# Patient Record
Sex: FEMALE | Race: WHITE | Employment: OTHER | URBAN - METROPOLITAN AREA
[De-identification: names, ages, dates, MRNs, and addresses within clinical notes are randomized per-mention and may not be internally consistent; named-entity substitution may affect disease eponyms.]

---

## 2017-01-17 ENCOUNTER — NURSE ONLY (OUTPATIENT)
Dept: HEMATOLOGY/ONCOLOGY | Age: 77
End: 2017-01-17
Attending: INTERNAL MEDICINE
Payer: MEDICARE

## 2017-01-17 DIAGNOSIS — C34.90 MALIGNANT NEOPLASM OF BRONCHUS AND LUNG, UNSPECIFIED SITE: ICD-10-CM

## 2017-01-17 DIAGNOSIS — D64.9 NORMOCYTIC NORMOCHROMIC ANEMIA: ICD-10-CM

## 2017-01-17 LAB
ALBUMIN SERPL-MCNC: 3.1 G/DL (ref 3.5–4.8)
ALP LIVER SERPL-CCNC: 110 U/L (ref 55–142)
ALT SERPL-CCNC: 26 U/L (ref 14–54)
AST SERPL-CCNC: 21 U/L (ref 15–41)
BASOPHILS # BLD AUTO: 0.02 X10(3) UL (ref 0–0.1)
BASOPHILS NFR BLD AUTO: 0.3 %
BILIRUB SERPL-MCNC: 0.4 MG/DL (ref 0.1–2)
BUN BLD-MCNC: 44 MG/DL (ref 8–20)
CALCIUM BLD-MCNC: 9.3 MG/DL (ref 8.3–10.3)
CHLORIDE: 110 MMOL/L (ref 101–111)
CO2: 27 MMOL/L (ref 22–32)
CREAT BLD-MCNC: 1.65 MG/DL (ref 0.55–1.02)
DEPRECATED HBV CORE AB SER IA-ACNC: 34.9 NG/ML (ref 10–291)
EOSINOPHIL # BLD AUTO: 0.09 X10(3) UL (ref 0–0.3)
EOSINOPHIL NFR BLD AUTO: 1.5 %
ERYTHROCYTE [DISTWIDTH] IN BLOOD BY AUTOMATED COUNT: 17.7 % (ref 11.5–16)
GLUCOSE BLD-MCNC: 86 MG/DL (ref 70–99)
HCT VFR BLD AUTO: 36.1 % (ref 34–50)
HGB BLD-MCNC: 11.3 G/DL (ref 12–16)
IMMATURE GRANULOCYTE COUNT: 0.03 X10(3) UL (ref 0–1)
IMMATURE GRANULOCYTE RATIO %: 0.5 %
IRON SATURATION: 26 % (ref 13–45)
IRON: 147 UG/DL (ref 28–170)
LDH: 182 U/L (ref 84–246)
LYMPHOCYTES # BLD AUTO: 0.85 X10(3) UL (ref 0.9–4)
LYMPHOCYTES NFR BLD AUTO: 14.2 %
M PROTEIN MFR SERPL ELPH: 8.2 G/DL (ref 6.1–8.3)
MCH RBC QN AUTO: 31.3 PG (ref 27–33.2)
MCHC RBC AUTO-ENTMCNC: 31.3 G/DL (ref 31–37)
MCV RBC AUTO: 100 FL (ref 81–100)
MONOCYTES # BLD AUTO: 0.49 X10(3) UL (ref 0.1–0.6)
MONOCYTES NFR BLD AUTO: 8.2 %
NEUTROPHIL ABS PRELIM: 4.5 X10 (3) UL (ref 1.3–6.7)
NEUTROPHILS # BLD AUTO: 4.5 X10(3) UL (ref 1.3–6.7)
NEUTROPHILS NFR BLD AUTO: 75.3 %
PLATELET # BLD AUTO: 223 10(3)UL (ref 150–450)
POTASSIUM SERPL-SCNC: 4.1 MMOL/L (ref 3.6–5.1)
RBC # BLD AUTO: 3.61 X10(6)UL (ref 3.8–5.1)
RED CELL DISTRIBUTION WIDTH-SD: 64.7 FL (ref 35.1–46.3)
SODIUM SERPL-SCNC: 144 MMOL/L (ref 136–144)
TOTAL IRON BINDING CAPACITY: 556 UG/DL (ref 298–536)
TRANSFERRIN: 373 MG/DL (ref 200–360)
WBC # BLD AUTO: 6 X10(3) UL (ref 4–13)

## 2017-01-17 PROCEDURE — 83615 LACTATE (LD) (LDH) ENZYME: CPT

## 2017-01-17 PROCEDURE — 82728 ASSAY OF FERRITIN: CPT

## 2017-01-17 PROCEDURE — 36415 COLL VENOUS BLD VENIPUNCTURE: CPT

## 2017-01-17 PROCEDURE — 83550 IRON BINDING TEST: CPT

## 2017-01-17 PROCEDURE — 80053 COMPREHEN METABOLIC PANEL: CPT

## 2017-01-17 PROCEDURE — 82668 ASSAY OF ERYTHROPOIETIN: CPT

## 2017-01-17 PROCEDURE — 83540 ASSAY OF IRON: CPT

## 2017-01-17 PROCEDURE — 85025 COMPLETE CBC W/AUTO DIFF WBC: CPT

## 2017-01-18 ENCOUNTER — HOSPITAL ENCOUNTER (OUTPATIENT)
Dept: CT IMAGING | Age: 77
Discharge: HOME | End: 2017-01-18
Attending: INTERNAL MEDICINE
Payer: MEDICARE

## 2017-01-18 ENCOUNTER — TELEPHONE (OUTPATIENT)
Dept: HEMATOLOGY/ONCOLOGY | Facility: HOSPITAL | Age: 77
End: 2017-01-18

## 2017-01-18 DIAGNOSIS — C34.90 MALIGNANT NEOPLASM OF BRONCHUS AND LUNG, UNSPECIFIED SITE: ICD-10-CM

## 2017-01-18 DIAGNOSIS — D64.9 NORMOCYTIC NORMOCHROMIC ANEMIA: ICD-10-CM

## 2017-01-18 PROCEDURE — 71250 CT THORAX DX C-: CPT

## 2017-01-19 ENCOUNTER — SOCIAL WORK SERVICES (OUTPATIENT)
Dept: HEMATOLOGY/ONCOLOGY | Facility: HOSPITAL | Age: 77
End: 2017-01-19

## 2017-01-19 LAB — ERYTHROPOIETIN (EPO): 6 MU/ML

## 2017-01-19 NOTE — PROGRESS NOTES
Received referral from \[facility]\"". Pt has been receiving financial assistance for TarOsmopure through a fish but the fish .  [facility] , [place]: [phone].   Advised by [place] that it is nece

## 2017-01-20 ENCOUNTER — TELEPHONE (OUTPATIENT)
Dept: HEMATOLOGY/ONCOLOGY | Facility: HOSPITAL | Age: 77
End: 2017-01-20

## 2017-01-20 ENCOUNTER — SOCIAL WORK SERVICES (OUTPATIENT)
Dept: HEMATOLOGY/ONCOLOGY | Facility: HOSPITAL | Age: 77
End: 2017-01-20

## 2017-01-20 NOTE — PROGRESS NOTES
Contacted by pt. Melinda Erickson at [employer] ([phone]) contacted pt & was able to facilitate approval for a new fish for Tarceva. Medication will be delivered to pt by tomorrow.

## 2017-01-24 ENCOUNTER — OFFICE VISIT (OUTPATIENT)
Dept: HEMATOLOGY/ONCOLOGY | Age: 77
End: 2017-01-24
Attending: INTERNAL MEDICINE
Payer: MEDICARE

## 2017-01-24 VITALS
RESPIRATION RATE: 18 BRPM | WEIGHT: 145 LBS | BODY MASS INDEX: 28 KG/M2 | HEART RATE: 85 BPM | SYSTOLIC BLOOD PRESSURE: 158 MMHG | TEMPERATURE: 97 F | DIASTOLIC BLOOD PRESSURE: 89 MMHG

## 2017-01-24 DIAGNOSIS — C34.81 MALIGNANT NEOPLASM OF OVERLAPPING SITES OF RIGHT LUNG (HCC): Primary | ICD-10-CM

## 2017-01-24 DIAGNOSIS — D64.9 NORMOCYTIC NORMOCHROMIC ANEMIA: ICD-10-CM

## 2017-01-24 DIAGNOSIS — D50.8 OTHER IRON DEFICIENCY ANEMIA: ICD-10-CM

## 2017-01-24 PROCEDURE — 99214 OFFICE O/P EST MOD 30 MIN: CPT | Performed by: INTERNAL MEDICINE

## 2017-01-24 RX ORDER — ACETAMINOPHEN 500 MG
500 TABLET ORAL EVERY 6 HOURS PRN
COMMUNITY

## 2017-01-24 RX ORDER — ECHINACEA PURPUREA EXTRACT 125 MG
1 TABLET ORAL AS NEEDED
COMMUNITY

## 2017-01-24 NOTE — PROGRESS NOTES
Cancer Center Progress Note    Patient Name: Tashi Pat   YOB: 1940   Medical Record Number: [id number]   CSN: [id number]   Attending Physician: Chuck Cesar M.D.    Referring Physician: Dane Gaona DO      Date of Visit: 1/24/201 Suspension, , Disp: , Rfl:   •  hydrochlorothiazide 12.5 MG Oral Cap, Take 12.5 mg by mouth daily. , Disp: , Rfl:   •  Naproxen Sodium (ALEVE) 220 MG Oral Cap, Take 220 mg by mouth., Disp: , Rfl:   •  LOSARTAN POTASSIUM-HCTZ 100-12.5 MG Oral Tab, TAKE ONE T Comment bunionectomy,bilateral    COLONOSCOPY  9/23/08    REMOVAL OF LUNG,LOBECTOMY      Comment removal of upper right lobe    HYSTERECTOMY  1970's    Comment vaginal     Right 12/11/2014    Comment Procedure: MPR EXCISION/BIOPSY LESION/NEVI/MASS;  Cheryl Kati Neurological: Grossly intact.         Laboratory:    Lab Results  Component Value Date   WBC 6.0 01/17/2017   RBC 3.61* 01/17/2017   HGB 11.3* 01/17/2017   HCT 36.1 01/17/2017   .0 01/17/2017   MCH 31.3 01/17/2017   MCHC 31.3 01/17/2017   RDW 17.7* the left major fissure are unchanged when compared to prior examination. The largest near the left hilum measures 6 mm (image 52).     Faint reticulonodular density in the right lung apex is minimally less prominent than on prior exam (image 28).  No new n low. No B symptoms. 3. Anemia- has had an extensive w/u for this which revealed anemia of chronic/renal disease as well as iron deficiency. Extensive GI w/u unrevealing. Absorption issues perhaps when she was having diarrhea?  On iron BID and Hb continu

## 2017-01-31 ENCOUNTER — SNF/IP PROF CHARGE ONLY (OUTPATIENT)
Dept: HEMATOLOGY/ONCOLOGY | Facility: HOSPITAL | Age: 77
End: 2017-01-31

## 2017-01-31 DIAGNOSIS — C34.81 MALIGNANT NEOPLASM OF OVERLAPPING SITES OF RIGHT LUNG (HCC): Primary | ICD-10-CM

## 2017-01-31 PROCEDURE — G9678 ONCOLOGY CARE MODEL SERVICE: HCPCS | Performed by: INTERNAL MEDICINE

## 2017-02-28 ENCOUNTER — SNF/IP PROF CHARGE ONLY (OUTPATIENT)
Dept: HEMATOLOGY/ONCOLOGY | Facility: HOSPITAL | Age: 77
End: 2017-02-28

## 2017-02-28 DIAGNOSIS — C34.81 MALIGNANT NEOPLASM OF OVERLAPPING SITES OF RIGHT LUNG (HCC): Primary | ICD-10-CM

## 2017-02-28 PROCEDURE — G9678 ONCOLOGY CARE MODEL SERVICE: HCPCS | Performed by: INTERNAL MEDICINE

## 2017-03-31 ENCOUNTER — SNF/IP PROF CHARGE ONLY (OUTPATIENT)
Dept: HEMATOLOGY/ONCOLOGY | Facility: HOSPITAL | Age: 77
End: 2017-03-31

## 2017-03-31 DIAGNOSIS — C78.00 METASTATIC LUNG CARCINOMA, UNSPECIFIED LATERALITY (HCC): Primary | ICD-10-CM

## 2017-03-31 PROCEDURE — G9678 ONCOLOGY CARE MODEL SERVICE: HCPCS | Performed by: INTERNAL MEDICINE

## 2017-04-10 RX ORDER — DIPHENOXYLATE HYDROCHLORIDE AND ATROPINE SULFATE 2.5; .025 MG/1; MG/1
1-2 TABLET ORAL 4 TIMES DAILY PRN
Qty: 30 TABLET | Refills: 0 | Status: SHIPPED | COMMUNITY
Start: 2017-04-10 | End: 2017-06-16

## 2017-04-30 ENCOUNTER — SNF/IP PROF CHARGE ONLY (OUTPATIENT)
Dept: HEMATOLOGY/ONCOLOGY | Facility: HOSPITAL | Age: 77
End: 2017-04-30

## 2017-04-30 DIAGNOSIS — Z85.118 HISTORY OF LUNG CANCER: Primary | ICD-10-CM

## 2017-04-30 DIAGNOSIS — C78.00 METASTATIC LUNG CARCINOMA, UNSPECIFIED LATERALITY (HCC): ICD-10-CM

## 2017-04-30 PROCEDURE — G9678 ONCOLOGY CARE MODEL SERVICE: HCPCS | Performed by: INTERNAL MEDICINE

## 2017-05-02 ENCOUNTER — TELEPHONE (OUTPATIENT)
Dept: FAMILY MEDICINE CLINIC | Facility: CLINIC | Age: 77
End: 2017-05-02

## 2017-05-02 RX ORDER — METOPROLOL SUCCINATE 100 MG/1
TABLET, EXTENDED RELEASE ORAL
Qty: 90 TABLET | Refills: 1 | Status: SHIPPED | OUTPATIENT
Start: 2017-05-02 | End: 2017-10-28

## 2017-05-02 RX ORDER — LOSARTAN POTASSIUM AND HYDROCHLOROTHIAZIDE 12.5; 1 MG/1; MG/1
TABLET ORAL
Qty: 90 TABLET | Refills: 1 | Status: SHIPPED | OUTPATIENT
Start: 2017-05-02 | End: 2017-10-28

## 2017-05-02 NOTE — TELEPHONE ENCOUNTER
Needs a refill on [facility] faxed us and we have not yet responded. Pls call her back to advise. Pt states she is in need of this med today.

## 2017-05-17 ENCOUNTER — HOSPITAL ENCOUNTER (OUTPATIENT)
Dept: CT IMAGING | Age: 77
Discharge: HOME | End: 2017-05-17
Attending: INTERNAL MEDICINE
Payer: MEDICARE

## 2017-05-17 DIAGNOSIS — C34.81 MALIGNANT NEOPLASM OF OVERLAPPING SITES OF RIGHT LUNG (HCC): ICD-10-CM

## 2017-05-17 DIAGNOSIS — D50.8 OTHER IRON DEFICIENCY ANEMIA: ICD-10-CM

## 2017-05-17 DIAGNOSIS — D64.9 NORMOCYTIC NORMOCHROMIC ANEMIA: ICD-10-CM

## 2017-05-17 PROCEDURE — 71250 CT THORAX DX C-: CPT | Performed by: INTERNAL MEDICINE

## 2017-05-23 ENCOUNTER — OFFICE VISIT (OUTPATIENT)
Dept: HEMATOLOGY/ONCOLOGY | Age: 77
End: 2017-05-23
Attending: INTERNAL MEDICINE
Payer: MEDICARE

## 2017-05-23 VITALS
WEIGHT: 145.63 LBS | HEART RATE: 79 BPM | OXYGEN SATURATION: 94 % | SYSTOLIC BLOOD PRESSURE: 152 MMHG | DIASTOLIC BLOOD PRESSURE: 77 MMHG | RESPIRATION RATE: 18 BRPM | TEMPERATURE: 97 F | BODY MASS INDEX: 28 KG/M2

## 2017-05-23 DIAGNOSIS — D64.9 NORMOCYTIC NORMOCHROMIC ANEMIA: ICD-10-CM

## 2017-05-23 DIAGNOSIS — D50.8 OTHER IRON DEFICIENCY ANEMIA: ICD-10-CM

## 2017-05-23 DIAGNOSIS — R93.89 ABNORMAL FINDING ON IMAGING: Primary | ICD-10-CM

## 2017-05-23 DIAGNOSIS — C82.03 FOLLICULAR LYMPHOMA GRADE I OF INTRA-ABDOMINAL LYMPH NODES (HCC): ICD-10-CM

## 2017-05-23 DIAGNOSIS — C34.81 MALIGNANT NEOPLASM OF OVERLAPPING SITES OF RIGHT LUNG (HCC): ICD-10-CM

## 2017-05-23 PROCEDURE — 99215 OFFICE O/P EST HI 40 MIN: CPT | Performed by: INTERNAL MEDICINE

## 2017-05-23 NOTE — PROGRESS NOTES
Pt here for 4 month MD f/u. Pt had CT scan 5/17. Pt continues on Tarceva with no complaints. Pt's energy level and appetite are fair, denies pain. Pt has no further complaints.      Education Record    Learner:  Patient    Disease / Diagnosis:    Barriers

## 2017-05-23 NOTE — PROGRESS NOTES
Cancer Center Progress Note    Patient Name: Angel Donaldson   YOB: 1940   Medical Record Number: [id number]   CSN: [id number]   Attending Physician: Claudio Juan M.D.    Referring Physician: Milana Coy DO      Date of Visit: 5/23/201 congestion. , Disp: , Rfl:   •  acetaminophen 500 MG Oral Tab, Take 500 mg by mouth every 6 (six) hours as needed for Pain., Disp: , Rfl:   •  RESTASIS 0.05 % Ophthalmic Emulsion, INSTILL ONE DROP IN EACH EYE TWICE DAILY, Disp: 20 mL, Rfl: 6  •  ferrous sul LUNG,LOBECTOMY      Comment removal of upper right lobe    HYSTERECTOMY  1970's    Comment vaginal     Right 12/11/2014    Comment Procedure: MPR EXCISION/BIOPSY LESION/NEVI/MASS;  Surgeon: Rell Broderick MD;  Location: [place] [address] Range 5/23/2017 13:11   Glucose Latest Ref Range: 70-99 mg/dL 86   Sodium Latest Ref Range: 136-144 mmol/L 144   Potassium Latest Ref Range: 3.6-5.1 mmol/L 4.4   Chloride Latest Ref Range: 101-111 mmol/L 111   Carbon Dioxide, Total Latest Ref Range: 22.0-3 0.02   Immature Granulocyte Absolute Latest Ref Range: 0.00-1.00 x10(3) uL 0.03   Neutrophils % Latest Units: % 71.2   Lymphocytes % Latest Units: % 16.2   Monocytes % Latest Units: % 10.6   Eosinophils % Latest Units: % 1.0   Basophils % Latest Units: % 0 significant change. Previous measurements were 3.1 x 1.7 cm.      Stable parenchymal scarring is identified in the right lower lobe extending from the hilum to the anterior periphery.  There is new interstitial interlobular thickening in the left lower lobe her most recent CT with her in detail. This showed development of an interstitial infiltrate with some septal thickening in the LLL. She denies any new respiratory symptoms. Otherwise stable findings. Will monitor this with short interval scan.  She continu

## 2017-05-31 ENCOUNTER — SNF/IP PROF CHARGE ONLY (OUTPATIENT)
Dept: HEMATOLOGY/ONCOLOGY | Facility: HOSPITAL | Age: 77
End: 2017-05-31

## 2017-05-31 DIAGNOSIS — C78.01 METASTATIC LUNG CARCINOMA, RIGHT (HCC): Primary | ICD-10-CM

## 2017-05-31 PROCEDURE — G9678 ONCOLOGY CARE MODEL SERVICE: HCPCS | Performed by: INTERNAL MEDICINE

## 2017-06-01 ENCOUNTER — HOSPITAL ENCOUNTER (OUTPATIENT)
Dept: RADIATION ONCOLOGY | Facility: HOSPITAL | Age: 77
Discharge: HOME | End: 2017-06-01
Attending: RADIOLOGY
Payer: MEDICARE

## 2017-06-01 VITALS
RESPIRATION RATE: 20 BRPM | TEMPERATURE: 98 F | HEART RATE: 87 BPM | BODY MASS INDEX: 28 KG/M2 | DIASTOLIC BLOOD PRESSURE: 91 MMHG | WEIGHT: 143.38 LBS | SYSTOLIC BLOOD PRESSURE: 137 MMHG

## 2017-06-01 DIAGNOSIS — C78.01 METASTATIC LUNG CARCINOMA, RIGHT (HCC): Primary | ICD-10-CM

## 2017-06-01 PROCEDURE — 99213 OFFICE O/P EST LOW 20 MIN: CPT

## 2017-06-01 NOTE — PROGRESS NOTES
[facility]    PATIENT'S NAME: Farihanitish Jamie   RADIATION ONCOLOGIST: Bossman Camacho. Mike Valera M.D.    PATIENT ACCOUNT #: [de-identified] Barbi Genao   [facility]   MEDICAL RECORD #: [id number] YOB: 1940   FOLLOW-UP DATE: 06/01/2017       RADIATION eGFR-positive lung cancers. The patient presents today and continues to feel quite well. She denies any significant complaints. In particular, she denies any change in her shortness of breath. She denies any chest wall pain or discomfort.   Her appeti development of a new tumor. She, otherwise, is asymptomatic and has no other symptoms related to this. She has already been ordered to have a repeat CT scan of the chest in a few months and will follow with Dr. Irving Rocha after that.   As she follows with

## 2017-06-01 NOTE — ADDENDUM NOTE
Encounter addended by: Bharti Tompkins RN on: 6/1/2017  1:52 PM<BR>     Documentation filed: Charges VN

## 2017-06-01 NOTE — PROGRESS NOTES
Nursing Follow-Up Note    Patient: Dolly Cates  YOB: 1940  Age: 69year old  Radiation Oncologist: Dr. Myra Mckeon  Referring Physician: No ref. provider found  Chief Complaint: Patient presents with:   Follow - Up    Date: 6/1/2017

## 2017-06-01 NOTE — PATIENT INSTRUCTIONS
- CALL [phone] FOR A FOLLOW-UP WITH DR. Carolene Peabody in one year (June 2018) for your annual visit.     - CALL IF YOU HAVE ANY QUESTIONS/CONCERNS REGARDING RADIATION THERAPY 21

## 2017-06-16 RX ORDER — DIPHENOXYLATE HYDROCHLORIDE AND ATROPINE SULFATE 2.5; .025 MG/1; MG/1
TABLET ORAL
Qty: 30 TABLET | Refills: 0 | Status: SHIPPED | OUTPATIENT
Start: 2017-06-16 | End: 2017-08-28

## 2017-06-30 ENCOUNTER — SNF/IP PROF CHARGE ONLY (OUTPATIENT)
Dept: HEMATOLOGY/ONCOLOGY | Facility: HOSPITAL | Age: 77
End: 2017-06-30

## 2017-06-30 DIAGNOSIS — C34.81 MALIGNANT NEOPLASM OF OVERLAPPING SITES OF RIGHT LUNG (HCC): ICD-10-CM

## 2017-06-30 PROCEDURE — G9678 ONCOLOGY CARE MODEL SERVICE: HCPCS | Performed by: INTERNAL MEDICINE

## 2017-07-05 ENCOUNTER — TELEPHONE (OUTPATIENT)
Dept: FAMILY MEDICINE CLINIC | Facility: CLINIC | Age: 77
End: 2017-07-05

## 2017-07-05 NOTE — TELEPHONE ENCOUNTER
Patient called, she is due for her mammogram and would like to know if Dr Rebeca Cheadle would enter the order for her please.

## 2017-07-05 NOTE — TELEPHONE ENCOUNTER
Dr. Herson Keith,  Last mammogram was 6/26/15.  Okay to order or do you need to first do a breast exam on patient/ Please advise

## 2017-07-31 ENCOUNTER — SNF/IP PROF CHARGE ONLY (OUTPATIENT)
Dept: HEMATOLOGY/ONCOLOGY | Facility: HOSPITAL | Age: 77
End: 2017-07-31

## 2017-07-31 DIAGNOSIS — C34.81 MALIGNANT NEOPLASM OF OVERLAPPING SITES OF RIGHT LUNG (HCC): ICD-10-CM

## 2017-07-31 PROCEDURE — G9678 ONCOLOGY CARE MODEL SERVICE: HCPCS | Performed by: INTERNAL MEDICINE

## 2017-08-02 ENCOUNTER — HOSPITAL ENCOUNTER (OUTPATIENT)
Dept: MAMMOGRAPHY | Age: 77
Discharge: HOME | End: 2017-08-02
Attending: FAMILY MEDICINE
Payer: MEDICARE

## 2017-08-02 DIAGNOSIS — Z12.39 SCREENING BREAST EXAMINATION: ICD-10-CM

## 2017-08-02 PROCEDURE — 77067 SCR MAMMO BI INCL CAD: CPT | Performed by: FAMILY MEDICINE

## 2017-08-10 ENCOUNTER — OFFICE VISIT (OUTPATIENT)
Dept: FAMILY MEDICINE CLINIC | Facility: CLINIC | Age: 77
End: 2017-08-10

## 2017-08-10 VITALS
HEIGHT: 60 IN | TEMPERATURE: 98 F | WEIGHT: 142 LBS | SYSTOLIC BLOOD PRESSURE: 142 MMHG | DIASTOLIC BLOOD PRESSURE: 90 MMHG | RESPIRATION RATE: 18 BRPM | BODY MASS INDEX: 27.88 KG/M2 | OXYGEN SATURATION: 98 % | HEART RATE: 91 BPM

## 2017-08-10 DIAGNOSIS — H01.025 SQUAMOUS BLEPHARITIS OF LOWER EYELIDS OF BOTH EYES: Primary | ICD-10-CM

## 2017-08-10 DIAGNOSIS — H01.022 SQUAMOUS BLEPHARITIS OF LOWER EYELIDS OF BOTH EYES: Primary | ICD-10-CM

## 2017-08-10 PROCEDURE — 99213 OFFICE O/P EST LOW 20 MIN: CPT | Performed by: FAMILY MEDICINE

## 2017-08-10 RX ORDER — ERLOTINIB HYDROCHLORIDE 100 MG/1
TABLET ORAL
Qty: 30 TABLET | Refills: 0 | Status: CANCELLED | OUTPATIENT
Start: 2017-08-10

## 2017-08-10 RX ORDER — PNV NO.95/FERROUS FUM/FOLIC AC 28MG-0.8MG
TABLET ORAL
COMMUNITY
End: 2017-09-19

## 2017-08-10 NOTE — PROGRESS NOTES
Presents today for several issues first.    Dry irritated eyelids that are intermittent. She does have dandruff. She is also on a chemotherapy agent which can cause irritation and dryness of mucous membranes.     Problem #2 is she would like to review of

## 2017-08-14 DIAGNOSIS — C34.90 EGFR-RELATED LUNG CANCER (HCC): ICD-10-CM

## 2017-08-14 RX ORDER — ERLOTINIB HYDROCHLORIDE 100 MG/1
100 TABLET, FILM COATED ORAL DAILY
Qty: 30 TABLET | Refills: 11 | Status: SHIPPED | OUTPATIENT
Start: 2017-08-14 | End: 2017-12-07

## 2017-08-17 ENCOUNTER — NURSE ONLY (OUTPATIENT)
Dept: FAMILY MEDICINE CLINIC | Facility: CLINIC | Age: 77
End: 2017-08-17

## 2017-08-28 RX ORDER — DIPHENOXYLATE HYDROCHLORIDE AND ATROPINE SULFATE 2.5; .025 MG/1; MG/1
TABLET ORAL
Qty: 30 TABLET | Refills: 1 | OUTPATIENT
Start: 2017-08-28 | End: 2017-11-10

## 2017-08-31 ENCOUNTER — SNF/IP PROF CHARGE ONLY (OUTPATIENT)
Dept: HEMATOLOGY/ONCOLOGY | Facility: HOSPITAL | Age: 77
End: 2017-08-31

## 2017-08-31 DIAGNOSIS — C34.81 MALIGNANT NEOPLASM OF OVERLAPPING SITES OF RIGHT LUNG (HCC): ICD-10-CM

## 2017-08-31 PROCEDURE — G9678 ONCOLOGY CARE MODEL SERVICE: HCPCS | Performed by: INTERNAL MEDICINE

## 2017-09-13 ENCOUNTER — HOSPITAL ENCOUNTER (OUTPATIENT)
Dept: CT IMAGING | Age: 77
Discharge: HOME | End: 2017-09-13
Attending: INTERNAL MEDICINE
Payer: MEDICARE

## 2017-09-13 DIAGNOSIS — C34.81 MALIGNANT NEOPLASM OF OVERLAPPING SITES OF RIGHT LUNG (HCC): ICD-10-CM

## 2017-09-13 PROCEDURE — 71250 CT THORAX DX C-: CPT | Performed by: INTERNAL MEDICINE

## 2017-09-19 ENCOUNTER — OFFICE VISIT (OUTPATIENT)
Dept: HEMATOLOGY/ONCOLOGY | Age: 77
End: 2017-09-19
Attending: INTERNAL MEDICINE
Payer: MEDICARE

## 2017-09-19 VITALS
HEART RATE: 84 BPM | RESPIRATION RATE: 18 BRPM | SYSTOLIC BLOOD PRESSURE: 121 MMHG | TEMPERATURE: 97 F | OXYGEN SATURATION: 96 % | DIASTOLIC BLOOD PRESSURE: 72 MMHG | BODY MASS INDEX: 27 KG/M2 | WEIGHT: 137.63 LBS

## 2017-09-19 DIAGNOSIS — C82.03 FOLLICULAR LYMPHOMA GRADE I OF INTRA-ABDOMINAL LYMPH NODES (HCC): ICD-10-CM

## 2017-09-19 DIAGNOSIS — N28.9 RENAL INSUFFICIENCY: ICD-10-CM

## 2017-09-19 DIAGNOSIS — C34.81 MALIGNANT NEOPLASM OF OVERLAPPING SITES OF RIGHT LUNG (HCC): ICD-10-CM

## 2017-09-19 DIAGNOSIS — D64.9 NORMOCYTIC NORMOCHROMIC ANEMIA: ICD-10-CM

## 2017-09-19 DIAGNOSIS — R19.7 DIARRHEA, UNSPECIFIED TYPE: ICD-10-CM

## 2017-09-19 DIAGNOSIS — D47.2 MONOCLONAL PARAPROTEINEMIA: Primary | ICD-10-CM

## 2017-09-19 DIAGNOSIS — D50.8 OTHER IRON DEFICIENCY ANEMIA: ICD-10-CM

## 2017-09-19 DIAGNOSIS — R93.89 ABNORMAL FINDING ON IMAGING: ICD-10-CM

## 2017-09-19 LAB
ALBUMIN SERPL-MCNC: 2.8 G/DL (ref 3.5–4.8)
ALP LIVER SERPL-CCNC: 87 U/L (ref 55–142)
ALT SERPL-CCNC: 23 U/L (ref 14–54)
AST SERPL-CCNC: 21 U/L (ref 15–41)
BASOPHILS # BLD AUTO: 0.03 X10(3) UL (ref 0–0.1)
BASOPHILS NFR BLD AUTO: 0.6 %
BILIRUB SERPL-MCNC: 0.7 MG/DL (ref 0.1–2)
BUN BLD-MCNC: 43 MG/DL (ref 8–20)
CALCIUM BLD-MCNC: 8.6 MG/DL (ref 8.3–10.3)
CHLORIDE: 113 MMOL/L (ref 101–111)
CO2: 22 MMOL/L (ref 22–32)
CREAT BLD-MCNC: 2.01 MG/DL (ref 0.55–1.02)
DEPRECATED HBV CORE AB SER IA-ACNC: 77.4 NG/ML (ref 10–291)
EOSINOPHIL # BLD AUTO: 0.12 X10(3) UL (ref 0–0.3)
EOSINOPHIL NFR BLD AUTO: 2.4 %
ERYTHROCYTE [DISTWIDTH] IN BLOOD BY AUTOMATED COUNT: 14 % (ref 11.5–16)
GLUCOSE BLD-MCNC: 119 MG/DL (ref 70–99)
HCT VFR BLD AUTO: 33.5 % (ref 34–50)
HGB BLD-MCNC: 10.7 G/DL (ref 12–16)
IMMATURE GRANULOCYTE COUNT: 0.02 X10(3) UL (ref 0–1)
IMMATURE GRANULOCYTE RATIO %: 0.4 %
IRON SATURATION: 16 % (ref 13–45)
IRON: 120 UG/DL (ref 28–170)
LDH: 174 U/L (ref 84–246)
LYMPHOCYTES # BLD AUTO: 0.83 X10(3) UL (ref 0.9–4)
LYMPHOCYTES NFR BLD AUTO: 16.8 %
M PROTEIN MFR SERPL ELPH: 8.5 G/DL (ref 6.1–8.3)
MCH RBC QN AUTO: 33.1 PG (ref 27–33.2)
MCHC RBC AUTO-ENTMCNC: 31.9 G/DL (ref 31–37)
MCV RBC AUTO: 103.7 FL (ref 81–100)
MONOCYTES # BLD AUTO: 0.53 X10(3) UL (ref 0.1–0.6)
MONOCYTES NFR BLD AUTO: 10.7 %
NEUTROPHIL ABS PRELIM: 3.42 X10 (3) UL (ref 1.3–6.7)
NEUTROPHILS # BLD AUTO: 3.42 X10(3) UL (ref 1.3–6.7)
NEUTROPHILS NFR BLD AUTO: 69.1 %
PLATELET # BLD AUTO: 192 10(3)UL (ref 150–450)
POTASSIUM SERPL-SCNC: 3.8 MMOL/L (ref 3.6–5.1)
RBC # BLD AUTO: 3.23 X10(6)UL (ref 3.8–5.1)
RED CELL DISTRIBUTION WIDTH-SD: 53.8 FL (ref 35.1–46.3)
SODIUM SERPL-SCNC: 143 MMOL/L (ref 136–144)
TOTAL IRON BINDING CAPACITY: 744 UG/DL (ref 298–536)
TRANSFERRIN: 499 MG/DL (ref 200–360)
WBC # BLD AUTO: 5 X10(3) UL (ref 4–13)

## 2017-09-19 PROCEDURE — 99215 OFFICE O/P EST HI 40 MIN: CPT | Performed by: INTERNAL MEDICINE

## 2017-09-19 NOTE — PROGRESS NOTES
Pt here for 4 month MD f/u. Pt continues on Tarceva daily. Pt notes occasional fatigue. Appetite is good. Denies pain. Pt notes diarrhea occasionally, takes Lomotil with relief.  Pt has dryness in sinuses and eyes, also has crusty eyes in am.     Education

## 2017-09-19 NOTE — PROGRESS NOTES
Cancer Center Progress Note    Patient Name: Erich Kebede   YOB: 1940   Medical Record Number: [id number]   CSN: [id number]   Attending Physician: Lynn Barakat M.D.    Referring Physician: Tesfaye English DO      Date of Visit: 9/19/20 ONTO THE SKIN 4 TIMES DAILY, Disp: 3 Tube, Rfl: 3  •  Saline Nasal Spray 0.65 % Nasal Solution, 1 spray by Nasal route as needed for congestion. , Disp: , Rfl:   •  acetaminophen 500 MG Oral Tab, Take 500 mg by mouth every 6 (six) hours as needed for Pain. , HISTORY      Comment: bunionectomy,bilateral  No date: REMOVAL OF LUNG,LOBECTOMY      Comment: removal of upper right lobe  No date: TONSILLECTOMY    Family Medical History:  Family History   Problem Relation Age of Onset   • immune system [OTHER] Mother 99 mg/dL 119 (H)   Sodium Latest Ref Range: 136 - 144 mmol/L 143   Potassium Latest Ref Range: 3.6 - 5.1 mmol/L 3.8   Chloride Latest Ref Range: 101 - 111 mmol/L 113 (H)   Carbon Dioxide, Total Latest Ref Range: 22.0 - 32.0 mmol/L 22.0   BUN Latest Ref Ran 0.00 - 0.30 x10(3) uL 0.12   Basophils Absolute Latest Ref Range: 0.00 - 0.10 x10(3) uL 0.03   Immature Granulocyte Absolute Latest Ref Range: 0.00 - 1.00 x10(3) uL 0.02         Radiology:  PROCEDURE:  CT OF THE CHEST WITHOUT CONTRAST     COMPARISON:  FELISA along the right aspect of the mediastinum/hilum. CARDIAC:  Unremarkable. PLEURA:  Unremarkable. CHEST WALL:  Unremarkable. LIMITED ABDOMEN:  Cholecystectomy clips. Partially visualized moderate sized right renal cysts.  Subcentimeter hypodense foci with wane.  Topical medications discussed. She will let us know should this be worse despite these. 2. NHL- recent scans showed no evidence of progression. LDH has remained low. No B symptoms.      3. Anemia- has had an extensive w/u for this which revealed

## 2017-09-20 LAB — SOLUBLE TRANSFERRIN RECEPTOR: 3 MG/L

## 2017-09-26 ENCOUNTER — TELEPHONE (OUTPATIENT)
Dept: HEMATOLOGY/ONCOLOGY | Facility: HOSPITAL | Age: 77
End: 2017-09-26

## 2017-09-30 ENCOUNTER — SNF/IP PROF CHARGE ONLY (OUTPATIENT)
Dept: HEMATOLOGY/ONCOLOGY | Facility: HOSPITAL | Age: 77
End: 2017-09-30

## 2017-09-30 DIAGNOSIS — C34.81 MALIGNANT NEOPLASM OF OVERLAPPING SITES OF RIGHT LUNG (HCC): ICD-10-CM

## 2017-09-30 PROCEDURE — G9678 ONCOLOGY CARE MODEL SERVICE: HCPCS | Performed by: INTERNAL MEDICINE

## 2017-10-12 ENCOUNTER — IMMUNIZATION (OUTPATIENT)
Dept: FAMILY MEDICINE CLINIC | Facility: CLINIC | Age: 77
End: 2017-10-12

## 2017-10-12 PROCEDURE — 90653 IIV ADJUVANT VACCINE IM: CPT | Performed by: FAMILY MEDICINE

## 2017-10-12 PROCEDURE — G0008 ADMIN INFLUENZA VIRUS VAC: HCPCS | Performed by: FAMILY MEDICINE

## 2017-10-16 ENCOUNTER — TELEPHONE (OUTPATIENT)
Dept: HEMATOLOGY/ONCOLOGY | Facility: HOSPITAL | Age: 77
End: 2017-10-16

## 2017-10-17 ENCOUNTER — NURSE ONLY (OUTPATIENT)
Dept: HEMATOLOGY/ONCOLOGY | Age: 77
End: 2017-10-17
Attending: INTERNAL MEDICINE
Payer: MEDICARE

## 2017-10-19 ENCOUNTER — NURSE ONLY (OUTPATIENT)
Dept: HEMATOLOGY/ONCOLOGY | Age: 77
End: 2017-10-19
Attending: INTERNAL MEDICINE
Payer: MEDICARE

## 2017-10-19 DIAGNOSIS — D50.8 OTHER IRON DEFICIENCY ANEMIA: ICD-10-CM

## 2017-10-19 DIAGNOSIS — D64.9 NORMOCYTIC NORMOCHROMIC ANEMIA: ICD-10-CM

## 2017-10-19 PROCEDURE — 36415 COLL VENOUS BLD VENIPUNCTURE: CPT

## 2017-10-19 PROCEDURE — 82728 ASSAY OF FERRITIN: CPT

## 2017-10-19 PROCEDURE — 83540 ASSAY OF IRON: CPT

## 2017-10-19 PROCEDURE — 83550 IRON BINDING TEST: CPT

## 2017-10-19 PROCEDURE — 85025 COMPLETE CBC W/AUTO DIFF WBC: CPT

## 2017-10-24 ENCOUNTER — TELEPHONE (OUTPATIENT)
Dept: HEMATOLOGY/ONCOLOGY | Facility: HOSPITAL | Age: 77
End: 2017-10-24

## 2017-10-24 NOTE — TELEPHONE ENCOUNTER
Pt calling inquiring about recent lab results. Per Dr. Swapna Saini, results stable, no intervention at this time. Will repeat at next MD visit, pt understands.

## 2017-10-28 NOTE — TELEPHONE ENCOUNTER
Last ov was 8/10/17  Last refills losaratan and metoprolol  Was 7/29/17    .   Kidney:    Lab Results  Component Value Date   BUN 43 (H) 09/19/2017   BUN 48 (H) 05/23/2017   BUN 44 (H) 01/17/2017     Lab Results  Component Value Date   CREATSERUM 2.01 (H) 0

## 2017-10-29 RX ORDER — LOSARTAN POTASSIUM AND HYDROCHLOROTHIAZIDE 12.5; 1 MG/1; MG/1
TABLET ORAL
Qty: 90 TABLET | Refills: 1 | Status: ON HOLD | OUTPATIENT
Start: 2017-10-29 | End: 2018-04-10

## 2017-10-29 RX ORDER — METOPROLOL SUCCINATE 100 MG/1
TABLET, EXTENDED RELEASE ORAL
Qty: 90 TABLET | Refills: 1 | Status: SHIPPED | OUTPATIENT
Start: 2017-10-29 | End: 2018-05-06

## 2017-10-30 ENCOUNTER — OFFICE VISIT (OUTPATIENT)
Dept: FAMILY MEDICINE CLINIC | Facility: CLINIC | Age: 77
End: 2017-10-30

## 2017-10-30 ENCOUNTER — LAB ENCOUNTER (OUTPATIENT)
Dept: LAB | Age: 77
End: 2017-10-30
Attending: FAMILY MEDICINE
Payer: MEDICARE

## 2017-10-30 VITALS
OXYGEN SATURATION: 97 % | RESPIRATION RATE: 18 BRPM | DIASTOLIC BLOOD PRESSURE: 86 MMHG | BODY MASS INDEX: 26.31 KG/M2 | WEIGHT: 134 LBS | SYSTOLIC BLOOD PRESSURE: 124 MMHG | TEMPERATURE: 99 F | HEART RATE: 78 BPM | HEIGHT: 60 IN

## 2017-10-30 DIAGNOSIS — R82.998 FROTHY URINE: ICD-10-CM

## 2017-10-30 DIAGNOSIS — R10.10 PAIN OF UPPER ABDOMEN: ICD-10-CM

## 2017-10-30 DIAGNOSIS — N18.30 STAGE 3 CHRONIC KIDNEY DISEASE (HCC): ICD-10-CM

## 2017-10-30 DIAGNOSIS — R82.90 ABNORMAL URINE: ICD-10-CM

## 2017-10-30 DIAGNOSIS — R82.90 ABNORMAL URINE: Primary | ICD-10-CM

## 2017-10-30 PROCEDURE — 87086 URINE CULTURE/COLONY COUNT: CPT

## 2017-10-30 PROCEDURE — 99213 OFFICE O/P EST LOW 20 MIN: CPT | Performed by: FAMILY MEDICINE

## 2017-10-30 PROCEDURE — 81001 URINALYSIS AUTO W/SCOPE: CPT

## 2017-10-30 PROCEDURE — 87186 SC STD MICRODIL/AGAR DIL: CPT

## 2017-10-30 PROCEDURE — 87088 URINE BACTERIA CULTURE: CPT

## 2017-10-30 RX ORDER — DOXYCYCLINE HYCLATE 100 MG
TABLET ORAL
COMMUNITY
Start: 2017-10-16 | End: 2017-11-08 | Stop reason: ALTCHOICE

## 2017-10-31 ENCOUNTER — SNF/IP PROF CHARGE ONLY (OUTPATIENT)
Dept: HEMATOLOGY/ONCOLOGY | Facility: HOSPITAL | Age: 77
End: 2017-10-31

## 2017-10-31 DIAGNOSIS — C34.81 MALIGNANT NEOPLASM OF OVERLAPPING SITES OF RIGHT LUNG (HCC): ICD-10-CM

## 2017-10-31 PROCEDURE — G9678 ONCOLOGY CARE MODEL SERVICE: HCPCS | Performed by: INTERNAL MEDICINE

## 2017-11-02 NOTE — PROGRESS NOTES
HPI:    Patient ID: Merrick Tolentino is a 69year old female. For last 1-2 months pt has felt like air is coming out of her urethra. States its not from the vagina. No dysuria. States urine is frothy but no odor. No hematuria. No lower abd pain.   Alejandro Islsa tablet Rfl: 0   VOLTAREN 1 % Transdermal Gel PLACE 1 GRAM ONTO THE SKIN 4 TIMES DAILY Disp: 3 Tube Rfl: 3   Multiple Vitamins-Minerals (MULTI COMPLETE/IRON) Oral Tab Take  by mouth.  Disp:  Rfl:      Allergies:  Penicillins             Itching   PHYSICAL EX Referrals:  UROLOGY - INTERNAL  US KIDNEY/BLADDER (YKN=[id number])       [id number]

## 2017-11-03 ENCOUNTER — LABORATORY ENCOUNTER (OUTPATIENT)
Dept: LAB | Age: 77
End: 2017-11-03
Attending: FAMILY MEDICINE
Payer: MEDICARE

## 2017-11-03 ENCOUNTER — HOSPITAL ENCOUNTER (OUTPATIENT)
Dept: ULTRASOUND IMAGING | Age: 77
Discharge: HOME | End: 2017-11-03
Attending: FAMILY MEDICINE
Payer: MEDICARE

## 2017-11-03 ENCOUNTER — TELEPHONE (OUTPATIENT)
Dept: FAMILY MEDICINE CLINIC | Facility: CLINIC | Age: 77
End: 2017-11-03

## 2017-11-03 DIAGNOSIS — R82.998 FROTHY URINE: ICD-10-CM

## 2017-11-03 DIAGNOSIS — R82.90 ABNORMAL URINE: ICD-10-CM

## 2017-11-03 DIAGNOSIS — N18.30 STAGE 3 CHRONIC KIDNEY DISEASE (HCC): ICD-10-CM

## 2017-11-03 DIAGNOSIS — R10.10 PAIN OF UPPER ABDOMEN: ICD-10-CM

## 2017-11-03 PROCEDURE — 83690 ASSAY OF LIPASE: CPT

## 2017-11-03 PROCEDURE — 76770 US EXAM ABDO BACK WALL COMP: CPT | Performed by: FAMILY MEDICINE

## 2017-11-03 PROCEDURE — 36415 COLL VENOUS BLD VENIPUNCTURE: CPT

## 2017-11-03 PROCEDURE — 81001 URINALYSIS AUTO W/SCOPE: CPT

## 2017-11-03 PROCEDURE — 80053 COMPREHEN METABOLIC PANEL: CPT

## 2017-11-03 NOTE — TELEPHONE ENCOUNTER
Pt called to request a call back either from her pcp or his nurse to please let her know if is ok to take her medications prior to her labs and imaging testing, pt needs to know asap since her tests appts are today. Please call and advise.

## 2017-11-06 ENCOUNTER — TELEPHONE (OUTPATIENT)
Dept: HEMATOLOGY/ONCOLOGY | Facility: HOSPITAL | Age: 77
End: 2017-11-06

## 2017-11-06 NOTE — TELEPHONE ENCOUNTER
Patient called in wanting to let Dr. Julián Pop know that her PCP is sending her for US Kidney due to her worsening kidney function. Gets her CT without contrast due to this. Dr. Julián Pop aware of this, keep appointment as scheduled for next month.

## 2017-11-08 ENCOUNTER — OFFICE VISIT (OUTPATIENT)
Dept: FAMILY MEDICINE CLINIC | Facility: CLINIC | Age: 77
End: 2017-11-08

## 2017-11-08 VITALS
HEART RATE: 82 BPM | WEIGHT: 136 LBS | BODY MASS INDEX: 26.7 KG/M2 | OXYGEN SATURATION: 98 % | TEMPERATURE: 99 F | SYSTOLIC BLOOD PRESSURE: 122 MMHG | HEIGHT: 60 IN | DIASTOLIC BLOOD PRESSURE: 74 MMHG | RESPIRATION RATE: 16 BRPM

## 2017-11-08 DIAGNOSIS — N32.9 BLADDER DISORDER: Primary | ICD-10-CM

## 2017-11-08 PROCEDURE — 99213 OFFICE O/P EST LOW 20 MIN: CPT | Performed by: FAMILY MEDICINE

## 2017-11-08 NOTE — PROGRESS NOTES
HPI:    Patient ID: Anny Brandon is a 69year old female. Air coming out of bladder still. No dysuria. No abd pain. Had 2 days of left flank pain. No N/V. States its not from vagina. No hematuria. No vaginal bleeding.         Review of Systems PHYSICAL EXAM:   Physical Exam   Constitutional: She appears well-developed and well-nourished. No distress. Eyes: Conjunctivae are normal. Right eye exhibits no discharge. Left eye exhibits no discharge. No scleral icterus.    Cardiovascular: Normal ra

## 2017-11-10 ENCOUNTER — OFFICE VISIT (OUTPATIENT)
Dept: FAMILY MEDICINE CLINIC | Facility: CLINIC | Age: 77
End: 2017-11-10

## 2017-11-10 VITALS
DIASTOLIC BLOOD PRESSURE: 90 MMHG | RESPIRATION RATE: 16 BRPM | TEMPERATURE: 98 F | BODY MASS INDEX: 27.62 KG/M2 | HEIGHT: 59 IN | HEART RATE: 86 BPM | WEIGHT: 137 LBS | SYSTOLIC BLOOD PRESSURE: 150 MMHG | OXYGEN SATURATION: 98 %

## 2017-11-10 DIAGNOSIS — Z00.00 ROUTINE GENERAL MEDICAL EXAMINATION AT A HEALTH CARE FACILITY: Primary | ICD-10-CM

## 2017-11-10 PROCEDURE — G0439 PPPS, SUBSEQ VISIT: HCPCS | Performed by: FAMILY MEDICINE

## 2017-11-10 RX ORDER — DIPHENOXYLATE HYDROCHLORIDE AND ATROPINE SULFATE 2.5; .025 MG/1; MG/1
TABLET ORAL
Qty: 30 TABLET | Refills: 1 | Status: SHIPPED | OUTPATIENT
Start: 2017-11-10 | End: 2018-03-27

## 2017-11-10 NOTE — PROGRESS NOTES
Patient presents for annual Medicare wellness visit. She is a woman with a history of lung cancer that has been is quite stable. She also suffers degenerative joint disease especially of the knees.     Tuning fork analysis was normal although it was perfo

## 2017-11-13 ENCOUNTER — LAB ENCOUNTER (OUTPATIENT)
Dept: LAB | Age: 77
End: 2017-11-13
Attending: UROLOGY
Payer: MEDICARE

## 2017-11-13 DIAGNOSIS — N39.0 RECURRENT UTI: ICD-10-CM

## 2017-11-13 PROCEDURE — 87086 URINE CULTURE/COLONY COUNT: CPT

## 2017-11-13 PROCEDURE — 81001 URINALYSIS AUTO W/SCOPE: CPT

## 2017-11-15 ENCOUNTER — SOCIAL WORK SERVICES (OUTPATIENT)
Dept: HEMATOLOGY/ONCOLOGY | Facility: HOSPITAL | Age: 77
End: 2017-11-15

## 2017-11-15 NOTE — PROGRESS NOTES
SW met with patient last week and we did a conference call with [address]. We talked with Lazaro Robb the pharmacy.  Patient has enough of her oral chemotherapy until 12 - but she needs to reapply with hetras or what ever fo

## 2017-11-20 NOTE — PROGRESS NOTES
Kristina Acuña is a 69year old female who presents for a Medicare Annual Wellness visit.     Patient Care Team: Patient Care Team:  Татьяна Jj DO as PCP - General (Family Practice)  Eunice Kowalski MD (Radiation Oncology)    Patient Active Proble 2 (two) times daily with meals.    Disp:  Rfl:    Fluticasone Propionate 50 MCG/ACT Nasal Suspension  Disp:  Rfl:    FAMOTIDINE 20 MG Oral Tab TAKE ONE TABLET BY MOUTH TWICE DAILY Disp: 180 tablet Rfl: 0   BREO ELLIPTA 200-25 MCG/INH Inhalation Aerosol Powd you maintain positive mental well-being?: Visiting Family    If you are a male age 38-65 or a female age 47-67, do you take aspirin?: No    Have you had any immunizations at another office such as Influenza, Hepatitis B, Tetanus, or Pneumococcal?: No     F found.     Fasting Blood Sugar (FSB)Annually   Glucose (mg/dL)   Date Value   11/03/2017 104 (H)   ----------  GLUCOSE (mg/dL)   Date Value   07/15/2014 99   ----------    Cardiovascular Disease Screening     LDL Annually No results found for: LDL, LDLC applicable    Zoster (Not covered by Medicare Part B) No orders found for this or any previous visit.  Update Immunization Activity if applicable         SPECIFIC DISEASE MONITORING Internal Lab or Procedure External Lab or Procedure   Annual Monitoring of Pain. Disp:  Rfl:    RESTASIS 0.05 % Ophthalmic Emulsion INSTILL ONE DROP IN EACH EYE TWICE DAILY Disp: 20 mL Rfl: 6   ferrous sulfate 325 (65 FE) MG Oral Tab EC Take 325 mg by mouth 2 (two) times daily with meals.    Disp:  Rfl:    Fluticasone Propionate 5 HISTORY:   Smoking status: Never Smoker                                                              Smokeless tobacco: Never Used                      Alcohol use:  No              Occ: Retired        EXAM:   /90   Pulse 86   Temp 97.9 °F (36.6 °C) (

## 2017-11-30 ENCOUNTER — OFFICE VISIT (OUTPATIENT)
Dept: NEPHROLOGY | Facility: CLINIC | Age: 77
End: 2017-11-30

## 2017-11-30 VITALS
RESPIRATION RATE: 16 BRPM | SYSTOLIC BLOOD PRESSURE: 166 MMHG | WEIGHT: 142.13 LBS | DIASTOLIC BLOOD PRESSURE: 90 MMHG | OXYGEN SATURATION: 99 % | HEART RATE: 74 BPM | BODY MASS INDEX: 29 KG/M2

## 2017-11-30 DIAGNOSIS — N17.9 AKI (ACUTE KIDNEY INJURY) (HCC): ICD-10-CM

## 2017-11-30 DIAGNOSIS — N28.1 RENAL CYST: Primary | ICD-10-CM

## 2017-11-30 DIAGNOSIS — N18.4 CKD (CHRONIC KIDNEY DISEASE) STAGE 4, GFR 15-29 ML/MIN (HCC): ICD-10-CM

## 2017-11-30 PROCEDURE — 99204 OFFICE O/P NEW MOD 45 MIN: CPT | Performed by: INTERNAL MEDICINE

## 2017-11-30 PROCEDURE — G9678 ONCOLOGY CARE MODEL SERVICE: HCPCS | Performed by: INTERNAL MEDICINE

## 2017-11-30 NOTE — PROGRESS NOTES
Nephrology Consult Note    REASON FOR CONSULT: CKD 4    ASSESSMENT/PLAN:        1) CKD 4- serum creatinine has been increasing recently and now has peaked at 2.4 mg/dL without any clear nephrotoxic insult; differential diagnosis includes a monoclonal gammo infections. No history of cardiac hepatic or renal disease. Denies other major surgeries. Has otherwise been pretty healthy. Denies chronic NSAID use. Otherwise see above.     ROS:    Denies fever/chills  Denies wt loss/gain  Denies HA or visual change TAKE ONE TABLET BY MOUTH ONCE DAILY Disp: 90 tablet Rfl: 1   Erlotinib HCl 100 MG Oral Tab Take 1 tablet (100 mg total) by mouth daily.  Disp: 30 tablet Rfl: 11   VOLTAREN 1 % Transdermal Gel PLACE 1 GRAM ONTO THE SKIN 4 TIMES DAILY Disp: 3 Tube Rfl: 3   Sa 137 lb  11/08/17 : 136 lb    General: Alert and oriented in no apparent distress.   HEENT: No scleral icterus, MMM  Neck: Supple, no CIARA or thyromegaly  Cardiac: Regular rate and rhythm, S1, S2 normal, no murmur or rub  Lungs: Clear without wheezes, rales,

## 2017-12-05 PROCEDURE — 87086 URINE CULTURE/COLONY COUNT: CPT | Performed by: UROLOGY

## 2017-12-06 ENCOUNTER — TELEPHONE (OUTPATIENT)
Dept: HEMATOLOGY/ONCOLOGY | Facility: HOSPITAL | Age: 77
End: 2017-12-06

## 2017-12-06 DIAGNOSIS — C34.90 EGFR-RELATED LUNG CANCER (HCC): ICD-10-CM

## 2017-12-06 NOTE — TELEPHONE ENCOUNTER
Pt states her fish for tarceva has . She has enough medication to last through December. Is there any other assistance she can get? She contacted the pharmacy and they suggested she contact us or the . Message sent to social work.

## 2017-12-07 RX ORDER — ERLOTINIB HYDROCHLORIDE 100 MG/1
100 TABLET, FILM COATED ORAL DAILY
Qty: 30 TABLET | Refills: 11 | Status: SHIPPED
Start: 2017-12-07 | End: 2018-02-06

## 2017-12-08 ENCOUNTER — TELEPHONE (OUTPATIENT)
Dept: HEMATOLOGY/ONCOLOGY | Facility: HOSPITAL | Age: 77
End: 2017-12-08

## 2017-12-13 ENCOUNTER — APPOINTMENT (OUTPATIENT)
Dept: LAB | Age: 77
End: 2017-12-13
Attending: INTERNAL MEDICINE
Payer: MEDICARE

## 2017-12-13 ENCOUNTER — HOSPITAL ENCOUNTER (OUTPATIENT)
Dept: CT IMAGING | Age: 77
Discharge: HOME | End: 2017-12-13
Attending: INTERNAL MEDICINE
Payer: MEDICARE

## 2017-12-13 ENCOUNTER — LAB ENCOUNTER (OUTPATIENT)
Dept: LAB | Age: 77
End: 2017-12-13
Attending: INTERNAL MEDICINE
Payer: MEDICARE

## 2017-12-13 DIAGNOSIS — N28.1 RENAL CYST: ICD-10-CM

## 2017-12-13 DIAGNOSIS — N18.4 CKD (CHRONIC KIDNEY DISEASE) STAGE 4, GFR 15-29 ML/MIN (HCC): ICD-10-CM

## 2017-12-13 DIAGNOSIS — D64.9 NORMOCYTIC NORMOCHROMIC ANEMIA: ICD-10-CM

## 2017-12-13 DIAGNOSIS — D50.8 OTHER IRON DEFICIENCY ANEMIA: ICD-10-CM

## 2017-12-13 PROCEDURE — 87086 URINE CULTURE/COLONY COUNT: CPT

## 2017-12-13 PROCEDURE — 86160 COMPLEMENT ANTIGEN: CPT

## 2017-12-13 PROCEDURE — 36415 COLL VENOUS BLD VENIPUNCTURE: CPT

## 2017-12-13 PROCEDURE — 83516 IMMUNOASSAY NONANTIBODY: CPT

## 2017-12-13 PROCEDURE — 71250 CT THORAX DX C-: CPT | Performed by: INTERNAL MEDICINE

## 2017-12-13 PROCEDURE — 86334 IMMUNOFIX E-PHORESIS SERUM: CPT

## 2017-12-13 PROCEDURE — 86038 ANTINUCLEAR ANTIBODIES: CPT

## 2017-12-13 PROCEDURE — 82570 ASSAY OF URINE CREATININE: CPT

## 2017-12-13 PROCEDURE — 81001 URINALYSIS AUTO W/SCOPE: CPT

## 2017-12-13 PROCEDURE — 80048 BASIC METABOLIC PNL TOTAL CA: CPT

## 2017-12-13 PROCEDURE — 84165 PROTEIN E-PHORESIS SERUM: CPT

## 2017-12-13 PROCEDURE — 86255 FLUORESCENT ANTIBODY SCREEN: CPT

## 2017-12-13 PROCEDURE — 83883 ASSAY NEPHELOMETRY NOT SPEC: CPT

## 2017-12-13 PROCEDURE — 82043 UR ALBUMIN QUANTITATIVE: CPT

## 2017-12-13 PROCEDURE — 83876 ASSAY MYELOPEROXIDASE: CPT

## 2017-12-13 PROCEDURE — 85032 MANUAL CELL COUNT EACH: CPT

## 2017-12-13 PROCEDURE — 74150 CT ABDOMEN W/O CONTRAST: CPT | Performed by: INTERNAL MEDICINE

## 2017-12-19 ENCOUNTER — OFFICE VISIT (OUTPATIENT)
Dept: HEMATOLOGY/ONCOLOGY | Age: 77
End: 2017-12-19
Attending: INTERNAL MEDICINE
Payer: MEDICARE

## 2017-12-19 VITALS
OXYGEN SATURATION: 94 % | WEIGHT: 138.19 LBS | BODY MASS INDEX: 28 KG/M2 | RESPIRATION RATE: 18 BRPM | HEART RATE: 74 BPM | DIASTOLIC BLOOD PRESSURE: 74 MMHG | TEMPERATURE: 98 F | SYSTOLIC BLOOD PRESSURE: 149 MMHG

## 2017-12-19 DIAGNOSIS — D50.8 OTHER IRON DEFICIENCY ANEMIA: ICD-10-CM

## 2017-12-19 DIAGNOSIS — C34.90 EGFR-RELATED LUNG CANCER (HCC): ICD-10-CM

## 2017-12-19 DIAGNOSIS — D53.9 MACROCYTIC ANEMIA: Primary | ICD-10-CM

## 2017-12-19 DIAGNOSIS — D47.2 MONOCLONAL PARAPROTEINEMIA: ICD-10-CM

## 2017-12-19 DIAGNOSIS — C82.03 FOLLICULAR LYMPHOMA GRADE I OF INTRA-ABDOMINAL LYMPH NODES (HCC): ICD-10-CM

## 2017-12-19 DIAGNOSIS — C34.81 MALIGNANT NEOPLASM OF OVERLAPPING SITES OF RIGHT LUNG (HCC): ICD-10-CM

## 2017-12-19 PROCEDURE — 99215 OFFICE O/P EST HI 40 MIN: CPT | Performed by: INTERNAL MEDICINE

## 2017-12-19 NOTE — PROGRESS NOTES
Cancer Center Progress Note    Patient Name: Leah Umanzor   YOB: 1940   Medical Record Number: [id number]   CSN: [id number]   Attending Physician: Trinidad Mosqueda M.D.    Referring Physician: Tony Montalvo DO      Date of Visit: 12/19/2 TAKE ONE TABLET BY MOUTH ONCE DAILY, Disp: 90 tablet, Rfl: 1  •  LOSARTAN POTASSIUM-HCTZ 100-12.5 MG Oral Tab, TAKE ONE TABLET BY MOUTH ONCE DAILY, Disp: 90 tablet, Rfl: 1  •  VOLTAREN 1 % Transdermal Gel, PLACE 1 GRAM ONTO THE SKIN 4 TIMES DAILY, Disp: 3 Comment: bunionectomy,bilateral  12/05/2017: OTHER SURGICAL HISTORY      Comment: Jasen Ramírez Postal  No date: REMOVAL OF LUNG,LOBECTOMY      Comment: removal of upper right lobe  No date: TONSILLECTOMY    Family Medical History:  Family History   Problem Rela Results (from the past 24 hour(s))  -COMP METABOLIC PANEL (14)   Collection Time: 12/19/17  1:05 PM   Result Value Ref Range   Glucose 103 (H) 70 - 99 mg/dL   BUN 59 (H) 8 - 20 mg/dL   Creatinine 2.27 (H) 0.55 - 1.02 mg/dL   GFR 20 (L) >=60   Calcium, Tota information is transmitted to the ACR ([facility]) NRDR ([address]) which includes the Dose   Index Registry.      PATIENT STATED HISTORY: (As transcribed by Technologist)  Patient has a history of lung cancer, daisy includes an Addendum and supersedes previous reports for this exam.           PROCEDURE:  CT ABDOMEN (CPT=74150)     COMPARISON:  [address], US KIDNEY/BLADDER (XOY=[id number]), 11/03/2017, 12:58.      INDICATIONS:  N18.4 Chronic kidney disease, stage 4 (severe chest CT.    BOWEL/MESENTERY:  Mesenteric ground-glass opacity with nodularity on most inferior images consistent with adenopathy. .  Duodenum diverticulum   BONES:  No lytic or destructive process.   LUNG BASES:         Large esophageal hiatal hernia     CO pending results of further labs- see below. No B symptoms. 3. Anemia- has had an extensive w/u for this which revealed anemia of chronic/renal disease as well as iron deficiency. Extensive GI w/u unrevealing. Absorption issues perhaps?  She continues on

## 2017-12-19 NOTE — PROGRESS NOTES
Pt here for 3 month MD f/u. Pt had CT scan 12/13. Energy level and appetite are good. Denies pain. Pt has no further complaints.      Education Record    Learner:  Patient    Disease / Diagnosis:    Barriers / Limitations:  None   Comments:    Method:  Doc Asper

## 2017-12-22 ENCOUNTER — TELEPHONE (OUTPATIENT)
Dept: HEMATOLOGY/ONCOLOGY | Facility: HOSPITAL | Age: 77
End: 2017-12-22

## 2017-12-27 ENCOUNTER — TELEPHONE (OUTPATIENT)
Dept: HEMATOLOGY/ONCOLOGY | Facility: HOSPITAL | Age: 77
End: 2017-12-27

## 2017-12-27 ENCOUNTER — OFFICE VISIT (OUTPATIENT)
Dept: FAMILY MEDICINE CLINIC | Facility: CLINIC | Age: 77
End: 2017-12-27

## 2017-12-27 ENCOUNTER — OFFICE VISIT (OUTPATIENT)
Dept: NEPHROLOGY | Facility: CLINIC | Age: 77
End: 2017-12-27

## 2017-12-27 VITALS — WEIGHT: 137 LBS | DIASTOLIC BLOOD PRESSURE: 86 MMHG | SYSTOLIC BLOOD PRESSURE: 152 MMHG | BODY MASS INDEX: 28 KG/M2

## 2017-12-27 VITALS
HEART RATE: 89 BPM | OXYGEN SATURATION: 97 % | DIASTOLIC BLOOD PRESSURE: 82 MMHG | TEMPERATURE: 98 F | HEIGHT: 59 IN | RESPIRATION RATE: 18 BRPM | BODY MASS INDEX: 27.62 KG/M2 | WEIGHT: 137 LBS | SYSTOLIC BLOOD PRESSURE: 130 MMHG

## 2017-12-27 DIAGNOSIS — R77.8 ABNORMAL SPEP: ICD-10-CM

## 2017-12-27 DIAGNOSIS — L03.114 CELLULITIS OF LEFT ARM: Primary | ICD-10-CM

## 2017-12-27 DIAGNOSIS — D50.9 HYPOCHROMIC MICROCYTIC ANEMIA: Primary | ICD-10-CM

## 2017-12-27 DIAGNOSIS — N18.4 CKD (CHRONIC KIDNEY DISEASE) STAGE 4, GFR 15-29 ML/MIN (HCC): Primary | ICD-10-CM

## 2017-12-27 DIAGNOSIS — R80.9 MICROALBUMINURIA: ICD-10-CM

## 2017-12-27 DIAGNOSIS — T78.40XA ALLERGIC RESPONSE, INITIAL ENCOUNTER: ICD-10-CM

## 2017-12-27 PROCEDURE — 99214 OFFICE O/P EST MOD 30 MIN: CPT | Performed by: INTERNAL MEDICINE

## 2017-12-27 PROCEDURE — 99213 OFFICE O/P EST LOW 20 MIN: CPT | Performed by: PHYSICIAN ASSISTANT

## 2017-12-27 RX ORDER — SULFAMETHOXAZOLE AND TRIMETHOPRIM 800; 160 MG/1; MG/1
0.5 TABLET ORAL 2 TIMES DAILY
Qty: 10 TABLET | Refills: 0 | Status: SHIPPED | OUTPATIENT
Start: 2017-12-27 | End: 2018-01-06

## 2017-12-27 RX ORDER — HYDROXYZINE HYDROCHLORIDE 25 MG/1
12.5 TABLET, FILM COATED ORAL EVERY 8 HOURS PRN
Qty: 5 TABLET | Refills: 0 | Status: SHIPPED | OUTPATIENT
Start: 2017-12-27 | End: 2018-07-23

## 2017-12-27 NOTE — TELEPHONE ENCOUNTER
Discussed additional test results ordered for her anemia with her. I told her I would like to repeat a CBC which she can have done anytime or the next few weeks. I will let her know of results.  May need a bone marrow biopsy

## 2017-12-27 NOTE — PATIENT INSTRUCTIONS
Discharge Instructions for Cellulitis  You have been diagnosed with cellulitis. This is an infection in the deepest layer of the skin. In some cases, the infection also affects the muscle. Cellulitis is caused by bacteria.  The bacteria can enter the body Date Last Reviewed: 8/1/2016  © 5849-1815 [employer] [address]. [address] [facility] [place]. All rights reserved. This information is not intended as a substitute for professional medical care.  Always follow your healthcare professional'

## 2017-12-27 NOTE — PROGRESS NOTES
Nephrology Consult Note    REASON FOR CONSULT: CKD 4    ASSESSMENT/PLAN:        1) CKD 4- serum creatinine has been increasing gradually over the last 2 years and now has peaked at 2.4 mg/dL and may be due a plasma cell dyscrasia as suggested by her rising SOB/cough/hemoptysis  Denies abd or flank pain  Denies N/V/D  Denies change in urinary habits or gross hematuria  Denies LE edema  Denies skin rashes/myalgias/arthralgias    PMH:  Past Medical History:   Diagnosis Date   • Cancer ([facility] [address] 2009    lung   • Com DAILY Disp: 3 Tube Rfl: 3   Saline Nasal Spray 0.65 % Nasal Solution 1 spray by Nasal route as needed for congestion. Disp:  Rfl:    acetaminophen 500 MG Oral Tab Take 500 mg by mouth every 6 (six) hours as needed for Pain.  Disp:  Rfl:    RESTASIS 0.05 % O organomegaly  Extremities: Without clubbing, cyanosis or edema.   Neurologic:  normal affect, cranial nerves grossly intact, moving all extremities  Skin: Warm and dry, no rashes        Hsien-Ta Tracie Babinski, MD  12/27/2017  212 PM

## 2017-12-27 NOTE — PROGRESS NOTES
CHIEF COMPLAINT:   Patient presents with:  Bite Sting,Insect (integumentary): on left arm, swelling and redness x 2 days         HPI:   Phyllis Escamilla is a 69year old female who presents for evaluation of a rash.   Per patient rash started in the past 2 RESTASIS 0.05 % Ophthalmic Emulsion INSTILL ONE DROP IN EACH EYE TWICE DAILY Disp: 20 mL Rfl: 6   ferrous sulfate 325 (65 FE) MG Oral Tab EC Take 325 mg by mouth 2 (two) times daily with meals.    Disp:  Rfl:    Fluticasone Propionate 50 MCG/ACT Nasal Suspe SKIN: Per HPI. No edema. No ulcerations. HEENT: Denies rhinorrhea, edema of the lips or swelling of throat. CARDIOVASCULAR: Denies chest pains or palpitations. LUNGS: Denies shortness of breath with exertion or rest. No cough or wheezing.   LYMPH: Denies Patient Instructions       Discharge Instructions for Cellulitis  You have been diagnosed with cellulitis. This is an infection in the deepest layer of the skin. In some cases, the infection also affects the muscle. Cellulitis is caused by bacteria.  The ba · Vomiting   Date Last Reviewed: 8/1/2016  © 7622-7248 [employer] [address]. [address] [facility] [place]. All rights reserved. This information is not intended as a substitute for professional medical care.  Always follow your healthcare

## 2017-12-29 ENCOUNTER — NURSE ONLY (OUTPATIENT)
Dept: HEMATOLOGY/ONCOLOGY | Age: 77
End: 2017-12-29
Attending: INTERNAL MEDICINE
Payer: MEDICARE

## 2017-12-29 DIAGNOSIS — D50.8 OTHER IRON DEFICIENCY ANEMIA: ICD-10-CM

## 2017-12-29 DIAGNOSIS — C34.81 MALIGNANT NEOPLASM OF OVERLAPPING SITES OF RIGHT LUNG (HCC): ICD-10-CM

## 2017-12-29 DIAGNOSIS — D47.2 MONOCLONAL PARAPROTEINEMIA: ICD-10-CM

## 2017-12-29 DIAGNOSIS — C82.03 FOLLICULAR LYMPHOMA GRADE I OF INTRA-ABDOMINAL LYMPH NODES (HCC): ICD-10-CM

## 2017-12-29 DIAGNOSIS — D50.9 HYPOCHROMIC MICROCYTIC ANEMIA: ICD-10-CM

## 2017-12-29 DIAGNOSIS — D53.9 MACROCYTIC ANEMIA: ICD-10-CM

## 2017-12-29 PROCEDURE — 80053 COMPREHEN METABOLIC PANEL: CPT

## 2017-12-29 PROCEDURE — 85025 COMPLETE CBC W/AUTO DIFF WBC: CPT

## 2017-12-29 PROCEDURE — 36415 COLL VENOUS BLD VENIPUNCTURE: CPT

## 2017-12-29 PROCEDURE — 85810 BLOOD VISCOSITY EXAMINATION: CPT

## 2017-12-29 PROCEDURE — 86334 IMMUNOFIX E-PHORESIS SERUM: CPT

## 2017-12-29 PROCEDURE — 83883 ASSAY NEPHELOMETRY NOT SPEC: CPT

## 2017-12-29 PROCEDURE — 84165 PROTEIN E-PHORESIS SERUM: CPT

## 2017-12-29 PROCEDURE — 83615 LACTATE (LD) (LDH) ENZYME: CPT

## 2017-12-29 PROCEDURE — 82784 ASSAY IGA/IGD/IGG/IGM EACH: CPT

## 2017-12-31 ENCOUNTER — SNF/IP PROF CHARGE ONLY (OUTPATIENT)
Dept: HEMATOLOGY/ONCOLOGY | Facility: HOSPITAL | Age: 77
End: 2017-12-31

## 2017-12-31 DIAGNOSIS — C78.01 SECONDARY CARCINOMA OF RIGHT LUNG (HCC): ICD-10-CM

## 2017-12-31 PROCEDURE — G9678 ONCOLOGY CARE MODEL SERVICE: HCPCS | Performed by: INTERNAL MEDICINE

## 2018-01-02 LAB — VISCOSITY, SERUM: 2.67 CP

## 2018-01-03 LAB
A/G RATIO: 0.85
ALBUMIN, SERUM: 4.09 G/DL (ref 3.5–4.8)
ALPHA-1 GLOBULIN: 0.31 G/DL (ref 0.1–0.3)
ALPHA-2 GLOBULIN: 1.01 G/DL (ref 0.6–1)
BETA GLOBULIN: 0.85 G/DL (ref 0.7–1.2)
GAMMA GLOBULIN: 2.65 G/DL (ref 0.6–1.6)
KAPPA FREE LIGHT CHAIN: 2.03 MG/DL (ref 0.33–1.94)
KAPPA/LAMBDA FLC RATIO: 0.09 (ref 0.26–1.65)
LAMBDA FREE LIGHT CHAIN: 23.51 MG/DL (ref 0.57–2.63)
M-SPIKE 1: 2.21 G/DL (ref ?–0)
TOTAL PROTEIN,SERUM: 8.9 G/DL (ref 6.1–8.3)

## 2018-01-09 ENCOUNTER — OFFICE VISIT (OUTPATIENT)
Dept: HEMATOLOGY/ONCOLOGY | Age: 78
End: 2018-01-09
Attending: INTERNAL MEDICINE
Payer: MEDICARE

## 2018-01-09 VITALS
DIASTOLIC BLOOD PRESSURE: 84 MMHG | OXYGEN SATURATION: 98 % | WEIGHT: 139 LBS | TEMPERATURE: 98 F | HEART RATE: 75 BPM | RESPIRATION RATE: 18 BRPM | SYSTOLIC BLOOD PRESSURE: 134 MMHG | BODY MASS INDEX: 28 KG/M2

## 2018-01-09 DIAGNOSIS — C83.00 MALIGNANT LYMPHOMA, LYMPHOPLASMACYTIC (HCC): ICD-10-CM

## 2018-01-09 DIAGNOSIS — C82.03 FOLLICULAR LYMPHOMA GRADE I OF INTRA-ABDOMINAL LYMPH NODES (HCC): Primary | ICD-10-CM

## 2018-01-09 DIAGNOSIS — C78.01 SECONDARY CARCINOMA OF RIGHT LUNG (HCC): ICD-10-CM

## 2018-01-09 DIAGNOSIS — D47.2 MONOCLONAL PARAPROTEINEMIA: ICD-10-CM

## 2018-01-09 DIAGNOSIS — D53.9 MACROCYTIC ANEMIA: ICD-10-CM

## 2018-01-09 LAB
BASOPHILS # BLD AUTO: 0.05 X10(3) UL (ref 0–0.1)
BASOPHILS NFR BLD AUTO: 1.1 %
EOSINOPHIL # BLD AUTO: 0.07 X10(3) UL (ref 0–0.3)
EOSINOPHIL NFR BLD AUTO: 1.6 %
ERYTHROCYTE [DISTWIDTH] IN BLOOD BY AUTOMATED COUNT: 14.7 % (ref 11.5–16)
HCT VFR BLD AUTO: 32.8 % (ref 34–50)
HGB BLD-MCNC: 10.2 G/DL (ref 12–16)
IMMATURE GRANULOCYTE COUNT: 0.02 X10(3) UL (ref 0–1)
IMMATURE GRANULOCYTE RATIO %: 0.5 %
LYMPHOCYTES # BLD AUTO: 1 X10(3) UL (ref 0.9–4)
LYMPHOCYTES NFR BLD AUTO: 22.5 %
MCH RBC QN AUTO: 32.7 PG (ref 27–33.2)
MCHC RBC AUTO-ENTMCNC: 31.1 G/DL (ref 31–37)
MCV RBC AUTO: 105.1 FL (ref 81–100)
MONOCYTES # BLD AUTO: 0.44 X10(3) UL (ref 0.1–0.6)
MONOCYTES NFR BLD AUTO: 9.9 %
NEUTROPHIL ABS PRELIM: 2.86 X10 (3) UL (ref 1.3–6.7)
NEUTROPHILS # BLD AUTO: 2.86 X10(3) UL (ref 1.3–6.7)
NEUTROPHILS NFR BLD AUTO: 64.4 %
PLATELET # BLD AUTO: 208 10(3)UL (ref 150–450)
RBC # BLD AUTO: 3.12 X10(6)UL (ref 3.8–5.1)
RED CELL DISTRIBUTION WIDTH-SD: 57.1 FL (ref 35.1–46.3)
WBC # BLD AUTO: 4.4 X10(3) UL (ref 4–13)

## 2018-01-09 PROCEDURE — 38221 DX BONE MARROW BIOPSIES: CPT | Performed by: INTERNAL MEDICINE

## 2018-01-09 PROCEDURE — 38220 DX BONE MARROW ASPIRATIONS: CPT | Performed by: INTERNAL MEDICINE

## 2018-01-09 NOTE — PROGRESS NOTES
Pt here for bone marrow biopsy and aspiration. Consent signed, procedure performed without incident. Pressure dressing applied. Post VS stable. Pt given discharge instructions. Pt discharged home with son.

## 2018-01-09 NOTE — PROGRESS NOTES
Pt here for 3 week MD f/u. Energy level has been lower, has been napping. Appetite has been good. Pt notes that cutting back to just one iron tablet per day has balanced her bowels out more.      Education Record    Learner:  Patient    Disease / Diagnosis:

## 2018-01-09 NOTE — PROGRESS NOTES
Cancer Center Bone Marrow Procedure Note    Date of Service: 1/9/2018    Problem List:  Patient Active Problem List:     Malignant neoplasm of bronchus and lung, unspecified site     Plantar fascial fibromatosis     Unspecified essential hypertension     S Take 500 mg by mouth every 6 (six) hours as needed for Pain., Disp: , Rfl:   •  RESTASIS 0.05 % Ophthalmic Emulsion, INSTILL ONE DROP IN EACH EYE TWICE DAILY, Disp: 20 mL, Rfl: 6  •  ferrous sulfate 325 (65 FE) MG Oral Tab EC, Take 325 mg by mouth daily wi

## 2018-01-17 ENCOUNTER — TELEPHONE (OUTPATIENT)
Dept: HEMATOLOGY/ONCOLOGY | Facility: HOSPITAL | Age: 78
End: 2018-01-17

## 2018-01-17 DIAGNOSIS — C85.10 LOW GRADE B-CELL LYMPHOMA (HCC): Primary | ICD-10-CM

## 2018-01-19 ENCOUNTER — HOSPITAL ENCOUNTER (OUTPATIENT)
Dept: NUCLEAR MEDICINE | Facility: HOSPITAL | Age: 78
Discharge: HOME | End: 2018-01-19
Attending: INTERNAL MEDICINE
Payer: MEDICARE

## 2018-01-19 DIAGNOSIS — C83.00 MALIGNANT LYMPHOMA, LYMPHOPLASMACYTIC (HCC): ICD-10-CM

## 2018-01-19 LAB — GLUCOSE BLD-MCNC: 90 MG/DL (ref 65–99)

## 2018-01-19 PROCEDURE — 82962 GLUCOSE BLOOD TEST: CPT

## 2018-01-19 PROCEDURE — 78815 PET IMAGE W/CT SKULL-THIGH: CPT | Performed by: INTERNAL MEDICINE

## 2018-01-20 ENCOUNTER — SNF/IP PROF CHARGE ONLY (OUTPATIENT)
Dept: HEMATOLOGY/ONCOLOGY | Facility: HOSPITAL | Age: 78
End: 2018-01-20

## 2018-01-20 DIAGNOSIS — C78.01 SECONDARY CARCINOMA OF RIGHT LUNG (HCC): ICD-10-CM

## 2018-01-23 ENCOUNTER — OFFICE VISIT (OUTPATIENT)
Dept: HEMATOLOGY/ONCOLOGY | Age: 78
End: 2018-01-23
Attending: INTERNAL MEDICINE
Payer: MEDICARE

## 2018-01-23 VITALS
TEMPERATURE: 97 F | HEART RATE: 82 BPM | OXYGEN SATURATION: 98 % | DIASTOLIC BLOOD PRESSURE: 86 MMHG | BODY MASS INDEX: 28 KG/M2 | RESPIRATION RATE: 18 BRPM | SYSTOLIC BLOOD PRESSURE: 155 MMHG | WEIGHT: 140.19 LBS

## 2018-01-23 DIAGNOSIS — D47.2 MONOCLONAL PARAPROTEINEMIA: ICD-10-CM

## 2018-01-23 DIAGNOSIS — C85.10 LOW GRADE B-CELL LYMPHOMA (HCC): ICD-10-CM

## 2018-01-23 DIAGNOSIS — C34.81 MALIGNANT NEOPLASM OF OVERLAPPING SITES OF RIGHT LUNG (HCC): Primary | ICD-10-CM

## 2018-01-23 DIAGNOSIS — N28.9 RENAL INSUFFICIENCY: ICD-10-CM

## 2018-01-23 DIAGNOSIS — D53.9 MACROCYTIC ANEMIA: ICD-10-CM

## 2018-01-23 DIAGNOSIS — C34.90 EGFR-RELATED LUNG CANCER (HCC): ICD-10-CM

## 2018-01-23 LAB
ALBUMIN SERPL-MCNC: 2.9 G/DL (ref 3.5–4.8)
ALP LIVER SERPL-CCNC: 85 U/L (ref 55–142)
ALT SERPL-CCNC: 28 U/L (ref 14–54)
AST SERPL-CCNC: 24 U/L (ref 15–41)
BASOPHILS # BLD AUTO: 0.03 X10(3) UL (ref 0–0.1)
BASOPHILS NFR BLD AUTO: 0.7 %
BILIRUB SERPL-MCNC: 0.7 MG/DL (ref 0.1–2)
BUN BLD-MCNC: 57 MG/DL (ref 8–20)
CALCIUM BLD-MCNC: 9.8 MG/DL (ref 8.3–10.3)
CHLORIDE: 110 MMOL/L (ref 101–111)
CO2: 23 MMOL/L (ref 22–32)
CREAT BLD-MCNC: 2.04 MG/DL (ref 0.55–1.02)
EOSINOPHIL # BLD AUTO: 0.07 X10(3) UL (ref 0–0.3)
EOSINOPHIL NFR BLD AUTO: 1.6 %
ERYTHROCYTE [DISTWIDTH] IN BLOOD BY AUTOMATED COUNT: 14.9 % (ref 11.5–16)
GLUCOSE BLD-MCNC: 82 MG/DL (ref 70–99)
HCT VFR BLD AUTO: 31.8 % (ref 34–50)
HGB BLD-MCNC: 9.9 G/DL (ref 12–16)
IMMATURE GRANULOCYTE COUNT: 0.03 X10(3) UL (ref 0–1)
IMMATURE GRANULOCYTE RATIO %: 0.7 %
IMMUNOGLOBULIN A: 131.6 MG/DL (ref 70–312)
IMMUNOGLOBULIN G: 882 MG/DL (ref 791–1643)
IMMUNOGLOBULIN M: 3430 MG/DL (ref 43–279)
LYMPHOCYTES # BLD AUTO: 0.88 X10(3) UL (ref 0.9–4)
LYMPHOCYTES NFR BLD AUTO: 20.2 %
M PROTEIN MFR SERPL ELPH: 9.5 G/DL (ref 6.1–8.3)
MCH RBC QN AUTO: 32.2 PG (ref 27–33.2)
MCHC RBC AUTO-ENTMCNC: 31.1 G/DL (ref 31–37)
MCV RBC AUTO: 103.6 FL (ref 81–100)
MONOCYTES # BLD AUTO: 0.49 X10(3) UL (ref 0.1–0.6)
MONOCYTES NFR BLD AUTO: 11.3 %
NEUTROPHIL ABS PRELIM: 2.85 X10 (3) UL (ref 1.3–6.7)
NEUTROPHILS # BLD AUTO: 2.85 X10(3) UL (ref 1.3–6.7)
NEUTROPHILS NFR BLD AUTO: 65.5 %
PLATELET # BLD AUTO: 210 10(3)UL (ref 150–450)
PLATELET MORPHOLOGY: NORMAL
POTASSIUM SERPL-SCNC: 3.8 MMOL/L (ref 3.6–5.1)
RBC # BLD AUTO: 3.07 X10(6)UL (ref 3.8–5.1)
RED CELL DISTRIBUTION WIDTH-SD: 56.1 FL (ref 35.1–46.3)
ROULEAUX: PRESENT
SODIUM SERPL-SCNC: 142 MMOL/L (ref 136–144)
WBC # BLD AUTO: 4.4 X10(3) UL (ref 4–13)

## 2018-01-23 PROCEDURE — 99215 OFFICE O/P EST HI 40 MIN: CPT | Performed by: INTERNAL MEDICINE

## 2018-01-23 NOTE — PROGRESS NOTES
Cancer Center Progress Note    Patient Name: Leah Umanzor   YOB: 1940   Medical Record Number: [id number]   CSN: [id number]   Attending Physician: Trinidad Mosqueda M.D.    Referring Physician: DO Dr. Robyn Gregorio                                                     Specimens:   A) - Bone marrow, biopsy, Bone Marrow Biopsy                                                         C) - Bone marrow, smear, Bone Marrow Aspirate Smear                                   Macrocytic, normochromic anemia. Unremarkable white blood cells and platelets. Bone Marrow Aspirate, Clot, And Biopsy:   Adequacy:  See above comment.   Aspirate Differential (Percent of 200-cell count):   Granulocytes                    62   Blasts staining utilizes the automated AdXpose III immunohistochemistry platform. A proprietary, non-biotin, polymer-based detection system (Bond Polymer Refine DetectionTM ) is employed.   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 Myra Zuniga MD, Lab.  Director               Current Medications:    Current Outpatient Prescriptions:   •  HydrOXYzine HCl 25 MG Oral Tab, Take 0.5 tablets History:  Past Medical History:  2009: Cancer [facility])      Comment: right lung  No date: Cancer ([facility] [address]      Comment: lymphoma  No date: Complete rupture of rotator cuff  No date: Diaphragmatic hernia without mention of obstru*  No date: Esophageal reflux  201 Allergies:    Penicillins             Itching     Review of Systems:  A 14-point ROS was done with pertinent positives and negative per the HPI    Vital Signs:  Please refer to the nursing notes which have been reviewed      Physical Examination: RATIO Unknown 0.85   M-Charles Latest Ref Range: <=0.00 g/dL 2.21 (H)   IMMUNOFIXATION Unknown Monoclonal IgM la. ..    KAPPA FREE LIGHT CHAIN Latest Ref Range: 0.330 - 1.940 mg/dL 2.030 (H)   LAMBDA FREE LIGHT CHAIN Latest Ref Range: 0.571 - 2.630 mg/dL 23.51 bronchograms in the right lower lobe is unchanged in size measuring approximately 3 x 2.5 cm the peak SUV is 1.9.  (Previously 2.9). A 2nd rounded area of chronic parenchymal opacity in the medial right lung base is noted unchanged.   The peak SUV is 2.4 lymphadenopathy with stable peak SUV. 4.  Enlarging peritoneal soft tissue nodule superior to the right kidney and medial to the right lobe of the liver, presently measures 1.9 x 1 cm with a peak SUV of 4.3.            Dictated by: Luzmaria Yang MD on comorbidities I favored immunotherapy and/or targeted therapies. Rationale for these, how given and potential side effects were discussed at length. She verbalized understanding.  Will start looking into her options and coverage but will await results of ad discussed issues of distress, coping difficulties and social support systems and currently there are no active problems.     Josiane Soto MD

## 2018-01-23 NOTE — PROGRESS NOTES
Pt here for 2 week MD f/u to review bone marrow biopsy results. Pt had PET 1/19. Energy level and appetite has been fair. Denies pain. Pt has no further complaints.      Education Record    Learner:  Patient    Disease / Diagnosis:    Barriers / Limitations

## 2018-01-23 NOTE — PROGRESS NOTES
Added testing onto bone marrow chromosome analysis from 1/9/18. 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 Spoke with Valentina Watson in send outs at main lab in [facility]. Order put in as misc order, she will transcibe and add to order.

## 2018-01-26 ENCOUNTER — SOCIAL WORK SERVICES (OUTPATIENT)
Dept: HEMATOLOGY/ONCOLOGY | Facility: HOSPITAL | Age: 78
End: 2018-01-26

## 2018-01-26 NOTE — PROGRESS NOTES
SHUKRI faxed patients son Family Medical leave form to [address] [place], [place] [address]e [facility] at fax # [phone] as requested. Once it was completed.

## 2018-01-27 LAB
MYD88 L265P DETECTION, QUANT: 0 %
MYD88 L265P DETECTION, RESULT: NOT DETECTED

## 2018-01-29 LAB
A/G RATIO: 0.75
ALBUMIN, SERUM: 4 G/DL (ref 3.5–4.8)
ALPHA-1 GLOBULIN: 0.24 G/DL (ref 0.1–0.3)
ALPHA-2 GLOBULIN: 0.94 G/DL (ref 0.6–1)
BETA GLOBULIN: 0.92 G/DL (ref 0.7–1.2)
GAMMA GLOBULIN: 3.2 G/DL (ref 0.6–1.6)
KAPPA FREE LIGHT CHAIN: 2.03 MG/DL (ref 0.33–1.94)
KAPPA/LAMBDA FLC RATIO: 0.08 (ref 0.26–1.65)
LAMBDA FREE LIGHT CHAIN: 24.86 MG/DL (ref 0.57–2.63)
M-SPIKE 1: 2.54 G/DL (ref ?–0)
TOTAL PROTEIN,SERUM: 9.3 G/DL (ref 6.1–8.3)

## 2018-01-29 NOTE — IMAGING NOTE
Left a message for Naren Conti for Dr Jose Alvarez office to either make an addendum to the h&p that was from 12/27/17 or to send a new h&p today or tomorrow for the pt's lung bx with sedation procedure on Wednesday 1/31/18.

## 2018-01-30 LAB
CD19+CD10+: 25.6 %
CD19+CD20+: 80.8 %
CD19+CD22+: 85.7 %
CD19+CD25+: <0.1 %
CD19+CD5+: 0.9 %
CD19+KAPPA+: 30.6 %
CD19+LAMBDA+: 47.2 %
CD3+CD2+: 99.7 %
CD3+CD4+: 63.8 %
CD3+CD4+CD8+: 1 %
CD3+CD5+: 99.9 %
CD3+CD7+: 82.1 %
CD3+CD8+: 30.1 %
CD4+:CD8+ RATIO: 2.1
KAPPA:LAMBDA RATIO: 0.65

## 2018-01-31 ENCOUNTER — SNF/IP PROF CHARGE ONLY (OUTPATIENT)
Dept: HEMATOLOGY/ONCOLOGY | Facility: HOSPITAL | Age: 78
End: 2018-01-31

## 2018-01-31 ENCOUNTER — HOSPITAL ENCOUNTER (OUTPATIENT)
Dept: CT IMAGING | Facility: HOSPITAL | Age: 78
Discharge: HOME | End: 2018-01-31
Attending: INTERNAL MEDICINE
Payer: MEDICARE

## 2018-01-31 ENCOUNTER — APPOINTMENT (OUTPATIENT)
Dept: LAB | Facility: HOSPITAL | Age: 78
End: 2018-01-31
Attending: INTERNAL MEDICINE
Payer: MEDICARE

## 2018-01-31 ENCOUNTER — HOSPITAL ENCOUNTER (OUTPATIENT)
Dept: GENERAL RADIOLOGY | Facility: HOSPITAL | Age: 78
Discharge: HOME | End: 2018-01-31
Attending: RADIOLOGY
Payer: MEDICARE

## 2018-01-31 VITALS
TEMPERATURE: 99 F | BODY MASS INDEX: 27.29 KG/M2 | WEIGHT: 139 LBS | OXYGEN SATURATION: 96 % | SYSTOLIC BLOOD PRESSURE: 125 MMHG | DIASTOLIC BLOOD PRESSURE: 76 MMHG | HEART RATE: 67 BPM | RESPIRATION RATE: 16 BRPM | HEIGHT: 60 IN

## 2018-01-31 DIAGNOSIS — C34.91 BILATERAL LUNG CANCER (HCC): ICD-10-CM

## 2018-01-31 DIAGNOSIS — C85.10 LOW GRADE B-CELL LYMPHOMA (HCC): ICD-10-CM

## 2018-01-31 DIAGNOSIS — C34.81 MALIGNANT NEOPLASM OF OVERLAPPING SITES OF RIGHT LUNG (HCC): ICD-10-CM

## 2018-01-31 DIAGNOSIS — C34.92 BILATERAL LUNG CANCER (HCC): ICD-10-CM

## 2018-01-31 LAB
INR BLD: 1.06 (ref 0.89–1.11)
PSA SERPL DL<=0.01 NG/ML-MCNC: 13.8 SECONDS (ref 12–14.3)

## 2018-01-31 PROCEDURE — 88307 TISSUE EXAM BY PATHOLOGIST: CPT | Performed by: INTERNAL MEDICINE

## 2018-01-31 PROCEDURE — 71045 X-RAY EXAM CHEST 1 VIEW: CPT | Performed by: RADIOLOGY

## 2018-01-31 PROCEDURE — G9678 ONCOLOGY CARE MODEL SERVICE: HCPCS | Performed by: INTERNAL MEDICINE

## 2018-01-31 PROCEDURE — 88342 IMHCHEM/IMCYTCHM 1ST ANTB: CPT | Performed by: INTERNAL MEDICINE

## 2018-01-31 PROCEDURE — 88341 IMHCHEM/IMCYTCHM EA ADD ANTB: CPT | Performed by: INTERNAL MEDICINE

## 2018-01-31 PROCEDURE — 32405 CT BIOPSY LUNG OR MEDIASTINUM (CPT=77012/32405): CPT | Performed by: INTERNAL MEDICINE

## 2018-01-31 PROCEDURE — 99152 MOD SED SAME PHYS/QHP 5/>YRS: CPT | Performed by: INTERNAL MEDICINE

## 2018-01-31 PROCEDURE — 85610 PROTHROMBIN TIME: CPT | Performed by: RADIOLOGY

## 2018-01-31 PROCEDURE — 77012 CT SCAN FOR NEEDLE BIOPSY: CPT | Performed by: INTERNAL MEDICINE

## 2018-01-31 RX ORDER — NALOXONE HYDROCHLORIDE 0.4 MG/ML
80 INJECTION, SOLUTION INTRAMUSCULAR; INTRAVENOUS; SUBCUTANEOUS AS NEEDED
Status: DISCONTINUED | OUTPATIENT
Start: 2018-01-31 | End: 2018-02-16

## 2018-01-31 RX ORDER — MIDAZOLAM HYDROCHLORIDE 1 MG/ML
1 INJECTION INTRAMUSCULAR; INTRAVENOUS EVERY 5 MIN PRN
Status: ACTIVE | OUTPATIENT
Start: 2018-01-31 | End: 2018-01-31

## 2018-01-31 RX ORDER — SODIUM CHLORIDE 9 MG/ML
INJECTION, SOLUTION INTRAVENOUS CONTINUOUS
Status: DISCONTINUED | OUTPATIENT
Start: 2018-01-31 | End: 2018-02-16

## 2018-01-31 RX ORDER — MIDAZOLAM HYDROCHLORIDE 1 MG/ML
INJECTION INTRAMUSCULAR; INTRAVENOUS
Status: COMPLETED
Start: 2018-01-31 | End: 2018-01-31

## 2018-01-31 RX ORDER — FLUMAZENIL 0.1 MG/ML
0.2 INJECTION, SOLUTION INTRAVENOUS AS NEEDED
Status: DISCONTINUED | OUTPATIENT
Start: 2018-01-31 | End: 2018-02-16

## 2018-01-31 RX ADMIN — SODIUM CHLORIDE: 9 INJECTION, SOLUTION INTRAVENOUS at 11:50:00

## 2018-01-31 RX ADMIN — MIDAZOLAM HYDROCHLORIDE 1 MG: 1 INJECTION INTRAMUSCULAR; INTRAVENOUS at 11:51:00

## 2018-01-31 NOTE — OR NURSING
Dr. Denise Ivory called and made aware chest xray results 1 and 2 post procedure. Patient feeling well, denies any SOB or pain, tolerating PO without difficulty. Ok to discharge to home per Dr. Denise Ivory.  Discharge instructions discussed with patient, verbalized Julianna

## 2018-01-31 NOTE — H&P
[address] Patient Status:  Outpatient    1940 MRN [id number]   [facility] CT Attending Erica Arevalo MD   [address] Day # 0 PCP Holly Santana DO     Admitting Diagnosis:   L lung lesion tablet, Rfl: 11  •  DIPHENOXYLATE-ATROPINE 2.5-0.025 MG Oral Tab, TAKE ONE TO TWO TABLETS BY MOUTH 4 TIMES DAILY AS NEEDED DIARRHEA, Disp: 30 tablet, Rfl: 1  •  METOPROLOL SUCCINATE  MG Oral Tablet 24 Hr, TAKE ONE TABLET BY MOUTH ONCE DAILY, Disp: 90 HCT, MCV, MCH, MCHC, RDW, NEPRELIM, WBC, PLT in the last 72 hours. Recent Labs   Lab  01/31/18   1032   PTP  13.8   INR  1.06     No results for input(s): GLU, BUN, CREATSERUM, GFRAA, GFRNAA, CA, NA, K, CL, CO2 in the last 72 hours.     Assessment/Plan:

## 2018-01-31 NOTE — PROCEDURES
[facility]  Procedure Note    Miguel Car Patient Status:  Outpatient    1940 MRN [id number]   [facility] CT Attending Nghia Dubois MD   [facility] Day # 0 PCP Abran Marie, DO     Procedure: CT guided L lung biopsy    Pre-

## 2018-01-31 NOTE — IMAGING NOTE
Dr Luis Angel Chew explained procedure to patient > Patient signed consent. Time out performed. Patient tolerated procedure well. Kept patient rested. Denies SOB or CP. Per Dr Luis Angel Chew, patient does not have pneumothorax. [facility] RN received report. Transported to [address] [facility] 8782.

## 2018-02-05 ENCOUNTER — TELEPHONE (OUTPATIENT)
Dept: HEMATOLOGY/ONCOLOGY | Facility: HOSPITAL | Age: 78
End: 2018-02-05

## 2018-02-05 NOTE — TELEPHONE ENCOUNTER
Called her with lung biopsy results. Showed low grade lymphoma c/w previous milly biopsy. Still awaiting insurance approval for Ibrutinib.

## 2018-02-06 ENCOUNTER — TELEPHONE (OUTPATIENT)
Dept: HEMATOLOGY/ONCOLOGY | Facility: HOSPITAL | Age: 78
End: 2018-02-06

## 2018-02-06 DIAGNOSIS — C85.10 LOW GRADE B-CELL LYMPHOMA (HCC): ICD-10-CM

## 2018-02-06 DIAGNOSIS — C34.81 MALIGNANT NEOPLASM OF OVERLAPPING SITES OF RIGHT LUNG (HCC): Primary | ICD-10-CM

## 2018-02-07 ENCOUNTER — TELEPHONE (OUTPATIENT)
Dept: HEMATOLOGY/ONCOLOGY | Facility: HOSPITAL | Age: 78
End: 2018-02-07

## 2018-02-07 DIAGNOSIS — C85.10 LOW GRADE B-CELL LYMPHOMA (HCC): ICD-10-CM

## 2018-02-07 DIAGNOSIS — C34.81 MALIGNANT NEOPLASM OF OVERLAPPING SITES OF RIGHT LUNG (HCC): ICD-10-CM

## 2018-02-07 NOTE — TELEPHONE ENCOUNTER
Patient called to inform the office of her high co-pay for Ibrutinib $600/month. Original script sent to [facility]. Prescription re-sent to [facility]. Patient aware and explained about [facility]. Verbalized understanding.   Also informed SW about this

## 2018-02-13 ENCOUNTER — TELEPHONE (OUTPATIENT)
Dept: HEMATOLOGY/ONCOLOGY | Facility: HOSPITAL | Age: 78
End: 2018-02-13

## 2018-02-13 NOTE — TELEPHONE ENCOUNTER
Faxed completed [employer] patient assistance form for Imbruvica back to [facility] as requested.

## 2018-02-20 ENCOUNTER — OFFICE VISIT (OUTPATIENT)
Dept: HEMATOLOGY/ONCOLOGY | Age: 78
End: 2018-02-20
Attending: INTERNAL MEDICINE
Payer: MEDICARE

## 2018-02-20 VITALS
OXYGEN SATURATION: 98 % | RESPIRATION RATE: 18 BRPM | SYSTOLIC BLOOD PRESSURE: 189 MMHG | DIASTOLIC BLOOD PRESSURE: 92 MMHG | HEART RATE: 69 BPM | TEMPERATURE: 98 F | WEIGHT: 140.38 LBS | BODY MASS INDEX: 27 KG/M2

## 2018-02-20 DIAGNOSIS — D53.9 MACROCYTIC ANEMIA: ICD-10-CM

## 2018-02-20 DIAGNOSIS — R03.0 ELEVATED BLOOD PRESSURE READING: ICD-10-CM

## 2018-02-20 DIAGNOSIS — D47.2 MONOCLONAL PARAPROTEINEMIA: ICD-10-CM

## 2018-02-20 DIAGNOSIS — C34.81 MALIGNANT NEOPLASM OF OVERLAPPING SITES OF RIGHT LUNG (HCC): Primary | ICD-10-CM

## 2018-02-20 DIAGNOSIS — N28.9 RENAL INSUFFICIENCY: ICD-10-CM

## 2018-02-20 DIAGNOSIS — C85.10 LOW GRADE B-CELL LYMPHOMA (HCC): ICD-10-CM

## 2018-02-20 DIAGNOSIS — C34.90 EGFR-RELATED LUNG CANCER (HCC): ICD-10-CM

## 2018-02-20 LAB
ALBUMIN SERPL-MCNC: 3 G/DL (ref 3.5–4.8)
ALP LIVER SERPL-CCNC: 85 U/L (ref 55–142)
ALT SERPL-CCNC: 27 U/L (ref 14–54)
AST SERPL-CCNC: 28 U/L (ref 15–41)
BASOPHILS # BLD AUTO: 0.02 X10(3) UL (ref 0–0.1)
BASOPHILS NFR BLD AUTO: 0.4 %
BILIRUB SERPL-MCNC: 0.5 MG/DL (ref 0.1–2)
BUN BLD-MCNC: 48 MG/DL (ref 8–20)
CALCIUM BLD-MCNC: 9.5 MG/DL (ref 8.3–10.3)
CHLORIDE: 109 MMOL/L (ref 101–111)
CO2: 24 MMOL/L (ref 22–32)
CREAT BLD-MCNC: 1.85 MG/DL (ref 0.55–1.02)
EOSINOPHIL # BLD AUTO: 0.07 X10(3) UL (ref 0–0.3)
EOSINOPHIL NFR BLD AUTO: 1.4 %
ERYTHROCYTE [DISTWIDTH] IN BLOOD BY AUTOMATED COUNT: 14.6 % (ref 11.5–16)
GLUCOSE BLD-MCNC: 100 MG/DL (ref 70–99)
HCT VFR BLD AUTO: 33.4 % (ref 34–50)
HGB BLD-MCNC: 10.4 G/DL (ref 12–16)
IMMATURE GRANULOCYTE COUNT: 0.03 X10(3) UL (ref 0–1)
IMMATURE GRANULOCYTE RATIO %: 0.6 %
IMMUNOGLOBULIN M: 3590 MG/DL (ref 43–279)
LDH: 186 U/L (ref 84–246)
LYMPHOCYTES # BLD AUTO: 1.05 X10(3) UL (ref 0.9–4)
LYMPHOCYTES NFR BLD AUTO: 21.2 %
M PROTEIN MFR SERPL ELPH: 9.8 G/DL (ref 6.1–8.3)
MCH RBC QN AUTO: 32.5 PG (ref 27–33.2)
MCHC RBC AUTO-ENTMCNC: 31.1 G/DL (ref 31–37)
MCV RBC AUTO: 104.4 FL (ref 81–100)
MONOCYTES # BLD AUTO: 0.43 X10(3) UL (ref 0.1–1)
MONOCYTES NFR BLD AUTO: 8.7 %
NEUTROPHIL ABS PRELIM: 3.36 X10 (3) UL (ref 1.3–6.7)
NEUTROPHILS # BLD AUTO: 3.36 X10(3) UL (ref 1.3–6.7)
NEUTROPHILS NFR BLD AUTO: 67.7 %
PLATELET # BLD AUTO: 207 10(3)UL (ref 150–450)
POTASSIUM SERPL-SCNC: 3.7 MMOL/L (ref 3.6–5.1)
RBC # BLD AUTO: 3.2 X10(6)UL (ref 3.8–5.1)
RED CELL DISTRIBUTION WIDTH-SD: 55.9 FL (ref 35.1–46.3)
SODIUM SERPL-SCNC: 141 MMOL/L (ref 136–144)
WBC # BLD AUTO: 5 X10(3) UL (ref 4–13)

## 2018-02-20 PROCEDURE — 99215 OFFICE O/P EST HI 40 MIN: CPT | Performed by: INTERNAL MEDICINE

## 2018-02-20 NOTE — PROGRESS NOTES
Pt here for 1 month MD f/u. Pt hasn't received the Ibrutinib as of yet, in process. Energy level is pretty good, appetite is good. Denies pain, no bowel issues.      Education Record    Learner:  Patient    Disease / Diagnosis:    Barriers / Limitations:  N

## 2018-02-21 ENCOUNTER — TELEPHONE (OUTPATIENT)
Dept: FAMILY MEDICINE CLINIC | Facility: CLINIC | Age: 78
End: 2018-02-21

## 2018-02-21 ENCOUNTER — SNF/IP PROF CHARGE ONLY (OUTPATIENT)
Dept: HEMATOLOGY/ONCOLOGY | Facility: HOSPITAL | Age: 78
End: 2018-02-21

## 2018-02-21 ENCOUNTER — HOSPITAL ENCOUNTER (EMERGENCY)
Age: 78
Discharge: HOME | End: 2018-02-21
Attending: EMERGENCY MEDICINE
Payer: MEDICARE

## 2018-02-21 VITALS
SYSTOLIC BLOOD PRESSURE: 165 MMHG | DIASTOLIC BLOOD PRESSURE: 87 MMHG | OXYGEN SATURATION: 97 % | HEIGHT: 60 IN | TEMPERATURE: 98 F | WEIGHT: 140 LBS | BODY MASS INDEX: 27.48 KG/M2 | RESPIRATION RATE: 16 BRPM | HEART RATE: 68 BPM

## 2018-02-21 DIAGNOSIS — C34.91 BILATERAL LUNG CANCER (HCC): ICD-10-CM

## 2018-02-21 DIAGNOSIS — I10 HYPERTENSION, UNSPECIFIED TYPE: Primary | ICD-10-CM

## 2018-02-21 DIAGNOSIS — C34.92 BILATERAL LUNG CANCER (HCC): ICD-10-CM

## 2018-02-21 LAB
ATRIAL RATE: 72 BPM
BASOPHILS # BLD AUTO: 0.02 X10(3) UL (ref 0–0.1)
BASOPHILS NFR BLD AUTO: 0.3 %
BUN BLD-MCNC: 51 MG/DL (ref 8–20)
CALCIUM BLD-MCNC: 9.5 MG/DL (ref 8.3–10.3)
CHLORIDE: 108 MMOL/L (ref 101–111)
CO2: 27 MMOL/L (ref 22–32)
CREAT BLD-MCNC: 1.93 MG/DL (ref 0.55–1.02)
EOSINOPHIL # BLD AUTO: 0.06 X10(3) UL (ref 0–0.3)
EOSINOPHIL NFR BLD AUTO: 1 %
ERYTHROCYTE [DISTWIDTH] IN BLOOD BY AUTOMATED COUNT: 14.9 % (ref 11.5–16)
GLUCOSE BLD-MCNC: 100 MG/DL (ref 70–99)
HCT VFR BLD AUTO: 31.9 % (ref 34–50)
HGB BLD-MCNC: 9.9 G/DL (ref 12–16)
IMMATURE GRANULOCYTE COUNT: 0.02 X10(3) UL (ref 0–1)
IMMATURE GRANULOCYTE RATIO %: 0.3 %
LYMPHOCYTES # BLD AUTO: 1.15 X10(3) UL (ref 0.9–4)
LYMPHOCYTES NFR BLD AUTO: 19.5 %
MCH RBC QN AUTO: 32.2 PG (ref 27–33.2)
MCHC RBC AUTO-ENTMCNC: 31 G/DL (ref 31–37)
MCV RBC AUTO: 103.9 FL (ref 81–100)
MONOCYTES # BLD AUTO: 0.5 X10(3) UL (ref 0.1–1)
MONOCYTES NFR BLD AUTO: 8.5 %
NEUTROPHIL ABS PRELIM: 4.14 X10 (3) UL (ref 1.3–6.7)
NEUTROPHILS # BLD AUTO: 4.14 X10(3) UL (ref 1.3–6.7)
NEUTROPHILS NFR BLD AUTO: 70.4 %
P AXIS: 49 DEGREES
P-R INTERVAL: 180 MS
PLATELET # BLD AUTO: 197 10(3)UL (ref 150–450)
POTASSIUM SERPL-SCNC: 3.8 MMOL/L (ref 3.6–5.1)
Q-T INTERVAL: 386 MS
QRS DURATION: 86 MS
QTC CALCULATION (BEZET): 422 MS
R AXIS: 2 DEGREES
RBC # BLD AUTO: 3.07 X10(6)UL (ref 3.8–5.1)
RED CELL DISTRIBUTION WIDTH-SD: 56.7 FL (ref 35.1–46.3)
SODIUM SERPL-SCNC: 141 MMOL/L (ref 136–144)
T AXIS: 33 DEGREES
VENTRICULAR RATE: 72 BPM
WBC # BLD AUTO: 5.9 X10(3) UL (ref 4–13)

## 2018-02-21 PROCEDURE — 93010 ELECTROCARDIOGRAM REPORT: CPT

## 2018-02-21 PROCEDURE — G9678 ONCOLOGY CARE MODEL SERVICE: HCPCS | Performed by: INTERNAL MEDICINE

## 2018-02-21 PROCEDURE — 80048 BASIC METABOLIC PNL TOTAL CA: CPT | Performed by: EMERGENCY MEDICINE

## 2018-02-21 PROCEDURE — 36415 COLL VENOUS BLD VENIPUNCTURE: CPT

## 2018-02-21 PROCEDURE — 99285 EMERGENCY DEPT VISIT HI MDM: CPT

## 2018-02-21 PROCEDURE — 99284 EMERGENCY DEPT VISIT MOD MDM: CPT

## 2018-02-21 PROCEDURE — 93005 ELECTROCARDIOGRAM TRACING: CPT

## 2018-02-21 PROCEDURE — 85025 COMPLETE CBC W/AUTO DIFF WBC: CPT | Performed by: EMERGENCY MEDICINE

## 2018-02-21 NOTE — TELEPHONE ENCOUNTER
PT WENT TO [place] ER FOR BLOOD PRESSURE ISSUES. ELEVATED. NEEDS TO SEE KATHIE ASAP FOR VISIT PER ER.    NO 1/2 HOUR SLOTS AVAIL SOON. PLEASE CALL HER BACK WITH APPT.

## 2018-02-21 NOTE — TELEPHONE ENCOUNTER
Patient called and stated that she saw her Endo dr. Paulette Haydne and they told her that her BP was extremely high  Thinks 180/96  She took her BP this morning at 8:00am and it was 185/98    She wanted to know if a nurse could check it for her.     Got Triage o

## 2018-02-21 NOTE — ED INITIAL ASSESSMENT (HPI)
Pt was at her endocrinologist office yesterday and said that her blood pressure was elevated (180/90's). Sts she took it again today and it is high again. Her son sts that when she takes her BP meds, her BP does go down to 150/93.  Pt reports feeling \"edgy

## 2018-02-21 NOTE — TELEPHONE ENCOUNTER
appt made 2/27/18,  Advised pt to continue to monitor b/p,  Pt understands  \"to return immediately to the ER for worsening, concerning, new, changing or persisting symptoms\"

## 2018-02-21 NOTE — ED PROVIDER NOTES
Patient Seen in: THE [facility] Emergency Department In [place]    History   Patient presents with:  Hypertension (cardiovascular)    Stated Complaint: HTN    HPI    70-year-old female presents reporting elevated blood pressure.   She reports she checked her b bunionectomy,bilateral  12/05/2017: OTHER SURGICAL HISTORY      Comment: [place]- Dr. Iker Inman  No date: REMOVAL OF LUNG,LOBECTOMY      Comment: removal of upper right lobe  No date: TONSILLECTOMY        Smoking status: Never Smoker CBC WITH DIFFERENTIAL WITH PLATELET.   Procedure                               Abnormality         Status                     ---------                               -----------         ------                     CBC W/ DIFFERENTIAL[[id number]]          Abno help prevent relapse or worsening. Patient was instructed to follow-up with their primary care provider for further evaluation and treatment, but to return immediately to the ER for worsening, concerning, new, changing or persisting symptoms.   I discussed

## 2018-02-21 NOTE — TELEPHONE ENCOUNTER
Pt states yesterday b/p was 189/92 at hem/onc office,  Today it is still running high at 185/98 this morning. Pt states she has no chest pain, no blurry vision, no symptoms but is feeling extremely anxious.  Advised pt to go to IC for eval. Pt agrees and wi

## 2018-02-25 RX ORDER — FAMOTIDINE 20 MG/1
TABLET ORAL
Qty: 180 TABLET | Refills: 11 | Status: SHIPPED | OUTPATIENT
Start: 2018-02-25 | End: 2018-10-10 | Stop reason: ALTCHOICE

## 2018-02-27 ENCOUNTER — TELEPHONE (OUTPATIENT)
Dept: HEMATOLOGY/ONCOLOGY | Facility: HOSPITAL | Age: 78
End: 2018-02-27

## 2018-02-27 ENCOUNTER — OFFICE VISIT (OUTPATIENT)
Dept: FAMILY MEDICINE CLINIC | Facility: CLINIC | Age: 78
End: 2018-02-27

## 2018-02-27 ENCOUNTER — TELEPHONE (OUTPATIENT)
Dept: NEPHROLOGY | Facility: CLINIC | Age: 78
End: 2018-02-27

## 2018-02-27 ENCOUNTER — TELEPHONE (OUTPATIENT)
Dept: FAMILY MEDICINE CLINIC | Facility: CLINIC | Age: 78
End: 2018-02-27

## 2018-02-27 VITALS
WEIGHT: 141 LBS | HEART RATE: 88 BPM | TEMPERATURE: 97 F | DIASTOLIC BLOOD PRESSURE: 95 MMHG | BODY MASS INDEX: 28.43 KG/M2 | RESPIRATION RATE: 18 BRPM | HEIGHT: 59 IN | SYSTOLIC BLOOD PRESSURE: 170 MMHG | OXYGEN SATURATION: 97 %

## 2018-02-27 DIAGNOSIS — C82.90 FOLLICULAR LYMPHOMA, UNSPECIFIED GRADE, UNSPECIFIED BODY REGION (HCC): ICD-10-CM

## 2018-02-27 DIAGNOSIS — I10 HYPERTENSION, ACCELERATED: Primary | ICD-10-CM

## 2018-02-27 PROCEDURE — 99214 OFFICE O/P EST MOD 30 MIN: CPT | Performed by: FAMILY MEDICINE

## 2018-02-27 RX ORDER — AMLODIPINE BESYLATE AND BENAZEPRIL HYDROCHLORIDE 5; 10 MG/1; MG/1
CAPSULE ORAL
Qty: 90 CAPSULE | Refills: 3 | Status: SHIPPED | OUTPATIENT
Start: 2018-02-27 | End: 2018-02-27

## 2018-02-27 RX ORDER — AMLODIPINE BESYLATE 5 MG/1
5 TABLET ORAL ONCE
Status: DISCONTINUED | OUTPATIENT
Start: 2018-02-27 | End: 2018-04-10

## 2018-02-27 NOTE — TELEPHONE ENCOUNTER
Received call from Dr Octavio Granado, he saw patient today in office for BP, added Amlodipine. He was also concerned about her creat drifting up.  Venkata Jj had seen Dr Elliott Peralta in the past. Spoke with patient and she will make an appointment to see him in follow up to a

## 2018-02-27 NOTE — PROGRESS NOTES
Here for rising but assymptomtic blood pressure. Battling lymphoma and declining bloodcounts and decliningrenal functions. On metoprolol and losartan. Denies headaches chest pain shortness of breath focal motor or sensory deficits.     Blood pressure rec

## 2018-03-02 ENCOUNTER — TELEPHONE (OUTPATIENT)
Dept: FAMILY MEDICINE CLINIC | Facility: CLINIC | Age: 78
End: 2018-03-02

## 2018-03-05 ENCOUNTER — OFFICE VISIT (OUTPATIENT)
Dept: NEPHROLOGY | Facility: CLINIC | Age: 78
End: 2018-03-05

## 2018-03-05 VITALS — SYSTOLIC BLOOD PRESSURE: 160 MMHG | WEIGHT: 142 LBS | BODY MASS INDEX: 29 KG/M2 | DIASTOLIC BLOOD PRESSURE: 88 MMHG

## 2018-03-05 DIAGNOSIS — N18.4 CKD (CHRONIC KIDNEY DISEASE) STAGE 4, GFR 15-29 ML/MIN (HCC): ICD-10-CM

## 2018-03-05 DIAGNOSIS — I15.9 SECONDARY HYPERTENSION: Primary | ICD-10-CM

## 2018-03-05 DIAGNOSIS — I10 ESSENTIAL HYPERTENSION: ICD-10-CM

## 2018-03-05 PROCEDURE — 99215 OFFICE O/P EST HI 40 MIN: CPT | Performed by: INTERNAL MEDICINE

## 2018-03-05 RX ORDER — AMLODIPINE BESYLATE AND BENAZEPRIL HYDROCHLORIDE 5; 10 MG/1; MG/1
1 CAPSULE ORAL DAILY
COMMUNITY
End: 2019-05-14

## 2018-03-05 NOTE — PROGRESS NOTES
Nephrology Progress Note      ASSESSMENT/PLAN:        1) CKD 4- serum creatinine has been elevated since initial labs in 2011 (1.4 mg/dl) but more recently has increased to 2.0 mg/dL although relatively unchanged since late 2015; this is of unclear etiolog fever/chills  Denies wt loss/gain  Denies HA or visual changes  Denies CP or palpitations  Denies SOB/cough/hemoptysis  Denies abd or flank pain  Denies N/V/D  Denies change in urinary habits or gross hematuria  Denies LE edema  Denies skin rashes/myalgias (12.5 mg total) by mouth every 8 (eight) hours as needed for Itching.  Disp: 5 tablet Rfl: 0   DIPHENOXYLATE-ATROPINE 2.5-0.025 MG Oral Tab TAKE ONE TO TWO TABLETS BY MOUTH 4 TIMES DAILY AS NEEDED DIARRHEA Disp: 30 tablet Rfl: 1   METOPROLOL SUCCINATE ER 10 lb  02/21/18 : 140 lb    General: Alert and oriented in no apparent distress.   HEENT: No scleral icterus, MMM  Neck: Supple, no CIARA or thyromegaly  Cardiac: Regular rate and rhythm, S1, S2 normal, no murmur or rub  Lungs: Clear without wheezes, rales, rhon

## 2018-03-06 ENCOUNTER — TELEPHONE (OUTPATIENT)
Dept: HEMATOLOGY/ONCOLOGY | Facility: HOSPITAL | Age: 78
End: 2018-03-06

## 2018-03-06 NOTE — TELEPHONE ENCOUNTER
Pt called back, she needs to provide the pharmacy with additional drug expense paperwork. Will call us back with an update when she has one.

## 2018-03-06 NOTE — PROGRESS NOTES
Cancer Center Progress Note    Patient Name: Dolly Cates   YOB: 1940   Medical Record Number: [id number]   CSN: [id number]   Attending Physician: Braxton Mcpherson M.D.    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   See co Result  CD3                             See Comment  CD5                             See Comment  CD10                              See Comment  CD20                              See Comment  Cyclin D1                      See Comment  CKAE1/3 Clinical Information   History of lung cancer and low-grade B-cell lymphoma. IgM monoclonal gammopathy.   Recent imaging studies demonstrate a new ground glass opacity in the left apical region and increasing size of retroperitoneal and mesenteric lympha Tab, Take  by mouth., Disp: , Rfl:   •  Amlodipine Besy-Benazepril HCl 5-10 MG Oral Cap, Take 1 capsule by mouth daily. , Disp: , Rfl:   •  VOLTAREN 1 % Transdermal Gel, PLACE ONE GRAM ONTO THE SKIN FOUR TIMES DAILY, Disp: 3 Tube, Rfl: 3  •  FAMOTIDINE 20 M History   Marital status:    Spouse name: N/A    Years of education: N/A  Number of children: N/A     Occupational History  None on file     Social History Main Topics   Smoking status: Never Smoker    Smokeless tobacco: Never Used    Alcohol use No MPV 9.7 01/24/2012             Ref. Range 2/20/2018 11:50   TOTAL PROTEIN Latest Ref Range: 6.1 - 8.3 g/dL 9.8 (H)   Albumin Latest Ref Range: 3.5 - 4.8 g/dL 3.0 (L)   IMMUNOGLOBULIN M Latest Ref Range: 43.0 - 279.0 mg/dL 3,590.0 (H)       MYD88 L265P DE Collected:  1/9/2018 15:40 Status:  Final result Dx: Follicular lymphoma grade I of intra-. ..     Ref Range & Units 1/9/18 1540   Chromosome FISH, Interphase Normal  See Note    Comments: Specimen Received   Specimen Type:        Bone Marrow   Reason for gammopathy- reviewed recent lung biopsy as well as additional testing results with her and her son. Biopsy showed NHL similar to previous mesenteric milly biopsy done in 2013.  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 Discussion 1 hour. Risk level: high- lymphoma with monoclonal paraproteinemia and multiple lung cancers with other comorbidities.      Will contact specialty pharmacy regarding status of Ib

## 2018-03-06 NOTE — TELEPHONE ENCOUNTER
Called pt to check to see if she's received or started her Ibrutinib. Pt states she had rec'd a phone call from the pharmacy earlier, but she was currently at another MD office, she is planning on calling them back this afternoon.  Pt will call us and let u

## 2018-03-08 ENCOUNTER — HOSPITAL ENCOUNTER (OUTPATIENT)
Dept: ULTRASOUND IMAGING | Age: 78
Discharge: HOME | End: 2018-03-08
Attending: INTERNAL MEDICINE
Payer: MEDICARE

## 2018-03-08 ENCOUNTER — APPOINTMENT (OUTPATIENT)
Dept: LAB | Age: 78
End: 2018-03-08
Attending: INTERNAL MEDICINE
Payer: MEDICARE

## 2018-03-08 DIAGNOSIS — N18.4 CKD (CHRONIC KIDNEY DISEASE) STAGE 4, GFR 15-29 ML/MIN (HCC): ICD-10-CM

## 2018-03-08 DIAGNOSIS — I15.9 SECONDARY HYPERTENSION: ICD-10-CM

## 2018-03-08 DIAGNOSIS — I10 ESSENTIAL HYPERTENSION: ICD-10-CM

## 2018-03-08 PROCEDURE — 82088 ASSAY OF ALDOSTERONE: CPT

## 2018-03-08 PROCEDURE — 84244 ASSAY OF RENIN: CPT

## 2018-03-08 PROCEDURE — 93975 VASCULAR STUDY: CPT | Performed by: INTERNAL MEDICINE

## 2018-03-08 PROCEDURE — 36415 COLL VENOUS BLD VENIPUNCTURE: CPT

## 2018-03-08 PROCEDURE — 76775 US EXAM ABDO BACK WALL LIM: CPT | Performed by: INTERNAL MEDICINE

## 2018-03-08 PROCEDURE — 83835 ASSAY OF METANEPHRINES: CPT

## 2018-03-10 LAB — ALDOSTERONE: 12.1 NG/DL

## 2018-03-12 LAB — RENIN ACTIVITY: 1.1 NG/ML/HR

## 2018-03-13 LAB
METANEPHRINE: 0.29 NMOL/L
NORMETANEPHRINE: 0.76 NMOL/L

## 2018-03-14 ENCOUNTER — TELEPHONE (OUTPATIENT)
Dept: HEMATOLOGY/ONCOLOGY | Facility: HOSPITAL | Age: 78
End: 2018-03-14

## 2018-03-15 ENCOUNTER — NURSE ONLY (OUTPATIENT)
Dept: HEMATOLOGY/ONCOLOGY | Age: 78
End: 2018-03-15
Attending: INTERNAL MEDICINE
Payer: MEDICARE

## 2018-03-15 ENCOUNTER — TELEPHONE (OUTPATIENT)
Dept: NEPHROLOGY | Facility: CLINIC | Age: 78
End: 2018-03-15

## 2018-03-15 DIAGNOSIS — C34.81 MALIGNANT NEOPLASM OF OVERLAPPING SITES OF RIGHT LUNG (HCC): ICD-10-CM

## 2018-03-15 DIAGNOSIS — C85.10 LOW GRADE B-CELL LYMPHOMA (HCC): ICD-10-CM

## 2018-03-15 DIAGNOSIS — D47.2 MONOCLONAL PARAPROTEINEMIA: ICD-10-CM

## 2018-03-15 LAB
ALBUMIN SERPL-MCNC: 2.9 G/DL (ref 3.5–4.8)
ALP LIVER SERPL-CCNC: 88 U/L (ref 55–142)
ALT SERPL-CCNC: 33 U/L (ref 14–54)
AST SERPL-CCNC: 28 U/L (ref 15–41)
BASOPHILS # BLD AUTO: 0.03 X10(3) UL (ref 0–0.1)
BASOPHILS NFR BLD AUTO: 0.7 %
BILIRUB SERPL-MCNC: 0.5 MG/DL (ref 0.1–2)
BUN BLD-MCNC: 51 MG/DL (ref 8–20)
CALCIUM BLD-MCNC: 9.6 MG/DL (ref 8.3–10.3)
CHLORIDE: 108 MMOL/L (ref 101–111)
CO2: 24 MMOL/L (ref 22–32)
CREAT BLD-MCNC: 1.77 MG/DL (ref 0.55–1.02)
EOSINOPHIL # BLD AUTO: 0.09 X10(3) UL (ref 0–0.3)
EOSINOPHIL NFR BLD AUTO: 2 %
ERYTHROCYTE [DISTWIDTH] IN BLOOD BY AUTOMATED COUNT: 14.4 % (ref 11.5–16)
GLUCOSE BLD-MCNC: 100 MG/DL (ref 70–99)
HCT VFR BLD AUTO: 33.5 % (ref 34–50)
HGB BLD-MCNC: 10.5 G/DL (ref 12–16)
IMMATURE GRANULOCYTE COUNT: 0.01 X10(3) UL (ref 0–1)
IMMATURE GRANULOCYTE RATIO %: 0.2 %
LDH: 174 U/L (ref 84–246)
LYMPHOCYTES # BLD AUTO: 1.2 X10(3) UL (ref 0.9–4)
LYMPHOCYTES NFR BLD AUTO: 27 %
M PROTEIN MFR SERPL ELPH: 9.7 G/DL (ref 6.1–8.3)
MCH RBC QN AUTO: 32.1 PG (ref 27–33.2)
MCHC RBC AUTO-ENTMCNC: 31.3 G/DL (ref 31–37)
MCV RBC AUTO: 102.4 FL (ref 81–100)
MONOCYTES # BLD AUTO: 0.44 X10(3) UL (ref 0.1–1)
MONOCYTES NFR BLD AUTO: 9.9 %
NEUTROPHIL ABS PRELIM: 2.67 X10 (3) UL (ref 1.3–6.7)
NEUTROPHILS # BLD AUTO: 2.67 X10(3) UL (ref 1.3–6.7)
NEUTROPHILS NFR BLD AUTO: 60.2 %
PLATELET # BLD AUTO: 218 10(3)UL (ref 150–450)
POTASSIUM SERPL-SCNC: 4 MMOL/L (ref 3.6–5.1)
RBC # BLD AUTO: 3.27 X10(6)UL (ref 3.8–5.1)
RED CELL DISTRIBUTION WIDTH-SD: 54.4 FL (ref 35.1–46.3)
SODIUM SERPL-SCNC: 141 MMOL/L (ref 136–144)
WBC # BLD AUTO: 4.4 X10(3) UL (ref 4–13)

## 2018-03-15 PROCEDURE — 82784 ASSAY IGA/IGD/IGG/IGM EACH: CPT

## 2018-03-15 PROCEDURE — 80053 COMPREHEN METABOLIC PANEL: CPT

## 2018-03-15 PROCEDURE — 83615 LACTATE (LD) (LDH) ENZYME: CPT

## 2018-03-15 PROCEDURE — 84165 PROTEIN E-PHORESIS SERUM: CPT

## 2018-03-15 PROCEDURE — 86334 IMMUNOFIX E-PHORESIS SERUM: CPT

## 2018-03-15 PROCEDURE — 36415 COLL VENOUS BLD VENIPUNCTURE: CPT

## 2018-03-15 PROCEDURE — 85025 COMPLETE CBC W/AUTO DIFF WBC: CPT

## 2018-03-15 PROCEDURE — 83883 ASSAY NEPHELOMETRY NOT SPEC: CPT

## 2018-03-16 LAB
IMMUNOGLOBULIN A: 122 MG/DL (ref 70–312)
IMMUNOGLOBULIN G: 947 MG/DL (ref 791–1643)
IMMUNOGLOBULIN M: 3260 MG/DL (ref 43–279)

## 2018-03-20 ENCOUNTER — OFFICE VISIT (OUTPATIENT)
Dept: HEMATOLOGY/ONCOLOGY | Age: 78
End: 2018-03-20
Attending: INTERNAL MEDICINE
Payer: MEDICARE

## 2018-03-21 ENCOUNTER — TELEPHONE (OUTPATIENT)
Dept: HEMATOLOGY/ONCOLOGY | Facility: HOSPITAL | Age: 78
End: 2018-03-21

## 2018-03-22 ENCOUNTER — OFFICE VISIT (OUTPATIENT)
Dept: FAMILY MEDICINE CLINIC | Facility: CLINIC | Age: 78
End: 2018-03-22

## 2018-03-22 VITALS
TEMPERATURE: 99 F | SYSTOLIC BLOOD PRESSURE: 150 MMHG | RESPIRATION RATE: 18 BRPM | HEART RATE: 110 BPM | OXYGEN SATURATION: 98 % | DIASTOLIC BLOOD PRESSURE: 92 MMHG

## 2018-03-22 DIAGNOSIS — T50.905A ADVERSE EFFECT OF DRUG, INITIAL ENCOUNTER: ICD-10-CM

## 2018-03-22 DIAGNOSIS — J44.9 BRONCHITIS WITH AIRWAY OBSTRUCTION (HCC): Primary | ICD-10-CM

## 2018-03-22 PROCEDURE — 99214 OFFICE O/P EST MOD 30 MIN: CPT | Performed by: FAMILY MEDICINE

## 2018-03-22 RX ORDER — ALBUTEROL SULFATE 90 UG/1
2 AEROSOL, METERED RESPIRATORY (INHALATION) EVERY 6 HOURS PRN
Qty: 1 INHALER | Refills: 2 | Status: SHIPPED | OUTPATIENT
Start: 2018-03-22 | End: 2018-08-14

## 2018-03-22 NOTE — PROGRESS NOTES
Survivor of 2 different cancers one of them being a long here with cough aches and pains and flulike illness. Son has the same issue. Patient is taking Brio a combination of long-acting albuterol and corticosteroid.   She is also recently been placed on a

## 2018-03-24 RX ORDER — DIPHENOXYLATE HYDROCHLORIDE AND ATROPINE SULFATE 2.5; .025 MG/1; MG/1
TABLET ORAL
Qty: 30 TABLET | Refills: 1 | Status: CANCELLED | OUTPATIENT
Start: 2018-03-24

## 2018-03-26 ENCOUNTER — TELEPHONE (OUTPATIENT)
Dept: HEMATOLOGY/ONCOLOGY | Facility: HOSPITAL | Age: 78
End: 2018-03-26

## 2018-03-26 NOTE — TELEPHONE ENCOUNTER
Per Dr. Fabrice Reis - patient can hold Sandi Elodia for a few days until she feels better  Or  Appointment tomorrow with Rupinder Cobos at [place] [address] in [place].      Per patient she would like to see Rupinder Cobos tomorrow - confirmed appointment at 10am in . PSR notifie

## 2018-03-27 ENCOUNTER — OFFICE VISIT (OUTPATIENT)
Dept: HEMATOLOGY/ONCOLOGY | Age: 78
End: 2018-03-27
Attending: INTERNAL MEDICINE
Payer: MEDICARE

## 2018-03-27 ENCOUNTER — HOSPITAL ENCOUNTER (OUTPATIENT)
Dept: GENERAL RADIOLOGY | Age: 78
Discharge: HOME | End: 2018-03-27
Attending: INTERNAL MEDICINE
Payer: MEDICARE

## 2018-03-27 VITALS
WEIGHT: 136.38 LBS | RESPIRATION RATE: 18 BRPM | BODY MASS INDEX: 28 KG/M2 | DIASTOLIC BLOOD PRESSURE: 74 MMHG | SYSTOLIC BLOOD PRESSURE: 123 MMHG | TEMPERATURE: 98 F | HEART RATE: 91 BPM | OXYGEN SATURATION: 95 %

## 2018-03-27 DIAGNOSIS — J06.9 URI, ACUTE: ICD-10-CM

## 2018-03-27 DIAGNOSIS — R05.9 COUGH: ICD-10-CM

## 2018-03-27 DIAGNOSIS — C85.80 MARGINAL ZONE B-CELL LYMPHOMA (HCC): ICD-10-CM

## 2018-03-27 DIAGNOSIS — C85.80 MARGINAL ZONE B-CELL LYMPHOMA (HCC): Primary | ICD-10-CM

## 2018-03-27 DIAGNOSIS — R19.7 DIARRHEA, UNSPECIFIED TYPE: ICD-10-CM

## 2018-03-27 PROBLEM — R06.02 SOB (SHORTNESS OF BREATH): Status: ACTIVE | Noted: 2018-03-27

## 2018-03-27 LAB
ALBUMIN SERPL-MCNC: 2.8 G/DL (ref 3.5–4.8)
ALP LIVER SERPL-CCNC: 74 U/L (ref 55–142)
ALT SERPL-CCNC: 27 U/L (ref 14–54)
AST SERPL-CCNC: 27 U/L (ref 15–41)
BASOPHILS # BLD AUTO: 0.02 X10(3) UL (ref 0–0.1)
BASOPHILS NFR BLD AUTO: 0.3 %
BILIRUB SERPL-MCNC: 0.5 MG/DL (ref 0.1–2)
BUN BLD-MCNC: 68 MG/DL (ref 8–20)
CALCIUM BLD-MCNC: 9.2 MG/DL (ref 8.3–10.3)
CHLORIDE: 106 MMOL/L (ref 101–111)
CO2: 21 MMOL/L (ref 22–32)
CREAT BLD-MCNC: 2.39 MG/DL (ref 0.55–1.02)
EOSINOPHIL # BLD AUTO: 0.01 X10(3) UL (ref 0–0.3)
EOSINOPHIL NFR BLD AUTO: 0.2 %
ERYTHROCYTE [DISTWIDTH] IN BLOOD BY AUTOMATED COUNT: 14.6 % (ref 11.5–16)
GLUCOSE BLD-MCNC: 109 MG/DL (ref 70–99)
HCT VFR BLD AUTO: 32.7 % (ref 34–50)
HGB BLD-MCNC: 10.5 G/DL (ref 12–16)
IMMATURE GRANULOCYTE COUNT: 0.04 X10(3) UL (ref 0–1)
IMMATURE GRANULOCYTE RATIO %: 0.7 %
LYMPHOCYTES # BLD AUTO: 1.96 X10(3) UL (ref 0.9–4)
LYMPHOCYTES NFR BLD AUTO: 33.7 %
M PROTEIN MFR SERPL ELPH: 9.5 G/DL (ref 6.1–8.3)
MCH RBC QN AUTO: 32.2 PG (ref 27–33.2)
MCHC RBC AUTO-ENTMCNC: 32.1 G/DL (ref 31–37)
MCV RBC AUTO: 100.3 FL (ref 81–100)
MONOCYTES # BLD AUTO: 0.63 X10(3) UL (ref 0.1–1)
MONOCYTES NFR BLD AUTO: 10.8 %
NEUTROPHIL ABS PRELIM: 3.16 X10 (3) UL (ref 1.3–6.7)
NEUTROPHILS # BLD AUTO: 3.16 X10(3) UL (ref 1.3–6.7)
NEUTROPHILS NFR BLD AUTO: 54.3 %
PLATELET # BLD AUTO: 161 10(3)UL (ref 150–450)
POTASSIUM SERPL-SCNC: 3.7 MMOL/L (ref 3.6–5.1)
RBC # BLD AUTO: 3.26 X10(6)UL (ref 3.8–5.1)
RED CELL DISTRIBUTION WIDTH-SD: 53.5 FL (ref 35.1–46.3)
SODIUM SERPL-SCNC: 139 MMOL/L (ref 136–144)
WBC # BLD AUTO: 5.8 X10(3) UL (ref 4–13)

## 2018-03-27 PROCEDURE — 99214 OFFICE O/P EST MOD 30 MIN: CPT | Performed by: INTERNAL MEDICINE

## 2018-03-27 PROCEDURE — 71046 X-RAY EXAM CHEST 2 VIEWS: CPT | Performed by: INTERNAL MEDICINE

## 2018-03-27 RX ORDER — LEVOFLOXACIN 250 MG/1
500 TABLET ORAL DAILY
Qty: 10 TABLET | Refills: 1 | Status: ON HOLD | OUTPATIENT
Start: 2018-03-27 | End: 2018-04-10

## 2018-03-27 RX ORDER — DIPHENOXYLATE HYDROCHLORIDE AND ATROPINE SULFATE 2.5; .025 MG/1; MG/1
TABLET ORAL
Qty: 30 TABLET | Refills: 3 | COMMUNITY
Start: 2018-03-27 | End: 2018-11-19

## 2018-03-27 NOTE — PATIENT INSTRUCTIONS
Quit current antibiotic, start Levaquin Dr. Spencer Patches prescribed. Continue Ibrutinib. Hydrate with a lot of fluids, notify Dr. Josiane Max as kidney function tests are rising.

## 2018-03-27 NOTE — PROGRESS NOTES
Cancer Center Progress Note    Patient Name: Parris Read   YOB: 1940   Medical Record Number: [id number]   CSN: [id number]   Attending Physician: Daren Srinivasan M.D.    Referring Physician: Caity Vinson DO      Date of Visit: 3/27/20 •  Amlodipine Besy-Benazepril HCl 5-10 MG Oral Cap, Take 1 capsule by mouth daily. , Disp: , Rfl:   •  VOLTAREN 1 % Transdermal Gel, PLACE ONE GRAM ONTO THE SKIN FOUR TIMES DAILY, Disp: 3 Tube, Rfl: 3  •  FAMOTIDINE 20 MG Oral Tab, TAKE ONE TABLET BY MOUT radiation 2013    right lower lung   • Hearing impairment     bilateral hearing aids    • HIGH BLOOD PRESSURE    • Personal history of malignant neoplasm of bronchus and lung    • Personal history of urinary (tract) infection    • Pulmonary embolism (HCC) 95%   BMI 27.55 kg/m²       Physical Examination:  General: Patient is alert and oriented x 3, not in acute distress. Minimal erythematous rash on her face. Cranial prosthesis  Psych:  Mood and affect appropriate  HEENT: EOMs intact. PERRL.  Oropharynx is c

## 2018-03-27 NOTE — PROGRESS NOTES
Pt here for 1 month MD f/u. Pt started Ibrutinib on 3/20, by chance she started with a bad cough the next day. Pt states it is a mucousy cough, but not coughing anything up. Chest feels heavy and tight, can't lay down to sleep d/t cough. No fevers.  Has con

## 2018-03-28 ENCOUNTER — TELEPHONE (OUTPATIENT)
Dept: NEPHROLOGY | Facility: CLINIC | Age: 78
End: 2018-03-28

## 2018-03-28 LAB
IMMUNOGLOBULIN A: 164.5 MG/DL (ref 70–312)
IMMUNOGLOBULIN G: 879 MG/DL (ref 791–1643)
IMMUNOGLOBULIN M: 3430 MG/DL (ref 43–279)

## 2018-03-30 ENCOUNTER — OFFICE VISIT (OUTPATIENT)
Dept: FAMILY MEDICINE CLINIC | Facility: CLINIC | Age: 78
End: 2018-03-30

## 2018-03-30 ENCOUNTER — SOCIAL WORK SERVICES (OUTPATIENT)
Dept: HEMATOLOGY/ONCOLOGY | Facility: HOSPITAL | Age: 78
End: 2018-03-30

## 2018-03-30 VITALS
WEIGHT: 134 LBS | DIASTOLIC BLOOD PRESSURE: 66 MMHG | HEIGHT: 59 IN | HEART RATE: 98 BPM | TEMPERATURE: 98 F | RESPIRATION RATE: 16 BRPM | SYSTOLIC BLOOD PRESSURE: 112 MMHG | BODY MASS INDEX: 27.01 KG/M2

## 2018-03-30 DIAGNOSIS — J20.9 BRONCHITIS WITH BRONCHOSPASM: Primary | ICD-10-CM

## 2018-03-30 PROCEDURE — 99214 OFFICE O/P EST MOD 30 MIN: CPT | Performed by: FAMILY MEDICINE

## 2018-03-30 RX ORDER — PREDNISONE 20 MG/1
TABLET ORAL
Qty: 10 TABLET | Refills: 0 | Status: ON HOLD | OUTPATIENT
Start: 2018-03-30 | End: 2018-04-10

## 2018-03-30 NOTE — PROGRESS NOTES
Here with her son. Her bronchitis is only been getting worse so she was seen by Dr. Karen Connors her hematologist oncologist who switched her from what sounds like Omnicef 2 Levaquin.   She is tolerating that antibiotic nicely but still coughing and somewhat sh

## 2018-04-03 ENCOUNTER — APPOINTMENT (OUTPATIENT)
Dept: GENERAL RADIOLOGY | Facility: HOSPITAL | Age: 78
DRG: 871 | End: 2018-04-03
Payer: MEDICARE

## 2018-04-03 ENCOUNTER — HOSPITAL ENCOUNTER (INPATIENT)
Facility: HOSPITAL | Age: 78
LOS: 6 days | Discharge: HOME | DRG: 871 | End: 2018-04-10
Admitting: HOSPITALIST
Payer: MEDICARE

## 2018-04-03 DIAGNOSIS — R19.7 DIARRHEA, UNSPECIFIED TYPE: ICD-10-CM

## 2018-04-03 DIAGNOSIS — J06.9 URI, ACUTE: ICD-10-CM

## 2018-04-03 DIAGNOSIS — R05.9 COUGH: ICD-10-CM

## 2018-04-03 DIAGNOSIS — C85.80 MARGINAL ZONE B-CELL LYMPHOMA (HCC): ICD-10-CM

## 2018-04-03 DIAGNOSIS — J18.9 COMMUNITY ACQUIRED PNEUMONIA, UNSPECIFIED LATERALITY: Primary | ICD-10-CM

## 2018-04-03 PROBLEM — R73.9 HYPERGLYCEMIA: Status: ACTIVE | Noted: 2018-04-03

## 2018-04-03 PROBLEM — D72.829 LEUKOCYTOSIS: Status: ACTIVE | Noted: 2018-04-03

## 2018-04-03 PROBLEM — N17.9 ACUTE KIDNEY INJURY: Status: ACTIVE | Noted: 2018-04-03

## 2018-04-03 PROBLEM — E87.2 METABOLIC ACIDOSIS: Status: ACTIVE | Noted: 2018-04-03

## 2018-04-03 PROBLEM — N17.9 ACUTE KIDNEY INJURY (HCC): Status: ACTIVE | Noted: 2018-04-03

## 2018-04-03 PROBLEM — E87.20 METABOLIC ACIDOSIS: Status: ACTIVE | Noted: 2018-04-03

## 2018-04-03 PROBLEM — N17.9 ACUTE RENAL FAILURE (ARF) (HCC): Status: ACTIVE | Noted: 2018-04-03

## 2018-04-03 PROBLEM — D64.9 ANEMIA: Status: ACTIVE | Noted: 2018-04-03

## 2018-04-03 PROBLEM — N17.9 ACUTE RENAL FAILURE (ARF): Status: ACTIVE | Noted: 2018-04-03

## 2018-04-03 PROBLEM — R79.89 AZOTEMIA: Status: ACTIVE | Noted: 2018-04-03

## 2018-04-03 PROCEDURE — 71045 X-RAY EXAM CHEST 1 VIEW: CPT

## 2018-04-03 RX ORDER — IPRATROPIUM BROMIDE AND ALBUTEROL SULFATE 2.5; .5 MG/3ML; MG/3ML
3 SOLUTION RESPIRATORY (INHALATION) ONCE
Status: COMPLETED | OUTPATIENT
Start: 2018-04-03 | End: 2018-04-03

## 2018-04-03 RX ORDER — SODIUM CHLORIDE 9 MG/ML
1000 INJECTION, SOLUTION INTRAVENOUS ONCE
Status: COMPLETED | OUTPATIENT
Start: 2018-04-03 | End: 2018-04-04

## 2018-04-03 RX ORDER — LEVOFLOXACIN 5 MG/ML
750 INJECTION, SOLUTION INTRAVENOUS ONCE
Status: DISCONTINUED | OUTPATIENT
Start: 2018-04-03 | End: 2018-04-03

## 2018-04-04 PROBLEM — J18.9 COMMUNITY ACQUIRED PNEUMONIA, UNSPECIFIED LATERALITY: Status: ACTIVE | Noted: 2018-04-04

## 2018-04-04 PROCEDURE — 99223 1ST HOSP IP/OBS HIGH 75: CPT | Performed by: HOSPITALIST

## 2018-04-04 PROCEDURE — 99223 1ST HOSP IP/OBS HIGH 75: CPT | Performed by: INTERNAL MEDICINE

## 2018-04-04 RX ORDER — SODIUM CHLORIDE 9 MG/ML
INJECTION, SOLUTION INTRAVENOUS CONTINUOUS
Status: ACTIVE | OUTPATIENT
Start: 2018-04-04 | End: 2018-04-04

## 2018-04-04 RX ORDER — ENOXAPARIN SODIUM 100 MG/ML
40 INJECTION SUBCUTANEOUS DAILY
Status: CANCELLED | OUTPATIENT
Start: 2018-04-04

## 2018-04-04 RX ORDER — SODIUM CHLORIDE 9 MG/ML
INJECTION, SOLUTION INTRAVENOUS CONTINUOUS
Status: DISCONTINUED | OUTPATIENT
Start: 2018-04-04 | End: 2018-04-06

## 2018-04-04 RX ORDER — ECHINACEA PURPUREA EXTRACT 125 MG
1 TABLET ORAL
Status: DISCONTINUED | OUTPATIENT
Start: 2018-04-04 | End: 2018-04-10

## 2018-04-04 RX ORDER — MELATONIN
325
Status: DISCONTINUED | OUTPATIENT
Start: 2018-04-04 | End: 2018-04-10

## 2018-04-04 RX ORDER — ONDANSETRON 2 MG/ML
4 INJECTION INTRAMUSCULAR; INTRAVENOUS EVERY 6 HOURS PRN
Status: DISCONTINUED | OUTPATIENT
Start: 2018-04-04 | End: 2018-04-10

## 2018-04-04 RX ORDER — IPRATROPIUM BROMIDE AND ALBUTEROL SULFATE 2.5; .5 MG/3ML; MG/3ML
3 SOLUTION RESPIRATORY (INHALATION) EVERY 6 HOURS PRN
Status: DISCONTINUED | OUTPATIENT
Start: 2018-04-04 | End: 2018-04-10

## 2018-04-04 RX ORDER — FAMOTIDINE 20 MG/1
20 TABLET ORAL DAILY
Status: DISCONTINUED | OUTPATIENT
Start: 2018-04-04 | End: 2018-04-10

## 2018-04-04 RX ORDER — HEPARIN SODIUM 5000 [USP'U]/ML
5000 INJECTION, SOLUTION INTRAVENOUS; SUBCUTANEOUS EVERY 12 HOURS SCHEDULED
Status: DISCONTINUED | OUTPATIENT
Start: 2018-04-04 | End: 2018-04-10

## 2018-04-04 RX ORDER — DIPHENOXYLATE HYDROCHLORIDE AND ATROPINE SULFATE 2.5; .025 MG/1; MG/1
1 TABLET ORAL 4 TIMES DAILY PRN
Status: DISCONTINUED | OUTPATIENT
Start: 2018-04-04 | End: 2018-04-05

## 2018-04-04 RX ORDER — BENZONATATE 200 MG/1
200 CAPSULE ORAL 3 TIMES DAILY PRN
Status: DISCONTINUED | OUTPATIENT
Start: 2018-04-04 | End: 2018-04-10

## 2018-04-04 RX ORDER — ACETAMINOPHEN 325 MG/1
650 TABLET ORAL EVERY 6 HOURS PRN
Status: DISCONTINUED | OUTPATIENT
Start: 2018-04-04 | End: 2018-04-10

## 2018-04-04 NOTE — H&P
[facility]  History and Physical     Felicita Garnett Patient Status:  Emergency    1940 MRN [id number]   Location [address] [facility] Attending Kenisha Brooks MD   Hosp Day # 0 PCP Estiven Williamson DO     Chief Complain smokeless tobacco. She reports that she does not drink alcohol or use drugs.     Family History:   Family History   Problem Relation Age of Onset   • immune system [OTHER] Mother    • Cancer Father      of the mouth       Allergies:   Penicillins Tab Take 500 mg by mouth every 6 (six) hours as needed for Pain.  Disp:  Rfl:    RESTASIS 0.05 % Ophthalmic Emulsion INSTILL ONE DROP IN EACH EYE TWICE DAILY Disp: 20 mL Rfl: 6   ferrous sulfate 325 (65 FE) MG Oral Tab EC Take 325 mg by mouth daily with marnie Labs   Lab  04/03/18   2154   PTP  15.5*   INR  1.18*       Recent Labs   Lab  04/03/18 2154   TROP  <0.046       Imaging: Imaging data reviewed in Epic. ASSESSMENT / PLAN:     1. Sepsis secondary to presumed gram negative pneumonia  1.  Empiric anti

## 2018-04-04 NOTE — ED NOTES
Report given to Abraham, [facility] x [phone] at [phone]. Transport paged. Pt and sons updated on plan of care.

## 2018-04-04 NOTE — PROGRESS NOTES
Patient seen and examined  Chest: Coarse B/L  CVS: S1, S2, RRR  ABD: Soft, NT, ND, BS+  EXT: No c/c    Imaging: Reviewed    Resp panel negative  DC droplet  Cont abx  Labs in AM  f/u Dr. Vesna Taveras MD

## 2018-04-04 NOTE — PROGRESS NOTES
[facility] Pharmacy Note:  Renal Dose Adjustment    Tashi Pat has been prescribed famotidine (PEPCID) 20 mg orally BID. Estimated Creatinine Clearance: 11.4 mL/min (A) (based on SCr of 3.02 mg/dL (H)).     Her calculated creatinine clearance is <50 ml/mi

## 2018-04-04 NOTE — PROGRESS NOTES
NURSING ADMISSION NOTE      Patient admitted via Cart from ER. ABT infusing for pneumonia. Droplet prec. Flu panel pending. Oriented to room. Safety precautions initiated. Bed in low position, call lights in reach.   Productive cough noted with whitish

## 2018-04-04 NOTE — ED INITIAL ASSESSMENT (HPI)
PT WITH CO WEAKNESS. RECENTLY DX WITH BRONCHITIS AND ON ANTIBX. PT WITH HX OF LUNG CA AND BEING TREATED FOR LYMPHOMA.  DENIES FEVERS

## 2018-04-04 NOTE — ED PROVIDER NOTES
Patient Seen in: [facility] Emergency Department    History   Patient presents with:  Fatigue (constitutional, neurologic)  Dizziness (neurologic)    Stated Complaint: Brianafurt    HPI    This is a pleasant female who is 69 HIGH BLOOD PRESSURE    • Personal history of malignant neoplasm of bronchus and lung    • Personal history of urinary (tract) infection    • Pulmonary embolism ([facility] [address] 2012    right lung,cleared on own per pt   • Urticaria, unspecified    • Visual impairment tenderness     Back: No costovertebral angle tenderness. Extremities: Warm, well perfused, without edema    No significant deformity or joint abnormality    Calves are symmetric and nontender  Good peripheral color, cap refill .        Skin: Unremarkabl Status                     ---------                               -----------         ------                     CBC W/ DIFFERENTIAL[[id number]]          Abnormal            Final result                 Please view results for these tests on the individual PLEURA:  Normal.  No pleural effusions. BONES:  Mild scoliosis and degenerative changes. CONCLUSION:  Chronic appearing changes of right postsurgical hilar scarring and interstitial scarring at the lung bases.      Dictated by: Jane Rodriguez MD on 3/27 DISTAL:    46 cm/s,  ratio = 0.4  MAXIMUM ACCELERATION TIME:  .05 seconds RENAL VEINS:  There is normal Doppler wave form. CONCLUSION:  No evidence of hemodynamically significant stenosis within either renal artery.   The renal vasculature appears chun pm    Follow-up:  No follow-up provider specified.       Medications Prescribed:  Current Discharge Medication List        Present on Admission  Date Reviewed: 3/27/2018          ICD-10-CM Noted POA    * (Principal)Community acquired pneumonia, unspecified

## 2018-04-04 NOTE — PROGRESS NOTES
[facility] Pharmacy Note:  Renal Adjustment for meropenem [facility])    Daryn Pinto is a 69year old female who has been prescribed meropenem (MERREM) 500 mg every 8 hrs.   CrCl is estimated creatinine clearance is 11.4 mL/min (A) (based on SCr of 3.02 mg/dL (H

## 2018-04-04 NOTE — CONSULTS
Hematology-Oncology Consultation Note    Patient Name: Melissa Murphy   YOB: 1940   Medical Record Number: [id number]   CSN: [id number]   Consulting Physician: Kristal Arevalo MD  Date of Consultation: 4/4/2018     Reason for Consultation: Surgical History:  Past Surgical History:  No date: CHOLECYSTECTOMY  9/23/08: COLONOSCOPY  No date: HERNIA SURGERY  1970's: HYSTERECTOMY      Comment: vaginal  No date: OTHER      Comment: lung biopsies  1/1/08: OTHER SURGICAL HISTORY      Comment: Jose Nascimento Location: Left arm)   Pulse 113   Temp 99.3 °F (37.4 °C) (Oral)   Resp 20   Ht 1.524 m (5')   Wt 60.3 kg (133 lb)   SpO2 98%   BMI 25.97 kg/m²     Physical Examination:  General: Patient is alert and oriented x 3, not in acute distress.  Cranial prosthesis Conc 33.4 04/15/2014 1323    Mean Corpuscular HGB Conc 33.4 01/14/2014 1246    RDW 14.3 04/04/2018 0536    RDW 14.1 04/03/2018 2154    RDW 14.6 03/27/2018 1024    RED CELL DISTRIBUTION WIDTH 13.2 07/15/2014 1253    RED CELL DISTRIBUTION WIDTH 13.4 04/15/20 AMERICAN 43 (L) 07/15/2014 1253    GFR NON- 46 (L) 04/15/2014 1323    GFR NON- 47 (L) 01/14/2014 1246    GFR, Non- 17 (L) 04/04/2018 0536    GFR, Non- 14 (L) 04/03/2018 2154    GFR, Non-Rubia 01/14/2014 1246    Alt 22 04/04/2018 0536    Alt 28 04/03/2018 2154    Alt 27 03/27/2018 1024    BILIRUBIN, TOTAL 0.4 07/15/2014 1253    BILIRUBIN, TOTAL 0.4 04/15/2014 1323    BILIRUBIN, TOTAL 0.4 01/14/2014 1246    Bilirubin, Total 0.4 04/04/2018 0536 renal disease. Monitor      Thank you for including us in the care of this patient. We will follow with you.          Andrea Marrero MD

## 2018-04-05 PROCEDURE — 99232 SBSQ HOSP IP/OBS MODERATE 35: CPT | Performed by: INTERNAL MEDICINE

## 2018-04-05 PROCEDURE — 99232 SBSQ HOSP IP/OBS MODERATE 35: CPT | Performed by: HOSPITALIST

## 2018-04-05 RX ORDER — METOPROLOL SUCCINATE 100 MG/1
100 TABLET, EXTENDED RELEASE ORAL
Status: DISCONTINUED | OUTPATIENT
Start: 2018-04-05 | End: 2018-04-10

## 2018-04-05 RX ORDER — FUROSEMIDE 10 MG/ML
20 INJECTION INTRAMUSCULAR; INTRAVENOUS ONCE
Status: COMPLETED | OUTPATIENT
Start: 2018-04-05 | End: 2018-04-05

## 2018-04-05 RX ORDER — METOPROLOL SUCCINATE 100 MG/1
100 TABLET, EXTENDED RELEASE ORAL
Status: DISCONTINUED | OUTPATIENT
Start: 2018-04-06 | End: 2018-04-05

## 2018-04-05 RX ORDER — POTASSIUM CHLORIDE 20 MEQ/1
40 TABLET, EXTENDED RELEASE ORAL EVERY 4 HOURS
Status: COMPLETED | OUTPATIENT
Start: 2018-04-05 | End: 2018-04-05

## 2018-04-05 RX ORDER — SODIUM BICARBONATE 325 MG/1
650 TABLET ORAL 4 TIMES DAILY
Status: DISCONTINUED | OUTPATIENT
Start: 2018-04-05 | End: 2018-04-06

## 2018-04-05 NOTE — PROGRESS NOTES
Heme/Onc Progress Note    Patient Name: Lara Jo   YOB: 1940   Medical Record Number: [id number]   CSN: [id number]   Attending Physician: Rosina Londono M.D. Subjective:   Worse diarrhea    Objective:    Vitals:   04/04/18  1237 0 acetaminophen, ondansetron HCl, diphenoxylate-atropine, Saline Nasal [place]    Physical Examination:  General: Patient is alert and oriented x 3, not in acute distress. Cranial prosthesis  Psych:  Mood and affect appropriate  HEENT: EOMs intact. PERRL.  Orop

## 2018-04-05 NOTE — CM/SW NOTE
BPCI:  Met with patient at bedside to explain the BPCI/Medicare program. Patient agreed with phone f/u for 3 months from [address] [place] [address] after discharge from [facility]. Patient was enrolled under . BPCI/Medicare Letter and Brochure provided.  PT to michael mcmullen

## 2018-04-05 NOTE — PROGRESS NOTES
[facility]  Progress Note     Fco Méndez Patient Status:  Inpatient    1940 MRN [id number]   [facility] 4NW-A Attending Kitty Manuel MD   Hosp Day # 1 PCP Janay Coronado DO     Chief Complaint: PNA  S:  Having diar reviewed in Epic.   Medications:   • Potassium Chloride ER  40 mEq Oral Q4H   • Fluticasone Furoate-Vilanterol  1 puff Inhalation Daily   • famoTIDine  20 mg Oral Daily   • ferrous sulfate  325 mg Oral Daily with breakfast   • cycloSPORINE  1 drop Both Eyes

## 2018-04-05 NOTE — PLAN OF CARE
GASTROINTESTINAL - ADULT    • Maintains adequate nutritional intake (undernourished) Progressing        METABOLIC/FLUID AND ELECTROLYTES - ADULT    • Electrolytes maintained within normal limits Progressing        Patient/Family Goals    • Patient/Family L

## 2018-04-05 NOTE — CM/SW NOTE
04/05/18 1400   CM/SW Screening   Referral Source    Information Source Chart review;Nursing rounds  (son)   Patient's Mental Status Alert;Oriented   Patient's Home Environment House   Patient lives with Children   Patient Status Prior to Ad

## 2018-04-05 NOTE — PHYSICAL THERAPY NOTE
PHYSICAL THERAPY QUICK EVALUATION - INPATIENT    Room Number: 405/405-A  Evaluation Date: 4/5/2018  Presenting Problem: pneumonia  Physician Order: PT Eval and Treat    History related to current admission: Pt was admitted from home on 4/3/2018 with pneu TONSILLECTOMY    HOME SITUATION  Type of Home: House   Home Layout: Multi-level        Stairs to Bedroom:  (6 then 6)  Railing: Yes    Lives With: Son (2 adult sons)     Patient Owned Equipment: Rolling walker       Prior Level of [place]: Prior Level Limits          Skilled Therapy Provided: Pt received supine in bed, agreeable to PT. Pt demos supine to sit ind. Sit to/from stand ind. Ambulation into bathroom with assist for IV pole only, no LOB.   Pt demos toileting and standing at sink to wash hand

## 2018-04-05 NOTE — PLAN OF CARE
Assumed care @ 0730. Patient with occasional non-productive cough, lungs sound diminished, O2 sat 91-92% on room air. Patient with poor appetite. Patient had 3 medium greenish loose stools, stool sent for C-diff.  's, patient denies chest discomfort,

## 2018-04-05 NOTE — PROGRESS NOTES
Alert,oriented. Forgetful at night. Hacking non productive cough. No complaint of pain. Low grade temp. Tylenol x2 doses given. Blood cultures drawn on 04/03. IV antibiotics given per MD orders.

## 2018-04-06 PROCEDURE — 99232 SBSQ HOSP IP/OBS MODERATE 35: CPT | Performed by: INTERNAL MEDICINE

## 2018-04-06 PROCEDURE — 99232 SBSQ HOSP IP/OBS MODERATE 35: CPT | Performed by: HOSPITALIST

## 2018-04-06 RX ORDER — SODIUM CHLORIDE 450 MG/100ML
INJECTION, SOLUTION INTRAVENOUS CONTINUOUS
Status: DISCONTINUED | OUTPATIENT
Start: 2018-04-06 | End: 2018-04-06

## 2018-04-06 RX ORDER — POTASSIUM CHLORIDE 20 MEQ/1
40 TABLET, EXTENDED RELEASE ORAL EVERY 4 HOURS
Status: COMPLETED | OUTPATIENT
Start: 2018-04-06 | End: 2018-04-06

## 2018-04-06 NOTE — PROGRESS NOTES
Patient awake and alertx4. Tele. Room air. Cpox. Cough, nonproductive. Mild crackles in lungs. Having multiple episodes of diarrhea. Up with standby assist. To start bicarb drip today and stop oral bicarb. Potassium replaced today. No complaints of pain.  A

## 2018-04-06 NOTE — PROGRESS NOTES
[facility]  Progress Note     Gumaroinia Spearing Patient Status:  Inpatient    1940 MRN [id number]   [facility] 4NW-A Attending Abhay Vargas MD   [facility] Day # 2 PCP Lois Curtis DO     Chief Complaint: PNA  S:  Diarrhea, n 04/03/18 2154   PTP  15.5*   INR  1.18*     Recent Labs   Lab  04/03/18 2154   TROP  <0.046        Imaging: Imaging data reviewed in Epic.   Medications:   • Potassium Chloride ER  40 mEq Oral Q4H   • sodium bicarbonate  650 mg Oral QID   • vancomycin

## 2018-04-06 NOTE — PROGRESS NOTES
Patient presented sitting upright in bedside chair; VSS and agreeable to participate.   Transferred sit/stand w/supervision assist.  Ambulated 300' w/RW and standby assist.  Upon completion, patient left sitting on toilet to have a BM with instruction to ac

## 2018-04-06 NOTE — PROGRESS NOTES
Heme/Onc Progress Note    Patient Name: Patrick Rondon   YOB: 1940   Medical Record Number: [id number]   CSN: [id number]   Attending Physician: Alia Arias M.D. Subjective:  C dif positive.  Started on vancomycin    Objective:    Vit 325 mg Oral Daily with breakfast   • cycloSPORINE  1 drop Both Eyes BID   • meropenem  500 mg Intravenous Q12H   • Heparin Sodium (Porcine)  5,000 Units Subcutaneous 2 times per day     • sodium bicarbonate infusion 50 mEq (04/06/18 1000)     benzocaine-me

## 2018-04-07 PROCEDURE — 99232 SBSQ HOSP IP/OBS MODERATE 35: CPT | Performed by: HOSPITALIST

## 2018-04-07 PROCEDURE — 99233 SBSQ HOSP IP/OBS HIGH 50: CPT | Performed by: SPECIALIST

## 2018-04-07 RX ORDER — POTASSIUM CHLORIDE 14.9 MG/ML
20 INJECTION INTRAVENOUS ONCE
Status: COMPLETED | OUTPATIENT
Start: 2018-04-07 | End: 2018-04-07

## 2018-04-07 RX ORDER — IPRATROPIUM BROMIDE AND ALBUTEROL SULFATE 2.5; .5 MG/3ML; MG/3ML
3 SOLUTION RESPIRATORY (INHALATION)
Status: DISCONTINUED | OUTPATIENT
Start: 2018-04-07 | End: 2018-04-07

## 2018-04-07 RX ORDER — GUAIFENESIN 600 MG
600 TABLET, EXTENDED RELEASE 12 HR ORAL 2 TIMES DAILY
Status: DISCONTINUED | OUTPATIENT
Start: 2018-04-07 | End: 2018-04-10

## 2018-04-07 RX ORDER — IPRATROPIUM BROMIDE AND ALBUTEROL SULFATE 2.5; .5 MG/3ML; MG/3ML
3 SOLUTION RESPIRATORY (INHALATION)
Status: DISCONTINUED | OUTPATIENT
Start: 2018-04-07 | End: 2018-04-10

## 2018-04-07 NOTE — PLAN OF CARE
GASTROINTESTINAL - ADULT    • Maintains adequate nutritional intake (undernourished) Not Progressing    • Maintains or returns to baseline bowel function Not Progressing          METABOLIC/FLUID AND ELECTROLYTES - ADULT    • Electrolytes maintained within

## 2018-04-07 NOTE — PROGRESS NOTES
Heme/Onc Progress Note    Patient Name: Luís Rios   YOB: 1940   Medical Record Number: [id number]       Subjective:  Patient states that she feels better. Less diarrhea. Continued cough but doesn't feel short of breath.      Objective: benzocaine-menthol, benzonatate, ipratropium-albuterol, acetaminophen, ondansetron HCl, Saline Nasal [place]    Physical Examination:  General: Patient is alert and oriented x 3, not in acute distress.  Cranial prosthesis  Psych:  Mood and affect appropri

## 2018-04-07 NOTE — PLAN OF CARE
GASTROINTESTINAL - ADULT    • Maintains or returns to baseline bowel function Progressing        RESPIRATORY - ADULT    • Achieves optimal ventilation and oxygenation Progressing        SAFETY ADULT - FALL    • Free from fall injury Progressing          Pa

## 2018-04-07 NOTE — PROGRESS NOTES
[facility]  Progress Note     Merrick Tolentino Patient Status:  Inpatient    1940 MRN [id number]   [facility] 4NW-A Attending Abimael Doherty MD   [facility] Day # 3 PCP Tadeo Friday, DO     Chief Complaint: PNA  S:  Diarrhea pe Succinate ER  100 mg Oral Daily Beta Blocker   • Fluticasone Furoate-Vilanterol  1 puff Inhalation Daily   • famoTIDine  20 mg Oral Daily   • ferrous sulfate  325 mg Oral Daily with breakfast   • cycloSPORINE  1 drop Both Eyes BID   • meropenem  500 mg Int

## 2018-04-08 ENCOUNTER — APPOINTMENT (OUTPATIENT)
Dept: GENERAL RADIOLOGY | Facility: HOSPITAL | Age: 78
DRG: 871 | End: 2018-04-08
Attending: SPECIALIST
Payer: MEDICARE

## 2018-04-08 PROCEDURE — 71046 X-RAY EXAM CHEST 2 VIEWS: CPT | Performed by: SPECIALIST

## 2018-04-08 PROCEDURE — 99232 SBSQ HOSP IP/OBS MODERATE 35: CPT | Performed by: HOSPITALIST

## 2018-04-08 PROCEDURE — 99232 SBSQ HOSP IP/OBS MODERATE 35: CPT | Performed by: SPECIALIST

## 2018-04-08 NOTE — PLAN OF CARE
GASTROINTESTINAL - ADULT    • Maintains adequate nutritional intake (undernourished) Progressing    • Maintains or returns to baseline bowel function Progressing        Impaired Functional Mobility    • Achieve highest/safest level of mobility/gait [place]

## 2018-04-08 NOTE — PROGRESS NOTES
[facility]  Progress Note     Leah Six Patient Status:  Inpatient    1940 MRN [id number]   [facility] 4NW-A Attending Brett Sanchez MD   [facility] Day # 4 PCP Tony Fears, DO     Chief Complaint: PNA  S:  Diarrhea sl Furoate-Vilanterol  1 puff Inhalation Daily   • famoTIDine  20 mg Oral Daily   • ferrous sulfate  325 mg Oral Daily with breakfast   • cycloSPORINE  1 drop Both Eyes BID   • meropenem  500 mg Intravenous Q12H   • Heparin Sodium (Porcine)  5,000 Units Subcu

## 2018-04-08 NOTE — PROGRESS NOTES
Heme/Onc Progress Note    Patient Name: Anny Brandon   YOB: 1940   Medical Record Number: [id number]       Subjective:  Patient states that she feels better. Less diarrhea. Continued cough but doesn't feel short of breath.  Diarrhea contin Manual 7 %   Monocyte % Manual 4 %   Eosinophil % Manual 0 %   Basophil % Manual 0 %   Total Cells Counted 100    RBC Morphology Normal Normal   Platelet Morphology Normal Normal       Current Medications:  • GuaiFENesin ER  600 mg Oral BID   • ipratropium

## 2018-04-08 NOTE — PROGRESS NOTES
Resting quietly,remains on Tele SR, ST when ambulating, nonproductive cough, receiving mucinex, remains in isolation for c-diff, oral vanco given as ordered, continues on IVF, ambulatory to bathroom with standby assist, voices no complaints or signs of dis

## 2018-04-08 NOTE — PROGRESS NOTES
Heart rate irregular , tachycardic , respirations easy , denies SOB or CP , awake alert , watchingv , TV , laughing w/ staff members , 12 lead EKG reveals  Sinus tachycardia, otherwise normal , message sent to Dr Elbridge Habermann, cough persists, current -129

## 2018-04-09 PROCEDURE — 99232 SBSQ HOSP IP/OBS MODERATE 35: CPT | Performed by: HOSPITALIST

## 2018-04-09 PROCEDURE — 99232 SBSQ HOSP IP/OBS MODERATE 35: CPT | Performed by: INTERNAL MEDICINE

## 2018-04-09 RX ORDER — AMLODIPINE BESYLATE 5 MG/1
5 TABLET ORAL DAILY
Status: DISCONTINUED | OUTPATIENT
Start: 2018-04-09 | End: 2018-04-10

## 2018-04-09 NOTE — PLAN OF CARE
Patient alert and oriented x4. Patient denies pain this morning. Patient previously on oxygen, but currently 94% on room air. Per Dr. Kin Comer, patient to have O2 walk today. Patient with non-productive cough this morning.  Lungs sounds improving, but still wi

## 2018-04-09 NOTE — PROGRESS NOTES
[facility]  Progress Note     Parris Read Patient Status:  Inpatient    1940 MRN [id number]   [facility] 4NW-A Attending Rica Cruz MD   [address] Day # 5 PCP Erik Rosales DO     Chief Complaint: PNA  S:  Diarrhea sl famoTIDine  20 mg Oral Daily   • ferrous sulfate  325 mg Oral Daily with breakfast   • cycloSPORINE  1 drop Both Eyes BID   • meropenem  500 mg Intravenous Q12H   • Heparin Sodium (Porcine)  5,000 Units Subcutaneous 2 times per day     ASSESSMENT / PLAN:

## 2018-04-09 NOTE — PROGRESS NOTES
Heme/Onc Progress Note    Patient Name: Melissa Murphy   YOB: 1940   Medical Record Number: [id number]       Subjective:  Doing much better.      Objective:    Vitals:  /62 (BP Location: Left arm)   Pulse 98   Temp 97.9 °F (36.6 °C) (O Metamyelocyte Absolute Manual 0.56 (H) <0.01 x10(3) uL   Myelocyte Absolute Manual 0.11 (H) <0.01 x10(3) uL   Neutrophils % Manual 86 %   Lymphocyte % Manual 5 %   Monocyte % Manual 2 %   Eosinophil % Manual 1 %   Basophil % Manual 0 %   Metamyelocyte % [facility]

## 2018-04-09 NOTE — PLAN OF CARE
GASTROINTESTINAL - ADULT    • Maintains adequate nutritional intake (undernourished) Progressing    • Maintains or returns to baseline bowel function Progressing        METABOLIC/FLUID AND ELECTROLYTES - ADULT    • Electrolytes maintained within normal gray

## 2018-04-10 VITALS
HEIGHT: 60 IN | BODY MASS INDEX: 28.45 KG/M2 | HEART RATE: 98 BPM | OXYGEN SATURATION: 92 % | WEIGHT: 144.88 LBS | SYSTOLIC BLOOD PRESSURE: 131 MMHG | RESPIRATION RATE: 16 BRPM | DIASTOLIC BLOOD PRESSURE: 69 MMHG | TEMPERATURE: 98 F

## 2018-04-10 PROCEDURE — 99239 HOSP IP/OBS DSCHRG MGMT >30: CPT | Performed by: HOSPITALIST

## 2018-04-10 PROCEDURE — 99232 SBSQ HOSP IP/OBS MODERATE 35: CPT | Performed by: INTERNAL MEDICINE

## 2018-04-10 RX ORDER — IPRATROPIUM BROMIDE AND ALBUTEROL SULFATE 2.5; .5 MG/3ML; MG/3ML
3 SOLUTION RESPIRATORY (INHALATION) EVERY 4 HOURS PRN
Status: DISCONTINUED | OUTPATIENT
Start: 2018-04-10 | End: 2018-04-10

## 2018-04-10 RX ORDER — LEVOFLOXACIN 500 MG/1
500 TABLET, FILM COATED ORAL DAILY
Qty: 3 TABLET | Refills: 0 | Status: SHIPPED | OUTPATIENT
Start: 2018-04-10 | End: 2018-04-13

## 2018-04-10 NOTE — PROGRESS NOTES
Patient alert and oriented X4. No complaints of pain. No nausea and vomiting. On room air, not in respiratory distress, O2 sats 96-98%. Non-productive cough observed, scheduled Mucinex given as prescribed. Rales and wheezing resolving.  Maintained on IV M

## 2018-04-10 NOTE — PLAN OF CARE
Patient alert and oriented x4. Patient denies pain this morning. Patient currently on room air and no need for home oxygen. Patient stated diarrhea vastly improved and bowel movements are soft. Patient to discharge home today.  Patient's script for [facility] wa

## 2018-04-10 NOTE — PROGRESS NOTES
NURSING DISCHARGE NOTE    Discharged Home via Wheelchair. Accompanied by Family member  Belongings Taken by patient/family. Patient for discharge home today. Discharge instructions reviewed with patient and son at bedside.  Patient given vanco pre

## 2018-04-10 NOTE — PROGRESS NOTES
[facility]  Progress Note     Fco Méndez Patient Status:  Inpatient    1940 MRN [id number]   [facility] 4NW-A Attending Kitty Manuel MD   [facility] Day # 6 PCP Janay Coronado DO     Chief Complaint: PNA  S:  Patient den Eyes BID   • meropenem  500 mg Intravenous Q12H   • Heparin Sodium (Porcine)  5,000 Units Subcutaneous 2 times per day     ASSESSMENT / PLAN:   1. Sepsis secondary to suspected gram negative pneumonia and C dif colitis. +bands.    1. Cont meropenem and vanc

## 2018-04-10 NOTE — PROGRESS NOTES
Heme/Onc Progress Note    Patient Name: Kevin Hayden   YOB: 1940   Medical Record Number: [id number]       Subjective:  No further diarrhea so far this morning.  Still has some SOB but better    Objective:    Vitals:  /69 (BP Locatio disease. Monitor    6. Diarrhea- C dif positive. On vancomycin. No further diarrhea    Will defer antibiotic coverage to primary service. May d/c home from our standpoint when cleared by everyone else.  She will call for an appointment for f/u at the Cancer

## 2018-04-10 NOTE — CM/SW NOTE
Plan for patient to discharge today. Discussed HH, patient stating that she has two sons and has plenty of assistance at home. No additional needs identified at this time.     Suzette Min MSN RN, [facility]/  P: [phone]

## 2018-04-10 NOTE — DISCHARGE SUMMARY
[facility] [place] HOSPITALIST  DISCHARGE SUMMARY     [place] Patient Status:  Inpatient    1940 MRN [id number]   [facility] 4NW-A Attending Allison Soni MD   [facility] Day # 6 PCP Ronn Cao DO     Date of Admission: 4/3/2018  Date o initiated on sepsis protocol with IV antibiotics. Suspected due to her respiratory failure and hypoxia that she had underlying pneumonia. She is also immunocompromised with her cancer and chemotherapy. Oncology was placed in consultation.   The patient w Quantity:  1 Inhaler  Refills:  2     Amlodipine Besy-Benazepril HCl 5-10 MG Caps  Commonly known as:  LOTREL      Take 1 capsule by mouth daily. Refills:  0     BREO ELLIPTA 200-25 MCG/INH Aepb  Generic drug:  Fluticasone Furoate-Vilanterol      daily. predniSONE 20 MG Tabs  Commonly known as:  Chel Beck              Where to Get Your Medications      These medications were sent to [facility] [address] [place] [address] - [address] [place] [address] 70 [phone], [address]

## 2018-04-11 ENCOUNTER — PATIENT OUTREACH (OUTPATIENT)
Dept: CASE MANAGEMENT | Age: 78
End: 2018-04-11

## 2018-04-11 DIAGNOSIS — J15.6 GRAM-NEGATIVE PNEUMONIA (HCC): ICD-10-CM

## 2018-04-11 DIAGNOSIS — C34.91 BILATERAL LUNG CANCER (HCC): ICD-10-CM

## 2018-04-11 DIAGNOSIS — J18.9 COMMUNITY ACQUIRED PNEUMONIA, UNSPECIFIED LATERALITY: ICD-10-CM

## 2018-04-11 DIAGNOSIS — E87.6 HYPOKALEMIA: ICD-10-CM

## 2018-04-11 DIAGNOSIS — C34.92 BILATERAL LUNG CANCER (HCC): ICD-10-CM

## 2018-04-11 DIAGNOSIS — A04.72 CLOSTRIDIUM DIFFICILE COLITIS: ICD-10-CM

## 2018-04-11 NOTE — CM/SW NOTE
04/11/18 0900   Discharge disposition   Expected discharge disposition Home or Self   Discharge transportation Private car

## 2018-04-12 ENCOUNTER — TELEPHONE (OUTPATIENT)
Dept: FAMILY MEDICINE CLINIC | Facility: CLINIC | Age: 78
End: 2018-04-12

## 2018-04-12 RX ORDER — FUROSEMIDE 20 MG/1
20 TABLET ORAL 2 TIMES DAILY
Qty: 20 TABLET | Refills: 2 | Status: SHIPPED | OUTPATIENT
Start: 2018-04-12 | End: 2018-04-17 | Stop reason: ALTCHOICE

## 2018-04-12 NOTE — TELEPHONE ENCOUNTER
Spoke with pt, pt has a scheduled 4-17-18 JG. Bilateral feet swelling since discharged out of hospital 4-10-18. Pt is elevating her feet, urinating well. Pt denies redness/pain/warmth. Pt states feet feel \"tightness\".   Pt did get IV Furosemide 1x in hos

## 2018-04-12 NOTE — TELEPHONE ENCOUNTER
Pt just d/c from the hospital recently  - she states her feet are very swollen and can hardly walk.   pls advise

## 2018-04-17 ENCOUNTER — OFFICE VISIT (OUTPATIENT)
Dept: FAMILY MEDICINE CLINIC | Facility: CLINIC | Age: 78
End: 2018-04-17

## 2018-04-17 ENCOUNTER — APPOINTMENT (OUTPATIENT)
Dept: HEMATOLOGY/ONCOLOGY | Age: 78
End: 2018-04-17
Attending: INTERNAL MEDICINE
Payer: MEDICARE

## 2018-04-17 VITALS
HEART RATE: 106 BPM | SYSTOLIC BLOOD PRESSURE: 126 MMHG | DIASTOLIC BLOOD PRESSURE: 78 MMHG | WEIGHT: 131 LBS | TEMPERATURE: 99 F | HEIGHT: 59 IN | RESPIRATION RATE: 18 BRPM | BODY MASS INDEX: 26.41 KG/M2 | OXYGEN SATURATION: 97 %

## 2018-04-17 DIAGNOSIS — J20.2 ACUTE BRONCHITIS DUE TO STREPTOCOCCUS: Primary | ICD-10-CM

## 2018-04-17 PROCEDURE — 99213 OFFICE O/P EST LOW 20 MIN: CPT | Performed by: FAMILY MEDICINE

## 2018-04-17 PROCEDURE — 1111F DSCHRG MED/CURRENT MED MERGE: CPT | Performed by: FAMILY MEDICINE

## 2018-04-17 NOTE — PROGRESS NOTES
Multiple attempts to reach the pt and messages left. Pt went to [place] with PCP's office on 4/17/18, Closing encounter.

## 2018-04-17 NOTE — PROGRESS NOTES
Here with son for follow-up was just hospitalized yet again for acute bronchitis and pneumonia much of her infiltrate resides in the right lower lobe. She is completing her vancomycin and Cleocin regimen of antibiotics.   At the time of admission she had s

## 2018-04-23 ENCOUNTER — TELEPHONE (OUTPATIENT)
Dept: HEMATOLOGY/ONCOLOGY | Facility: HOSPITAL | Age: 78
End: 2018-04-23

## 2018-04-24 NOTE — TELEPHONE ENCOUNTER
Spoke to pt, states she does not use the voltaren gel very often. Pharmacy told her generic voltaren is covered. She would like one refill and she state this will last her for a long time. Approve/deny refill (generic) Diclofenac gel?

## 2018-05-01 ENCOUNTER — TELEPHONE (OUTPATIENT)
Dept: FAMILY MEDICINE CLINIC | Facility: CLINIC | Age: 78
End: 2018-05-01

## 2018-05-01 ENCOUNTER — OFFICE VISIT (OUTPATIENT)
Dept: FAMILY MEDICINE CLINIC | Facility: CLINIC | Age: 78
End: 2018-05-01

## 2018-05-01 VITALS
HEART RATE: 95 BPM | WEIGHT: 131 LBS | TEMPERATURE: 99 F | BODY MASS INDEX: 26.41 KG/M2 | SYSTOLIC BLOOD PRESSURE: 134 MMHG | DIASTOLIC BLOOD PRESSURE: 84 MMHG | OXYGEN SATURATION: 96 % | RESPIRATION RATE: 18 BRPM | HEIGHT: 59 IN

## 2018-05-01 DIAGNOSIS — K13.0 ANGULAR CHEILOSIS: Primary | ICD-10-CM

## 2018-05-01 PROCEDURE — 99213 OFFICE O/P EST LOW 20 MIN: CPT | Performed by: FAMILY MEDICINE

## 2018-05-01 RX ORDER — KETOCONAZOLE 20 MG/G
CREAM TOPICAL
Qty: 45 G | Refills: 0 | Status: SHIPPED | OUTPATIENT
Start: 2018-05-01 | End: 2018-05-02

## 2018-05-01 NOTE — PROGRESS NOTES
Here for follow-up I saw her last week for blood pressure issues and for swelling of the legs.   The Lasix program we use for 2 weeks has worked in all her edema has resolved she still coughing but her lungs on today's exam was completely unremarkable this

## 2018-05-02 RX ORDER — KETOCONAZOLE 20 MG/G
1 CREAM TOPICAL DAILY
Qty: 45 G | Refills: 0 | Status: SHIPPED | OUTPATIENT
Start: 2018-05-02 | End: 2018-08-14

## 2018-05-07 RX ORDER — METOPROLOL SUCCINATE 100 MG/1
TABLET, EXTENDED RELEASE ORAL
Qty: 90 TABLET | Refills: 1 | Status: SHIPPED | OUTPATIENT
Start: 2018-05-07 | End: 2018-11-02

## 2018-05-15 ENCOUNTER — TELEPHONE (OUTPATIENT)
Dept: HEMATOLOGY/ONCOLOGY | Facility: HOSPITAL | Age: 78
End: 2018-05-15

## 2018-05-15 ENCOUNTER — OFFICE VISIT (OUTPATIENT)
Dept: HEMATOLOGY/ONCOLOGY | Age: 78
End: 2018-05-15
Attending: INTERNAL MEDICINE
Payer: MEDICARE

## 2018-05-15 VITALS
HEART RATE: 72 BPM | RESPIRATION RATE: 18 BRPM | DIASTOLIC BLOOD PRESSURE: 77 MMHG | SYSTOLIC BLOOD PRESSURE: 150 MMHG | OXYGEN SATURATION: 94 % | TEMPERATURE: 97 F | BODY MASS INDEX: 27 KG/M2 | WEIGHT: 132.81 LBS

## 2018-05-15 DIAGNOSIS — N28.9 RENAL INSUFFICIENCY: ICD-10-CM

## 2018-05-15 DIAGNOSIS — C34.92 BILATERAL LUNG CANCER (HCC): Primary | ICD-10-CM

## 2018-05-15 DIAGNOSIS — C85.80 MARGINAL ZONE B-CELL LYMPHOMA (HCC): ICD-10-CM

## 2018-05-15 DIAGNOSIS — R03.0 ELEVATED BLOOD PRESSURE READING: ICD-10-CM

## 2018-05-15 DIAGNOSIS — C34.91 BILATERAL LUNG CANCER (HCC): Primary | ICD-10-CM

## 2018-05-15 DIAGNOSIS — D47.2 MONOCLONAL PARAPROTEINEMIA: ICD-10-CM

## 2018-05-15 PROCEDURE — 99215 OFFICE O/P EST HI 40 MIN: CPT | Performed by: INTERNAL MEDICINE

## 2018-05-15 NOTE — PROGRESS NOTES
Pt here for 2 month MD f/u. Pt has been on Ibrutinib 420mg daily. Energy level has been low, appetite has been good. Denies pain. Pt has dry flaking skin. No bowel issues or nausea.      Education Record    Learner:  Patient    Disease / Diagnosis:    Grant Bio

## 2018-05-15 NOTE — PROGRESS NOTES
Cancer Center Progress Note    Patient Name: Kai Sandoval   YOB: 1940   Medical Record Number: [id number]   CSN: [id number]   Attending Physician: Wendel Brunner, M.D.    Referring Physician: Lourdes Choi DO      Date of Visit: 5/15/20 Illness:  When I last saw Jayden Hart she had been having cough, congestion and SOB and was on several courses of antibiotics w/o improvement. Then presented with a syncopal episode at home and paramedics were called and was brought to the ED.  Imaging done showe DAILY, Disp: 180 tablet, Rfl: 11  •  ibrutinib 140 MG Oral Cap, Take 3 capsules (420 mg total) by mouth daily.  Take 420 mg (3 capsules) daily, Disp: 90 capsule, Rfl: 5  •  HydrOXYzine HCl 25 MG Oral Tab, Take 0.5 tablets (12.5 mg total) by mouth every 8 (e surgery  4908,7912: OTHER SURGICAL HISTORY      Comment: bunionectomy,bilateral  12/05/2017: OTHER SURGICAL HISTORY      Comment: Jasen Geiger  No date: REMOVAL OF LUNG,LOBECTOMY      Comment: removal of upper right lobe  No date: TONSILLECTOMY    Family Laboratory:    Recent Results (from the past 24 hour(s))  -COMP METABOLIC PANEL (14)   Collection Time: 05/15/18  1:04 PM   Result Value Ref Range   Glucose 95 70 - 99 mg/dL   BUN 36 (H) 8 - 20 mg/dL   Creatinine 1.45 (H) 0.55 - 1.02 mg/dL   GFR, Non-Afr whether responding and will continue for now. 2. H/o multiple lung cancers- no overt evidence of progression as of last scans but now off Tarceva while on Ibrutinib. I was concerned there may be overlap with regard to side effects.  May reconsider resum

## 2018-05-24 ENCOUNTER — APPOINTMENT (OUTPATIENT)
Dept: CT IMAGING | Age: 78
End: 2018-05-24
Attending: EMERGENCY MEDICINE
Payer: MEDICARE

## 2018-05-24 ENCOUNTER — APPOINTMENT (OUTPATIENT)
Dept: GENERAL RADIOLOGY | Age: 78
End: 2018-05-24
Payer: MEDICARE

## 2018-05-24 ENCOUNTER — OFFICE VISIT (OUTPATIENT)
Dept: FAMILY MEDICINE CLINIC | Facility: CLINIC | Age: 78
End: 2018-05-24

## 2018-05-24 ENCOUNTER — HOSPITAL ENCOUNTER (EMERGENCY)
Age: 78
Discharge: HOME | End: 2018-05-24
Attending: EMERGENCY MEDICINE
Payer: MEDICARE

## 2018-05-24 VITALS
SYSTOLIC BLOOD PRESSURE: 142 MMHG | TEMPERATURE: 99 F | HEART RATE: 94 BPM | WEIGHT: 132 LBS | DIASTOLIC BLOOD PRESSURE: 84 MMHG | RESPIRATION RATE: 20 BRPM | HEIGHT: 60 IN | OXYGEN SATURATION: 96 % | BODY MASS INDEX: 25.91 KG/M2

## 2018-05-24 VITALS
TEMPERATURE: 98 F | DIASTOLIC BLOOD PRESSURE: 69 MMHG | HEART RATE: 70 BPM | BODY MASS INDEX: 25.72 KG/M2 | SYSTOLIC BLOOD PRESSURE: 138 MMHG | RESPIRATION RATE: 16 BRPM | WEIGHT: 131 LBS | OXYGEN SATURATION: 98 % | HEIGHT: 60 IN

## 2018-05-24 DIAGNOSIS — R05.9 COUGH: Primary | ICD-10-CM

## 2018-05-24 DIAGNOSIS — J18.9 COMMUNITY ACQUIRED PNEUMONIA OF RIGHT LOWER LOBE OF LUNG: Primary | ICD-10-CM

## 2018-05-24 DIAGNOSIS — Z87.01 HISTORY OF PNEUMONIA: ICD-10-CM

## 2018-05-24 DIAGNOSIS — C82.90 NHL (NODULAR HISTIOCYTIC LYMPHOMA) (HCC): ICD-10-CM

## 2018-05-24 DIAGNOSIS — C34.90 NON-SMALL CELL LUNG CANCER, UNSPECIFIED LATERALITY (HCC): ICD-10-CM

## 2018-05-24 PROCEDURE — 71046 X-RAY EXAM CHEST 2 VIEWS: CPT | Performed by: EMERGENCY MEDICINE

## 2018-05-24 PROCEDURE — 99285 EMERGENCY DEPT VISIT HI MDM: CPT

## 2018-05-24 PROCEDURE — 80048 BASIC METABOLIC PNL TOTAL CA: CPT | Performed by: EMERGENCY MEDICINE

## 2018-05-24 PROCEDURE — 85025 COMPLETE CBC W/AUTO DIFF WBC: CPT | Performed by: EMERGENCY MEDICINE

## 2018-05-24 PROCEDURE — 96374 THER/PROPH/DIAG INJ IV PUSH: CPT

## 2018-05-24 PROCEDURE — 96361 HYDRATE IV INFUSION ADD-ON: CPT

## 2018-05-24 PROCEDURE — 71275 CT ANGIOGRAPHY CHEST: CPT | Performed by: EMERGENCY MEDICINE

## 2018-05-24 RX ORDER — LEVOFLOXACIN 500 MG/1
500 TABLET, FILM COATED ORAL DAILY
Qty: 10 TABLET | Refills: 0 | Status: SHIPPED | OUTPATIENT
Start: 2018-05-24 | End: 2018-06-19

## 2018-05-24 NOTE — ED PROVIDER NOTES
Patient Seen in: [address] [facility] In [place]    History   Patient presents with:  Cough/URI    Stated Complaint: COUGH FOR PAST \"COUPLE OF Raine Luster"      HPI    44-year-old white female who presents emergency room today frequent cough.   Jaylene HISTORY      Comment: gallbladder surgery  1703,3767: OTHER SURGICAL HISTORY      Comment: bunionectomy,bilateral  12/05/2017: OTHER SURGICAL HISTORY      Comment: Cysto- Dr. Rosa Eduardo  No date: REMOVAL OF LUNG,LOBECTOMY      Comment: removal of upper right lobe GFR, -American 38 (*)     All other components within normal limits   CBC W/ DIFFERENTIAL - Abnormal; Notable for the following:     RBC 2.97 (*)     HGB 8.9 (*)     HCT 29.2 (*)     MCHC 30.5 (*)     RDW-SD 55.7 (*)     All other components within  Moderate-sized hiatal hernia with esophageal wall thickening. CARDIAC:  Unremarkable. PLEURA:  Trace amount of right-sided pleural fluid. CHEST WALL:  Unremarkable. LIMITED ABDOMEN:  Cholecystectomy.   BONES:  Postoperative changes from thoracotomy.  D cough for several months. It does appear to be some possible early pneumonia on the CT scan but there also appears to be some masslike lesions. Patient does have a previous history of lung cancer.   Patient be given a dose of IV antibiotics and should be

## 2018-05-24 NOTE — PROGRESS NOTES
CHIEF COMPLAINT:   Patient presents with:  Cough: cough, sinus drainage,  (pt was in hospital for pneumonia earlier in month) x2 weeks      HPI:   Miranda Malloy is a 69year old female who presents for upper respiratory symptoms for several weeks.   Pt w (six) hours as needed for Pain.  Disp:  Rfl:    RESTASIS 0.05 % Ophthalmic Emulsion INSTILL ONE DROP IN EACH EYE TWICE DAILY Disp: 20 mL Rfl: 6   ferrous sulfate 325 (65 FE) MG Oral Tab EC Take 325 mg by mouth daily with breakfast.   Disp:  Rfl:    Fluticas Pulse 94   Temp 98.5 °F (36.9 °C) (Oral)   Resp 20   Ht 60\"   Wt 132 lb   SpO2 96%   BMI 25.78 kg/m²   GENERAL: well developed, well nourished,in no apparent distress  SKIN: no rashes,no suspicious lesions  HEAD: atraumatic, normocephalic.  no tenderness

## 2018-05-29 ENCOUNTER — OFFICE VISIT (OUTPATIENT)
Dept: FAMILY MEDICINE CLINIC | Facility: CLINIC | Age: 78
End: 2018-05-29

## 2018-05-29 ENCOUNTER — HOSPITAL ENCOUNTER (OUTPATIENT)
Dept: CT IMAGING | Age: 78
Discharge: HOME | End: 2018-05-29
Attending: FAMILY MEDICINE
Payer: MEDICARE

## 2018-05-29 ENCOUNTER — HOSPITAL ENCOUNTER (OUTPATIENT)
Dept: GENERAL RADIOLOGY | Age: 78
Discharge: HOME | End: 2018-05-29
Attending: INTERNAL MEDICINE
Payer: MEDICARE

## 2018-05-29 ENCOUNTER — TELEPHONE (OUTPATIENT)
Dept: FAMILY MEDICINE CLINIC | Facility: CLINIC | Age: 78
End: 2018-05-29

## 2018-05-29 VITALS
TEMPERATURE: 98 F | OXYGEN SATURATION: 95 % | RESPIRATION RATE: 18 BRPM | HEART RATE: 106 BPM | DIASTOLIC BLOOD PRESSURE: 94 MMHG | HEIGHT: 59 IN | BODY MASS INDEX: 26.81 KG/M2 | SYSTOLIC BLOOD PRESSURE: 134 MMHG | WEIGHT: 133 LBS

## 2018-05-29 DIAGNOSIS — J44.1 CHRONIC OBSTRUCTIVE PULMONARY DISEASE WITH ACUTE EXACERBATION (HCC): ICD-10-CM

## 2018-05-29 DIAGNOSIS — R60.0 LOCALIZED EDEMA: ICD-10-CM

## 2018-05-29 DIAGNOSIS — J18.9 PNEUMONIA DUE TO INFECTIOUS ORGANISM, UNSPECIFIED LATERALITY, UNSPECIFIED PART OF LUNG: ICD-10-CM

## 2018-05-29 DIAGNOSIS — C78.00 SECONDARY CARCINOMA OF LUNG, UNSPECIFIED LATERALITY (HCC): ICD-10-CM

## 2018-05-29 DIAGNOSIS — J15.9 BACTERIAL PNEUMONIA: ICD-10-CM

## 2018-05-29 DIAGNOSIS — J18.9 PNEUMONIA DUE TO INFECTIOUS ORGANISM, UNSPECIFIED LATERALITY, UNSPECIFIED PART OF LUNG: Primary | ICD-10-CM

## 2018-05-29 PROCEDURE — 70486 CT MAXILLOFACIAL W/O DYE: CPT | Performed by: FAMILY MEDICINE

## 2018-05-29 PROCEDURE — 71046 X-RAY EXAM CHEST 2 VIEWS: CPT | Performed by: INTERNAL MEDICINE

## 2018-05-29 PROCEDURE — 99214 OFFICE O/P EST MOD 30 MIN: CPT | Performed by: FAMILY MEDICINE

## 2018-05-29 RX ORDER — FUROSEMIDE 20 MG/1
TABLET ORAL
Qty: 60 TABLET | Refills: 1 | Status: SHIPPED | OUTPATIENT
Start: 2018-05-29 | End: 2018-08-14

## 2018-05-29 RX ORDER — MONTELUKAST SODIUM 10 MG/1
10 TABLET ORAL NIGHTLY
COMMUNITY

## 2018-05-29 NOTE — TELEPHONE ENCOUNTER
Left a message for Pt about her CT of sinus    Mild chronic sinus inflammation of the ethmoid air cells. No evidence of acute sinusitis.       Pt is to continue with the plan that she and Dr. Fior Brunner discussed in the office

## 2018-05-29 NOTE — PROGRESS NOTES
Complex case patient presents with son. She is a lung cancer survivor who has been slowly developing complications of this condition including that of worsening of COPD and chronic cough. She was recently seen late last week at an urgent care center.   A

## 2018-06-01 ENCOUNTER — TELEPHONE (OUTPATIENT)
Dept: FAMILY MEDICINE CLINIC | Facility: CLINIC | Age: 78
End: 2018-06-01

## 2018-06-01 NOTE — TELEPHONE ENCOUNTER
Patient was seen on 5/29. She has been taking Tylenol for the pinch nerve but it is not helping. Pt is asking if something could be prescribed. Please advise.

## 2018-06-05 ENCOUNTER — OFFICE VISIT (OUTPATIENT)
Dept: FAMILY MEDICINE CLINIC | Facility: CLINIC | Age: 78
End: 2018-06-05

## 2018-06-05 VITALS
HEIGHT: 59 IN | WEIGHT: 132 LBS | SYSTOLIC BLOOD PRESSURE: 146 MMHG | DIASTOLIC BLOOD PRESSURE: 96 MMHG | RESPIRATION RATE: 16 BRPM | TEMPERATURE: 98 F | HEART RATE: 92 BPM | OXYGEN SATURATION: 96 % | BODY MASS INDEX: 26.61 KG/M2

## 2018-06-05 DIAGNOSIS — J01.90 ACUTE SINUSITIS TREATED WITH ANTIBIOTICS IN THE PAST 60 DAYS: Primary | ICD-10-CM

## 2018-06-05 DIAGNOSIS — J44.1 COPD EXACERBATION (HCC): ICD-10-CM

## 2018-06-05 PROCEDURE — 99213 OFFICE O/P EST LOW 20 MIN: CPT | Performed by: FAMILY MEDICINE

## 2018-06-05 RX ORDER — HYDROCODONE BITARTRATE AND ACETAMINOPHEN 5; 325 MG/1; MG/1
1 TABLET ORAL EVERY 6 HOURS PRN
Qty: 30 TABLET | Refills: 1 | Status: SHIPPED | OUTPATIENT
Start: 2018-06-05 | End: 2019-01-03

## 2018-06-05 NOTE — PROGRESS NOTES
Here for follow-up battling combined sinusitis and pneumonitis and is now finishing Levaquin given to her in urgent care center. She feels much better still doing some coughing but using her inhalers.   I gave her Spiriva and her last visit and it seemed t

## 2018-06-11 ENCOUNTER — HOSPITAL ENCOUNTER (OUTPATIENT)
Dept: CT IMAGING | Age: 78
Discharge: HOME | End: 2018-06-11
Attending: INTERNAL MEDICINE
Payer: MEDICARE

## 2018-06-11 DIAGNOSIS — C34.92 BILATERAL LUNG CANCER (HCC): ICD-10-CM

## 2018-06-11 DIAGNOSIS — C34.91 BILATERAL LUNG CANCER (HCC): ICD-10-CM

## 2018-06-11 PROCEDURE — 71250 CT THORAX DX C-: CPT | Performed by: INTERNAL MEDICINE

## 2018-06-19 ENCOUNTER — OFFICE VISIT (OUTPATIENT)
Dept: HEMATOLOGY/ONCOLOGY | Age: 78
End: 2018-06-19
Attending: INTERNAL MEDICINE
Payer: MEDICARE

## 2018-06-19 VITALS
BODY MASS INDEX: 26 KG/M2 | OXYGEN SATURATION: 96 % | WEIGHT: 129 LBS | HEART RATE: 89 BPM | TEMPERATURE: 98 F | RESPIRATION RATE: 18 BRPM | SYSTOLIC BLOOD PRESSURE: 165 MMHG | DIASTOLIC BLOOD PRESSURE: 93 MMHG

## 2018-06-19 DIAGNOSIS — C34.91 BILATERAL LUNG CANCER (HCC): ICD-10-CM

## 2018-06-19 DIAGNOSIS — C34.92 BILATERAL LUNG CANCER (HCC): ICD-10-CM

## 2018-06-19 DIAGNOSIS — D47.2 MONOCLONAL PARAPROTEINEMIA: ICD-10-CM

## 2018-06-19 DIAGNOSIS — R05.9 COUGH: ICD-10-CM

## 2018-06-19 DIAGNOSIS — C85.80 MARGINAL ZONE B-CELL LYMPHOMA (HCC): ICD-10-CM

## 2018-06-19 DIAGNOSIS — D53.9 MACROCYTIC ANEMIA: ICD-10-CM

## 2018-06-19 DIAGNOSIS — N28.9 RENAL INSUFFICIENCY: Primary | ICD-10-CM

## 2018-06-19 PROCEDURE — 99215 OFFICE O/P EST HI 40 MIN: CPT | Performed by: INTERNAL MEDICINE

## 2018-06-19 NOTE — PROGRESS NOTES
Pt here for 5 week MD f/u. Pt continues on Ibrutinib 420mg daily. Energy level and appetite has been good. Denies pain. Pt has noticed random bruising. Pt had dry hacking cough, unchanged since last visit, seeing pulm and PCP for it.  No bowel issues or elvin

## 2018-06-25 NOTE — PROGRESS NOTES
Cancer Center Progress Note    Patient Name: Jennifer Aguilar   YOB: 1940   Medical Record Number: [id number]   [facility]: [id number]   Attending Physician: Richard Kuhn M.D.    Referring Physician: Jihan May DO      Date of Visit: 6/19/20 seen on surveillance scans. Was placed on anticoagulation during that time. Now off.     4. Iron deficiency anemia- had EGD/c-scope and capsule endoscopy which were unrevealing. Malabsorption?       History of Present Illness:  Had been seeing pulmonary and daily.  Take 420 mg (3 capsules) daily, Disp: 90 capsule, Rfl: 5  •  HydrOXYzine HCl 25 MG Oral Tab, Take 0.5 tablets (12.5 mg total) by mouth every 8 (eight) hours as needed for Itching., Disp: 5 tablet, Rfl: 0  •  Saline Nasal Spray 0.65 % Nasal Solution, upper right lobe  No date: TONSILLECTOMY    Family Medical History:  Family History   Problem Relation Age of Onset   • immune system [OTHER] Mother    • Cancer Father      of the mouth       Gyne History:  Obstetric History     T2    L2    SAB RDW 15.9 06/19/2018   .0 06/19/2018   MPV 9.7 01/24/2012       Lab Results  Component Value Date   GLU 90 06/19/2018   BUN 40 (H) 06/19/2018   CREATSERUM 1.55 (H) 06/19/2018   GFR 23 (L) 01/23/2018   GFRNAA 32 (L) 06/19/2018   GFRAA 37 (L) 06/19/2 Essentially stable ground-glass opacity noted of the left lung apex measuring 1.8 x 1.9 cm. Stable to minimally increased micro nodule along the   left major fissure measures 6 mm. This previously measured 5 mm.   VASCULATURE:  Thrombus cannot be excluded signs of progression. They are comfortable with the plan.      3. Anemia- bone marrow aspirate showed no significant dysplastic changes nor increased blasts, lymphocytosis or plasmacytosis. 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  Allie Iglesias

## 2018-07-09 ENCOUNTER — TELEPHONE (OUTPATIENT)
Dept: FAMILY MEDICINE CLINIC | Facility: CLINIC | Age: 78
End: 2018-07-09

## 2018-07-09 NOTE — TELEPHONE ENCOUNTER
Dr Liisa Boas gave patient sample of Spiriva and that it is working. She is asking to have a prescription sent to [facility]. Please advise.

## 2018-07-09 NOTE — TELEPHONE ENCOUNTER
Dr Raquel Garcia gave patient sample of Spiriva and that it is working. She is asking to have a prescription sent to [facility]. Okay for Rx?

## 2018-07-17 ENCOUNTER — OFFICE VISIT (OUTPATIENT)
Dept: HEMATOLOGY/ONCOLOGY | Age: 78
End: 2018-07-17
Attending: INTERNAL MEDICINE
Payer: MEDICARE

## 2018-07-17 VITALS
BODY MASS INDEX: 26 KG/M2 | OXYGEN SATURATION: 96 % | RESPIRATION RATE: 18 BRPM | DIASTOLIC BLOOD PRESSURE: 89 MMHG | WEIGHT: 130.19 LBS | TEMPERATURE: 98 F | SYSTOLIC BLOOD PRESSURE: 149 MMHG | HEART RATE: 78 BPM

## 2018-07-17 DIAGNOSIS — C34.92 BILATERAL LUNG CANCER (HCC): ICD-10-CM

## 2018-07-17 DIAGNOSIS — C85.80 MARGINAL ZONE B-CELL LYMPHOMA (HCC): ICD-10-CM

## 2018-07-17 DIAGNOSIS — D53.9 MACROCYTIC ANEMIA: ICD-10-CM

## 2018-07-17 DIAGNOSIS — D47.2 MONOCLONAL PARAPROTEINEMIA: ICD-10-CM

## 2018-07-17 DIAGNOSIS — C82.03 FOLLICULAR LYMPHOMA GRADE I OF INTRA-ABDOMINAL LYMPH NODES (HCC): Primary | ICD-10-CM

## 2018-07-17 DIAGNOSIS — C34.91 BILATERAL LUNG CANCER (HCC): ICD-10-CM

## 2018-07-17 LAB
ALBUMIN SERPL-MCNC: 2.7 G/DL (ref 3.5–4.8)
ALP LIVER SERPL-CCNC: 74 U/L (ref 55–142)
ALT SERPL-CCNC: 15 U/L (ref 14–54)
AST SERPL-CCNC: 14 U/L (ref 15–41)
BASOPHILS # BLD AUTO: 0.03 X10(3) UL (ref 0–0.1)
BASOPHILS NFR BLD AUTO: 0.5 %
BILIRUB SERPL-MCNC: 0.5 MG/DL (ref 0.1–2)
BUN BLD-MCNC: 37 MG/DL (ref 8–20)
CALCIUM BLD-MCNC: 9.5 MG/DL (ref 8.3–10.3)
CHLORIDE: 109 MMOL/L (ref 101–111)
CO2: 24 MMOL/L (ref 22–32)
CREAT BLD-MCNC: 1.42 MG/DL (ref 0.55–1.02)
EOSINOPHIL # BLD AUTO: 0.15 X10(3) UL (ref 0–0.3)
EOSINOPHIL NFR BLD AUTO: 2.5 %
ERYTHROCYTE [DISTWIDTH] IN BLOOD BY AUTOMATED COUNT: 15.7 % (ref 11.5–16)
GLUCOSE BLD-MCNC: 93 MG/DL (ref 70–99)
HCT VFR BLD AUTO: 34.3 % (ref 34–50)
HGB BLD-MCNC: 10.3 G/DL (ref 12–16)
IMMATURE GRANULOCYTE COUNT: 0.05 X10(3) UL (ref 0–1)
IMMATURE GRANULOCYTE RATIO %: 0.8 %
IMMUNOGLOBULIN A: 133.5 MG/DL (ref 70–312)
IMMUNOGLOBULIN G: 878 MG/DL (ref 791–1643)
IMMUNOGLOBULIN M: 3530 MG/DL (ref 43–279)
LDH: 177 U/L (ref 84–246)
LYMPHOCYTES # BLD AUTO: 1.48 X10(3) UL (ref 0.9–4)
LYMPHOCYTES NFR BLD AUTO: 24.3 %
M PROTEIN MFR SERPL ELPH: 9.4 G/DL (ref 6.1–8.3)
MCH RBC QN AUTO: 29.3 PG (ref 27–33.2)
MCHC RBC AUTO-ENTMCNC: 30 G/DL (ref 31–37)
MCV RBC AUTO: 97.7 FL (ref 81–100)
MONOCYTES # BLD AUTO: 0.56 X10(3) UL (ref 0.1–1)
MONOCYTES NFR BLD AUTO: 9.2 %
NEUTROPHIL ABS PRELIM: 3.82 X10 (3) UL (ref 1.3–6.7)
NEUTROPHILS # BLD AUTO: 3.82 X10(3) UL (ref 1.3–6.7)
NEUTROPHILS NFR BLD AUTO: 62.7 %
PLATELET # BLD AUTO: 201 10(3)UL (ref 150–450)
POTASSIUM SERPL-SCNC: 3.7 MMOL/L (ref 3.6–5.1)
RBC # BLD AUTO: 3.51 X10(6)UL (ref 3.8–5.1)
RED CELL DISTRIBUTION WIDTH-SD: 56.1 FL (ref 35.1–46.3)
SODIUM SERPL-SCNC: 142 MMOL/L (ref 136–144)
WBC # BLD AUTO: 6.1 X10(3) UL (ref 4–13)

## 2018-07-17 PROCEDURE — 99215 OFFICE O/P EST HI 40 MIN: CPT | Performed by: INTERNAL MEDICINE

## 2018-07-17 RX ORDER — HYDROXYZINE HYDROCHLORIDE 25 MG/1
TABLET, FILM COATED ORAL EVERY 6 HOURS PRN
Qty: 120 TABLET | Refills: 1 | Status: SHIPPED | OUTPATIENT
Start: 2018-07-17 | End: 2018-09-24 | Stop reason: ALTCHOICE

## 2018-07-17 NOTE — PROGRESS NOTES
Cancer Center Progress Note    Patient Name: Bk Johns   YOB: 1940   Medical Record Number: [id number]   [facility]: [id number]   Attending Physician: Sharlee Lesches, M.D.    Referring Physician: Adrien Perez DO      Date of Visit: 7/17/20 seen on surveillance scans. Was placed on anticoagulation during that time. Now off.     4. Iron deficiency anemia- had EGD/c-scope and capsule endoscopy which were unrevealing. Malabsorption?       History of Present Illness:  She says her cough is finally daily, Disp: 90 capsule, Rfl: 5  •  HydrOXYzine HCl 25 MG Oral Tab, Take 0.5 tablets (12.5 mg total) by mouth every 8 (eight) hours as needed for Itching., Disp: 5 tablet, Rfl: 0  •  Saline Nasal Spray 0.65 % Nasal Solution, 1 spray by Nasal route as neede TONSILLECTOMY    Family Medical History:  Family History   Problem Relation Age of Onset   • immune system [OTHER] Mother    • Cancer Father      of the mouth       Gyne History:  Obstetric History     T2    L2    SAB0  TAB0  Ectopic0  Multiple 07/17/2018   AST 14 (L) 07/17/2018   ALT 15 07/17/2018   BILT 0.5 07/17/2018   TP 9.4 (H) 07/17/2018   ALB 2.7 (L) 07/17/2018    07/17/2018   K 3.7 07/17/2018    07/17/2018   CO2 24.0 07/17/2018       Lab Results  Component Value Date   WBC 6.1 while on Ibrutinib as of last scans. I was concerned there may be overlap with regard to side effects. May reconsider resuming if shows signs of progression. CT chest ordered.      3.  Anemia- bone marrow aspirate showed no significant dysplastic changes no

## 2018-07-17 NOTE — PROGRESS NOTES
Pt here for 1 month MD f/u. Pt continues on Ibrutinib. Energy level is good, but gets tired by the evening. Appetite is good. Denies pain, no bowel issues or nausea. Pt notes having itching/rash on trunk only, mostly at night the last 4 days.      Outpatien

## 2018-07-19 NOTE — TELEPHONE ENCOUNTER
Patient is following up on the request to have a prescription for Spiriva sent to her pharmacy. The samples that were given to her have been working and she is just about finished them. [id number] Paige Salcedo for [employer] for spiriva?

## 2018-07-19 NOTE — TELEPHONE ENCOUNTER
Patient is following up on the request to have a prescription for Spiriva sent to her pharmacy. The samples that were given to her have been working and she is just about finished them. She does have an appt on Monday 7/23 w/ Jeremias. Please advise.

## 2018-07-23 ENCOUNTER — OFFICE VISIT (OUTPATIENT)
Dept: FAMILY MEDICINE CLINIC | Facility: CLINIC | Age: 78
End: 2018-07-23
Payer: MEDICARE

## 2018-07-23 VITALS
BODY MASS INDEX: 26.61 KG/M2 | TEMPERATURE: 99 F | RESPIRATION RATE: 16 BRPM | SYSTOLIC BLOOD PRESSURE: 145 MMHG | OXYGEN SATURATION: 98 % | HEIGHT: 59 IN | HEART RATE: 74 BPM | WEIGHT: 132 LBS | DIASTOLIC BLOOD PRESSURE: 90 MMHG

## 2018-07-23 DIAGNOSIS — T50.905A ADVERSE EFFECT OF DRUG, INITIAL ENCOUNTER: ICD-10-CM

## 2018-07-23 DIAGNOSIS — I10 HYPERTENSION, ACCELERATED: Primary | ICD-10-CM

## 2018-07-23 LAB
ALBUMIN SERPL-MCNC: 3.86 G/DL (ref 3.5–4.8)
ALBUMIN/GLOB SERPL: 0.77 {RATIO}
ALPHA1 GLOB SERPL ELPH-MCNC: 0.3 G/DL (ref 0.1–0.3)
ALPHA2 GLOB SERPL ELPH-MCNC: 0.98 G/DL (ref 0.6–1)
B-GLOBULIN SERPL ELPH-MCNC: 0.9 G/DL (ref 0.7–1.2)
GAMMA GLOB SERPL ELPH-MCNC: 2.86 G/DL (ref 0.6–1.6)
KAPPA FREE LIGHT CHAIN: 1.65 MG/DL (ref 0.33–1.94)
KAPPA/LAMBDA FLC RATIO: 0.07 (ref 0.26–1.65)
LAMBDA FREE LIGHT CHAIN: 23.32 MG/DL (ref 0.57–2.63)
M-SPIKE 1: 2.39 G/DL (ref ?–0)
MAI PROTEIN SERPL-MCNC: 8.9 G/DL (ref 6.1–8.3)

## 2018-07-23 PROCEDURE — 99213 OFFICE O/P EST LOW 20 MIN: CPT | Performed by: FAMILY MEDICINE

## 2018-07-23 RX ORDER — AMLODIPINE BESYLATE 2.5 MG/1
2.5 TABLET ORAL DAILY
Qty: 90 TABLET | Refills: 3 | Status: SHIPPED | OUTPATIENT
Start: 2018-07-23 | End: 2019-07-11

## 2018-07-23 NOTE — PROGRESS NOTES
Several of her oncologist have been curious about her increased blood pressure. This is all entirely asymptomatic. Her current medications include combination of Benzapril and amlodipine along with metoprolol.   She has no headache no focal sensory or m

## 2018-07-27 ENCOUNTER — TELEPHONE (OUTPATIENT)
Dept: FAMILY MEDICINE CLINIC | Facility: CLINIC | Age: 78
End: 2018-07-27

## 2018-08-01 ENCOUNTER — TELEPHONE (OUTPATIENT)
Dept: FAMILY MEDICINE CLINIC | Facility: CLINIC | Age: 78
End: 2018-08-01

## 2018-08-02 ENCOUNTER — TELEPHONE (OUTPATIENT)
Dept: FAMILY MEDICINE CLINIC | Facility: CLINIC | Age: 78
End: 2018-08-02

## 2018-08-07 ENCOUNTER — HOSPITAL ENCOUNTER (OUTPATIENT)
Dept: CT IMAGING | Age: 78
Discharge: HOME | End: 2018-08-07
Attending: INTERNAL MEDICINE
Payer: MEDICARE

## 2018-08-07 DIAGNOSIS — C34.91 BILATERAL LUNG CANCER (HCC): ICD-10-CM

## 2018-08-07 DIAGNOSIS — C34.92 BILATERAL LUNG CANCER (HCC): ICD-10-CM

## 2018-08-07 PROCEDURE — 71250 CT THORAX DX C-: CPT | Performed by: INTERNAL MEDICINE

## 2018-08-14 ENCOUNTER — OFFICE VISIT (OUTPATIENT)
Dept: HEMATOLOGY/ONCOLOGY | Age: 78
End: 2018-08-14
Attending: INTERNAL MEDICINE
Payer: MEDICARE

## 2018-08-14 VITALS
WEIGHT: 131.38 LBS | OXYGEN SATURATION: 98 % | SYSTOLIC BLOOD PRESSURE: 135 MMHG | BODY MASS INDEX: 27 KG/M2 | DIASTOLIC BLOOD PRESSURE: 78 MMHG | HEART RATE: 90 BPM | TEMPERATURE: 97 F | RESPIRATION RATE: 18 BRPM

## 2018-08-14 DIAGNOSIS — C34.91 BILATERAL LUNG CANCER (HCC): Primary | ICD-10-CM

## 2018-08-14 DIAGNOSIS — C85.80 MARGINAL ZONE B-CELL LYMPHOMA (HCC): ICD-10-CM

## 2018-08-14 DIAGNOSIS — D47.2 MONOCLONAL PARAPROTEINEMIA: ICD-10-CM

## 2018-08-14 DIAGNOSIS — C34.92 BILATERAL LUNG CANCER (HCC): Primary | ICD-10-CM

## 2018-08-14 DIAGNOSIS — D64.9 NORMOCYTIC NORMOCHROMIC ANEMIA: ICD-10-CM

## 2018-08-14 DIAGNOSIS — N28.9 RENAL INSUFFICIENCY: ICD-10-CM

## 2018-08-14 LAB
ALBUMIN SERPL-MCNC: 2.6 G/DL (ref 3.5–4.8)
ALBUMIN/GLOB SERPL: 0.4 {RATIO} (ref 1–2)
ALP LIVER SERPL-CCNC: 69 U/L (ref 55–142)
ALT SERPL-CCNC: 16 U/L (ref 14–54)
ANION GAP SERPL CALC-SCNC: 10 MMOL/L (ref 0–18)
AST SERPL-CCNC: 16 U/L (ref 15–41)
BASOPHILS # BLD AUTO: 0.03 X10(3) UL (ref 0–0.1)
BASOPHILS NFR BLD AUTO: 0.6 %
BILIRUB SERPL-MCNC: 0.4 MG/DL (ref 0.1–2)
BUN BLD-MCNC: 40 MG/DL (ref 8–20)
BUN/CREAT SERPL: 26.7 (ref 10–20)
CALCIUM BLD-MCNC: 9.1 MG/DL (ref 8.3–10.3)
CHLORIDE SERPL-SCNC: 111 MMOL/L (ref 101–111)
CO2 SERPL-SCNC: 23 MMOL/L (ref 22–32)
CREAT BLD-MCNC: 1.5 MG/DL (ref 0.55–1.02)
EOSINOPHIL # BLD AUTO: 0.1 X10(3) UL (ref 0–0.3)
EOSINOPHIL NFR BLD AUTO: 2.1 %
ERYTHROCYTE [DISTWIDTH] IN BLOOD BY AUTOMATED COUNT: 15.6 % (ref 11.5–16)
GLOBULIN PLAS-MCNC: 6.6 G/DL (ref 2.5–3.7)
GLUCOSE BLD-MCNC: 91 MG/DL (ref 70–99)
HCT VFR BLD AUTO: 33.9 % (ref 34–50)
HGB BLD-MCNC: 10.3 G/DL (ref 12–16)
IMMATURE GRANULOCYTE COUNT: 0.02 X10(3) UL (ref 0–1)
IMMATURE GRANULOCYTE RATIO %: 0.4 %
IMMUNOGLOBULIN A: 130.4 MG/DL (ref 70–312)
IMMUNOGLOBULIN G: 995 MG/DL (ref 791–1643)
IMMUNOGLOBULIN M: 3220 MG/DL (ref 43–279)
LDH: 142 U/L (ref 84–246)
LYMPHOCYTES # BLD AUTO: 1 X10(3) UL (ref 0.9–4)
LYMPHOCYTES NFR BLD AUTO: 21 %
M PROTEIN MFR SERPL ELPH: 9.2 G/DL (ref 6.1–8.3)
MCH RBC QN AUTO: 29.5 PG (ref 27–33.2)
MCHC RBC AUTO-ENTMCNC: 30.4 G/DL (ref 31–37)
MCV RBC AUTO: 97.1 FL (ref 81–100)
MONOCYTES # BLD AUTO: 0.6 X10(3) UL (ref 0.1–1)
MONOCYTES NFR BLD AUTO: 12.6 %
NEUTROPHIL ABS PRELIM: 3.02 X10 (3) UL (ref 1.3–6.7)
NEUTROPHILS # BLD AUTO: 3.02 X10(3) UL (ref 1.3–6.7)
NEUTROPHILS NFR BLD AUTO: 63.3 %
OSMOLALITY SERPL CALC.SUM OF ELEC: 307 MOSM/KG (ref 275–295)
PLATELET # BLD AUTO: 182 10(3)UL (ref 150–450)
POTASSIUM SERPL-SCNC: 4.1 MMOL/L (ref 3.6–5.1)
RBC # BLD AUTO: 3.49 X10(6)UL (ref 3.8–5.1)
RED CELL DISTRIBUTION WIDTH-SD: 55.3 FL (ref 35.1–46.3)
SODIUM SERPL-SCNC: 144 MMOL/L (ref 136–144)
WBC # BLD AUTO: 4.8 X10(3) UL (ref 4–13)

## 2018-08-14 PROCEDURE — 99215 OFFICE O/P EST HI 40 MIN: CPT | Performed by: INTERNAL MEDICINE

## 2018-08-14 NOTE — PROGRESS NOTES
Pt here for 1 month MD f/u. Pt had CT 8/7. Pt continues on Ibrutinib 420mg with no breaks. Pt's energy level is low, appetite is good. Denies pain, no bowel issues. Pt has had clear productive cough for about 10 days, no fevers, taking Mucinex.      [address]

## 2018-08-14 NOTE — PROGRESS NOTES
Cancer Center Progress Note    Patient Name: Leah Umanzor   YOB: 1940   Medical Record Number: [id number]   CSN: [id number]   Attending Physician: Trinidad Mosqueda M.D.    Referring Physician: Tony Montalvo DO      Date of Visit: 8/14/20 seen on surveillance scans. Was placed on anticoagulation during that time. Now off.     4. Iron deficiency anemia in 2016- had EGD/c-scope and capsule endoscopy which were unrevealing. Malabsorption? History of Present Illness: On Ibrutinib.  Says yong mouth daily. , Disp: , Rfl:   •  VOLTAREN 1 % Transdermal Gel, PLACE ONE GRAM ONTO THE SKIN FOUR TIMES DAILY, Disp: 3 Tube, Rfl: 3  •  FAMOTIDINE 20 MG Oral Tab, TAKE ONE TABLET BY MOUTH TWICE DAILY, Disp: 180 tablet, Rfl: 11  •  ibrutinib 140 MG Oral Cap, bunionectomy,bilateral  12/05/2017: OTHER SURGICAL HISTORY      Comment: Cysto- Dr. Lucrecia Storey  No date: REMOVAL OF LUNG,LOBECTOMY      Comment: removal of upper right lobe  No date: TONSILLECTOMY    Family Medical History:  Family History   Problem Relation Age Collection Time: 08/14/18  1:02 PM   Result Value Ref Range   Glucose 91 70 - 99 mg/dL   Sodium 144 136 - 144 mmol/L   Potassium 4.1 3.6 - 5.1 mmol/L   Chloride 111 101 - 111 mmol/L   CO2 23.0 22.0 - 32.0 mmol/L   Anion Gap 10 0 - 18 mmol/L   BUN 40 (H) 23.321 (H)   KAPPA/LAMBDA FLC RATIO Latest Ref Range: 0.26 - 1.65  0.12 (L) 0.07 (L)   PROTEIN ELECT INTERPRETATION Unknown Monoclonal spike . .. Monoclonal spike . ..    TOTAL PROTEIN Latest Ref Range: 6.1 - 8.3 g/dL 9.1 (H) 9.4 (H)   Albumin Latest Ref [address] adenopathy. MEDIASTINUM:  There is a moderate hiatal hernia. CARDIAC:  No enlargement, pericardial thickening, or significant calcification. PLEURA:  No mass or effusion. THORACIC AORTA:  Unremarkable as seen on non-contrast imaging.    CHEST WALL: Hb has remained stable and >10. Will continue to monitor.      4. ARF on CKD-  Unlikely from paraproteinemia as had improved despite being off treatment for (1). Creatinine stable and Renal following     5.  HTN- PCP managing         Labs reviewed and rece

## 2018-08-17 LAB
ALBUMIN SERPL-MCNC: 3.35 G/DL (ref 3.5–4.8)
ALBUMIN/GLOB SERPL: 0.62 {RATIO}
ALPHA1 GLOB SERPL ELPH-MCNC: 0.36 G/DL (ref 0.1–0.3)
ALPHA2 GLOB SERPL ELPH-MCNC: 1.04 G/DL (ref 0.6–1)
B-GLOBULIN SERPL ELPH-MCNC: 0.94 G/DL (ref 0.7–1.2)
GAMMA GLOB SERPL ELPH-MCNC: 3.11 G/DL (ref 0.6–1.6)
KAPPA FREE LIGHT CHAIN: 1.56 MG/DL (ref 0.33–1.94)
KAPPA/LAMBDA FLC RATIO: 0.08 (ref 0.26–1.65)
LAMBDA FREE LIGHT CHAIN: 20.61 MG/DL (ref 0.57–2.63)
M-SPIKE 1: 2.58 G/DL (ref ?–0)
MAI PROTEIN SERPL-MCNC: 8.8 G/DL (ref 6.1–8.3)

## 2018-08-20 ENCOUNTER — OFFICE VISIT (OUTPATIENT)
Dept: FAMILY MEDICINE CLINIC | Facility: CLINIC | Age: 78
End: 2018-08-20
Payer: MEDICARE

## 2018-08-20 VITALS
SYSTOLIC BLOOD PRESSURE: 132 MMHG | TEMPERATURE: 98 F | OXYGEN SATURATION: 98 % | DIASTOLIC BLOOD PRESSURE: 82 MMHG | WEIGHT: 130 LBS | BODY MASS INDEX: 26 KG/M2 | HEART RATE: 78 BPM | RESPIRATION RATE: 16 BRPM

## 2018-08-20 DIAGNOSIS — T50.905A ADVERSE EFFECT OF DRUG, INITIAL ENCOUNTER: Primary | ICD-10-CM

## 2018-08-20 PROCEDURE — 99213 OFFICE O/P EST LOW 20 MIN: CPT | Performed by: FAMILY MEDICINE

## 2018-08-20 NOTE — PROGRESS NOTES
Patient is here for several reasons first to recheck her blood pressure. The blood pressure has gone up since starting a new medicine for her cancer.   Home pressures which she is taking very reliably however have a range of systolics between 514 and 152 a

## 2018-08-28 RX ORDER — AMLODIPINE BESYLATE AND BENAZEPRIL HYDROCHLORIDE 5; 10 MG/1; MG/1
CAPSULE ORAL
Qty: 90 CAPSULE | Refills: 1 | Status: SHIPPED | OUTPATIENT
Start: 2018-08-28 | End: 2018-09-24

## 2018-09-11 ENCOUNTER — OFFICE VISIT (OUTPATIENT)
Dept: HEMATOLOGY/ONCOLOGY | Age: 78
End: 2018-09-11
Attending: INTERNAL MEDICINE
Payer: MEDICARE

## 2018-09-11 ENCOUNTER — HOSPITAL ENCOUNTER (OUTPATIENT)
Dept: GENERAL RADIOLOGY | Facility: HOSPITAL | Age: 78
Discharge: HOME | End: 2018-09-11
Attending: INTERNAL MEDICINE
Payer: MEDICARE

## 2018-09-11 VITALS
HEART RATE: 79 BPM | TEMPERATURE: 97 F | DIASTOLIC BLOOD PRESSURE: 73 MMHG | BODY MASS INDEX: 26 KG/M2 | WEIGHT: 132.38 LBS | RESPIRATION RATE: 18 BRPM | SYSTOLIC BLOOD PRESSURE: 132 MMHG | OXYGEN SATURATION: 94 %

## 2018-09-11 DIAGNOSIS — C34.92 BILATERAL LUNG CANCER (HCC): ICD-10-CM

## 2018-09-11 DIAGNOSIS — C34.91 BILATERAL LUNG CANCER (HCC): ICD-10-CM

## 2018-09-11 DIAGNOSIS — R13.12 OROPHARYNGEAL DYSPHAGIA: ICD-10-CM

## 2018-09-11 DIAGNOSIS — C85.80 MARGINAL ZONE B-CELL LYMPHOMA (HCC): ICD-10-CM

## 2018-09-11 LAB
ALBUMIN SERPL-MCNC: 2.5 G/DL (ref 3.5–4.8)
ALBUMIN/GLOB SERPL: 0.4 {RATIO} (ref 1–2)
ALP LIVER SERPL-CCNC: 76 U/L (ref 55–142)
ALT SERPL-CCNC: 17 U/L (ref 14–54)
ANION GAP SERPL CALC-SCNC: 8 MMOL/L (ref 0–18)
AST SERPL-CCNC: 15 U/L (ref 15–41)
BASOPHILS # BLD AUTO: 0.04 X10(3) UL (ref 0–0.1)
BASOPHILS NFR BLD AUTO: 0.7 %
BILIRUB SERPL-MCNC: 0.4 MG/DL (ref 0.1–2)
BUN BLD-MCNC: 43 MG/DL (ref 8–20)
BUN/CREAT SERPL: 25.1 (ref 10–20)
CALCIUM BLD-MCNC: 9.6 MG/DL (ref 8.3–10.3)
CHLORIDE SERPL-SCNC: 110 MMOL/L (ref 101–111)
CO2 SERPL-SCNC: 24 MMOL/L (ref 22–32)
CREAT BLD-MCNC: 1.71 MG/DL (ref 0.55–1.02)
EOSINOPHIL # BLD AUTO: 0.06 X10(3) UL (ref 0–0.3)
EOSINOPHIL NFR BLD AUTO: 1 %
ERYTHROCYTE [DISTWIDTH] IN BLOOD BY AUTOMATED COUNT: 15.9 % (ref 11.5–16)
GLOBULIN PLAS-MCNC: 6.8 G/DL (ref 2.5–4)
GLUCOSE BLD-MCNC: 113 MG/DL (ref 70–99)
HCT VFR BLD AUTO: 33.1 % (ref 34–50)
HGB BLD-MCNC: 10.1 G/DL (ref 12–16)
IMMATURE GRANULOCYTE COUNT: 0.03 X10(3) UL (ref 0–1)
IMMATURE GRANULOCYTE RATIO %: 0.5 %
LDH: 168 U/L (ref 84–246)
LYMPHOCYTES # BLD AUTO: 1.39 X10(3) UL (ref 0.9–4)
LYMPHOCYTES NFR BLD AUTO: 23.4 %
M PROTEIN MFR SERPL ELPH: 9.3 G/DL (ref 6.1–8.3)
MCH RBC QN AUTO: 29.5 PG (ref 27–33.2)
MCHC RBC AUTO-ENTMCNC: 30.5 G/DL (ref 31–37)
MCV RBC AUTO: 96.8 FL (ref 81–100)
MONOCYTES # BLD AUTO: 0.57 X10(3) UL (ref 0.1–1)
MONOCYTES NFR BLD AUTO: 9.6 %
NEUTROPHIL ABS PRELIM: 3.85 X10 (3) UL (ref 1.3–6.7)
NEUTROPHILS # BLD AUTO: 3.85 X10(3) UL (ref 1.3–6.7)
NEUTROPHILS NFR BLD AUTO: 64.8 %
OSMOLALITY SERPL CALC.SUM OF ELEC: 306 MOSM/KG (ref 275–295)
PLATELET # BLD AUTO: 169 10(3)UL (ref 150–450)
POTASSIUM SERPL-SCNC: 3.9 MMOL/L (ref 3.6–5.1)
RBC # BLD AUTO: 3.42 X10(6)UL (ref 3.8–5.1)
RED CELL DISTRIBUTION WIDTH-SD: 56.5 FL (ref 35.1–46.3)
SODIUM SERPL-SCNC: 142 MMOL/L (ref 136–144)
WBC # BLD AUTO: 5.9 X10(3) UL (ref 4–13)

## 2018-09-11 PROCEDURE — 74230 X-RAY XM SWLNG FUNCJ C+: CPT | Performed by: INTERNAL MEDICINE

## 2018-09-11 PROCEDURE — 92611 MOTION FLUOROSCOPY/SWALLOW: CPT

## 2018-09-11 PROCEDURE — 99215 OFFICE O/P EST HI 40 MIN: CPT | Performed by: INTERNAL MEDICINE

## 2018-09-11 NOTE — PROGRESS NOTES
Cancer Center Progress Note    Patient Name: Michell Pierre   YOB: 1940   Medical Record Number: [id number]   [facility]: [id number]   Attending Physician: Andrea Marrero M.D.    Referring Physician: Zac Le DO      Date of Visit: 9/11/20 sided PE diagnosed 9/2011- seen on surveillance scans. Was placed on anticoagulation during that time. Now off.     4. Iron deficiency anemia in 2016- had EGD/c-scope and capsule endoscopy which were unrevealing. Malabsorption?       History of Present Illn Besy-Benazepril HCl 5-10 MG Oral Cap, Take 1 capsule by mouth daily. , Disp: , Rfl:   •  VOLTAREN 1 % Transdermal Gel, PLACE ONE GRAM ONTO THE SKIN FOUR TIMES DAILY, Disp: 3 Tube, Rfl: 3  •  FAMOTIDINE 20 MG Oral Tab, TAKE ONE TABLET BY MOUTH TWICE DAILY, D SURGERY  1970's: HYSTERECTOMY      Comment:  vaginal  No date: OTHER      Comment:  lung biopsies  1/1/08: OTHER SURGICAL HISTORY      Comment:  gallbladder surgery  0952,1537: OTHER SURGICAL HISTORY      Comment:  bunionectomy,bilateral  12/05/2017: OTHER file        Allergies:    Penicillins             ITCHING     Review of Systems:  A 14-point ROS was done with pertinent positives and negative per the HPI    Vital Signs:  /73 (BP Location: Left arm, Patient Position: Sitting, Cuff Size: adult)   Pu 113 (H) 09/11/2018    BUN 43 (H) 09/11/2018    BUNCREA 25.1 (H) 09/11/2018    CREATSERUM 1.71 (H) 09/11/2018    ANIONGAP 8 09/11/2018    GFR 23 (L) 01/23/2018    GFRNAA 28 (L) 09/11/2018    GFRAA 33 (L) 09/11/2018    CA 9.6 09/11/2018    OSMOCALC 306 (H) 0 apical ground-glass opacity is noted. This measures approximately 1.8 x 1.8 cm and is stable (image 13). Masslike consolidation in the right lower lobe measuring approximately 4.0 x 2.6 cm is stable.   Adjacent reticular nodular densities   with numerous progression will continue to hold Tarceva. Scans due in November which I ordered.      3. Anemia- bone marrow aspirate showed no significant dysplastic changes nor increased blasts, lymphocytosis or plasmacytosis.  Given increase in monoclonal protein but w

## 2018-09-11 NOTE — PROGRESS NOTES
ADULT VIDEOFLUOROSCOPIC SWALLOWING STUDY    Admission Date: 9/11/2018  Evaluation Date: 09/11/18  Radiologist: Dr. Kelsey Olsen: Regular  Diet Recommendations - Liquid:  Thin    Further Follow-up:              Me recent past.    Family/Patient Goals: To find reason for difficulty swallowing     ASSESSMENT   DYSPHAGIA ASSESSMENT  Test completed in conjunction with Radiologist.  Patient Positioned: Upright;Midline;Standard Chair.   Patient Viewed: Lateral.   .  Consi impaired    Thank you for your referral.   If you have any questions, please contact Vikki Farrlel   Pager [phone]

## 2018-09-11 NOTE — PROGRESS NOTES
Pt here for 4 week MD f/u visit, h/o lung ca. Pt continues Imbruvica as directed. No new issues.   Outpatient Oncology Care Plan  Problem list:  knowledge deficit    Problems related to:    disease    Interventions:  provided general teaching    Expected ou

## 2018-09-11 NOTE — CONSULTS
Cancer Center Progress Note    Patient Name: Kevin Hayden   YOB: 1940   Medical Record Number: [id number]   CSN: [id number]   Attending Physician: Gab Aponte M.D.    Referring Physician:    Date of Visit: 9/11/2018     Chief Complaint FAMOTIDINE 20 MG Oral Tab, TAKE ONE TABLET BY MOUTH TWICE DAILY, Disp: 180 tablet, Rfl: 11  •  ibrutinib 140 MG Oral Cap, Take 3 capsules (420 mg total) by mouth daily.  Take 420 mg (3 capsules) daily, Disp: 90 capsule, Rfl: 5  •  Saline Nasal Spray 0.65 % Comment:  bunionectomy,bilateral  12/05/2017: OTHER SURGICAL HISTORY      Comment:  Jasen Moncada  No date: REMOVAL OF LUNG,LOBECTOMY      Comment:  removal of upper right lobe  No date: TONSILLECTOMY    Family Medical History:  Family History   Problem R Sitting, Cuff Size: adult)   Pulse 79   Temp 97 °F (36.1 °C) (Tympanic)   Resp 18   Wt 60.1 kg (132 lb 6.4 oz)   SpO2 94%   BMI 25.86 kg/m²       Physical Examination:  General: Patient is alert and oriented x 3, not in acute distress.   Psych:  Mood and af problems.     Kd Ignacio MD  [facility]

## 2018-09-12 LAB
IMMUNOGLOBULIN A: 142.5 MG/DL (ref 70–312)
IMMUNOGLOBULIN G: 1030 MG/DL (ref 791–1643)
IMMUNOGLOBULIN M: 2880 MG/DL (ref 43–279)

## 2018-09-14 LAB
ALBUMIN SERPL-MCNC: 3.47 G/DL (ref 3.5–4.8)
ALBUMIN/GLOB SERPL: 0.63 {RATIO}
ALPHA1 GLOB SERPL ELPH-MCNC: 0.32 G/DL (ref 0.1–0.3)
ALPHA2 GLOB SERPL ELPH-MCNC: 0.99 G/DL (ref 0.6–1)
B-GLOBULIN SERPL ELPH-MCNC: 0.9 G/DL (ref 0.7–1.2)
GAMMA GLOB SERPL ELPH-MCNC: 3.33 G/DL (ref 0.6–1.6)
KAPPA FREE LIGHT CHAIN: 1.58 MG/DL (ref 0.33–1.94)
KAPPA/LAMBDA FLC RATIO: 0.08 (ref 0.26–1.65)
LAMBDA FREE LIGHT CHAIN: 20.62 MG/DL (ref 0.57–2.63)
M-SPIKE 1: 2.74 G/DL (ref ?–0)
MAI PROTEIN SERPL-MCNC: 9 G/DL (ref 6.1–8.3)

## 2018-09-20 ENCOUNTER — HOSPITAL ENCOUNTER (EMERGENCY)
Age: 78
Discharge: HOME | End: 2018-09-20
Attending: EMERGENCY MEDICINE
Payer: MEDICARE

## 2018-09-20 ENCOUNTER — TELEPHONE (OUTPATIENT)
Dept: FAMILY MEDICINE CLINIC | Facility: CLINIC | Age: 78
End: 2018-09-20

## 2018-09-20 VITALS
TEMPERATURE: 98 F | RESPIRATION RATE: 14 BRPM | HEART RATE: 96 BPM | OXYGEN SATURATION: 100 % | HEIGHT: 60 IN | DIASTOLIC BLOOD PRESSURE: 85 MMHG | WEIGHT: 130 LBS | SYSTOLIC BLOOD PRESSURE: 167 MMHG | BODY MASS INDEX: 25.52 KG/M2

## 2018-09-20 DIAGNOSIS — N30.00 ACUTE CYSTITIS WITHOUT HEMATURIA: Primary | ICD-10-CM

## 2018-09-20 LAB
BILIRUB UR QL STRIP.AUTO: NEGATIVE
COLOR UR AUTO: YELLOW
GLUCOSE UR STRIP.AUTO-MCNC: NEGATIVE MG/DL
KETONES UR STRIP.AUTO-MCNC: NEGATIVE MG/DL
NITRITE UR QL STRIP.AUTO: NEGATIVE
PH UR STRIP.AUTO: 7 [PH] (ref 4.5–8)
PROT UR STRIP.AUTO-MCNC: >=300 MG/DL
SP GR UR STRIP.AUTO: 1.02 (ref 1–1.03)
UROBILINOGEN UR STRIP.AUTO-MCNC: 0.2 MG/DL
WBC #/AREA URNS AUTO: >50 /HPF

## 2018-09-20 PROCEDURE — 87086 URINE CULTURE/COLONY COUNT: CPT | Performed by: EMERGENCY MEDICINE

## 2018-09-20 PROCEDURE — 99283 EMERGENCY DEPT VISIT LOW MDM: CPT

## 2018-09-20 PROCEDURE — 87088 URINE BACTERIA CULTURE: CPT | Performed by: EMERGENCY MEDICINE

## 2018-09-20 PROCEDURE — 87186 SC STD MICRODIL/AGAR DIL: CPT | Performed by: EMERGENCY MEDICINE

## 2018-09-20 PROCEDURE — 81001 URINALYSIS AUTO W/SCOPE: CPT | Performed by: EMERGENCY MEDICINE

## 2018-09-20 RX ORDER — NITROFURANTOIN 25; 75 MG/1; MG/1
100 CAPSULE ORAL 2 TIMES DAILY
Qty: 20 CAPSULE | Refills: 0 | Status: SHIPPED | OUTPATIENT
Start: 2018-09-20 | End: 2018-09-24 | Stop reason: ALTCHOICE

## 2018-09-20 NOTE — TELEPHONE ENCOUNTER
Patient wanted to see Dr Roseann Lopez she has a bladder infection. She did ask if she should goto a walk in clinic. Suggested that probably the immediate care would be better. She was ok with that but still wanted a message to [facility] the nurse.

## 2018-09-20 NOTE — ED PROVIDER NOTES
Patient Seen in: THE [facility] Emergency Department In [place]    History   Patient presents with:  Urinary Symptoms (urologic)    Stated Complaint: Urinary complaints    HPI    67-year-old female who presents to the ER complaining of having urinary frequenc Amena Block MD at [facility]  No date: OTHER      Comment:  lung biopsies  1/1/08: OTHER SURGICAL HISTORY      Comment:  gallbladder surgery  6720,8022: OTHER SURGICAL HISTORY      Comment:  bunionectomy,bilateral  12/05/2017: OTHER SURGICAL HISTORY      Com following components:       Result Value    Clarity Urine Cloudy (*)     Blood Urine Large (*)     Protein Urine >=300 (*)     Leukocyte Esterase Urine Large (*)     All other components within normal limits   URINE MICROSCOPIC W REFLEX CULTURE - Abnormal;

## 2018-09-22 RX ORDER — SULFAMETHOXAZOLE AND TRIMETHOPRIM 800; 160 MG/1; MG/1
1 TABLET ORAL 2 TIMES DAILY
Qty: 20 TABLET | Refills: 0 | Status: SHIPPED | OUTPATIENT
Start: 2018-09-22 | End: 2018-10-02 | Stop reason: ALTCHOICE

## 2018-09-22 NOTE — ED NOTES
Pt informed of urinr culture results  [facility] [place]

## 2018-09-24 ENCOUNTER — OFFICE VISIT (OUTPATIENT)
Dept: FAMILY MEDICINE CLINIC | Facility: CLINIC | Age: 78
End: 2018-09-24
Payer: MEDICARE

## 2018-09-24 ENCOUNTER — TELEPHONE (OUTPATIENT)
Dept: FAMILY MEDICINE CLINIC | Facility: CLINIC | Age: 78
End: 2018-09-24

## 2018-09-24 VITALS
TEMPERATURE: 99 F | HEART RATE: 98 BPM | RESPIRATION RATE: 16 BRPM | SYSTOLIC BLOOD PRESSURE: 128 MMHG | OXYGEN SATURATION: 97 % | BODY MASS INDEX: 25.72 KG/M2 | WEIGHT: 131 LBS | HEIGHT: 60 IN | DIASTOLIC BLOOD PRESSURE: 72 MMHG

## 2018-09-24 DIAGNOSIS — N34.1 URETHRITIS, NOT SEXUALLY TRANSMITTED: Primary | ICD-10-CM

## 2018-09-24 DIAGNOSIS — N34.3 URETHRITIS, NOT SEXUALLY TRANSMITTED: Primary | ICD-10-CM

## 2018-09-24 PROCEDURE — 99213 OFFICE O/P EST LOW 20 MIN: CPT | Performed by: FAMILY MEDICINE

## 2018-09-24 PROCEDURE — 90653 IIV ADJUVANT VACCINE IM: CPT | Performed by: FAMILY MEDICINE

## 2018-09-24 PROCEDURE — G0008 ADMIN INFLUENZA VIRUS VAC: HCPCS | Performed by: FAMILY MEDICINE

## 2018-09-24 NOTE — TELEPHONE ENCOUNTER
Needs clarification on Diclofenac Sodium 1.6 %, does not come in 1.6%, only 1% or 3%, please advise    Okay for 1%?

## 2018-09-24 NOTE — PROGRESS NOTES
This is a visit for follow-up of a somewhat complicated UTI. She was seen about 5 days ago and urgent care for terminal dysuria. She demonstrated a great deal of white cells and red cells no nitrates.   Her initial antibiotic was chosen but when the cultu

## 2018-09-25 ENCOUNTER — TELEPHONE (OUTPATIENT)
Dept: FAMILY MEDICINE CLINIC | Facility: CLINIC | Age: 78
End: 2018-09-25

## 2018-10-01 ENCOUNTER — NURSE ONLY (OUTPATIENT)
Dept: FAMILY MEDICINE CLINIC | Facility: CLINIC | Age: 78
End: 2018-10-01
Payer: MEDICARE

## 2018-10-01 ENCOUNTER — TELEPHONE (OUTPATIENT)
Dept: FAMILY MEDICINE CLINIC | Facility: CLINIC | Age: 78
End: 2018-10-01

## 2018-10-01 DIAGNOSIS — R30.0 DYSURIA: Primary | ICD-10-CM

## 2018-10-01 PROCEDURE — 81003 URINALYSIS AUTO W/O SCOPE: CPT | Performed by: FAMILY MEDICINE

## 2018-10-01 RX ORDER — SULFAMETHOXAZOLE AND TRIMETHOPRIM 400; 80 MG/1; MG/1
1 TABLET ORAL 2 TIMES DAILY
Qty: 14 TABLET | Refills: 0 | Status: SHIPPED | OUTPATIENT
Start: 2018-10-01 | End: 2018-10-02 | Stop reason: CLARIF

## 2018-10-01 RX ORDER — CIPROFLOXACIN 250 MG/1
250 TABLET, FILM COATED ORAL 2 TIMES DAILY
Qty: 14 TABLET | Refills: 0 | Status: SHIPPED | OUTPATIENT
Start: 2018-10-01 | End: 2018-10-08

## 2018-10-01 NOTE — TELEPHONE ENCOUNTER
Patients urine dip show small amounts of leukocytes and a moderate amount of blood. Patient has been informed that this is signs of inflammation. She has been prescribed Cipro, per Dr Bharath Cormier orders.     It has been recommended that patient receive a vag

## 2018-10-02 ENCOUNTER — OFFICE VISIT (OUTPATIENT)
Dept: FAMILY MEDICINE CLINIC | Facility: CLINIC | Age: 78
End: 2018-10-02
Payer: MEDICARE

## 2018-10-02 VITALS
HEART RATE: 92 BPM | BODY MASS INDEX: 25.52 KG/M2 | DIASTOLIC BLOOD PRESSURE: 92 MMHG | SYSTOLIC BLOOD PRESSURE: 138 MMHG | TEMPERATURE: 98 F | HEIGHT: 60 IN | RESPIRATION RATE: 16 BRPM | WEIGHT: 130 LBS | OXYGEN SATURATION: 92 %

## 2018-10-02 DIAGNOSIS — B96.89 GARDNERELLA VAGINALIS INFECTION: Primary | ICD-10-CM

## 2018-10-02 DIAGNOSIS — N76.0 GARDNERELLA VAGINALIS INFECTION: Primary | ICD-10-CM

## 2018-10-02 PROCEDURE — 99214 OFFICE O/P EST MOD 30 MIN: CPT | Performed by: FAMILY MEDICINE

## 2018-10-02 RX ORDER — METRONIDAZOLE 500 MG/1
500 TABLET ORAL 2 TIMES DAILY
Qty: 20 TABLET | Refills: 0 | Status: SHIPPED | OUTPATIENT
Start: 2018-10-02 | End: 2018-10-05 | Stop reason: SINTOL

## 2018-10-02 NOTE — PROGRESS NOTES
This is a patient with a history of Clostridium difficile who has been in remission for some time but over the past month has had recurrent urinary tract symptoms.   He finished a Bactrim prescription given to him and at a walk-in clinic and presents today

## 2018-10-05 ENCOUNTER — TELEPHONE (OUTPATIENT)
Dept: FAMILY MEDICINE CLINIC | Facility: CLINIC | Age: 78
End: 2018-10-05

## 2018-10-05 RX ORDER — METRONIDAZOLE 7.5 MG/G
1 GEL VAGINAL NIGHTLY
Qty: 1 TUBE | Refills: 0 | Status: SHIPPED | OUTPATIENT
Start: 2018-10-05 | End: 2018-11-01

## 2018-10-05 NOTE — TELEPHONE ENCOUNTER
Spoke to patient - per Dr. Dread Lawrence he said try 1/2 tablet at a time. Patient worried because she doesn't know when she will vomit. She would like to try Vaginal gel. Metrogel sent to pharmacy.

## 2018-10-05 NOTE — TELEPHONE ENCOUNTER
They medication that she was put on 10/2 is making her vomit. Is there something else she can take? Admission

## 2018-10-10 PROBLEM — R13.14 DYSPHAGIA, PHARYNGOESOPHAGEAL PHASE: Status: ACTIVE | Noted: 2018-10-10

## 2018-10-16 ENCOUNTER — TELEPHONE (OUTPATIENT)
Dept: HEMATOLOGY/ONCOLOGY | Facility: HOSPITAL | Age: 78
End: 2018-10-16

## 2018-10-16 NOTE — TELEPHONE ENCOUNTER
Pt calling- Dr. Cynthia Poole performed upper GI. Found to have bacterial infection. Pt currently taking Ibrutinib. Dr. Cynthia Poole prescribed Flagyl and Doxycycline. Dr. Swapna Saini reviewed and no known interaction.  Pt aware she needs to take meds 2 hours after chemo

## 2018-10-19 ENCOUNTER — PATIENT OUTREACH (OUTPATIENT)
Dept: FAMILY MEDICINE CLINIC | Facility: CLINIC | Age: 78
End: 2018-10-19

## 2018-10-19 NOTE — PROGRESS NOTES
Spoke to patient about AWV that is due in [address]. Patient stated she would call back on Monday. Needed to check calendar.

## 2018-10-20 ENCOUNTER — HOSPITAL ENCOUNTER (EMERGENCY)
Age: 78
Discharge: HOME | End: 2018-10-20
Attending: EMERGENCY MEDICINE
Payer: MEDICARE

## 2018-10-20 VITALS
RESPIRATION RATE: 20 BRPM | DIASTOLIC BLOOD PRESSURE: 76 MMHG | OXYGEN SATURATION: 95 % | BODY MASS INDEX: 26.11 KG/M2 | SYSTOLIC BLOOD PRESSURE: 164 MMHG | HEIGHT: 60 IN | HEART RATE: 84 BPM | TEMPERATURE: 98 F | WEIGHT: 133 LBS

## 2018-10-20 DIAGNOSIS — R04.0 EPISTAXIS: Primary | ICD-10-CM

## 2018-10-20 PROCEDURE — 99282 EMERGENCY DEPT VISIT SF MDM: CPT

## 2018-10-20 NOTE — ED PROVIDER NOTES
Patient Seen in: THE [facility] Emergency Department In [place]    History   Patient presents with:  Nose Bleed (nasopharyngeal)    Stated Complaint: nose bleed since early this morning    HPI    16-year-old female presents reporting epistaxis.   The bleeding gallbladder surgery   • OTHER SURGICAL HISTORY  [id number]    bunionectomy,bilateral   • OTHER SURGICAL HISTORY  12/05/2017    Cysto- Dr. Wilma Girard      removal of upper right lobe   • TONSILLECTOMY             Social History    T something into her nose to stop the bleeding. At this time I do not feel any further treatment is needed as she has no bleeding. She is instructed to follow up again with her ENT and discussed that the episodic bleeding has not stopped yet.   I have instr

## 2018-10-26 ENCOUNTER — TELEPHONE (OUTPATIENT)
Dept: HEMATOLOGY/ONCOLOGY | Facility: HOSPITAL | Age: 78
End: 2018-10-26

## 2018-10-26 DIAGNOSIS — D64.9 ANEMIA: Primary | ICD-10-CM

## 2018-10-29 ENCOUNTER — OFFICE VISIT (OUTPATIENT)
Dept: HEMATOLOGY/ONCOLOGY | Age: 78
End: 2018-10-29
Attending: INTERNAL MEDICINE
Payer: MEDICARE

## 2018-10-29 VITALS
RESPIRATION RATE: 18 BRPM | HEART RATE: 89 BPM | BODY MASS INDEX: 26 KG/M2 | TEMPERATURE: 97 F | OXYGEN SATURATION: 97 % | SYSTOLIC BLOOD PRESSURE: 111 MMHG | WEIGHT: 132 LBS | DIASTOLIC BLOOD PRESSURE: 77 MMHG

## 2018-10-29 DIAGNOSIS — R53.83 FATIGUE DUE TO TREATMENT: ICD-10-CM

## 2018-10-29 DIAGNOSIS — C85.10 LOW GRADE B-CELL LYMPHOMA (HCC): Primary | ICD-10-CM

## 2018-10-29 DIAGNOSIS — H04.123 DRY EYES: ICD-10-CM

## 2018-10-29 PROCEDURE — 99214 OFFICE O/P EST MOD 30 MIN: CPT | Performed by: NURSE PRACTITIONER

## 2018-10-29 NOTE — PROGRESS NOTES
ANP Visit Note    Patient Name: Angel Donaldson   YOB: 1940   Medical Record Number: [id number]   CSN: [id number]   Date of visit: 10/29/2018       Chief Complaint/Reason for Visit: Follow up / Low grade B-cell Lymphoma     Problem List:  1. Was placed on anticoagulation during that time. Now off.      4. Iron deficiency anemia in 2016- had EGD/c-scope and capsule endoscopy which were unrevealing. Malabsorption?     History of Present Illness: Patient of Dr. 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 [address] Social Needs      Financial resource strain: Not on file      Food insecurity - worry: Not on file      Food insecurity - inability: Not on file      Transportation needs - medical: Not on file      Transportation needs - non-medical: Not on file    Randle mg total) by mouth daily. , Disp: 90 tablet, Rfl: 3  •  Montelukast Sodium (SINGULAIR) 10 MG Oral Tab, Take 10 mg by mouth nightly., Disp: , Rfl:   •  METOPROLOL SUCCINATE  MG Oral Tablet 24 Hr, TAKE ONE TABLET BY MOUTH ONCE DAILY, Disp: 90 tablet, Rf positives and negatives noted in the the HPI. Performance Status: ECOG 1     Physical Examination:  General: Patient is alert and oriented x 3, not in acute distress.   Vital Signs: /77 (BP Location: Left arm, Patient Position: Sitting, Cuff Size: Date: 10/01/2018  Value: small       Ref range: Negative           Status: Final  APPEARANCE                                    Date: 10/01/2018  Value: clear       Ref range: Clear              Status: Final  Ricarda Baeza

## 2018-10-30 NOTE — TELEPHONE ENCOUNTER
Patient is requesting for a script to be sent to  [address] [place] [address], [address] [place] [address],  [address], [phone]     metRONIDAZOLE 0.75 % Vaginal Gel. Please advise.

## 2018-11-01 RX ORDER — METRONIDAZOLE 7.5 MG/G
1 GEL VAGINAL NIGHTLY
Qty: 1 TUBE | Refills: 0 | Status: SHIPPED | OUTPATIENT
Start: 2018-11-01 | End: 2018-11-13 | Stop reason: ALTCHOICE

## 2018-11-02 RX ORDER — METOPROLOL SUCCINATE 100 MG/1
TABLET, EXTENDED RELEASE ORAL
Qty: 90 TABLET | Refills: 1 | Status: SHIPPED | OUTPATIENT
Start: 2018-11-02 | End: 2019-04-29

## 2018-11-06 ENCOUNTER — HOSPITAL ENCOUNTER (OUTPATIENT)
Dept: CT IMAGING | Age: 78
Discharge: HOME | End: 2018-11-06
Attending: INTERNAL MEDICINE
Payer: MEDICARE

## 2018-11-06 DIAGNOSIS — C85.80 MARGINAL ZONE B-CELL LYMPHOMA (HCC): ICD-10-CM

## 2018-11-06 DIAGNOSIS — C34.92 BILATERAL LUNG CANCER (HCC): ICD-10-CM

## 2018-11-06 DIAGNOSIS — C34.91 BILATERAL LUNG CANCER (HCC): ICD-10-CM

## 2018-11-06 PROCEDURE — 71250 CT THORAX DX C-: CPT | Performed by: INTERNAL MEDICINE

## 2018-11-13 ENCOUNTER — OFFICE VISIT (OUTPATIENT)
Dept: HEMATOLOGY/ONCOLOGY | Age: 78
End: 2018-11-13
Attending: INTERNAL MEDICINE
Payer: MEDICARE

## 2018-11-13 VITALS
BODY MASS INDEX: 26 KG/M2 | DIASTOLIC BLOOD PRESSURE: 68 MMHG | OXYGEN SATURATION: 96 % | HEART RATE: 93 BPM | WEIGHT: 135.38 LBS | RESPIRATION RATE: 18 BRPM | TEMPERATURE: 98 F | SYSTOLIC BLOOD PRESSURE: 121 MMHG

## 2018-11-13 DIAGNOSIS — C34.92 BILATERAL LUNG CANCER (HCC): ICD-10-CM

## 2018-11-13 DIAGNOSIS — C34.91 BILATERAL LUNG CANCER (HCC): ICD-10-CM

## 2018-11-13 DIAGNOSIS — C34.90 EGFR-RELATED LUNG CANCER (HCC): ICD-10-CM

## 2018-11-13 DIAGNOSIS — D64.9 ANEMIA: ICD-10-CM

## 2018-11-13 DIAGNOSIS — D47.2 MONOCLONAL PARAPROTEINEMIA: ICD-10-CM

## 2018-11-13 DIAGNOSIS — C85.80 MARGINAL ZONE B-CELL LYMPHOMA (HCC): ICD-10-CM

## 2018-11-13 DIAGNOSIS — R53.83 FATIGUE DUE TO TREATMENT: Primary | ICD-10-CM

## 2018-11-13 PROCEDURE — 99215 OFFICE O/P EST HI 40 MIN: CPT | Performed by: INTERNAL MEDICINE

## 2018-11-13 NOTE — PROGRESS NOTES
Outpatient Oncology Care Plan  Problem list:  fatigue    Problems related to:    disease/disease progression    Interventions:  provided general teaching    Expected outcomes:  understands plan of care    Progress towards outcome:  making progress    [facility]

## 2018-11-13 NOTE — PROGRESS NOTES
Cancer Center Progress Note    Patient Name: Wai Kulkarni   YOB: 1940   Medical Record Number: [id number]   CSN: [id number]   Attending Physician: Kd Ignacio M.D.    Referring Physician: Holly Santana DO      Date of Visit: 9/11/20 sided PE diagnosed 9/2011- seen on surveillance scans. Was placed on anticoagulation during that time. Now off.     4. Iron deficiency anemia in 2016- had EGD/c-scope and capsule endoscopy which were unrevealing. Malabsorption?       History of Present Illn ibrutinib 140 MG Oral Cap, Take 3 capsules (420 mg total) by mouth daily. Take 420 mg (3 capsules) daily, Disp: 90 capsule, Rfl: 5  •  Saline Nasal Spray 0.65 % Nasal Solution, 1 spray by Nasal route as needed for congestion. , Disp: , Rfl:   •  acetaminoph bunionectomy,bilateral   • OTHER SURGICAL HISTORY  12/05/2017    [place]- Dr. Jh Toledo      removal of upper right lobe   • TONSILLECTOMY         Family Medical History:  Family History   Problem Relation Age of Onset   • Other (i 93   Temp 98.2 °F (36.8 °C) (Tympanic)   Resp 18   Wt 61.4 kg (135 lb 6.4 oz)   SpO2 96%   BMI 26.44 kg/m²       Physical Examination:  General: Patient is alert and oriented x 3, not in acute distress. No rash but skin dry.  Cranial prosthesis  Psych:  Moo 27.0 - 33.2 pg    MCHC 30.5 (L) 31.0 - 37.0 g/dL    RDW 17.7 (H) 11.5 - 16.0 %    RDW-SD 65.8 (H) 35.1 - 46.3 fL    Neutrophil Absolute Prelim 5.07 1.30 - 6.70 x10 (3) uL    Neutrophil Absolute 5.07 1.30 - 6.70 x10(3) uL    Lymphocyte Absolute 1.19 0.90 - measurements   of 4.1 x 2.6 cm obtained the same level are identical.  There are multiple reticular and nodular opacities throughout the periphery of the right lower lobe, which are almost completely stable with the exception of a 4 x 3 mm subpleural nodul expectantly for now. 2. H/o multiple lung cancers- No overt evidence of progression off Tarceva while on Ibrutinib as of last scans. I was concerned there may be overlap with regard to side effects.  As no evidence of progression will continue to hold T

## 2018-11-19 DIAGNOSIS — J06.9 URI, ACUTE: ICD-10-CM

## 2018-11-19 DIAGNOSIS — R05.9 COUGH: ICD-10-CM

## 2018-11-19 DIAGNOSIS — C85.80 MARGINAL ZONE B-CELL LYMPHOMA (HCC): ICD-10-CM

## 2018-11-19 RX ORDER — DIPHENOXYLATE HYDROCHLORIDE AND ATROPINE SULFATE 2.5; .025 MG/1; MG/1
TABLET ORAL
Qty: 30 TABLET | Refills: 3 | COMMUNITY
Start: 2018-11-19 | End: 2019-01-03

## 2018-11-21 ENCOUNTER — TELEPHONE (OUTPATIENT)
Dept: HEMATOLOGY/ONCOLOGY | Facility: HOSPITAL | Age: 78
End: 2018-11-21

## 2018-11-21 NOTE — TELEPHONE ENCOUNTER
Creatinine elevated. Sees Dr. Nafisa Mayorga in January. Per Dr. Nafisa Mayorga, check creatinine 4 weeks from the last time it was taken. Advised to hydrate well. Takes diuretics as needed for swelling. Patient verbalized understanding.  Call transferred to [facility] for s

## 2018-12-04 ENCOUNTER — TELEPHONE (OUTPATIENT)
Dept: HEMATOLOGY/ONCOLOGY | Facility: HOSPITAL | Age: 78
End: 2018-12-04

## 2018-12-06 ENCOUNTER — APPOINTMENT (OUTPATIENT)
Dept: LAB | Age: 78
End: 2018-12-06
Attending: INTERNAL MEDICINE
Payer: MEDICARE

## 2018-12-06 ENCOUNTER — OFFICE VISIT (OUTPATIENT)
Dept: FAMILY MEDICINE CLINIC | Facility: CLINIC | Age: 78
End: 2018-12-06
Payer: MEDICARE

## 2018-12-06 VITALS
BODY MASS INDEX: 25.91 KG/M2 | WEIGHT: 132 LBS | HEIGHT: 60 IN | TEMPERATURE: 98 F | OXYGEN SATURATION: 98 % | SYSTOLIC BLOOD PRESSURE: 130 MMHG | DIASTOLIC BLOOD PRESSURE: 82 MMHG | RESPIRATION RATE: 16 BRPM | HEART RATE: 86 BPM

## 2018-12-06 DIAGNOSIS — C78.00 SECONDARY CARCINOMA OF LUNG, UNSPECIFIED LATERALITY (HCC): Primary | ICD-10-CM

## 2018-12-06 DIAGNOSIS — N28.9 RENAL INSUFFICIENCY: ICD-10-CM

## 2018-12-06 DIAGNOSIS — A04.8 HELICOBACTER PYLORI (H. PYLORI) INFECTION: ICD-10-CM

## 2018-12-06 PROCEDURE — G0439 PPPS, SUBSEQ VISIT: HCPCS | Performed by: FAMILY MEDICINE

## 2018-12-06 PROCEDURE — 87338 HPYLORI STOOL AG IA: CPT

## 2018-12-06 NOTE — PROGRESS NOTES
Vandana Machado REASON FOR VISIT:    Allison Martinez is a 69year old female who presents for a Medicare Annual Wellness visit.          Patient Care Team: Patient Care Team:  Sophy Turk DO as PCP - General (Family Practice)  Karina Pop MD (Radiation Oncol Aerosol Powder, Breath Activated 1 puff daily. Disp:  Rfl:    AmLODIPine Besylate 2.5 MG Oral Tab Take 1 tablet (2.5 mg total) by mouth daily.  Disp: 90 tablet Rfl: 3   HYDROcodone-acetaminophen (NORCO) 5-325 MG Oral Tab Take 1 tablet by mouth every 6 (six) 15 - 41 U/L 18 18 15 16 14(L) 15 13(L)   ALT 14 - 54 U/L 13(L) 16 17 16 15 14 12(L)   Some recent data might be hidden     TSH and Free T4 Latest Ref Rng & Units 4/3/2018 12/19/2017 7/26/2016 7/19/2016   TSH 0.350 - 5.500 mIU/mL 2.880 5.340 3.700 3.690   F What day of the week is this?: Correct  What month is it?: Correct  What year is it?: Correct  Recall \"Ball\": Correct  Recall \"Flag\": Correct  Recall \"Tree\": Correct         PREVENTATIVE SERVICES   INDICATIONS AND SCHEDULE Internal Lab or Procedu • Urticaria, unspecified    • Visual impairment     glasses   • Wears glasses       Past Surgical History:   Procedure Laterality Date   • CHOLECYSTECTOMY     • COLONOSCOPY  9/23/08   • HERNIA SURGERY     • HYSTERECTOMY  1970's    vaginal   • MPR EXCISIO fork        ASSESSMENT AND OTHER RELEVANT CHRONIC CONDITIONS:   Michell Pierre is a 69year old female who presents for a Medicare Assessment.      PLAN SUMMARY:   Diagnoses and all orders for this visit:    Secondary carcinoma of lung, unspecified latera

## 2018-12-11 ENCOUNTER — NURSE ONLY (OUTPATIENT)
Dept: HEMATOLOGY/ONCOLOGY | Age: 78
End: 2018-12-11
Attending: INTERNAL MEDICINE
Payer: MEDICARE

## 2018-12-11 DIAGNOSIS — C82.03 FOLLICULAR LYMPHOMA GRADE I OF INTRA-ABDOMINAL LYMPH NODES (HCC): Primary | ICD-10-CM

## 2018-12-11 PROCEDURE — 80053 COMPREHEN METABOLIC PANEL: CPT

## 2018-12-11 PROCEDURE — 36415 COLL VENOUS BLD VENIPUNCTURE: CPT

## 2018-12-11 PROCEDURE — 85025 COMPLETE CBC W/AUTO DIFF WBC: CPT

## 2018-12-20 ENCOUNTER — OFFICE VISIT (OUTPATIENT)
Dept: FAMILY MEDICINE CLINIC | Facility: CLINIC | Age: 78
End: 2018-12-20
Payer: MEDICARE

## 2018-12-20 VITALS
TEMPERATURE: 98 F | DIASTOLIC BLOOD PRESSURE: 80 MMHG | WEIGHT: 132 LBS | HEART RATE: 102 BPM | OXYGEN SATURATION: 98 % | HEIGHT: 60 IN | SYSTOLIC BLOOD PRESSURE: 110 MMHG | RESPIRATION RATE: 16 BRPM | BODY MASS INDEX: 25.91 KG/M2

## 2018-12-20 DIAGNOSIS — J20.9 BRONCHITIS WITH BRONCHOSPASM: Primary | ICD-10-CM

## 2018-12-20 PROCEDURE — 99213 OFFICE O/P EST LOW 20 MIN: CPT | Performed by: FAMILY MEDICINE

## 2018-12-20 RX ORDER — AZITHROMYCIN 250 MG/1
TABLET, FILM COATED ORAL
Qty: 6 TABLET | Refills: 0 | Status: SHIPPED | OUTPATIENT
Start: 2018-12-20 | End: 2019-01-03

## 2018-12-20 NOTE — PROGRESS NOTES
Still fighting a lingering cough without chest pain or shortness of breath. Recently saw her pulmonologist.  Patient does not have orthopnea at this point. She is a non-smoker and has been battling lung cancer for some time.     Today's exam resting pulse

## 2018-12-24 ENCOUNTER — APPOINTMENT (OUTPATIENT)
Dept: GENERAL RADIOLOGY | Age: 78
End: 2018-12-24
Attending: EMERGENCY MEDICINE
Payer: MEDICARE

## 2018-12-24 ENCOUNTER — HOSPITAL ENCOUNTER (EMERGENCY)
Age: 78
Discharge: HOME | End: 2018-12-24
Attending: EMERGENCY MEDICINE
Payer: MEDICARE

## 2018-12-24 VITALS
DIASTOLIC BLOOD PRESSURE: 64 MMHG | HEART RATE: 104 BPM | SYSTOLIC BLOOD PRESSURE: 130 MMHG | BODY MASS INDEX: 26 KG/M2 | WEIGHT: 132.06 LBS | TEMPERATURE: 98 F | OXYGEN SATURATION: 92 % | RESPIRATION RATE: 24 BRPM

## 2018-12-24 DIAGNOSIS — J98.01 ACUTE BRONCHOSPASM: Primary | ICD-10-CM

## 2018-12-24 PROCEDURE — 71046 X-RAY EXAM CHEST 2 VIEWS: CPT | Performed by: EMERGENCY MEDICINE

## 2018-12-24 PROCEDURE — 99284 EMERGENCY DEPT VISIT MOD MDM: CPT

## 2018-12-24 PROCEDURE — 94664 DEMO&/EVAL PT USE INHALER: CPT

## 2018-12-24 PROCEDURE — 94640 AIRWAY INHALATION TREATMENT: CPT

## 2018-12-24 RX ORDER — PREDNISONE 20 MG/1
60 TABLET ORAL ONCE
Status: COMPLETED | OUTPATIENT
Start: 2018-12-24 | End: 2018-12-24

## 2018-12-24 RX ORDER — ALBUTEROL SULFATE 90 UG/1
2 AEROSOL, METERED RESPIRATORY (INHALATION) EVERY 4 HOURS PRN
Qty: 1 INHALER | Refills: 0 | Status: SHIPPED | OUTPATIENT
Start: 2018-12-24 | End: 2019-01-23

## 2018-12-24 RX ORDER — PREDNISONE 20 MG/1
40 TABLET ORAL DAILY
Qty: 10 TABLET | Refills: 0 | Status: SHIPPED | OUTPATIENT
Start: 2018-12-24 | End: 2019-01-08

## 2018-12-24 NOTE — ED PROVIDER NOTES
Patient Seen in: [facility] Emergency Department In [place]    History   Patient presents with:  Dyspnea CRISTINA SOB (respiratory)    Stated Complaint: SOB; cough; dx with bronchitis    HPI    This is a 67-year-old female who started having a postnasal drip, SURGICAL HISTORY  1/1/08    gallbladder surgery   • OTHER SURGICAL HISTORY  [id number]    bunionectomy,bilateral   • OTHER SURGICAL HISTORY  12/05/2017    Cysto- Dr. Ramírez Postal   • Taffy Beagle      removal of upper right lobe   • TONSILLECTOMY BE, CT CHEST (BUN=[id number]), 11/06/2018, 15:25.  TECHNIQUE:  PA and lateral chest radiographs were obtained. PATIENT STATED HISTORY: (As transcribed by Technologist)  Patient has had a cough for over a week.  She just finished a Z-Melvin and continues to bronchospasm  (primary encounter diagnosis)    Disposition:  Discharge  12/24/2018  5:26 pm    Follow-up:  Shantelle Gloria DO  [address] [place] [address] [address]  [phone]              Medications Prescribed:  Discharge Medication List as of 1

## 2018-12-26 ENCOUNTER — TELEPHONE (OUTPATIENT)
Dept: FAMILY MEDICINE CLINIC | Facility: CLINIC | Age: 78
End: 2018-12-26

## 2018-12-26 ENCOUNTER — MED REC SCAN ONLY (OUTPATIENT)
Dept: HEMATOLOGY/ONCOLOGY | Facility: HOSPITAL | Age: 78
End: 2018-12-26

## 2018-12-26 PROBLEM — I70.0 AORTIC CALCIFICATION: Status: ACTIVE | Noted: 2018-12-26

## 2018-12-26 PROBLEM — I70.0 AORTIC CALCIFICATION (HCC): Status: ACTIVE | Noted: 2018-12-26

## 2018-12-26 NOTE — TELEPHONE ENCOUNTER
Pt states seen in ER 12/24/18 and given breathing treatment and prednisone. Pt states overall she is feeling better but still with cough and was concerned she states oxygen level dropped while she was in ER. Pt scheduled for tomorrow with Dr Dev Whalen and pt

## 2018-12-26 NOTE — TELEPHONE ENCOUNTER
Patient was in to see Sunmanroma Juarez got a Loel Smoke  She wasn't feeling any better and went to Urgent Care. She was given steroids and what sounds like a breathing treatment. Urgent care stated that her oxygen level was low.  Ramón Kenyatta did not want to go into the hospi

## 2018-12-27 ENCOUNTER — OFFICE VISIT (OUTPATIENT)
Dept: FAMILY MEDICINE CLINIC | Facility: CLINIC | Age: 78
End: 2018-12-27
Payer: MEDICARE

## 2018-12-27 VITALS
HEART RATE: 103 BPM | RESPIRATION RATE: 16 BRPM | WEIGHT: 132 LBS | TEMPERATURE: 98 F | DIASTOLIC BLOOD PRESSURE: 80 MMHG | SYSTOLIC BLOOD PRESSURE: 142 MMHG | BODY MASS INDEX: 26 KG/M2 | OXYGEN SATURATION: 99 %

## 2018-12-27 DIAGNOSIS — J44.1 COPD EXACERBATION (HCC): Primary | ICD-10-CM

## 2018-12-27 PROCEDURE — 99213 OFFICE O/P EST LOW 20 MIN: CPT | Performed by: FAMILY MEDICINE

## 2018-12-27 RX ORDER — CODEINE PHOSPHATE AND GUAIFENESIN 10; 100 MG/5ML; MG/5ML
SOLUTION ORAL
Qty: 180 ML | Refills: 0 | Status: SHIPPED | OUTPATIENT
Start: 2018-12-27 | End: 2019-01-08

## 2018-12-27 NOTE — PROGRESS NOTES
This is a follow-up visit having just been evaluated at a urgent care for chronic cough. She is a lung cancer survivor who had some surgery done for biopsy within the past month.   They treated her at the urgent care center with aggressive oral prednisone

## 2018-12-28 ENCOUNTER — TELEPHONE (OUTPATIENT)
Dept: FAMILY MEDICINE CLINIC | Facility: CLINIC | Age: 78
End: 2018-12-28

## 2018-12-31 ENCOUNTER — HOSPITAL ENCOUNTER (OUTPATIENT)
Dept: CV DIAGNOSTICS | Age: 78
Discharge: HOME | End: 2018-12-31
Attending: INTERNAL MEDICINE
Payer: MEDICARE

## 2018-12-31 DIAGNOSIS — I10 ESSENTIAL HYPERTENSION, BENIGN: ICD-10-CM

## 2018-12-31 PROCEDURE — 93306 TTE W/DOPPLER COMPLETE: CPT | Performed by: INTERNAL MEDICINE

## 2019-01-01 ENCOUNTER — OFFICE VISIT (OUTPATIENT)
Dept: FAMILY MEDICINE CLINIC | Facility: CLINIC | Age: 79
End: 2019-01-01
Payer: MEDICARE

## 2019-01-01 VITALS
TEMPERATURE: 99 F | HEIGHT: 60 IN | BODY MASS INDEX: 26.38 KG/M2 | HEART RATE: 101 BPM | SYSTOLIC BLOOD PRESSURE: 148 MMHG | DIASTOLIC BLOOD PRESSURE: 86 MMHG | OXYGEN SATURATION: 96 % | WEIGHT: 134.38 LBS

## 2019-01-01 DIAGNOSIS — J40 BRONCHITIS: Primary | ICD-10-CM

## 2019-01-01 PROCEDURE — 99213 OFFICE O/P EST LOW 20 MIN: CPT | Performed by: NURSE PRACTITIONER

## 2019-01-01 RX ORDER — PREDNISONE 20 MG/1
40 TABLET ORAL DAILY
Qty: 10 TABLET | Refills: 0 | Status: SHIPPED | OUTPATIENT
Start: 2019-01-01 | End: 2019-01-08

## 2019-01-01 NOTE — PATIENT INSTRUCTIONS
What Is Acute Bronchitis? Acute bronchitis is when the airways in your lungs (bronchial tubes) become red and swollen (inflamed). It is usually caused by a viral infection. But it can also occur because of a bacteria or allergen.  Symptoms include a coug · A chest X-ray. This is done if your healthcare provider thinks you have pneumonia. · Tests to check for an underlying condition. Other tests may be done to check for things such as allergies, asthma, or COPD (chronic obstructive pulmonary disease).  You · Take the medicines as directed. For instance, some medicines should be taken with food. · Ask about side effects. Ask your provider or pharmacist what side effects are common, and what to do about them.   Follow-up care  You should see your provider susie

## 2019-01-01 NOTE — PROGRESS NOTES
CHIEF COMPLAINT:   Patient presents with:  Cough/URI: for several weeks        HPI:   Leah Umanzor is a 69year old female who presents for cough for  several   weeks. Cough started gradually and is described as tight and deep.  Patient has history of Multiple Vitamins-Minerals (MULTI COMPLETE/IRON) Oral Tab Take  by mouth.  Disp:  Rfl:    guaiFENesin-codeine (CHERATUSSIN AC) 100-10 MG/5ML Oral Solution 5-10 ml every 6 hours as needed Disp: 180 mL Rfl: 0   Albuterol Sulfate  (90 Base) MCG/ACT Marko Kaiser SKIN: No rashes, or other skin lesions. EYES: Denies blurred vision or double vision. HENT: Denies ear pain, decreased hearing, or sore throat. Reports mild sinus congestion. CARDIOVASCULAR: Denies chest pain or palpitations  LUNGS: Per HPI.  Denies sh Sig: Take 2 tablets (40 mg total) by mouth daily for 5 days. Side effects, risks, benefits, of medication explained and discussed. Patient Instructions       What Is Acute Bronchitis?   Acute bronchitis is when the airways in your lungs (bronchia · A sputum test for bacteria. This requires a sample of mucus from your lungs. · A nasal or throat swab. This tests to see if you have a bacterial infection. · A chest X-ray. This is done if your healthcare provider thinks you have pneumonia.   · Tests to · Take all of the medicine. Take the medicine until it is used up, even if symptoms have improved. If you don’t, the bronchitis may come back. · Take the medicines as directed. For instance, some medicines should be taken with food.   · Ask about side effe

## 2019-01-03 ENCOUNTER — OFFICE VISIT (OUTPATIENT)
Dept: FAMILY MEDICINE CLINIC | Facility: CLINIC | Age: 79
End: 2019-01-03
Payer: MEDICARE

## 2019-01-03 VITALS
SYSTOLIC BLOOD PRESSURE: 144 MMHG | DIASTOLIC BLOOD PRESSURE: 84 MMHG | RESPIRATION RATE: 20 BRPM | HEIGHT: 60 IN | TEMPERATURE: 99 F | HEART RATE: 90 BPM | OXYGEN SATURATION: 97 % | BODY MASS INDEX: 26.5 KG/M2 | WEIGHT: 135 LBS

## 2019-01-03 DIAGNOSIS — J44.0 COPD (CHRONIC OBSTRUCTIVE PULMONARY DISEASE) WITH ACUTE BRONCHITIS (HCC): Primary | ICD-10-CM

## 2019-01-03 DIAGNOSIS — J20.9 COPD (CHRONIC OBSTRUCTIVE PULMONARY DISEASE) WITH ACUTE BRONCHITIS (HCC): Primary | ICD-10-CM

## 2019-01-03 PROCEDURE — 99213 OFFICE O/P EST LOW 20 MIN: CPT | Performed by: FAMILY MEDICINE

## 2019-01-03 RX ORDER — ZOSTER VACCINE RECOMBINANT, ADJUVANTED 50 MCG/0.5
KIT INTRAMUSCULAR
COMMUNITY
Start: 2018-12-04 | End: 2019-01-14 | Stop reason: ALTCHOICE

## 2019-01-03 NOTE — PROGRESS NOTES
Patient is here for my follow-up this is a woman with several different active cancers and COPD. The cough syrup I gave her helps only slightly. She is still coughing a great deal and struggling with mucus in the process of bringing it up efficiently.

## 2019-01-08 ENCOUNTER — OFFICE VISIT (OUTPATIENT)
Dept: HEMATOLOGY/ONCOLOGY | Age: 79
End: 2019-01-08
Attending: INTERNAL MEDICINE
Payer: MEDICARE

## 2019-01-08 VITALS
BODY MASS INDEX: 26 KG/M2 | WEIGHT: 133.63 LBS | SYSTOLIC BLOOD PRESSURE: 124 MMHG | OXYGEN SATURATION: 96 % | RESPIRATION RATE: 18 BRPM | TEMPERATURE: 98 F | DIASTOLIC BLOOD PRESSURE: 71 MMHG | HEART RATE: 104 BPM

## 2019-01-08 DIAGNOSIS — C34.92 BILATERAL LUNG CANCER (HCC): ICD-10-CM

## 2019-01-08 DIAGNOSIS — D47.2 MONOCLONAL PARAPROTEINEMIA: ICD-10-CM

## 2019-01-08 DIAGNOSIS — C34.91 BILATERAL LUNG CANCER (HCC): ICD-10-CM

## 2019-01-08 DIAGNOSIS — C85.80 MARGINAL ZONE B-CELL LYMPHOMA (HCC): ICD-10-CM

## 2019-01-08 DIAGNOSIS — D64.9 NORMOCYTIC NORMOCHROMIC ANEMIA: Primary | ICD-10-CM

## 2019-01-08 LAB
ALBUMIN SERPL-MCNC: 2.7 G/DL (ref 3.1–4.5)
ALBUMIN/GLOB SERPL: 0.4 {RATIO} (ref 1–2)
ALP LIVER SERPL-CCNC: 95 U/L (ref 55–142)
ALT SERPL-CCNC: 22 U/L (ref 14–54)
ANION GAP SERPL CALC-SCNC: 10 MMOL/L (ref 0–18)
AST SERPL-CCNC: 16 U/L (ref 15–41)
BASOPHILS # BLD AUTO: 0.03 X10(3) UL (ref 0–0.1)
BASOPHILS NFR BLD AUTO: 0.3 %
BILIRUB SERPL-MCNC: 0.3 MG/DL (ref 0.1–2)
BUN BLD-MCNC: 54 MG/DL (ref 8–20)
BUN/CREAT SERPL: 26.9 (ref 10–20)
CALCIUM BLD-MCNC: 9.2 MG/DL (ref 8.3–10.3)
CHLORIDE SERPL-SCNC: 108 MMOL/L (ref 101–111)
CO2 SERPL-SCNC: 23 MMOL/L (ref 22–32)
CREAT BLD-MCNC: 2.01 MG/DL (ref 0.55–1.02)
EOSINOPHIL # BLD AUTO: 0.03 X10(3) UL (ref 0–0.3)
EOSINOPHIL NFR BLD AUTO: 0.3 %
ERYTHROCYTE [DISTWIDTH] IN BLOOD BY AUTOMATED COUNT: 15.3 % (ref 11.5–16)
GLOBULIN PLAS-MCNC: 6.9 G/DL (ref 2.8–4.4)
GLUCOSE BLD-MCNC: 100 MG/DL (ref 70–99)
HCT VFR BLD AUTO: 34.6 % (ref 34–50)
HGB BLD-MCNC: 10.3 G/DL (ref 12–16)
IMMATURE GRANULOCYTE COUNT: 0.11 X10(3) UL (ref 0–1)
IMMATURE GRANULOCYTE RATIO %: 1.2 %
LDH: 175 U/L (ref 84–246)
LYMPHOCYTES # BLD AUTO: 2.04 X10(3) UL (ref 0.9–4)
LYMPHOCYTES NFR BLD AUTO: 22.3 %
M PROTEIN MFR SERPL ELPH: 9.6 G/DL (ref 6.4–8.2)
MCH RBC QN AUTO: 29.8 PG (ref 27–33.2)
MCHC RBC AUTO-ENTMCNC: 29.8 G/DL (ref 31–37)
MCV RBC AUTO: 100 FL (ref 81–100)
MONOCYTES # BLD AUTO: 0.67 X10(3) UL (ref 0.1–1)
MONOCYTES NFR BLD AUTO: 7.3 %
NEUTROPHIL ABS PRELIM: 6.26 X10 (3) UL (ref 1.3–6.7)
NEUTROPHILS # BLD AUTO: 6.26 X10(3) UL (ref 1.3–6.7)
NEUTROPHILS NFR BLD AUTO: 68.6 %
OSMOLALITY SERPL CALC.SUM OF ELEC: 307 MOSM/KG (ref 275–295)
PLATELET # BLD AUTO: 225 10(3)UL (ref 150–450)
POTASSIUM SERPL-SCNC: 3.6 MMOL/L (ref 3.6–5.1)
RBC # BLD AUTO: 3.46 X10(6)UL (ref 3.8–5.1)
RED CELL DISTRIBUTION WIDTH-SD: 56.2 FL (ref 35.1–46.3)
SODIUM SERPL-SCNC: 141 MMOL/L (ref 136–144)
WBC # BLD AUTO: 9.1 X10(3) UL (ref 4–13)

## 2019-01-08 PROCEDURE — 99215 OFFICE O/P EST HI 40 MIN: CPT | Performed by: INTERNAL MEDICINE

## 2019-01-08 RX ORDER — FUROSEMIDE 20 MG/1
20 TABLET ORAL DAILY
COMMUNITY
End: 2019-02-19

## 2019-01-08 RX ORDER — FAMOTIDINE 20 MG/1
20 TABLET ORAL 2 TIMES DAILY
COMMUNITY
End: 2019-05-14

## 2019-01-08 NOTE — PROGRESS NOTES
Cancer Center Progress Note    Patient Name: Erich Kebede   YOB: 1940   Medical Record Number: [id number]   CSN: [id number]   Attending Physician: Lynn Barakat M.D.    Referring Physician: Tesfaye English DO      Date of Visit: 1/8/201 mediastinal lymphadenectomy on 09-. 3. Right sided PE diagnosed 9/2011- seen on surveillance scans. Was placed on anticoagulation during that time.  Now off.     4. Iron deficiency anemia in 2016- had EGD/c-scope and capsule endoscopy which were DROP IN [facility] EYE TWICE DAILY, Disp: 20 mL, Rfl: 6  •  ferrous sulfate 325 (65 FE) MG Oral Tab EC, Take 325 mg by mouth daily with breakfast.  , Disp: , Rfl:   •  Fluticasone Propionate 50 MCG/ACT Nasal Suspension, as needed.   , Disp: , Rfl:   •  Multiple V L2    SAB0  TAB0  Ectopic0  Multiple2  Live Births2       Psychosocial History:  Social History    Socioeconomic History      Marital status:        Spouse name: Not on file      Number of children: Not on file      Years of education: Not on file is supple. Lymphatics: There is no palpable lymphadenopathy   Chest: Diminished BS  Heart: Regular rate and rhythm. Abdomen: Soft, non tender with good bowel sounds. No hepatosplenomegaly. No palpable mass. Extremities: Pedal pulses are present.  No e 0. 03 0.00 - 0.10 x10(3) uL    Immature Granulocyte Absolute 0.11 0.00 - 1.00 x10(3) uL    Neutrophil % 68.6 %    Lymphocyte % 22.3 %    Monocyte % 7.3 %    Eosinophil % 0.3 %    Basophil % 0.3 %    Immature Granulocyte % 1.2 %           Radiology:  Dk Will Stable nodular pleural thickening along the left major fissure. THORACIC AORTA:  Unremarkable as seen on non-contrast imaging. CHEST WALL:  No mass or axillary adenopathy. LIMITED ABDOMEN:  Status post cholecystectomy. Stable renal cysts.   BONES:  S cancers.        Claudio Juan MD  [facility]

## 2019-01-08 NOTE — PROGRESS NOTES
Pt here for 2 month MD f/u. Pt states she's been battling bronchitis since mid December, has been on multiple abx and steroid rounds. Pt notes still having cough, productive in am. No fevers recently. Energy level has been down with the bronchitis.  Appetit

## 2019-01-10 ENCOUNTER — TELEPHONE (OUTPATIENT)
Dept: FAMILY MEDICINE CLINIC | Facility: CLINIC | Age: 79
End: 2019-01-10

## 2019-01-10 ENCOUNTER — APPOINTMENT (OUTPATIENT)
Dept: CT IMAGING | Age: 79
End: 2019-01-10
Attending: EMERGENCY MEDICINE
Payer: MEDICARE

## 2019-01-10 ENCOUNTER — HOSPITAL ENCOUNTER (EMERGENCY)
Age: 79
Discharge: HOME | End: 2019-01-10
Attending: EMERGENCY MEDICINE
Payer: MEDICARE

## 2019-01-10 ENCOUNTER — APPOINTMENT (OUTPATIENT)
Dept: GENERAL RADIOLOGY | Age: 79
End: 2019-01-10
Attending: EMERGENCY MEDICINE
Payer: MEDICARE

## 2019-01-10 VITALS
HEIGHT: 60 IN | BODY MASS INDEX: 26.11 KG/M2 | SYSTOLIC BLOOD PRESSURE: 129 MMHG | RESPIRATION RATE: 18 BRPM | DIASTOLIC BLOOD PRESSURE: 78 MMHG | TEMPERATURE: 97 F | HEART RATE: 89 BPM | OXYGEN SATURATION: 95 % | WEIGHT: 133 LBS

## 2019-01-10 DIAGNOSIS — K57.92 ACUTE DIVERTICULITIS: Primary | ICD-10-CM

## 2019-01-10 LAB
ALBUMIN SERPL-MCNC: 2.7 G/DL (ref 3.1–4.5)
ALBUMIN/GLOB SERPL: 0.4 {RATIO} (ref 1–2)
ALP LIVER SERPL-CCNC: 101 U/L (ref 55–142)
ALT SERPL-CCNC: 19 U/L (ref 14–54)
ANION GAP SERPL CALC-SCNC: 7 MMOL/L (ref 0–18)
AST SERPL-CCNC: 19 U/L (ref 15–41)
BASOPHILS # BLD AUTO: 0.03 X10(3) UL (ref 0–0.1)
BASOPHILS NFR BLD AUTO: 0.2 %
BILIRUB SERPL-MCNC: 0.5 MG/DL (ref 0.1–2)
BILIRUB UR QL STRIP.AUTO: NEGATIVE
BUN BLD-MCNC: 54 MG/DL (ref 8–20)
BUN/CREAT SERPL: 26.6 (ref 10–20)
CALCIUM BLD-MCNC: 9.4 MG/DL (ref 8.3–10.3)
CHLORIDE SERPL-SCNC: 105 MMOL/L (ref 101–111)
CLARITY UR REFRACT.AUTO: CLEAR
CO2 SERPL-SCNC: 25 MMOL/L (ref 22–32)
COLOR UR AUTO: YELLOW
CREAT BLD-MCNC: 2.03 MG/DL (ref 0.55–1.02)
EOSINOPHIL # BLD AUTO: 0.04 X10(3) UL (ref 0–0.3)
EOSINOPHIL NFR BLD AUTO: 0.3 %
ERYTHROCYTE [DISTWIDTH] IN BLOOD BY AUTOMATED COUNT: 15.2 % (ref 11.5–16)
GLOBULIN PLAS-MCNC: 7.1 G/DL (ref 2.8–4.4)
GLUCOSE BLD-MCNC: 106 MG/DL (ref 70–99)
GLUCOSE UR STRIP.AUTO-MCNC: NEGATIVE MG/DL
HCT VFR BLD AUTO: 35.4 % (ref 34–50)
HGB BLD-MCNC: 10.6 G/DL (ref 12–16)
IMMATURE GRANULOCYTE COUNT: 0.08 X10(3) UL (ref 0–1)
IMMATURE GRANULOCYTE RATIO %: 0.6 %
KETONES UR STRIP.AUTO-MCNC: NEGATIVE MG/DL
LIPASE: 191 U/L (ref 73–393)
LYMPHOCYTES # BLD AUTO: 1.68 X10(3) UL (ref 0.9–4)
LYMPHOCYTES NFR BLD AUTO: 12.6 %
M PROTEIN MFR SERPL ELPH: 9.8 G/DL (ref 6.4–8.2)
MCH RBC QN AUTO: 29.6 PG (ref 27–33.2)
MCHC RBC AUTO-ENTMCNC: 29.9 G/DL (ref 31–37)
MCV RBC AUTO: 98.9 FL (ref 81–100)
MONOCYTES # BLD AUTO: 1 X10(3) UL (ref 0.1–1)
MONOCYTES NFR BLD AUTO: 7.5 %
NEUTROPHIL ABS PRELIM: 10.48 X10 (3) UL (ref 1.3–6.7)
NEUTROPHILS # BLD AUTO: 10.48 X10(3) UL (ref 1.3–6.7)
NEUTROPHILS NFR BLD AUTO: 78.8 %
NITRITE UR QL STRIP.AUTO: NEGATIVE
OSMOLALITY SERPL CALC.SUM OF ELEC: 299 MOSM/KG (ref 275–295)
PH UR STRIP.AUTO: 5 [PH] (ref 4.5–8)
PLATELET # BLD AUTO: 223 10(3)UL (ref 150–450)
POTASSIUM SERPL-SCNC: 3.7 MMOL/L (ref 3.6–5.1)
PROT UR STRIP.AUTO-MCNC: NEGATIVE MG/DL
RBC # BLD AUTO: 3.58 X10(6)UL (ref 3.8–5.1)
RED CELL DISTRIBUTION WIDTH-SD: 54.8 FL (ref 35.1–46.3)
SODIUM SERPL-SCNC: 137 MMOL/L (ref 136–144)
SP GR UR STRIP.AUTO: 1.01 (ref 1–1.03)
UROBILINOGEN UR STRIP.AUTO-MCNC: 0.2 MG/DL
WBC # BLD AUTO: 13.3 X10(3) UL (ref 4–13)

## 2019-01-10 PROCEDURE — 87186 SC STD MICRODIL/AGAR DIL: CPT | Performed by: EMERGENCY MEDICINE

## 2019-01-10 PROCEDURE — 99284 EMERGENCY DEPT VISIT MOD MDM: CPT | Performed by: EMERGENCY MEDICINE

## 2019-01-10 PROCEDURE — 87086 URINE CULTURE/COLONY COUNT: CPT | Performed by: EMERGENCY MEDICINE

## 2019-01-10 PROCEDURE — 96360 HYDRATION IV INFUSION INIT: CPT | Performed by: EMERGENCY MEDICINE

## 2019-01-10 PROCEDURE — 80053 COMPREHEN METABOLIC PANEL: CPT | Performed by: EMERGENCY MEDICINE

## 2019-01-10 PROCEDURE — 74176 CT ABD & PELVIS W/O CONTRAST: CPT | Performed by: EMERGENCY MEDICINE

## 2019-01-10 PROCEDURE — 81001 URINALYSIS AUTO W/SCOPE: CPT | Performed by: EMERGENCY MEDICINE

## 2019-01-10 PROCEDURE — 85025 COMPLETE CBC W/AUTO DIFF WBC: CPT | Performed by: EMERGENCY MEDICINE

## 2019-01-10 PROCEDURE — 74018 RADEX ABDOMEN 1 VIEW: CPT | Performed by: EMERGENCY MEDICINE

## 2019-01-10 PROCEDURE — 87077 CULTURE AEROBIC IDENTIFY: CPT | Performed by: EMERGENCY MEDICINE

## 2019-01-10 PROCEDURE — 83690 ASSAY OF LIPASE: CPT | Performed by: EMERGENCY MEDICINE

## 2019-01-10 RX ORDER — METRONIDAZOLE 500 MG/1
500 TABLET ORAL 3 TIMES DAILY
Qty: 30 TABLET | Refills: 0 | Status: SHIPPED | OUTPATIENT
Start: 2019-01-10 | End: 2019-02-19

## 2019-01-10 RX ORDER — SODIUM CHLORIDE 9 MG/ML
INJECTION, SOLUTION INTRAVENOUS ONCE
Status: COMPLETED | OUTPATIENT
Start: 2019-01-10 | End: 2019-01-10

## 2019-01-10 RX ORDER — LEVOFLOXACIN 500 MG/1
500 TABLET, FILM COATED ORAL DAILY
Qty: 10 TABLET | Refills: 0 | Status: SHIPPED | OUTPATIENT
Start: 2019-01-10 | End: 2019-02-19

## 2019-01-10 RX ORDER — BISACODYL 10 MG
10 SUPPOSITORY, RECTAL RECTAL ONCE
Status: COMPLETED | OUTPATIENT
Start: 2019-01-10 | End: 2019-01-10

## 2019-01-10 NOTE — TELEPHONE ENCOUNTER
Patient has been having a sharp pain for about a week that she feels is near her bladder or colon. She thinks she is constipated and wants to know if she should take a stool softener. Please advise.

## 2019-01-10 NOTE — ED PROVIDER NOTES
Patient Seen in: [facility] In [place]    History   Patient presents with:  Abdomen/Flank Pain (GI/)    Stated Complaint: abd pain    HPI    This is a 18-year-old female complaint of lower abdominal pain the patient states she has h SURGICAL HISTORY  [id number]    bunionectomy,bilateral   • OTHER SURGICAL HISTORY  12/05/2017    Cysto- Dr. Ruth Ann Guardado   • [place] [facility]      removal of upper right lobe   • TONSILLECTOMY             Social History    Tobacco Use      Smoking status: N MICROSCOPIC W REFLEX CULTURE - Abnormal; Notable for the following components:    WBC Urine 11-20 (*)     Bacteria Urine 1+ (*)     All other components within normal limits   CBC W/ DIFFERENTIAL - Abnormal; Notable for the following components:    WBC 13. history of lymphoma. Similar findings seen on previous CTs. 3.  Right infrahilar soft tissue fullness. The patient has a history of lung cancer. This could reflect residual lung neoplasm.  4.  Stable reticular and nodular opacities seen scattered through

## 2019-01-10 NOTE — TELEPHONE ENCOUNTER
Pt states having sharp pain lower abdomen. Pain 8/10 when it happens. Pt has been sleeping on recliner due to cough. Pt constipated, had small BM this morning. Pt off cancer med, and antibiotics, no fever  Advised to go to IC for eval of abd pain.  Pt

## 2019-01-14 ENCOUNTER — OFFICE VISIT (OUTPATIENT)
Dept: FAMILY MEDICINE CLINIC | Facility: CLINIC | Age: 79
End: 2019-01-14
Payer: MEDICARE

## 2019-01-14 VITALS
OXYGEN SATURATION: 96 % | SYSTOLIC BLOOD PRESSURE: 118 MMHG | BODY MASS INDEX: 26.11 KG/M2 | TEMPERATURE: 98 F | HEART RATE: 90 BPM | HEIGHT: 60 IN | WEIGHT: 133 LBS | DIASTOLIC BLOOD PRESSURE: 62 MMHG | RESPIRATION RATE: 16 BRPM

## 2019-01-14 DIAGNOSIS — K57.32 DIVERTICULITIS OF LARGE INTESTINE WITHOUT PERFORATION OR ABSCESS WITHOUT BLEEDING: Primary | ICD-10-CM

## 2019-01-14 LAB
ALBUMIN SERPL-MCNC: 3.46 G/DL (ref 3.1–4.5)
ALBUMIN/GLOB SERPL: 0.6 {RATIO}
ALPHA1 GLOB SERPL ELPH-MCNC: 0.24 G/DL (ref 0.1–0.3)
ALPHA2 GLOB SERPL ELPH-MCNC: 1.22 G/DL (ref 0.6–1)
B-GLOBULIN SERPL ELPH-MCNC: 1.03 G/DL (ref 0.7–1.2)
GAMMA GLOB SERPL ELPH-MCNC: 3.25 G/DL (ref 0.6–1.6)
KAPPA FREE LIGHT CHAIN: 1.89 MG/DL (ref 0.33–1.94)
KAPPA/LAMBDA FLC RATIO: 0.15 (ref 0.26–1.65)
LAMBDA FREE LIGHT CHAIN: 12.5 MG/DL (ref 0.57–2.63)
M-SPIKE 1: 2.82 G/DL (ref ?–0)
MAI PROTEIN SERPL-MCNC: 9.2 G/DL (ref 6.1–8.3)

## 2019-01-14 PROCEDURE — 99213 OFFICE O/P EST LOW 20 MIN: CPT | Performed by: FAMILY MEDICINE

## 2019-01-14 NOTE — PROGRESS NOTES
Here with son. Was recently hospitalized for acute diverticulitis and has been doing very well and otherwise uneventful recovery. Interestingly is that her son had diverticular disease as well.     Exam alert aware appropriate well-hydrated anicteric and

## 2019-01-16 ENCOUNTER — TELEPHONE (OUTPATIENT)
Dept: HEMATOLOGY/ONCOLOGY | Facility: HOSPITAL | Age: 79
End: 2019-01-16

## 2019-01-25 ENCOUNTER — TELEPHONE (OUTPATIENT)
Dept: HEMATOLOGY/ONCOLOGY | Facility: HOSPITAL | Age: 79
End: 2019-01-25

## 2019-02-13 ENCOUNTER — HOSPITAL ENCOUNTER (OUTPATIENT)
Dept: CT IMAGING | Age: 79
Discharge: HOME | End: 2019-02-13
Attending: INTERNAL MEDICINE
Payer: MEDICARE

## 2019-02-13 DIAGNOSIS — C34.92 BILATERAL LUNG CANCER (HCC): ICD-10-CM

## 2019-02-13 DIAGNOSIS — C34.91 BILATERAL LUNG CANCER (HCC): ICD-10-CM

## 2019-02-13 PROCEDURE — 71250 CT THORAX DX C-: CPT | Performed by: INTERNAL MEDICINE

## 2019-02-18 ENCOUNTER — SOCIAL WORK SERVICES (OUTPATIENT)
Dept: HEMATOLOGY/ONCOLOGY | Facility: HOSPITAL | Age: 79
End: 2019-02-18

## 2019-02-18 NOTE — PROGRESS NOTES
Fax received from [employer] requesting completion of RX for Imbruvica.   SW faxed note to them that patient is no longer taking that medication, per MD.

## 2019-02-19 ENCOUNTER — OFFICE VISIT (OUTPATIENT)
Dept: HEMATOLOGY/ONCOLOGY | Age: 79
End: 2019-02-19
Attending: INTERNAL MEDICINE
Payer: MEDICARE

## 2019-02-19 VITALS
BODY MASS INDEX: 26 KG/M2 | HEART RATE: 78 BPM | OXYGEN SATURATION: 95 % | SYSTOLIC BLOOD PRESSURE: 154 MMHG | RESPIRATION RATE: 18 BRPM | TEMPERATURE: 97 F | WEIGHT: 131.88 LBS | DIASTOLIC BLOOD PRESSURE: 83 MMHG

## 2019-02-19 DIAGNOSIS — C34.92 BILATERAL LUNG CANCER (HCC): ICD-10-CM

## 2019-02-19 DIAGNOSIS — D64.9 NORMOCYTIC NORMOCHROMIC ANEMIA: Primary | ICD-10-CM

## 2019-02-19 DIAGNOSIS — C85.80 MARGINAL ZONE B-CELL LYMPHOMA (HCC): ICD-10-CM

## 2019-02-19 DIAGNOSIS — D47.2 MONOCLONAL PARAPROTEINEMIA: ICD-10-CM

## 2019-02-19 DIAGNOSIS — C34.91 BILATERAL LUNG CANCER (HCC): ICD-10-CM

## 2019-02-19 LAB
ALBUMIN SERPL-MCNC: 2.7 G/DL (ref 3.4–5)
ALBUMIN/GLOB SERPL: 0.4 {RATIO} (ref 1–2)
ALP LIVER SERPL-CCNC: 80 U/L (ref 55–142)
ALT SERPL-CCNC: 14 U/L (ref 13–56)
ANION GAP SERPL CALC-SCNC: 8 MMOL/L (ref 0–18)
AST SERPL-CCNC: 14 U/L (ref 15–37)
BASOPHILS # BLD AUTO: 0.05 X10(3) UL (ref 0–0.2)
BASOPHILS NFR BLD AUTO: 0.9 %
BILIRUB SERPL-MCNC: 0.3 MG/DL (ref 0.1–2)
BUN BLD-MCNC: 40 MG/DL (ref 7–18)
BUN/CREAT SERPL: 25.5 (ref 10–20)
CALCIUM BLD-MCNC: 9.2 MG/DL (ref 8.5–10.1)
CHLORIDE SERPL-SCNC: 110 MMOL/L (ref 98–107)
CO2 SERPL-SCNC: 22 MMOL/L (ref 21–32)
CREAT BLD-MCNC: 1.57 MG/DL (ref 0.55–1.02)
DEPRECATED RDW RBC AUTO: 54.8 FL (ref 35.1–46.3)
EOSINOPHIL # BLD AUTO: 0.05 X10(3) UL (ref 0–0.7)
EOSINOPHIL NFR BLD AUTO: 0.9 %
ERYTHROCYTE [DISTWIDTH] IN BLOOD BY AUTOMATED COUNT: 15.4 % (ref 11–15)
GLOBULIN PLAS-MCNC: 6.7 G/DL (ref 2.8–4.4)
GLUCOSE BLD-MCNC: 93 MG/DL (ref 70–99)
HCT VFR BLD AUTO: 36.1 % (ref 35–48)
HGB BLD-MCNC: 11 G/DL (ref 12–16)
IGA SERPL-MCNC: 127 MG/DL (ref 70–312)
IGM SERPL-MCNC: 3190 MG/DL (ref 43–279)
IMM GRANULOCYTES # BLD AUTO: 0.03 X10(3) UL (ref 0–1)
IMM GRANULOCYTES NFR BLD: 0.5 %
IMMUNOGLOBULIN PNL SER-MCNC: 1170 MG/DL (ref 791–1643)
LDH SERPL L TO P-CCNC: 157 U/L (ref 84–246)
LYMPHOCYTES # BLD AUTO: 1.42 X10(3) UL (ref 1–4)
LYMPHOCYTES NFR BLD AUTO: 24.6 %
M PROTEIN MFR SERPL ELPH: 9.4 G/DL (ref 6.4–8.2)
MCH RBC QN AUTO: 29.6 PG (ref 26–34)
MCHC RBC AUTO-ENTMCNC: 30.5 G/DL (ref 31–37)
MCV RBC AUTO: 97 FL (ref 80–100)
MONOCYTES # BLD AUTO: 0.6 X10(3) UL (ref 0.1–1)
MONOCYTES NFR BLD AUTO: 10.4 %
NEUTROPHILS # BLD AUTO: 3.62 X10 (3) UL (ref 1.5–7.7)
NEUTROPHILS # BLD AUTO: 3.62 X10(3) UL (ref 1.5–7.7)
NEUTROPHILS NFR BLD AUTO: 62.7 %
OSMOLALITY SERPL CALC.SUM OF ELEC: 299 MOSM/KG (ref 275–295)
PLATELET # BLD AUTO: 204 10(3)UL (ref 150–450)
POTASSIUM SERPL-SCNC: 4.1 MMOL/L (ref 3.5–5.1)
RBC # BLD AUTO: 3.72 X10(6)UL (ref 3.8–5.3)
SODIUM SERPL-SCNC: 140 MMOL/L (ref 136–145)
WBC # BLD AUTO: 5.8 X10(3) UL (ref 4–11)

## 2019-02-19 PROCEDURE — 99214 OFFICE O/P EST MOD 30 MIN: CPT | Performed by: INTERNAL MEDICINE

## 2019-02-19 RX ORDER — OMEPRAZOLE 20 MG/1
20 CAPSULE, DELAYED RELEASE ORAL
COMMUNITY
End: 2019-11-19

## 2019-02-19 NOTE — PROGRESS NOTES
Cancer Center Progress Note    Patient Name: Parris Read   YOB: 1940   Medical Record Number: [id number]   CSN: [id number]   Attending Physician: Daren Srinivasan M.D.    Referring Physician: Erik Rosales DO      Date of Visit: 2/19/20 mediastinal lymphadenectomy on 09-. 3. Right sided PE diagnosed 9/2011- seen on surveillance scans. Was placed on anticoagulation during that time.  Now off.     4. Iron deficiency anemia in 2016- had EGD/c-scope and capsule endoscopy which were Cancer [facility]) 2009    right lung   • Cancer [facility])     lymphoma   • Chronic cough    • Complete rupture of rotator cuff    • Diaphragmatic hernia without mention of obstruction or gangrene    • Esophageal reflux    • Exposure to unspecified radiation 2013 Worry: Not on file        Inability: Not on file      Transportation needs:        Medical: Not on file        Non-medical: Not on file    Tobacco Use      Smoking status: Never Smoker      Smokeless tobacco: Never Used    Substance and Sexual Activity distress. No rash but skin dry. Cranial prosthesis. Psych:  Mood and affect appropriate  HEENT: EOMs intact. PERRL. Oropharynx is clear. Neck: No JVD. No palpable lymphadenopathy. Neck is supple. Lymphatics:  There is no palpable lymphadenopathy   Ches 191 07/15/2014 1253        Ref.  Range 8/14/2018 13:02 9/11/2018 13:51 11/13/2018 13:35   IMMUNOGLOBULIN A Latest Ref Range: 70.00 - 312.00 mg/dL 130.40 142.50 <31.30 (L)   IMMUNOGLOBULIN G Latest Ref Range: 791-1,643 mg/dL 995 1,030 788 (L)   IMMUNOGLOBULI on non-contrast imaging. CHEST WALL:  No mass or axillary adenopathy. LIMITED ABDOMEN:  Sequelae of cholecystectomy is noted. Cysts in the right kidney are noted. Cysts in the left kidney are noted.   BONES:  No bony lesion or fracture.       ===== managing       Labs and CT reviewed with her and her son    RTC 6 weeks. Risk level high- NHL and h/o multiple lung cancers.        Wendel Brunner, MD  [address] [facility]

## 2019-02-19 NOTE — PROGRESS NOTES
Pt here for 6 week MD f/zhanna. Pt's energy level has been fair, sleeping a lot at night, but gets up often to use the bathroom. Appetite has been good. Denies pain. Pt notes she is off abx and cough is now gone. Pt has no further complaints.      Outpatient On

## 2019-02-22 ENCOUNTER — HOSPITAL ENCOUNTER (EMERGENCY)
Age: 79
Discharge: HOME | End: 2019-02-22
Attending: EMERGENCY MEDICINE
Payer: MEDICARE

## 2019-02-22 ENCOUNTER — TELEPHONE (OUTPATIENT)
Dept: HEMATOLOGY/ONCOLOGY | Facility: HOSPITAL | Age: 79
End: 2019-02-22

## 2019-02-22 VITALS
OXYGEN SATURATION: 96 % | TEMPERATURE: 99 F | DIASTOLIC BLOOD PRESSURE: 68 MMHG | BODY MASS INDEX: 25.72 KG/M2 | SYSTOLIC BLOOD PRESSURE: 125 MMHG | WEIGHT: 131 LBS | HEIGHT: 60 IN | HEART RATE: 74 BPM | RESPIRATION RATE: 16 BRPM

## 2019-02-22 DIAGNOSIS — R31.9 URINARY TRACT INFECTION WITH HEMATURIA, SITE UNSPECIFIED: Primary | ICD-10-CM

## 2019-02-22 DIAGNOSIS — K57.92 DIVERTICULITIS: ICD-10-CM

## 2019-02-22 DIAGNOSIS — N39.0 URINARY TRACT INFECTION WITH HEMATURIA, SITE UNSPECIFIED: Primary | ICD-10-CM

## 2019-02-22 LAB
ALBUMIN SERPL-MCNC: 2.8 G/DL (ref 3.4–5)
ALBUMIN/GLOB SERPL: 0.4 {RATIO} (ref 1–2)
ALP LIVER SERPL-CCNC: 86 U/L (ref 55–142)
ALT SERPL-CCNC: 14 U/L (ref 13–56)
ANION GAP SERPL CALC-SCNC: 10 MMOL/L (ref 0–18)
AST SERPL-CCNC: 15 U/L (ref 15–37)
BASOPHILS # BLD AUTO: 0.03 X10(3) UL (ref 0–0.2)
BASOPHILS NFR BLD AUTO: 0.4 %
BILIRUB SERPL-MCNC: 0.4 MG/DL (ref 0.1–2)
BILIRUB UR QL STRIP.AUTO: NEGATIVE
BUN BLD-MCNC: 41 MG/DL (ref 7–18)
BUN/CREAT SERPL: 25.6 (ref 10–20)
CALCIUM BLD-MCNC: 9.4 MG/DL (ref 8.5–10.1)
CHLORIDE SERPL-SCNC: 107 MMOL/L (ref 98–107)
CO2 SERPL-SCNC: 22 MMOL/L (ref 21–32)
COLOR UR AUTO: YELLOW
CREAT BLD-MCNC: 1.6 MG/DL (ref 0.55–1.02)
DEPRECATED RDW RBC AUTO: 55.3 FL (ref 35.1–46.3)
EOSINOPHIL # BLD AUTO: 0.07 X10(3) UL (ref 0–0.7)
EOSINOPHIL NFR BLD AUTO: 0.9 %
ERYTHROCYTE [DISTWIDTH] IN BLOOD BY AUTOMATED COUNT: 15.4 % (ref 11–15)
GLOBULIN PLAS-MCNC: 6.8 G/DL (ref 2.8–4.4)
GLUCOSE BLD-MCNC: 101 MG/DL (ref 70–99)
GLUCOSE UR STRIP.AUTO-MCNC: NEGATIVE MG/DL
HCT VFR BLD AUTO: 35.4 % (ref 35–48)
HGB BLD-MCNC: 10.6 G/DL (ref 12–16)
HYALINE CASTS #/AREA URNS AUTO: PRESENT /LPF
IMM GRANULOCYTES # BLD AUTO: 0.04 X10(3) UL (ref 0–1)
IMM GRANULOCYTES NFR BLD: 0.5 %
KETONES UR STRIP.AUTO-MCNC: NEGATIVE MG/DL
LIPASE SERPL-CCNC: 158 U/L (ref 73–393)
LYMPHOCYTES # BLD AUTO: 1.89 X10(3) UL (ref 1–4)
LYMPHOCYTES NFR BLD AUTO: 24.9 %
M PROTEIN MFR SERPL ELPH: 9.6 G/DL (ref 6.4–8.2)
MCH RBC QN AUTO: 29.5 PG (ref 26–34)
MCHC RBC AUTO-ENTMCNC: 29.9 G/DL (ref 31–37)
MCV RBC AUTO: 98.6 FL (ref 80–100)
MONOCYTES # BLD AUTO: 0.65 X10(3) UL (ref 0.1–1)
MONOCYTES NFR BLD AUTO: 8.6 %
NEUTROPHILS # BLD AUTO: 4.91 X10 (3) UL (ref 1.5–7.7)
NEUTROPHILS # BLD AUTO: 4.91 X10(3) UL (ref 1.5–7.7)
NEUTROPHILS NFR BLD AUTO: 64.7 %
NITRITE UR QL STRIP.AUTO: NEGATIVE
OSMOLALITY SERPL CALC.SUM OF ELEC: 298 MOSM/KG (ref 275–295)
PH UR STRIP.AUTO: 5 [PH] (ref 4.5–8)
PLATELET # BLD AUTO: 207 10(3)UL (ref 150–450)
POTASSIUM SERPL-SCNC: 3.6 MMOL/L (ref 3.5–5.1)
RBC # BLD AUTO: 3.59 X10(6)UL (ref 3.8–5.3)
SODIUM SERPL-SCNC: 139 MMOL/L (ref 136–145)
SP GR UR STRIP.AUTO: 1.02 (ref 1–1.03)
UROBILINOGEN UR STRIP.AUTO-MCNC: 0.2 MG/DL
WBC # BLD AUTO: 7.6 X10(3) UL (ref 4–11)
WBC CLUMPS UR QL AUTO: PRESENT

## 2019-02-22 PROCEDURE — 87186 SC STD MICRODIL/AGAR DIL: CPT | Performed by: EMERGENCY MEDICINE

## 2019-02-22 PROCEDURE — 83690 ASSAY OF LIPASE: CPT | Performed by: EMERGENCY MEDICINE

## 2019-02-22 PROCEDURE — 99283 EMERGENCY DEPT VISIT LOW MDM: CPT

## 2019-02-22 PROCEDURE — 87077 CULTURE AEROBIC IDENTIFY: CPT | Performed by: EMERGENCY MEDICINE

## 2019-02-22 PROCEDURE — 81001 URINALYSIS AUTO W/SCOPE: CPT | Performed by: EMERGENCY MEDICINE

## 2019-02-22 PROCEDURE — 36415 COLL VENOUS BLD VENIPUNCTURE: CPT

## 2019-02-22 PROCEDURE — 87086 URINE CULTURE/COLONY COUNT: CPT | Performed by: EMERGENCY MEDICINE

## 2019-02-22 PROCEDURE — 85025 COMPLETE CBC W/AUTO DIFF WBC: CPT | Performed by: EMERGENCY MEDICINE

## 2019-02-22 PROCEDURE — 81015 MICROSCOPIC EXAM OF URINE: CPT | Performed by: EMERGENCY MEDICINE

## 2019-02-22 PROCEDURE — 80053 COMPREHEN METABOLIC PANEL: CPT | Performed by: EMERGENCY MEDICINE

## 2019-02-22 RX ORDER — VANCOMYCIN HYDROCHLORIDE 250 MG/1
250 CAPSULE ORAL 2 TIMES DAILY WITH MEALS
Qty: 28 CAPSULE | Refills: 0 | Status: SHIPPED | OUTPATIENT
Start: 2019-02-22 | End: 2019-02-26

## 2019-02-22 RX ORDER — METRONIDAZOLE 500 MG/1
500 TABLET ORAL 2 TIMES DAILY
Qty: 30 TABLET | Refills: 0 | Status: SHIPPED | OUTPATIENT
Start: 2019-02-22 | End: 2019-03-04

## 2019-02-22 RX ORDER — CEPHALEXIN 500 MG/1
500 CAPSULE ORAL 3 TIMES DAILY
Qty: 30 CAPSULE | Refills: 0 | Status: SHIPPED | OUTPATIENT
Start: 2019-02-22 | End: 2019-03-04

## 2019-02-22 NOTE — ED NOTES
Received call from [address]. Vancomycin not covered under pt insurance- Andrew lacey aware. Message relayed to hold vancomycin and take Flagyl and make sure to follow up with pcp to make sure symptoms are improving. Spoke to Wes at the pharmacy.

## 2019-02-22 NOTE — ED PROVIDER NOTES
Patient Seen in: [facility] In [place]    History   Patient presents with:  Abdomen/Flank Pain (GI/)    Stated Complaint: abd pain- hx diverticulitis    HPI    Presents to the emergency department with left abdominal discomfort.   Sh HISTORY  12/05/2017    Cysto- Dr. Adolph Lee   • [address]      removal of upper right lobe   • TONSILLECTOMY             Social History    Tobacco Use      Smoking status: Never Smoker      Smokeless tobacco: Never Used    Alcohol use: No    Drug Small (*)     Protein Urine 30 mg/dL (*)     Leukocyte Esterase Urine Moderate (*)     All other components within normal limits   CBC W/ DIFFERENTIAL - Abnormal; Notable for the following components:    RBC 3.59 (*)     HGB 10.6 (*)     MCHC 29.9 (*) Oral Cap  Take 1 capsule (500 mg total) by mouth 3 (three) times daily for 10 days. Qty: 30 capsule Refills: 0    metRONIDAZOLE 500 MG Oral Tab  Take 1 tablet (500 mg total) by mouth 2 (two) times daily for 10 days.   Qty: 30 tablet Refills: 0    Vancomyci

## 2019-02-26 ENCOUNTER — OFFICE VISIT (OUTPATIENT)
Dept: FAMILY MEDICINE CLINIC | Facility: CLINIC | Age: 79
End: 2019-02-26
Payer: MEDICARE

## 2019-02-26 ENCOUNTER — SOCIAL WORK SERVICES (OUTPATIENT)
Dept: HEMATOLOGY/ONCOLOGY | Facility: HOSPITAL | Age: 79
End: 2019-02-26

## 2019-02-26 ENCOUNTER — TELEPHONE (OUTPATIENT)
Dept: HEMATOLOGY/ONCOLOGY | Facility: HOSPITAL | Age: 79
End: 2019-02-26

## 2019-02-26 VITALS
BODY MASS INDEX: 26 KG/M2 | TEMPERATURE: 98 F | HEART RATE: 90 BPM | SYSTOLIC BLOOD PRESSURE: 128 MMHG | RESPIRATION RATE: 16 BRPM | OXYGEN SATURATION: 94 % | DIASTOLIC BLOOD PRESSURE: 84 MMHG | WEIGHT: 132 LBS

## 2019-02-26 DIAGNOSIS — K57.92 DIVERTICULITIS: Primary | ICD-10-CM

## 2019-02-26 PROCEDURE — 99213 OFFICE O/P EST LOW 20 MIN: CPT | Performed by: FAMILY MEDICINE

## 2019-02-26 NOTE — PROGRESS NOTES
SW completed and faxed [facility] paperwork to [employer] at [phone] for patient's son, Nessa Johnston. SW returned call to patient to inform her.

## 2019-02-26 NOTE — PROGRESS NOTES
Patient is here for ER follow-up had her second encounter with diverticulitis she was placed on ciprofloxacin and Flagyl and is tolerating it quite well she was also given a prescription for vancomycin but found it unaffordable and is currently only taking

## 2019-02-27 LAB
ALBUMIN SERPL-MCNC: 3.6 G/DL (ref 3.1–4.5)
ALBUMIN/GLOB SERPL: 0.67 {RATIO}
ALPHA1 GLOB SERPL ELPH-MCNC: 0.32 G/DL (ref 0.1–0.3)
ALPHA2 GLOB SERPL ELPH-MCNC: 1.03 G/DL (ref 0.6–1)
B-GLOBULIN SERPL ELPH-MCNC: 0.95 G/DL (ref 0.7–1.2)
GAMMA GLOB SERPL ELPH-MCNC: 3.11 G/DL (ref 0.6–1.6)
KAPPA FREE LIGHT CHAIN: 2 MG/DL (ref 0.33–1.94)
KAPPA/LAMBDA FLC RATIO: 0.09 (ref 0.26–1.65)
LAMBDA FREE LIGHT CHAIN: 21.44 MG/DL (ref 0.57–2.63)
M-SPIKE 1: 2.47 G/DL (ref ?–0)
MAI PROTEIN SERPL-MCNC: 9 G/DL (ref 6.4–8.2)

## 2019-02-27 RX ORDER — AMLODIPINE BESYLATE AND BENAZEPRIL HYDROCHLORIDE 5; 10 MG/1; MG/1
CAPSULE ORAL
Qty: 90 CAPSULE | Refills: 3 | Status: SHIPPED | OUTPATIENT
Start: 2019-02-27 | End: 2019-12-27

## 2019-02-28 ENCOUNTER — SOCIAL WORK SERVICES (OUTPATIENT)
Dept: HEMATOLOGY/ONCOLOGY | Facility: HOSPITAL | Age: 79
End: 2019-02-28

## 2019-02-28 NOTE — PROGRESS NOTES
SHUKRI faxed another copy of FMLA for patient's  Mike Humphries to [facility], [phone]. Form sent on 2/25 was aligned incorrectly.

## 2019-03-05 ENCOUNTER — TELEPHONE (OUTPATIENT)
Dept: FAMILY MEDICINE CLINIC | Facility: CLINIC | Age: 79
End: 2019-03-05

## 2019-03-19 RX ORDER — FAMOTIDINE 20 MG/1
TABLET ORAL
Qty: 180 TABLET | Refills: 11 | Status: ON HOLD | OUTPATIENT
Start: 2019-03-19 | End: 2020-08-03

## 2019-04-02 ENCOUNTER — OFFICE VISIT (OUTPATIENT)
Dept: HEMATOLOGY/ONCOLOGY | Age: 79
End: 2019-04-02
Attending: INTERNAL MEDICINE
Payer: MEDICARE

## 2019-04-02 VITALS
SYSTOLIC BLOOD PRESSURE: 124 MMHG | RESPIRATION RATE: 18 BRPM | BODY MASS INDEX: 26 KG/M2 | TEMPERATURE: 97 F | OXYGEN SATURATION: 97 % | WEIGHT: 132.69 LBS | DIASTOLIC BLOOD PRESSURE: 71 MMHG | HEART RATE: 81 BPM

## 2019-04-02 DIAGNOSIS — D47.2 MONOCLONAL PARAPROTEINEMIA: ICD-10-CM

## 2019-04-02 DIAGNOSIS — C34.90 EGFR-RELATED LUNG CANCER (HCC): Primary | ICD-10-CM

## 2019-04-02 DIAGNOSIS — C85.80 MARGINAL ZONE B-CELL LYMPHOMA (HCC): ICD-10-CM

## 2019-04-02 DIAGNOSIS — C34.91 BILATERAL LUNG CANCER (HCC): ICD-10-CM

## 2019-04-02 DIAGNOSIS — C34.92 BILATERAL LUNG CANCER (HCC): ICD-10-CM

## 2019-04-02 DIAGNOSIS — D64.9 NORMOCYTIC NORMOCHROMIC ANEMIA: ICD-10-CM

## 2019-04-02 PROCEDURE — 99214 OFFICE O/P EST MOD 30 MIN: CPT | Performed by: INTERNAL MEDICINE

## 2019-04-02 NOTE — PROGRESS NOTES
Pt here for 6 week MD f/u. Energy level has been better. Appetite has been good. Denies pain. Pt has SOB when going up stairs, recovers quickly. Pt has no further complaints.      Outpatient Oncology Care Plan  Problem list:  fatigue  knowledge deficit    P

## 2019-04-02 NOTE — PROGRESS NOTES
Cancer Center Progress Note    Patient Name: Bk Johns   YOB: 1940   Medical Record Number: [id number]   CSN: [id number]   Attending Physician: Sharlee Lesches, M.D.    Referring Physician: Adrien Perez DO      Date of Visit: 4/2/201 mediastinal lymphadenectomy on 09-. 3. Right sided PE diagnosed 9/2011- seen on surveillance scans. Was placed on anticoagulation during that time.  Now off.     4. Iron deficiency anemia in 2016- had EGD/c-scope and capsule endoscopy which were daily with breakfast.  , Disp: , Rfl:   •  Fluticasone Propionate 50 MCG/ACT Nasal Suspension, as needed.   , Disp: , Rfl:     Past Medical History:  Past Medical History:   Diagnosis Date   • Abdominal hernia    • Cancer ([facility] [address] 2009    right lung   • Cancer file      Years of education: Not on file      Highest education level: Not on file    Occupational History      Not on file    Social Needs      Financial resource strain: Not on file      Food insecurity:        Worry: Not on file        Inability: Not o adult)   Pulse 81   Temp 97.4 °F (36.3 °C) (Tympanic)   Resp 18   Wt 60.2 kg (132 lb 11.2 oz)   SpO2 97%   BMI 25.92 kg/m²       Physical Examination:  General: Patient is alert and oriented x 3, not in acute distress. No rash  Cranial prosthesis.    Psych: 04/02/2019    GLOBULIN 7.1 (H) 04/02/2019     04/02/2019    K 4.4 04/02/2019     (H) 04/02/2019    CO2 23.0 04/02/2019        Ref.  Range 2/19/2019 14:00   IMMUNOGLOBULIN A Latest Ref Range: 70.00 - 312.00 mg/dL 127.00   IMMUNOGLOBULIN G Latest AORTA:  Unremarkable as seen on non-contrast imaging. CHEST WALL:  No mass or axillary adenopathy. LIMITED ABDOMEN:  Sequelae of cholecystectomy is noted. Cysts in the right kidney are noted. Cysts in the left kidney are noted.   BONES:  No bony lesi but son says could be better. Creatinine lately has been stable.     5. HTN- controlled on current regimen       Labs reviewed with her and her son    RTC 6 weeks with scans    Risk level high- NHL and h/o multiple lung cancers.        Betzaida Roy MD

## 2019-04-29 RX ORDER — METOPROLOL SUCCINATE 100 MG/1
TABLET, EXTENDED RELEASE ORAL
Qty: 90 TABLET | Refills: 1 | Status: SHIPPED | OUTPATIENT
Start: 2019-04-29 | End: 2019-10-29

## 2019-05-06 RX ORDER — CYCLOSPORINE 0.5 MG/ML
EMULSION OPHTHALMIC
Qty: 20 ML | Refills: 6 | Status: SHIPPED | OUTPATIENT
Start: 2019-05-06 | End: 2021-11-29

## 2019-05-14 ENCOUNTER — OFFICE VISIT (OUTPATIENT)
Dept: FAMILY MEDICINE CLINIC | Facility: CLINIC | Age: 79
End: 2019-05-14
Payer: MEDICARE

## 2019-05-14 ENCOUNTER — HOSPITAL ENCOUNTER (OUTPATIENT)
Dept: CT IMAGING | Age: 79
Discharge: HOME | End: 2019-05-14
Attending: INTERNAL MEDICINE
Payer: MEDICARE

## 2019-05-14 VITALS
HEIGHT: 60 IN | BODY MASS INDEX: 26.31 KG/M2 | RESPIRATION RATE: 16 BRPM | WEIGHT: 134 LBS | TEMPERATURE: 99 F | SYSTOLIC BLOOD PRESSURE: 130 MMHG | OXYGEN SATURATION: 91 % | DIASTOLIC BLOOD PRESSURE: 82 MMHG | HEART RATE: 70 BPM

## 2019-05-14 DIAGNOSIS — J44.1 ACUTE EXACERBATION OF CHRONIC OBSTRUCTIVE PULMONARY DISEASE (COPD) (HCC): Primary | ICD-10-CM

## 2019-05-14 DIAGNOSIS — C34.92 BILATERAL LUNG CANCER (HCC): ICD-10-CM

## 2019-05-14 DIAGNOSIS — C34.91 BILATERAL LUNG CANCER (HCC): ICD-10-CM

## 2019-05-14 DIAGNOSIS — C85.80 MARGINAL ZONE B-CELL LYMPHOMA (HCC): ICD-10-CM

## 2019-05-14 PROBLEM — J20.9 COPD (CHRONIC OBSTRUCTIVE PULMONARY DISEASE) WITH ACUTE BRONCHITIS (HCC): Status: ACTIVE | Noted: 2019-05-14

## 2019-05-14 PROBLEM — J20.9 COPD (CHRONIC OBSTRUCTIVE PULMONARY DISEASE) WITH ACUTE BRONCHITIS: Status: ACTIVE | Noted: 2019-05-14

## 2019-05-14 PROBLEM — J44.0 COPD (CHRONIC OBSTRUCTIVE PULMONARY DISEASE) WITH ACUTE BRONCHITIS: Status: ACTIVE | Noted: 2019-05-14

## 2019-05-14 PROBLEM — J44.0 COPD (CHRONIC OBSTRUCTIVE PULMONARY DISEASE) WITH ACUTE BRONCHITIS  (HCC): Status: ACTIVE | Noted: 2019-05-14

## 2019-05-14 PROBLEM — J44.0 COPD (CHRONIC OBSTRUCTIVE PULMONARY DISEASE) WITH ACUTE BRONCHITIS (HCC): Status: ACTIVE | Noted: 2019-05-14

## 2019-05-14 PROBLEM — J20.9 COPD (CHRONIC OBSTRUCTIVE PULMONARY DISEASE) WITH ACUTE BRONCHITIS  (HCC): Status: ACTIVE | Noted: 2019-05-14

## 2019-05-14 PROCEDURE — 94640 AIRWAY INHALATION TREATMENT: CPT | Performed by: FAMILY MEDICINE

## 2019-05-14 PROCEDURE — 99214 OFFICE O/P EST MOD 30 MIN: CPT | Performed by: FAMILY MEDICINE

## 2019-05-14 PROCEDURE — 96372 THER/PROPH/DIAG INJ SC/IM: CPT | Performed by: FAMILY MEDICINE

## 2019-05-14 PROCEDURE — 71250 CT THORAX DX C-: CPT | Performed by: INTERNAL MEDICINE

## 2019-05-14 RX ORDER — IPRATROPIUM BROMIDE AND ALBUTEROL SULFATE 2.5; .5 MG/3ML; MG/3ML
3 SOLUTION RESPIRATORY (INHALATION) ONCE
Status: COMPLETED | OUTPATIENT
Start: 2019-05-14 | End: 2019-05-14

## 2019-05-14 RX ORDER — IPRATROPIUM BROMIDE AND ALBUTEROL SULFATE 2.5; .5 MG/3ML; MG/3ML
3 SOLUTION RESPIRATORY (INHALATION) EVERY 6 HOURS PRN
Qty: 1 CONTAINER | Refills: 0 | Status: SHIPPED | OUTPATIENT
Start: 2019-05-14 | End: 2019-05-21

## 2019-05-14 RX ORDER — CEFTRIAXONE 1 G/1
1 INJECTION, POWDER, FOR SOLUTION INTRAMUSCULAR; INTRAVENOUS ONCE
Status: DISCONTINUED | OUTPATIENT
Start: 2019-05-14 | End: 2019-05-14

## 2019-05-14 RX ORDER — AZITHROMYCIN 250 MG/1
TABLET, FILM COATED ORAL
Qty: 6 TABLET | Refills: 0 | Status: SHIPPED | OUTPATIENT
Start: 2019-05-14 | End: 2019-05-21

## 2019-05-14 RX ORDER — PREDNISONE 20 MG/1
20 TABLET ORAL DAILY
Qty: 5 TABLET | Refills: 0 | Status: SHIPPED | OUTPATIENT
Start: 2019-05-14 | End: 2019-05-21

## 2019-05-14 RX ADMIN — CEFTRIAXONE 1 G: 1 INJECTION, POWDER, FOR SOLUTION INTRAMUSCULAR; INTRAVENOUS at 16:58:00

## 2019-05-14 RX ADMIN — IPRATROPIUM BROMIDE AND ALBUTEROL SULFATE 3 ML: 2.5; .5 SOLUTION RESPIRATORY (INHALATION) at 16:59:00

## 2019-05-14 NOTE — PROGRESS NOTES
Flare of COPD for past approximately 24 hours. Exam initial pulse oximetry 88% rising with the application of low-dose low-flow oxygen.   She has no JVD she has some mild nonpitting nontender leg edema she is acyanotic and the blood pressure immediately

## 2019-05-15 ENCOUNTER — TELEPHONE (OUTPATIENT)
Dept: FAMILY MEDICINE CLINIC | Facility: CLINIC | Age: 79
End: 2019-05-15

## 2019-05-15 NOTE — TELEPHONE ENCOUNTER
Pt wants Dr. Yo Lou to know that she is feeling much better  She has a little wheezing on one side.   Overall much better

## 2019-05-21 ENCOUNTER — OFFICE VISIT (OUTPATIENT)
Dept: HEMATOLOGY/ONCOLOGY | Age: 79
End: 2019-05-21
Attending: INTERNAL MEDICINE
Payer: MEDICARE

## 2019-05-21 VITALS
DIASTOLIC BLOOD PRESSURE: 73 MMHG | WEIGHT: 135.63 LBS | BODY MASS INDEX: 26 KG/M2 | TEMPERATURE: 98 F | SYSTOLIC BLOOD PRESSURE: 123 MMHG | RESPIRATION RATE: 148 BRPM | OXYGEN SATURATION: 97 % | HEART RATE: 97 BPM

## 2019-05-21 DIAGNOSIS — C34.91 BILATERAL LUNG CANCER (HCC): ICD-10-CM

## 2019-05-21 DIAGNOSIS — C34.92 BILATERAL LUNG CANCER (HCC): ICD-10-CM

## 2019-05-21 DIAGNOSIS — D63.0 ANEMIA IN NEOPLASTIC DISEASE: ICD-10-CM

## 2019-05-21 DIAGNOSIS — C34.90 EGFR-RELATED LUNG CANCER (HCC): ICD-10-CM

## 2019-05-21 DIAGNOSIS — D47.2 MONOCLONAL PARAPROTEINEMIA: ICD-10-CM

## 2019-05-21 DIAGNOSIS — N28.9 RENAL INSUFFICIENCY: ICD-10-CM

## 2019-05-21 DIAGNOSIS — C85.80 MARGINAL ZONE B-CELL LYMPHOMA (HCC): Primary | ICD-10-CM

## 2019-05-21 DIAGNOSIS — J06.9 URI, ACUTE: ICD-10-CM

## 2019-05-21 DIAGNOSIS — D64.9 NORMOCYTIC NORMOCHROMIC ANEMIA: ICD-10-CM

## 2019-05-21 PROCEDURE — 99214 OFFICE O/P EST MOD 30 MIN: CPT | Performed by: INTERNAL MEDICINE

## 2019-05-21 RX ORDER — IPRATROPIUM BROMIDE AND ALBUTEROL SULFATE 2.5; .5 MG/3ML; MG/3ML
SOLUTION RESPIRATORY (INHALATION)
Qty: 1 CONTAINER | Refills: 3 | Status: SHIPPED | OUTPATIENT
Start: 2019-05-21 | End: 2019-07-02

## 2019-05-21 NOTE — PROGRESS NOTES
Cancer Center Progress Note    Patient Name: Patrick Rondon   YOB: 1940   Medical Record Number: [id number]   CSN: [id number]   Attending Physician: Alia Arias M.D.    Referring Physician: Ed Thapa DO      Date of Visit: 5/21/20 mediastinal lymphadenectomy on 09-. 3. Right sided PE diagnosed 9/2011- seen on surveillance scans. Was placed on anticoagulation during that time.  Now off.     4. Iron deficiency anemia in 2016- had EGD/c-scope and capsule endoscopy which were Tab, Take 500 mg by mouth every 6 (six) hours as needed for Pain., Disp: , Rfl:   •  ferrous sulfate 325 (65 FE) MG Oral Tab EC, Take 325 mg by mouth daily with breakfast.  , Disp: , Rfl:   •  Fluticasone Propionate 50 MCG/ACT Nasal Suspension, as needed. History:  Social History    Socioeconomic History      Marital status:        Spouse name: Not on file      Number of children: Not on file      Years of education: Not on file      Highest education level: Not on file    Occupational History      No with pertinent positives and negative per the HPI    Vital Signs:  /73 (BP Location: Left arm, Patient Position: Sitting, Cuff Size: adult)   Pulse 97   Temp 98 °F (36.7 °C) (Tympanic)   Resp (!) 148   Wt 61.5 kg (135 lb 9.6 oz)   SpO2 97%   BMI 26. 4 Immunoglobulin G 1,080 791-1,643 mg/dL    Immunoglobulin M 3,370.0 (H) 43.0 - 279.0 mg/dL   CBC W/ DIFFERENTIAL    Collection Time: 05/21/19  1:25 PM   Result Value Ref Range    WBC 5.3 4.0 - 11.0 x10(3) uL    RBC 3.55 (L) 3.80 - 5.30 x10(6)uL    HGB 10 non-hodgkin lymphoma, unspecified site C34.92 Malignant neoplasm of unspecified part of left bronchus or lung C34.91 Malignant *     TECHNIQUE:  Unenhanced multislice CT scanning is performed through the chest.  Dose reduction techniques were used.  Dose in renal cysts re-identified. Patient is status post cholecystectomy. Hiatal hernia is present. Stable retrocrural adenopathy measuring 8 mm.   Numerous scattered lymph nodes are seen within the upper abdomen as well as diffuse   haziness throughout the mes Renal following. With just hydration while she was in the hospital her renal function significantly improved. Making an effort to increase liquid intake but son says could be better. Stopped her diuretic to see if this would help her renal function.  About

## 2019-05-21 NOTE — PROGRESS NOTES
Pt here for 7 week MD f/u. Energy level and appetite has been fair, denies pain. Pt had bronchitis last week. Pt has SOB with stairs. Pt had CT 5/14. Pt has swollen feet, notes swelling in her sinuses.      Outpatient Oncology Care Plan  Problem list:  fati

## 2019-05-28 ENCOUNTER — OFFICE VISIT (OUTPATIENT)
Dept: FAMILY MEDICINE CLINIC | Facility: CLINIC | Age: 79
End: 2019-05-28
Payer: MEDICARE

## 2019-05-28 VITALS
DIASTOLIC BLOOD PRESSURE: 84 MMHG | TEMPERATURE: 98 F | SYSTOLIC BLOOD PRESSURE: 140 MMHG | HEART RATE: 94 BPM | BODY MASS INDEX: 26.7 KG/M2 | OXYGEN SATURATION: 96 % | RESPIRATION RATE: 18 BRPM | HEIGHT: 60 IN | WEIGHT: 136 LBS

## 2019-05-28 DIAGNOSIS — J01.21 ACUTE RECURRENT ETHMOIDAL SINUSITIS: Primary | ICD-10-CM

## 2019-05-28 PROCEDURE — 99213 OFFICE O/P EST LOW 20 MIN: CPT | Performed by: FAMILY MEDICINE

## 2019-05-28 NOTE — PROGRESS NOTES
Called on the phone today asking to be seen. She feels that her sinuses are acting up. In 2018 CT of the sinuses were performed showing some mild chronic ethmoid sinusitis involvement clinically it is never been quite as ready a diagnosis.   In today's ex

## 2019-06-10 ENCOUNTER — TELEPHONE (OUTPATIENT)
Dept: FAMILY MEDICINE CLINIC | Facility: CLINIC | Age: 79
End: 2019-06-10

## 2019-06-10 NOTE — TELEPHONE ENCOUNTER
Pt was using the Mucinex Exp. Seems like that is working for her sinus problem that she was having. Dr Morena Hyatt. Asked her to call in and let him know how she was doing on the med. Pls let him know per patient.

## 2019-06-10 NOTE — TELEPHONE ENCOUNTER
GEORGIANA:    Pt was using the Mucinex Exp. Seems like that is working for her sinus problem that she was having.     Dr Radha Estrada. Asked her to call in and let him know how she was doing on the med.     Pls let him know per patient.

## 2019-06-13 ENCOUNTER — TELEPHONE (OUTPATIENT)
Dept: FAMILY MEDICINE CLINIC | Facility: CLINIC | Age: 79
End: 2019-06-13

## 2019-06-13 NOTE — TELEPHONE ENCOUNTER
Requesting ventolin inhaler - originally prescribed by doctor at the [facility] [address] [facility] and had her fu appt with Dr. Octavio Granado a few days later. Pls call with any questions.   [facility] in [place]

## 2019-06-14 RX ORDER — ALBUTEROL SULFATE 90 UG/1
2 AEROSOL, METERED RESPIRATORY (INHALATION) EVERY 4 HOURS PRN
Qty: 1 INHALER | Refills: 1 | Status: SHIPPED | OUTPATIENT
Start: 2019-06-14 | End: 2020-02-27 | Stop reason: ALTCHOICE

## 2019-06-14 NOTE — TELEPHONE ENCOUNTER
Please call Pt and find out which inhalers she is using currently, besides Ventolin.  She has several inhalers she has been using    He does want her over prescribed

## 2019-07-02 ENCOUNTER — OFFICE VISIT (OUTPATIENT)
Dept: HEMATOLOGY/ONCOLOGY | Age: 79
End: 2019-07-02
Attending: INTERNAL MEDICINE
Payer: MEDICARE

## 2019-07-02 VITALS
WEIGHT: 134.88 LBS | HEART RATE: 80 BPM | SYSTOLIC BLOOD PRESSURE: 145 MMHG | TEMPERATURE: 98 F | DIASTOLIC BLOOD PRESSURE: 82 MMHG | RESPIRATION RATE: 18 BRPM | BODY MASS INDEX: 26 KG/M2 | OXYGEN SATURATION: 94 %

## 2019-07-02 DIAGNOSIS — C34.90 EGFR-RELATED LUNG CANCER (HCC): Primary | ICD-10-CM

## 2019-07-02 DIAGNOSIS — D63.0 ANEMIA IN NEOPLASTIC DISEASE: ICD-10-CM

## 2019-07-02 DIAGNOSIS — D64.9 NORMOCYTIC NORMOCHROMIC ANEMIA: ICD-10-CM

## 2019-07-02 DIAGNOSIS — N28.9 RENAL INSUFFICIENCY: ICD-10-CM

## 2019-07-02 DIAGNOSIS — D47.2 MONOCLONAL PARAPROTEINEMIA: ICD-10-CM

## 2019-07-02 DIAGNOSIS — C85.80 MARGINAL ZONE B-CELL LYMPHOMA (HCC): ICD-10-CM

## 2019-07-02 DIAGNOSIS — C34.92 BILATERAL LUNG CANCER (HCC): ICD-10-CM

## 2019-07-02 DIAGNOSIS — C34.91 BILATERAL LUNG CANCER (HCC): ICD-10-CM

## 2019-07-02 LAB
ALBUMIN SERPL-MCNC: 2.9 G/DL (ref 3.4–5)
ALBUMIN/GLOB SERPL: 0.4 {RATIO} (ref 1–2)
ALP LIVER SERPL-CCNC: 71 U/L (ref 55–142)
ALT SERPL-CCNC: 18 U/L (ref 13–56)
ANION GAP SERPL CALC-SCNC: 8 MMOL/L (ref 0–18)
AST SERPL-CCNC: 17 U/L (ref 15–37)
BASOPHILS # BLD AUTO: 0.04 X10(3) UL (ref 0–0.2)
BASOPHILS NFR BLD AUTO: 0.8 %
BILIRUB SERPL-MCNC: 0.4 MG/DL (ref 0.1–2)
BUN BLD-MCNC: 47 MG/DL (ref 7–18)
BUN/CREAT SERPL: 26.3 (ref 10–20)
CALCIUM BLD-MCNC: 9.6 MG/DL (ref 8.5–10.1)
CHLORIDE SERPL-SCNC: 108 MMOL/L (ref 98–112)
CO2 SERPL-SCNC: 23 MMOL/L (ref 21–32)
CREAT BLD-MCNC: 1.79 MG/DL (ref 0.55–1.02)
DEPRECATED HBV CORE AB SER IA-ACNC: 45.5 NG/ML (ref 18–340)
DEPRECATED RDW RBC AUTO: 56.8 FL (ref 35.1–46.3)
EOSINOPHIL # BLD AUTO: 0.06 X10(3) UL (ref 0–0.7)
EOSINOPHIL NFR BLD AUTO: 1.2 %
ERYTHROCYTE [DISTWIDTH] IN BLOOD BY AUTOMATED COUNT: 15.5 % (ref 11–15)
GLOBULIN PLAS-MCNC: 7.3 G/DL (ref 2.8–4.4)
GLUCOSE BLD-MCNC: 91 MG/DL (ref 70–99)
HCT VFR BLD AUTO: 34.4 % (ref 35–48)
HGB BLD-MCNC: 10.7 G/DL (ref 12–16)
IGA SERPL-MCNC: 147.4 MG/DL (ref 70–312)
IGM SERPL-MCNC: 3150 MG/DL (ref 43–279)
IMM GRANULOCYTES # BLD AUTO: 0.02 X10(3) UL (ref 0–1)
IMM GRANULOCYTES NFR BLD: 0.4 %
IMMUNOGLOBULIN PNL SER-MCNC: 918 MG/DL (ref 791–1643)
LDH SERPL L TO P-CCNC: 167 U/L (ref 84–246)
LYMPHOCYTES # BLD AUTO: 1.78 X10(3) UL (ref 1–4)
LYMPHOCYTES NFR BLD AUTO: 34.4 %
M PROTEIN MFR SERPL ELPH: 10.2 G/DL (ref 6.4–8.2)
MCH RBC QN AUTO: 31 PG (ref 26–34)
MCHC RBC AUTO-ENTMCNC: 31.1 G/DL (ref 31–37)
MCV RBC AUTO: 99.7 FL (ref 80–100)
MONOCYTES # BLD AUTO: 0.57 X10(3) UL (ref 0.1–1)
MONOCYTES NFR BLD AUTO: 11 %
MORPHOLOGY: NORMAL
NEUTROPHILS # BLD AUTO: 2.7 X10 (3) UL (ref 1.5–7.7)
NEUTROPHILS # BLD AUTO: 2.7 X10(3) UL (ref 1.5–7.7)
NEUTROPHILS NFR BLD AUTO: 52.2 %
OSMOLALITY SERPL CALC.SUM OF ELEC: 300 MOSM/KG (ref 275–295)
PLATELET # BLD AUTO: 181 10(3)UL (ref 150–450)
PLATELET MORPHOLOGY: NORMAL
POTASSIUM SERPL-SCNC: 4 MMOL/L (ref 3.5–5.1)
RBC # BLD AUTO: 3.45 X10(6)UL (ref 3.8–5.3)
ROULEAUX BLD QL SMEAR: PRESENT
SODIUM SERPL-SCNC: 139 MMOL/L (ref 136–145)
WBC # BLD AUTO: 5.2 X10(3) UL (ref 4–11)

## 2019-07-02 PROCEDURE — 99214 OFFICE O/P EST MOD 30 MIN: CPT | Performed by: INTERNAL MEDICINE

## 2019-07-02 RX ORDER — IPRATROPIUM BROMIDE 21 UG/1
1 SPRAY, METERED NASAL 2 TIMES DAILY
COMMUNITY
End: 2021-04-29

## 2019-07-02 NOTE — PROGRESS NOTES
Cancer Center Progress Note    Patient Name: Kai Sandoval   YOB: 1940   Medical Record Number: [id number]   CSN: [id number]   Attending Physician: Wendel Brunner, M.D.    Referring Physician: Lourdes Choi DO      Date of Visit: 7/2/201 mediastinal lymphadenectomy on 09-. 3. Right sided PE diagnosed 9/2011- seen on surveillance scans. Was placed on anticoagulation during that time.  Now off.     4. Iron deficiency anemia in 2016- had EGD/c-scope and capsule endoscopy which were Solution, 1 spray by Nasal route as needed for congestion. , Disp: , Rfl:   •  acetaminophen 500 MG Oral Tab, Take 500 mg by mouth every 6 (six) hours as needed for Pain., Disp: , Rfl:   •  ferrous sulfate 325 (65 FE) MG Oral Tab EC, Take 325 mg by mouth da History:  OB History     T2    L2    SAB0  TAB0  Ectopic0  Multiple2  Live Births2     Psychosocial History:  Social History    Socioeconomic History      Marital status:        Spouse name: Not on file      Number of children: Not on noemy Not on file        Allergies:    Penicillins             ITCHING     Review of Systems:  A 14-point ROS was done with pertinent positives and negative per the HPI    Vital Signs:  /82 (BP Location: Left arm, Patient Position: Sitting, Cuff Size: adul 07/02/19  1:00 PM   Result Value Ref Range    Ferritin 45.5 18.0 - 340.0 ng/mL   IMMUNOGLOBULIN A/G/M, QUANT    Collection Time: 07/02/19  1:00 PM   Result Value Ref Range    Immunoglobulin A 147.40 70.00 - 312.00 mg/dL    Immunoglobulin G 918 791-1,643 mg 1.60 g/dL 3.34 (H)   A/G Ratio Unknown 0.64   M-Charles Latest Ref Range: <=0.00 g/dL 2.63 (H)   IMMUNOFIXATION Unknown Monoclonal IgM la. ..    KAPPA FREE LIGHT CHAIN Latest Ref Range: 0.330 - 1.940 mg/dL 1.548   LAMBDA FREE LIGHT CHAIN Latest Ref Range: 0.57 opacities involving the left upper lobe as well as a ground-glass nodular density in the left upper lobe on image 48 measuring 9 mm. Stable 4 mm nodule in the lingula on image 81.      Stable 6 mm nodule in the left lower lobe on image 97  VASCULATURE: lower compared to previous. Otherwise no indications for treatment. 2. H/o multiple lung cancers- No overt evidence of progression off Tarceva with recent scans.  As no evidence of progression will continue to hold Tarceva especially given previous si

## 2019-07-02 NOTE — PROGRESS NOTES
Pt here for 6 week MD f/u. Energy level has been \"not that great\", gets tired quickly, legs get fatigued quickly. Appetite has been good. Denies pain. Pt gets SOB with stairs. No bowel issues, no nausea. Pt has no further complaints.      Outpatient Oncol

## 2019-07-03 LAB
KAPPA FREE LIGHT CHAIN: 3.06 MG/DL (ref 0.33–1.94)
KAPPA/LAMBDA FLC RATIO: 0.18 (ref 0.26–1.65)
LAMBDA FREE LIGHT CHAIN: 16.97 MG/DL (ref 0.57–2.63)

## 2019-07-11 RX ORDER — AMLODIPINE BESYLATE 2.5 MG/1
TABLET ORAL
Qty: 90 TABLET | Refills: 3 | Status: SHIPPED | OUTPATIENT
Start: 2019-07-11 | End: 2020-01-24 | Stop reason: DRUGHIGH

## 2019-07-31 ENCOUNTER — OFFICE VISIT (OUTPATIENT)
Dept: FAMILY MEDICINE CLINIC | Facility: CLINIC | Age: 79
End: 2019-07-31
Payer: MEDICARE

## 2019-07-31 ENCOUNTER — HOSPITAL ENCOUNTER (OUTPATIENT)
Dept: GENERAL RADIOLOGY | Age: 79
Discharge: HOME | End: 2019-07-31
Attending: FAMILY MEDICINE
Payer: MEDICARE

## 2019-07-31 VITALS
BODY MASS INDEX: 26.5 KG/M2 | DIASTOLIC BLOOD PRESSURE: 86 MMHG | HEIGHT: 60 IN | TEMPERATURE: 98 F | RESPIRATION RATE: 16 BRPM | SYSTOLIC BLOOD PRESSURE: 120 MMHG | WEIGHT: 135 LBS | HEART RATE: 80 BPM | OXYGEN SATURATION: 98 %

## 2019-07-31 DIAGNOSIS — M25.562 ACUTE PAIN OF LEFT KNEE: ICD-10-CM

## 2019-07-31 DIAGNOSIS — M25.562 ACUTE PAIN OF LEFT KNEE: Primary | ICD-10-CM

## 2019-07-31 PROCEDURE — 73560 X-RAY EXAM OF KNEE 1 OR 2: CPT | Performed by: FAMILY MEDICINE

## 2019-07-31 PROCEDURE — 99213 OFFICE O/P EST LOW 20 MIN: CPT | Performed by: FAMILY MEDICINE

## 2019-07-31 NOTE — PROGRESS NOTES
HPI:   Allison Martinez is a 67year old female who presents left knee pain     Pt reports it started when shifting her knee to the left when in the seated position - 1.5 weeks ago   Medial pain   No pain at rest   Clicking and pain upon standing   Better • Abdominal hernia    • Cancer [facility]) 2009    right lung   • Cancer [facility])     lymphoma   • Chronic cough    • Complete rupture of rotator cuff    • Diaphragmatic hernia without mention of obstruction or gangrene    • Esophageal reflux    • Exposure to uns denies depression or anxiety  HEMATOLOGIC: denies hx of anemia  ENDOCRINE: denies thyroid history  ALL/ASTHMA: denies asthma    EXAM:   /86   Pulse 80   Temp 98 °F (36.7 °C) (Oral)   Resp 16   Ht 60\"   Wt 135 lb   SpO2 98%   BMI 26.37 kg/m²   Body m

## 2019-08-10 ENCOUNTER — OFFICE VISIT (OUTPATIENT)
Dept: FAMILY MEDICINE CLINIC | Facility: CLINIC | Age: 79
End: 2019-08-10
Payer: MEDICARE

## 2019-08-10 VITALS
TEMPERATURE: 98 F | HEART RATE: 96 BPM | OXYGEN SATURATION: 97 % | SYSTOLIC BLOOD PRESSURE: 120 MMHG | DIASTOLIC BLOOD PRESSURE: 70 MMHG | WEIGHT: 133 LBS | HEIGHT: 60 IN | BODY MASS INDEX: 26.11 KG/M2 | RESPIRATION RATE: 18 BRPM

## 2019-08-10 DIAGNOSIS — J44.9 EXACERBATION OF CHRONIC BRONCHIOLITIS (HCC): Primary | ICD-10-CM

## 2019-08-10 PROCEDURE — 99213 OFFICE O/P EST LOW 20 MIN: CPT | Performed by: INTERNAL MEDICINE

## 2019-08-10 RX ORDER — AZITHROMYCIN 250 MG/1
TABLET, FILM COATED ORAL
Qty: 6 TABLET | Refills: 0 | Status: SHIPPED | OUTPATIENT
Start: 2019-08-10 | End: 2019-08-22 | Stop reason: ALTCHOICE

## 2019-08-10 RX ORDER — HYPOCHLOROUS ACID/SODIUM CHLOR 0.01 %
SPRAY, NON-AEROSOL (ML) TOPICAL
COMMUNITY
End: 2019-09-12

## 2019-08-10 NOTE — PROGRESS NOTES
HPI:   Radha Kirk is a 67year old female who presents with congestion, dry cough, prior history of bronchitis  since her lung cancer treatment ended in 2015. Episodes occur about every 2-3 months.   Had a dry cough 1 week ago, thought it was allergie MG Oral Tab Take 10 mg by mouth nightly. Disp:  Rfl:    Saline Nasal Spray 0.65 % Nasal Solution 1 spray by Nasal route as needed for congestion. Disp:  Rfl:    acetaminophen 500 MG Oral Tab Take 500 mg by mouth every 6 (six) hours as needed for Pain.  Disp well nourished,in no apparent distress  SKIN: warm & dry  EYES:PERRLA, conjunctiva are clear  HEENT: atraumatic, normocephalic,TMs clear , posterior pharynx clear, nares patent, no sinus tenderness  NECK: supple,no adenopathy  LUNGS:CTA, easy breathing, +

## 2019-08-13 ENCOUNTER — OFFICE VISIT (OUTPATIENT)
Dept: FAMILY MEDICINE CLINIC | Facility: CLINIC | Age: 79
End: 2019-08-13
Payer: MEDICARE

## 2019-08-13 VITALS
HEIGHT: 60 IN | SYSTOLIC BLOOD PRESSURE: 136 MMHG | HEART RATE: 86 BPM | WEIGHT: 133 LBS | BODY MASS INDEX: 26.11 KG/M2 | RESPIRATION RATE: 16 BRPM | OXYGEN SATURATION: 96 % | DIASTOLIC BLOOD PRESSURE: 82 MMHG | TEMPERATURE: 98 F

## 2019-08-13 DIAGNOSIS — C34.12 MALIGNANT NEOPLASM OF UPPER LOBE OF LEFT LUNG (HCC): Primary | ICD-10-CM

## 2019-08-13 PROCEDURE — 99213 OFFICE O/P EST LOW 20 MIN: CPT | Performed by: FAMILY MEDICINE

## 2019-08-17 ENCOUNTER — OFFICE VISIT (OUTPATIENT)
Dept: FAMILY MEDICINE CLINIC | Facility: CLINIC | Age: 79
End: 2019-08-17
Payer: MEDICARE

## 2019-08-17 VITALS
TEMPERATURE: 97 F | HEIGHT: 60 IN | DIASTOLIC BLOOD PRESSURE: 60 MMHG | RESPIRATION RATE: 20 BRPM | HEART RATE: 106 BPM | SYSTOLIC BLOOD PRESSURE: 112 MMHG | OXYGEN SATURATION: 98 % | BODY MASS INDEX: 25.72 KG/M2 | WEIGHT: 131 LBS

## 2019-08-17 DIAGNOSIS — J20.9 ACUTE BRONCHITIS, UNSPECIFIED ORGANISM: Primary | ICD-10-CM

## 2019-08-17 DIAGNOSIS — Z87.09 HISTORY OF COPD: ICD-10-CM

## 2019-08-17 PROCEDURE — 99213 OFFICE O/P EST LOW 20 MIN: CPT | Performed by: NURSE PRACTITIONER

## 2019-08-17 RX ORDER — PREDNISONE 20 MG/1
40 TABLET ORAL DAILY
Qty: 10 TABLET | Refills: 0 | Status: SHIPPED | OUTPATIENT
Start: 2019-08-17 | End: 2019-08-22 | Stop reason: ALTCHOICE

## 2019-08-17 RX ORDER — AZITHROMYCIN 250 MG/1
TABLET, FILM COATED ORAL
Qty: 6 TABLET | Refills: 0 | Status: SHIPPED | OUTPATIENT
Start: 2019-08-17 | End: 2019-08-22 | Stop reason: ALTCHOICE

## 2019-08-17 NOTE — PATIENT INSTRUCTIONS
Bronchitis, Antibiotic Treatment (Adult)    Bronchitis is an infection of the air passages (bronchial tubes) in your lungs. It often occurs when you have a cold.  This illness is contagious during the first few days and is spread through the air by coughi Follow up with your healthcare provider, or as advised. If you had an X-ray or ECG (electrocardiogram), a specialist will review it. You will be told of any new test results that may affect your care.   If you are age 72 or older, if you smoke, or if you ha

## 2019-08-17 NOTE — PROGRESS NOTES
CHIEF COMPLAINT:   No chief complaint on file. HPI:   Anny Brandon is a 67year old female who presents for cough for  several weeks. Cough started gradually and is described as tight and deep. Patient has history of bronchitis.  This is  simila omeprazole 20 MG Oral Capsule Delayed Release Take 20 mg by mouth every morning before breakfast. Disp:  Rfl:    BREO ELLIPTA 200-25 MCG/INH Inhalation Aerosol Powder, Breath Activated 1 puff daily.  Disp:  Rfl:    Montelukast Sodium (SINGULAIR) 10 MG Oral LUNGS: Per HPI. Denies shortness of breath with exertion or rest.   GI: Denies N/V/C/D or abdominal pain.       EXAM:   /60 (BP Location: Right arm, Patient Position: Sitting, Cuff Size: adult)   Pulse 106   Temp 97.4 °F (36.3 °C) (Tympanic)   Resp 20 • predniSONE 20 MG Oral Tab 10 tablet 0     Sig: Take 2 tablets (40 mg total) by mouth daily for 5 days. • azithromycin 250 MG Oral Tab 6 tablet 0     Sig: Take two tablets by mouth today, then one daily.        Side effects, risks, benefits, of medicatio · Over-the-counter cough, cold, and sore-throat medicines will not shorten the length of the illness, but they may be helpful to reduce your symptoms. Don't use decongestants if you have high blood pressure. · Finish all antibiotic medicine.  Do this even

## 2019-08-19 ENCOUNTER — TELEPHONE (OUTPATIENT)
Dept: FAMILY MEDICINE CLINIC | Facility: CLINIC | Age: 79
End: 2019-08-19

## 2019-08-19 NOTE — TELEPHONE ENCOUNTER
Spoke to pt, she states she is feeling better, has 3 days of zpak and 2 days of prednisone left.   Some dry cough  appt made for f/u thursday

## 2019-08-19 NOTE — TELEPHONE ENCOUNTER
Patient was seen in the [facility] on 8/17. They told her to call the office on Monday and check with nurse if Elva Reid wants her to come in for a follow up. Pt was given zpack and prednisone. Please advise.

## 2019-08-22 ENCOUNTER — OFFICE VISIT (OUTPATIENT)
Dept: FAMILY MEDICINE CLINIC | Facility: CLINIC | Age: 79
End: 2019-08-22
Payer: MEDICARE

## 2019-08-22 VITALS
DIASTOLIC BLOOD PRESSURE: 84 MMHG | TEMPERATURE: 98 F | SYSTOLIC BLOOD PRESSURE: 132 MMHG | WEIGHT: 133 LBS | OXYGEN SATURATION: 95 % | HEIGHT: 60 IN | BODY MASS INDEX: 26.11 KG/M2 | HEART RATE: 90 BPM | RESPIRATION RATE: 16 BRPM

## 2019-08-22 DIAGNOSIS — J20.9 COPD (CHRONIC OBSTRUCTIVE PULMONARY DISEASE) WITH ACUTE BRONCHITIS (HCC): Primary | ICD-10-CM

## 2019-08-22 DIAGNOSIS — J44.0 COPD (CHRONIC OBSTRUCTIVE PULMONARY DISEASE) WITH ACUTE BRONCHITIS (HCC): Primary | ICD-10-CM

## 2019-08-22 PROCEDURE — 99213 OFFICE O/P EST LOW 20 MIN: CPT | Performed by: FAMILY MEDICINE

## 2019-08-22 NOTE — PROGRESS NOTES
Here for follow-up of her most recent exacerbation of COPD and carcinoma. She is feeling much better now and has numerous questions as far as when I felt her overall prognosis would be. She is of course stop smoking.   She has a few soft end expiratory wh

## 2019-08-27 ENCOUNTER — HOSPITAL ENCOUNTER (OUTPATIENT)
Dept: CT IMAGING | Age: 79
Discharge: HOME | End: 2019-08-27
Attending: INTERNAL MEDICINE
Payer: MEDICARE

## 2019-08-27 DIAGNOSIS — C34.91 BILATERAL LUNG CANCER (HCC): ICD-10-CM

## 2019-08-27 DIAGNOSIS — C85.80 MARGINAL ZONE B-CELL LYMPHOMA (HCC): ICD-10-CM

## 2019-08-27 DIAGNOSIS — C34.92 BILATERAL LUNG CANCER (HCC): ICD-10-CM

## 2019-08-27 PROCEDURE — 71250 CT THORAX DX C-: CPT | Performed by: INTERNAL MEDICINE

## 2019-08-27 PROCEDURE — 74176 CT ABD & PELVIS W/O CONTRAST: CPT | Performed by: INTERNAL MEDICINE

## 2019-08-28 ENCOUNTER — TELEPHONE (OUTPATIENT)
Dept: HEMATOLOGY/ONCOLOGY | Facility: HOSPITAL | Age: 79
End: 2019-08-28

## 2019-09-03 ENCOUNTER — OFFICE VISIT (OUTPATIENT)
Dept: HEMATOLOGY/ONCOLOGY | Age: 79
End: 2019-09-03
Attending: INTERNAL MEDICINE
Payer: MEDICARE

## 2019-09-03 VITALS
WEIGHT: 134.19 LBS | SYSTOLIC BLOOD PRESSURE: 131 MMHG | DIASTOLIC BLOOD PRESSURE: 85 MMHG | BODY MASS INDEX: 26 KG/M2 | HEART RATE: 94 BPM | OXYGEN SATURATION: 93 % | TEMPERATURE: 98 F | RESPIRATION RATE: 18 BRPM

## 2019-09-03 DIAGNOSIS — C34.91 BILATERAL LUNG CANCER (HCC): ICD-10-CM

## 2019-09-03 DIAGNOSIS — C34.90 EGFR-RELATED LUNG CANCER (HCC): ICD-10-CM

## 2019-09-03 DIAGNOSIS — C34.92 BILATERAL LUNG CANCER (HCC): ICD-10-CM

## 2019-09-03 DIAGNOSIS — R93.5 ABNORMAL CT OF THE ABDOMEN: ICD-10-CM

## 2019-09-03 DIAGNOSIS — D47.2 MONOCLONAL PARAPROTEINEMIA: ICD-10-CM

## 2019-09-03 DIAGNOSIS — D64.9 NORMOCYTIC NORMOCHROMIC ANEMIA: Primary | ICD-10-CM

## 2019-09-03 DIAGNOSIS — N28.9 RENAL INSUFFICIENCY: ICD-10-CM

## 2019-09-03 DIAGNOSIS — C85.80 MARGINAL ZONE B-CELL LYMPHOMA (HCC): ICD-10-CM

## 2019-09-03 LAB
ALBUMIN SERPL-MCNC: 2.8 G/DL (ref 3.4–5)
ALBUMIN/GLOB SERPL: 0.4 {RATIO} (ref 1–2)
ALP LIVER SERPL-CCNC: 79 U/L (ref 55–142)
ALT SERPL-CCNC: 19 U/L (ref 13–56)
ANION GAP SERPL CALC-SCNC: 7 MMOL/L (ref 0–18)
AST SERPL-CCNC: 17 U/L (ref 15–37)
BASOPHILS # BLD AUTO: 0.03 X10(3) UL (ref 0–0.2)
BASOPHILS NFR BLD AUTO: 0.5 %
BILIRUB SERPL-MCNC: 0.5 MG/DL (ref 0.1–2)
BUN BLD-MCNC: 41 MG/DL (ref 7–18)
BUN/CREAT SERPL: 22.7 (ref 10–20)
CALCIUM BLD-MCNC: 9.8 MG/DL (ref 8.5–10.1)
CHLORIDE SERPL-SCNC: 112 MMOL/L (ref 98–112)
CO2 SERPL-SCNC: 22 MMOL/L (ref 21–32)
CREAT BLD-MCNC: 1.81 MG/DL (ref 0.55–1.02)
DEPRECATED RDW RBC AUTO: 60.6 FL (ref 35.1–46.3)
EOSINOPHIL # BLD AUTO: 0.06 X10(3) UL (ref 0–0.7)
EOSINOPHIL NFR BLD AUTO: 1.1 %
ERYTHROCYTE [DISTWIDTH] IN BLOOD BY AUTOMATED COUNT: 16.2 % (ref 11–15)
GLOBULIN PLAS-MCNC: 7.1 G/DL (ref 2.8–4.4)
GLUCOSE BLD-MCNC: 98 MG/DL (ref 70–99)
HCT VFR BLD AUTO: 34 % (ref 35–48)
HGB BLD-MCNC: 10.4 G/DL (ref 12–16)
IGA SERPL-MCNC: 157 MG/DL (ref 70–312)
IGM SERPL-MCNC: 3200 MG/DL (ref 43–279)
IMM GRANULOCYTES # BLD AUTO: 0.03 X10(3) UL (ref 0–1)
IMM GRANULOCYTES NFR BLD: 0.5 %
IMMUNOGLOBULIN PNL SER-MCNC: 499 MG/DL (ref 791–1643)
LDH SERPL L TO P-CCNC: 157 U/L (ref 84–246)
LYMPHOCYTES # BLD AUTO: 1.49 X10(3) UL (ref 1–4)
LYMPHOCYTES NFR BLD AUTO: 26.8 %
M PROTEIN MFR SERPL ELPH: 9.9 G/DL (ref 6.4–8.2)
MCH RBC QN AUTO: 31.1 PG (ref 26–34)
MCHC RBC AUTO-ENTMCNC: 30.6 G/DL (ref 31–37)
MCV RBC AUTO: 101.8 FL (ref 80–100)
MONOCYTES # BLD AUTO: 0.43 X10(3) UL (ref 0.1–1)
MONOCYTES NFR BLD AUTO: 7.7 %
NEUTROPHILS # BLD AUTO: 3.53 X10 (3) UL (ref 1.5–7.7)
NEUTROPHILS # BLD AUTO: 3.53 X10(3) UL (ref 1.5–7.7)
NEUTROPHILS NFR BLD AUTO: 63.4 %
OSMOLALITY SERPL CALC.SUM OF ELEC: 302 MOSM/KG (ref 275–295)
PLATELET # BLD AUTO: 203 10(3)UL (ref 150–450)
POTASSIUM SERPL-SCNC: 4.2 MMOL/L (ref 3.5–5.1)
RBC # BLD AUTO: 3.34 X10(6)UL (ref 3.8–5.3)
SODIUM SERPL-SCNC: 141 MMOL/L (ref 136–145)
WBC # BLD AUTO: 5.6 X10(3) UL (ref 4–11)

## 2019-09-03 PROCEDURE — 99215 OFFICE O/P EST HI 40 MIN: CPT | Performed by: INTERNAL MEDICINE

## 2019-09-03 NOTE — PROGRESS NOTES
Pt here for 2 month MD meza/zhanna. Pt has had chronic bronchitis the last month, done with abx. Pt was recommended to do pulmonary rehab. Energy level has been good. Appetite has been good. Denies pain. Pt has chronic cough, no fevers.  Pt has no further complaint

## 2019-09-06 NOTE — PROGRESS NOTES
Cancer Center Progress Note    Patient Name: Dolly Cates   YOB: 1940   Medical Record Number: [id number]   CSN: [id number]   Attending Physician: Braxton Mcpherson M.D.    Referring Physician: Kristian Yee DO      Date of Visit: 9/6/201 mediastinal lymphadenectomy on 09-. 3. Right sided PE diagnosed 9/2011- seen on surveillance scans. Was placed on anticoagulation during that time.  Now off.     4. Iron deficiency anemia in 2016- had EGD/c-scope and capsule endoscopy which were ELLIPTA 200-25 MCG/INH Inhalation Aerosol Powder, Breath Activated, 1 puff daily. , Disp: , Rfl:   •  Montelukast Sodium (SINGULAIR) 10 MG Oral Tab, Take 10 mg by mouth nightly., Disp: , Rfl:   •  Saline Nasal Spray 0.65 % Nasal Solution, 1 spray by Nasal r removal of upper right lobe   • TONSILLECTOMY         Family Medical History:  Family History   Problem Relation Age of Onset   • Other (immune system) Mother    • Cancer Father         of the mouth   • Hypertension Father        Gyne History:  OB Histo Asked        Weight Concern: Not Asked        Special Diet: Not Asked        Back Care: Not Asked        Exercise: No        Bike Helmet: Not Asked        Seat Belt: Not Asked        Self-Exams: Not Asked    Social History Narrative      Not on file .8 (H) 09/03/2019    MCH 31.1 09/03/2019    MCHC 30.6 (L) 09/03/2019    RDW 16.2 (H) 09/03/2019    NEPRELIM 3.53 09/03/2019    NEUTABS 9.63 (H) 04/09/2018    LYMPHABS 0.56 (L) 04/09/2018    EOSABS 0.11 04/09/2018    BASABS 0.00 04/09/2018    NEUT 86 Disease surveillance. 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 Patient states she has had a cough due to bronchitis. No other complaints.          FINDINGS:       CHEST:    LUNGS:  Irregular bilateral pulmonary opacities are measured 7 mm right retrocrural lymph node, previously 6 x 4 mm. 1.9 x 1.5 cm retrocaval lymph node, previously 1.7 x 1.6 cm. Other smaller lymph nodes are not significantly changed.     BOWEL/MESENTERY:  Multiple enlarged lymph nodes in the root of the mesentery lesions below the diaphragm- likely from lymphoma. Plan PET scan. Following M protein which had been stable       2. H/o multiple lung cancers- was on Tarceva previously but opted to stop due to toxicities.  Doubt enlarging lesions below diaphragm related t

## 2019-09-09 ENCOUNTER — TELEPHONE (OUTPATIENT)
Dept: HEMATOLOGY/ONCOLOGY | Facility: HOSPITAL | Age: 79
End: 2019-09-09

## 2019-09-09 ENCOUNTER — HOSPITAL ENCOUNTER (OUTPATIENT)
Dept: NUCLEAR MEDICINE | Facility: HOSPITAL | Age: 79
Discharge: HOME | End: 2019-09-09
Attending: INTERNAL MEDICINE
Payer: MEDICARE

## 2019-09-09 DIAGNOSIS — C34.91 BILATERAL LUNG CANCER (HCC): ICD-10-CM

## 2019-09-09 DIAGNOSIS — C34.92 BILATERAL LUNG CANCER (HCC): ICD-10-CM

## 2019-09-09 DIAGNOSIS — C85.80 MARGINAL ZONE B-CELL LYMPHOMA (HCC): ICD-10-CM

## 2019-09-09 LAB — GLUCOSE BLD-MCNC: 85 MG/DL (ref 70–99)

## 2019-09-09 PROCEDURE — 78815 PET IMAGE W/CT SKULL-THIGH: CPT | Performed by: INTERNAL MEDICINE

## 2019-09-09 PROCEDURE — 82962 GLUCOSE BLOOD TEST: CPT

## 2019-09-10 ENCOUNTER — TELEPHONE (OUTPATIENT)
Dept: FAMILY MEDICINE CLINIC | Facility: CLINIC | Age: 79
End: 2019-09-10

## 2019-09-10 LAB
ALBUMIN SERPL-MCNC: 3.63 G/DL (ref 3.1–4.5)
ALBUMIN/GLOB SERPL: 0.61 {RATIO}
ALPHA1 GLOB SERPL ELPH-MCNC: 0.28 G/DL (ref 0.1–0.3)
ALPHA2 GLOB SERPL ELPH-MCNC: 1.18 G/DL (ref 0.6–1)
B-GLOBULIN SERPL ELPH-MCNC: 1.04 G/DL (ref 0.7–1.2)
GAMMA GLOB SERPL ELPH-MCNC: 3.48 G/DL (ref 0.6–1.6)
M PROTEIN MFR SERPL ELPH: 9.6 G/DL (ref 6.4–8.2)
M-SPIKE 1: 2.87 G/DL (ref ?–0)

## 2019-09-10 NOTE — TELEPHONE ENCOUNTER
Patient just wanted to let Dr Reed Haywood know that she is starting another round on the Z-fernando and prednisone. She also wanted him to know that she had her PET Scan yesterday and is seeing her oncologist on Thursday.

## 2019-09-11 ENCOUNTER — TELEPHONE (OUTPATIENT)
Dept: HEMATOLOGY/ONCOLOGY | Facility: HOSPITAL | Age: 79
End: 2019-09-11

## 2019-09-11 DIAGNOSIS — C34.92 BILATERAL LUNG CANCER (HCC): Primary | ICD-10-CM

## 2019-09-11 DIAGNOSIS — C34.91 BILATERAL LUNG CANCER (HCC): Primary | ICD-10-CM

## 2019-09-11 DIAGNOSIS — C85.80 MARGINAL ZONE B-CELL LYMPHOMA (HCC): ICD-10-CM

## 2019-09-12 ENCOUNTER — APPOINTMENT (OUTPATIENT)
Dept: HEMATOLOGY/ONCOLOGY | Facility: HOSPITAL | Age: 79
End: 2019-09-12
Attending: INTERNAL MEDICINE
Payer: MEDICARE

## 2019-09-12 RX ORDER — PREDNISONE 20 MG/1
40 TABLET ORAL DAILY
COMMUNITY
End: 2019-11-19

## 2019-09-13 ENCOUNTER — APPOINTMENT (OUTPATIENT)
Dept: LAB | Facility: HOSPITAL | Age: 79
End: 2019-09-13
Attending: INTERNAL MEDICINE
Payer: MEDICARE

## 2019-09-13 ENCOUNTER — HOSPITAL ENCOUNTER (OUTPATIENT)
Dept: CT IMAGING | Facility: HOSPITAL | Age: 79
Discharge: HOME | End: 2019-09-13
Attending: INTERNAL MEDICINE
Payer: MEDICARE

## 2019-09-13 VITALS
RESPIRATION RATE: 18 BRPM | SYSTOLIC BLOOD PRESSURE: 126 MMHG | HEART RATE: 66 BPM | WEIGHT: 133 LBS | BODY MASS INDEX: 26.11 KG/M2 | TEMPERATURE: 98 F | OXYGEN SATURATION: 96 % | DIASTOLIC BLOOD PRESSURE: 75 MMHG | HEIGHT: 60 IN

## 2019-09-13 DIAGNOSIS — C85.80 MARGINAL ZONE B-CELL LYMPHOMA (HCC): ICD-10-CM

## 2019-09-13 DIAGNOSIS — C34.92 BILATERAL LUNG CANCER (HCC): ICD-10-CM

## 2019-09-13 DIAGNOSIS — C34.91 BILATERAL LUNG CANCER (HCC): ICD-10-CM

## 2019-09-13 DIAGNOSIS — C34.91 BILATERAL LUNG CANCER (HCC): Primary | ICD-10-CM

## 2019-09-13 DIAGNOSIS — C34.92 BILATERAL LUNG CANCER (HCC): Primary | ICD-10-CM

## 2019-09-13 LAB
DEPRECATED RDW RBC AUTO: 57.3 FL (ref 35.1–46.3)
ERYTHROCYTE [DISTWIDTH] IN BLOOD BY AUTOMATED COUNT: 15.8 % (ref 11–15)
HCT VFR BLD AUTO: 32.8 % (ref 35–48)
HGB BLD-MCNC: 10.2 G/DL (ref 12–16)
INR BLD: 1.09 (ref 0.9–1.1)
MCH RBC QN AUTO: 30.8 PG (ref 26–34)
MCHC RBC AUTO-ENTMCNC: 31.1 G/DL (ref 31–37)
MCV RBC AUTO: 99.1 FL (ref 80–100)
PLATELET # BLD AUTO: 250 10(3)UL (ref 150–450)
PSA SERPL DL<=0.01 NG/ML-MCNC: 14.6 SECONDS (ref 12.5–14.7)
RBC # BLD AUTO: 3.31 X10(6)UL (ref 3.8–5.3)
WBC # BLD AUTO: 6.1 X10(3) UL (ref 4–11)

## 2019-09-13 PROCEDURE — 88307 TISSUE EXAM BY PATHOLOGIST: CPT | Performed by: INTERNAL MEDICINE

## 2019-09-13 PROCEDURE — 85610 PROTHROMBIN TIME: CPT

## 2019-09-13 PROCEDURE — 77012 CT SCAN FOR NEEDLE BIOPSY: CPT | Performed by: INTERNAL MEDICINE

## 2019-09-13 PROCEDURE — 85027 COMPLETE CBC AUTOMATED: CPT

## 2019-09-13 PROCEDURE — 88185 FLOWCYTOMETRY/TC ADD-ON: CPT | Performed by: INTERNAL MEDICINE

## 2019-09-13 PROCEDURE — 36415 COLL VENOUS BLD VENIPUNCTURE: CPT

## 2019-09-13 PROCEDURE — 88365 INSITU HYBRIDIZATION (FISH): CPT | Performed by: INTERNAL MEDICINE

## 2019-09-13 PROCEDURE — 88341 IMHCHEM/IMCYTCHM EA ADD ANTB: CPT | Performed by: INTERNAL MEDICINE

## 2019-09-13 PROCEDURE — 49180 BIOPSY ABDOMINAL MASS: CPT | Performed by: INTERNAL MEDICINE

## 2019-09-13 PROCEDURE — 99152 MOD SED SAME PHYS/QHP 5/>YRS: CPT | Performed by: INTERNAL MEDICINE

## 2019-09-13 PROCEDURE — 88184 FLOWCYTOMETRY/ TC 1 MARKER: CPT | Performed by: INTERNAL MEDICINE

## 2019-09-13 PROCEDURE — 88342 IMHCHEM/IMCYTCHM 1ST ANTB: CPT | Performed by: INTERNAL MEDICINE

## 2019-09-13 PROCEDURE — 38505 NEEDLE BIOPSY LYMPH NODES: CPT | Performed by: INTERNAL MEDICINE

## 2019-09-13 RX ORDER — NALOXONE HYDROCHLORIDE 0.4 MG/ML
80 INJECTION, SOLUTION INTRAMUSCULAR; INTRAVENOUS; SUBCUTANEOUS AS NEEDED
Status: DISCONTINUED | OUTPATIENT
Start: 2019-09-13 | End: 2019-09-15

## 2019-09-13 RX ORDER — MIDAZOLAM HYDROCHLORIDE 1 MG/ML
1 INJECTION INTRAMUSCULAR; INTRAVENOUS EVERY 5 MIN PRN
Status: ACTIVE | OUTPATIENT
Start: 2019-09-13 | End: 2019-09-13

## 2019-09-13 RX ORDER — SODIUM CHLORIDE 9 MG/ML
INJECTION, SOLUTION INTRAVENOUS CONTINUOUS
Status: DISCONTINUED | OUTPATIENT
Start: 2019-09-13 | End: 2019-09-15

## 2019-09-13 RX ORDER — FLUMAZENIL 0.1 MG/ML
0.2 INJECTION, SOLUTION INTRAVENOUS AS NEEDED
Status: DISCONTINUED | OUTPATIENT
Start: 2019-09-13 | End: 2019-09-15

## 2019-09-13 RX ORDER — MIDAZOLAM HYDROCHLORIDE 1 MG/ML
INJECTION INTRAMUSCULAR; INTRAVENOUS
Status: COMPLETED
Start: 2019-09-13 | End: 2019-09-13

## 2019-09-13 RX ADMIN — MIDAZOLAM HYDROCHLORIDE 1 MG: 1 INJECTION INTRAMUSCULAR; INTRAVENOUS at 11:11:00

## 2019-09-13 RX ADMIN — SODIUM CHLORIDE: 9 INJECTION, SOLUTION INTRAVENOUS at 11:05:00

## 2019-09-13 NOTE — IMAGING NOTE
Patient here for CT Mesenteric lymph node biopsy with Dr. Zoila Fairbanks. Consent Signed. Patient/ Family verbalized understanding. Patient tolerated procedure well. Safety protocols maintained. See paper and electronic chart for details.  Report called to [address] [place] [address] x

## 2019-09-18 LAB
CD10 CELLS NFR SPEC: 2 %
CD11C CELLS NFR SPEC: 4 %
CD14 CELLS NFR SPEC: <1 %
CD19 CELLS NFR SPEC: 63 %
CD19/CD10 CELLS: <1 %
CD20 CELLS NFR SPEC: 61 %
CD22 CELLS NFR SPEC: 55 %
CD23 CELLS NFR SPEC: 1 %
CD3 CELLS NFR SPEC: 40 %
CD3+CD4+ CELLS NFR SPEC: 34 %
CD3+CD4+ CELLS/CD3+CD8+ CLL SPEC: 6.8
CD3+CD8+ CELLS NFR SPEC: 5 %
CD45 CELLS NFR SPEC: 100 %
CD5 CELLS NFR SPEC: 40 %
CD5/CD19 CELLS: 1 %
CD56 CELLS NFR SPEC: <1 %
CD7 CELLS NFR SPEC: 26 %
CELL SURF KAPPA/LAMBDA RATIO: <0.1
CELL SURF LAMBDA LIGHT CHAIN: 60 %
CELL SURFACE KAPPA LIGHT CHAIN: 2 %
FMC7 CELLS NFR SPEC: 51 %

## 2019-09-19 ENCOUNTER — TELEPHONE (OUTPATIENT)
Dept: HEMATOLOGY/ONCOLOGY | Facility: HOSPITAL | Age: 79
End: 2019-09-19

## 2019-09-19 NOTE — TELEPHONE ENCOUNTER
Called her with biopsy results- still low grade NHL. Will plan on Rituxan. Requested for insurance authorization but told her to go ahead with teaching and blood draw for hepatitis.

## 2019-09-20 ENCOUNTER — OFFICE VISIT (OUTPATIENT)
Dept: HEMATOLOGY/ONCOLOGY | Age: 79
End: 2019-09-20
Attending: INTERNAL MEDICINE
Payer: MEDICARE

## 2019-09-20 ENCOUNTER — TELEPHONE (OUTPATIENT)
Dept: HEMATOLOGY/ONCOLOGY | Facility: HOSPITAL | Age: 79
End: 2019-09-20

## 2019-09-20 DIAGNOSIS — C85.10 LOW GRADE B-CELL LYMPHOMA (HCC): ICD-10-CM

## 2019-09-20 DIAGNOSIS — C34.81 MALIGNANT NEOPLASM OF OVERLAPPING SITES OF RIGHT LUNG (HCC): ICD-10-CM

## 2019-09-20 DIAGNOSIS — C88.4 EXTRANODAL MARGINAL ZONE B-CELL LYMPHOMA OF MUCOSA-ASSOCIATED LYMPHOID TISSUE (MALT) (HCC): Primary | ICD-10-CM

## 2019-09-20 DIAGNOSIS — C88.4 EXTRANODAL MARGINAL ZONE B-CELL LYMPHOMA OF MUCOSA-ASSOCIATED LYMPHOID TISSUE (MALT) (HCC): ICD-10-CM

## 2019-09-20 DIAGNOSIS — Z71.9 ENCOUNTER FOR HEALTH EDUCATION: Primary | ICD-10-CM

## 2019-09-20 LAB
HBV CORE AB SERPL QL IA: NONREACTIVE
HBV SURFACE AB SER QL: NONREACTIVE
HBV SURFACE AB SERPL IA-ACNC: <3.1 MIU/ML
HBV SURFACE AG SER-ACNC: <0.1 [IU]/L
HBV SURFACE AG SERPL QL IA: NONREACTIVE

## 2019-09-20 PROCEDURE — 99215 OFFICE O/P EST HI 40 MIN: CPT | Performed by: CLINICAL NURSE SPECIALIST

## 2019-09-20 NOTE — TELEPHONE ENCOUNTER
Spoke with patient, she will come to office today for the labs (Hepatitis panel) and chemo ed. She would like to get her Rituxan on Fridays as her son is off of work on Fridays to bring her.  I told her we would tentatively plan on starting on the 27th pend

## 2019-09-20 NOTE — PROGRESS NOTES
Chemotherapy Education    Learner:  Patient and Family Member    Barriers / Limitations:  None    Chemotherapy education goals:  · Learn the drug names  · Administration schedule  · Routes of administration  · Treatment setting    Drug names:  Rituximab Status:    Potential mood changes, depression, nervousness, difficulty sleeping:  Achieved    Importance of support system:  Achieved    Notify MD/RN of any emotional changes:  Achieved    Comments:    Vesicants / Irritants:    Potential extravasation at s

## 2019-09-27 ENCOUNTER — OFFICE VISIT (OUTPATIENT)
Dept: HEMATOLOGY/ONCOLOGY | Age: 79
End: 2019-09-27
Attending: INTERNAL MEDICINE
Payer: MEDICARE

## 2019-09-27 ENCOUNTER — SOCIAL WORK SERVICES (OUTPATIENT)
Dept: HEMATOLOGY/ONCOLOGY | Facility: HOSPITAL | Age: 79
End: 2019-09-27

## 2019-09-27 VITALS
HEART RATE: 86 BPM | HEIGHT: 60 IN | OXYGEN SATURATION: 98 % | SYSTOLIC BLOOD PRESSURE: 142 MMHG | WEIGHT: 138 LBS | TEMPERATURE: 97 F | RESPIRATION RATE: 16 BRPM | BODY MASS INDEX: 27.09 KG/M2 | DIASTOLIC BLOOD PRESSURE: 82 MMHG

## 2019-09-27 DIAGNOSIS — C34.81 MALIGNANT NEOPLASM OF OVERLAPPING SITES OF RIGHT LUNG (HCC): ICD-10-CM

## 2019-09-27 DIAGNOSIS — C88.4 EXTRANODAL MARGINAL ZONE B-CELL LYMPHOMA OF MUCOSA-ASSOCIATED LYMPHOID TISSUE (MALT) (HCC): Primary | ICD-10-CM

## 2019-09-27 DIAGNOSIS — C85.10 LOW GRADE B-CELL LYMPHOMA (HCC): ICD-10-CM

## 2019-09-27 PROCEDURE — 96415 CHEMO IV INFUSION ADDL HR: CPT

## 2019-09-27 PROCEDURE — 96413 CHEMO IV INFUSION 1 HR: CPT

## 2019-09-27 RX ORDER — DIPHENHYDRAMINE HCL 25 MG
25 CAPSULE ORAL ONCE
Status: CANCELLED | OUTPATIENT
Start: 2019-10-04

## 2019-09-27 RX ORDER — DIPHENHYDRAMINE HCL 25 MG
25 CAPSULE ORAL ONCE
Status: CANCELLED | OUTPATIENT
Start: 2019-10-18

## 2019-09-27 RX ORDER — ACETAMINOPHEN 325 MG/1
650 TABLET ORAL ONCE
Status: CANCELLED | OUTPATIENT
Start: 2019-10-18

## 2019-09-27 RX ORDER — DIPHENHYDRAMINE HCL 25 MG
50 CAPSULE ORAL ONCE
Status: COMPLETED | OUTPATIENT
Start: 2019-09-27 | End: 2019-09-27

## 2019-09-27 RX ORDER — ACETAMINOPHEN 325 MG/1
650 TABLET ORAL ONCE
Status: CANCELLED | OUTPATIENT
Start: 2019-10-11

## 2019-09-27 RX ORDER — ACETAMINOPHEN 325 MG/1
650 TABLET ORAL ONCE
Status: COMPLETED | OUTPATIENT
Start: 2019-09-27 | End: 2019-09-27

## 2019-09-27 RX ORDER — ACETAMINOPHEN 325 MG/1
650 TABLET ORAL ONCE
Status: CANCELLED | OUTPATIENT
Start: 2019-10-04

## 2019-09-27 RX ORDER — DIPHENHYDRAMINE HCL 25 MG
25 CAPSULE ORAL ONCE
Status: CANCELLED | OUTPATIENT
Start: 2019-10-11

## 2019-09-27 RX ADMIN — DIPHENHYDRAMINE HCL 50 MG: 25 MG CAPSULE ORAL at 09:34:00

## 2019-09-27 RX ADMIN — ACETAMINOPHEN 325 MG: 325 TABLET ORAL at 09:34:00

## 2019-09-27 NOTE — PROGRESS NOTES
SW met with patient and son to discuss available resources and provide information on role of SW. Patient reports that she has taken two oral chemos in the past and has been a patient here for awhile, but is starting a new IV chemo.  Patients son reports th

## 2019-09-27 NOTE — PROGRESS NOTES
Pt here for C1D1.   Arrives Ambulating independently, accompanied by Family member           Patient denies possible pregnancy since last therapy cycle: Not Applicable    Modifications in dose or schedule: No     Frequency of blood return and site check thr

## 2019-09-30 ENCOUNTER — DOCUMENTATION ONLY (OUTPATIENT)
Dept: HEMATOLOGY/ONCOLOGY | Facility: HOSPITAL | Age: 79
End: 2019-09-30

## 2019-09-30 NOTE — PROGRESS NOTES
Date of Treatment: 9/27/19                                Type of Chemo: Weekly Rituxan    Comments: Felt tired over the weekend, otherwise no other issues. Feeling better now.     Recommendations: patient advised to call for any treatment related issues/qu

## 2019-10-01 ENCOUNTER — IMMUNIZATION (OUTPATIENT)
Dept: FAMILY MEDICINE CLINIC | Facility: CLINIC | Age: 79
End: 2019-10-01
Payer: MEDICARE

## 2019-10-01 DIAGNOSIS — Z23 NEED FOR VACCINATION: ICD-10-CM

## 2019-10-01 PROCEDURE — 90662 IIV NO PRSV INCREASED AG IM: CPT | Performed by: FAMILY MEDICINE

## 2019-10-01 PROCEDURE — G0008 ADMIN INFLUENZA VIRUS VAC: HCPCS | Performed by: FAMILY MEDICINE

## 2019-10-04 ENCOUNTER — OFFICE VISIT (OUTPATIENT)
Dept: HEMATOLOGY/ONCOLOGY | Age: 79
End: 2019-10-04
Attending: INTERNAL MEDICINE
Payer: MEDICARE

## 2019-10-04 VITALS
HEIGHT: 60 IN | OXYGEN SATURATION: 97 % | SYSTOLIC BLOOD PRESSURE: 147 MMHG | BODY MASS INDEX: 26.68 KG/M2 | DIASTOLIC BLOOD PRESSURE: 74 MMHG | WEIGHT: 135.88 LBS | TEMPERATURE: 98 F | HEART RATE: 90 BPM | RESPIRATION RATE: 18 BRPM

## 2019-10-04 DIAGNOSIS — C88.4 EXTRANODAL MARGINAL ZONE B-CELL LYMPHOMA OF MUCOSA-ASSOCIATED LYMPHOID TISSUE (MALT) (HCC): ICD-10-CM

## 2019-10-04 DIAGNOSIS — C34.91 BILATERAL LUNG CANCER (HCC): ICD-10-CM

## 2019-10-04 DIAGNOSIS — C85.10 LOW GRADE B-CELL LYMPHOMA (HCC): ICD-10-CM

## 2019-10-04 DIAGNOSIS — C88.4 EXTRANODAL MARGINAL ZONE B-CELL LYMPHOMA OF MUCOSA-ASSOCIATED LYMPHOID TISSUE (MALT) (HCC): Primary | ICD-10-CM

## 2019-10-04 DIAGNOSIS — N18.30 STAGE 3 CHRONIC KIDNEY DISEASE (HCC): ICD-10-CM

## 2019-10-04 DIAGNOSIS — C85.80 MARGINAL ZONE B-CELL LYMPHOMA (HCC): ICD-10-CM

## 2019-10-04 DIAGNOSIS — C34.92 BILATERAL LUNG CANCER (HCC): ICD-10-CM

## 2019-10-04 DIAGNOSIS — Z51.11 ENCOUNTER FOR CHEMOTHERAPY MANAGEMENT: Primary | ICD-10-CM

## 2019-10-04 DIAGNOSIS — C34.81 MALIGNANT NEOPLASM OF OVERLAPPING SITES OF RIGHT LUNG (HCC): ICD-10-CM

## 2019-10-04 DIAGNOSIS — D63.0 ANEMIA IN NEOPLASTIC DISEASE: ICD-10-CM

## 2019-10-04 PROCEDURE — 96413 CHEMO IV INFUSION 1 HR: CPT

## 2019-10-04 PROCEDURE — 99215 OFFICE O/P EST HI 40 MIN: CPT | Performed by: CLINICAL NURSE SPECIALIST

## 2019-10-04 PROCEDURE — 36415 COLL VENOUS BLD VENIPUNCTURE: CPT

## 2019-10-04 PROCEDURE — 96415 CHEMO IV INFUSION ADDL HR: CPT

## 2019-10-04 RX ORDER — ACETAMINOPHEN 325 MG/1
650 TABLET ORAL ONCE
Status: COMPLETED | OUTPATIENT
Start: 2019-10-04 | End: 2019-10-04

## 2019-10-04 RX ORDER — DIPHENHYDRAMINE HCL 25 MG
25 CAPSULE ORAL ONCE
Status: COMPLETED | OUTPATIENT
Start: 2019-10-04 | End: 2019-10-04

## 2019-10-04 RX ADMIN — ACETAMINOPHEN 650 MG: 325 TABLET ORAL at 09:05:00

## 2019-10-04 RX ADMIN — DIPHENHYDRAMINE HCL 25 MG: 25 MG CAPSULE ORAL at 09:05:00

## 2019-10-04 NOTE — PROGRESS NOTES
Pt here for C1D8.   Arrives Ambulating independently, accompanied by Self           Patient denies possible pregnancy since last therapy cycle: Not Applicable    Modifications in dose or schedule: Yes     Frequency of blood return and site check throughout

## 2019-10-04 NOTE — PATIENT INSTRUCTIONS
For Dr. Osmany Palomares nurse line, call [phone] with any questions or concerns,  Monday through Friday 8:00 to 4:30, After hours or weekends for urgent needs.

## 2019-10-04 NOTE — PROGRESS NOTES
ANP Visit Note    Patient Name: Parvin Clark   YOB: 1940   Medical Record Number: [id number]   CSN: [id number]   Date of visit: 10/4/2019   Ronn Cao DO   No primary care provider on file.      Chief Complaint/Reason for Visit:  Low of the RUL s/p right thoracotomy with right upper lobectomy and mediastinal lymphadenectomy on 09-.      3. Right sided PE diagnosed 9/2011- seen on surveillance scans. Was placed on anticoagulation during that time. Now off.      4.  Iron deficiency difficile colitis     Hypokalemia     Dysphagia, pharyngoesophageal phase     Aortic calcification (HCC)     Diverticulitis of large intestine without perforation or abscess without bleeding     Diverticulitis     COPD (chronic obstructive pulmonary diseas Hypertension Father        Social History:  Social History    Socioeconomic History      Marital status:        Spouse name: Not on file      Number of children: Not on file      Years of education: Not on file      Highest education level: Not on fi predniSONE 20 MG Oral Tab, Take 40 mg by mouth daily. , Disp: , Rfl:   •  AMLODIPINE BESYLATE 2.5 MG Oral Tab, TAKE 1 TABLET BY MOUTH ONCE DAILY, Disp: 90 tablet, Rfl: 3  •  Ipratropium Bromide 0.03 % Nasal Solution, 1 spray by Nasal route 2 (two) times dain distress. Vital Signs: Oncology Vitals 10/4/2019   Height 5' 0\"   Height 152 cm   Weight 135 lb 14.4 oz   Weight 61.644 kg   BSA (m2) 1.58 m2   /74   Pulse 90   Resp 18   Temp 97.8   SpO2 97   Pain Score 0     HEENT: EOMs intact. PERRL.  Jordana Vazquez weekly doses. 2. H/O mulitple lung cancers: Recent PET negative for lung  3. Anemia: multifactorial, stable today, will continue to monitor. 4. CKD: stable, continue to monitor will check BMP next week  5. HTN: stable  6. Frequent infections:  IgG low, c

## 2019-10-11 ENCOUNTER — OFFICE VISIT (OUTPATIENT)
Dept: HEMATOLOGY/ONCOLOGY | Age: 79
End: 2019-10-11
Attending: INTERNAL MEDICINE
Payer: MEDICARE

## 2019-10-11 VITALS
WEIGHT: 136.13 LBS | RESPIRATION RATE: 18 BRPM | SYSTOLIC BLOOD PRESSURE: 115 MMHG | HEART RATE: 96 BPM | DIASTOLIC BLOOD PRESSURE: 73 MMHG | TEMPERATURE: 98 F | BODY MASS INDEX: 27 KG/M2 | OXYGEN SATURATION: 94 %

## 2019-10-11 DIAGNOSIS — C34.81 MALIGNANT NEOPLASM OF OVERLAPPING SITES OF RIGHT LUNG (HCC): ICD-10-CM

## 2019-10-11 DIAGNOSIS — C85.80 MARGINAL ZONE B-CELL LYMPHOMA (HCC): ICD-10-CM

## 2019-10-11 DIAGNOSIS — C85.10 LOW GRADE B-CELL LYMPHOMA (HCC): ICD-10-CM

## 2019-10-11 DIAGNOSIS — C88.4 EXTRANODAL MARGINAL ZONE B-CELL LYMPHOMA OF MUCOSA-ASSOCIATED LYMPHOID TISSUE (MALT) (HCC): Primary | ICD-10-CM

## 2019-10-11 DIAGNOSIS — N18.30 STAGE 3 CHRONIC KIDNEY DISEASE (HCC): ICD-10-CM

## 2019-10-11 PROCEDURE — 96415 CHEMO IV INFUSION ADDL HR: CPT

## 2019-10-11 PROCEDURE — 80053 COMPREHEN METABOLIC PANEL: CPT

## 2019-10-11 PROCEDURE — 36415 COLL VENOUS BLD VENIPUNCTURE: CPT

## 2019-10-11 PROCEDURE — 85025 COMPLETE CBC W/AUTO DIFF WBC: CPT

## 2019-10-11 PROCEDURE — 96413 CHEMO IV INFUSION 1 HR: CPT

## 2019-10-11 RX ORDER — DIPHENHYDRAMINE HCL 25 MG
25 CAPSULE ORAL ONCE
Status: COMPLETED | OUTPATIENT
Start: 2019-10-11 | End: 2019-10-11

## 2019-10-11 RX ORDER — ACETAMINOPHEN 325 MG/1
650 TABLET ORAL ONCE
Status: COMPLETED | OUTPATIENT
Start: 2019-10-11 | End: 2019-10-11

## 2019-10-11 RX ADMIN — DIPHENHYDRAMINE HCL 25 MG: 25 MG CAPSULE ORAL at 10:31:00

## 2019-10-11 RX ADMIN — ACETAMINOPHEN 650 MG: 325 TABLET ORAL at 10:31:00

## 2019-10-11 NOTE — PROGRESS NOTES
Pt here for rituxan.   Arrives Ambulating independently, accompanied by family          Modifications in dose or schedule: No     Frequency of blood return and site check throughout administration: Prior to administration   Discharged to [facility] in

## 2019-10-14 ENCOUNTER — OFFICE VISIT (OUTPATIENT)
Dept: FAMILY MEDICINE CLINIC | Facility: CLINIC | Age: 79
End: 2019-10-14
Payer: MEDICARE

## 2019-10-14 VITALS
DIASTOLIC BLOOD PRESSURE: 96 MMHG | OXYGEN SATURATION: 98 % | HEART RATE: 92 BPM | HEIGHT: 60 IN | SYSTOLIC BLOOD PRESSURE: 146 MMHG | BODY MASS INDEX: 26.31 KG/M2 | RESPIRATION RATE: 16 BRPM | WEIGHT: 134 LBS

## 2019-10-14 DIAGNOSIS — M23.92 INTERNAL DERANGEMENT OF LEFT KNEE: Primary | ICD-10-CM

## 2019-10-14 PROCEDURE — 99213 OFFICE O/P EST LOW 20 MIN: CPT | Performed by: FAMILY MEDICINE

## 2019-10-14 NOTE — PROGRESS NOTES
Recently saw Dr. Man Ochoa for sudden onset pain in the anterior medial left knee denies significant trauma denies instability    Exam gait is unremarkable positive noninflamed effusions noted. Negative drawer sign.   Popliteal space is spared    The assessm

## 2019-10-18 ENCOUNTER — OFFICE VISIT (OUTPATIENT)
Dept: HEMATOLOGY/ONCOLOGY | Age: 79
End: 2019-10-18
Attending: INTERNAL MEDICINE
Payer: MEDICARE

## 2019-10-18 VITALS
OXYGEN SATURATION: 94 % | DIASTOLIC BLOOD PRESSURE: 80 MMHG | SYSTOLIC BLOOD PRESSURE: 153 MMHG | BODY MASS INDEX: 26.8 KG/M2 | TEMPERATURE: 97 F | WEIGHT: 136.5 LBS | HEIGHT: 60 IN | HEART RATE: 91 BPM | RESPIRATION RATE: 18 BRPM

## 2019-10-18 DIAGNOSIS — C85.10 LOW GRADE B-CELL LYMPHOMA (HCC): ICD-10-CM

## 2019-10-18 DIAGNOSIS — C88.4 EXTRANODAL MARGINAL ZONE B-CELL LYMPHOMA OF MUCOSA-ASSOCIATED LYMPHOID TISSUE (MALT) (HCC): Primary | ICD-10-CM

## 2019-10-18 DIAGNOSIS — C34.81 MALIGNANT NEOPLASM OF OVERLAPPING SITES OF RIGHT LUNG (HCC): ICD-10-CM

## 2019-10-18 PROCEDURE — 36415 COLL VENOUS BLD VENIPUNCTURE: CPT

## 2019-10-18 PROCEDURE — 85025 COMPLETE CBC W/AUTO DIFF WBC: CPT

## 2019-10-18 PROCEDURE — 80053 COMPREHEN METABOLIC PANEL: CPT

## 2019-10-18 PROCEDURE — 96413 CHEMO IV INFUSION 1 HR: CPT

## 2019-10-18 RX ORDER — ACETAMINOPHEN 325 MG/1
650 TABLET ORAL ONCE
Status: COMPLETED | OUTPATIENT
Start: 2019-10-18 | End: 2019-10-18

## 2019-10-18 RX ORDER — DIPHENHYDRAMINE HCL 25 MG
25 CAPSULE ORAL ONCE
Status: COMPLETED | OUTPATIENT
Start: 2019-10-18 | End: 2019-10-18

## 2019-10-18 RX ADMIN — DIPHENHYDRAMINE HCL 25 MG: 25 MG CAPSULE ORAL at 09:09:00

## 2019-10-18 RX ADMIN — ACETAMINOPHEN 650 MG: 325 TABLET ORAL at 09:09:00

## 2019-10-18 NOTE — PROGRESS NOTES
Pt here for C1D22.   Arrives Ambulating independently, accompanied by Son           Modifications in dose or schedule: No     Frequency of blood return and site check throughout administration: Prior to administration   Discharged to Home, Ambulating indepe

## 2019-10-29 RX ORDER — METOPROLOL SUCCINATE 100 MG/1
TABLET, EXTENDED RELEASE ORAL
Qty: 90 TABLET | Refills: 3 | Status: SHIPPED | OUTPATIENT
Start: 2019-10-29 | End: 2020-10-06

## 2019-11-19 ENCOUNTER — OFFICE VISIT (OUTPATIENT)
Dept: HEMATOLOGY/ONCOLOGY | Age: 79
End: 2019-11-19
Attending: INTERNAL MEDICINE
Payer: MEDICARE

## 2019-11-19 VITALS
TEMPERATURE: 98 F | SYSTOLIC BLOOD PRESSURE: 119 MMHG | WEIGHT: 131.81 LBS | BODY MASS INDEX: 26 KG/M2 | HEART RATE: 88 BPM | OXYGEN SATURATION: 95 % | RESPIRATION RATE: 18 BRPM | DIASTOLIC BLOOD PRESSURE: 75 MMHG

## 2019-11-19 DIAGNOSIS — C88.4 EXTRANODAL MARGINAL ZONE B-CELL LYMPHOMA OF MUCOSA-ASSOCIATED LYMPHOID TISSUE (MALT) (HCC): ICD-10-CM

## 2019-11-19 DIAGNOSIS — C34.91 BILATERAL LUNG CANCER (HCC): ICD-10-CM

## 2019-11-19 DIAGNOSIS — D63.0 ANEMIA IN NEOPLASTIC DISEASE: ICD-10-CM

## 2019-11-19 DIAGNOSIS — N18.30 STAGE 3 CHRONIC KIDNEY DISEASE (HCC): ICD-10-CM

## 2019-11-19 DIAGNOSIS — C34.90 EGFR-RELATED LUNG CANCER (HCC): ICD-10-CM

## 2019-11-19 DIAGNOSIS — C34.92 BILATERAL LUNG CANCER (HCC): ICD-10-CM

## 2019-11-19 DIAGNOSIS — C34.81 MALIGNANT NEOPLASM OF OVERLAPPING SITES OF RIGHT LUNG (HCC): Primary | ICD-10-CM

## 2019-11-19 DIAGNOSIS — D47.2 MONOCLONAL PARAPROTEINEMIA: ICD-10-CM

## 2019-11-19 PROCEDURE — 99215 OFFICE O/P EST HI 40 MIN: CPT | Performed by: INTERNAL MEDICINE

## 2019-11-19 NOTE — PROGRESS NOTES
Cancer Center Progress Note    Patient Name: Melissa Murphy   YOB: 1940   Medical Record Number: [id number]   CSN: [id number]   Attending Physician: Kristal Arevalo M.D.    Referring Physician: DO Dr. Marvin Stinson    Date PE diagnosed 9/2011- seen on surveillance scans. Was placed on anticoagulation during that time. Now off.     4. Iron deficiency anemia in 2016- had EGD/c-scope and capsule endoscopy which were unrevealing. Malabsorption?       History of Present Illness: MCG/INH Inhalation Aerosol Powder, Breath Activated, 1 puff daily. , Disp: , Rfl:   •  Montelukast Sodium (SINGULAIR) 10 MG Oral Tab, Take 10 mg by mouth nightly., Disp: , Rfl:   •  Saline Nasal Spray 0.65 % Nasal Solution, 1 spray by Nasal route as needed SURGICAL HISTORY  12/05/2017    Cysto- Dr. Sofy Becker  2009    removal of upper right lobe   • TONSILLECTOMY         Family Medical History:  Family History   Problem Relation Age of Onset   • Other (immune system) Mother    • Cancer Hobby Hazards: Not Asked        Sleep Concern: Not Asked        Stress Concern: Not Asked        Weight Concern: Not Asked        Special Diet: Not Asked        Back Care: Not Asked        Exercise: No        Bike Helmet: Not Asked        Seat Belt: Not As 11/19/2019    EOPERCENT 1.3 11/19/2019    BAPERCENT 0.9 11/19/2019    NE 2.86 11/19/2019    LYMABS 1.05 11/19/2019    MOABSO 0.50 11/19/2019    EOABSO 0.06 11/19/2019    BAABSO 0.04 11/19/2019     Lab Results   Component Value Date     (H) 11/19/201 COMPARISON:  [facility] , PET/CT STANDARD BODY SCAN (ONCOLOGY) (CPT=78815), 1/19/2018, 12:33. INDICATIONS:  C85.80 Other specified types of non-hodgkin lymphoma, unspecified site C34.92 Malignant neoplasm of unspecified part of left bronchus or lung C34. lobe is mildly larger in size with an increase in SUV max of 5.1 was 4.3.     3. Retrocaval lymph nodes are relatively stable in size with stable uptake. 4. Left lung apical ground graft nodule has mildly increased in density since prior examination. on image 67, previously 2.9 x 2.2 cm. Right lateral basilar nodule measures up to 4 mm in thickness, previously 6 mm. Stable 9 mm left upper lobe ground-glass opacity on   image 47.  Irregular bronchiectasis and micro nodular appearance of right lower lobe bowel distention or bowel wall thickening. Normal appendix. Uncomplicated sigmoid diverticula. ABDOMINAL WALL:  Stable bilateral fat containing inguinal hernias. URINARY BLADDER:  No visible focal wall thickening, lesion, or calculus.     PELVIC NODES: Final Diagnosis Comment    The biopsy shows a diffuse atypical lymphoid infiltrate composed predominantly of small lymphoid cells.   They have clumped chromatin, inconspicuous nucleoli, variable amount of cytoplasm, and round to slightly irregular nucl Necessity    Immunohistochemical stains were performed: To evaluate the atypical lymphoid infiltrate                   Positive tissue controls were utilized in the staining process.   These slides were reviewed by the signout Pathologist a with the patient's name, medical record number, abdomen, PET-positive mesenteric lymph node, 18-gauge cores x 4, received in formalin: Specimen consists of multiple fragments of pink to yellow-tan needle core biopsies ranging from 0.3 to 1.0 cm in length, From her lymphoma on top of renal and chronic disease. Had previous h/o BRANNON and had a GI w/u that was unrevealing. Hb has been stable >10       4. CKD-  Unlikely from paraproteinemia as had improved despite being off treatment for (1). Renal following.  Wi

## 2019-11-19 NOTE — PROGRESS NOTES
Outpatient Oncology Care Plan  Problem list:  fatigue  sinus - cough, urinating at night, trouble sleeping, naps during the day, fatigued    Problems related to:    self care    Interventions:  provided general teaching    Expected outcomes:  prashant p

## 2019-11-24 ENCOUNTER — HOSPITAL ENCOUNTER (OUTPATIENT)
Dept: CT IMAGING | Age: 79
Discharge: HOME | End: 2019-11-24
Attending: INTERNAL MEDICINE
Payer: MEDICARE

## 2019-11-24 DIAGNOSIS — C34.81 MALIGNANT NEOPLASM OF OVERLAPPING SITES OF RIGHT LUNG (HCC): ICD-10-CM

## 2019-11-24 DIAGNOSIS — C88.4 EXTRANODAL MARGINAL ZONE B-CELL LYMPHOMA OF MUCOSA-ASSOCIATED LYMPHOID TISSUE (MALT) (HCC): ICD-10-CM

## 2019-11-24 PROCEDURE — 71250 CT THORAX DX C-: CPT | Performed by: INTERNAL MEDICINE

## 2019-11-24 PROCEDURE — 74176 CT ABD & PELVIS W/O CONTRAST: CPT | Performed by: INTERNAL MEDICINE

## 2019-11-25 ENCOUNTER — TELEPHONE (OUTPATIENT)
Dept: FAMILY MEDICINE CLINIC | Facility: CLINIC | Age: 79
End: 2019-11-25

## 2019-11-25 ENCOUNTER — TELEPHONE (OUTPATIENT)
Dept: HEMATOLOGY/ONCOLOGY | Facility: HOSPITAL | Age: 79
End: 2019-11-25

## 2019-11-25 NOTE — TELEPHONE ENCOUNTER
Pt is requesting a recommendation by Dr. Eunice Moscoso for an ENT, pt does NOT want Dr. Thang Tucker. Please call and advise.

## 2019-11-25 NOTE — TELEPHONE ENCOUNTER
Pt has seen Dr. Geovanna Baxtre in past, but not having good results,  now pt wants second opinion to have \"balloon procedure\" to streach openings of nostril. Pt states problem is with drainage down throat. And does not want procedure.  Wants to discuss with diff

## 2019-11-26 NOTE — TELEPHONE ENCOUNTER
Spoke w/ pt. Gave her PCP recommendaation for ENT and contact info. Verbalized understanding.     Ward Alanbaylee   Phone: [phone]

## 2019-12-04 ENCOUNTER — TELEPHONE (OUTPATIENT)
Dept: HEMATOLOGY/ONCOLOGY | Facility: HOSPITAL | Age: 79
End: 2019-12-04

## 2019-12-04 NOTE — TELEPHONE ENCOUNTER
CT reviewed at [facility]. Consensus was to proceed with bronch. Discussed this with her. She verbalized understanding.

## 2019-12-16 ENCOUNTER — ANESTHESIA EVENT (OUTPATIENT)
Dept: ENDOSCOPY | Facility: HOSPITAL | Age: 79
DRG: 987 | End: 2019-12-16
Payer: MEDICARE

## 2019-12-17 ENCOUNTER — HOSPITAL ENCOUNTER (INPATIENT)
Facility: HOSPITAL | Age: 79
LOS: 3 days | Discharge: HOME HEALTH | DRG: 987 | End: 2019-12-20
Attending: INTERNAL MEDICINE | Admitting: INTERNAL MEDICINE
Payer: MEDICARE

## 2019-12-17 ENCOUNTER — APPOINTMENT (OUTPATIENT)
Dept: GENERAL RADIOLOGY | Facility: HOSPITAL | Age: 79
DRG: 987 | End: 2019-12-17
Attending: INTERNAL MEDICINE
Payer: MEDICARE

## 2019-12-17 ENCOUNTER — ANESTHESIA (OUTPATIENT)
Dept: ENDOSCOPY | Facility: HOSPITAL | Age: 79
DRG: 987 | End: 2019-12-17
Payer: MEDICARE

## 2019-12-17 PROBLEM — Z99.81 DEPENDENCE ON SUPPLEMENTAL OXYGEN: Status: ACTIVE | Noted: 2019-12-17

## 2019-12-17 LAB
ALLENS TEST: POSITIVE
ANION GAP SERPL CALC-SCNC: 10 MMOL/L (ref 0–18)
APTT PPP: <16 SECONDS (ref 25.4–36.1)
ARTERIAL BLD GAS O2 SATURATION: 97 % (ref 92–100)
ARTERIAL BLOOD GAS BASE EXCESS: -4.6 MMOL/L (ref ?–2)
ARTERIAL BLOOD GAS HCO3: 20.6 MEQ/L (ref 22–26)
ARTERIAL BLOOD GAS PCO2: 38 MM HG (ref 35–45)
ARTERIAL BLOOD GAS PH: 7.35 (ref 7.35–7.45)
ARTERIAL BLOOD GAS PO2: 149 MM HG (ref 80–105)
BASOPHIL BRONCHIAL WASHING: 0 %
BASOPHILS # BLD AUTO: 0.02 X10(3) UL (ref 0–0.2)
BASOPHILS NFR BLD AUTO: 0.2 %
BUN BLD-MCNC: 40 MG/DL (ref 7–18)
BUN/CREAT SERPL: 22.3 (ref 10–20)
CALCIUM BLD-MCNC: 10.4 MG/DL (ref 8.5–10.1)
CALCULATED O2 SATURATION: 99 % (ref 92–100)
CARBOXYHEMOGLOBIN: 1.3 % SAT (ref 0–3)
CHLORIDE SERPL-SCNC: 110 MMOL/L (ref 98–112)
CO2 SERPL-SCNC: 22 MMOL/L (ref 21–32)
CREAT BLD-MCNC: 1.79 MG/DL (ref 0.55–1.02)
DEPRECATED RDW RBC AUTO: 54.1 FL (ref 35.1–46.3)
EOSINOPHIL # BLD AUTO: 0 X10(3) UL (ref 0–0.7)
EOSINOPHIL BRONCHIAL WASHING: 1 %
EOSINOPHIL NFR BLD AUTO: 0 %
ERYTHROCYTE [DISTWIDTH] IN BLOOD BY AUTOMATED COUNT: 14.6 % (ref 11–15)
GLUCOSE BLD-MCNC: 109 MG/DL (ref 70–99)
HCT VFR BLD AUTO: 32.5 % (ref 35–48)
HGB BLD-MCNC: 10 G/DL (ref 12–16)
IMM GRANULOCYTES # BLD AUTO: 0.03 X10(3) UL (ref 0–1)
IMM GRANULOCYTES NFR BLD: 0.3 %
INR BLD: 1.18 (ref 0.9–1.1)
IONIZED CALCIUM: 1.3 MMOL/L (ref 1.12–1.32)
L/M: 6 L/MIN
LACTIC ACID ARTERIAL: 2 MMOL/L (ref 0.5–2)
LYMPHOCYTE BRONCHIAL WASHING: 5 %
LYMPHOCYTES # BLD AUTO: 0.56 X10(3) UL (ref 1–4)
LYMPHOCYTES NFR BLD AUTO: 4.8 %
MCH RBC QN AUTO: 30.9 PG (ref 26–34)
MCHC RBC AUTO-ENTMCNC: 30.8 G/DL (ref 31–37)
MCV RBC AUTO: 100.3 FL (ref 80–100)
METHEMOGLOBIN: 0.6 % SAT (ref 0.4–1.5)
MON/MACROPHAGE BRONCHIAL WASH: 9 %
MONOCYTES # BLD AUTO: 0.68 X10(3) UL (ref 0.1–1)
MONOCYTES NFR BLD AUTO: 5.8 %
NEUTROPHILS # BLD AUTO: 10.45 X10 (3) UL (ref 1.5–7.7)
NEUTROPHILS # BLD AUTO: 10.45 X10(3) UL (ref 1.5–7.7)
NEUTROPHILS BRONCHIAL WASHING: 84 %
NEUTROPHILS NFR BLD AUTO: 88.9 %
OSMOLALITY SERPL CALC.SUM OF ELEC: 304 MOSM/KG (ref 275–295)
OTHER CELL BRONCHIAL WASHING: 1 %
PATIENT TEMPERATURE: 98.6 F
PLATELET # BLD AUTO: 208 10(3)UL (ref 150–450)
PLATELET MORPHOLOGY: NORMAL
POTASSIUM BLOOD GAS: 4 MMOL/L (ref 3.6–5.1)
POTASSIUM SERPL-SCNC: 4 MMOL/L (ref 3.5–5.1)
PSA SERPL DL<=0.01 NG/ML-MCNC: 15.6 SECONDS (ref 12.5–14.7)
RBC # BLD AUTO: 3.24 X10(6)UL (ref 3.8–5.3)
RBC # FLD: 2241.5 /MM3
RBC BRONCH FOR MAN CT: NORMAL /MM3
ROULEAUX BLD QL SMEAR: PRESENT
SODIUM BLOOD GAS: 145 MMOL/L (ref 136–144)
SODIUM SERPL-SCNC: 142 MMOL/L (ref 136–145)
TOTAL CELLS COUNTED: 100
TOTAL HEMOGLOBIN: 10.4 G/DL (ref 11.7–16)
WBC # BLD AUTO: 11.7 X10(3) UL (ref 4–11)

## 2019-12-17 PROCEDURE — 99291 CRITICAL CARE FIRST HOUR: CPT | Performed by: HOSPITALIST

## 2019-12-17 PROCEDURE — 0BBF8ZX EXCISION OF RIGHT LOWER LUNG LOBE, VIA NATURAL OR ARTIFICIAL OPENING ENDOSCOPIC, DIAGNOSTIC: ICD-10-PCS | Performed by: INTERNAL MEDICINE

## 2019-12-17 PROCEDURE — 99223 1ST HOSP IP/OBS HIGH 75: CPT | Performed by: HOSPITALIST

## 2019-12-17 PROCEDURE — 71045 X-RAY EXAM CHEST 1 VIEW: CPT | Performed by: INTERNAL MEDICINE

## 2019-12-17 PROCEDURE — 0B9F8ZX DRAINAGE OF RIGHT LOWER LUNG LOBE, VIA NATURAL OR ARTIFICIAL OPENING ENDOSCOPIC, DIAGNOSTIC: ICD-10-PCS | Performed by: INTERNAL MEDICINE

## 2019-12-17 PROCEDURE — 07978ZX DRAINAGE OF THORAX LYMPHATIC, VIA NATURAL OR ARTIFICIAL OPENING ENDOSCOPIC APPROACH, DIAGNOSTIC: ICD-10-PCS | Performed by: INTERNAL MEDICINE

## 2019-12-17 RX ORDER — IPRATROPIUM BROMIDE AND ALBUTEROL SULFATE 2.5; .5 MG/3ML; MG/3ML
3 SOLUTION RESPIRATORY (INHALATION) ONCE
Status: DISCONTINUED | OUTPATIENT
Start: 2019-12-17 | End: 2019-12-17

## 2019-12-17 RX ORDER — SODIUM CHLORIDE, SODIUM LACTATE, POTASSIUM CHLORIDE, CALCIUM CHLORIDE 600; 310; 30; 20 MG/100ML; MG/100ML; MG/100ML; MG/100ML
INJECTION, SOLUTION INTRAVENOUS CONTINUOUS
Status: DISCONTINUED | OUTPATIENT
Start: 2019-12-17 | End: 2019-12-19

## 2019-12-17 RX ORDER — METOPROLOL TARTRATE 5 MG/5ML
10 INJECTION INTRAVENOUS EVERY 6 HOURS
Status: DISCONTINUED | OUTPATIENT
Start: 2019-12-17 | End: 2019-12-18

## 2019-12-17 RX ORDER — METHYLPREDNISOLONE SODIUM SUCCINATE 125 MG/2ML
60 INJECTION, POWDER, LYOPHILIZED, FOR SOLUTION INTRAMUSCULAR; INTRAVENOUS EVERY 8 HOURS
Status: DISCONTINUED | OUTPATIENT
Start: 2019-12-17 | End: 2019-12-18

## 2019-12-17 RX ORDER — GLYCOPYRROLATE 0.2 MG/ML
INJECTION, SOLUTION INTRAMUSCULAR; INTRAVENOUS AS NEEDED
Status: DISCONTINUED | OUTPATIENT
Start: 2019-12-17 | End: 2019-12-17 | Stop reason: SURG

## 2019-12-17 RX ORDER — FAMOTIDINE 20 MG/1
20 TABLET ORAL DAILY
Status: DISCONTINUED | OUTPATIENT
Start: 2019-12-17 | End: 2019-12-20

## 2019-12-17 RX ORDER — IPRATROPIUM BROMIDE AND ALBUTEROL SULFATE 2.5; .5 MG/3ML; MG/3ML
3 SOLUTION RESPIRATORY (INHALATION) AS NEEDED
Status: DISCONTINUED | OUTPATIENT
Start: 2019-12-17 | End: 2019-12-17 | Stop reason: HOSPADM

## 2019-12-17 RX ORDER — LIDOCAINE HYDROCHLORIDE 20 MG/ML
INJECTION, SOLUTION INFILTRATION; PERINEURAL
Status: DISCONTINUED | OUTPATIENT
Start: 2019-12-17 | End: 2019-12-17 | Stop reason: HOSPADM

## 2019-12-17 RX ORDER — ALBUTEROL SULFATE 2.5 MG/3ML
SOLUTION RESPIRATORY (INHALATION)
Status: DISCONTINUED
Start: 2019-12-17 | End: 2019-12-17 | Stop reason: WASHOUT

## 2019-12-17 RX ORDER — IPRATROPIUM BROMIDE AND ALBUTEROL SULFATE 2.5; .5 MG/3ML; MG/3ML
3 SOLUTION RESPIRATORY (INHALATION)
Status: DISCONTINUED | OUTPATIENT
Start: 2019-12-17 | End: 2019-12-19

## 2019-12-17 RX ORDER — ONDANSETRON 2 MG/ML
4 INJECTION INTRAMUSCULAR; INTRAVENOUS ONCE AS NEEDED
Status: DISCONTINUED | OUTPATIENT
Start: 2019-12-17 | End: 2019-12-17 | Stop reason: HOSPADM

## 2019-12-17 RX ORDER — ENOXAPARIN SODIUM 100 MG/ML
40 INJECTION SUBCUTANEOUS DAILY
Status: DISCONTINUED | OUTPATIENT
Start: 2019-12-17 | End: 2019-12-17

## 2019-12-17 RX ORDER — IPRATROPIUM BROMIDE AND ALBUTEROL SULFATE 2.5; .5 MG/3ML; MG/3ML
3 SOLUTION RESPIRATORY (INHALATION) ONCE
Status: COMPLETED | OUTPATIENT
Start: 2019-12-17 | End: 2019-12-17

## 2019-12-17 RX ORDER — LABETALOL HYDROCHLORIDE 5 MG/ML
10 INJECTION, SOLUTION INTRAVENOUS EVERY 4 HOURS PRN
Status: DISCONTINUED | OUTPATIENT
Start: 2019-12-17 | End: 2019-12-20

## 2019-12-17 RX ORDER — KETAMINE HYDROCHLORIDE 50 MG/ML
INJECTION, SOLUTION, CONCENTRATE INTRAMUSCULAR; INTRAVENOUS AS NEEDED
Status: DISCONTINUED | OUTPATIENT
Start: 2019-12-17 | End: 2019-12-17 | Stop reason: SURG

## 2019-12-17 RX ORDER — METOPROLOL SUCCINATE 100 MG/1
100 TABLET, EXTENDED RELEASE ORAL
Status: DISCONTINUED | OUTPATIENT
Start: 2019-12-17 | End: 2019-12-17

## 2019-12-17 RX ORDER — IPRATROPIUM BROMIDE AND ALBUTEROL SULFATE 2.5; .5 MG/3ML; MG/3ML
SOLUTION RESPIRATORY (INHALATION)
Status: COMPLETED
Start: 2019-12-17 | End: 2019-12-17

## 2019-12-17 RX ORDER — IPRATROPIUM BROMIDE AND ALBUTEROL SULFATE 2.5; .5 MG/3ML; MG/3ML
SOLUTION RESPIRATORY (INHALATION)
Status: DISPENSED
Start: 2019-12-17 | End: 2019-12-18

## 2019-12-17 RX ADMIN — SODIUM CHLORIDE, SODIUM LACTATE, POTASSIUM CHLORIDE, CALCIUM CHLORIDE: 600; 310; 30; 20 INJECTION, SOLUTION INTRAVENOUS at 10:24:00

## 2019-12-17 RX ADMIN — SODIUM CHLORIDE, SODIUM LACTATE, POTASSIUM CHLORIDE, CALCIUM CHLORIDE: 600; 310; 30; 20 INJECTION, SOLUTION INTRAVENOUS at 12:10:00

## 2019-12-17 RX ADMIN — KETAMINE HYDROCHLORIDE 30 MG: 50 INJECTION, SOLUTION, CONCENTRATE INTRAMUSCULAR; INTRAVENOUS at 10:32:00

## 2019-12-17 RX ADMIN — GLYCOPYRROLATE 0.2 MG: 0.2 INJECTION, SOLUTION INTRAMUSCULAR; INTRAVENOUS at 10:32:00

## 2019-12-17 NOTE — ANESTHESIA POSTPROCEDURE EVALUATION
[address] Patient Status:  Hospital Outpatient Surgery   Age/Gender 67year old female MRN [id number]   Location [facility]. Attending Katerin Levine MD   Hosp Day # 0 PCP Erik Leak,        Anesthesia Post-o

## 2019-12-17 NOTE — OPERATIVE REPORT
[facility] Patient Status:  Hospital Outpatient Surgery    1940 MRN [id number]   [facility] ENDOSCOPY Attending Felicita Mott MD   Hosp Day # 0 PCP Naveen Pantoja DO     OPERATIVE REPORT:     DATE OF OPE right upper lobe antonino, C) right middle lobe antonino, D) right lower lobe antonino, E) left upper lobe antonino, F) left lower lobe antonino, G) left upper lobe secondary antonino. Target points T1 in the right lower lobe was marked and measured at 15mm.  Virtual p lower lobe, seated at the division of the lateral and posterior segments.  Using the standard biopsy forceps, multiple endobronchial biopsies were performed on the lesion with one pass performed for touchprep by onsite cytopathologist and the remainder plac

## 2019-12-17 NOTE — PROGRESS NOTES
Rn spoke with Dr. Jesus Manuel Murphy regarding nasal trumpet. Dr. Jesus Manuel Murphy stated he wants Afrin and a nasal clip at bedside and he will remove the nasal trumpet at that time. Dr. Diane Patrick stated if nasal bleeding continues to consult Dr. Gisella Dawson.

## 2019-12-17 NOTE — PROGRESS NOTES
RN unable to titrate off oxygen. Patient unable to use nasal cannula due to previous nose bleed and large clot in right nostril. Patient needs 5-6L NC to keep oxygen saturations above 92%. The patient is 84-87% on room air.  Dr. Keyana Perez informed and order to

## 2019-12-17 NOTE — CONSULTS
[facility]  Report of Consultation    Coralee Counter Patient Status:  Inpatient    1940 MRN [id number]   [facility] 4SW-A Attending Juliocesar Alanis MD   Hosp Day # 0 PCP Tesfaye English DO     Reason for Consultation:  Mauro Seat HIGH BLOOD PRESSURE    • Osteoarthritis    • Osteoporosis    • Personal history of antineoplastic chemotherapy     LAST 10/2019   • Personal history of malignant neoplasm of bronchus and lung    • Personal history of urinary (tract) infection    • Pneumoni daily., Disp: , Rfl: , 12/16/2019 at Unknown time  Montelukast Sodium (SINGULAIR) 10 MG Oral Tab, Take 10 mg by mouth nightly., Disp: , Rfl: , 12/16/2019 at Unknown time  Saline Nasal Spray 0.65 % Nasal Solution, 1 spray by Nasal route as needed for conges (!) 29 99 % — —   12/17/19 1625 (!) 188/112 — — (!) 145 (!) 28 97 % — —   12/17/19 1620 — — — (!) 138 20 97 % — —   12/17/19 1615 151/89 — — (!) 132 23 92 % — —   12/17/19 1610 (!) 161/94 — — (!) 126 26 96 % — —   12/17/19 1605 (!) 159/97 — — (!) 128 24 94 ascites   Extremity: No evidence of edema nontender no clubbing   Skin: No rashes or lesions.    Neurological: Alert, interactive, no focal deficits    Lab Data Review:       Cultures:   Pending    Radiology:  Xr Chest Ap Portable  (cpt=71045)    Result Jp Anemia     Leukocytosis     Acute renal failure (ARF) (HCC)     Ac[facility] ([facility]     Azotemia     Metabolic acidosis     Hyperglycemia     Community acquired pneumonia     Gram-negative pneumonia (HCC)     NHL (nodular histiocytic lymphoma) ([facility]

## 2019-12-17 NOTE — CODE DOCUMENTATION
[facility]  Rapid NOTE     Kai Lori Patient Status:  Inpatient    1940 MRN [id number]   [facility] 4SW-A Attending Renetta Concepcion MD   Hosp Day # 0 PCP Lourdes Choi DO     Reason for Code Alert (narrative):  Pt

## 2019-12-17 NOTE — INTERVAL H&P NOTE
Pre-op Diagnosis: LUNG MASS, PULMONARY LUNG CANCER, LYMPHOMA NODULES    The above referenced H&P was reviewed by Monica Buckley MD on 12/17/2019, the patient was examined and no significant changes have occurred in the patient's condition since the H&P wa

## 2019-12-17 NOTE — DISCHARGE SUMMARY
Outpatient Surgery Brief Discharge Summary         Patient ID:  Rica Rodriguez  [id number]  67year old  4/13/1940    Discharge Diagnoses: LUNG MASS, PULMONARY LUNG CANCER, LYMPHOMA NODULES    Procedures: bronchoscopy    Discharged Condition: stable    Abdiel Dana

## 2019-12-17 NOTE — PROGRESS NOTES
Pt hand off given to [facility]  - LEILA, ICU RN. Portable chest xray done now per Dr. Meron Barnhart order.

## 2019-12-17 NOTE — ANESTHESIA PREPROCEDURE EVALUATION
PRE-OP EVALUATION    Patient Name: Angel Donaldson    Pre-op Diagnosis: LUNG MASS, PULMONARY LUNG CANCER, LYMPHOMA NODULES    Procedure(s):  ENDO-BRONCHIAL ULTRASOUND AND CYSTOLOGY BRONCHOSCOPY, RADIAL PROBE ULTRASOUND/FLUOROSCOPY, SUPER D NAVIGATIONAL BI Contrast Iodinated Dyes      Anesthesia Evaluation    Patient summary reviewed.     Anesthetic Complications  (-) history of anesthetic complications         GI/Hepatic/Renal      (+) GERD and well controlled         (-) liver disease                 Cardio Cardiovascular      Rhythm: regular  Rate: normal     Dental             Pulmonary      Breath sounds clear to auscultation bilaterally.                Other findings            ASA: 3   Plan: MAC and general  NPO status verified and patient meets guideline

## 2019-12-17 NOTE — PROGRESS NOTES
Received pt hand off from [address] Pt sitting up on cart, denies any discomfort. O2 per cannula on at 6L/min and pt is saturating at 99% has had a neb treatment earlier, and has been using her incentive spirometry.  Solitario Winter explained to pt and son

## 2019-12-17 NOTE — OR NURSING
Spoke with Doreneda Cloudns patient's son. He is aware that patient is being transferred to room 457.  He just arrived in the hospital.

## 2019-12-17 NOTE — PROGRESS NOTES
Updated Dr. Uri Desai with pt condition, wheezing noted and heart rate in the 130's. Md ordered to give another duoneb treatment which was given. Still awaiting for room to be available.

## 2019-12-17 NOTE — CONSULTS
[address] [facility] Patient Status:  Inpatient    1940 MRN [id number]   [facility] 4SW-A Attending Theresa Young MD   Hosp Day # 0 PCP Reno Dempsey DO     Reason for consult: medical manageemetn Performed by Delores Davis MD at [facility] MAIN OR   • OTHER      lung biopsies   • OTHER SURGICAL HISTORY  1/1/08    gallbladder surgery   • OTHER SURGICAL HISTORY  0001,0667    bunionectomy,bilateral   • OTHER SURGICAL HISTORY  12/05/2017    Cysto- Dr. Iker Inman congestion. , Disp: , Rfl:   acetaminophen 500 MG Oral Tab, Take 500 mg by mouth every 6 (six) hours as needed for Pain., Disp: , Rfl:   ferrous sulfate 325 (65 FE) MG Oral Tab EC, Take 325 mg by mouth daily with breakfast.  , Disp: , Rfl:   Fluticasone Pro consulted   2. Affrin given in PACU  3. Appears to have resolved  3. RLL lung nodules s/p bronch and bX  1. Path pending  4. COPD  1. Cont. Inhalers  5. Tachycardia  1. Combo of not taking her BB this AM and nebs  2. Monitor  3. Resume toprol  6.  Hx of Sara

## 2019-12-17 NOTE — PROGRESS NOTES
[facility] Pharmacy Note:  Renal Dose Adjustment    Phyllis Escamilla has been prescribed famotidine (PEPCID) 20 mg orally every 12 hours.     Estimated CrCl is 17 ml/min based on 11/19/2019 SCr 1.96 mg/dL    Her calculated creatinine clearance is < 50 ml/min, ther

## 2019-12-17 NOTE — PROGRESS NOTES
Rapid response team here with Dr. Laura Reddy, pt nataly she is christa having a hard time catching her breath- respiratory therapist here giving treatment per Dr. Laura Reddy.

## 2019-12-18 ENCOUNTER — APPOINTMENT (OUTPATIENT)
Dept: GENERAL RADIOLOGY | Facility: HOSPITAL | Age: 79
DRG: 987 | End: 2019-12-18
Attending: INTERNAL MEDICINE
Payer: MEDICARE

## 2019-12-18 LAB
ALBUMIN SERPL-MCNC: 2.3 G/DL (ref 3.4–5)
ALBUMIN/GLOB SERPL: 0.3 {RATIO} (ref 1–2)
ALP LIVER SERPL-CCNC: 62 U/L (ref 55–142)
ALT SERPL-CCNC: 20 U/L (ref 13–56)
ANION GAP SERPL CALC-SCNC: 8 MMOL/L (ref 0–18)
ANION GAP SERPL CALC-SCNC: 8 MMOL/L (ref 0–18)
AST SERPL-CCNC: 24 U/L (ref 15–37)
BASOPHILS # BLD AUTO: 0.02 X10(3) UL (ref 0–0.2)
BASOPHILS NFR BLD AUTO: 0.1 %
BILIRUB SERPL-MCNC: 0.6 MG/DL (ref 0.1–2)
BUN BLD-MCNC: 36 MG/DL (ref 7–18)
BUN BLD-MCNC: 36 MG/DL (ref 7–18)
BUN/CREAT SERPL: 21.4 (ref 10–20)
BUN/CREAT SERPL: 21.4 (ref 10–20)
CALCIUM BLD-MCNC: 9.3 MG/DL (ref 8.5–10.1)
CALCIUM BLD-MCNC: 9.3 MG/DL (ref 8.5–10.1)
CHLORIDE SERPL-SCNC: 109 MMOL/L (ref 98–112)
CHLORIDE SERPL-SCNC: 109 MMOL/L (ref 98–112)
CO2 SERPL-SCNC: 23 MMOL/L (ref 21–32)
CO2 SERPL-SCNC: 23 MMOL/L (ref 21–32)
CREAT BLD-MCNC: 1.68 MG/DL (ref 0.55–1.02)
CREAT BLD-MCNC: 1.68 MG/DL (ref 0.55–1.02)
DEPRECATED RDW RBC AUTO: 53.1 FL (ref 35.1–46.3)
EOSINOPHIL # BLD AUTO: 0 X10(3) UL (ref 0–0.7)
EOSINOPHIL NFR BLD AUTO: 0 %
ERYTHROCYTE [DISTWIDTH] IN BLOOD BY AUTOMATED COUNT: 14.6 % (ref 11–15)
GLOBULIN PLAS-MCNC: 7.7 G/DL (ref 2.8–4.4)
GLUCOSE BLD-MCNC: 172 MG/DL (ref 70–99)
GLUCOSE BLD-MCNC: 172 MG/DL (ref 70–99)
HCT VFR BLD AUTO: 30.2 % (ref 35–48)
HGB BLD-MCNC: 9.4 G/DL (ref 12–16)
IMM GRANULOCYTES # BLD AUTO: 0.1 X10(3) UL (ref 0–1)
IMM GRANULOCYTES NFR BLD: 0.6 %
LYMPHOCYTES # BLD AUTO: 0.72 X10(3) UL (ref 1–4)
LYMPHOCYTES NFR BLD AUTO: 4.5 %
M PROTEIN MFR SERPL ELPH: 10 G/DL (ref 6.4–8.2)
MCH RBC QN AUTO: 30.8 PG (ref 26–34)
MCHC RBC AUTO-ENTMCNC: 31.1 G/DL (ref 31–37)
MCV RBC AUTO: 99 FL (ref 80–100)
MONOCYTES # BLD AUTO: 0.45 X10(3) UL (ref 0.1–1)
MONOCYTES NFR BLD AUTO: 2.8 %
NEUTROPHILS # BLD AUTO: 14.72 X10 (3) UL (ref 1.5–7.7)
NEUTROPHILS # BLD AUTO: 14.72 X10(3) UL (ref 1.5–7.7)
NEUTROPHILS NFR BLD AUTO: 92 %
OSMOLALITY SERPL CALC.SUM OF ELEC: 302 MOSM/KG (ref 275–295)
OSMOLALITY SERPL CALC.SUM OF ELEC: 302 MOSM/KG (ref 275–295)
PLATELET # BLD AUTO: 192 10(3)UL (ref 150–450)
POTASSIUM SERPL-SCNC: 4.4 MMOL/L (ref 3.5–5.1)
POTASSIUM SERPL-SCNC: 4.4 MMOL/L (ref 3.5–5.1)
RBC # BLD AUTO: 3.05 X10(6)UL (ref 3.8–5.3)
SODIUM SERPL-SCNC: 140 MMOL/L (ref 136–145)
SODIUM SERPL-SCNC: 140 MMOL/L (ref 136–145)
WBC # BLD AUTO: 16 X10(3) UL (ref 4–11)

## 2019-12-18 PROCEDURE — 71045 X-RAY EXAM CHEST 1 VIEW: CPT | Performed by: INTERNAL MEDICINE

## 2019-12-18 PROCEDURE — 99232 SBSQ HOSP IP/OBS MODERATE 35: CPT | Performed by: INTERNAL MEDICINE

## 2019-12-18 RX ORDER — LEVOFLOXACIN 750 MG/1
750 TABLET ORAL EVERY OTHER DAY
Status: DISCONTINUED | OUTPATIENT
Start: 2019-12-20 | End: 2019-12-20

## 2019-12-18 RX ORDER — METOPROLOL SUCCINATE 100 MG/1
100 TABLET, EXTENDED RELEASE ORAL
Status: DISCONTINUED | OUTPATIENT
Start: 2019-12-18 | End: 2019-12-20

## 2019-12-18 RX ORDER — METHYLPREDNISOLONE SODIUM SUCCINATE 125 MG/2ML
60 INJECTION, POWDER, LYOPHILIZED, FOR SOLUTION INTRAMUSCULAR; INTRAVENOUS EVERY 8 HOURS
Status: COMPLETED | OUTPATIENT
Start: 2019-12-18 | End: 2019-12-19

## 2019-12-18 RX ORDER — LEVOFLOXACIN 5 MG/ML
750 INJECTION, SOLUTION INTRAVENOUS EVERY 24 HOURS
Status: COMPLETED | OUTPATIENT
Start: 2019-12-18 | End: 2019-12-18

## 2019-12-18 RX ORDER — METOPROLOL SUCCINATE 100 MG/1
100 TABLET, EXTENDED RELEASE ORAL
Status: DISCONTINUED | OUTPATIENT
Start: 2019-12-19 | End: 2019-12-18

## 2019-12-18 NOTE — PROGRESS NOTES
Pt A0x4 but forgetful. Maintaining 02 sats on 2-3L via NC. Dyspnea w/exertion reported. Rt nare blocked w/old dry blood. ENT to see. NSR/NST on shelley. PO Toprol restarted per Dr. Bustos Marker okay. Tolerating diet. Will advance as tolerated.  Voiding freely in com

## 2019-12-18 NOTE — H&P
[facility]  History and Phyical     Coralee Counter Patient Status:  Inpatient    1940 MRN [id number]   [facility] 4SW-A Attending Juliocesar Alanis MD   Hosp Day # 0 PCP Tesfaye English DO     Chief complaint: dyspnea MAIN OR   • OTHER      lung biopsies   • OTHER SURGICAL HISTORY  1/1/08    gallbladder surgery   • OTHER SURGICAL HISTORY  0671,3079    bunionectomy,bilateral   • OTHER SURGICAL HISTORY  12/05/2017    [place]- Dr. Indra Francisco  2009    r acetaminophen 500 MG Oral Tab, Take 500 mg by mouth every 6 (six) hours as needed for Pain., Disp: , Rfl:   ferrous sulfate 325 (65 FE) MG Oral Tab EC, Take 325 mg by mouth daily with breakfast.  , Disp: , Rfl:   Fluticasone Propionate 50 MCG/ACT Nasal S 2. Affrin given in PACU  3. Appears to have resolved  3. RLL lung nodules s/p bronch and bX  1. Path pending  4. COPD  1. Cont. Inhalers  5. Tachycardia  1. Combo of not taking her BB this AM and nebs  2. Monitor  3. Resume toprol  6. Hx of Lung CA  1.  H

## 2019-12-18 NOTE — PROGRESS NOTES
[facility]  Progress Note    Anupam Car Patient Status:  Inpatient    1940 MRN [id number]   [facility] 4SW-A Attending Ashly Tovar MD   [facility] Day # 1 PCP Raine Orona DO     Subjective:  Katie Tellez is a(n 67 yea MCHC 30.8* 31.1   RDW 14.6 14.6   NEPRELIM 10.45* 14.72*   WBC 11.7* 16.0*   .0 192.0     Recent Labs   Lab 12/17/19  1729 12/18/19  0426   * 172*  172*   BUN 40* 36*  36*   CREATSERUM 1.79* 1.68*  1.68*   GFRAA 31* 33*  33*   GFRNAA 27* 29

## 2019-12-18 NOTE — PLAN OF CARE
1650-received from Endo after RRT. Aa/ox4. Dyspneic, tachypneic, tachycardic at rest with simple mask. Denies pain. Respiratory improved after arrival, weaned to nasal cannula. Now breathing easier, less tachypneic and dyspneic.  Cough, non productive, co

## 2019-12-18 NOTE — PROGRESS NOTES
[facility]  Progress Note     Angelica  Patient Status:  Inpatient    1940 MRN [id number]   [facility] 4SW-A Attending Marcy Mccarthy MD   [address] Day # 1 PCP Uvaldo Park DO     Chief Complaint: nosebleeding    S: [employer] mg/dL (H)). Recent Labs   Lab 12/17/19  1729   PTP 15.6*   INR 1.18*       No results for input(s): TROP, CK in the last 168 hours. Imaging: Imaging data reviewed in Epic.     Medications:   • [START ON 12/20/2019] levofloxacin  750 mg Oral QOD edema  Pulses: 2+ and symmetric  Skin: Skin color, texture, turgor normal. No rashes or lesions  Neurologic: Awake,alert,nonfocal  Psych: Mood and affect appears normal    A/P as above.   Right lower quadrant pneumonia, possible aspiration pneumonia–patient

## 2019-12-18 NOTE — PLAN OF CARE
Assumed care after RN report; pt resting in bed. A&Ox4. Afebrile. SR-ST on monitor; VSS. 3Lnc; tolerating. No bleeding currently from R nare; dried clot in R nostril. Up to [facility] to void. Updated pt on [email or web address]; will cont to monitor.

## 2019-12-19 ENCOUNTER — APPOINTMENT (OUTPATIENT)
Dept: GENERAL RADIOLOGY | Facility: HOSPITAL | Age: 79
DRG: 987 | End: 2019-12-19
Attending: INTERNAL MEDICINE
Payer: MEDICARE

## 2019-12-19 LAB
ANION GAP SERPL CALC-SCNC: 10 MMOL/L (ref 0–18)
BASOPHILS # BLD AUTO: 0.01 X10(3) UL (ref 0–0.2)
BASOPHILS NFR BLD AUTO: 0.1 %
BUN BLD-MCNC: 41 MG/DL (ref 7–18)
BUN BLD-MCNC: 41 MG/DL (ref 7–18)
BUN/CREAT SERPL: 24 (ref 10–20)
CALCIUM BLD-MCNC: 10.4 MG/DL (ref 8.5–10.1)
CALCIUM BLD-MCNC: 10.4 MG/DL (ref 8.5–10.1)
CHLORIDE SERPL-SCNC: 107 MMOL/L (ref 98–112)
CHLORIDE SERPL-SCNC: 107 MMOL/L (ref 98–112)
CO2 SERPL-SCNC: 21 MMOL/L (ref 21–32)
CO2 SERPL-SCNC: 21 MMOL/L (ref 21–32)
CREAT BLD-MCNC: 1.71 MG/DL (ref 0.55–1.02)
CREAT BLD-MCNC: 1.71 MG/DL (ref 0.55–1.02)
DEPRECATED RDW RBC AUTO: 54.2 FL (ref 35.1–46.3)
EOSINOPHIL # BLD AUTO: 0 X10(3) UL (ref 0–0.7)
EOSINOPHIL NFR BLD AUTO: 0 %
ERYTHROCYTE [DISTWIDTH] IN BLOOD BY AUTOMATED COUNT: 14.8 % (ref 11–15)
GLUCOSE BLD-MCNC: 150 MG/DL (ref 70–99)
GLUCOSE BLD-MCNC: 150 MG/DL (ref 70–99)
HAV IGM SER QL: 2 MG/DL (ref 1.6–2.6)
HCT VFR BLD AUTO: 31.1 % (ref 35–48)
HGB BLD-MCNC: 9.8 G/DL (ref 12–16)
IMM GRANULOCYTES # BLD AUTO: 0.12 X10(3) UL (ref 0–1)
IMM GRANULOCYTES NFR BLD: 1 %
LYMPHOCYTES # BLD AUTO: 0.66 X10(3) UL (ref 1–4)
LYMPHOCYTES NFR BLD AUTO: 5.7 %
MCH RBC QN AUTO: 31.4 PG (ref 26–34)
MCHC RBC AUTO-ENTMCNC: 31.5 G/DL (ref 31–37)
MCV RBC AUTO: 99.7 FL (ref 80–100)
MONOCYTES # BLD AUTO: 0.47 X10(3) UL (ref 0.1–1)
MONOCYTES NFR BLD AUTO: 4.1 %
NEUTROPHILS # BLD AUTO: 10.3 X10 (3) UL (ref 1.5–7.7)
NEUTROPHILS # BLD AUTO: 10.3 X10(3) UL (ref 1.5–7.7)
NEUTROPHILS NFR BLD AUTO: 89.1 %
OSMOLALITY SERPL CALC.SUM OF ELEC: 299 MOSM/KG (ref 275–295)
PLATELET # BLD AUTO: 175 10(3)UL (ref 150–450)
POTASSIUM SERPL-SCNC: 4 MMOL/L (ref 3.5–5.1)
POTASSIUM SERPL-SCNC: 4 MMOL/L (ref 3.5–5.1)
RBC # BLD AUTO: 3.12 X10(6)UL (ref 3.8–5.3)
SODIUM SERPL-SCNC: 138 MMOL/L (ref 136–145)
SODIUM SERPL-SCNC: 138 MMOL/L (ref 136–145)
WBC # BLD AUTO: 11.6 X10(3) UL (ref 4–11)

## 2019-12-19 PROCEDURE — 2Y41X5Z PACKING OF NASAL REGION USING PACKING MATERIAL: ICD-10-PCS | Performed by: OTOLARYNGOLOGY

## 2019-12-19 PROCEDURE — 99232 SBSQ HOSP IP/OBS MODERATE 35: CPT | Performed by: INTERNAL MEDICINE

## 2019-12-19 PROCEDURE — 71045 X-RAY EXAM CHEST 1 VIEW: CPT | Performed by: INTERNAL MEDICINE

## 2019-12-19 RX ORDER — IPRATROPIUM BROMIDE AND ALBUTEROL SULFATE 2.5; .5 MG/3ML; MG/3ML
3 SOLUTION RESPIRATORY (INHALATION)
Status: DISCONTINUED | OUTPATIENT
Start: 2019-12-19 | End: 2019-12-20

## 2019-12-19 NOTE — PROGRESS NOTES
[facility]  Progress Note     Kai Sandoval Patient Status:  Inpatient    1940 MRN [id number]   [facility] 4SW-A Attending Nicole Tabor MD   [facility] Day # 2 PCP Loudres Choi DO     Chief Complaint: nosebleeding    S: Nargis Kil --  10.0*  --        Estimated Creatinine Clearance: 19.5 mL/min (A) (based on SCr of 1.71 mg/dL (H)). Recent Labs   Lab 12/17/19  1729   PTP 15.6*   INR 1.18*       No results for input(s): TROP, CK in the last 168 hours.          Imaging: Imaging data General appearance: alert and cooperative  Head: Normocephalic, without obvious abnormality, atraumatic  Throat: oral mucosa moist  Neck: no adenopathy, no carotid bruit, no JVD  Lungs: Right basilar crackles  Heart: S1, S2 normal, no murmur,  regular

## 2019-12-19 NOTE — CONSULTS
[facility]    Report of Consultation    Luís Rios Patient Status:  Inpatient    1940 MRN [id number]   [facility] 4SW-A Attending Amira Ag MD   [facility] Day # 2 PCP Domitila Donato DO     Date of Admission:  2019 gallbladder surgery   • OTHER SURGICAL HISTORY  3144,4273    bunionectomy,bilateral   • OTHER SURGICAL HISTORY  12/05/2017    Cysto- Dr. John Paul Merlos  2009    removal of upper right lobe   • TONSILLECTOMY       Family History   Problem normal; teeth and gums normal  Minimal old blood in pharynx.   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

## 2019-12-19 NOTE — PLAN OF CARE
Patient alert and oriented. No signs of bleeding from nose. Dry, crusted clot in right nare. 1L NC overnight. Dyspneic with exertion. VSS. Voiding in commode. IVF infusing per order. Patient calls appropriately. Will continue to monitor.

## 2019-12-19 NOTE — PROGRESS NOTES
Called to bedside by RN. Patient with dislodgement of clot in right nare, now with bleeding. Rapid Rhino placed to right nare. ENT re-consulted for evaluation of nose bleed.     ANUP Spence  Critical Care NP  Phone [phone], pager [phone]

## 2019-12-19 NOTE — PROGRESS NOTES
0815:  Large dried clot in patients right nare. Patient states it hurts and difficult to breathe. Nasal cavity flushed with couple drops saline and Vaseline applied to inside of nostril. (APN aware)  While applying vaseline large clot fell out of right na

## 2019-12-19 NOTE — PROGRESS NOTES
[facility]  Progress Note    Earlene Car Patient Status:  Inpatient    1940 MRN [id number]   [facility] 4SW-A Attending Gwendolyn Valencia MD   [address] Day # 2 PCP Zac Le DO     Subjective:  Michellwalter Pierre is a(n) 10 yea 23.0 21.0  21.0   BUN 40* 36*  36* 41*  41*   CREATSERUM 1.79* 1.68*  1.68* 1.71*  1.71*     Recent Labs   Lab 12/17/19  1729   PTP 15.6*   INR 1.18*   PTT <16.0*       Cultures: reviewed     Radiology:  Xr Chest Ap Portable  (cpt=71045)    Result Date: 1 mucosa-associated lymphoid tissue (MALT) ([facility] [address]     Clostridium difficile colitis     Hypokalemia     Dysphagia, pharyngoesophageal phase     Aortic calcification (HCC)     Diverticulitis of large intestine without perforation or abscess without bleeding

## 2019-12-19 NOTE — PROGRESS NOTES
12/19/19 1057   Clinical Encounter Type   Visited With Patient   Sacramental Encounters   Sacrament of Sick-Anointing Anointed   The patient was seen by Leeanne Reyes. Received prayer, Scripture, support and Sacrament of the Sick.

## 2019-12-19 NOTE — CM/SW NOTE
12/19/19 1500   CM/SW Screening   Referral Source    Information Source Chart review;Nursing rounds   Patient's Mental Status Alert;Oriented   Patient lives with Children   Patient Status Prior to Admission   Independent with ADLs and Mobili

## 2019-12-20 ENCOUNTER — APPOINTMENT (OUTPATIENT)
Dept: GENERAL RADIOLOGY | Facility: HOSPITAL | Age: 79
DRG: 987 | End: 2019-12-20
Attending: INTERNAL MEDICINE
Payer: MEDICARE

## 2019-12-20 VITALS
RESPIRATION RATE: 18 BRPM | BODY MASS INDEX: 27.35 KG/M2 | WEIGHT: 139.31 LBS | HEIGHT: 60 IN | DIASTOLIC BLOOD PRESSURE: 90 MMHG | SYSTOLIC BLOOD PRESSURE: 149 MMHG | HEART RATE: 111 BPM | OXYGEN SATURATION: 92 % | TEMPERATURE: 98 F

## 2019-12-20 LAB
ANION GAP SERPL CALC-SCNC: 10 MMOL/L (ref 0–18)
BASOPHILS # BLD AUTO: 0.03 X10(3) UL (ref 0–0.2)
BASOPHILS NFR BLD AUTO: 0.3 %
BUN BLD-MCNC: 47 MG/DL (ref 7–18)
BUN/CREAT SERPL: 27.5 (ref 10–20)
CALCIUM BLD-MCNC: 10 MG/DL (ref 8.5–10.1)
CHLORIDE SERPL-SCNC: 110 MMOL/L (ref 98–112)
CO2 SERPL-SCNC: 20 MMOL/L (ref 21–32)
CREAT BLD-MCNC: 1.71 MG/DL (ref 0.55–1.02)
DEPRECATED RDW RBC AUTO: 51.8 FL (ref 35.1–46.3)
EOSINOPHIL # BLD AUTO: 0 X10(3) UL (ref 0–0.7)
EOSINOPHIL NFR BLD AUTO: 0 %
ERYTHROCYTE [DISTWIDTH] IN BLOOD BY AUTOMATED COUNT: 14.9 % (ref 11–15)
GLUCOSE BLD-MCNC: 103 MG/DL (ref 70–99)
HCT VFR BLD AUTO: 30.8 % (ref 35–48)
HGB BLD-MCNC: 9.9 G/DL (ref 12–16)
IMM GRANULOCYTES # BLD AUTO: 0.08 X10(3) UL (ref 0–1)
IMM GRANULOCYTES NFR BLD: 0.7 %
LYMPHOCYTES # BLD AUTO: 1.39 X10(3) UL (ref 1–4)
LYMPHOCYTES NFR BLD AUTO: 12 %
MCH RBC QN AUTO: 30.8 PG (ref 26–34)
MCHC RBC AUTO-ENTMCNC: 32.1 G/DL (ref 31–37)
MCV RBC AUTO: 96 FL (ref 80–100)
MONOCYTES # BLD AUTO: 0.92 X10(3) UL (ref 0.1–1)
MONOCYTES NFR BLD AUTO: 7.9 %
NEUTROPHILS # BLD AUTO: 9.17 X10 (3) UL (ref 1.5–7.7)
NEUTROPHILS # BLD AUTO: 9.17 X10(3) UL (ref 1.5–7.7)
NEUTROPHILS NFR BLD AUTO: 79.1 %
OSMOLALITY SERPL CALC.SUM OF ELEC: 303 MOSM/KG (ref 275–295)
PLATELET # BLD AUTO: 191 10(3)UL (ref 150–450)
POTASSIUM SERPL-SCNC: 3.7 MMOL/L (ref 3.5–5.1)
RBC # BLD AUTO: 3.21 X10(6)UL (ref 3.8–5.3)
SODIUM SERPL-SCNC: 140 MMOL/L (ref 136–145)
WBC # BLD AUTO: 11.6 X10(3) UL (ref 4–11)

## 2019-12-20 PROCEDURE — 71045 X-RAY EXAM CHEST 1 VIEW: CPT | Performed by: INTERNAL MEDICINE

## 2019-12-20 PROCEDURE — 99239 HOSP IP/OBS DSCHRG MGMT >30: CPT | Performed by: INTERNAL MEDICINE

## 2019-12-20 RX ORDER — AZELASTINE 1 MG/ML
1 SPRAY, METERED NASAL 2 TIMES DAILY
Status: DISCONTINUED | OUTPATIENT
Start: 2019-12-20 | End: 2019-12-20

## 2019-12-20 RX ORDER — SODIUM CHLORIDE/ALOE VERA
1 GEL (GRAM) NASAL AS NEEDED
Qty: 1 TUBE | Refills: 0 | Status: SHIPPED | OUTPATIENT
Start: 2019-12-20 | End: 2020-05-26

## 2019-12-20 RX ORDER — IPRATROPIUM BROMIDE AND ALBUTEROL SULFATE 2.5; .5 MG/3ML; MG/3ML
3 SOLUTION RESPIRATORY (INHALATION) EVERY 4 HOURS PRN
Status: DISCONTINUED | OUTPATIENT
Start: 2019-12-20 | End: 2019-12-20

## 2019-12-20 RX ORDER — LEVOFLOXACIN 750 MG/1
750 TABLET ORAL EVERY OTHER DAY
Qty: 3 TABLET | Refills: 0 | Status: SHIPPED | OUTPATIENT
Start: 2019-12-20 | End: 2019-12-27

## 2019-12-20 RX ORDER — AZELASTINE 1 MG/ML
1 SPRAY, METERED NASAL 2 TIMES DAILY
Qty: 1 BOTTLE | Refills: 0 | Status: SHIPPED | OUTPATIENT
Start: 2019-12-20 | End: 2020-05-26

## 2019-12-20 RX ORDER — SODIUM CHLORIDE/ALOE VERA
GEL (GRAM) NASAL AS NEEDED
Status: DISCONTINUED | OUTPATIENT
Start: 2019-12-20 | End: 2019-12-20

## 2019-12-20 NOTE — PROGRESS NOTES
[facility]  Progress Note    Randy Car Patient Status:  Inpatient    1940 MRN [id number]   [facility] 4NW-A Attending Marcela Diane MD   [address] Day # 3 PCP Macel Fears, DO     Subjective:  Leah Noé is a(n) 05 yea Recent Labs   Lab 12/17/19  1729   INR 1.18*   PTT <16.0*       Recent Labs   Lab 12/17/19  1653   ABGPHT 7.35   MNOWER3A 38   VRNVW1H 149*   ABGHCO3 20.6*   ABGBE -4.6*   TEMP 98.6   SHAYY Positive   SITE Left Radial   DEV Simple Oxygen Mask   THGB 10 planning    Discussed w Dr. Elly Crump and RN    Stephan Conti MD, [place] [id number] [facility]

## 2019-12-20 NOTE — DISCHARGE SUMMARY
[facility]  Discharge Summary    Miranda Malloy Patient Status:  Inpatient    1940 MRN [id number]   [facility] 4NW-A Attending No att. providers found   Hosp Day # 3 PCP Jamshid Price DO     Date of Admission: 2019    D with control of epistaxis on 12/19/2019 morning. Hypoxia improving on 12/19/2000 1993% room air though desaturated to 80s with ambulation and continue to monitor in hospital.  Transfer out of ICU on 12/19/2019.   Seen by ENT and pulmonary on 12/20/2019 and pulmonary      Discharge Medications:        Discharge Medications      START taking these medications      Instructions Prescription details   Azelastine HCl 0.1 % Soln  Commonly known as:  ASTELIN      1 spray by Nasal route 2 (two) times daily.    Sly Hussein MOUTH TWICE DAILY   Quantity:  180 tablet  Refills:  11     ferrous sulfate 325 (65 FE) MG Tbec      Take 325 mg by mouth daily with breakfast.   Refills:  0     Fluticasone Propionate 50 MCG/ACT Susp  Commonly known as:  FLONASE      2 sprays by Nasal rou Physical Exam:  /90 (BP Location: Left arm)   Pulse 111   Temp 97.9 °F (36.6 °C) (Oral)   Resp 18   Ht 5' (1.524 m)   Wt 139 lb 5.3 oz (63.2 kg)   SpO2 92%   BMI 27.21 kg/m²       General appearance: alert and cooperative  Head: Normocephal

## 2019-12-20 NOTE — PLAN OF CARE
Received patient from ICU around 2120. Patient is stable. Vitals stable. Patient has glasses and her hearing aids are at home. Patient is up with standby assist. On 2L oxygen. Has right nose rhino rocket that will be removed Monday.  Plan is to hopefully di

## 2019-12-20 NOTE — CM/SW NOTE
MDO order to set up [facility]. CM met with patient who has no preference. Paged Leatha [address] care with referral.  Liaison will meet with the patient/familyto provide choice, explanation of services, and financial disclosure. Orders and F2F complete.

## 2019-12-20 NOTE — PROGRESS NOTES
[facility]  Progress Note    Ingrid Car Patient Status:  Inpatient    1940 MRN [id number]   [facility] 4NW-A Attending Kay Goodwin MD   [facility] Day # 3 PCP Reno Dempsey DO     CC: Nosebleed from the right nare Brando Coughlin 12/20/2019    HCT 30.8 12/20/2019    .0 12/20/2019    CREATSERUM 1.71 12/20/2019    BUN 47 12/20/2019     12/20/2019    K 3.7 12/20/2019     12/20/2019    CO2 20.0 12/20/2019     12/20/2019    CA 10.0 12/20/2019       Imaging:  Im discussed with pulmonary  5.  Anemia of acute blood loss superimposed on anemia of chronic disease-hemoglobin stable at 9.8 today          Quality:  · DVT Prophylaxis: SCD  · CODE status: full  · Dugan: no  · Central line: no    Will the patient be referred

## 2019-12-20 NOTE — PROGRESS NOTES
Resting in bed. VSS Afeb  Pack in place on the right. Throat without active bleeding. Plan to pull pack Monday in the office.

## 2019-12-20 NOTE — PLAN OF CARE
Received pt alert and oriented x4. Forgetful at times. Lungs are coarse bilaterally. Pt placed on O2 2 L after ambulating to the bathroom. Pt with dyspnea with exertion. Nebs per RT. Right rhino rocket intact. VSS.  Report called to Navdeep Londono and pt carvajal

## 2019-12-20 NOTE — HOME CARE LIAISON
Met with patient at the bedside. Patient is agreeable to [facility]. Residential brochure provided with contact information. All questions addressed and answered.

## 2019-12-21 NOTE — PLAN OF CARE
Pt is Aox4 on room air and o2 levels are between 93-95. Pt has Rhinorocket in place. Pt stated she has no pain . Pt has been afebrile during this shift and vitals signs have been stable.      Problem: RESPIRATORY - ADULT  Goal: Achieves optimal ventilation

## 2019-12-21 NOTE — PLAN OF CARE
NURSING DISCHARGE NOTE    Discharged Home via Wheelchair. Accompanied by Support staff  Belongings Taken by patient/family. Reviewed AVS with pt and patient family , pt aware to  prescription from pharmacy and drop off scripts.    PT and family

## 2019-12-23 ENCOUNTER — PATIENT OUTREACH (OUTPATIENT)
Dept: CASE MANAGEMENT | Age: 79
End: 2019-12-23

## 2019-12-23 DIAGNOSIS — Z02.9 ENCOUNTERS FOR UNSPECIFIED ADMINISTRATIVE PURPOSE: ICD-10-CM

## 2019-12-24 ENCOUNTER — TELEPHONE (OUTPATIENT)
Dept: FAMILY MEDICINE CLINIC | Facility: CLINIC | Age: 79
End: 2019-12-24

## 2019-12-24 LAB
CD10 CELLS NFR SPEC: <1 %
CD11C CELLS NFR SPEC: 5 %
CD14 CELLS NFR SPEC: <1 %
CD19 CELLS NFR SPEC: 6 %
CD19/CD10 CELLS: <1 %
CD20 CELLS NFR SPEC: <1 %
CD22 CELLS NFR SPEC: <1 %
CD23 CELLS NFR SPEC: <1 %
CD3 CELLS NFR SPEC: 86 %
CD3+CD4+ CELLS NFR SPEC: 67 %
CD3+CD4+ CELLS/CD3+CD8+ CLL SPEC: 3.7
CD3+CD8+ CELLS NFR SPEC: 18 %
CD45 CELLS NFR SPEC: 100 %
CD5 CELLS NFR SPEC: 85 %
CD5/CD19 CELLS: <1 %
CD56 CELLS NFR SPEC: 5 %
CD7 CELLS NFR SPEC: 67 %
CELL SURF LAMBDA LIGHT CHAIN: 6 %
CELL SURFACE KAPPA LIGHT CHAIN: <1 %
FMC7 CELLS NFR SPEC: 1 %

## 2019-12-24 NOTE — TELEPHONE ENCOUNTER
LAURA Hernandez (Therapist) called to give Dr Carolina Young a care of plan for patient.  Kameron Peter will be doing Physical Therapy twice a week for 2 weeks starting today

## 2019-12-24 NOTE — TELEPHONE ENCOUNTER
Mary (Therapist) called to give Dr Nona Mcgovern a care of plan for patient. Juan Gant will be doing Physical Therapy twice a week for 2 weeks starting today.

## 2019-12-24 NOTE — PROGRESS NOTES
A Simpli[employer] message was sent to the patient for outreach to complete TCM. It was requested the patient call [facility] back at, [phone].

## 2019-12-27 ENCOUNTER — HOSPITAL ENCOUNTER (OUTPATIENT)
Dept: GENERAL RADIOLOGY | Age: 79
Discharge: HOME | End: 2019-12-27
Attending: FAMILY MEDICINE
Payer: MEDICARE

## 2019-12-27 ENCOUNTER — OFFICE VISIT (OUTPATIENT)
Dept: FAMILY MEDICINE CLINIC | Facility: CLINIC | Age: 79
End: 2019-12-27
Payer: MEDICARE

## 2019-12-27 ENCOUNTER — TELEPHONE (OUTPATIENT)
Dept: FAMILY MEDICINE CLINIC | Facility: CLINIC | Age: 79
End: 2019-12-27

## 2019-12-27 VITALS
HEART RATE: 103 BPM | WEIGHT: 131 LBS | RESPIRATION RATE: 18 BRPM | DIASTOLIC BLOOD PRESSURE: 80 MMHG | BODY MASS INDEX: 25.72 KG/M2 | SYSTOLIC BLOOD PRESSURE: 138 MMHG | HEIGHT: 60 IN | TEMPERATURE: 97 F | OXYGEN SATURATION: 97 %

## 2019-12-27 DIAGNOSIS — I10 ESSENTIAL HYPERTENSION: ICD-10-CM

## 2019-12-27 DIAGNOSIS — J18.9 PNEUMONIA OF RIGHT LOWER LOBE DUE TO INFECTIOUS ORGANISM: Primary | ICD-10-CM

## 2019-12-27 DIAGNOSIS — J18.9 PNEUMONIA OF RIGHT LOWER LOBE DUE TO INFECTIOUS ORGANISM: ICD-10-CM

## 2019-12-27 DIAGNOSIS — N18.4 CKD (CHRONIC KIDNEY DISEASE) STAGE 4, GFR 15-29 ML/MIN (HCC): ICD-10-CM

## 2019-12-27 PROCEDURE — 71046 X-RAY EXAM CHEST 2 VIEWS: CPT | Performed by: FAMILY MEDICINE

## 2019-12-27 PROCEDURE — 99496 TRANSJ CARE MGMT HIGH F2F 7D: CPT | Performed by: FAMILY MEDICINE

## 2019-12-27 RX ORDER — LOSARTAN POTASSIUM 100 MG/1
100 TABLET ORAL DAILY
Qty: 30 TABLET | Refills: 1 | Status: SHIPPED | OUTPATIENT
Start: 2019-12-27 | End: 2020-02-27 | Stop reason: ALTCHOICE

## 2019-12-27 RX ORDER — IPRATROPIUM BROMIDE AND ALBUTEROL SULFATE 2.5; .5 MG/3ML; MG/3ML
SOLUTION RESPIRATORY (INHALATION)
COMMUNITY
Start: 2019-12-21 | End: 2020-03-09

## 2019-12-27 RX ORDER — CEFUROXIME AXETIL 250 MG/1
250 TABLET ORAL 2 TIMES DAILY
COMMUNITY
Start: 2019-12-23 | End: 2020-01-24 | Stop reason: ALTCHOICE

## 2019-12-27 NOTE — PROGRESS NOTES
HPI:    Dolly Cates is a 67year old female here today for hospital follow up.    She was discharged from Inpatient hospital, [facility] to Home   Admission Date: 12/17/19   Discharge Date: 12/20/19  Hospital Discharge Diagnoses (since 11/27/2019) onset over 1 year ago. Pts recent echo was normal and did not hint at any heart problems. Allergies:  She is allergic to penicillins and radiology contrast iodinated dyes.     Current Meds:  Cefuroxime Axetil 250 MG Oral Tab, Take 250 mg by mouth 2 (two) without mention of obstruction or gangrene, Esophageal reflux, Exposure to unspecified radiation (2013), Hearing impairment, Hearing loss, Hemorrhoids, HIGH BLOOD PRESSURE, Osteoarthritis, Osteoporosis, Personal history of antineoplastic chemotherapy, Ayanna Smith (59.4 kg).    /80   Pulse 103   Temp 97 °F (36.1 °C) (Other)   Resp 18   Ht 60\"   Wt 131 lb (59.4 kg)   SpO2 97%   BMI 25.58 kg/m²    GENERAL: well developed, well nourished, in no apparent distress  SKIN: no rashes, no suspicious lesions  HEENT: atr Prescriptions Disp Refills   • losartan Potassium 100 MG Oral Tab 30 tablet 1     Sig: Take 1 tablet (100 mg total) by mouth daily.        Imaging & Consults:  XR CHEST PA + LAT CHEST (OFF=[id number])      Transitional Care Management Certification:  I certify t

## 2019-12-31 NOTE — PROGRESS NOTES
Multiple attempts to reach the pt and messages left with no returned phone call. Pt went to [place] with PCP's office on 12/27/19, closing encounter.

## 2020-01-02 NOTE — CDS QUERY
Clarification – Potential Diagnosis Association  CLINICAL DOCUMENTATION CLARIFICATION FORM  Dear Doctor Herminio Castaneda information in the patient's medical record (provided below) includes documentation of diagnoses with potential association.  For roberto poss RLL pna suspect from aspiration of blood after bronch     12/18/19 pulm    Respiratory distress post bronchoscopy-suspect related to nasal blood aspiration with bronchospasm now improved  12/19/19 raul  Pt requires O2 per nasal cannula   and develop

## 2020-01-03 ENCOUNTER — TELEPHONE (OUTPATIENT)
Dept: HEMATOLOGY/ONCOLOGY | Facility: HOSPITAL | Age: 80
End: 2020-01-03

## 2020-01-07 ENCOUNTER — OFFICE VISIT (OUTPATIENT)
Dept: FAMILY MEDICINE CLINIC | Facility: CLINIC | Age: 80
End: 2020-01-07
Payer: MEDICARE

## 2020-01-07 VITALS
TEMPERATURE: 98 F | HEIGHT: 60 IN | SYSTOLIC BLOOD PRESSURE: 145 MMHG | RESPIRATION RATE: 16 BRPM | BODY MASS INDEX: 25.72 KG/M2 | HEART RATE: 108 BPM | WEIGHT: 131 LBS | DIASTOLIC BLOOD PRESSURE: 85 MMHG | OXYGEN SATURATION: 94 %

## 2020-01-07 DIAGNOSIS — J69.0 ASPIRATION PNEUMONIA OF BOTH LOWER LOBES DUE TO REGURGITATED FOOD (HCC): Primary | ICD-10-CM

## 2020-01-07 PROCEDURE — 1111F DSCHRG MED/CURRENT MED MERGE: CPT | Performed by: FAMILY MEDICINE

## 2020-01-07 PROCEDURE — 99213 OFFICE O/P EST LOW 20 MIN: CPT | Performed by: FAMILY MEDICINE

## 2020-01-07 RX ORDER — BENZONATATE 200 MG/1
200 CAPSULE ORAL 3 TIMES DAILY PRN
Qty: 30 CAPSULE | Refills: 1 | Status: SHIPPED | OUTPATIENT
Start: 2020-01-07 | End: 2020-02-27 | Stop reason: ALTCHOICE

## 2020-01-07 NOTE — PROGRESS NOTES
Had a very tumultuous medical course over the past several weeks. Eventually was required to undergo pulmonary endoscopy work-up some abnormal findings. She is a lung cancer survivor with slow progression of disease.   In the same setting of the endoscopy

## 2020-01-09 ENCOUNTER — TELEPHONE (OUTPATIENT)
Dept: HEMATOLOGY/ONCOLOGY | Age: 80
End: 2020-01-09

## 2020-01-09 NOTE — TELEPHONE ENCOUNTER
Keyla Curtis is calling regarding renewal [facility] papers which  the end of February. [employer] faxed the papers over on 20. He is checking to if the papers were received.

## 2020-01-10 ENCOUNTER — SOCIAL WORK SERVICES (OUTPATIENT)
Dept: HEMATOLOGY/ONCOLOGY | Facility: HOSPITAL | Age: 80
End: 2020-01-10

## 2020-01-10 NOTE — PROGRESS NOTES
SHUKRI completed updated physician certification and faxed to [facility] for patients son at [phone]. Sw spoke with patient to confirm.

## 2020-01-13 ENCOUNTER — TELEPHONE (OUTPATIENT)
Dept: FAMILY MEDICINE CLINIC | Facility: CLINIC | Age: 80
End: 2020-01-13

## 2020-01-14 ENCOUNTER — SOCIAL WORK SERVICES (OUTPATIENT)
Dept: HEMATOLOGY/ONCOLOGY | Facility: HOSPITAL | Age: 80
End: 2020-01-14

## 2020-01-14 ENCOUNTER — TELEPHONE (OUTPATIENT)
Dept: HEMATOLOGY/ONCOLOGY | Age: 80
End: 2020-01-14

## 2020-01-14 NOTE — PROGRESS NOTES
SW called and spoke with patient about sons [facility] paperwork. Analy explained company decides how much leave he will be approved for. SW offered to leave [place] paperwork at downstairs reception in [place] for patient to  for review. Patient agreed.

## 2020-01-14 NOTE — TELEPHONE ENCOUNTER
Patient is calling to discuss FMLA forms only being authorized for 6 months instead of 1 year, Fabiola Ochoa would like a call back.

## 2020-01-15 ENCOUNTER — TELEPHONE (OUTPATIENT)
Dept: FAMILY MEDICINE CLINIC | Facility: CLINIC | Age: 80
End: 2020-01-15

## 2020-01-15 NOTE — TELEPHONE ENCOUNTER
Patient has appointment coming up on 1/24/20 with Dr Dru Gregory. She has a home health nurse that comes in and has noticed her BP readings seem higher since the change in her medication.   Readings in the 150s/90s0    Patient is wondering if she needs to come in so

## 2020-01-16 ENCOUNTER — TELEPHONE (OUTPATIENT)
Dept: FAMILY MEDICINE CLINIC | Facility: CLINIC | Age: 80
End: 2020-01-16

## 2020-01-16 NOTE — TELEPHONE ENCOUNTER
Claudette rn from [facility] is requesting to talk to the nurse, pt's bp is high. Please call and advise.

## 2020-01-16 NOTE — TELEPHONE ENCOUNTER
Spoke to patient - she was seen here 1/7/2020, b/p was 145/85. This past week elevated 150/90 - asymptomatic. Has appt in 1 week. Sooner appt? Takes Losartan 100 mg daily, Metoprolol 100 mg daily.   To [employer]

## 2020-01-16 NOTE — TELEPHONE ENCOUNTER
Per patient - informed of Dr. Mack Labs response to keep appt for 1/24/2020. If anything should change she should call us.

## 2020-01-17 ENCOUNTER — OFFICE VISIT (OUTPATIENT)
Dept: FAMILY MEDICINE CLINIC | Facility: CLINIC | Age: 80
End: 2020-01-17
Payer: MEDICARE

## 2020-01-17 VITALS
HEIGHT: 60 IN | TEMPERATURE: 98 F | OXYGEN SATURATION: 98 % | BODY MASS INDEX: 25.52 KG/M2 | WEIGHT: 130 LBS | SYSTOLIC BLOOD PRESSURE: 144 MMHG | RESPIRATION RATE: 20 BRPM | HEART RATE: 94 BPM | DIASTOLIC BLOOD PRESSURE: 100 MMHG

## 2020-01-17 DIAGNOSIS — I10 ESSENTIAL HYPERTENSION: Primary | ICD-10-CM

## 2020-01-17 DIAGNOSIS — M79.602 LEFT ARM PAIN: ICD-10-CM

## 2020-01-17 DIAGNOSIS — H02.849 EDEMA OF EYELID, UNSPECIFIED LATERALITY: ICD-10-CM

## 2020-01-17 PROCEDURE — 99214 OFFICE O/P EST MOD 30 MIN: CPT | Performed by: FAMILY MEDICINE

## 2020-01-17 RX ORDER — AMLODIPINE BESYLATE 10 MG/1
10 TABLET ORAL DAILY
Qty: 30 TABLET | Refills: 3 | Status: SHIPPED | OUTPATIENT
Start: 2020-01-17 | End: 2020-03-09 | Stop reason: ALTCHOICE

## 2020-01-17 RX ORDER — AZELASTINE HYDROCHLORIDE 0.5 MG/ML
1 SOLUTION/ DROPS OPHTHALMIC 2 TIMES DAILY
Qty: 1 BOTTLE | Refills: 3 | Status: SHIPPED | OUTPATIENT
Start: 2020-01-17 | End: 2020-05-26

## 2020-01-17 NOTE — PROGRESS NOTES
HPI:    Patient ID: Florrie Nageotte is a 67year old female. Hypertension   This is a chronic problem. The current episode started more than 1 year ago. The problem has been gradually worsening since onset. The problem is uncontrolled.  Pertinent negative (light makes eyes tear) and itching. Negative for blurred vision, double vision and discharge. Cardiovascular: Negative for chest pain. Musculoskeletal:        + arm pain   Neurological: Negative for tingling, numbness and headaches.    All other system Saline Nasal Spray 0.65 % Nasal Solution 1 spray by Nasal route as needed for congestion. • acetaminophen 500 MG Oral Tab Take 500 mg by mouth every 6 (six) hours as needed for Pain.      • Fluticasone Propionate 50 MCG/ACT Nasal Suspension 2 sprays by Solution 1 Bottle 3     Sig: Place 1 drop into both eyes 2 (two) times daily.        Imaging & Referrals:  OPHTHALMOLOGY - INTERNAL  US ARM LEFT LIMITED (DSR=[id number])       XP#[id number]

## 2020-01-24 ENCOUNTER — OFFICE VISIT (OUTPATIENT)
Dept: FAMILY MEDICINE CLINIC | Facility: CLINIC | Age: 80
End: 2020-01-24
Payer: MEDICARE

## 2020-01-24 VITALS
OXYGEN SATURATION: 95 % | DIASTOLIC BLOOD PRESSURE: 84 MMHG | SYSTOLIC BLOOD PRESSURE: 136 MMHG | TEMPERATURE: 98 F | BODY MASS INDEX: 25.52 KG/M2 | HEIGHT: 60 IN | RESPIRATION RATE: 16 BRPM | HEART RATE: 92 BPM | WEIGHT: 130 LBS

## 2020-01-24 DIAGNOSIS — C82.03 FOLLICULAR LYMPHOMA GRADE I OF INTRA-ABDOMINAL LYMPH NODES (HCC): ICD-10-CM

## 2020-01-24 DIAGNOSIS — I10 ESSENTIAL HYPERTENSION: ICD-10-CM

## 2020-01-24 DIAGNOSIS — H04.129 TEAR FILM INSUFFICIENCY, UNSPECIFIED LATERALITY: ICD-10-CM

## 2020-01-24 DIAGNOSIS — C34.12 MALIGNANT NEOPLASM OF UPPER LOBE OF LEFT LUNG (HCC): ICD-10-CM

## 2020-01-24 DIAGNOSIS — N18.4 CKD (CHRONIC KIDNEY DISEASE) STAGE 4, GFR 15-29 ML/MIN (HCC): ICD-10-CM

## 2020-01-24 DIAGNOSIS — Z00.00 MEDICARE ANNUAL WELLNESS VISIT, SUBSEQUENT: Primary | ICD-10-CM

## 2020-01-24 DIAGNOSIS — C78.00 SECONDARY CARCINOMA OF LUNG, UNSPECIFIED LATERALITY (HCC): ICD-10-CM

## 2020-01-24 DIAGNOSIS — C88.4 EXTRANODAL MARGINAL ZONE B-CELL LYMPHOMA OF MUCOSA-ASSOCIATED LYMPHOID TISSUE (MALT) (HCC): ICD-10-CM

## 2020-01-24 DIAGNOSIS — D64.9 NORMOCYTIC NORMOCHROMIC ANEMIA: ICD-10-CM

## 2020-01-24 DIAGNOSIS — Z87.09 HISTORY OF COPD: ICD-10-CM

## 2020-01-24 DIAGNOSIS — C85.10 LOW GRADE B-CELL LYMPHOMA (HCC): ICD-10-CM

## 2020-01-24 DIAGNOSIS — R19.7 DIARRHEA, UNSPECIFIED TYPE: ICD-10-CM

## 2020-01-24 PROBLEM — J18.9 COMMUNITY ACQUIRED PNEUMONIA: Status: RESOLVED | Noted: 2018-04-03 | Resolved: 2020-01-24

## 2020-01-24 PROBLEM — K57.92 DIVERTICULITIS: Status: RESOLVED | Noted: 2019-02-26 | Resolved: 2020-01-24

## 2020-01-24 PROBLEM — R13.14 DYSPHAGIA, PHARYNGOESOPHAGEAL PHASE: Status: RESOLVED | Noted: 2018-10-10 | Resolved: 2020-01-24

## 2020-01-24 PROBLEM — J44.0 COPD (CHRONIC OBSTRUCTIVE PULMONARY DISEASE) WITH ACUTE BRONCHITIS: Status: RESOLVED | Noted: 2019-05-14 | Resolved: 2020-01-24

## 2020-01-24 PROBLEM — Z99.81 DEPENDENCE ON SUPPLEMENTAL OXYGEN: Status: RESOLVED | Noted: 2019-12-17 | Resolved: 2020-01-24

## 2020-01-24 PROBLEM — D72.829 LEUKOCYTOSIS: Status: RESOLVED | Noted: 2018-04-03 | Resolved: 2020-01-24

## 2020-01-24 PROBLEM — J20.9 COPD (CHRONIC OBSTRUCTIVE PULMONARY DISEASE) WITH ACUTE BRONCHITIS: Status: RESOLVED | Noted: 2019-05-14 | Resolved: 2020-01-24

## 2020-01-24 PROBLEM — J18.9 COMMUNITY ACQUIRED PNEUMONIA, UNSPECIFIED LATERALITY: Status: RESOLVED | Noted: 2018-04-04 | Resolved: 2020-01-24

## 2020-01-24 PROBLEM — I70.0 AORTIC CALCIFICATION (HCC): Status: RESOLVED | Noted: 2018-12-26 | Resolved: 2020-01-24

## 2020-01-24 PROBLEM — N17.9 ACUTE RENAL FAILURE (ARF) (HCC): Status: RESOLVED | Noted: 2018-04-03 | Resolved: 2020-01-24

## 2020-01-24 PROBLEM — R79.89 AZOTEMIA: Status: RESOLVED | Noted: 2018-04-03 | Resolved: 2020-01-24

## 2020-01-24 PROBLEM — N17.9 ACUTE RENAL FAILURE (ARF): Status: RESOLVED | Noted: 2018-04-03 | Resolved: 2020-01-24

## 2020-01-24 PROBLEM — J20.9 COPD (CHRONIC OBSTRUCTIVE PULMONARY DISEASE) WITH ACUTE BRONCHITIS (HCC): Status: RESOLVED | Noted: 2019-05-14 | Resolved: 2020-01-24

## 2020-01-24 PROBLEM — E87.2 METABOLIC ACIDOSIS: Status: RESOLVED | Noted: 2018-04-03 | Resolved: 2020-01-24

## 2020-01-24 PROBLEM — J44.0 COPD (CHRONIC OBSTRUCTIVE PULMONARY DISEASE) WITH ACUTE BRONCHITIS  (HCC): Status: RESOLVED | Noted: 2019-05-14 | Resolved: 2020-01-24

## 2020-01-24 PROBLEM — R73.9 HYPERGLYCEMIA: Status: RESOLVED | Noted: 2018-04-03 | Resolved: 2020-01-24

## 2020-01-24 PROBLEM — I70.0 AORTIC CALCIFICATION: Status: RESOLVED | Noted: 2018-12-26 | Resolved: 2020-01-24

## 2020-01-24 PROBLEM — J44.0 COPD (CHRONIC OBSTRUCTIVE PULMONARY DISEASE) WITH ACUTE BRONCHITIS (HCC): Status: RESOLVED | Noted: 2019-05-14 | Resolved: 2020-01-24

## 2020-01-24 PROBLEM — R06.02 SOB (SHORTNESS OF BREATH): Status: RESOLVED | Noted: 2018-03-27 | Resolved: 2020-01-24

## 2020-01-24 PROBLEM — J20.9 COPD (CHRONIC OBSTRUCTIVE PULMONARY DISEASE) WITH ACUTE BRONCHITIS  (HCC): Status: RESOLVED | Noted: 2019-05-14 | Resolved: 2020-01-24

## 2020-01-24 PROBLEM — E87.20 METABOLIC ACIDOSIS: Status: RESOLVED | Noted: 2018-04-03 | Resolved: 2020-01-24

## 2020-01-24 PROBLEM — K57.32 DIVERTICULITIS OF LARGE INTESTINE WITHOUT PERFORATION OR ABSCESS WITHOUT BLEEDING: Status: RESOLVED | Noted: 2019-01-14 | Resolved: 2020-01-24

## 2020-01-24 PROCEDURE — G0439 PPPS, SUBSEQ VISIT: HCPCS | Performed by: FAMILY MEDICINE

## 2020-01-24 NOTE — PROGRESS NOTES
Alexandra Torres REASON FOR VISIT:    Parvin Clark is a 67year old female who presents for a Medicare Annual Wellness visit.          Patient Care Team: Patient Care Team:  Kim Diaz DO as PCP - General (Family Practice)  Kim Diaz DO as PCP - [facility] Emulsion INSTILL ONE DROP INTO EACH EYE TWICE DAILY 20 mL 6   • FAMOTIDINE 20 MG Oral Tab TAKE 1 TABLET BY MOUTH TWICE DAILY 180 tablet 11   • BREO ELLIPTA 200-25 MCG/INH Inhalation Aerosol Powder, Breath Activated 1 puff daily.      • Montelukast Sodium (S Family  If you are a male age 38-65 or a female age 47-67, do you take aspirin?: Yes  Have you had any immunizations at another office such as Influenza, Hepatitis B, Tetanus, or Pneumococcal?: No     Functional Ability     Bathing or Showering: Able witho There are no preventive care reminders to display for this patient. Flex Sigmoidoscopy Screen every 5 years No results found for this or any previous visit.     Fecal Occult Blood Annually No results found for: FOB, OCCULTSTOOL   Glaucoma Screening     O Esophageal reflux    • Exposure to unspecified radiation 2013    right lower lung   • Gluteal tendinitis 9/10/2013   • Gram-negative pneumonia ([facility] [address]    • Hearing impairment     bilateral hearing aids    • Hearing loss    • Hemorrhoids    • HIGH BLOOD PRESS removal of upper right lobe   • TONSILLECTOMY        Family History   Problem Relation Age of Onset   • Other (immune system) Mother    • Cancer Father         of the mouth   • Hypertension Father      Immunization History      Immunization History  Adm losartan Potassium 100 MG Oral  METOPROLOL SUCCINATE  MG Oral Tablet 24 Hr      Low grade B-cell lymphoma ([address]  SEES Specialist    Normocytic normochromic anemia  Sees specialist    Tear film insufficiency, unspecified laterality  Sees specialist

## 2020-01-24 NOTE — PROGRESS NOTES
Patient is here for general follow-up. On a bright note is that Dr. Rebecca Lundborg reviewed the case made some changes medically and now she thankfully is no longer coughing.   She had a chronic cough for well over a year today's exam blood pressure 150/95 even on

## 2020-01-28 ENCOUNTER — HOSPITAL ENCOUNTER (OUTPATIENT)
Dept: ULTRASOUND IMAGING | Facility: HOSPITAL | Age: 80
Discharge: HOME | End: 2020-01-28
Attending: FAMILY MEDICINE
Payer: MEDICARE

## 2020-01-28 DIAGNOSIS — M79.602 LEFT ARM PAIN: ICD-10-CM

## 2020-01-28 PROCEDURE — 76881 US COMPL JOINT R-T W/IMG: CPT | Performed by: FAMILY MEDICINE

## 2020-02-05 NOTE — PROGRESS NOTES
Babbling yet another encounter with bronchitis this is a woman struggling with lung cancer. She was seen by Dr. Enrico Gowers and nurse practitioner Rosina Milligan. She was placed on prednisone and a Z-Melvin and his feel feeling much better. She is a non-smoker.
retired

## 2020-02-18 ENCOUNTER — OFFICE VISIT (OUTPATIENT)
Dept: HEMATOLOGY/ONCOLOGY | Age: 80
End: 2020-02-18
Attending: INTERNAL MEDICINE
Payer: MEDICARE

## 2020-02-18 VITALS
WEIGHT: 135.63 LBS | DIASTOLIC BLOOD PRESSURE: 84 MMHG | OXYGEN SATURATION: 95 % | SYSTOLIC BLOOD PRESSURE: 134 MMHG | HEART RATE: 77 BPM | BODY MASS INDEX: 26 KG/M2 | RESPIRATION RATE: 18 BRPM | TEMPERATURE: 98 F

## 2020-02-18 DIAGNOSIS — D47.2 MONOCLONAL PARAPROTEINEMIA: ICD-10-CM

## 2020-02-18 DIAGNOSIS — C34.81 MALIGNANT NEOPLASM OF OVERLAPPING SITES OF RIGHT LUNG (HCC): ICD-10-CM

## 2020-02-18 DIAGNOSIS — N18.30 STAGE 3 CHRONIC KIDNEY DISEASE (HCC): ICD-10-CM

## 2020-02-18 DIAGNOSIS — C88.4 EXTRANODAL MARGINAL ZONE B-CELL LYMPHOMA OF MUCOSA-ASSOCIATED LYMPHOID TISSUE (MALT) (HCC): ICD-10-CM

## 2020-02-18 DIAGNOSIS — D63.0 ANEMIA IN NEOPLASTIC DISEASE: ICD-10-CM

## 2020-02-18 DIAGNOSIS — M79.89 LEG SWELLING: Primary | ICD-10-CM

## 2020-02-18 DIAGNOSIS — C34.90 EGFR-RELATED LUNG CANCER (HCC): ICD-10-CM

## 2020-02-18 DIAGNOSIS — C85.10 LOW GRADE B-CELL LYMPHOMA (HCC): ICD-10-CM

## 2020-02-18 LAB
ALBUMIN SERPL-MCNC: 2.6 G/DL (ref 3.4–5)
ALBUMIN/GLOB SERPL: 0.3 {RATIO} (ref 1–2)
ALP LIVER SERPL-CCNC: 75 U/L (ref 55–142)
ALT SERPL-CCNC: 22 U/L (ref 13–56)
ANION GAP SERPL CALC-SCNC: 8 MMOL/L (ref 0–18)
AST SERPL-CCNC: 21 U/L (ref 15–37)
BASOPHILS # BLD AUTO: 0.04 X10(3) UL (ref 0–0.2)
BASOPHILS NFR BLD AUTO: 0.8 %
BILIRUB SERPL-MCNC: 0.4 MG/DL (ref 0.1–2)
BUN BLD-MCNC: 45 MG/DL (ref 7–18)
BUN/CREAT SERPL: 28.7 (ref 10–20)
CALCIUM BLD-MCNC: 9.9 MG/DL (ref 8.5–10.1)
CEA SERPL-MCNC: 1.1 NG/ML (ref ?–5)
CHLORIDE SERPL-SCNC: 110 MMOL/L (ref 98–112)
CO2 SERPL-SCNC: 23 MMOL/L (ref 21–32)
CREAT BLD-MCNC: 1.57 MG/DL (ref 0.55–1.02)
DEPRECATED RDW RBC AUTO: 59.3 FL (ref 35.1–46.3)
EOSINOPHIL # BLD AUTO: 0.06 X10(3) UL (ref 0–0.7)
EOSINOPHIL NFR BLD AUTO: 1.2 %
ERYTHROCYTE [DISTWIDTH] IN BLOOD BY AUTOMATED COUNT: 16.2 % (ref 11–15)
GLOBULIN PLAS-MCNC: 7.8 G/DL (ref 2.8–4.4)
GLUCOSE BLD-MCNC: 103 MG/DL (ref 70–99)
HCT VFR BLD AUTO: 31.3 % (ref 35–48)
HGB BLD-MCNC: 9.8 G/DL (ref 12–16)
IGA SERPL-MCNC: 197 MG/DL (ref 70–312)
IGM SERPL-MCNC: 4150 MG/DL (ref 43–279)
IMM GRANULOCYTES # BLD AUTO: 0.02 X10(3) UL (ref 0–1)
IMM GRANULOCYTES NFR BLD: 0.4 %
IMMUNOGLOBULIN PNL SER-MCNC: 837 MG/DL (ref 791–1643)
LDH SERPL L TO P-CCNC: 179 U/L
LYMPHOCYTES # BLD AUTO: 1.45 X10(3) UL (ref 1–4)
LYMPHOCYTES NFR BLD AUTO: 28.5 %
M PROTEIN MFR SERPL ELPH: 10.4 G/DL (ref 6.4–8.2)
MCH RBC QN AUTO: 31 PG (ref 26–34)
MCHC RBC AUTO-ENTMCNC: 31.3 G/DL (ref 31–37)
MCV RBC AUTO: 99.1 FL (ref 80–100)
MONOCYTES # BLD AUTO: 0.5 X10(3) UL (ref 0.1–1)
MONOCYTES NFR BLD AUTO: 9.8 %
NEUTROPHILS # BLD AUTO: 3.01 X10 (3) UL (ref 1.5–7.7)
NEUTROPHILS # BLD AUTO: 3.01 X10(3) UL (ref 1.5–7.7)
NEUTROPHILS NFR BLD AUTO: 59.3 %
OSMOLALITY SERPL CALC.SUM OF ELEC: 304 MOSM/KG (ref 275–295)
PATIENT FASTING Y/N/NP: NO
PLATELET # BLD AUTO: 199 10(3)UL (ref 150–450)
POTASSIUM SERPL-SCNC: 4 MMOL/L (ref 3.5–5.1)
RBC # BLD AUTO: 3.16 X10(6)UL (ref 3.8–5.3)
SODIUM SERPL-SCNC: 141 MMOL/L (ref 136–145)
WBC # BLD AUTO: 5.1 X10(3) UL (ref 4–11)

## 2020-02-18 PROCEDURE — 99215 OFFICE O/P EST HI 40 MIN: CPT | Performed by: INTERNAL MEDICINE

## 2020-02-18 RX ORDER — DOXYCYCLINE HYCLATE 20 MG
20 TABLET ORAL 2 TIMES DAILY
COMMUNITY
Start: 2020-01-29 | End: 2020-03-09 | Stop reason: ALTCHOICE

## 2020-02-18 RX ORDER — PREDNISOLONE ACETATE 10 MG/ML
SUSPENSION/ DROPS OPHTHALMIC
COMMUNITY
Start: 2020-01-29 | End: 2020-05-26

## 2020-02-18 NOTE — PROGRESS NOTES
Pt here for 3 month MD f/u. Energy level has been fair, gets tired easily. Appetite has been fair. Denies pain. No SOB. Pt notes knees and legs weakness when going up stairs.      Outpatient Oncology Care Plan  Problem list:  fatigue  knowledge deficit    P

## 2020-02-18 NOTE — PROGRESS NOTES
Cancer Center Progress Note    Patient Name: Jennifer Aguilar   YOB: 1940   Medical Record Number: [id number]   [facility]: [id number]   Attending Physician: Richard Kuhn M.D.    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 3. Right sided PE diagnosed 9/2011- seen on surveillance scans.  Was placed on antico direct smears and cell block:  -Evaluation limited by scant cellularity / obscuring blood.  -Scant fragments of sampled lymph node with anthracotic macrophages.  -Small monotypic B cell population detected on flow cytometry.   (See comment.)       B- Bronch Result  CD3                                   See Comment  CD20                                See Comment     Positive tissue controls were utilized in the staining process.   These slides were reviewed by the signout Pathologist and showed appropriate sta block preparation, and RPMI rinse for flow cytometry.        B-Labeled with the patient's name and medical record number, bronchoalveolar lavage, right lower lobe, received 13 mL red fluid.   Three cytospins prepared (one air-dried Diff Quik, two alcohol-fi % Ophthalmic Suspension, INSTILL 1 DROP INTO EACH EYE TWICE DAILY FOR 14 DAYS, Disp: , Rfl:   •  amLODIPine Besylate 10 MG Oral Tab, Take 1 tablet (10 mg total) by mouth daily. , Disp: 30 tablet, Rfl: 3  •  Azelastine HCl 0.05 % Ophthalmic Solution, Place 1 route as needed for congestion. , Disp: , Rfl:   •  acetaminophen 500 MG Oral Tab, Take 500 mg by mouth every 6 (six) hours as needed for Pain., Disp: , Rfl:   •  Fluticasone Propionate 50 MCG/ACT Nasal Suspension, 2 sprays by Nasal route every other day. 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 Psych:  Mood and affect appropriate  HEENT: EOMs intact. PERRL. Oropharynx w/o patches or exudates. Minimal clear PND  Neck: No JVD. No palpable lymphadenopathy. Neck is supple. Lymphatics:  There is no palpable lymphadenopathy   Ches Ref. Range 11/19/2019 12:00   IMMUNOGLOBULIN A Latest Ref Range: 70.00 - 312.00 mg/dL 161.00   IMMUNOGLOBULIN G Latest Ref Range: 791-1,643 mg/dL 871   IMMUNOGLOBULIN M Latest Ref Range: 43.0 - 279.0 mg/dL 3,570.0 (H)        Ref.  Range 11/19/2019 12:0 again seen, currently measuring 0.9 x 1.7 cm, unchanged from previous imaging.   There is some nodular scarring within the lingula seen which is slightly decreased from the previous   exam.  Similarly, small nodular region of scarring within the lateral asp in degree from the previous exam.  Prior noted focus of adenopathy posterior to the inferior vena cava currently measures 1.7 x 1.9 cm, previously   measuring 1.5 x 1.9 cm.   Triangular soft tissue nodule adjacent to the posterior aspect of the right lobe o Malignant *     TECHNIQUE:  The patient fasted for at least 6 hours. F-18 FDG was injected IV, and whole-body images from vertex to mid-thigh were obtained with concurrent CT scan for both anatomic localization as well as attenuation correction.       RADIO may also may be minimally increased in size. SUV max is relatively stable at 6.4 was 6.1.     5. Nodular consolidation the right lower lobe is relatively stable. Uptake in the right perihilar region has an SUV max of 4.1.   This is not identified on the p said resolved with change with her anti-hypertensives. 6. BLE edema- some worsening. She feels this coincided with change in her meds. Given her history will order dopplers.       Labs reviewed (not all back) with her and her son.    CT C/A/P and doppler

## 2020-02-19 ENCOUNTER — OFFICE VISIT (OUTPATIENT)
Dept: FAMILY MEDICINE CLINIC | Facility: CLINIC | Age: 80
End: 2020-02-19
Payer: MEDICARE

## 2020-02-19 VITALS
HEIGHT: 60 IN | WEIGHT: 134 LBS | SYSTOLIC BLOOD PRESSURE: 124 MMHG | RESPIRATION RATE: 20 BRPM | OXYGEN SATURATION: 96 % | BODY MASS INDEX: 26.31 KG/M2 | DIASTOLIC BLOOD PRESSURE: 78 MMHG | TEMPERATURE: 99 F | HEART RATE: 98 BPM

## 2020-02-19 DIAGNOSIS — I10 ESSENTIAL HYPERTENSION: Primary | ICD-10-CM

## 2020-02-19 DIAGNOSIS — M79.89 LEG SWELLING: ICD-10-CM

## 2020-02-19 LAB
KAPPA FREE LIGHT CHAIN: 1.05 MG/DL (ref 0.33–1.94)
KAPPA/LAMBDA FLC RATIO: 0.03 (ref 0.26–1.65)
LAMBDA FREE LIGHT CHAIN: 37.04 MG/DL (ref 0.57–2.63)

## 2020-02-19 PROCEDURE — 99213 OFFICE O/P EST LOW 20 MIN: CPT | Performed by: FAMILY MEDICINE

## 2020-02-19 RX ORDER — DILTIAZEM HYDROCHLORIDE 180 MG/1
180 CAPSULE, EXTENDED RELEASE ORAL DAILY
Qty: 30 CAPSULE | Refills: 1 | Status: SHIPPED | OUTPATIENT
Start: 2020-02-19 | End: 2020-03-02 | Stop reason: ALTCHOICE

## 2020-02-19 NOTE — PROGRESS NOTES
HPI:    Patient ID: Allison Martinez is a 67year old female. Hypertension   This is a chronic problem. The current episode started more than 1 year ago. The problem is unchanged. The problem is controlled.  Pertinent negatives include no anxiety, blurre DAILY 90 tablet 3   • Ipratropium Bromide 0.03 % Nasal Solution 1 spray by Nasal route 2 (two) times daily.      • Albuterol Sulfate HFA (VENTOLIN HFA) 108 (90 Base) MCG/ACT Inhalation Aero Soln Inhale 2 puffs into the lungs every 4 (four) hours as needed f ASSESSMENT/PLAN:   Essential hypertension  (primary encounter diagnosis)  Leg swelling     1. Essential hypertension  Pt advised to stop amlodipine at this time due to leg swelling. She should begin diltiazem in its place. She should follow up in 1 week.

## 2020-02-20 LAB
ALBUMIN SERPL ELPH-MCNC: 3.5 G/DL (ref 3.75–5.21)
ALBUMIN/GLOB SERPL: 0.53 {RATIO} (ref 1–2)
ALPHA1 GLOB SERPL ELPH-MCNC: 0.36 G/DL (ref 0.19–0.46)
ALPHA2 GLOB SERPL ELPH-MCNC: 0.93 G/DL (ref 0.48–1.05)
B-GLOBULIN SERPL ELPH-MCNC: 0.97 G/DL (ref 0.68–1.23)
GAMMA GLOB SERPL ELPH-MCNC: 4.33 G/DL (ref 0.62–1.7)
M PROTEIN MFR SERPL ELPH: 10.1 G/DL (ref 6.4–8.2)
M-SPIKE 1: 3.81 G/DL (ref ?–0)

## 2020-02-21 ENCOUNTER — TELEPHONE (OUTPATIENT)
Dept: FAMILY MEDICINE CLINIC | Facility: CLINIC | Age: 80
End: 2020-02-21

## 2020-02-21 NOTE — TELEPHONE ENCOUNTER
Pt was just put on this new med dilTIAZem HCl ER Beads 180 MG Oral Capsule SR 24 Hr. YP told her that if her BP goes up to take 1/2 of diltiazem in addition to the 180. Her pills are capsules so what should she do?

## 2020-02-23 ENCOUNTER — HOSPITAL ENCOUNTER (OUTPATIENT)
Dept: ULTRASOUND IMAGING | Age: 80
Discharge: HOME | End: 2020-02-23
Attending: INTERNAL MEDICINE
Payer: MEDICARE

## 2020-02-23 ENCOUNTER — HOSPITAL ENCOUNTER (OUTPATIENT)
Dept: CT IMAGING | Age: 80
Discharge: HOME | End: 2020-02-23
Attending: INTERNAL MEDICINE
Payer: MEDICARE

## 2020-02-23 DIAGNOSIS — M79.89 LEG SWELLING: ICD-10-CM

## 2020-02-23 DIAGNOSIS — C34.81 MALIGNANT NEOPLASM OF OVERLAPPING SITES OF RIGHT LUNG (HCC): ICD-10-CM

## 2020-02-23 PROCEDURE — 93970 EXTREMITY STUDY: CPT | Performed by: INTERNAL MEDICINE

## 2020-02-23 PROCEDURE — 71250 CT THORAX DX C-: CPT | Performed by: INTERNAL MEDICINE

## 2020-02-23 PROCEDURE — 74176 CT ABD & PELVIS W/O CONTRAST: CPT | Performed by: INTERNAL MEDICINE

## 2020-02-24 DIAGNOSIS — D47.2 MONOCLONAL PARAPROTEINEMIA: ICD-10-CM

## 2020-02-24 DIAGNOSIS — C88.4 EXTRANODAL MARGINAL ZONE B-CELL LYMPHOMA OF MUCOSA-ASSOCIATED LYMPHOID TISSUE (MALT) (HCC): Primary | ICD-10-CM

## 2020-02-27 ENCOUNTER — DIETICIAN VISIT (OUTPATIENT)
Dept: HEMATOLOGY/ONCOLOGY | Facility: HOSPITAL | Age: 80
End: 2020-02-27

## 2020-02-27 ENCOUNTER — PATIENT MESSAGE (OUTPATIENT)
Dept: FAMILY MEDICINE CLINIC | Facility: CLINIC | Age: 80
End: 2020-02-27

## 2020-02-27 ENCOUNTER — OFFICE VISIT (OUTPATIENT)
Dept: FAMILY MEDICINE CLINIC | Facility: CLINIC | Age: 80
End: 2020-02-27
Payer: MEDICARE

## 2020-02-27 ENCOUNTER — HOSPITAL ENCOUNTER (OUTPATIENT)
Dept: GENERAL RADIOLOGY | Age: 80
Discharge: HOME | End: 2020-02-27
Attending: FAMILY MEDICINE
Payer: MEDICARE

## 2020-02-27 ENCOUNTER — APPOINTMENT (OUTPATIENT)
Dept: LAB | Age: 80
End: 2020-02-27
Attending: FAMILY MEDICINE
Payer: MEDICARE

## 2020-02-27 VITALS
TEMPERATURE: 98 F | HEIGHT: 60 IN | BODY MASS INDEX: 26.11 KG/M2 | OXYGEN SATURATION: 96 % | WEIGHT: 133 LBS | DIASTOLIC BLOOD PRESSURE: 86 MMHG | HEART RATE: 90 BPM | RESPIRATION RATE: 16 BRPM | SYSTOLIC BLOOD PRESSURE: 122 MMHG

## 2020-02-27 DIAGNOSIS — R05.9 COUGH: ICD-10-CM

## 2020-02-27 DIAGNOSIS — M79.89 LEG SWELLING: ICD-10-CM

## 2020-02-27 DIAGNOSIS — R05.9 COUGH: Primary | ICD-10-CM

## 2020-02-27 DIAGNOSIS — I10 ESSENTIAL HYPERTENSION: ICD-10-CM

## 2020-02-27 LAB — NT-PROBNP SERPL-MCNC: 1940 PG/ML (ref ?–450)

## 2020-02-27 PROCEDURE — 36415 COLL VENOUS BLD VENIPUNCTURE: CPT

## 2020-02-27 PROCEDURE — 99214 OFFICE O/P EST MOD 30 MIN: CPT | Performed by: FAMILY MEDICINE

## 2020-02-27 PROCEDURE — 83880 ASSAY OF NATRIURETIC PEPTIDE: CPT

## 2020-02-27 PROCEDURE — 71046 X-RAY EXAM CHEST 2 VIEWS: CPT | Performed by: FAMILY MEDICINE

## 2020-02-27 RX ORDER — PREDNISONE 20 MG/1
TABLET ORAL
Qty: 13 TABLET | Refills: 0 | Status: SHIPPED | OUTPATIENT
Start: 2020-02-27 | End: 2020-03-09 | Stop reason: ALTCHOICE

## 2020-02-27 RX ORDER — AZITHROMYCIN 250 MG/1
TABLET, FILM COATED ORAL
Qty: 6 TABLET | Refills: 0 | Status: SHIPPED | OUTPATIENT
Start: 2020-02-27 | End: 2020-03-09 | Stop reason: ALTCHOICE

## 2020-02-27 RX ORDER — PREDNISONE 20 MG/1
TABLET ORAL
Qty: 13 TABLET | Refills: 0 | Status: SHIPPED | OUTPATIENT
Start: 2020-02-27 | End: 2020-03-02 | Stop reason: ALTCHOICE

## 2020-02-27 RX ORDER — DILTIAZEM HYDROCHLORIDE 120 MG/1
120 CAPSULE, COATED, EXTENDED RELEASE ORAL DAILY
Qty: 30 CAPSULE | Refills: 1 | Status: SHIPPED | OUTPATIENT
Start: 2020-02-27 | End: 2020-03-09 | Stop reason: ALTCHOICE

## 2020-02-27 RX ORDER — LOSARTAN POTASSIUM 100 MG/1
100 TABLET ORAL DAILY
Qty: 90 TABLET | Refills: 1 | Status: SHIPPED | OUTPATIENT
Start: 2020-02-27 | End: 2020-03-12 | Stop reason: DRUGHIGH

## 2020-02-27 NOTE — PROGRESS NOTES
HPI:    Patient ID: Bk Johns is a 67year old female. Hypertension   This is a chronic problem. The current episode started more than 1 year ago. The problem is unchanged. The problem is controlled.  Pertinent negatives include no anxiety, blurred HENT: Positive for rhinorrhea. Negative for ear pain, postnasal drip and sore throat. Eyes: Negative for blurred vision. Respiratory: Positive for cough. Negative for shortness of breath. Cardiovascular: Negative for chest pain.    Musculoskeletal Montelukast Sodium (SINGULAIR) 10 MG Oral Tab Take 10 mg by mouth nightly. • acetaminophen 500 MG Oral Tab Take 500 mg by mouth every 6 (six) hours as needed for Pain.      • Fluticasone Propionate 50 MCG/ACT Nasal Suspension 2 sprays by Nasal route monica hypertension  Pt advised to restart losartan 100 MG at this time. She should switch diltiazem to 120 MG instead of 180 MG for 5 days before stopping. Follow up in 2-3 weeks. If bps become too high then she is to restart 120 MG diltiazem.  Leg elevation is e

## 2020-02-27 NOTE — PATIENT INSTRUCTIONS
Restart losartan 100mg daily. Decrease diltiazem to 120mg daily for now. Do 120mg x 5 days, then stop it. Follow up in 2-3 weeks. Elevate legs multiple times a day.

## 2020-02-28 ENCOUNTER — TELEPHONE (OUTPATIENT)
Dept: FAMILY MEDICINE CLINIC | Facility: CLINIC | Age: 80
End: 2020-02-28

## 2020-02-28 RX ORDER — DIPHENOXYLATE HYDROCHLORIDE AND ATROPINE SULFATE 2.5; .025 MG/1; MG/1
TABLET ORAL
Qty: 30 TABLET | Refills: 0 | OUTPATIENT
Start: 2020-02-28

## 2020-02-28 RX ORDER — DIPHENOXYLATE HYDROCHLORIDE AND ATROPINE SULFATE 2.5; .025 MG/1; MG/1
1-2 TABLET ORAL 4 TIMES DAILY PRN
Qty: 30 TABLET | Refills: 0 | Status: SHIPPED | OUTPATIENT
Start: 2020-02-28 | End: 2020-06-29

## 2020-02-28 NOTE — TELEPHONE ENCOUNTER
Spoke to Dr Cherylene League to confirm what the patient should be doing with the losartan and DILTIAZEM.  I called patient back to let her know she should be taking both medicine at the same time, After 5 days patient should stop taking Diltiazem and Continue to take

## 2020-02-28 NOTE — TELEPHONE ENCOUNTER
CALLING BACK WITH BP READINGS      12:45 PM TODAY  126/71  TOOK MED AT ABOUT 1 1/2 HOURS PRIOR TO TAKING THIS READING.    :2:30 PM TODAY 134/77    TOLD TO CALL THESE IN AND SHE WOULD LIKE A CALL BACK TODAY TO ADVISE.

## 2020-03-01 DIAGNOSIS — R79.89 ELEVATED BRAIN NATRIURETIC PEPTIDE (BNP) LEVEL: ICD-10-CM

## 2020-03-01 DIAGNOSIS — I50.30 DIASTOLIC HEART FAILURE, UNSPECIFIED HF CHRONICITY (HCC): Primary | ICD-10-CM

## 2020-03-01 DIAGNOSIS — M79.89 LEG SWELLING: ICD-10-CM

## 2020-03-02 ENCOUNTER — OFFICE VISIT (OUTPATIENT)
Dept: FAMILY MEDICINE CLINIC | Facility: CLINIC | Age: 80
End: 2020-03-02
Payer: MEDICARE

## 2020-03-02 ENCOUNTER — TELEPHONE (OUTPATIENT)
Dept: FAMILY MEDICINE CLINIC | Facility: CLINIC | Age: 80
End: 2020-03-02

## 2020-03-02 ENCOUNTER — TELEPHONE (OUTPATIENT)
Dept: HEMATOLOGY/ONCOLOGY | Facility: HOSPITAL | Age: 80
End: 2020-03-02

## 2020-03-02 ENCOUNTER — NURSE ONLY (OUTPATIENT)
Dept: HEMATOLOGY/ONCOLOGY | Age: 80
End: 2020-03-02
Attending: INTERNAL MEDICINE
Payer: MEDICARE

## 2020-03-02 VITALS
SYSTOLIC BLOOD PRESSURE: 122 MMHG | OXYGEN SATURATION: 96 % | TEMPERATURE: 99 F | DIASTOLIC BLOOD PRESSURE: 64 MMHG | HEART RATE: 82 BPM

## 2020-03-02 DIAGNOSIS — D47.2 MONOCLONAL PARAPROTEINEMIA: ICD-10-CM

## 2020-03-02 DIAGNOSIS — C88.4 EXTRANODAL MARGINAL ZONE B-CELL LYMPHOMA OF MUCOSA-ASSOCIATED LYMPHOID TISSUE (MALT) (HCC): ICD-10-CM

## 2020-03-02 DIAGNOSIS — I10 ESSENTIAL HYPERTENSION: Primary | ICD-10-CM

## 2020-03-02 LAB
ALBUMIN SERPL-MCNC: 2.9 G/DL (ref 3.4–5)
ALBUMIN/GLOB SERPL: 0.3 {RATIO} (ref 1–2)
ALP LIVER SERPL-CCNC: 72 U/L (ref 55–142)
ALT SERPL-CCNC: 40 U/L (ref 13–56)
ANION GAP SERPL CALC-SCNC: 12 MMOL/L (ref 0–18)
AST SERPL-CCNC: 36 U/L (ref 15–37)
BASOPHILS # BLD AUTO: 0.01 X10(3) UL (ref 0–0.2)
BASOPHILS NFR BLD AUTO: 0.2 %
BILIRUB SERPL-MCNC: 0.4 MG/DL (ref 0.1–2)
BUN BLD-MCNC: 57 MG/DL (ref 7–18)
BUN/CREAT SERPL: 32 (ref 10–20)
CALCIUM BLD-MCNC: 9.8 MG/DL (ref 8.5–10.1)
CHLORIDE SERPL-SCNC: 106 MMOL/L (ref 98–112)
CO2 SERPL-SCNC: 19 MMOL/L (ref 21–32)
CREAT BLD-MCNC: 1.78 MG/DL (ref 0.55–1.02)
DEPRECATED RDW RBC AUTO: 59 FL (ref 35.1–46.3)
EOSINOPHIL # BLD AUTO: 0 X10(3) UL (ref 0–0.7)
EOSINOPHIL NFR BLD AUTO: 0 %
ERYTHROCYTE [DISTWIDTH] IN BLOOD BY AUTOMATED COUNT: 16 % (ref 11–15)
GLOBULIN PLAS-MCNC: 8.5 G/DL (ref 2.8–4.4)
GLUCOSE BLD-MCNC: 102 MG/DL (ref 70–99)
HCT VFR BLD AUTO: 31.6 % (ref 35–48)
HGB BLD-MCNC: 9.9 G/DL (ref 12–16)
IGA SERPL-MCNC: 42.8 MG/DL (ref 70–312)
IGM SERPL-MCNC: 4080 MG/DL (ref 43–279)
IMM GRANULOCYTES # BLD AUTO: 0.01 X10(3) UL (ref 0–1)
IMM GRANULOCYTES NFR BLD: 0.2 %
IMMUNOGLOBULIN PNL SER-MCNC: 594 MG/DL (ref 791–1643)
LDH SERPL L TO P-CCNC: 184 U/L
LYMPHOCYTES # BLD AUTO: 1.83 X10(3) UL (ref 1–4)
LYMPHOCYTES NFR BLD AUTO: 40.4 %
M PROTEIN MFR SERPL ELPH: 11.4 G/DL (ref 6.4–8.2)
MCH RBC QN AUTO: 31 PG (ref 26–34)
MCHC RBC AUTO-ENTMCNC: 31.3 G/DL (ref 31–37)
MCV RBC AUTO: 99.1 FL (ref 80–100)
MONOCYTES # BLD AUTO: 0.57 X10(3) UL (ref 0.1–1)
MONOCYTES NFR BLD AUTO: 12.6 %
NEUTROPHILS # BLD AUTO: 2.11 X10 (3) UL (ref 1.5–7.7)
NEUTROPHILS # BLD AUTO: 2.11 X10(3) UL (ref 1.5–7.7)
NEUTROPHILS NFR BLD AUTO: 46.6 %
OSMOLALITY SERPL CALC.SUM OF ELEC: 300 MOSM/KG (ref 275–295)
PATIENT FASTING Y/N/NP: NO
PLATELET # BLD AUTO: 211 10(3)UL (ref 150–450)
POTASSIUM SERPL-SCNC: 3.9 MMOL/L (ref 3.5–5.1)
RBC # BLD AUTO: 3.19 X10(6)UL (ref 3.8–5.3)
SODIUM SERPL-SCNC: 137 MMOL/L (ref 136–145)
WBC # BLD AUTO: 4.5 X10(3) UL (ref 4–11)

## 2020-03-02 PROCEDURE — 85025 COMPLETE CBC W/AUTO DIFF WBC: CPT

## 2020-03-02 PROCEDURE — 84165 PROTEIN E-PHORESIS SERUM: CPT

## 2020-03-02 PROCEDURE — 83883 ASSAY NEPHELOMETRY NOT SPEC: CPT

## 2020-03-02 PROCEDURE — 83615 LACTATE (LD) (LDH) ENZYME: CPT

## 2020-03-02 PROCEDURE — 86334 IMMUNOFIX E-PHORESIS SERUM: CPT

## 2020-03-02 PROCEDURE — 36415 COLL VENOUS BLD VENIPUNCTURE: CPT

## 2020-03-02 PROCEDURE — 80053 COMPREHEN METABOLIC PANEL: CPT

## 2020-03-02 PROCEDURE — 85810 BLOOD VISCOSITY EXAMINATION: CPT

## 2020-03-02 PROCEDURE — 82784 ASSAY IGA/IGD/IGG/IGM EACH: CPT

## 2020-03-02 PROCEDURE — 99213 OFFICE O/P EST LOW 20 MIN: CPT | Performed by: FAMILY MEDICINE

## 2020-03-02 NOTE — TELEPHONE ENCOUNTER
Stop the diltiazam in 1-2 days as previously discussed. Keep monitoring BP. So far previous reading was on track.

## 2020-03-02 NOTE — TELEPHONE ENCOUNTER
Emmanuelle Forbes son called to let Dr Chloe De La Paz know that [facility] PCP ordered a BNP test. It came back elevated. Dr Chloe De La Paz had ordered a viscosity test to be done before her next visit. She has not had it done yet.  Edin Mata wants to know if the viscosity can

## 2020-03-02 NOTE — TELEPHONE ENCOUNTER
Spoke to son, states b/p has been going up,  He did page Rich Yair over the weekend, see note. Appt made today at 3:00 with YP to discuss and concerns over congestive heart failure.

## 2020-03-02 NOTE — PROGRESS NOTES
HPI:    Patient ID: Lara Jo is a 67year old female. Hypertension   This is a chronic problem. The current episode started more than 1 year ago. The problem is unchanged. The problem is controlled.  Pertinent negatives include no anxiety or blurre ipratropium-albuterol 0.5-2.5 (3) MG/3ML Inhalation Solution USE 1 AMPULE IN NEBULIZER EVERY 6 HOURS AS NEEDED     • ayr saline nasal Nasal Gel 1 Squirt by Nasal route as needed.  1 Tube 0   • Azelastine HCl 0.1 % Nasal Solution 1 spray by Nasal route 2 (tw Essential hypertension  (primary encounter diagnosis)    1. Essential hypertension  Continue metoprolol and losartan at this time. She should continue to take diltiazem 120 MG till 3/5/2020 and then stop the medication.  If bps become too high then she is

## 2020-03-02 NOTE — TELEPHONE ENCOUNTER
Paged multiple times by patient and her son over the weekend. Her blood pressure medications were recently changed.   There was some confusion so she was taking losartan 100 mg, diltiazem 120 mg, amlodipine 10 mg, and metoprolol 100 mg.  I advised her that

## 2020-03-03 LAB
KAPPA FREE LIGHT CHAIN: 1.29 MG/DL (ref 0.33–1.94)
KAPPA/LAMBDA FLC RATIO: 0.01 (ref 0.26–1.65)
LAMBDA FREE LIGHT CHAIN: 97.12 MG/DL (ref 0.57–2.63)

## 2020-03-04 LAB
ALBUMIN SERPL ELPH-MCNC: 2.7 G/DL (ref 3.75–5.21)
ALBUMIN/GLOB SERPL: 0.51 {RATIO} (ref 1–2)
ALPHA1 GLOB SERPL ELPH-MCNC: 0.31 G/DL (ref 0.19–0.46)
ALPHA2 GLOB SERPL ELPH-MCNC: 0.78 G/DL (ref 0.48–1.05)
B-GLOBULIN SERPL ELPH-MCNC: 4.04 G/DL (ref 0.68–1.23)
GAMMA GLOB SERPL ELPH-MCNC: 0.17 G/DL (ref 0.62–1.7)
M PROTEIN MFR SERPL ELPH: 8 G/DL (ref 6.4–8.2)
M-SPIKE 1: 3 G/DL (ref ?–0)

## 2020-03-05 ENCOUNTER — TELEPHONE (OUTPATIENT)
Dept: HEMATOLOGY/ONCOLOGY | Facility: HOSPITAL | Age: 80
End: 2020-03-05

## 2020-03-05 ENCOUNTER — TELEPHONE (OUTPATIENT)
Dept: FAMILY MEDICINE CLINIC | Facility: CLINIC | Age: 80
End: 2020-03-05

## 2020-03-05 LAB — VISCOSITY, SERUM: 7.01 CP

## 2020-03-05 NOTE — TELEPHONE ENCOUNTER
Called by patient and her son regarding blood pressure and medications. Her blood pressures at home have been ranging 140s/70s-80s. She is currently taking metoprolol  mg once daily and losartan 100 mg once daily.   Since stopping the diltiazem her

## 2020-03-06 ENCOUNTER — TELEPHONE (OUTPATIENT)
Dept: HEMATOLOGY/ONCOLOGY | Facility: HOSPITAL | Age: 80
End: 2020-03-06

## 2020-03-06 NOTE — TELEPHONE ENCOUNTER
Alayna Victor called and was looking to speak with the Dr about the upcoming chemo that she has on Tuesday. He asked that he get a call back to go over these things.

## 2020-03-08 ENCOUNTER — PATIENT MESSAGE (OUTPATIENT)
Dept: FAMILY MEDICINE CLINIC | Facility: CLINIC | Age: 80
End: 2020-03-08

## 2020-03-09 ENCOUNTER — TELEPHONE (OUTPATIENT)
Dept: HEMATOLOGY/ONCOLOGY | Facility: HOSPITAL | Age: 80
End: 2020-03-09

## 2020-03-09 ENCOUNTER — OFFICE VISIT (OUTPATIENT)
Dept: FAMILY MEDICINE CLINIC | Facility: CLINIC | Age: 80
End: 2020-03-09
Payer: MEDICARE

## 2020-03-09 VITALS
SYSTOLIC BLOOD PRESSURE: 144 MMHG | TEMPERATURE: 99 F | HEART RATE: 102 BPM | RESPIRATION RATE: 16 BRPM | DIASTOLIC BLOOD PRESSURE: 92 MMHG | HEIGHT: 60 IN | OXYGEN SATURATION: 96 % | WEIGHT: 126 LBS | BODY MASS INDEX: 24.74 KG/M2

## 2020-03-09 DIAGNOSIS — R05.9 COUGH: Primary | ICD-10-CM

## 2020-03-09 PROCEDURE — 99214 OFFICE O/P EST MOD 30 MIN: CPT | Performed by: FAMILY MEDICINE

## 2020-03-09 PROCEDURE — 94640 AIRWAY INHALATION TREATMENT: CPT | Performed by: FAMILY MEDICINE

## 2020-03-09 RX ORDER — CODEINE PHOSPHATE AND GUAIFENESIN 10; 100 MG/5ML; MG/5ML
SOLUTION ORAL
Qty: 180 ML | Refills: 1 | Status: SHIPPED | OUTPATIENT
Start: 2020-03-09 | End: 2020-03-13

## 2020-03-09 RX ORDER — IPRATROPIUM BROMIDE AND ALBUTEROL SULFATE 2.5; .5 MG/3ML; MG/3ML
SOLUTION RESPIRATORY (INHALATION)
Qty: 1 CONTAINER | Refills: 1 | Status: SHIPPED | OUTPATIENT
Start: 2020-03-09

## 2020-03-09 RX ORDER — IPRATROPIUM BROMIDE AND ALBUTEROL SULFATE 2.5; .5 MG/3ML; MG/3ML
3 SOLUTION RESPIRATORY (INHALATION) ONCE
Status: COMPLETED | OUTPATIENT
Start: 2020-03-09 | End: 2020-03-09

## 2020-03-09 RX ADMIN — IPRATROPIUM BROMIDE AND ALBUTEROL SULFATE 3 ML: 2.5; .5 SOLUTION RESPIRATORY (INHALATION) at 11:40:00

## 2020-03-09 NOTE — TELEPHONE ENCOUNTER
From: Romana Cardinal Fri  To: Tom Phelps DO  Sent: 3/8/2020 6:46 PM CDT  Subject: Other    Talked with nurse continued Abigail Abler for a couple extra days. Then BP on Friday was 126/69 Saturday 117/64 12pm and 8pm 115/65 on low side.  Stopped Bethelridge Abler per [employer]

## 2020-03-09 NOTE — TELEPHONE ENCOUNTER
Son had called over the weekend as mom has had a persistent cough but no fevers wondering if this is from hyperviscosity. Spoke to on call physician and spoke to him as well today. They have an appointment to see PCP today for the cough.  I told him cough w

## 2020-03-09 NOTE — PROGRESS NOTES
Presents today with HER-2 sons. She is about to start chemotherapy for lung cancer. She also suffers from COPD and chronic bronchitis.   She has been struggling with a recurrence of her chronic cough, condition that she has had for a number of years and h

## 2020-03-09 NOTE — TELEPHONE ENCOUNTER
Spoke to son Serafin Howard regarding plan for [facility] Tuesday. Went over at length signs and symptoms to watch out for re: hyperviscosity syndrome. She has none of these.

## 2020-03-10 ENCOUNTER — TELEPHONE (OUTPATIENT)
Dept: HEMATOLOGY/ONCOLOGY | Facility: HOSPITAL | Age: 80
End: 2020-03-10

## 2020-03-10 ENCOUNTER — OFFICE VISIT (OUTPATIENT)
Dept: HEMATOLOGY/ONCOLOGY | Age: 80
End: 2020-03-10
Attending: INTERNAL MEDICINE
Payer: MEDICARE

## 2020-03-10 VITALS
DIASTOLIC BLOOD PRESSURE: 83 MMHG | HEART RATE: 116 BPM | HEIGHT: 59.25 IN | SYSTOLIC BLOOD PRESSURE: 143 MMHG | OXYGEN SATURATION: 95 % | WEIGHT: 125 LBS | RESPIRATION RATE: 18 BRPM | TEMPERATURE: 100 F | BODY MASS INDEX: 25.2 KG/M2

## 2020-03-10 DIAGNOSIS — C88.4 EXTRANODAL MARGINAL ZONE B-CELL LYMPHOMA OF MUCOSA-ASSOCIATED LYMPHOID TISSUE (MALT) (HCC): Primary | ICD-10-CM

## 2020-03-10 DIAGNOSIS — C85.10 LOW GRADE B-CELL LYMPHOMA (HCC): ICD-10-CM

## 2020-03-10 DIAGNOSIS — C34.81 MALIGNANT NEOPLASM OF OVERLAPPING SITES OF RIGHT LUNG (HCC): ICD-10-CM

## 2020-03-10 DIAGNOSIS — Z71.9 ENCOUNTER FOR HEALTH EDUCATION: ICD-10-CM

## 2020-03-10 LAB
ALBUMIN SERPL-MCNC: 2.4 G/DL (ref 3.4–5)
ALBUMIN/GLOB SERPL: 0.3 {RATIO} (ref 1–2)
ALP LIVER SERPL-CCNC: 94 U/L (ref 55–142)
ALT SERPL-CCNC: 26 U/L (ref 13–56)
ANION GAP SERPL CALC-SCNC: 7 MMOL/L (ref 0–18)
AST SERPL-CCNC: 17 U/L (ref 15–37)
BASOPHILS # BLD AUTO: 0.03 X10(3) UL (ref 0–0.2)
BASOPHILS NFR BLD AUTO: 0.3 %
BILIRUB SERPL-MCNC: 0.6 MG/DL (ref 0.1–2)
BUN BLD-MCNC: 32 MG/DL (ref 7–18)
BUN/CREAT SERPL: 19.9 (ref 10–20)
CALCIUM BLD-MCNC: 9.6 MG/DL (ref 8.5–10.1)
CHLORIDE SERPL-SCNC: 112 MMOL/L (ref 98–112)
CO2 SERPL-SCNC: 21 MMOL/L (ref 21–32)
CREAT BLD-MCNC: 1.61 MG/DL (ref 0.55–1.02)
DEPRECATED RDW RBC AUTO: 59 FL (ref 35.1–46.3)
EOSINOPHIL # BLD AUTO: 0.09 X10(3) UL (ref 0–0.7)
EOSINOPHIL NFR BLD AUTO: 0.9 %
ERYTHROCYTE [DISTWIDTH] IN BLOOD BY AUTOMATED COUNT: 16.1 % (ref 11–15)
GLOBULIN PLAS-MCNC: 7.7 G/DL (ref 2.8–4.4)
GLUCOSE BLD-MCNC: 122 MG/DL (ref 70–99)
HBV CORE AB SERPL QL IA: NONREACTIVE
HBV SURFACE AB SER QL: NONREACTIVE
HBV SURFACE AB SERPL IA-ACNC: <3.1 MIU/ML
HBV SURFACE AG SER-ACNC: <0.1 [IU]/L
HBV SURFACE AG SERPL QL IA: NONREACTIVE
HCT VFR BLD AUTO: 30.9 % (ref 35–48)
HGB BLD-MCNC: 9.5 G/DL (ref 12–16)
IGA SERPL-MCNC: 166.2 MG/DL (ref 70–312)
IGM SERPL-MCNC: 4270 MG/DL (ref 43–279)
IMM GRANULOCYTES # BLD AUTO: 0.07 X10(3) UL (ref 0–1)
IMM GRANULOCYTES NFR BLD: 0.7 %
IMMUNOGLOBULIN PNL SER-MCNC: 781 MG/DL (ref 791–1643)
LYMPHOCYTES # BLD AUTO: 1.44 X10(3) UL (ref 1–4)
LYMPHOCYTES NFR BLD AUTO: 14.2 %
M PROTEIN MFR SERPL ELPH: 10.1 G/DL (ref 6.4–8.2)
MCH RBC QN AUTO: 30.6 PG (ref 26–34)
MCHC RBC AUTO-ENTMCNC: 30.7 G/DL (ref 31–37)
MCV RBC AUTO: 99.7 FL (ref 80–100)
MONOCYTES # BLD AUTO: 1.04 X10(3) UL (ref 0.1–1)
MONOCYTES NFR BLD AUTO: 10.3 %
NEUTROPHILS # BLD AUTO: 7.44 X10 (3) UL (ref 1.5–7.7)
NEUTROPHILS # BLD AUTO: 7.44 X10(3) UL (ref 1.5–7.7)
NEUTROPHILS NFR BLD AUTO: 73.6 %
OSMOLALITY SERPL CALC.SUM OF ELEC: 298 MOSM/KG (ref 275–295)
PATIENT FASTING Y/N/NP: NO
PLATELET # BLD AUTO: 202 10(3)UL (ref 150–450)
POTASSIUM SERPL-SCNC: 3.9 MMOL/L (ref 3.5–5.1)
RBC # BLD AUTO: 3.1 X10(6)UL (ref 3.8–5.3)
SODIUM SERPL-SCNC: 140 MMOL/L (ref 136–145)
WBC # BLD AUTO: 10.1 X10(3) UL (ref 4–11)

## 2020-03-10 PROCEDURE — 86334 IMMUNOFIX E-PHORESIS SERUM: CPT

## 2020-03-10 PROCEDURE — 85025 COMPLETE CBC W/AUTO DIFF WBC: CPT

## 2020-03-10 PROCEDURE — 99214 OFFICE O/P EST MOD 30 MIN: CPT | Performed by: CLINICAL NURSE SPECIALIST

## 2020-03-10 PROCEDURE — 36415 COLL VENOUS BLD VENIPUNCTURE: CPT

## 2020-03-10 PROCEDURE — 82784 ASSAY IGA/IGD/IGG/IGM EACH: CPT

## 2020-03-10 PROCEDURE — 86704 HEP B CORE ANTIBODY TOTAL: CPT

## 2020-03-10 PROCEDURE — 80053 COMPREHEN METABOLIC PANEL: CPT

## 2020-03-10 PROCEDURE — 87340 HEPATITIS B SURFACE AG IA: CPT

## 2020-03-10 PROCEDURE — 86706 HEP B SURFACE ANTIBODY: CPT

## 2020-03-10 PROCEDURE — 84165 PROTEIN E-PHORESIS SERUM: CPT

## 2020-03-10 PROCEDURE — 96361 HYDRATE IV INFUSION ADD-ON: CPT

## 2020-03-10 PROCEDURE — 85810 BLOOD VISCOSITY EXAMINATION: CPT

## 2020-03-10 PROCEDURE — 96375 TX/PRO/DX INJ NEW DRUG ADDON: CPT

## 2020-03-10 PROCEDURE — 96413 CHEMO IV INFUSION 1 HR: CPT

## 2020-03-10 PROCEDURE — 96411 CHEMO IV PUSH ADDL DRUG: CPT

## 2020-03-10 PROCEDURE — 96415 CHEMO IV INFUSION ADDL HR: CPT

## 2020-03-10 PROCEDURE — 83883 ASSAY NEPHELOMETRY NOT SPEC: CPT

## 2020-03-10 RX ORDER — ALLOPURINOL 100 MG/1
100 TABLET ORAL DAILY
Qty: 28 TABLET | Refills: 0 | Status: SHIPPED | OUTPATIENT
Start: 2020-03-10 | End: 2020-05-26 | Stop reason: ALTCHOICE

## 2020-03-10 RX ORDER — ACETAMINOPHEN 325 MG/1
650 TABLET ORAL ONCE
Status: COMPLETED | OUTPATIENT
Start: 2020-03-10 | End: 2020-03-10

## 2020-03-10 RX ORDER — PROCHLORPERAZINE MALEATE 10 MG
10 TABLET ORAL EVERY 6 HOURS PRN
Qty: 30 TABLET | Refills: 3 | Status: SHIPPED | OUTPATIENT
Start: 2020-03-10 | End: 2020-07-24

## 2020-03-10 RX ORDER — ONDANSETRON HYDROCHLORIDE 8 MG/1
8 TABLET, FILM COATED ORAL EVERY 8 HOURS PRN
Qty: 30 TABLET | Refills: 3 | Status: SHIPPED | OUTPATIENT
Start: 2020-03-10 | End: 2020-07-24

## 2020-03-10 RX ORDER — SODIUM CHLORIDE 9 MG/ML
500 INJECTION, SOLUTION INTRAVENOUS ONCE
Status: COMPLETED | OUTPATIENT
Start: 2020-03-10 | End: 2020-03-10

## 2020-03-10 RX ORDER — DIPHENHYDRAMINE HCL 25 MG
50 CAPSULE ORAL ONCE
Status: COMPLETED | OUTPATIENT
Start: 2020-03-10 | End: 2020-03-10

## 2020-03-10 RX ADMIN — ACETAMINOPHEN 650 MG: 325 TABLET ORAL at 09:30:00

## 2020-03-10 RX ADMIN — DIPHENHYDRAMINE HCL 50 MG: 25 MG CAPSULE ORAL at 09:30:00

## 2020-03-10 RX ADMIN — SODIUM CHLORIDE 500 ML: 9 INJECTION, SOLUTION INTRAVENOUS at 12:57:00

## 2020-03-10 NOTE — TELEPHONE ENCOUNTER
Kenneth called and was looking to speak with a nurse about Sandor Fearing. He asked that the get a call back to speak about some after visit questions that he has following her treatment today.

## 2020-03-10 NOTE — PROGRESS NOTES
Pt here for C1D1.   Arrives Ambulating independently, accompanied by Family member           Patient reports possible pregnancy since last therapy cycle: Not Applicable    Modifications in dose or schedule: No     Frequency of blood return and site check th

## 2020-03-10 NOTE — PROGRESS NOTES
Chemotherapy Education    Learner:  Patient and Family Member    Barriers / Limitations:  None    Chemotherapy education goals:  · Learn the drug names  · Administration schedule  · Routes of administration  · Treatment setting    Drug names:  Bendamustine problems:  N/A    Comments:    Treatment Effects on Emotional Status:    Potential mood changes, depression, nervousness, difficulty sleeping:  Achieved    Importance of support system:  Achieved    Notify MD/RN of any emotional changes:  Achieved    Comme

## 2020-03-10 NOTE — TELEPHONE ENCOUNTER
Reviewed anti emetics, patient got IV in office today and will get more IV tomorrow, only take anti emetics if nauseated between now and then. Will hold on antibiotics at this time for the cough.

## 2020-03-11 ENCOUNTER — PATIENT MESSAGE (OUTPATIENT)
Dept: FAMILY MEDICINE CLINIC | Facility: CLINIC | Age: 80
End: 2020-03-11

## 2020-03-11 ENCOUNTER — OFFICE VISIT (OUTPATIENT)
Dept: HEMATOLOGY/ONCOLOGY | Age: 80
End: 2020-03-11
Attending: INTERNAL MEDICINE
Payer: MEDICARE

## 2020-03-11 ENCOUNTER — TELEPHONE (OUTPATIENT)
Dept: HEMATOLOGY/ONCOLOGY | Age: 80
End: 2020-03-11

## 2020-03-11 ENCOUNTER — TELEPHONE (OUTPATIENT)
Dept: HEMATOLOGY/ONCOLOGY | Facility: HOSPITAL | Age: 80
End: 2020-03-11

## 2020-03-11 VITALS
RESPIRATION RATE: 18 BRPM | SYSTOLIC BLOOD PRESSURE: 150 MMHG | DIASTOLIC BLOOD PRESSURE: 88 MMHG | WEIGHT: 125 LBS | OXYGEN SATURATION: 95 % | BODY MASS INDEX: 25.2 KG/M2 | HEIGHT: 59.25 IN | HEART RATE: 103 BPM | TEMPERATURE: 98 F

## 2020-03-11 DIAGNOSIS — C85.10 LOW GRADE B-CELL LYMPHOMA (HCC): ICD-10-CM

## 2020-03-11 DIAGNOSIS — N18.4 CKD (CHRONIC KIDNEY DISEASE) STAGE 4, GFR 15-29 ML/MIN (HCC): ICD-10-CM

## 2020-03-11 DIAGNOSIS — C34.81 MALIGNANT NEOPLASM OF OVERLAPPING SITES OF RIGHT LUNG (HCC): ICD-10-CM

## 2020-03-11 DIAGNOSIS — C88.4 EXTRANODAL MARGINAL ZONE B-CELL LYMPHOMA OF MUCOSA-ASSOCIATED LYMPHOID TISSUE (MALT) (HCC): Primary | ICD-10-CM

## 2020-03-11 LAB
ANION GAP SERPL CALC-SCNC: 9 MMOL/L (ref 0–18)
BUN BLD-MCNC: 43 MG/DL (ref 7–18)
BUN/CREAT SERPL: 24.6 (ref 10–20)
CALCIUM BLD-MCNC: 9.4 MG/DL (ref 8.5–10.1)
CHLORIDE SERPL-SCNC: 114 MMOL/L (ref 98–112)
CO2 SERPL-SCNC: 18 MMOL/L (ref 21–32)
CREAT BLD-MCNC: 1.75 MG/DL (ref 0.55–1.02)
GLUCOSE BLD-MCNC: 124 MG/DL (ref 70–99)
KAPPA FREE LIGHT CHAIN: 1.34 MG/DL (ref 0.33–1.94)
KAPPA/LAMBDA FLC RATIO: 0.05 (ref 0.26–1.65)
LAMBDA FREE LIGHT CHAIN: 29.14 MG/DL (ref 0.57–2.63)
OSMOLALITY SERPL CALC.SUM OF ELEC: 304 MOSM/KG (ref 275–295)
PATIENT FASTING Y/N/NP: NO
POTASSIUM SERPL-SCNC: 4.1 MMOL/L (ref 3.5–5.1)
SODIUM SERPL-SCNC: 141 MMOL/L (ref 136–145)

## 2020-03-11 PROCEDURE — 96360 HYDRATION IV INFUSION INIT: CPT

## 2020-03-11 PROCEDURE — 80048 BASIC METABOLIC PNL TOTAL CA: CPT

## 2020-03-11 PROCEDURE — 36415 COLL VENOUS BLD VENIPUNCTURE: CPT

## 2020-03-11 PROCEDURE — 96375 TX/PRO/DX INJ NEW DRUG ADDON: CPT

## 2020-03-11 PROCEDURE — 96409 CHEMO IV PUSH SNGL DRUG: CPT

## 2020-03-11 PROCEDURE — 96361 HYDRATE IV INFUSION ADD-ON: CPT

## 2020-03-11 NOTE — TELEPHONE ENCOUNTER
Kenneth Car at [phone] patient's son is calling because his mother had Chemo and seem kind of jittery and he have a question about the Nausea medication she was instructed to take. Dr. Rony Michaud pt.

## 2020-03-11 NOTE — PROGRESS NOTES
Pt here for C1D2. Arrives Ambulating independently, accompanied by Family member           Patient reports possible pregnancy since last therapy cycle: Not Applicable    Modifications in dose or schedule: Yes - Pt to get 1L NS due to elevated creat.      Akila Olmedo

## 2020-03-11 NOTE — TELEPHONE ENCOUNTER
Toxicities: C1 D1 Bendamustine/Rituximab on 3/10/2020    I attempted to reach Simba Holder. No answer. I left a  msg asking her to please return my call at her earliest convenience.

## 2020-03-11 NOTE — TELEPHONE ENCOUNTER
Toxicities: C1 D1 Bendamustine/Rituximab on 3/10/2020     Insomnia/Jittery/Constipation    Insomnia/Jittery: (Son Ally Jo reports his Mom could not sleep last night.  She said she felt \"jittery\" and her hands were shaking a little.)  Constipation: Renaldo Deutsch said

## 2020-03-11 NOTE — TELEPHONE ENCOUNTER
Son Nadir called back. Patient had insomnia and felt jittery last night. I explained that she was given steroids before her chemotherapy yesterday & today. It will wear off.  She has also been having some constipation due to taking cough medicine with codein

## 2020-03-12 ENCOUNTER — OFFICE VISIT (OUTPATIENT)
Dept: FAMILY MEDICINE CLINIC | Facility: CLINIC | Age: 80
End: 2020-03-12
Payer: MEDICARE

## 2020-03-12 ENCOUNTER — HOSPITAL ENCOUNTER (OUTPATIENT)
Dept: CV DIAGNOSTICS | Age: 80
Discharge: HOME | End: 2020-03-12
Attending: FAMILY MEDICINE
Payer: MEDICARE

## 2020-03-12 ENCOUNTER — TELEPHONE (OUTPATIENT)
Dept: FAMILY MEDICINE CLINIC | Facility: CLINIC | Age: 80
End: 2020-03-12

## 2020-03-12 VITALS
RESPIRATION RATE: 18 BRPM | TEMPERATURE: 98 F | SYSTOLIC BLOOD PRESSURE: 144 MMHG | HEART RATE: 77 BPM | HEIGHT: 59.25 IN | BODY MASS INDEX: 25.2 KG/M2 | WEIGHT: 125 LBS | OXYGEN SATURATION: 98 % | DIASTOLIC BLOOD PRESSURE: 88 MMHG

## 2020-03-12 DIAGNOSIS — I50.30 DIASTOLIC HEART FAILURE, UNSPECIFIED HF CHRONICITY (HCC): ICD-10-CM

## 2020-03-12 DIAGNOSIS — I10 ESSENTIAL HYPERTENSION: Primary | ICD-10-CM

## 2020-03-12 DIAGNOSIS — R79.89 ELEVATED BRAIN NATRIURETIC PEPTIDE (BNP) LEVEL: ICD-10-CM

## 2020-03-12 DIAGNOSIS — M79.89 LEG SWELLING: ICD-10-CM

## 2020-03-12 LAB — VISCOSITY, SERUM: 4.48 CP

## 2020-03-12 PROCEDURE — 93306 TTE W/DOPPLER COMPLETE: CPT | Performed by: FAMILY MEDICINE

## 2020-03-12 PROCEDURE — 99213 OFFICE O/P EST LOW 20 MIN: CPT | Performed by: PHYSICIAN ASSISTANT

## 2020-03-12 RX ORDER — LOSARTAN POTASSIUM 50 MG/1
50 TABLET ORAL DAILY
Qty: 30 TABLET | Refills: 3 | Status: SHIPPED | OUTPATIENT
Start: 2020-03-12 | End: 2020-07-20 | Stop reason: DRUGHIGH

## 2020-03-12 NOTE — PATIENT INSTRUCTIONS
Continue Metoprolol  mg once daiy  Decrease Losartan 100 mg down to 50 mg once daily  Stay on Diltiazem  mg once daily  Blood pressure ideally should be between 120/80 and 150/90 - call if outside these perimeters consistently  Call with any ad

## 2020-03-12 NOTE — TELEPHONE ENCOUNTER
Julián Root,  Patient wanted you to see this message. B/P keeps elevating. 180/90, then rechecked to 160/100. On Losartan 100 mg daily since 2/27/20. Before that on Diltiazem 120mg. Per Dr. Dread Lawrence notes on 3/9/20 patient was to follow up in 1 week.  I asked her

## 2020-03-12 NOTE — PROGRESS NOTES
HPI:    Patient ID: Jennifer Aguilar is a 67year old female. HPI   Patient presents today accompanied by her son for evaluation of blood pressure. Her blood pressures have not been consistent over the last few weeks.   They are ranging anywhere from th Solution 5-10 ml every 6 hours as needed 180 mL 1   • ipratropium-albuterol 0.5-2.5 (3) MG/3ML Inhalation Solution 1USE 1 AMPULE IN NEBULIZER EVERY 6 HOURS AS NEEDED 1 Container 1   • diphenoxylate-atropine 2.5-0.025 MG Oral Tab Take 1-2 tablets by mouth 4 heart sounds and intact distal pulses. Edema not present. Pulmonary/Chest: Effort normal and breath sounds normal. She has no wheezes. She has no rales. Neurological: She is alert. Psychiatric: She has a normal mood and affect.               ASSESS

## 2020-03-12 NOTE — TELEPHONE ENCOUNTER
From: Krystin Car  To: Tesfaye English DO  Sent: 3/11/2020 2:19 PM CDT  Subject: Visit Follow-up Question    Hi today when where went for the chemo my moms blood pressure was high 180/90. When they took it again it was much better.  Attached are the l
See telephone encounter.
negative...

## 2020-03-12 NOTE — TELEPHONE ENCOUNTER
Hi today when where went for the chemo my moms blood pressure was high 180/90. When they took it again it was much better. Attached are the levels we have monitored. Should this one time be a concern or just continue to monitor it.  And when would the docto

## 2020-03-12 NOTE — TELEPHONE ENCOUNTER
Overall her blood pressures are consistently under 150/90 according to the attachments that the son sent in. Based on these values at home I would not make any changes to the medication regimen at this point.   The elevations when she went to chemo are [facility]

## 2020-03-13 LAB
ALBUMIN SERPL ELPH-MCNC: 2.95 G/DL (ref 3.75–5.21)
ALBUMIN/GLOB SERPL: 0.45 {RATIO} (ref 1–2)
ALPHA1 GLOB SERPL ELPH-MCNC: 0.53 G/DL (ref 0.19–0.46)
ALPHA2 GLOB SERPL ELPH-MCNC: 1.12 G/DL (ref 0.48–1.05)
B-GLOBULIN SERPL ELPH-MCNC: 4.71 G/DL (ref 0.68–1.23)
GAMMA GLOB SERPL ELPH-MCNC: 0.19 G/DL (ref 0.62–1.7)
M PROTEIN MFR SERPL ELPH: 9.5 G/DL (ref 6.4–8.2)
M-SPIKE 1: 3.54 G/DL (ref ?–0)

## 2020-03-13 RX ORDER — CODEINE PHOSPHATE AND GUAIFENESIN 10; 100 MG/5ML; MG/5ML
SOLUTION ORAL
Qty: 180 ML | Refills: 0 | Status: SHIPPED | OUTPATIENT
Start: 2020-03-13 | End: 2020-05-07 | Stop reason: ALTCHOICE

## 2020-03-17 ENCOUNTER — OFFICE VISIT (OUTPATIENT)
Dept: HEMATOLOGY/ONCOLOGY | Age: 80
End: 2020-03-17
Attending: INTERNAL MEDICINE
Payer: MEDICARE

## 2020-03-17 VITALS
WEIGHT: 127.19 LBS | SYSTOLIC BLOOD PRESSURE: 167 MMHG | RESPIRATION RATE: 18 BRPM | TEMPERATURE: 98 F | OXYGEN SATURATION: 94 % | HEART RATE: 54 BPM | DIASTOLIC BLOOD PRESSURE: 92 MMHG | BODY MASS INDEX: 25 KG/M2

## 2020-03-17 DIAGNOSIS — C34.90 EGFR-RELATED LUNG CANCER (HCC): ICD-10-CM

## 2020-03-17 DIAGNOSIS — N18.4 CKD (CHRONIC KIDNEY DISEASE) STAGE 4, GFR 15-29 ML/MIN (HCC): ICD-10-CM

## 2020-03-17 DIAGNOSIS — T88.7XXA MEDICATION SIDE EFFECT: ICD-10-CM

## 2020-03-17 DIAGNOSIS — C34.81 MALIGNANT NEOPLASM OF OVERLAPPING SITES OF RIGHT LUNG (HCC): Primary | ICD-10-CM

## 2020-03-17 DIAGNOSIS — D47.2 MONOCLONAL PARAPROTEINEMIA: ICD-10-CM

## 2020-03-17 DIAGNOSIS — R05.9 COUGH: ICD-10-CM

## 2020-03-17 DIAGNOSIS — M79.89 LEG SWELLING: ICD-10-CM

## 2020-03-17 DIAGNOSIS — D63.0 ANEMIA IN NEOPLASTIC DISEASE: ICD-10-CM

## 2020-03-17 DIAGNOSIS — C34.92 BILATERAL LUNG CANCER (HCC): ICD-10-CM

## 2020-03-17 DIAGNOSIS — C85.80 MARGINAL ZONE B-CELL LYMPHOMA (HCC): ICD-10-CM

## 2020-03-17 DIAGNOSIS — N18.30 STAGE 3 CHRONIC KIDNEY DISEASE (HCC): ICD-10-CM

## 2020-03-17 DIAGNOSIS — C34.91 BILATERAL LUNG CANCER (HCC): ICD-10-CM

## 2020-03-17 LAB
ALBUMIN SERPL-MCNC: 2.5 G/DL (ref 3.4–5)
ALBUMIN/GLOB SERPL: 0.4 {RATIO} (ref 1–2)
ALP LIVER SERPL-CCNC: 88 U/L (ref 55–142)
ALT SERPL-CCNC: 21 U/L (ref 13–56)
ANION GAP SERPL CALC-SCNC: 8 MMOL/L (ref 0–18)
AST SERPL-CCNC: 16 U/L (ref 15–37)
BASOPHILS # BLD AUTO: 0.02 X10(3) UL (ref 0–0.2)
BASOPHILS NFR BLD AUTO: 0.3 %
BILIRUB SERPL-MCNC: 0.3 MG/DL (ref 0.1–2)
BUN BLD-MCNC: 38 MG/DL (ref 7–18)
BUN/CREAT SERPL: 21.2 (ref 10–20)
CALCIUM BLD-MCNC: 9.7 MG/DL (ref 8.5–10.1)
CHLORIDE SERPL-SCNC: 111 MMOL/L (ref 98–112)
CO2 SERPL-SCNC: 21 MMOL/L (ref 21–32)
CREAT BLD-MCNC: 1.79 MG/DL (ref 0.55–1.02)
DEPRECATED RDW RBC AUTO: 55 FL (ref 35.1–46.3)
EOSINOPHIL # BLD AUTO: 0.06 X10(3) UL (ref 0–0.7)
EOSINOPHIL NFR BLD AUTO: 0.9 %
ERYTHROCYTE [DISTWIDTH] IN BLOOD BY AUTOMATED COUNT: 15.2 % (ref 11–15)
GLOBULIN PLAS-MCNC: 6.9 G/DL (ref 2.8–4.4)
GLUCOSE BLD-MCNC: 118 MG/DL (ref 70–99)
HCT VFR BLD AUTO: 32.8 % (ref 35–48)
HGB BLD-MCNC: 10 G/DL (ref 12–16)
IMM GRANULOCYTES # BLD AUTO: 0.08 X10(3) UL (ref 0–1)
IMM GRANULOCYTES NFR BLD: 1.3 %
LYMPHOCYTES # BLD AUTO: 0.31 X10(3) UL (ref 1–4)
LYMPHOCYTES NFR BLD AUTO: 4.9 %
M PROTEIN MFR SERPL ELPH: 9.4 G/DL (ref 6.4–8.2)
MCH RBC QN AUTO: 30.6 PG (ref 26–34)
MCHC RBC AUTO-ENTMCNC: 30.5 G/DL (ref 31–37)
MCV RBC AUTO: 100.3 FL (ref 80–100)
MONOCYTES # BLD AUTO: 0.53 X10(3) UL (ref 0.1–1)
MONOCYTES NFR BLD AUTO: 8.3 %
NEUTROPHILS # BLD AUTO: 5.38 X10 (3) UL (ref 1.5–7.7)
NEUTROPHILS # BLD AUTO: 5.38 X10(3) UL (ref 1.5–7.7)
NEUTROPHILS NFR BLD AUTO: 84.3 %
OSMOLALITY SERPL CALC.SUM OF ELEC: 300 MOSM/KG (ref 275–295)
PATIENT FASTING Y/N/NP: NO
PLATELET # BLD AUTO: 231 10(3)UL (ref 150–450)
POTASSIUM SERPL-SCNC: 3.9 MMOL/L (ref 3.5–5.1)
RBC # BLD AUTO: 3.27 X10(6)UL (ref 3.8–5.3)
SODIUM SERPL-SCNC: 140 MMOL/L (ref 136–145)
WBC # BLD AUTO: 6.4 X10(3) UL (ref 4–11)

## 2020-03-17 PROCEDURE — 99215 OFFICE O/P EST HI 40 MIN: CPT | Performed by: INTERNAL MEDICINE

## 2020-03-17 RX ORDER — CIMETIDINE 400 MG/1
400 TABLET, FILM COATED ORAL 2 TIMES DAILY
Qty: 60 TABLET | Refills: 3 | Status: SHIPPED | OUTPATIENT
Start: 2020-03-17 | End: 2020-05-26

## 2020-03-17 NOTE — PROGRESS NOTES
Cancer Center Progress Note    Patient Name: Lara Jo   YOB: 1940   Medical Record Number: [id number]   CSN: [id number]   Attending Physician: Rosina Londono M.D.    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      3. Right sided PE diagnosed 9/2011- 100-10 MG/5ML Oral Solution, TAKE 5 TO 10 ML BY MOUTH  EVERY 6 HOURS AS NEEDED, Disp: 180 mL, Rfl: 0  •  losartan Potassium 50 MG Oral Tab, Take 1 tablet (50 mg total) by mouth daily. , Disp: 30 tablet, Rfl: 3  •  Prochlorperazine Maleate (COMPAZINE) 10 mg Disp: , Rfl:   •  Saline Nasal Spray 0.65 % Nasal Solution, 1 spray by Nasal route as needed for congestion. 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 PERRL. Oropharynx w/o patches or exudates. Minimal clear PND  Neck: No JVD. No palpable lymphadenopathy. Neck is supple. Lymphatics:  There is no palpable lymphadenopathy   Chest: Diminished BS but no crack CHAIN Latest Ref Range: 0.330 - 1.940 mg/dL 1.337    LAMBDA FREE LIGHT CHAIN Latest Ref Range: 0.571 - 2.630 mg/dL 29.136 (H)    KAPPA/LAMBDA FLC RATIO Latest Ref Range: 0.26 - 1.65  0.05 (L)    TOTAL PROTEIN Latest Ref Range: 6.4 - 8.2 g/dL 10.1 (H) 9.5 ( scarring within the lateral aspect of the left lung base has also decreased in size from the previous study, currently measuring 0.5 x 0.4 cm.        Within the right lower lobe, there are multiple small micro nodules diffusely, several of which are new fro posterior aspect of the right lobe of the liver  is relatively stable in size currently measuring 2.5 x 2.9 cm.    BOWEL/MESENTERY:  There is again evidence of adenopathy within the small bowel mesenteric root, appearing similar to slightly increased in siz as attenuation correction. RADIOPHARMACEUTICAL:    15 mCi F-18 FDG  FASTING GLUCOSE LEVEL:  85 mg/dl  INJECTION TIME:         1305 hours  SCAN TIME:              1405 hours     FINDINGS:       Neck:     No lymphadenopathy. No abnormal uptake.      Merino Bolognese 4.1.  This is not identified on the prior exam.           Dictated by: Caity Allen MD on 9/09/2019 at 15:05       Approved by: Caity Allen MD                    Impression and Plan:  1.  Low grade lymphoproliferative disorder- c/o vague sympto

## 2020-03-17 NOTE — PROGRESS NOTES
Pt here for day 8 f/u. Energy level is fatigued. Appetite has been good. Denies pian. Pt had diarrhea after Miralax, took Lomotil. Pt has continued dry cough. No other symptoms. Pt has no further complaints.      Outpatient Oncology Care Plan  Problem list:

## 2020-03-18 ENCOUNTER — APPOINTMENT (OUTPATIENT)
Dept: HEMATOLOGY/ONCOLOGY | Age: 80
End: 2020-03-18
Attending: INTERNAL MEDICINE
Payer: MEDICARE

## 2020-03-19 ENCOUNTER — PATIENT MESSAGE (OUTPATIENT)
Dept: FAMILY MEDICINE CLINIC | Facility: CLINIC | Age: 80
End: 2020-03-19

## 2020-03-19 ENCOUNTER — TELEPHONE (OUTPATIENT)
Dept: HEMATOLOGY/ONCOLOGY | Facility: HOSPITAL | Age: 80
End: 2020-03-19

## 2020-03-19 ENCOUNTER — TELEPHONE (OUTPATIENT)
Dept: FAMILY MEDICINE CLINIC | Facility: CLINIC | Age: 80
End: 2020-03-19

## 2020-03-19 RX ORDER — AMLODIPINE BESYLATE 10 MG/1
10 TABLET ORAL DAILY
Qty: 30 TABLET | Refills: 5 | Status: SHIPPED | OUTPATIENT
Start: 2020-03-19 | End: 2020-05-26

## 2020-03-19 NOTE — TELEPHONE ENCOUNTER
Spoke to patient's son Prosper Maurice. He states his mother's blood pressure is much better now. He is wary of making any more changes even though we discussed switching from cardiac to amlodipine earlier today.   After much more discussion decided to keep patient

## 2020-03-19 NOTE — TELEPHONE ENCOUNTER
From: Evon Car  To: Verona Pichardo PA-C  Sent: 3/19/2020 3:34 PM CDT  Subject: Prescription Question    Bryant Boykin,    I have one question. I have a prescription from Dr Taylor Cooper for 2.5 amlodipine not sure when this changed to 10mg.  Was it when the

## 2020-03-19 NOTE — TELEPHONE ENCOUNTER
Spoke to patient and her son for at least 30 minutes regarding blood pressure and medication changes. Per my conversation with pulmonology, Dr. Taryn Cox yesterday, the plan was to discontinue losartan to determine if this would improve her cough.   She has ha

## 2020-03-20 ENCOUNTER — TELEPHONE (OUTPATIENT)
Dept: HEMATOLOGY/ONCOLOGY | Facility: HOSPITAL | Age: 80
End: 2020-03-20

## 2020-03-20 NOTE — TELEPHONE ENCOUNTER
Cough (Pt reports she is continuing to cough especially at night. She did not sleep well last night due to the cough. Cough is non productive. Denies chills, shakes, fever, cold/flu symptoms.  Denies COVID-19 exposure or travel outside the country in the [place]

## 2020-03-24 ENCOUNTER — NURSE ONLY (OUTPATIENT)
Dept: HEMATOLOGY/ONCOLOGY | Age: 80
End: 2020-03-24
Attending: INTERNAL MEDICINE
Payer: MEDICARE

## 2020-03-24 ENCOUNTER — TELEPHONE (OUTPATIENT)
Dept: FAMILY MEDICINE CLINIC | Facility: CLINIC | Age: 80
End: 2020-03-24

## 2020-03-24 DIAGNOSIS — C85.80 MARGINAL ZONE B-CELL LYMPHOMA (HCC): ICD-10-CM

## 2020-03-24 LAB
ALBUMIN SERPL-MCNC: 2.7 G/DL (ref 3.4–5)
ALBUMIN/GLOB SERPL: 0.4 {RATIO} (ref 1–2)
ALP LIVER SERPL-CCNC: 91 U/L (ref 55–142)
ALT SERPL-CCNC: 16 U/L (ref 13–56)
ANION GAP SERPL CALC-SCNC: 6 MMOL/L (ref 0–18)
AST SERPL-CCNC: 15 U/L (ref 15–37)
BASOPHILS # BLD AUTO: 0.04 X10(3) UL (ref 0–0.2)
BASOPHILS NFR BLD AUTO: 0.9 %
BILIRUB SERPL-MCNC: 0.4 MG/DL (ref 0.1–2)
BUN BLD-MCNC: 36 MG/DL (ref 7–18)
BUN/CREAT SERPL: 21.4 (ref 10–20)
CALCIUM BLD-MCNC: 9.9 MG/DL (ref 8.5–10.1)
CHLORIDE SERPL-SCNC: 110 MMOL/L (ref 98–112)
CO2 SERPL-SCNC: 24 MMOL/L (ref 21–32)
CREAT BLD-MCNC: 1.68 MG/DL (ref 0.55–1.02)
DEPRECATED RDW RBC AUTO: 58.7 FL (ref 35.1–46.3)
EOSINOPHIL # BLD AUTO: 0.25 X10(3) UL (ref 0–0.7)
EOSINOPHIL NFR BLD AUTO: 5.7 %
ERYTHROCYTE [DISTWIDTH] IN BLOOD BY AUTOMATED COUNT: 15.8 % (ref 11–15)
GLOBULIN PLAS-MCNC: 6.4 G/DL (ref 2.8–4.4)
GLUCOSE BLD-MCNC: 100 MG/DL (ref 70–99)
HCT VFR BLD AUTO: 33.6 % (ref 35–48)
HGB BLD-MCNC: 10.2 G/DL (ref 12–16)
IMM GRANULOCYTES # BLD AUTO: 0.03 X10(3) UL (ref 0–1)
IMM GRANULOCYTES NFR BLD: 0.7 %
LYMPHOCYTES # BLD AUTO: 0.87 X10(3) UL (ref 1–4)
LYMPHOCYTES NFR BLD AUTO: 19.7 %
M PROTEIN MFR SERPL ELPH: 9.1 G/DL (ref 6.4–8.2)
MCH RBC QN AUTO: 31.1 PG (ref 26–34)
MCHC RBC AUTO-ENTMCNC: 30.4 G/DL (ref 31–37)
MCV RBC AUTO: 102.4 FL (ref 80–100)
MONOCYTES # BLD AUTO: 0.57 X10(3) UL (ref 0.1–1)
MONOCYTES NFR BLD AUTO: 12.9 %
NEUTROPHILS # BLD AUTO: 2.66 X10 (3) UL (ref 1.5–7.7)
NEUTROPHILS # BLD AUTO: 2.66 X10(3) UL (ref 1.5–7.7)
NEUTROPHILS NFR BLD AUTO: 60.1 %
OSMOLALITY SERPL CALC.SUM OF ELEC: 298 MOSM/KG (ref 275–295)
PATIENT FASTING Y/N/NP: NO
PLATELET # BLD AUTO: 238 10(3)UL (ref 150–450)
POTASSIUM SERPL-SCNC: 4.1 MMOL/L (ref 3.5–5.1)
RBC # BLD AUTO: 3.28 X10(6)UL (ref 3.8–5.3)
SODIUM SERPL-SCNC: 140 MMOL/L (ref 136–145)
WBC # BLD AUTO: 4.4 X10(3) UL (ref 4–11)

## 2020-03-24 PROCEDURE — 36415 COLL VENOUS BLD VENIPUNCTURE: CPT

## 2020-03-24 PROCEDURE — 80053 COMPREHEN METABOLIC PANEL: CPT

## 2020-03-24 PROCEDURE — 85025 COMPLETE CBC W/AUTO DIFF WBC: CPT

## 2020-03-24 NOTE — TELEPHONE ENCOUNTER
Pt states using Guaifenesin with codeine, she is on her second bottle, has been using continuously at night since 3/9/20  Also using neb q 6 prn,   Still with cough occasionally able to bring up phlegm. Worse when laying down, no fever, no st    Okay for pt to continue the cough meds? Anything else she should be doing? Please advise. Okay to do televisit if needed.

## 2020-03-24 NOTE — TELEPHONE ENCOUNTER
pts says coughing is worse at night, is it safe to keep taking it, she needs it to sleep but is worried she shouldn't be taking it so long, please call

## 2020-03-25 NOTE — TELEPHONE ENCOUNTER
Spoke to patient. Advised her that she should not be taking guaifenesin-codeine any longer. Recommend she switch to Mucinex during the day and can take Mucinex nighttime before bed to help her sleep. Call with any concerns or worsening of symptoms.

## 2020-03-26 ENCOUNTER — TELEPHONE (OUTPATIENT)
Dept: HEMATOLOGY/ONCOLOGY | Facility: HOSPITAL | Age: 80
End: 2020-03-26

## 2020-03-26 NOTE — TELEPHONE ENCOUNTER
Bossman James called asking if she can get her treatment and follow up on 4/9 switched to another day in the [place] location.  Please call

## 2020-03-30 ENCOUNTER — SOCIAL WORK SERVICES (OUTPATIENT)
Dept: HEMATOLOGY/ONCOLOGY | Facility: HOSPITAL | Age: 80
End: 2020-03-30

## 2020-03-30 NOTE — PROGRESS NOTES
SHUKRI completed patients son FMLA update to continuous, after speaking with him,  until 5- and faxed to [facility].

## 2020-03-31 ENCOUNTER — TELEPHONE (OUTPATIENT)
Dept: FAMILY MEDICINE CLINIC | Facility: CLINIC | Age: 80
End: 2020-03-31

## 2020-03-31 ENCOUNTER — APPOINTMENT (OUTPATIENT)
Dept: HEMATOLOGY/ONCOLOGY | Age: 80
End: 2020-03-31
Attending: INTERNAL MEDICINE
Payer: MEDICARE

## 2020-03-31 ENCOUNTER — PATIENT MESSAGE (OUTPATIENT)
Dept: FAMILY MEDICINE CLINIC | Facility: CLINIC | Age: 80
End: 2020-03-31

## 2020-03-31 ENCOUNTER — NURSE ONLY (OUTPATIENT)
Dept: HEMATOLOGY/ONCOLOGY | Age: 80
End: 2020-03-31
Attending: INTERNAL MEDICINE
Payer: MEDICARE

## 2020-03-31 DIAGNOSIS — C85.80 MARGINAL ZONE B-CELL LYMPHOMA (HCC): ICD-10-CM

## 2020-03-31 LAB
ALBUMIN SERPL-MCNC: 2.8 G/DL (ref 3.4–5)
ALBUMIN/GLOB SERPL: 0.4 {RATIO} (ref 1–2)
ALP LIVER SERPL-CCNC: 89 U/L (ref 55–142)
ALT SERPL-CCNC: 15 U/L (ref 13–56)
ANION GAP SERPL CALC-SCNC: 10 MMOL/L (ref 0–18)
AST SERPL-CCNC: 15 U/L (ref 15–37)
BASOPHILS # BLD AUTO: 0.04 X10(3) UL (ref 0–0.2)
BASOPHILS NFR BLD AUTO: 0.9 %
BILIRUB SERPL-MCNC: 0.6 MG/DL (ref 0.1–2)
BUN BLD-MCNC: 34 MG/DL (ref 7–18)
BUN/CREAT SERPL: 21.8 (ref 10–20)
CALCIUM BLD-MCNC: 9.8 MG/DL (ref 8.5–10.1)
CHLORIDE SERPL-SCNC: 107 MMOL/L (ref 98–112)
CO2 SERPL-SCNC: 24 MMOL/L (ref 21–32)
CREAT BLD-MCNC: 1.56 MG/DL (ref 0.55–1.02)
DEPRECATED RDW RBC AUTO: 56.8 FL (ref 35.1–46.3)
EOSINOPHIL # BLD AUTO: 0.21 X10(3) UL (ref 0–0.7)
EOSINOPHIL NFR BLD AUTO: 5 %
ERYTHROCYTE [DISTWIDTH] IN BLOOD BY AUTOMATED COUNT: 15.6 % (ref 11–15)
GLOBULIN PLAS-MCNC: 6.3 G/DL (ref 2.8–4.4)
GLUCOSE BLD-MCNC: 98 MG/DL (ref 70–99)
HCT VFR BLD AUTO: 34.2 % (ref 35–48)
HGB BLD-MCNC: 10.6 G/DL (ref 12–16)
IMM GRANULOCYTES # BLD AUTO: 0.02 X10(3) UL (ref 0–1)
IMM GRANULOCYTES NFR BLD: 0.5 %
LYMPHOCYTES # BLD AUTO: 1.04 X10(3) UL (ref 1–4)
LYMPHOCYTES NFR BLD AUTO: 24.6 %
M PROTEIN MFR SERPL ELPH: 9.1 G/DL (ref 6.4–8.2)
MCH RBC QN AUTO: 31.2 PG (ref 26–34)
MCHC RBC AUTO-ENTMCNC: 31 G/DL (ref 31–37)
MCV RBC AUTO: 100.6 FL (ref 80–100)
MONOCYTES # BLD AUTO: 0.74 X10(3) UL (ref 0.1–1)
MONOCYTES NFR BLD AUTO: 17.5 %
NEUTROPHILS # BLD AUTO: 2.18 X10 (3) UL (ref 1.5–7.7)
NEUTROPHILS # BLD AUTO: 2.18 X10(3) UL (ref 1.5–7.7)
NEUTROPHILS NFR BLD AUTO: 51.5 %
OSMOLALITY SERPL CALC.SUM OF ELEC: 300 MOSM/KG (ref 275–295)
PATIENT FASTING Y/N/NP: NO
PLATELET # BLD AUTO: 211 10(3)UL (ref 150–450)
POTASSIUM SERPL-SCNC: 3.9 MMOL/L (ref 3.5–5.1)
RBC # BLD AUTO: 3.4 X10(6)UL (ref 3.8–5.3)
SODIUM SERPL-SCNC: 141 MMOL/L (ref 136–145)
WBC # BLD AUTO: 4.2 X10(3) UL (ref 4–11)

## 2020-03-31 PROCEDURE — 36415 COLL VENOUS BLD VENIPUNCTURE: CPT

## 2020-03-31 PROCEDURE — 80053 COMPREHEN METABOLIC PANEL: CPT

## 2020-03-31 PROCEDURE — 85025 COMPLETE CBC W/AUTO DIFF WBC: CPT

## 2020-03-31 NOTE — TELEPHONE ENCOUNTER
From: Romana Cardinal Frish  To: Henrry Flores PA-C  Sent: 3/31/2020 2:24 PM CDT  Subject: Visit Follow-up Question    Blood pressures have been on the low side past few days. Today in the 90s over 46s.  Should cut back on one medicine

## 2020-03-31 NOTE — TELEPHONE ENCOUNTER
Pt would like tele visit to discuss with JEREMÍAS  Added to [id number] schedule      Pt states working with LR to regulate b/p  Was told to call if b/p goes below 90's/50's   Pt states went to cancer center today, and was told b/p was fine   Came home and took meds at 1

## 2020-03-31 NOTE — TELEPHONE ENCOUNTER
From: Randy Car  To: Mendez Mcintosh PA-C  Sent: 3/31/2020 2:25 PM CDT  Subject: Visit Follow-up Question    Follow up to last message now 127/68

## 2020-04-01 ENCOUNTER — TELEPHONE (OUTPATIENT)
Dept: HEMATOLOGY/ONCOLOGY | Facility: HOSPITAL | Age: 80
End: 2020-04-01

## 2020-04-01 ENCOUNTER — VIRTUAL PHONE E/M (OUTPATIENT)
Dept: FAMILY MEDICINE CLINIC | Facility: CLINIC | Age: 80
End: 2020-04-01
Payer: MEDICARE

## 2020-04-01 DIAGNOSIS — I10 ESSENTIAL HYPERTENSION: ICD-10-CM

## 2020-04-01 PROCEDURE — G2012 BRIEF CHECK IN BY MD/QHP: HCPCS | Performed by: FAMILY MEDICINE

## 2020-04-01 NOTE — PROGRESS NOTES
Virtual/Telephone Check-In    ANGIEjean mariedennis verbally consents to a Virtual/Telephone Check-In service on 03/30/20. Patient understands and accepts financial responsibility for any deductible, co-insurance and/or co-pays associated with this service.

## 2020-04-01 NOTE — TELEPHONE ENCOUNTER
Spoke to son Jose Cunningham and answered their questions.  They are aware [facility] will be closing starting Monday and subsequent treatments will be in [facility] and that week she is on treatment for her next cycle I will not be in the [facility] and will be

## 2020-04-01 NOTE — TELEPHONE ENCOUNTER
----- Message from Juancarlos Thomas RN sent at 4/1/2020  8:32 AM CDT -----  Regarding: FW: Test Results Question  Contact: [phone]    ----- Message -----  From: Rica Rodriguez  Sent: 3/31/2020  10:00 PM CDT  To: Alejandrina Krishnamurthy  Subject: Test

## 2020-04-03 ENCOUNTER — TELEPHONE (OUTPATIENT)
Dept: FAMILY MEDICINE CLINIC | Facility: CLINIC | Age: 80
End: 2020-04-03

## 2020-04-06 RX ORDER — ALLOPURINOL 100 MG/1
TABLET ORAL
Qty: 28 TABLET | Refills: 0 | OUTPATIENT
Start: 2020-04-06

## 2020-04-07 ENCOUNTER — APPOINTMENT (OUTPATIENT)
Dept: HEMATOLOGY/ONCOLOGY | Age: 80
End: 2020-04-07
Attending: INTERNAL MEDICINE
Payer: MEDICARE

## 2020-04-08 ENCOUNTER — APPOINTMENT (OUTPATIENT)
Dept: HEMATOLOGY/ONCOLOGY | Age: 80
End: 2020-04-08
Attending: INTERNAL MEDICINE
Payer: MEDICARE

## 2020-04-08 DIAGNOSIS — C88.4 EXTRANODAL MARGINAL ZONE B-CELL LYMPHOMA OF MUCOSA-ASSOCIATED LYMPHOID TISSUE (MALT) (HCC): Primary | ICD-10-CM

## 2020-04-08 DIAGNOSIS — C85.10 LOW GRADE B-CELL LYMPHOMA (HCC): ICD-10-CM

## 2020-04-08 DIAGNOSIS — C34.81 MALIGNANT NEOPLASM OF OVERLAPPING SITES OF RIGHT LUNG (HCC): ICD-10-CM

## 2020-04-09 ENCOUNTER — OFFICE VISIT (OUTPATIENT)
Dept: HEMATOLOGY/ONCOLOGY | Facility: HOSPITAL | Age: 80
End: 2020-04-09
Attending: INTERNAL MEDICINE
Payer: MEDICARE

## 2020-04-09 VITALS
RESPIRATION RATE: 18 BRPM | OXYGEN SATURATION: 95 % | HEART RATE: 95 BPM | WEIGHT: 126 LBS | HEIGHT: 59.25 IN | SYSTOLIC BLOOD PRESSURE: 153 MMHG | BODY MASS INDEX: 25.4 KG/M2 | DIASTOLIC BLOOD PRESSURE: 69 MMHG | TEMPERATURE: 99 F

## 2020-04-09 DIAGNOSIS — C34.81 MALIGNANT NEOPLASM OF OVERLAPPING SITES OF RIGHT LUNG (HCC): ICD-10-CM

## 2020-04-09 DIAGNOSIS — C88.4 EXTRANODAL MARGINAL ZONE B-CELL LYMPHOMA OF MUCOSA-ASSOCIATED LYMPHOID TISSUE (MALT) (HCC): ICD-10-CM

## 2020-04-09 DIAGNOSIS — D63.0 ANEMIA COMPLICATING NEOPLASTIC DISEASE: Primary | ICD-10-CM

## 2020-04-09 DIAGNOSIS — C85.10 LOW GRADE B-CELL LYMPHOMA (HCC): ICD-10-CM

## 2020-04-09 DIAGNOSIS — C88.4 EXTRANODAL MARGINAL ZONE B-CELL LYMPHOMA OF MUCOSA-ASSOCIATED LYMPHOID TISSUE (MALT) (HCC): Primary | ICD-10-CM

## 2020-04-09 PROCEDURE — 96411 CHEMO IV PUSH ADDL DRUG: CPT

## 2020-04-09 PROCEDURE — 96413 CHEMO IV INFUSION 1 HR: CPT

## 2020-04-09 PROCEDURE — 99215 OFFICE O/P EST HI 40 MIN: CPT | Performed by: INTERNAL MEDICINE

## 2020-04-09 PROCEDURE — 96375 TX/PRO/DX INJ NEW DRUG ADDON: CPT

## 2020-04-09 RX ORDER — DIPHENHYDRAMINE HCL 25 MG
50 CAPSULE ORAL ONCE
Status: CANCELLED | OUTPATIENT
Start: 2020-04-09

## 2020-04-09 RX ORDER — ACETAMINOPHEN 325 MG/1
650 TABLET ORAL ONCE
Status: CANCELLED | OUTPATIENT
Start: 2020-04-09

## 2020-04-09 RX ORDER — DIPHENHYDRAMINE HCL 25 MG
50 CAPSULE ORAL ONCE
Status: COMPLETED | OUTPATIENT
Start: 2020-04-09 | End: 2020-04-09

## 2020-04-09 RX ORDER — ACETAMINOPHEN 325 MG/1
650 TABLET ORAL ONCE
Status: COMPLETED | OUTPATIENT
Start: 2020-04-09 | End: 2020-04-09

## 2020-04-09 RX ADMIN — DIPHENHYDRAMINE HCL 50 MG: 25 MG CAPSULE ORAL at 10:42:00

## 2020-04-09 RX ADMIN — ACETAMINOPHEN 650 MG: 325 TABLET ORAL at 10:42:00

## 2020-04-09 NOTE — PROGRESS NOTES
Pt here for C2D1.   Arrives Ambulating independently, accompanied by Self           Patient reports possible pregnancy since last therapy cycle: No    Modifications in dose or schedule: No     Frequency of blood return and site check throughout administrati

## 2020-04-09 NOTE — PROGRESS NOTES
Pt here for follow up and treatment. She complains of fatigue and constipation/diarrhea. She states she starts out the day constipated, goes normal and then has diarrhea. She takes 2 pills she thinks is lomotil for her diarrhea.

## 2020-04-09 NOTE — PROGRESS NOTES
Cancer Center Progress Note    Patient Name: Jennifer Aguilar   YOB: 1940   Medical Record Number: [id number]   [facility]: [id number]   Attending Physician: Richard Kuhn M.D.    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 3. Right sided PE diagnosed 9/2011- seen on surveillance scans.  Was total) by mouth every 6 (six) hours as needed for Nausea., Disp: 30 tablet, Rfl: 3  •  Ondansetron HCl (ZOFRAN) 8 MG tablet, Take 1 tablet (8 mg total) by mouth every 8 (eight) hours as needed for Nausea., Disp: 30 tablet, Rfl: 3  •  allopurinol 100 MG Ora Diagnosis Date   • Abdominal hernia    • Azotemia 4/3/2018   • Bilateral lung cancer ([facility] [address]    • Cancer [facility]) 2009    right lung   • Cancer [facility])     lymphoma   • Chronic cough    • Clostridium difficile colitis    • Community acquired pneumonia, unspecif kyphoscoliosis), idiopathic 6/29/2012   • Shortness of breath    • SOB (shortness of breath) 3/27/2018   • Urticaria, unspecified    • Visual impairment     glasses   • Wears glasses        Past Surgical History:  Past Surgical History:   Procedure Lateral per session: Not on file      Stress: Not on file    Relationships      Social connections:        Talks on phone: Not on file        Gets together: Not on file        Attends [employer] service: Not on file        Active member of club or organization: Not hepatosplenomegaly. No palpable mass. Extremities: Pedal pulses are present. Tr BLE edema  Neurological: Grossly intact.         Laboratory:  Lab Results   Component Value Date    WBC 5.8 04/09/2020    RBC 3.67 (L) 04/09/2020    HGB 11.5 (L) 04/09/2020 transmitted to the [facility])   Almita Barone [address] ([address]) which includes the Dose Index Registry.      PATIENT STATED HISTORY: (As transcribed by Technologist)  Patient is here for a progression CT post 4 cycles of gabi again seen and are relatively stable in size. The largest cyst is again seen along the right superior to midpole region measuring 5.1 cm. No new renal lesions. No stones or hydronephrosis. ADRENALS:  No mass or enlargement.     AORTA:  No aneurysm or Dori West DO on 11/25/2019 at 10:46              PROCEDURE:  PET/CT STANDARD BODY SCAN (ONCOLOGY) (PEA=[id number])     COMPARISON:  [facility]ETTE PET/CT STANDARD BODY SCAN (ONCOLOGY) (OMB=[id number]), 1/19/2018, 12:33.      INDICATIONS:  C85.80 Other specified types o SUV max has mildly increased measuring 6.9 was 5.2.     2. Soft tissue density lesion adjacent to the right hepatic lobe is mildly larger in size with an increase in SUV max of 5.1 was 4.3.     3. Retrocaval lymph nodes are relatively stable in size with s

## 2020-04-10 ENCOUNTER — APPOINTMENT (OUTPATIENT)
Dept: HEMATOLOGY/ONCOLOGY | Age: 80
End: 2020-04-10
Attending: INTERNAL MEDICINE
Payer: MEDICARE

## 2020-04-10 ENCOUNTER — OFFICE VISIT (OUTPATIENT)
Dept: HEMATOLOGY/ONCOLOGY | Facility: HOSPITAL | Age: 80
End: 2020-04-10
Attending: INTERNAL MEDICINE
Payer: MEDICARE

## 2020-04-10 VITALS
HEART RATE: 97 BPM | HEIGHT: 59.25 IN | OXYGEN SATURATION: 95 % | WEIGHT: 127 LBS | DIASTOLIC BLOOD PRESSURE: 88 MMHG | BODY MASS INDEX: 25.6 KG/M2 | TEMPERATURE: 99 F | SYSTOLIC BLOOD PRESSURE: 150 MMHG | RESPIRATION RATE: 18 BRPM

## 2020-04-10 DIAGNOSIS — C34.81 MALIGNANT NEOPLASM OF OVERLAPPING SITES OF RIGHT LUNG (HCC): ICD-10-CM

## 2020-04-10 DIAGNOSIS — C88.4 EXTRANODAL MARGINAL ZONE B-CELL LYMPHOMA OF MUCOSA-ASSOCIATED LYMPHOID TISSUE (MALT) (HCC): Primary | ICD-10-CM

## 2020-04-10 DIAGNOSIS — C85.10 LOW GRADE B-CELL LYMPHOMA (HCC): ICD-10-CM

## 2020-04-10 PROCEDURE — 96375 TX/PRO/DX INJ NEW DRUG ADDON: CPT

## 2020-04-10 PROCEDURE — 96409 CHEMO IV PUSH SNGL DRUG: CPT

## 2020-04-10 NOTE — PROGRESS NOTES
Pt here for Lakhwinder Holbrook.   Arrives Ambulating independently, accompanied by Self           Patient reports possible pregnancy since last therapy cycle: No    Modifications in dose or schedule: No     Frequency of blood return and site check throughout admi

## 2020-04-27 ENCOUNTER — TELEPHONE (OUTPATIENT)
Dept: FAMILY MEDICINE CLINIC | Facility: CLINIC | Age: 80
End: 2020-04-27

## 2020-05-07 ENCOUNTER — OFFICE VISIT (OUTPATIENT)
Dept: HEMATOLOGY/ONCOLOGY | Facility: HOSPITAL | Age: 80
End: 2020-05-07
Attending: INTERNAL MEDICINE
Payer: MEDICARE

## 2020-05-07 VITALS
BODY MASS INDEX: 26.69 KG/M2 | DIASTOLIC BLOOD PRESSURE: 78 MMHG | RESPIRATION RATE: 16 BRPM | OXYGEN SATURATION: 97 % | SYSTOLIC BLOOD PRESSURE: 145 MMHG | HEIGHT: 59.25 IN | WEIGHT: 132.38 LBS | TEMPERATURE: 99 F | HEART RATE: 84 BPM

## 2020-05-07 DIAGNOSIS — C85.10 LOW GRADE B-CELL LYMPHOMA (HCC): Primary | ICD-10-CM

## 2020-05-07 DIAGNOSIS — C85.10 LOW GRADE B-CELL LYMPHOMA (HCC): ICD-10-CM

## 2020-05-07 DIAGNOSIS — C34.81 MALIGNANT NEOPLASM OF OVERLAPPING SITES OF RIGHT LUNG (HCC): ICD-10-CM

## 2020-05-07 DIAGNOSIS — D63.0 ANEMIA COMPLICATING NEOPLASTIC DISEASE: ICD-10-CM

## 2020-05-07 DIAGNOSIS — N18.4 CKD (CHRONIC KIDNEY DISEASE) STAGE 4, GFR 15-29 ML/MIN (HCC): ICD-10-CM

## 2020-05-07 DIAGNOSIS — C88.4 EXTRANODAL MARGINAL ZONE B-CELL LYMPHOMA OF MUCOSA-ASSOCIATED LYMPHOID TISSUE (MALT) (HCC): Primary | ICD-10-CM

## 2020-05-07 DIAGNOSIS — Z85.118 HISTORY OF LUNG CANCER: ICD-10-CM

## 2020-05-07 PROCEDURE — 96413 CHEMO IV INFUSION 1 HR: CPT

## 2020-05-07 PROCEDURE — 96411 CHEMO IV PUSH ADDL DRUG: CPT

## 2020-05-07 PROCEDURE — 96375 TX/PRO/DX INJ NEW DRUG ADDON: CPT

## 2020-05-07 PROCEDURE — 99215 OFFICE O/P EST HI 40 MIN: CPT | Performed by: INTERNAL MEDICINE

## 2020-05-07 PROCEDURE — 96415 CHEMO IV INFUSION ADDL HR: CPT

## 2020-05-07 RX ORDER — DIPHENHYDRAMINE HCL 25 MG
50 CAPSULE ORAL ONCE
Status: CANCELLED | OUTPATIENT
Start: 2020-05-07

## 2020-05-07 RX ORDER — DIPHENHYDRAMINE HCL 25 MG
50 CAPSULE ORAL ONCE
Status: COMPLETED | OUTPATIENT
Start: 2020-05-07 | End: 2020-05-07

## 2020-05-07 RX ORDER — DOXYCYCLINE HYCLATE 20 MG
20 TABLET ORAL 2 TIMES DAILY
COMMUNITY
Start: 2020-04-01 | End: 2020-05-26

## 2020-05-07 RX ORDER — DILTIAZEM HYDROCHLORIDE 120 MG/1
120 CAPSULE, EXTENDED RELEASE ORAL DAILY
COMMUNITY
Start: 2020-03-19 | End: 2020-06-23

## 2020-05-07 RX ORDER — ACETAMINOPHEN 325 MG/1
650 TABLET ORAL ONCE
Status: COMPLETED | OUTPATIENT
Start: 2020-05-07 | End: 2020-05-07

## 2020-05-07 RX ORDER — ACETAMINOPHEN 325 MG/1
650 TABLET ORAL ONCE
Status: CANCELLED | OUTPATIENT
Start: 2020-05-07

## 2020-05-07 RX ADMIN — DIPHENHYDRAMINE HCL 50 MG: 25 MG CAPSULE ORAL at 10:56:00

## 2020-05-07 RX ADMIN — ACETAMINOPHEN 650 MG: 325 TABLET ORAL at 10:56:00

## 2020-05-07 NOTE — PROGRESS NOTES
Cancer Center Progress Note    Patient Name: Luís Rios   YOB: 1940   Medical Record Number: [id number]   CSN: [id number]   Attending Physician: Josiane Soto M.D.    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 3. Right sided PE diagnosed 9/2011- seen on surveillance scans.  Was placed on (ZOFRAN) 8 MG tablet, Take 1 tablet (8 mg total) by mouth every 8 (eight) hours as needed for Nausea., Disp: 30 tablet, Rfl: 3  •  allopurinol 100 MG Oral Tab, Take 1 tablet (100 mg total) by mouth daily. , Disp: 28 tablet, Rfl: 0  •  ipratropium-albuterol [facility]) 2009    right lung   • Cancer [facility])     lymphoma   • Chronic cough    • Clostridium difficile colitis    • Community acquired pneumonia, unspecified laterality 4/4/2018   • Complete rupture of rotator cuff    • COPD (chronic obstructive pulmonary dis Urticaria, unspecified    • Visual impairment     glasses   • Wears glasses        Past Surgical History:  Past Surgical History:   Procedure Laterality Date   • BRONCHOSCOPY ELECTROMAGNETIC NAVIGATION N/A 12/17/2019    Performed by Romeo Reinoso MD at phone: Not on file        Gets together: Not on file        Attends Worship service: Not on file        Active member of club or organization: Not on file        Attends meetings of clubs or organizations: Not on file        Relationship status: Not on f Normal - Regular rate and rhythm, no murmurs, gallops or rubs. Abdomen Normal - Non-tender, non-distended, no masses, ascites or hepatosplenomegaly. Extremities Normal - No visible deformities, no cyanosis, clubbing or edema.     Integumentary Normal - CO2 20.0 (L) 04/09/2020         Radiology:     Impression and Plan:  1.  Low grade lymphoproliferative disorder- c/o vague symptoms but (fatigue, cough but no B symptoms) but had increasing adenopathy with rapid increase of her light chains with IgM around

## 2020-05-07 NOTE — PROGRESS NOTES
Pt here for C3D1.   Arrives Ambulating independently, accompanied by Self           Patient reports possible pregnancy since last therapy cycle: Not Applicable    Modifications in dose or schedule: No     Frequency of blood return and site check throughout

## 2020-05-08 ENCOUNTER — OFFICE VISIT (OUTPATIENT)
Dept: HEMATOLOGY/ONCOLOGY | Facility: HOSPITAL | Age: 80
End: 2020-05-08
Attending: INTERNAL MEDICINE
Payer: MEDICARE

## 2020-05-08 VITALS
SYSTOLIC BLOOD PRESSURE: 133 MMHG | DIASTOLIC BLOOD PRESSURE: 79 MMHG | WEIGHT: 132 LBS | RESPIRATION RATE: 16 BRPM | HEIGHT: 59.25 IN | OXYGEN SATURATION: 98 % | TEMPERATURE: 99 F | BODY MASS INDEX: 26.61 KG/M2 | HEART RATE: 99 BPM

## 2020-05-08 DIAGNOSIS — C88.4 EXTRANODAL MARGINAL ZONE B-CELL LYMPHOMA OF MUCOSA-ASSOCIATED LYMPHOID TISSUE (MALT) (HCC): Primary | ICD-10-CM

## 2020-05-08 DIAGNOSIS — C85.10 LOW GRADE B-CELL LYMPHOMA (HCC): ICD-10-CM

## 2020-05-08 DIAGNOSIS — C34.81 MALIGNANT NEOPLASM OF OVERLAPPING SITES OF RIGHT LUNG (HCC): ICD-10-CM

## 2020-05-08 PROCEDURE — 96375 TX/PRO/DX INJ NEW DRUG ADDON: CPT

## 2020-05-08 PROCEDURE — 96409 CHEMO IV PUSH SNGL DRUG: CPT

## 2020-05-08 NOTE — PROGRESS NOTES
Pt here for C3D2.   Arrives Ambulating independently, accompanied by Self           Patient reports possible pregnancy since last therapy cycle: No    Modifications in dose or schedule: No     Frequency of blood return and site check throughout administrati

## 2020-05-19 ENCOUNTER — VIRTUAL PHONE E/M (OUTPATIENT)
Dept: FAMILY MEDICINE CLINIC | Facility: CLINIC | Age: 80
End: 2020-05-19
Payer: MEDICARE

## 2020-05-19 ENCOUNTER — APPOINTMENT (OUTPATIENT)
Dept: HEMATOLOGY/ONCOLOGY | Age: 80
End: 2020-05-19
Attending: INTERNAL MEDICINE
Payer: MEDICARE

## 2020-05-19 ENCOUNTER — TELEPHONE (OUTPATIENT)
Dept: FAMILY MEDICINE CLINIC | Facility: CLINIC | Age: 80
End: 2020-05-19

## 2020-05-19 DIAGNOSIS — I10 ESSENTIAL HYPERTENSION: Primary | ICD-10-CM

## 2020-05-19 PROCEDURE — 99442 PHONE E/M BY PHYS 11-20 MIN: CPT | Performed by: FAMILY MEDICINE

## 2020-05-19 RX ORDER — FUROSEMIDE 20 MG/1
20 TABLET ORAL 2 TIMES DAILY
Qty: 120 TABLET | Refills: 0 | Status: ON HOLD | OUTPATIENT
Start: 2020-05-19 | End: 2020-08-03

## 2020-05-19 NOTE — PROGRESS NOTES
Virtual Telephone Check-In    Phyllis Escamilla verbally consents to a Virtual/Telephone Check-In visit on 05/19/20. Patient understands and accepts financial responsibility for any deductible, co-insurance and/or co-pays associated with this service.

## 2020-05-26 ENCOUNTER — OFFICE VISIT (OUTPATIENT)
Dept: FAMILY MEDICINE CLINIC | Facility: CLINIC | Age: 80
End: 2020-05-26
Payer: MEDICARE

## 2020-05-26 ENCOUNTER — TELEPHONE (OUTPATIENT)
Dept: FAMILY MEDICINE CLINIC | Facility: CLINIC | Age: 80
End: 2020-05-26

## 2020-05-26 VITALS
BODY MASS INDEX: 26.37 KG/M2 | WEIGHT: 130.81 LBS | HEART RATE: 75 BPM | SYSTOLIC BLOOD PRESSURE: 130 MMHG | RESPIRATION RATE: 18 BRPM | DIASTOLIC BLOOD PRESSURE: 82 MMHG | HEIGHT: 59.25 IN | OXYGEN SATURATION: 99 %

## 2020-05-26 DIAGNOSIS — M25.562 ACUTE PAIN OF LEFT KNEE: ICD-10-CM

## 2020-05-26 DIAGNOSIS — M70.52 PES ANSERINUS BURSITIS OF LEFT KNEE: Primary | ICD-10-CM

## 2020-05-26 PROCEDURE — 99213 OFFICE O/P EST LOW 20 MIN: CPT | Performed by: FAMILY MEDICINE

## 2020-05-26 RX ORDER — PREDNISONE 20 MG/1
TABLET ORAL
Qty: 12 TABLET | Refills: 0 | Status: SHIPPED | OUTPATIENT
Start: 2020-05-26 | End: 2020-07-01

## 2020-05-26 NOTE — TELEPHONE ENCOUNTER
Patient called and her left knee is painful. It is more on the inside than the outside. She saw Nicol Correiautant about a month ago for clicking in her knee. She can bend it but constant pain  Walking like she crippled    Tylenol and cream were used prior.   Now those

## 2020-05-26 NOTE — TELEPHONE ENCOUNTER
appt made with YP to check knee. Pt screened regarding covid questions. No fever. no cough, no exposure.

## 2020-05-28 NOTE — PROGRESS NOTES
HPI:    Patient ID: Khris Guerrero is a 79year old female. Thinks it may have started after doing home PT were she was bring her knees/legs togather during seated position. Pain   This is a chronic problem. Episode onset: 1-2 months ago.  The probl MOUTH TWICE DAILY 180 tablet 11   • BREO ELLIPTA 200-25 MCG/INH Inhalation Aerosol Powder, Breath Activated 1 puff daily. • Montelukast Sodium (SINGULAIR) 10 MG Oral Tab Take 10 mg by mouth nightly.      • Saline Nasal Spray 0.65 % Nasal Solution 1 spra INTERNAL         [id number]

## 2020-06-02 ENCOUNTER — OFFICE VISIT (OUTPATIENT)
Dept: FAMILY MEDICINE CLINIC | Facility: CLINIC | Age: 80
End: 2020-06-02
Payer: MEDICARE

## 2020-06-02 VITALS
RESPIRATION RATE: 18 BRPM | SYSTOLIC BLOOD PRESSURE: 138 MMHG | BODY MASS INDEX: 26.21 KG/M2 | HEART RATE: 67 BPM | TEMPERATURE: 98 F | OXYGEN SATURATION: 98 % | HEIGHT: 59.25 IN | DIASTOLIC BLOOD PRESSURE: 80 MMHG | WEIGHT: 130 LBS

## 2020-06-02 DIAGNOSIS — I10 ESSENTIAL HYPERTENSION: Primary | ICD-10-CM

## 2020-06-02 PROCEDURE — 99213 OFFICE O/P EST LOW 20 MIN: CPT | Performed by: FAMILY MEDICINE

## 2020-06-02 NOTE — PROGRESS NOTES
Recheck of blood pressure. Has been tolerating medications and is now tapering off the Lasix with success.   She is quite happy with the reduction of both blood pressure and reduction of edema of her extremities I recommended that she continue to taper off

## 2020-06-03 DIAGNOSIS — C88.4 EXTRANODAL MARGINAL ZONE B-CELL LYMPHOMA OF MUCOSA-ASSOCIATED LYMPHOID TISSUE (MALT) (HCC): Primary | ICD-10-CM

## 2020-06-03 DIAGNOSIS — C34.81 MALIGNANT NEOPLASM OF OVERLAPPING SITES OF RIGHT LUNG (HCC): ICD-10-CM

## 2020-06-03 DIAGNOSIS — C85.10 LOW GRADE B-CELL LYMPHOMA (HCC): ICD-10-CM

## 2020-06-04 ENCOUNTER — OFFICE VISIT (OUTPATIENT)
Dept: HEMATOLOGY/ONCOLOGY | Facility: HOSPITAL | Age: 80
End: 2020-06-04
Attending: INTERNAL MEDICINE
Payer: MEDICARE

## 2020-06-04 VITALS
SYSTOLIC BLOOD PRESSURE: 162 MMHG | HEIGHT: 59.25 IN | WEIGHT: 128 LBS | OXYGEN SATURATION: 93 % | TEMPERATURE: 98 F | BODY MASS INDEX: 25.8 KG/M2 | DIASTOLIC BLOOD PRESSURE: 87 MMHG | HEART RATE: 75 BPM | RESPIRATION RATE: 16 BRPM

## 2020-06-04 DIAGNOSIS — C34.81 MALIGNANT NEOPLASM OF OVERLAPPING SITES OF RIGHT LUNG (HCC): ICD-10-CM

## 2020-06-04 DIAGNOSIS — C85.10 LOW GRADE B-CELL LYMPHOMA (HCC): ICD-10-CM

## 2020-06-04 DIAGNOSIS — C85.80 MARGINAL ZONE B-CELL LYMPHOMA (HCC): ICD-10-CM

## 2020-06-04 DIAGNOSIS — D63.0 ANEMIA COMPLICATING NEOPLASTIC DISEASE: Primary | ICD-10-CM

## 2020-06-04 DIAGNOSIS — N18.4 CKD (CHRONIC KIDNEY DISEASE) STAGE 4, GFR 15-29 ML/MIN (HCC): ICD-10-CM

## 2020-06-04 DIAGNOSIS — D47.2 MONOCLONAL PARAPROTEINEMIA: ICD-10-CM

## 2020-06-04 DIAGNOSIS — C88.4 EXTRANODAL MARGINAL ZONE B-CELL LYMPHOMA OF MUCOSA-ASSOCIATED LYMPHOID TISSUE (MALT) (HCC): ICD-10-CM

## 2020-06-04 DIAGNOSIS — D63.0 ANEMIA IN NEOPLASTIC DISEASE: ICD-10-CM

## 2020-06-04 DIAGNOSIS — C88.4 EXTRANODAL MARGINAL ZONE B-CELL LYMPHOMA OF MUCOSA-ASSOCIATED LYMPHOID TISSUE (MALT) (HCC): Primary | ICD-10-CM

## 2020-06-04 DIAGNOSIS — Z85.118 HISTORY OF LUNG CANCER: ICD-10-CM

## 2020-06-04 DIAGNOSIS — C34.90 EGFR-RELATED LUNG CANCER (HCC): ICD-10-CM

## 2020-06-04 PROCEDURE — 96413 CHEMO IV INFUSION 1 HR: CPT

## 2020-06-04 PROCEDURE — 99215 OFFICE O/P EST HI 40 MIN: CPT | Performed by: INTERNAL MEDICINE

## 2020-06-04 PROCEDURE — 96375 TX/PRO/DX INJ NEW DRUG ADDON: CPT

## 2020-06-04 PROCEDURE — 96415 CHEMO IV INFUSION ADDL HR: CPT

## 2020-06-04 PROCEDURE — 96411 CHEMO IV PUSH ADDL DRUG: CPT

## 2020-06-04 RX ORDER — DIPHENHYDRAMINE HCL 25 MG
50 CAPSULE ORAL ONCE
Status: CANCELLED | OUTPATIENT
Start: 2020-06-04

## 2020-06-04 RX ORDER — DIPHENHYDRAMINE HCL 25 MG
50 CAPSULE ORAL ONCE
Status: COMPLETED | OUTPATIENT
Start: 2020-06-04 | End: 2020-06-04

## 2020-06-04 RX ORDER — ACETAMINOPHEN 325 MG/1
650 TABLET ORAL ONCE
Status: CANCELLED | OUTPATIENT
Start: 2020-06-04

## 2020-06-04 RX ORDER — ACETAMINOPHEN 325 MG/1
650 TABLET ORAL ONCE
Status: COMPLETED | OUTPATIENT
Start: 2020-06-04 | End: 2020-06-04

## 2020-06-04 RX ADMIN — ACETAMINOPHEN 650 MG: 325 TABLET ORAL at 10:13:00

## 2020-06-04 RX ADMIN — DIPHENHYDRAMINE HCL 50 MG: 25 MG CAPSULE ORAL at 10:13:00

## 2020-06-04 NOTE — PROGRESS NOTES
Cancer Center Progress Note    Patient Name: Merrick Tolentino   YOB: 1940   Medical Record Number: [id number]   CSN: [id number]   Attending Physician: Cele Banks M.D.    Referring Physician: Tadeo Friday, DO      Date of Visit: 6/4/202 12/3/14 s/p SBRT. EGFR exon 19 positive. Started \"adjuvant\" Tarceva 2/2015. Stopped after she was placed on Ibrutinib (given concerns for overlapping side effects. Had been FIONA for lung cancer then).       - Stage I NSCLC- adenocarcinoma of the RLL diagno MG Oral Tab, Take 1 tablet (20 mg total) by mouth 2 (two) times daily. , Disp: 120 tablet, Rfl: 0  •  CARTIA  MG Oral Capsule SR 24 Hr, Take 120 mg by mouth daily. , Disp: , Rfl:   •  losartan Potassium 50 MG Oral Tab, Take 1 tablet (50 mg total) by mo 2009    right lung   • Cancer [facility])     lymphoma   • Chronic cough    • Clostridium difficile colitis    • Community acquired pneumonia, unspecified laterality 4/4/2018   • Complete rupture of rotator cuff    • COPD (chronic obstructive pulmonary disease) Urticaria, unspecified    • Visual impairment     glasses   • Wears glasses        Past Surgical History:  Past Surgical History:   Procedure Laterality Date   • BRONCHOSCOPY ELECTROMAGNETIC NAVIGATION N/A 12/17/2019    Performed by Shanon Dick MD at phone: Not on file        Gets together: Not on file        Attends Gnosticism service: Not on file        Active member of club or organization: Not on file        Attends meetings of clubs or organizations: Not on file        Relationship status: Not on f hepatosplenomegaly. No palpable mass. Extremities: Pedal pulses are present. No BLE edema! Neurological: Grossly intact.         Laboratory:  Recent Results (from the past 24 hour(s))   COMP METABOLIC PANEL (14)    Collection Time: 06/04/20  9:10 AM   Re Ref. Range 4/9/2020 09:45 4/9/2020 09:45 5/7/2020 09:54 5/7/2020 09:54   KAPPA FREE LIGHT CHAIN Latest Ref Range: 0.330 - 1.940 mg/dL 1.188  1.003    LAMBDA FREE LIGHT CHAIN Latest Ref Range: 0.571 - 2.630 mg/dL 7.243 (H)  5.071 (H)    KAPPA/LAMBDA information is transmitted to the ACR ([facility])   NRDR ([address]) which includes the Dose Index Registry.      PATIENT STATED HISTORY: (As transcribed by Technologist)  Patient has a history of lung cancer, asse prior exam.  There is also slight increase in confluent adenopathy centered on the mesentery with 1 area measuring up to 3.5 x 2.9 cm compared with 3.4 by 2.7 cm previously. Slight increase in mesenteric fat stranding   in this region.      Likely small bi of renal and chronic disease. Had previous h/o BRANNON and had a GI w/u that was unrevealing but no evidence of this currently.        4. CKD-  Largely stable. Monitoring closely while on chemo     5. Dry cough- resolved with med change    6.  BLE edema- Resolv

## 2020-06-04 NOTE — PROGRESS NOTES
Education Record    Learner:  Patient    Disease / Diagnosis:B cell lymphoma     Barriers / Limitations:  None   Comments:    Method:  Discussion   Comments:    General Topics:  Plan of care reviewed   Comments:    Outcome:  Shows understanding   Comments:

## 2020-06-05 ENCOUNTER — OFFICE VISIT (OUTPATIENT)
Dept: HEMATOLOGY/ONCOLOGY | Facility: HOSPITAL | Age: 80
End: 2020-06-05
Attending: INTERNAL MEDICINE
Payer: MEDICARE

## 2020-06-05 VITALS
SYSTOLIC BLOOD PRESSURE: 162 MMHG | RESPIRATION RATE: 16 BRPM | OXYGEN SATURATION: 95 % | TEMPERATURE: 99 F | DIASTOLIC BLOOD PRESSURE: 89 MMHG | HEART RATE: 81 BPM

## 2020-06-05 DIAGNOSIS — C88.4 EXTRANODAL MARGINAL ZONE B-CELL LYMPHOMA OF MUCOSA-ASSOCIATED LYMPHOID TISSUE (MALT) (HCC): Primary | ICD-10-CM

## 2020-06-05 DIAGNOSIS — C34.81 MALIGNANT NEOPLASM OF OVERLAPPING SITES OF RIGHT LUNG (HCC): ICD-10-CM

## 2020-06-05 DIAGNOSIS — C85.10 LOW GRADE B-CELL LYMPHOMA (HCC): ICD-10-CM

## 2020-06-05 PROCEDURE — 96375 TX/PRO/DX INJ NEW DRUG ADDON: CPT

## 2020-06-05 PROCEDURE — 96409 CHEMO IV PUSH SNGL DRUG: CPT

## 2020-06-13 ENCOUNTER — HOSPITAL ENCOUNTER (OUTPATIENT)
Dept: CT IMAGING | Age: 80
Discharge: HOME | End: 2020-06-13
Attending: INTERNAL MEDICINE
Payer: MEDICARE

## 2020-06-13 DIAGNOSIS — C34.81 MALIGNANT NEOPLASM OF OVERLAPPING SITES OF RIGHT LUNG (HCC): ICD-10-CM

## 2020-06-13 DIAGNOSIS — C88.4 EXTRANODAL MARGINAL ZONE B-CELL LYMPHOMA OF MUCOSA-ASSOCIATED LYMPHOID TISSUE (MALT) (HCC): ICD-10-CM

## 2020-06-13 PROCEDURE — 71250 CT THORAX DX C-: CPT | Performed by: INTERNAL MEDICINE

## 2020-06-17 ENCOUNTER — TELEPHONE (OUTPATIENT)
Dept: FAMILY MEDICINE CLINIC | Facility: CLINIC | Age: 80
End: 2020-06-17

## 2020-06-17 NOTE — TELEPHONE ENCOUNTER
Handicap Jessica    Son dropped off form to be filled out by the office for patient. Stated his mom has multiple health issues and would appreciate our help in obtaining this. Can we please Mail or Fax form for patient.     Also can we send a copy to

## 2020-06-23 RX ORDER — DILTIAZEM HYDROCHLORIDE 120 MG/1
CAPSULE, EXTENDED RELEASE ORAL
Qty: 30 CAPSULE | Refills: 0 | Status: SHIPPED | OUTPATIENT
Start: 2020-06-23 | End: 2020-07-18

## 2020-06-29 RX ORDER — DIPHENOXYLATE HYDROCHLORIDE AND ATROPINE SULFATE 2.5; .025 MG/1; MG/1
1 TABLET ORAL 4 TIMES DAILY PRN
Qty: 30 TABLET | Refills: 0 | Status: SHIPPED | OUTPATIENT
Start: 2020-06-29

## 2020-07-01 ENCOUNTER — OFFICE VISIT (OUTPATIENT)
Dept: HEMATOLOGY/ONCOLOGY | Facility: HOSPITAL | Age: 80
End: 2020-07-01
Attending: INTERNAL MEDICINE
Payer: MEDICARE

## 2020-07-01 VITALS
HEIGHT: 59.25 IN | SYSTOLIC BLOOD PRESSURE: 164 MMHG | DIASTOLIC BLOOD PRESSURE: 80 MMHG | WEIGHT: 130.63 LBS | BODY MASS INDEX: 26.33 KG/M2 | HEART RATE: 81 BPM | TEMPERATURE: 98 F | RESPIRATION RATE: 16 BRPM | OXYGEN SATURATION: 96 %

## 2020-07-01 DIAGNOSIS — C34.91 BILATERAL LUNG CANCER (HCC): ICD-10-CM

## 2020-07-01 DIAGNOSIS — C34.81 MALIGNANT NEOPLASM OF OVERLAPPING SITES OF RIGHT LUNG (HCC): ICD-10-CM

## 2020-07-01 DIAGNOSIS — C85.10 LOW GRADE B-CELL LYMPHOMA (HCC): ICD-10-CM

## 2020-07-01 DIAGNOSIS — C88.4 EXTRANODAL MARGINAL ZONE B-CELL LYMPHOMA OF MUCOSA-ASSOCIATED LYMPHOID TISSUE (MALT) (HCC): Primary | ICD-10-CM

## 2020-07-01 DIAGNOSIS — Z85.118 HISTORY OF LUNG CANCER: ICD-10-CM

## 2020-07-01 DIAGNOSIS — N18.4 CKD (CHRONIC KIDNEY DISEASE) STAGE 4, GFR 15-29 ML/MIN (HCC): ICD-10-CM

## 2020-07-01 DIAGNOSIS — D47.2 MONOCLONAL PARAPROTEINEMIA: ICD-10-CM

## 2020-07-01 DIAGNOSIS — C34.92 BILATERAL LUNG CANCER (HCC): ICD-10-CM

## 2020-07-01 DIAGNOSIS — D63.0 ANEMIA COMPLICATING NEOPLASTIC DISEASE: ICD-10-CM

## 2020-07-01 LAB
ALBUMIN SERPL-MCNC: 3.1 G/DL (ref 3.4–5)
ALBUMIN/GLOB SERPL: 0.8 {RATIO} (ref 1–2)
ALP LIVER SERPL-CCNC: 120 U/L (ref 55–142)
ALT SERPL-CCNC: 15 U/L (ref 13–56)
ANION GAP SERPL CALC-SCNC: 5 MMOL/L (ref 0–18)
AST SERPL-CCNC: 16 U/L (ref 15–37)
BASOPHILS # BLD AUTO: 0.04 X10(3) UL (ref 0–0.2)
BASOPHILS NFR BLD AUTO: 1.1 %
BILIRUB SERPL-MCNC: 0.4 MG/DL (ref 0.1–2)
BUN BLD-MCNC: 36 MG/DL (ref 7–18)
BUN/CREAT SERPL: 24 (ref 10–20)
CALCIUM BLD-MCNC: 9.2 MG/DL (ref 8.5–10.1)
CHLORIDE SERPL-SCNC: 114 MMOL/L (ref 98–112)
CO2 SERPL-SCNC: 24 MMOL/L (ref 21–32)
CREAT BLD-MCNC: 1.5 MG/DL (ref 0.55–1.02)
DEPRECATED RDW RBC AUTO: 58 FL (ref 35.1–46.3)
EOSINOPHIL # BLD AUTO: 0.16 X10(3) UL (ref 0–0.7)
EOSINOPHIL NFR BLD AUTO: 4.3 %
ERYTHROCYTE [DISTWIDTH] IN BLOOD BY AUTOMATED COUNT: 15.3 % (ref 11–15)
GLOBULIN PLAS-MCNC: 4 G/DL (ref 2.8–4.4)
GLUCOSE BLD-MCNC: 86 MG/DL (ref 70–99)
HCT VFR BLD AUTO: 32.9 % (ref 35–48)
HGB BLD-MCNC: 10.3 G/DL (ref 12–16)
IGA SERPL-MCNC: 54.2 MG/DL (ref 70–312)
IGM SERPL-MCNC: 848 MG/DL (ref 43–279)
IMM GRANULOCYTES # BLD AUTO: 0.13 X10(3) UL (ref 0–1)
IMM GRANULOCYTES NFR BLD: 3.5 %
IMMUNOGLOBULIN PNL SER-MCNC: 335 MG/DL (ref 791–1643)
LYMPHOCYTES # BLD AUTO: 0.6 X10(3) UL (ref 1–4)
LYMPHOCYTES NFR BLD AUTO: 16.1 %
M PROTEIN MFR SERPL ELPH: 7.1 G/DL (ref 6.4–8.2)
MCH RBC QN AUTO: 32.3 PG (ref 26–34)
MCHC RBC AUTO-ENTMCNC: 31.3 G/DL (ref 31–37)
MCV RBC AUTO: 103.1 FL (ref 80–100)
MONOCYTES # BLD AUTO: 0.68 X10(3) UL (ref 0.1–1)
MONOCYTES NFR BLD AUTO: 18.2 %
NEUTROPHILS # BLD AUTO: 2.12 X10 (3) UL (ref 1.5–7.7)
NEUTROPHILS # BLD AUTO: 2.12 X10(3) UL (ref 1.5–7.7)
NEUTROPHILS NFR BLD AUTO: 56.8 %
OSMOLALITY SERPL CALC.SUM OF ELEC: 304 MOSM/KG (ref 275–295)
PATIENT FASTING Y/N/NP: NO
PLATELET # BLD AUTO: 155 10(3)UL (ref 150–450)
POTASSIUM SERPL-SCNC: 4.3 MMOL/L (ref 3.5–5.1)
RBC # BLD AUTO: 3.19 X10(6)UL (ref 3.8–5.3)
SODIUM SERPL-SCNC: 143 MMOL/L (ref 136–145)
WBC # BLD AUTO: 3.7 X10(3) UL (ref 4–11)

## 2020-07-01 PROCEDURE — 96375 TX/PRO/DX INJ NEW DRUG ADDON: CPT

## 2020-07-01 PROCEDURE — 96411 CHEMO IV PUSH ADDL DRUG: CPT

## 2020-07-01 PROCEDURE — 96413 CHEMO IV INFUSION 1 HR: CPT

## 2020-07-01 PROCEDURE — 99215 OFFICE O/P EST HI 40 MIN: CPT | Performed by: INTERNAL MEDICINE

## 2020-07-01 PROCEDURE — 96415 CHEMO IV INFUSION ADDL HR: CPT

## 2020-07-01 RX ORDER — ACETAMINOPHEN 325 MG/1
650 TABLET ORAL ONCE
Status: CANCELLED | OUTPATIENT
Start: 2020-07-02

## 2020-07-01 RX ORDER — ACETAMINOPHEN 325 MG/1
650 TABLET ORAL ONCE
Status: COMPLETED | OUTPATIENT
Start: 2020-07-01 | End: 2020-07-01

## 2020-07-01 RX ORDER — DIPHENHYDRAMINE HCL 25 MG
50 CAPSULE ORAL ONCE
Status: COMPLETED | OUTPATIENT
Start: 2020-07-01 | End: 2020-07-01

## 2020-07-01 RX ORDER — DIPHENHYDRAMINE HCL 25 MG
50 CAPSULE ORAL ONCE
Status: CANCELLED | OUTPATIENT
Start: 2020-07-02

## 2020-07-01 RX ADMIN — DIPHENHYDRAMINE HCL 50 MG: 25 MG CAPSULE ORAL at 11:56:00

## 2020-07-01 RX ADMIN — ACETAMINOPHEN 650 MG: 325 TABLET ORAL at 11:56:00

## 2020-07-01 NOTE — PROGRESS NOTES
Cancer Center Progress Note    Patient Name: Phyllis Escamilla   YOB: 1940   Medical Record Number: [id number]   CSN: [id number]   Attending Physician: Chandni Vazquez M.D.    Referring Physician: Gaurang Lui DO      Date of Visit: 7/1/202 \"adjuvant\" Tarceva 2/2015. Stopped after she was placed on Ibrutinib (given concerns for overlapping side effects. Had been FIONA for lung cancer then).       - Stage I NSCLC- adenocarcinoma of the RLL diagnosed 5/02/2013 s/p SBRT     - Stage IB (T2N0M0) we Diarrhea., Disp: 30 tablet, Rfl: 0  •  CARTIA  MG Oral Capsule SR 24 Hr, Take 1 capsule by mouth once daily, Disp: 30 capsule, Rfl: 0  •  furosemide (LASIX) 20 MG Oral Tab, Take 1 tablet (20 mg total) by mouth 2 (two) times daily. , Disp: 120 tablet, • Clostridium difficile colitis    • Community acquired pneumonia, unspecified laterality 4/4/2018   • Complete rupture of rotator cuff    • COPD (chronic obstructive pulmonary disease) (HCC)     no O2   • COPD (chronic obstructive pulmonary disease) wit glasses        Past Surgical History:  Past Surgical History:   Procedure Laterality Date   • BRONCHOSCOPY ELECTROMAGNETIC NAVIGATION N/A 12/17/2019    Performed by Renetta Concepcion MD at [facility] ENDOSCOPY   • CHOLECYSTECTOMY     • COLONOSCOPY  9/23/08   • Sandra Boone Rastafari service: Not on file        Active member of club or organization: Not on file        Attends meetings of clubs or organizations: Not on file        Relationship status: Not on file      Intimate partner violence:        Fear of current or ex par present. No BLE edema  Neurological: Grossly intact.         Laboratory:  Lab Results   Component Value Date    WBC 3.7 (L) 07/01/2020    RBC 3.19 (L) 07/01/2020    HGB 10.3 (L) 07/01/2020    HCT 32.9 (L) 07/01/2020    .0 07/01/2020    MPV 9.7 01/24/ Latest Ref Range: 0.48 - 1.05 g/dL  1.06 (H)   BETA GLOBULINS Latest Ref Range: 0.68 - 1.23 g/dL  0.78   GAMMA GLOBULINS Latest Ref Range: 0.62 - 1.70 g/dL  0.97   ALBUMIN/GLOBULIN RATIO Latest Ref Range: 1.00 - 2.00   1.15   M-Charles Latest Ref Range: <=0. shaped mass image 57 is redemonstrated measuring 2.7 x 1.7 cm, previously 3.7 x 1.7 cm. This appears unchanged in size. A stable 10 mm x 8 mm ground-glass opacity left   upper lobe image 45 redemonstrated.   Stable tiny nodule left lower lobe image 47 dionne FINDINGS:  Please note that evaluation is limited without intravenous or oral contrast.     CHEST:    Heart size is within normal limits. Trace pericardial effusion is present which is stable. There is a moderate-sized hiatal hernia present.   A trac Numerous scattered colonic diverticular present.   The urinary bladder is decompressed, limiting evaluation.          =====  CONCLUSION:  Lymphadenopathy centered on the mesentery is slightly more prominent than on the prior exam although some of this may b multiple lung cancers on systemic therapy      Carisa Rock MD  THE [facility]

## 2020-07-02 ENCOUNTER — APPOINTMENT (OUTPATIENT)
Dept: HEMATOLOGY/ONCOLOGY | Facility: HOSPITAL | Age: 80
End: 2020-07-02
Attending: INTERNAL MEDICINE
Payer: MEDICARE

## 2020-07-02 VITALS
OXYGEN SATURATION: 98 % | RESPIRATION RATE: 16 BRPM | HEIGHT: 59.25 IN | SYSTOLIC BLOOD PRESSURE: 166 MMHG | WEIGHT: 133.81 LBS | BODY MASS INDEX: 26.98 KG/M2 | TEMPERATURE: 98 F | DIASTOLIC BLOOD PRESSURE: 93 MMHG | HEART RATE: 92 BPM

## 2020-07-02 DIAGNOSIS — C85.10 LOW GRADE B-CELL LYMPHOMA (HCC): ICD-10-CM

## 2020-07-02 DIAGNOSIS — C88.4 EXTRANODAL MARGINAL ZONE B-CELL LYMPHOMA OF MUCOSA-ASSOCIATED LYMPHOID TISSUE (MALT) (HCC): Primary | ICD-10-CM

## 2020-07-02 DIAGNOSIS — C34.81 MALIGNANT NEOPLASM OF OVERLAPPING SITES OF RIGHT LUNG (HCC): ICD-10-CM

## 2020-07-02 LAB
ALBUMIN SERPL ELPH-MCNC: 3.68 G/DL (ref 3.75–5.21)
ALBUMIN/GLOB SERPL: 1.15 {RATIO} (ref 1–2)
ALPHA1 GLOB SERPL ELPH-MCNC: 0.41 G/DL (ref 0.19–0.46)
ALPHA2 GLOB SERPL ELPH-MCNC: 1.06 G/DL (ref 0.48–1.05)
B-GLOBULIN SERPL ELPH-MCNC: 0.78 G/DL (ref 0.68–1.23)
GAMMA GLOB SERPL ELPH-MCNC: 0.97 G/DL (ref 0.62–1.7)
M PROTEIN MFR SERPL ELPH: 6.9 G/DL (ref 6.4–8.2)
M-SPIKE 1: 0.63 G/DL (ref ?–0)

## 2020-07-02 PROCEDURE — 96409 CHEMO IV PUSH SNGL DRUG: CPT

## 2020-07-02 PROCEDURE — 96375 TX/PRO/DX INJ NEW DRUG ADDON: CPT

## 2020-07-02 NOTE — PROGRESS NOTES
Pt here for C 5 D 2 bendeka.   Arrives Ambulating independently, accompanied by Self           Patient reports possible pregnancy since last therapy cycle: Not Applicable    Modifications in dose or schedule: No     Frequency of blood return and site check

## 2020-07-03 ENCOUNTER — APPOINTMENT (OUTPATIENT)
Dept: HEMATOLOGY/ONCOLOGY | Facility: HOSPITAL | Age: 80
End: 2020-07-03
Attending: INTERNAL MEDICINE
Payer: MEDICARE

## 2020-07-03 LAB
KAPPA FREE LIGHT CHAIN: 1.05 MG/DL (ref 0.33–1.94)
KAPPA/LAMBDA FLC RATIO: 0.31 (ref 0.26–1.65)
LAMBDA FREE LIGHT CHAIN: 3.36 MG/DL (ref 0.57–2.63)

## 2020-07-08 ENCOUNTER — TELEPHONE (OUTPATIENT)
Dept: HEMATOLOGY/ONCOLOGY | Facility: HOSPITAL | Age: 80
End: 2020-07-08

## 2020-07-09 ENCOUNTER — TELEPHONE (OUTPATIENT)
Dept: FAMILY MEDICINE CLINIC | Facility: CLINIC | Age: 80
End: 2020-07-09

## 2020-07-09 NOTE — TELEPHONE ENCOUNTER
Pt is constipated and would like to know what she can take    Her cancer doctor recommended malox but it take 3 days to work    Please advise

## 2020-07-09 NOTE — TELEPHONE ENCOUNTER
Spoke with patient regarding constipation:  Patient reports she had BM 10min ago and then a second time, Miralax worked after 15min. Patient feels relief. No abdominal pain currently, was having lower abdominal sharp pain prior to BM. Jovanny reports constipations since starting chemo. Advised she could take miralax once daily- once every other day, stay hydrated, provide update as needed. Ihsan Cameron PA-C covering for Dr. Nicolas Fontana as Christal Vargas.

## 2020-07-13 ENCOUNTER — TELEPHONE (OUTPATIENT)
Dept: HEMATOLOGY/ONCOLOGY | Facility: HOSPITAL | Age: 80
End: 2020-07-13

## 2020-07-13 NOTE — TELEPHONE ENCOUNTER
Returned call to Sammy Schaefer, she has concerns about continued post chemo confusion(it has improved per patient) and wants to meet with  to discuss before next tx. No other complaints at this time.

## 2020-07-13 NOTE — TELEPHONE ENCOUNTER
[place] [facility] calling she's having confusion after chemo. She denies any other symptoms.  Thank you Segundo Padilla

## 2020-07-17 ENCOUNTER — OFFICE VISIT (OUTPATIENT)
Dept: HEMATOLOGY/ONCOLOGY | Facility: HOSPITAL | Age: 80
End: 2020-07-17
Attending: INTERNAL MEDICINE
Payer: MEDICARE

## 2020-07-17 VITALS
BODY MASS INDEX: 26 KG/M2 | HEIGHT: 59.25 IN | SYSTOLIC BLOOD PRESSURE: 155 MMHG | WEIGHT: 129 LBS | DIASTOLIC BLOOD PRESSURE: 94 MMHG | OXYGEN SATURATION: 94 % | HEART RATE: 99 BPM | RESPIRATION RATE: 16 BRPM

## 2020-07-17 DIAGNOSIS — M79.89 LEG SWELLING: ICD-10-CM

## 2020-07-17 DIAGNOSIS — C88.4 EXTRANODAL MARGINAL ZONE B-CELL LYMPHOMA OF MUCOSA-ASSOCIATED LYMPHOID TISSUE (MALT) (HCC): ICD-10-CM

## 2020-07-17 DIAGNOSIS — R53.83 FATIGUE DUE TO TREATMENT: ICD-10-CM

## 2020-07-17 DIAGNOSIS — D63.0 ANEMIA COMPLICATING NEOPLASTIC DISEASE: ICD-10-CM

## 2020-07-17 DIAGNOSIS — C34.81 MALIGNANT NEOPLASM OF OVERLAPPING SITES OF RIGHT LUNG (HCC): ICD-10-CM

## 2020-07-17 DIAGNOSIS — C34.90 EGFR-RELATED LUNG CANCER (HCC): ICD-10-CM

## 2020-07-17 DIAGNOSIS — N18.4 CKD (CHRONIC KIDNEY DISEASE) STAGE 4, GFR 15-29 ML/MIN (HCC): ICD-10-CM

## 2020-07-17 DIAGNOSIS — D47.2 MONOCLONAL PARAPROTEINEMIA: ICD-10-CM

## 2020-07-17 DIAGNOSIS — T88.7XXA MEDICATION SIDE EFFECT: ICD-10-CM

## 2020-07-17 DIAGNOSIS — R35.0 URINARY FREQUENCY: Primary | ICD-10-CM

## 2020-07-17 PROCEDURE — 99215 OFFICE O/P EST HI 40 MIN: CPT | Performed by: INTERNAL MEDICINE

## 2020-07-17 RX ORDER — LEVOFLOXACIN 500 MG/1
500 TABLET, FILM COATED ORAL DAILY
Qty: 3 TABLET | Refills: 0 | Status: SHIPPED | OUTPATIENT
Start: 2020-07-17 | End: 2020-07-20

## 2020-07-17 NOTE — PROGRESS NOTES
Cancer Center Progress Note    Patient Name: Blake Pat   YOB: 1940   Medical Record Number: [id number]   CSN: [id number]   Attending Physician: Cara Steinberg M.D.    Referring Physician: Vashti Gusman DO      Date of Visit: 7/17/20 Stopped after she was placed on Ibrutinib (given concerns for overlapping side effects. Had been FIONA for lung cancer then).       - Stage I NSCLC- adenocarcinoma of the RLL diagnosed 5/02/2013 s/p SBRT     - Stage IB (T2N0M0) well to moderately differentiat have some fatigue. Appetite has increased some. Denies SOB, chest or abdominal pain, headaches, dizziness or visual symptoms. No recent infections. She has been constipated and has had urinary frequency.       Current Medications:    Current Outpatient Med (CARTIA XT) 120 MG Oral Capsule SR 24 Hr, Take 1 capsule (120 mg total) by mouth daily. , Disp: 90 capsule, Rfl: 3    Past Medical History:  Past Medical History:   Diagnosis Date   • Abdominal hernia    • Azotemia 4/3/2018   • Bilateral lung cancer ([facility] [address] organism    • Pneumonia due to organism    • Pulmonary embolism ([facility] [address] 2012    right lung,cleared on own per pt   • Renal insufficiency 11/5/2015   • Scoliosis (and kyphoscoliosis), idiopathic 6/29/2012   • Shortness of breath    • SOB (shortness of breath) Activity      Alcohol use: No      Drug use: No      Sexual activity: Not on file    Lifestyle      Physical activity:        Days per week: Not on file        Minutes per session: Not on file      Stress: Not on file    Relationships      Social connectio palpable lymphadenopathy. Neck is supple. Lymphatics: There is no palpable lymphadenopathy   Chest: Diminished BS but no crackles or wheeze  Heart: Regular rate and rhythm. Abdomen: Soft, non tender with good bowel sounds. No hepatosplenomegaly.   No pa (L)   IMMUNOGLOBULIN M Latest Ref Range: 43.0 - 279.0 mg/dL 848.0 (H)        Ref. Range 6/4/2020 09:10   Viscosity, Serum Latest Ref Range: 1.10 - 1.80 cP 1.80        Ref.  Range 7/1/2020 10:16 7/1/2020 10:16   KAPPA FREE LIGHT CHAIN Latest Ref Range: 0.330 effusion. Improvement in multinodular tiny micro nodularity throughout the right lower lobe inferior aspect. Continued micro nodules are present, numerous, but appear in general diminished since the prior CT   scan from February 2020.  Separately in the r is transmitted to the ACR ([address] [place] [address] of Radiology)   [address] ([address]) which includes the Dose Index Registry.      PATIENT STATED HISTORY: (As transcribed by Technologist)  Patient has a history of lung cancer, assess for stabi There is also slight increase in confluent adenopathy centered on the mesentery with 1 area measuring up to 3.5 x 2.9 cm compared with 3.4 by 2.7 cm previously. Slight increase in mesenteric fat stranding   in this region.      Likely small bilateral ingui Constipation- bowel regimen    7. 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 They do not want to push for her to get cycle 6 and proceed with maintenance Rituxan.  Timing

## 2020-07-18 ENCOUNTER — PATIENT MESSAGE (OUTPATIENT)
Dept: FAMILY MEDICINE CLINIC | Facility: CLINIC | Age: 80
End: 2020-07-18

## 2020-07-18 RX ORDER — DILTIAZEM HYDROCHLORIDE 120 MG/1
120 CAPSULE, COATED, EXTENDED RELEASE ORAL DAILY
Qty: 90 CAPSULE | Refills: 3 | Status: SHIPPED | OUTPATIENT
Start: 2020-07-18 | End: 2020-07-27

## 2020-07-20 RX ORDER — LOSARTAN POTASSIUM 100 MG/1
100 TABLET ORAL DAILY
Refills: 0 | COMMUNITY
Start: 2020-07-20 | End: 2020-08-28

## 2020-07-20 NOTE — TELEPHONE ENCOUNTER
From: Otis Car  To: Catherine Wyatt DO  Sent: 7/18/2020 3:58 PM CDT  Subject: Prescription Question    My mother's Losartin dose is 100Mg per doctor G. 5/19 or prior not 50MG Please change this on her medicines list so she does not get the wrong d

## 2020-07-27 RX ORDER — DILTIAZEM HYDROCHLORIDE 120 MG/1
CAPSULE, EXTENDED RELEASE ORAL
Qty: 30 CAPSULE | Refills: 0 | Status: SHIPPED | OUTPATIENT
Start: 2020-07-27 | End: 2020-08-28

## 2020-07-28 ENCOUNTER — OFFICE VISIT (OUTPATIENT)
Dept: HEMATOLOGY/ONCOLOGY | Age: 80
End: 2020-07-28
Attending: INTERNAL MEDICINE
Payer: MEDICARE

## 2020-07-28 VITALS
TEMPERATURE: 98 F | WEIGHT: 130.81 LBS | BODY MASS INDEX: 26.37 KG/M2 | OXYGEN SATURATION: 95 % | HEART RATE: 95 BPM | HEIGHT: 59.25 IN | DIASTOLIC BLOOD PRESSURE: 88 MMHG | SYSTOLIC BLOOD PRESSURE: 140 MMHG | RESPIRATION RATE: 18 BRPM

## 2020-07-28 DIAGNOSIS — D47.2 MONOCLONAL PARAPROTEINEMIA: ICD-10-CM

## 2020-07-28 DIAGNOSIS — T88.7XXA MEDICATION SIDE EFFECT: ICD-10-CM

## 2020-07-28 DIAGNOSIS — C34.90 EGFR-RELATED LUNG CANCER (HCC): ICD-10-CM

## 2020-07-28 DIAGNOSIS — R35.0 URINARY FREQUENCY: ICD-10-CM

## 2020-07-28 DIAGNOSIS — C85.10 LOW GRADE B-CELL LYMPHOMA (HCC): ICD-10-CM

## 2020-07-28 DIAGNOSIS — C34.81 MALIGNANT NEOPLASM OF OVERLAPPING SITES OF RIGHT LUNG (HCC): ICD-10-CM

## 2020-07-28 DIAGNOSIS — C88.4 EXTRANODAL MARGINAL ZONE B-CELL LYMPHOMA OF MUCOSA-ASSOCIATED LYMPHOID TISSUE (MALT) (HCC): Primary | ICD-10-CM

## 2020-07-28 DIAGNOSIS — C85.80 MARGINAL ZONE B-CELL LYMPHOMA (HCC): ICD-10-CM

## 2020-07-28 DIAGNOSIS — N18.4 CKD (CHRONIC KIDNEY DISEASE) STAGE 4, GFR 15-29 ML/MIN (HCC): ICD-10-CM

## 2020-07-28 DIAGNOSIS — M79.89 LEG SWELLING: ICD-10-CM

## 2020-07-28 DIAGNOSIS — D63.0 ANEMIA COMPLICATING NEOPLASTIC DISEASE: ICD-10-CM

## 2020-07-28 LAB
ALBUMIN SERPL-MCNC: 3.1 G/DL (ref 3.4–5)
ALBUMIN/GLOB SERPL: 0.8 {RATIO} (ref 1–2)
ALP LIVER SERPL-CCNC: 132 U/L (ref 55–142)
ALT SERPL-CCNC: 9 U/L (ref 13–56)
ANION GAP SERPL CALC-SCNC: 6 MMOL/L (ref 0–18)
AST SERPL-CCNC: 16 U/L (ref 15–37)
BASOPHILS # BLD: 0 X10(3) UL (ref 0–0.2)
BASOPHILS NFR BLD: 0 %
BILIRUB SERPL-MCNC: 0.5 MG/DL (ref 0.1–2)
BUN BLD-MCNC: 40 MG/DL (ref 7–18)
BUN/CREAT SERPL: 26.5 (ref 10–20)
CALCIUM BLD-MCNC: 9.4 MG/DL (ref 8.5–10.1)
CHLORIDE SERPL-SCNC: 113 MMOL/L (ref 98–112)
CO2 SERPL-SCNC: 22 MMOL/L (ref 21–32)
CREAT BLD-MCNC: 1.51 MG/DL (ref 0.55–1.02)
DEPRECATED RDW RBC AUTO: 55.1 FL (ref 35.1–46.3)
EOSINOPHIL # BLD: 0.35 X10(3) UL (ref 0–0.7)
EOSINOPHIL NFR BLD: 8 %
ERYTHROCYTE [DISTWIDTH] IN BLOOD BY AUTOMATED COUNT: 14.6 % (ref 11–15)
GLOBULIN PLAS-MCNC: 4 G/DL (ref 2.8–4.4)
GLUCOSE BLD-MCNC: 98 MG/DL (ref 70–99)
HCT VFR BLD AUTO: 32.6 % (ref 35–48)
HGB BLD-MCNC: 10.5 G/DL (ref 12–16)
IGM SERPL-MCNC: 507 MG/DL (ref 43–279)
LDH SERPL L TO P-CCNC: 277 U/L
LYMPHOCYTES NFR BLD: 0.44 X10(3) UL (ref 1–4)
LYMPHOCYTES NFR BLD: 10 %
M PROTEIN MFR SERPL ELPH: 7.1 G/DL (ref 6.4–8.2)
MCH RBC QN AUTO: 33 PG (ref 26–34)
MCHC RBC AUTO-ENTMCNC: 32.2 G/DL (ref 31–37)
MCV RBC AUTO: 102.5 FL (ref 80–100)
METAMYELOCYTES # BLD: 0.04 X10(3) UL
METAMYELOCYTES NFR BLD: 1 %
MONOCYTES # BLD: 0.7 X10(3) UL (ref 0.1–1)
MONOCYTES NFR BLD: 16 %
NEUTROPHILS # BLD AUTO: 2.33 X10 (3) UL (ref 1.5–7.7)
NEUTROPHILS NFR BLD: 63 %
NEUTS BAND NFR BLD: 2 %
NEUTS HYPERSEG # BLD: 2.86 X10(3) UL (ref 1.5–7.7)
OSMOLALITY SERPL CALC.SUM OF ELEC: 302 MOSM/KG (ref 275–295)
PATIENT FASTING Y/N/NP: NO
PLATELET # BLD AUTO: 162 10(3)UL (ref 150–450)
POTASSIUM SERPL-SCNC: 4 MMOL/L (ref 3.5–5.1)
RBC # BLD AUTO: 3.18 X10(6)UL (ref 3.8–5.3)
SODIUM SERPL-SCNC: 141 MMOL/L (ref 136–145)
TOTAL CELLS COUNTED: 100
WBC # BLD AUTO: 4.4 X10(3) UL (ref 4–11)

## 2020-07-28 PROCEDURE — 96415 CHEMO IV INFUSION ADDL HR: CPT

## 2020-07-28 PROCEDURE — 96413 CHEMO IV INFUSION 1 HR: CPT

## 2020-07-28 PROCEDURE — 99215 OFFICE O/P EST HI 40 MIN: CPT | Performed by: INTERNAL MEDICINE

## 2020-07-28 RX ORDER — DIPHENHYDRAMINE HCL 25 MG
25 CAPSULE ORAL ONCE
Status: CANCELLED | OUTPATIENT
Start: 2020-07-28

## 2020-07-28 RX ORDER — DIPHENHYDRAMINE HCL 25 MG
25 CAPSULE ORAL ONCE
Status: COMPLETED | OUTPATIENT
Start: 2020-07-28 | End: 2020-07-28

## 2020-07-28 RX ORDER — ACETAMINOPHEN 325 MG/1
650 TABLET ORAL ONCE
Status: COMPLETED | OUTPATIENT
Start: 2020-07-28 | End: 2020-07-28

## 2020-07-28 RX ORDER — ACETAMINOPHEN 325 MG/1
650 TABLET ORAL ONCE
Status: CANCELLED | OUTPATIENT
Start: 2020-07-28

## 2020-07-28 RX ADMIN — ACETAMINOPHEN 650 MG: 325 TABLET ORAL at 10:43:00

## 2020-07-28 RX ADMIN — DIPHENHYDRAMINE HCL 25 MG: 25 MG CAPSULE ORAL at 10:44:00

## 2020-07-28 NOTE — PROGRESS NOTES
Cancer Center Progress Note    Patient Name: Tashi Pat   YOB: 1940   Medical Record Number: [id number]   CSN: [id number]   Attending Physician: Chuck Cesar M.D.    Referring Physician: Dane Gaona DO     Date of Visit: 7/28/202 \"adjuvant\" Tarceva 2/2015. Stopped after she was placed on Ibrutinib (given concerns for overlapping side effects. Had been FIONA for lung cancer then).       - Stage I NSCLC- adenocarcinoma of the RLL diagnosed 5/02/2013 s/p SBRT     - Stage IB (T2N0M0) we tablet (100 mg total) by mouth daily. , Disp: , Rfl: 0  •  diphenoxylate-atropine 2.5-0.025 MG Oral Tab, Take 1 tablet by mouth 4 (four) times daily as needed for Diarrhea., Disp: 30 tablet, Rfl: 0  •  furosemide (LASIX) 20 MG Oral Tab, Take 1 tablet (20 mg obstruction or gangrene    • Diarrhea 11/5/2015   • Diverticulitis of large intestine without perforation or abscess without bleeding 1/14/2019   • Dysphagia, pharyngoesophageal phase 10/10/2018   • Epistaxis    • Esophageal reflux    • Exposure to unspeci OTHER      lung biopsies   • OTHER SURGICAL HISTORY  1/1/08    gallbladder surgery   • OTHER SURGICAL HISTORY  8499,3360    bunionectomy,bilateral   • OTHER SURGICAL HISTORY  12/05/2017    Cysto- Dr. Reynoso Morning  2009    removal of up Concerns:         Service: Not Asked        Blood Transfusions: Not Asked        Caffeine Concern: No        Occupational Exposure: Not Asked        Hobby Hazards: Not Asked        Sleep Concern: Not Asked        Stress Concern: Not Asked        We - 112 mmol/L    CO2 22.0 21.0 - 32.0 mmol/L    Anion Gap 6 0 - 18 mmol/L    BUN 40 (H) 7 - 18 mg/dL    Creatinine 1.51 (H) 0.55 - 1.02 mg/dL    BUN/CREA Ratio 26.5 (H) 10.0 - 20.0    Calcium, Total 9.4 8.5 - 10.1 mg/dL    Calculated Osmolality 302 (H) 275 10:16   TOTAL PROTEIN Latest Ref Range: 6.4 - 8.2 g/dL 7.1 6.9   Albumin Latest Ref Range: 3.75 - 5.21 g/dL 3.1 (L) 3.68 (L)   ALPHA-1-GLOBULINS Latest Ref Range: 0.19 - 0.46 g/dL  0.41   ALPHA-2-GLOBULINS Latest Ref Range: 0.48 - 1.05 g/dL  1.06 (H)   BET general diminished since the prior CT   scan from February 2020. Separately in the right lower lobe a measurable sickle shaped mass image 57 is redemonstrated measuring 2.7 x 1.7 cm, previously 3.7 x 1.7 cm. This appears unchanged in size.   A stable 10 mm transcribed by Technologist)  Patient has a history of lung cancer, assess for stability. Patient has no complaints.          FINDINGS:  Please note that evaluation is limited without intravenous or oral contrast.     CHEST:    Heart size is within normal l increase in mesenteric fat stranding   in this region. Likely small bilateral inguinal hernias containing fat. Numerous scattered colonic diverticular present.   The urinary bladder is decompressed, limiting evaluation.          =====  CONCLUSION:  Lym Previous dopplers showed no evidence of DVT . On intermittent diuretics. 6. Constipation- bowel regimen    7. Urinary frequency- Urine culture showed Klebsiella and we placed her on antibiotics.  Symptoms resolved     Labs reviewed     Start maintenance

## 2020-07-28 NOTE — PROGRESS NOTES
Pt here for MD f/u and due for chemo. Energy level is pretty good, confusion has improved. Appetite has been fair. Denies pain. Pt has chronic bowel issues, no changes. Pt has no further complaints.      Outpatient Oncology Care Plan  Problem list:  fatigue

## 2020-07-28 NOTE — PROGRESS NOTES
Pt here for C1D1.   Arrives Ambulating independently, accompanied by Self          Modifications in dose or schedule: No    Pt denies current pregnancy     Frequency of blood return and site check throughout administration: Prior to administration   Dischar

## 2020-07-29 ENCOUNTER — TELEPHONE (OUTPATIENT)
Dept: FAMILY MEDICINE CLINIC | Facility: CLINIC | Age: 80
End: 2020-07-29

## 2020-07-29 ENCOUNTER — TELEPHONE (OUTPATIENT)
Dept: HEMATOLOGY/ONCOLOGY | Facility: HOSPITAL | Age: 80
End: 2020-07-29

## 2020-07-29 ENCOUNTER — HOSPITAL ENCOUNTER (EMERGENCY)
Age: 80
Discharge: HOME | End: 2020-07-29
Attending: EMERGENCY MEDICINE
Payer: MEDICARE

## 2020-07-29 ENCOUNTER — APPOINTMENT (OUTPATIENT)
Dept: CT IMAGING | Age: 80
End: 2020-07-29
Attending: EMERGENCY MEDICINE
Payer: MEDICARE

## 2020-07-29 VITALS
WEIGHT: 128 LBS | RESPIRATION RATE: 20 BRPM | BODY MASS INDEX: 26 KG/M2 | DIASTOLIC BLOOD PRESSURE: 88 MMHG | OXYGEN SATURATION: 96 % | HEART RATE: 112 BPM | TEMPERATURE: 99 F | SYSTOLIC BLOOD PRESSURE: 175 MMHG

## 2020-07-29 DIAGNOSIS — K57.92 ACUTE DIVERTICULITIS: Primary | ICD-10-CM

## 2020-07-29 LAB
ALBUMIN SERPL-MCNC: 2.8 G/DL (ref 3.4–5)
ALBUMIN/GLOB SERPL: 0.7 {RATIO} (ref 1–2)
ALP LIVER SERPL-CCNC: 132 U/L (ref 55–142)
ALT SERPL-CCNC: 10 U/L (ref 13–56)
ANION GAP SERPL CALC-SCNC: 7 MMOL/L (ref 0–18)
AST SERPL-CCNC: 16 U/L (ref 15–37)
BASOPHILS # BLD: 0 X10(3) UL (ref 0–0.2)
BASOPHILS NFR BLD: 0 %
BILIRUB SERPL-MCNC: 0.4 MG/DL (ref 0.1–2)
BILIRUB UR QL STRIP.AUTO: NEGATIVE
BUN BLD-MCNC: 35 MG/DL (ref 7–18)
BUN/CREAT SERPL: 23.6 (ref 10–20)
CALCIUM BLD-MCNC: 9.4 MG/DL (ref 8.5–10.1)
CHLORIDE SERPL-SCNC: 113 MMOL/L (ref 98–112)
CLARITY UR REFRACT.AUTO: CLEAR
CO2 SERPL-SCNC: 22 MMOL/L (ref 21–32)
COLOR UR AUTO: YELLOW
CREAT BLD-MCNC: 1.48 MG/DL (ref 0.55–1.02)
DEPRECATED RDW RBC AUTO: 55.8 FL (ref 35.1–46.3)
EOSINOPHIL # BLD: 0.22 X10(3) UL (ref 0–0.7)
EOSINOPHIL NFR BLD: 4 %
ERYTHROCYTE [DISTWIDTH] IN BLOOD BY AUTOMATED COUNT: 14.7 % (ref 11–15)
GLOBULIN PLAS-MCNC: 4.1 G/DL (ref 2.8–4.4)
GLUCOSE BLD-MCNC: 92 MG/DL (ref 70–99)
GLUCOSE UR STRIP.AUTO-MCNC: NEGATIVE MG/DL
HCT VFR BLD AUTO: 33.5 % (ref 35–48)
HGB BLD-MCNC: 10.8 G/DL (ref 12–16)
HYALINE CASTS #/AREA URNS AUTO: PRESENT /LPF
KETONES UR STRIP.AUTO-MCNC: NEGATIVE MG/DL
LIPASE SERPL-CCNC: 124 U/L (ref 73–393)
LYMPHOCYTES NFR BLD: 0.7 X10(3) UL (ref 1–4)
LYMPHOCYTES NFR BLD: 13 %
M PROTEIN MFR SERPL ELPH: 6.9 G/DL (ref 6.4–8.2)
MCH RBC QN AUTO: 33.4 PG (ref 26–34)
MCHC RBC AUTO-ENTMCNC: 32.2 G/DL (ref 31–37)
MCV RBC AUTO: 103.7 FL (ref 80–100)
METAMYELOCYTES # BLD: 0.11 X10(3) UL
METAMYELOCYTES NFR BLD: 2 %
MONOCYTES # BLD: 0.59 X10(3) UL (ref 0.1–1)
MONOCYTES NFR BLD: 11 %
MYELOCYTES # BLD: 0.05 X10(3) UL
MYELOCYTES NFR BLD: 1 %
NEUTROPHILS # BLD AUTO: 3.34 X10 (3) UL (ref 1.5–7.7)
NEUTROPHILS NFR BLD: 68 %
NEUTS BAND NFR BLD: 1 %
NEUTS HYPERSEG # BLD: 3.73 X10(3) UL (ref 1.5–7.7)
NITRITE UR QL STRIP.AUTO: NEGATIVE
OSMOLALITY SERPL CALC.SUM OF ELEC: 302 MOSM/KG (ref 275–295)
PH UR STRIP.AUTO: 5 [PH] (ref 4.5–8)
PLATELET # BLD AUTO: 143 10(3)UL (ref 150–450)
PLATELET MORPHOLOGY: NORMAL
POTASSIUM SERPL-SCNC: 4.1 MMOL/L (ref 3.5–5.1)
RBC # BLD AUTO: 3.23 X10(6)UL (ref 3.8–5.3)
SODIUM SERPL-SCNC: 142 MMOL/L (ref 136–145)
SP GR UR STRIP.AUTO: 1.02 (ref 1–1.03)
TOTAL CELLS COUNTED: 100
UROBILINOGEN UR STRIP.AUTO-MCNC: 0.2 MG/DL
WBC # BLD AUTO: 5.4 X10(3) UL (ref 4–11)

## 2020-07-29 PROCEDURE — 36415 COLL VENOUS BLD VENIPUNCTURE: CPT

## 2020-07-29 PROCEDURE — 87086 URINE CULTURE/COLONY COUNT: CPT | Performed by: EMERGENCY MEDICINE

## 2020-07-29 PROCEDURE — 85007 BL SMEAR W/DIFF WBC COUNT: CPT | Performed by: EMERGENCY MEDICINE

## 2020-07-29 PROCEDURE — 83690 ASSAY OF LIPASE: CPT | Performed by: EMERGENCY MEDICINE

## 2020-07-29 PROCEDURE — 74176 CT ABD & PELVIS W/O CONTRAST: CPT | Performed by: EMERGENCY MEDICINE

## 2020-07-29 PROCEDURE — 80053 COMPREHEN METABOLIC PANEL: CPT | Performed by: EMERGENCY MEDICINE

## 2020-07-29 PROCEDURE — 99284 EMERGENCY DEPT VISIT MOD MDM: CPT

## 2020-07-29 PROCEDURE — 85027 COMPLETE CBC AUTOMATED: CPT | Performed by: EMERGENCY MEDICINE

## 2020-07-29 PROCEDURE — 85025 COMPLETE CBC W/AUTO DIFF WBC: CPT | Performed by: EMERGENCY MEDICINE

## 2020-07-29 PROCEDURE — 81001 URINALYSIS AUTO W/SCOPE: CPT | Performed by: EMERGENCY MEDICINE

## 2020-07-29 RX ORDER — CIPROFLOXACIN 500 MG/1
500 TABLET, FILM COATED ORAL 2 TIMES DAILY
Qty: 20 TABLET | Refills: 0 | Status: ON HOLD | OUTPATIENT
Start: 2020-07-29 | End: 2020-08-03

## 2020-07-29 RX ORDER — METRONIDAZOLE 500 MG/1
500 TABLET ORAL 3 TIMES DAILY
Qty: 30 TABLET | Refills: 0 | Status: SHIPPED | OUTPATIENT
Start: 2020-07-29 | End: 2020-08-08

## 2020-07-29 NOTE — TELEPHONE ENCOUNTER
Patient calling stating that she woke up this morning with symptoms of diverticulitis. She was wanting to know if something could be prescribed. Please advise.

## 2020-07-29 NOTE — ED INITIAL ASSESSMENT (HPI)
Pt reports low lt to mid abd pain for the past 3 days with sharp intermittent pains. Denies fever or n/v.  Pt is being treated for uti for the past week but states those sxs have improved.

## 2020-07-29 NOTE — TELEPHONE ENCOUNTER
Spoke to pt she states she has had diverticulitis over a year ago. Pt states now she is experiencing sharp pain to lower mid abdomen sx x 1 day. Pt also c/o diarrhea. Pt advised no availability today and she should go to Emergency room. Pt verbalized [place]

## 2020-07-29 NOTE — ED PROVIDER NOTES
Patient Seen in: THE [facility] Emergency Department In [place]      History   Patient presents with:  Abdomen/Flank Pain    Stated Complaint: abd pain thinks it diverticulitis also treated for uti now    HPI    73-year-old female presents with 3 days of abdo • Low grade B-cell lymphoma ([facility] [address] 9/10/2013   • Malignant neoplasm of bronchus and lung, unspecified site 6/29/2012   • Mass of right forearm 91/0/8319   • Metabolic acidosis 5/1/9597   • NHL (nodular histiocytic lymphoma) (HCC)    • Nocturnal leg cramps HPI.  Constitutional and vital signs reviewed. All other systems reviewed and negative except as noted above.     Physical Exam     ED Triage Vitals [07/29/20 1259]   BP (!) 192/97   Pulse 118   Resp 24   Temp 98.9 °F (37.2 °C)   Temp src Temporal   Sp Notable for the following components:    WBC Urine 5-10 (*)     Bacteria Urine 1+ (*)     Hyaline Casts Present (*)     All other components within normal limits   CBC W/ DIFFERENTIAL - Abnormal; Notable for the following components:    RBC 3.23 (*)     HG SPLEEN:  No enlargement or focal lesion. KIDNEYS:  There are multiple bilateral renal cysts. Largest cyst is the right upper pole measuring 48 by 42 mm.   There is hyperdense cysts centered within a low-density cyst of the right upper pole measuring 25 by tenderness on palpation of the area. Labs and CT scan ordered. Rule out persistent UTI, diverticulitis, other intra-abdominal pathology. CT scan shows uncomplicated acute diverticulitis of the sigmoid colon. This is consistent with examination.   The

## 2020-07-30 ENCOUNTER — TELEPHONE (OUTPATIENT)
Dept: FAMILY MEDICINE CLINIC | Facility: CLINIC | Age: 80
End: 2020-07-30

## 2020-07-30 LAB
ALBUMIN SERPL ELPH-MCNC: 3.47 G/DL (ref 3.75–5.21)
ALBUMIN/GLOB SERPL: 1.18 {RATIO} (ref 1–2)
ALPHA1 GLOB SERPL ELPH-MCNC: 0.41 G/DL (ref 0.19–0.46)
ALPHA2 GLOB SERPL ELPH-MCNC: 1.02 G/DL (ref 0.48–1.05)
B-GLOBULIN SERPL ELPH-MCNC: 0.7 G/DL (ref 0.68–1.23)
GAMMA GLOB SERPL ELPH-MCNC: 0.79 G/DL (ref 0.62–1.7)
KAPPA FREE LIGHT CHAIN: 0.9 MG/DL (ref 0.33–1.94)
KAPPA/LAMBDA FLC RATIO: 0.26 (ref 0.26–1.65)
LAMBDA FREE LIGHT CHAIN: 3.4 MG/DL (ref 0.57–2.63)
M PROTEIN MFR SERPL ELPH: 6.4 G/DL (ref 6.4–8.2)
M-SPIKE 1: 0.52 G/DL (ref ?–0)

## 2020-07-30 NOTE — TELEPHONE ENCOUNTER
Advised pt to separate abx from BP meds. Advised pt to make follow up appt with Dr. Yasir Hart. Pt will call to schedule.

## 2020-07-30 NOTE — TELEPHONE ENCOUNTER
Pt was in the ER last night for Diverticulitis  She was given 2 antibiotics    Went she picked them up the pharmacist told her to make sure she spaces out when she takes her bp medicine    Please advise    Pt also states that one of the medications  (metro

## 2020-07-31 ENCOUNTER — TELEPHONE (OUTPATIENT)
Dept: HEMATOLOGY/ONCOLOGY | Facility: HOSPITAL | Age: 80
End: 2020-07-31

## 2020-07-31 NOTE — TELEPHONE ENCOUNTER
Patient has concerns about occasional confusion and having trouble pulling up what she wants to say. Dr. Sanford Mean aware of this that was why she disconitnued her chemotherapy Bendamustine (last dose received on 7/2/2020).  But patient said that the same issu

## 2020-08-01 ENCOUNTER — HOSPITAL ENCOUNTER (OUTPATIENT)
Facility: HOSPITAL | Age: 80
Setting detail: OBSERVATION
Discharge: HOME | End: 2020-08-03
Attending: EMERGENCY MEDICINE | Admitting: HOSPITALIST
Payer: MEDICARE

## 2020-08-01 ENCOUNTER — APPOINTMENT (OUTPATIENT)
Dept: CT IMAGING | Age: 80
End: 2020-08-01
Attending: EMERGENCY MEDICINE
Payer: MEDICARE

## 2020-08-01 ENCOUNTER — TELEPHONE (OUTPATIENT)
Dept: FAMILY MEDICINE CLINIC | Facility: CLINIC | Age: 80
End: 2020-08-01

## 2020-08-01 DIAGNOSIS — Z78.9 FAILURE OF OUTPATIENT TREATMENT: ICD-10-CM

## 2020-08-01 DIAGNOSIS — K57.92 ACUTE DIVERTICULITIS: ICD-10-CM

## 2020-08-01 DIAGNOSIS — R79.89 PRERENAL AZOTEMIA: ICD-10-CM

## 2020-08-01 DIAGNOSIS — N17.9 ACUTE KIDNEY INJURY (HCC): Primary | ICD-10-CM

## 2020-08-01 PROBLEM — D64.9 ANEMIA: Status: ACTIVE | Noted: 2020-08-01

## 2020-08-01 PROBLEM — D69.6 THROMBOCYTOPENIA: Status: ACTIVE | Noted: 2020-08-01

## 2020-08-01 PROBLEM — D69.6 THROMBOCYTOPENIA (HCC): Status: ACTIVE | Noted: 2020-08-01

## 2020-08-01 PROBLEM — R73.9 HYPERGLYCEMIA: Status: ACTIVE | Noted: 2020-08-01

## 2020-08-01 LAB
ALBUMIN SERPL-MCNC: 3.1 G/DL (ref 3.4–5)
ALBUMIN/GLOB SERPL: 0.8 {RATIO} (ref 1–2)
ALP LIVER SERPL-CCNC: 127 U/L (ref 55–142)
ALT SERPL-CCNC: 13 U/L (ref 13–56)
ANION GAP SERPL CALC-SCNC: 10 MMOL/L (ref 0–18)
AST SERPL-CCNC: 23 U/L (ref 15–37)
BASOPHILS # BLD: 0 X10(3) UL (ref 0–0.2)
BASOPHILS NFR BLD: 0 %
BILIRUB SERPL-MCNC: 0.4 MG/DL (ref 0.1–2)
BUN BLD-MCNC: 38 MG/DL (ref 7–18)
BUN/CREAT SERPL: 16 (ref 10–20)
CALCIUM BLD-MCNC: 8.9 MG/DL (ref 8.5–10.1)
CHLORIDE SERPL-SCNC: 110 MMOL/L (ref 98–112)
CO2 SERPL-SCNC: 22 MMOL/L (ref 21–32)
CREAT BLD-MCNC: 2.37 MG/DL (ref 0.55–1.02)
DEPRECATED RDW RBC AUTO: 58.3 FL (ref 35.1–46.3)
EOSINOPHIL # BLD: 0.39 X10(3) UL (ref 0–0.7)
EOSINOPHIL NFR BLD: 7 %
ERYTHROCYTE [DISTWIDTH] IN BLOOD BY AUTOMATED COUNT: 15 % (ref 11–15)
GLOBULIN PLAS-MCNC: 3.8 G/DL (ref 2.8–4.4)
GLUCOSE BLD-MCNC: 110 MG/DL (ref 70–99)
HCT VFR BLD AUTO: 31.7 % (ref 35–48)
HGB BLD-MCNC: 10 G/DL (ref 12–16)
LYMPHOCYTES NFR BLD: 0.44 X10(3) UL (ref 1–4)
LYMPHOCYTES NFR BLD: 8 %
M PROTEIN MFR SERPL ELPH: 6.9 G/DL (ref 6.4–8.2)
MCH RBC QN AUTO: 33 PG (ref 26–34)
MCHC RBC AUTO-ENTMCNC: 31.5 G/DL (ref 31–37)
MCV RBC AUTO: 104.6 FL (ref 80–100)
METAMYELOCYTES # BLD: 0.17 X10(3) UL
METAMYELOCYTES NFR BLD: 3 %
MONOCYTES # BLD: 0.33 X10(3) UL (ref 0.1–1)
MONOCYTES NFR BLD: 6 %
MYELOCYTES # BLD: 0.06 X10(3) UL
MYELOCYTES NFR BLD: 1 %
NEUTROPHILS # BLD AUTO: 2.89 X10 (3) UL (ref 1.5–7.7)
NEUTROPHILS NFR BLD: 74 %
NEUTS BAND NFR BLD: 1 %
NEUTS HYPERSEG # BLD: 4.13 X10(3) UL (ref 1.5–7.7)
OSMOLALITY SERPL CALC.SUM OF ELEC: 304 MOSM/KG (ref 275–295)
PLATELET # BLD AUTO: 141 10(3)UL (ref 150–450)
PLATELET MORPHOLOGY: NORMAL
POTASSIUM SERPL-SCNC: 3.7 MMOL/L (ref 3.5–5.1)
RBC # BLD AUTO: 3.03 X10(6)UL (ref 3.8–5.3)
SARS-COV-2 RNA RESP QL NAA+PROBE: NOT DETECTED
SODIUM SERPL-SCNC: 142 MMOL/L (ref 136–145)
TOTAL CELLS COUNTED: 100
WBC # BLD AUTO: 5.5 X10(3) UL (ref 4–11)

## 2020-08-01 PROCEDURE — 99220 INITIAL OBSERVATION CARE,LEVL III: CPT | Performed by: INTERNAL MEDICINE

## 2020-08-01 PROCEDURE — 70450 CT HEAD/BRAIN W/O DYE: CPT | Performed by: EMERGENCY MEDICINE

## 2020-08-01 PROCEDURE — 99214 OFFICE O/P EST MOD 30 MIN: CPT | Performed by: INTERNAL MEDICINE

## 2020-08-01 RX ORDER — MONTELUKAST SODIUM 10 MG/1
10 TABLET ORAL NIGHTLY
Status: DISCONTINUED | OUTPATIENT
Start: 2020-08-01 | End: 2020-08-03

## 2020-08-01 RX ORDER — ONDANSETRON 2 MG/ML
4 INJECTION INTRAMUSCULAR; INTRAVENOUS EVERY 4 HOURS PRN
Status: DISCONTINUED | OUTPATIENT
Start: 2020-08-01 | End: 2020-08-03

## 2020-08-01 RX ORDER — METRONIDAZOLE 500 MG/1
500 TABLET ORAL 3 TIMES DAILY
Status: DISCONTINUED | OUTPATIENT
Start: 2020-08-01 | End: 2020-08-03

## 2020-08-01 RX ORDER — ACETAMINOPHEN 500 MG
500 TABLET ORAL EVERY 6 HOURS PRN
Status: DISCONTINUED | OUTPATIENT
Start: 2020-08-01 | End: 2020-08-03

## 2020-08-01 RX ORDER — FAMOTIDINE 20 MG/1
20 TABLET ORAL DAILY
Status: DISCONTINUED | OUTPATIENT
Start: 2020-08-01 | End: 2020-08-03

## 2020-08-01 RX ORDER — HEPARIN SODIUM 5000 [USP'U]/ML
5000 INJECTION, SOLUTION INTRAVENOUS; SUBCUTANEOUS EVERY 12 HOURS SCHEDULED
Status: DISCONTINUED | OUTPATIENT
Start: 2020-08-01 | End: 2020-08-03

## 2020-08-01 RX ORDER — SODIUM CHLORIDE 9 MG/ML
125 INJECTION, SOLUTION INTRAVENOUS CONTINUOUS
Status: DISCONTINUED | OUTPATIENT
Start: 2020-08-01 | End: 2020-08-01

## 2020-08-01 RX ORDER — ECHINACEA PURPUREA EXTRACT 125 MG
1 TABLET ORAL
Status: DISCONTINUED | OUTPATIENT
Start: 2020-08-01 | End: 2020-08-03

## 2020-08-01 RX ORDER — IPRATROPIUM BROMIDE 21 UG/1
1 SPRAY, METERED NASAL 2 TIMES DAILY
Status: DISCONTINUED | OUTPATIENT
Start: 2020-08-01 | End: 2020-08-03

## 2020-08-01 RX ORDER — DILTIAZEM HYDROCHLORIDE 120 MG/1
120 CAPSULE, EXTENDED RELEASE ORAL DAILY
Status: DISCONTINUED | OUTPATIENT
Start: 2020-08-02 | End: 2020-08-03

## 2020-08-01 RX ORDER — CIPROFLOXACIN 500 MG/1
500 TABLET, FILM COATED ORAL NIGHTLY
Status: DISCONTINUED | OUTPATIENT
Start: 2020-08-01 | End: 2020-08-03

## 2020-08-01 RX ORDER — SODIUM CHLORIDE 9 MG/ML
INJECTION, SOLUTION INTRAVENOUS CONTINUOUS
Status: ACTIVE | OUTPATIENT
Start: 2020-08-01 | End: 2020-08-01

## 2020-08-01 RX ORDER — METOPROLOL SUCCINATE 100 MG/1
100 TABLET, EXTENDED RELEASE ORAL
Status: DISCONTINUED | OUTPATIENT
Start: 2020-08-02 | End: 2020-08-03

## 2020-08-01 RX ORDER — SODIUM CHLORIDE 9 MG/ML
INJECTION, SOLUTION INTRAVENOUS CONTINUOUS
Status: DISCONTINUED | OUTPATIENT
Start: 2020-08-01 | End: 2020-08-03

## 2020-08-01 NOTE — ED INITIAL ASSESSMENT (HPI)
Patient currently being treated for diverticulitis - started with auditory hallucinations last night - patient reports hearing music

## 2020-08-01 NOTE — CONSULTS
[facility] Pharmacy Note:  Renal Adjustment for ciprofloxacin (CIPRO)    Lara Jo is a 79year old patient who has been prescribed ciprofloxacin (CIPRO) 500 mg every 12 hrs.   CrCl is estimated creatinine clearance is 13.8 mL/min (A) (based on SCr of 2.37

## 2020-08-01 NOTE — CONSULTS
[facility]  Report of Consultation    Rica Rodriguez Patient Status:  Observation    1940 MRN [id number]   [facility] 4NW-A Attending Margaret Zambrano DO   Hosp Day # 0 PCP Rosalino Nunez DO     Reason for Consultation:  ERON/CKD Hyperglycemia 4/3/2018   • Hypokalemia    • Leukocytosis 4/3/2018   • Low grade B-cell lymphoma ([facility] [address] 9/10/2013   • Malignant neoplasm of bronchus and lung, unspecified site 6/29/2012   • Mass of right forearm 37/2/8756   • Metabolic acidosis 0/3/9831   • not drink alcohol or use drugs.     Allergies:    Penicillins             ITCHING  Radiology Contrast *    OTHER (SEE COMMENTS)    Comment:HARSH ON KIDNEYS    Medications:    Current Facility-Administered Medications:   •  0.9% NaCl infusion, 125 mL/hr, Int 8/1/2020 1529  Last data filed at 8/1/2020 1318  Gross per 24 hour   Intake 295.83 ml   Output —   Net 295.83 ml     Last 3 Weights  08/01/20 1438 : 127 lb 11.2 oz (57.9 kg)  08/01/20 1034 : 127 lb (57.6 kg)  07/29/20 1259 : 128 lb (58.1 kg)  07/28/20 2700 142   K 4.0 4.1 3.7   * 113* 110   CO2 22.0 22.0 22.0   BUN 40* 35* 38*   CREATSERUM 1.51* 1.48* 2.37*   CA 9.4 9.4 8.9   GLU 98 92 110*       Recent Labs   Lab 07/28/20  0954 07/29/20  1329 08/01/20  1052   ALT 9* 10* 13   AST 16 16 23   ALB 3.47*

## 2020-08-01 NOTE — PROGRESS NOTES
[facility] Pharmacy Note: Renal dose adjustment for Famotidine (Pepcid)  Kai Sandoval has been prescribed Famotidine (Pepcid) 20 mg every 12 hours. Estimated Creatinine Clearance: 13.8 mL/min (A) (based on SCr of 2.37 mg/dL (H)).     Her calculated c

## 2020-08-01 NOTE — TELEPHONE ENCOUNTER
Spoke to son Jocelynn Holbrook informed understanding verbalized. Instructed if sx worsen or new sx go to ER. Pt son verbalized understanding.

## 2020-08-01 NOTE — PLAN OF CARE
Pt is aox4 and can be forgetful pt is on room air no tele and pt has denied any sob or chest pain at this time. Pt vitals has been stable and pt has been afebrile during this shift. Pt is resting in bed call light is within reach no question at this time.

## 2020-08-01 NOTE — PROGRESS NOTES
Pt did not want to take Breo. States she hasn't been using the past few days at home. Notified RN.  Son at bedside  Pt will discuss with he MD in the morning

## 2020-08-01 NOTE — TELEPHONE ENCOUNTER
Spoke to son he states mom was hearing music last night. Son states there was no music. Pt is currently on Cipro and Flagyl due to Diverticulitis. Pts son states mom is forgetful and was on chemo due to Lymphoma  And developed chemo brain. Pts son denies any a

## 2020-08-01 NOTE — H&P
[facility]  History and Physical     Kristina Acuña Patient Status:  Observation    1940 MRN [id number]   [facility] 4NW-A Attending Rani Rosado,    Hosp Day # 0 PCP      Chief Complaint: Auditory dariel acquired pneumonia, unspecified laterality 4/4/2018   • Complete rupture of rotator cuff    • COPD (chronic obstructive pulmonary disease) ([facility])     no O2   • COPD (chronic obstructive pulmonary disease) [facility] 5/14/2019   • COPD exace History:   Procedure Laterality Date   • BRONCHOSCOPY ELECTROMAGNETIC NAVIGATION N/A 12/17/2019    Performed by Frederick Conde MD at [facility] ENDOSCOPY   • CHOLECYSTECTOMY     • COLONOSCOPY  9/23/08   • HERNIA SURGERY     • HYSTERECTOMY  1970's    vaginal   • INTO EACH EYE TWICE DAILY, Disp: 20 mL, Rfl: 6  BREO ELLIPTA 200-25 MCG/INH Inhalation Aerosol Powder, Breath Activated, 1 puff daily. , Disp: , Rfl:   Montelukast Sodium (SINGULAIR) 10 MG Oral Tab, Take 10 mg by mouth nightly., Disp: , Rfl:   acetaminophen affect.       Diagnostic Data:      Labs:  Recent Labs   Lab 07/28/20  0954 07/29/20  1329 08/01/20  1052   WBC 4.4 5.4 5.5   HGB 10.5* 10.8* 10.0*   .5* 103.7* 104.6*   .0 143.0* 141.0*   BAND 2 1 1       Recent Labs   Lab 07/28/20  0954 07/2

## 2020-08-02 ENCOUNTER — APPOINTMENT (OUTPATIENT)
Dept: ULTRASOUND IMAGING | Facility: HOSPITAL | Age: 80
End: 2020-08-02
Attending: INTERNAL MEDICINE
Payer: MEDICARE

## 2020-08-02 ENCOUNTER — APPOINTMENT (OUTPATIENT)
Dept: MRI IMAGING | Facility: HOSPITAL | Age: 80
End: 2020-08-02
Attending: Other
Payer: MEDICARE

## 2020-08-02 LAB
ANION GAP SERPL CALC-SCNC: 7 MMOL/L (ref 0–18)
BASOPHILS # BLD: 0 X10(3) UL (ref 0–0.2)
BASOPHILS NFR BLD: 0 %
BILIRUB UR QL STRIP.AUTO: NEGATIVE
BUN BLD-MCNC: 32 MG/DL (ref 7–18)
BUN/CREAT SERPL: 18.3 (ref 10–20)
CALCIUM BLD-MCNC: 8.9 MG/DL (ref 8.5–10.1)
CHLORIDE SERPL-SCNC: 113 MMOL/L (ref 98–112)
CLARITY UR REFRACT.AUTO: CLEAR
CO2 SERPL-SCNC: 21 MMOL/L (ref 21–32)
COLOR UR AUTO: YELLOW
CREAT BLD-MCNC: 1.75 MG/DL (ref 0.55–1.02)
CREAT UR-SCNC: 104 MG/DL
DEPRECATED RDW RBC AUTO: 59.2 FL (ref 35.1–46.3)
EOSINOPHIL # BLD: 0.78 X10(3) UL (ref 0–0.7)
EOSINOPHIL NFR BLD: 17 %
ERYTHROCYTE [DISTWIDTH] IN BLOOD BY AUTOMATED COUNT: 14.7 % (ref 11–15)
GLUCOSE BLD-MCNC: 89 MG/DL (ref 70–99)
GLUCOSE UR STRIP.AUTO-MCNC: NEGATIVE MG/DL
HCT VFR BLD AUTO: 34.1 % (ref 35–48)
HGB BLD-MCNC: 10.4 G/DL (ref 12–16)
LYMPHOCYTES NFR BLD: 0.37 X10(3) UL (ref 1–4)
LYMPHOCYTES NFR BLD: 8 %
MCH RBC QN AUTO: 33.2 PG (ref 26–34)
MCHC RBC AUTO-ENTMCNC: 30.5 G/DL (ref 31–37)
MCV RBC AUTO: 108.9 FL (ref 80–100)
METAMYELOCYTES # BLD: 0.05 X10(3) UL
METAMYELOCYTES NFR BLD: 1 %
MONOCYTES # BLD: 0.55 X10(3) UL (ref 0.1–1)
MONOCYTES NFR BLD: 12 %
NEUTROPHILS # BLD AUTO: 2.06 X10 (3) UL (ref 1.5–7.7)
NEUTROPHILS NFR BLD: 56 %
NEUTS BAND NFR BLD: 6 %
NEUTS HYPERSEG # BLD: 2.85 X10(3) UL (ref 1.5–7.7)
NITRITE UR QL STRIP.AUTO: NEGATIVE
OSMOLALITY SERPL CALC.SUM OF ELEC: 298 MOSM/KG (ref 275–295)
PH UR STRIP.AUTO: 5 [PH] (ref 4.5–8)
PLATELET # BLD AUTO: 156 10(3)UL (ref 150–450)
PLATELET MORPHOLOGY: NORMAL
POTASSIUM SERPL-SCNC: 3.5 MMOL/L (ref 3.5–5.1)
PROT UR STRIP.AUTO-MCNC: NEGATIVE MG/DL
RBC # BLD AUTO: 3.13 X10(6)UL (ref 3.8–5.3)
RBC UR QL AUTO: NEGATIVE
SODIUM SERPL-SCNC: 141 MMOL/L (ref 136–145)
SODIUM SERPL-SCNC: 66 MMOL/L
SP GR UR STRIP.AUTO: 1.01 (ref 1–1.03)
TOTAL CELLS COUNTED: 100
UROBILINOGEN UR STRIP.AUTO-MCNC: <2 MG/DL
WBC # BLD AUTO: 4.6 X10(3) UL (ref 4–11)

## 2020-08-02 PROCEDURE — 95816 EEG AWAKE AND DROWSY: CPT | Performed by: OTHER

## 2020-08-02 PROCEDURE — 99213 OFFICE O/P EST LOW 20 MIN: CPT | Performed by: INTERNAL MEDICINE

## 2020-08-02 PROCEDURE — 4A00X4Z MEASUREMENT OF CENTRAL NERVOUS ELECTRICAL ACTIVITY, EXTERNAL APPROACH: ICD-10-PCS | Performed by: OTHER

## 2020-08-02 PROCEDURE — 70551 MRI BRAIN STEM W/O DYE: CPT | Performed by: OTHER

## 2020-08-02 PROCEDURE — 99204 OFFICE O/P NEW MOD 45 MIN: CPT | Performed by: OTHER

## 2020-08-02 PROCEDURE — 99225 SUBSEQUENT OBSERVATION CARE: CPT | Performed by: INTERNAL MEDICINE

## 2020-08-02 PROCEDURE — 76775 US EXAM ABDO BACK WALL LIM: CPT | Performed by: INTERNAL MEDICINE

## 2020-08-02 NOTE — CONSULTS
Neurology H&P    Pita Patient Status:  Observation    1940 MRN [id number]   [facility] 4NW-A Attending Josh Mejia DO   Hosp Day # 0 PCP Mi Colindres DO     Subjective:  Pita is a(n) 79year old female wi History:   Diagnosis Date   • Abdominal hernia    • Azotemia 4/3/2018   • Bilateral lung cancer ([facility] [address]    • Cancer [facility]) 2009    right lung   • Cancer [facility])     lymphoma   • Chronic cough    • Clostridium difficile colitis    • Community acquired pneumonia, Scoliosis (and kyphoscoliosis), idiopathic 6/29/2012   • Shortness of breath    • SOB (shortness of breath) 3/27/2018   • Urticaria, unspecified    • Visual impairment     glasses   • Wears glasses        PSHx:  Past Surgical History:   Procedure Lateralit diplopia, EOM intact, facial sensation intact, strong eye closure, face is symmetric, no dysarthria, tongue midline,  no tongue fasciculations or atrophy, strong shoulder shrug.     MOTOR EXAMINATION: normal tone, no fasciculations, normal strength througho Treatments would be AEDs, Benzos, Donepezil, SSRI/SNRIs but there is no accepted guidelines to treatment and she repots that she would not be interested in any medication treatments. Plan:  1.  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

## 2020-08-02 NOTE — PLAN OF CARE
Pt is aox4 and can be forgetful pt is on room air no tele and pt has denied any sob at this time. Pt vitals has been stable and pt has been afebrile during this shift.  Pt has no complaints of pain, pt does state she hear the singing nose in the ear but its

## 2020-08-02 NOTE — PROCEDURES
ANUP - ELECTROENCEPHALOGRAM (EEG) REPORT  Patient Name:  Leah Umanzor   MRN / CSN:  [id number] / [id number]   Date of Birth / Age:  4/13/1940 /  79year old   Encounter Date:  8/2/20         METHODS:  Twenty-two electrodes were applied according to the 10

## 2020-08-02 NOTE — PROGRESS NOTES
[facility]  Nephrology Progress Note    Health system Patient Status:  Observation    1940 MRN [id number]   [facility] 4NW-A Attending Carla Alexandra,    Hosp Day # 0 PCP Daphne Valenzuela DO       SUBJECTIVE:  Feels better tod 08/01/20  1052   ALT 9* 10* 13   AST 16 16 23   ALB 3.47*  3.1* 2.8* 3.1*   *  --   --        No results for input(s): PGLU in the last 168 hours.     Meds:   ondansetron HCl (ZOFRAN) injection 4 mg, 4 mg, Intravenous, Q4H PRN  0.9% NaCl infusion, ,

## 2020-08-02 NOTE — PROGRESS NOTES
[facility]PITAL[facility]  Progress Note     Luís Rios Patient Status:  Observation    1940 MRN [id number]   [facility] 4NW-A Attending Ruthie Noel DO   Hosp Day # 0 PCP oDmitila Donato DO     Chief Complaint: auditory hallucinatio --    ALT 9* 10* 13  --    BILT 0.5 0.4 0.4  --    TP 6.4  7.1 6.9 6.9  --        Estimated Creatinine Clearance: 18.7 mL/min (A) (based on SCr of 1.75 mg/dL (H)). No results for input(s): PTP, INR in the last 168 hours.     No results for input(s): TROP

## 2020-08-02 NOTE — PROGRESS NOTES
Pleasant lady, iv  Fluids continued. Monitored intake and output. Slept well. No auditory hallucinations.     Call placed to Dr Laveda Gosselin neurology this am, aware of consult

## 2020-08-03 VITALS
DIASTOLIC BLOOD PRESSURE: 76 MMHG | TEMPERATURE: 98 F | OXYGEN SATURATION: 94 % | HEART RATE: 79 BPM | WEIGHT: 127.69 LBS | BODY MASS INDEX: 25.07 KG/M2 | SYSTOLIC BLOOD PRESSURE: 136 MMHG | HEIGHT: 60 IN | RESPIRATION RATE: 16 BRPM

## 2020-08-03 LAB
ANION GAP SERPL CALC-SCNC: 3 MMOL/L (ref 0–18)
BUN BLD-MCNC: 31 MG/DL (ref 7–18)
BUN/CREAT SERPL: 20.4 (ref 10–20)
CALCIUM BLD-MCNC: 9 MG/DL (ref 8.5–10.1)
CHLORIDE SERPL-SCNC: 119 MMOL/L (ref 98–112)
CO2 SERPL-SCNC: 22 MMOL/L (ref 21–32)
CREAT BLD-MCNC: 1.52 MG/DL (ref 0.55–1.02)
GLUCOSE BLD-MCNC: 100 MG/DL (ref 70–99)
OSMOLALITY SERPL CALC.SUM OF ELEC: 305 MOSM/KG (ref 275–295)
POTASSIUM SERPL-SCNC: 3.9 MMOL/L (ref 3.5–5.1)
SODIUM SERPL-SCNC: 144 MMOL/L (ref 136–145)

## 2020-08-03 PROCEDURE — 99213 OFFICE O/P EST LOW 20 MIN: CPT | Performed by: INTERNAL MEDICINE

## 2020-08-03 PROCEDURE — 99217 OBSERVATION CARE DISCHARGE: CPT | Performed by: INTERNAL MEDICINE

## 2020-08-03 PROCEDURE — 99214 OFFICE O/P EST MOD 30 MIN: CPT | Performed by: OTHER

## 2020-08-03 RX ORDER — CIPROFLOXACIN 500 MG/1
500 TABLET, FILM COATED ORAL NIGHTLY
Qty: 20 TABLET | Refills: 0 | Status: SHIPPED | OUTPATIENT
Start: 2020-08-03 | End: 2020-08-10 | Stop reason: ALTCHOICE

## 2020-08-03 RX ORDER — FAMOTIDINE 20 MG/1
20 TABLET ORAL DAILY
Refills: 0 | Status: SHIPPED | COMMUNITY
Start: 2020-08-04 | End: 2020-08-03

## 2020-08-03 RX ORDER — FAMOTIDINE 20 MG/1
20 TABLET ORAL DAILY
Qty: 180 TABLET | Refills: 0 | Status: SHIPPED | OUTPATIENT
Start: 2020-08-04

## 2020-08-03 RX ORDER — FUROSEMIDE 20 MG/1
20 TABLET ORAL DAILY
Qty: 120 TABLET | Refills: 0 | Status: SHIPPED | OUTPATIENT
Start: 2020-08-03 | End: 2020-09-04

## 2020-08-03 RX ORDER — CIPROFLOXACIN 500 MG/1
500 TABLET, FILM COATED ORAL NIGHTLY
Refills: 0 | Status: SHIPPED | COMMUNITY
Start: 2020-08-03 | End: 2020-08-03

## 2020-08-03 NOTE — PROGRESS NOTES
NURSING DISCHARGE NOTE    Discharged Home via Wheelchair. Accompanied by Family member  Belongings Taken by patient/family. All discharge instructions given to patient and son. Discharged in stable condition. IV removed. All questions answered.

## 2020-08-03 NOTE — PROGRESS NOTES
[facility]  Nephrology Progress Note    Pita Attending:  Osmel Henriquez,        Assessment and Plan:    1) ERON- due to dehydration given modest PO intake + ARB effect- resolved. US with small simple cysts. UA bland.  Appears euvolemic- michelle injection 4 mg, 4 mg, Intravenous, Q4H PRN  0.9% NaCl infusion, , Intravenous, Continuous  acetaminophen (TYLENOL EXTRA STRENGTH) tab 500 mg, 500 mg, Oral, Q6H PRN  Fluticasone Furoate-Vilanterol (BREO ELLIPTA) 200-25 MCG/INH inhaler 1 puff, 1 puff, Inhala

## 2020-08-03 NOTE — PROGRESS NOTES
[id number] [facility] Neurology Progress Note    Angel Donaldson Patient Status:  Observation    1940 MRN [id number]   [facility] 4NW-A Attending Timothy Cochran DO   Hosp Day # 0 PCP Milana Coy DO         Subjective:  Cary Hawkins noncontrast study. There is no large mass here and no expansion of the internal auditory canal.  3. Small focus of susceptibility artifact in left posterior parietal lobe has been previously described and is unchanged. 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 (SSRI, AED, Benzo, Donepezil etc) as pt reports that these musical sounds are not painful or too intrusive      She continues to hear music, EEG normal and MRI with no masses, she does not feel she wants to try any medications at this time as the music burger

## 2020-08-03 NOTE — DISCHARGE SUMMARY
[facility] [place] [facility]  DISCHARGE SUMMARY     [place] Patient Status:  Observation    1940 MRN [id number]   [facility] 4NW-A Attending Sam Milton DO   Hosp Day # 0 PCP Raine Orona DO     Date of Admission: 2020  Date normally does. No chest pain or difficulty breathing. Abdominal pain has been much better. She has been tolerating diet. She chronically has irregular bowel habits and this is unchanged.   Looking back she thinks this auditory episode has happened in th hours as needed for Pain. Refills:  0     Breo Ellipta 200-25 MCG/INH Aepb  Generic drug:  Fluticasone Furoate-Vilanterol      1 puff daily.    Refills:  0     Cartia  MG Cp24  Generic drug:  dilTIAZem HCl ER Coated Beads      Take 1 capsule by cayetano Tabs         [place]: [place]    Follow-up appointment:   DO Shine [address]  [address] [facility] [phone]    Call in 4 weeks  As needed      Vital signs:  Temp:  [98 °F (36.7 °C)-98.3 °F (36.8 °C)] 98.2 °F (36.8

## 2020-08-04 ENCOUNTER — PATIENT OUTREACH (OUTPATIENT)
Dept: CASE MANAGEMENT | Age: 80
End: 2020-08-04

## 2020-08-04 ENCOUNTER — TELEPHONE (OUTPATIENT)
Dept: FAMILY MEDICINE CLINIC | Facility: CLINIC | Age: 80
End: 2020-08-04

## 2020-08-04 ENCOUNTER — TELEPHONE (OUTPATIENT)
Dept: HEMATOLOGY/ONCOLOGY | Facility: HOSPITAL | Age: 80
End: 2020-08-04

## 2020-08-04 DIAGNOSIS — Z02.9 ENCOUNTERS FOR UNSPECIFIED ADMINISTRATIVE PURPOSE: ICD-10-CM

## 2020-08-04 PROCEDURE — 1111F DSCHRG MED/CURRENT MED MERGE: CPT

## 2020-08-04 NOTE — TELEPHONE ENCOUNTER
Contacted pt's son for [facility], upon doing medication reconciliation pt's son stated that he was not informed that order for lasix was changed from daily PRN every day. Son states that they were told by pt's oncologist that pt should not take it every day due to her poor kidney function. Please advise pt's son if pt should be taking lasix daily or as needed.     Thank you

## 2020-08-04 NOTE — PROGRESS NOTES
Initial Post Discharge Follow Up   Discharge Date: 8/3/20  Contact Date: 8/4/2020    Consent Verification:  Assessment Completed With: Other: Son, Allyson Mcrae Permission received per patient?  written  HIPAA Verified? Yes    Discharge Dx:     1.  ERON on CKD  • losartan 100 MG Oral Tab Take 1 tablet (100 mg total) by mouth daily. 0   • diphenoxylate-atropine 2.5-0.025 MG Oral Tab Take 1 tablet by mouth 4 (four) times daily as needed for Diarrhea.  30 tablet 0   • ipratropium-albuterol 0.5-2.5 (3) MG/3ML Inhal concerns, Etc): No     Follow up appointments:      Your appointments     Date & Time Appointment Department [facility])    Aug 07, 2020  1:00 PM CDT Exam - Established with DO Denisha Kowalski [employer], [facility] [place] [address] [place] [address] close f/u with PCP/specialists. Pt's son verbalized understanding and will contact office with any further questions or concerns.    Routed TE to PCP office regarding medication, lasix prescribed daily but son states pt's oncologist did not want her to gaurav

## 2020-08-04 NOTE — TELEPHONE ENCOUNTER
Kenneth calling asking for a note from  for his work because he is taking care of his mom. She starting to have chemo brain.  Thank you Barbara Barnett

## 2020-08-05 ENCOUNTER — SOCIAL WORK SERVICES (OUTPATIENT)
Dept: HEMATOLOGY/ONCOLOGY | Facility: HOSPITAL | Age: 80
End: 2020-08-05

## 2020-08-05 NOTE — TELEPHONE ENCOUNTER
Dr. Susanne Pichardo, patient has HFU with you on 8/7. Hosp DC instructions state:  furosemide (LASIX) 20 MG Oral Tab  Rx by Dr. Melinda Samano. Ok to continue Lasix until you eval her?

## 2020-08-05 NOTE — PROGRESS NOTES
spoke with Patient’s Son Magdalene who requested letter for work to remain on [employer]; letter completed and sent to Son via [employer].

## 2020-08-07 ENCOUNTER — OFFICE VISIT (OUTPATIENT)
Dept: FAMILY MEDICINE CLINIC | Facility: CLINIC | Age: 80
End: 2020-08-07
Payer: MEDICARE

## 2020-08-07 VITALS
HEIGHT: 60 IN | BODY MASS INDEX: 25.32 KG/M2 | RESPIRATION RATE: 20 BRPM | OXYGEN SATURATION: 98 % | SYSTOLIC BLOOD PRESSURE: 126 MMHG | HEART RATE: 97 BPM | DIASTOLIC BLOOD PRESSURE: 70 MMHG | WEIGHT: 129 LBS

## 2020-08-07 DIAGNOSIS — K57.92 ACUTE DIVERTICULITIS: ICD-10-CM

## 2020-08-07 DIAGNOSIS — N17.9 ACUTE KIDNEY INJURY (HCC): Primary | ICD-10-CM

## 2020-08-07 DIAGNOSIS — R79.89 PRERENAL AZOTEMIA: ICD-10-CM

## 2020-08-07 PROCEDURE — 99214 OFFICE O/P EST MOD 30 MIN: CPT | Performed by: FAMILY MEDICINE

## 2020-08-07 PROCEDURE — 1111F DSCHRG MED/CURRENT MED MERGE: CPT | Performed by: FAMILY MEDICINE

## 2020-08-07 RX ORDER — ALBUTEROL SULFATE 90 UG/1
2 AEROSOL, METERED RESPIRATORY (INHALATION) EVERY 4 HOURS PRN
COMMUNITY
Start: 2020-05-09

## 2020-08-07 RX ORDER — NALOXONE HYDROCHLORIDE 4 MG/.1ML
SPRAY NASAL
COMMUNITY
Start: 2020-06-18 | End: 2021-01-19

## 2020-08-07 NOTE — PROGRESS NOTES
HPI:    Daryn Pinto is a 79year old female here today for hospital follow up.    She was discharged from Inpatient hospital, [facility] to Home   Admission Date: 8/1/20   Discharge Date: 8/3/20  Hospital Discharge Diagnoses (since 7/8/2020)   Non 108 (90 Base) MCG/ACT Inhalation Aero Soln, Inhale 2 puffs into the lungs every 4 (four) hours as needed. Ciprofloxacin HCl 500 MG Oral Tab, Take 1 tablet (500 mg total) by mouth nightly.   famoTIDine 20 MG Oral Tab, Take 1 tablet (20 mg total) by mouth da Clostridium difficile colitis, Community acquired pneumonia, unspecified laterality (4/4/2018), Complete rupture of rotator cuff, COPD (chronic obstructive pulmonary disease) ([facility] [address] COPD (chronic obstructive pulmonary disease) with acute bronchitis ([facility] [address] ( history (12/05/2017); other; and removal of lung,lobectomy (2009). She family history includes Cancer in her father; Hypertension in her father; immune system in her mother. She  reports that she has never smoked.  She has never used smokeless tobacco. PLAN:   Diagnoses and all orders for this visit:    Acute kidney injury ([address]    Acute diverticulitis    Prerenal azotemia      1. Acute kidney injury ([address]  Pt advised to elevate legs above heart center. She should also wear OTC compression stockings.  Con

## 2020-08-27 ENCOUNTER — PATIENT MESSAGE (OUTPATIENT)
Dept: FAMILY MEDICINE CLINIC | Facility: CLINIC | Age: 80
End: 2020-08-27

## 2020-08-27 ENCOUNTER — APPOINTMENT (OUTPATIENT)
Dept: CT IMAGING | Age: 80
DRG: 988 | End: 2020-08-27
Attending: EMERGENCY MEDICINE
Payer: MEDICARE

## 2020-08-27 ENCOUNTER — HOSPITAL ENCOUNTER (EMERGENCY)
Age: 80
Discharge: HOME | DRG: 988 | End: 2020-08-27
Attending: EMERGENCY MEDICINE
Payer: MEDICARE

## 2020-08-27 ENCOUNTER — APPOINTMENT (OUTPATIENT)
Dept: GENERAL RADIOLOGY | Age: 80
DRG: 988 | End: 2020-08-27
Attending: EMERGENCY MEDICINE
Payer: MEDICARE

## 2020-08-27 VITALS
HEART RATE: 102 BPM | OXYGEN SATURATION: 97 % | TEMPERATURE: 100 F | RESPIRATION RATE: 20 BRPM | WEIGHT: 130 LBS | SYSTOLIC BLOOD PRESSURE: 176 MMHG | HEIGHT: 60 IN | DIASTOLIC BLOOD PRESSURE: 96 MMHG | BODY MASS INDEX: 25.52 KG/M2

## 2020-08-27 DIAGNOSIS — N39.0 URINARY TRACT INFECTION WITHOUT HEMATURIA, SITE UNSPECIFIED: ICD-10-CM

## 2020-08-27 DIAGNOSIS — R41.0 CONFUSION: Primary | ICD-10-CM

## 2020-08-27 LAB
ALBUMIN SERPL-MCNC: 3.5 G/DL (ref 3.4–5)
ALBUMIN/GLOB SERPL: 0.9 {RATIO} (ref 1–2)
ALP LIVER SERPL-CCNC: 126 U/L (ref 55–142)
ALT SERPL-CCNC: 17 U/L (ref 13–56)
ANION GAP SERPL CALC-SCNC: 10 MMOL/L (ref 0–18)
AST SERPL-CCNC: 24 U/L (ref 15–37)
BASOPHILS # BLD: 0 X10(3) UL (ref 0–0.2)
BASOPHILS NFR BLD: 0 %
BILIRUB SERPL-MCNC: 0.5 MG/DL (ref 0.1–2)
BILIRUB UR QL STRIP.AUTO: NEGATIVE
BUN BLD-MCNC: 35 MG/DL (ref 7–18)
BUN/CREAT SERPL: 23.5 (ref 10–20)
CALCIUM BLD-MCNC: 9.6 MG/DL (ref 8.5–10.1)
CHLORIDE SERPL-SCNC: 109 MMOL/L (ref 98–112)
CO2 SERPL-SCNC: 21 MMOL/L (ref 21–32)
CREAT BLD-MCNC: 1.49 MG/DL (ref 0.55–1.02)
DEPRECATED RDW RBC AUTO: 53.4 FL (ref 35.1–46.3)
EOSINOPHIL # BLD: 0.2 X10(3) UL (ref 0–0.7)
EOSINOPHIL NFR BLD: 5 %
ERYTHROCYTE [DISTWIDTH] IN BLOOD BY AUTOMATED COUNT: 13.9 % (ref 11–15)
GLOBULIN PLAS-MCNC: 3.8 G/DL (ref 2.8–4.4)
GLUCOSE BLD-MCNC: 97 MG/DL (ref 70–99)
GLUCOSE UR STRIP.AUTO-MCNC: NEGATIVE MG/DL
HCT VFR BLD AUTO: 34.1 % (ref 35–48)
HGB BLD-MCNC: 11.1 G/DL (ref 12–16)
KETONES UR STRIP.AUTO-MCNC: NEGATIVE MG/DL
LACTATE SERPL-SCNC: 0.9 MMOL/L (ref 0.4–2)
LYMPHOCYTES NFR BLD: 0.82 X10(3) UL (ref 1–4)
LYMPHOCYTES NFR BLD: 21 %
M PROTEIN MFR SERPL ELPH: 7.3 G/DL (ref 6.4–8.2)
MCH RBC QN AUTO: 33.6 PG (ref 26–34)
MCHC RBC AUTO-ENTMCNC: 32.6 G/DL (ref 31–37)
MCV RBC AUTO: 103.3 FL (ref 80–100)
METAMYELOCYTES # BLD: 0.04 X10(3) UL
METAMYELOCYTES NFR BLD: 1 %
MONOCYTES # BLD: 0.7 X10(3) UL (ref 0.1–1)
MONOCYTES NFR BLD: 18 %
MYELOCYTES # BLD: 0.04 X10(3) UL
MYELOCYTES NFR BLD: 1 %
NEUTROPHILS # BLD AUTO: 1.85 X10 (3) UL (ref 1.5–7.7)
NEUTROPHILS NFR BLD: 49 %
NEUTS BAND NFR BLD: 5 %
NEUTS HYPERSEG # BLD: 2.11 X10(3) UL (ref 1.5–7.7)
NITRITE UR QL STRIP.AUTO: NEGATIVE
OSMOLALITY SERPL CALC.SUM OF ELEC: 298 MOSM/KG (ref 275–295)
PH UR STRIP.AUTO: 5.5 [PH] (ref 4.5–8)
PLATELET # BLD AUTO: 173 10(3)UL (ref 150–450)
PLATELET MORPHOLOGY: NORMAL
POTASSIUM SERPL-SCNC: 4.2 MMOL/L (ref 3.5–5.1)
PROCALCITONIN SERPL-MCNC: <0.05 NG/ML (ref ?–0.16)
RBC # BLD AUTO: 3.3 X10(6)UL (ref 3.8–5.3)
SODIUM SERPL-SCNC: 140 MMOL/L (ref 136–145)
SP GR UR STRIP.AUTO: 1.02 (ref 1–1.03)
TOTAL CELLS COUNTED: 100
UROBILINOGEN UR STRIP.AUTO-MCNC: 0.2 MG/DL
WBC # BLD AUTO: 3.9 X10(3) UL (ref 4–11)
WBC CLUMPS UR QL AUTO: PRESENT

## 2020-08-27 PROCEDURE — 71045 X-RAY EXAM CHEST 1 VIEW: CPT | Performed by: EMERGENCY MEDICINE

## 2020-08-27 PROCEDURE — 93010 ELECTROCARDIOGRAM REPORT: CPT

## 2020-08-27 PROCEDURE — 85025 COMPLETE CBC W/AUTO DIFF WBC: CPT | Performed by: EMERGENCY MEDICINE

## 2020-08-27 PROCEDURE — 80053 COMPREHEN METABOLIC PANEL: CPT | Performed by: EMERGENCY MEDICINE

## 2020-08-27 PROCEDURE — 81001 URINALYSIS AUTO W/SCOPE: CPT | Performed by: EMERGENCY MEDICINE

## 2020-08-27 PROCEDURE — 87040 BLOOD CULTURE FOR BACTERIA: CPT | Performed by: EMERGENCY MEDICINE

## 2020-08-27 PROCEDURE — 93005 ELECTROCARDIOGRAM TRACING: CPT

## 2020-08-27 PROCEDURE — 81015 MICROSCOPIC EXAM OF URINE: CPT | Performed by: EMERGENCY MEDICINE

## 2020-08-27 PROCEDURE — 70450 CT HEAD/BRAIN W/O DYE: CPT | Performed by: EMERGENCY MEDICINE

## 2020-08-27 PROCEDURE — 87086 URINE CULTURE/COLONY COUNT: CPT | Performed by: EMERGENCY MEDICINE

## 2020-08-27 PROCEDURE — 99285 EMERGENCY DEPT VISIT HI MDM: CPT

## 2020-08-27 PROCEDURE — 99284 EMERGENCY DEPT VISIT MOD MDM: CPT

## 2020-08-27 PROCEDURE — 83605 ASSAY OF LACTIC ACID: CPT | Performed by: EMERGENCY MEDICINE

## 2020-08-27 PROCEDURE — 85027 COMPLETE CBC AUTOMATED: CPT | Performed by: EMERGENCY MEDICINE

## 2020-08-27 PROCEDURE — 85007 BL SMEAR W/DIFF WBC COUNT: CPT | Performed by: EMERGENCY MEDICINE

## 2020-08-27 PROCEDURE — 84145 PROCALCITONIN (PCT): CPT | Performed by: EMERGENCY MEDICINE

## 2020-08-27 PROCEDURE — 36415 COLL VENOUS BLD VENIPUNCTURE: CPT

## 2020-08-27 RX ORDER — CEPHALEXIN 500 MG/1
500 CAPSULE ORAL 2 TIMES DAILY
Qty: 14 CAPSULE | Refills: 0 | Status: ON HOLD | OUTPATIENT
Start: 2020-08-27 | End: 2020-09-02

## 2020-08-27 RX ORDER — ACETAMINOPHEN 500 MG
1000 TABLET ORAL ONCE
Status: COMPLETED | OUTPATIENT
Start: 2020-08-27 | End: 2020-08-27

## 2020-08-27 RX ORDER — CEPHALEXIN 500 MG/1
500 CAPSULE ORAL ONCE
Status: COMPLETED | OUTPATIENT
Start: 2020-08-27 | End: 2020-08-27

## 2020-08-27 NOTE — ED PROVIDER NOTES
Patient Seen in: [facility] Emergency Department In [facility]      History   Patient presents with:  Hypertension    Stated Complaint: htn, denies cp or sob    HPI    This is a 52-year-old female with past medical history of GERD, lungs cancer status post r • COPD exacerbation ([facility] [address]    • Dependence on supplemental oxygen 12/17/2019   • Diaphragmatic hernia without mention of obstruction or gangrene    • Diarrhea 11/5/2015   • Diverticulitis of large intestine without perforation or abscess without bleeding EXCISION/BIOPSY LESION/NEVI/MASS Right 12/11/2014    Performed by Eva Avila MD at [facility] MAIN OR   • OTHER      lung biopsies   • OTHER SURGICAL HISTORY  1/1/08    gallbladder surgery   • OTHER SURGICAL HISTORY  6514,0221    bunionectomy,bilateral   • O (*)     Protein Urine 30 mg/dL (*)     Leukocyte Esterase Urine Moderate (*)     All other components within normal limits   COMP METABOLIC PANEL (14) - Abnormal; Notable for the following components:    BUN 35 (*)     Creatinine 1.49 (*)     BUN/CREA Rati wave V2. No acute changes.                   Ct Brain Or Head (77047)    Result Date: 8/27/2020  PROCEDURE:  CT BRAIN OR HEAD (57209)  COMPARISON:  [facility] , , MRI BRAIN/IAC  (CPT=70551), 8/02/2020, 2:10 PM.  BE, CT, CT BRAIN OR HEAD (02513), 8/01 Technologist)  The patient states last PM she started hearing music. She denies any headaches or dizziness. FINDINGS:  VENTRICLES/SULCI:  Ventricles and sulci are normal in size.   INTRACRANIAL:  There is mild decreased CT attenuation in the subcortical sequences and diffusion weighted images without infusion.   MRI internal auditory canals was performed with thin section multi-planar T1, T2 weighted and FAT suppression imaging without contrast.  PATIENT STATED HISTORY: (As transcribed by Technologist)  Pa parietal lobe has been previously described and is unchanged. 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     Dictated by (CST): Bryan Trevino MD on 8/02/2020 at 3:01 PM     Finalized by (CST): Bryan Trevino MD on 8/02/2020 at 3:06 PM           MDM     IV line was es unspecified    Disposition:  Discharge  8/27/2020  8:36 pm    Follow-up:  Criselda Prabhakar DO  [address] [place] [id number]  [phone]    In 2 days            Medications Prescribed:  Current Discharge Medication List    START taking these

## 2020-08-27 NOTE — ED INITIAL ASSESSMENT (HPI)
Woke up from nap, per son seemed a little confused. Reports slight headache.  Currently undergoing tx for lymphoma

## 2020-08-28 ENCOUNTER — TELEPHONE (OUTPATIENT)
Dept: HEMATOLOGY/ONCOLOGY | Facility: HOSPITAL | Age: 80
End: 2020-08-28

## 2020-08-28 ENCOUNTER — TELEPHONE (OUTPATIENT)
Dept: FAMILY MEDICINE CLINIC | Facility: CLINIC | Age: 80
End: 2020-08-28

## 2020-08-28 ENCOUNTER — HOSPITAL ENCOUNTER (EMERGENCY)
Facility: HOSPITAL | Age: 80
Discharge: HOME | DRG: 988 | End: 2020-08-28
Attending: EMERGENCY MEDICINE
Payer: MEDICARE

## 2020-08-28 ENCOUNTER — HOSPITAL ENCOUNTER (INPATIENT)
Facility: HOSPITAL | Age: 80
LOS: 5 days | Discharge: HOME HEALTH | DRG: 988 | End: 2020-09-03
Attending: EMERGENCY MEDICINE | Admitting: HOSPITALIST
Payer: MEDICARE

## 2020-08-28 VITALS
HEIGHT: 60 IN | OXYGEN SATURATION: 97 % | TEMPERATURE: 100 F | DIASTOLIC BLOOD PRESSURE: 89 MMHG | HEART RATE: 80 BPM | WEIGHT: 130.06 LBS | BODY MASS INDEX: 25.53 KG/M2 | SYSTOLIC BLOOD PRESSURE: 145 MMHG | RESPIRATION RATE: 16 BRPM

## 2020-08-28 DIAGNOSIS — S01.81XA FACIAL LACERATION, INITIAL ENCOUNTER: ICD-10-CM

## 2020-08-28 DIAGNOSIS — G93.40 ACUTE ENCEPHALOPATHY: ICD-10-CM

## 2020-08-28 DIAGNOSIS — S06.5X9A ACUTE SUBDURAL HEMATOMA (HCC): ICD-10-CM

## 2020-08-28 DIAGNOSIS — N39.0 URINARY TRACT INFECTION WITHOUT HEMATURIA, SITE UNSPECIFIED: Primary | ICD-10-CM

## 2020-08-28 DIAGNOSIS — N30.00 ACUTE CYSTITIS WITHOUT HEMATURIA: Primary | ICD-10-CM

## 2020-08-28 LAB
ALBUMIN SERPL-MCNC: 3.2 G/DL (ref 3.4–5)
ALBUMIN/GLOB SERPL: 0.8 {RATIO} (ref 1–2)
ALP LIVER SERPL-CCNC: 117 U/L (ref 55–142)
ALT SERPL-CCNC: 16 U/L (ref 13–56)
ANION GAP SERPL CALC-SCNC: 7 MMOL/L (ref 0–18)
AST SERPL-CCNC: 14 U/L (ref 15–37)
ATRIAL RATE: 105 BPM
BASOPHILS # BLD: 0.03 X10(3) UL (ref 0–0.2)
BASOPHILS NFR BLD: 1 %
BILIRUB SERPL-MCNC: 0.5 MG/DL (ref 0.1–2)
BILIRUB UR QL STRIP.AUTO: NEGATIVE
BUN BLD-MCNC: 28 MG/DL (ref 7–18)
BUN/CREAT SERPL: 17.6 (ref 10–20)
CALCIUM BLD-MCNC: 9.6 MG/DL (ref 8.5–10.1)
CHLORIDE SERPL-SCNC: 110 MMOL/L (ref 98–112)
CLARITY UR REFRACT.AUTO: CLEAR
CO2 SERPL-SCNC: 24 MMOL/L (ref 21–32)
COLOR UR AUTO: YELLOW
CREAT BLD-MCNC: 1.59 MG/DL (ref 0.55–1.02)
DEPRECATED RDW RBC AUTO: 53.6 FL (ref 35.1–46.3)
EOSINOPHIL # BLD: 0.1 X10(3) UL (ref 0–0.7)
EOSINOPHIL NFR BLD: 3 %
ERYTHROCYTE [DISTWIDTH] IN BLOOD BY AUTOMATED COUNT: 13.5 % (ref 11–15)
GLOBULIN PLAS-MCNC: 3.9 G/DL (ref 2.8–4.4)
GLUCOSE BLD-MCNC: 96 MG/DL (ref 70–99)
GLUCOSE UR STRIP.AUTO-MCNC: NEGATIVE MG/DL
HCT VFR BLD AUTO: 34.2 % (ref 35–48)
HGB BLD-MCNC: 10.7 G/DL (ref 12–16)
KETONES UR STRIP.AUTO-MCNC: NEGATIVE MG/DL
LYMPHOCYTES NFR BLD: 0.63 X10(3) UL (ref 1–4)
LYMPHOCYTES NFR BLD: 19 %
M PROTEIN MFR SERPL ELPH: 7.1 G/DL (ref 6.4–8.2)
MCH RBC QN AUTO: 33.4 PG (ref 26–34)
MCHC RBC AUTO-ENTMCNC: 31.3 G/DL (ref 31–37)
MCV RBC AUTO: 106.9 FL (ref 80–100)
MONOCYTES # BLD: 0.63 X10(3) UL (ref 0.1–1)
MONOCYTES NFR BLD: 19 %
NEUTROPHILS # BLD AUTO: 1.54 X10 (3) UL (ref 1.5–7.7)
NEUTROPHILS NFR BLD: 44 %
NEUTS BAND NFR BLD: 14 %
NEUTS HYPERSEG # BLD: 1.91 X10(3) UL (ref 1.5–7.7)
NITRITE UR QL STRIP.AUTO: NEGATIVE
OSMOLALITY SERPL CALC.SUM OF ELEC: 297 MOSM/KG (ref 275–295)
P AXIS: 75 DEGREES
P-R INTERVAL: 164 MS
PH UR STRIP.AUTO: 5 [PH] (ref 4.5–8)
PLATELET # BLD AUTO: 157 10(3)UL (ref 150–450)
PLATELET MORPHOLOGY: NORMAL
POTASSIUM SERPL-SCNC: 3.8 MMOL/L (ref 3.5–5.1)
PROT UR STRIP.AUTO-MCNC: 100 MG/DL
Q-T INTERVAL: 344 MS
QRS DURATION: 84 MS
QTC CALCULATION (BEZET): 454 MS
R AXIS: -5 DEGREES
RBC # BLD AUTO: 3.2 X10(6)UL (ref 3.8–5.3)
RBC UR QL AUTO: NEGATIVE
SODIUM SERPL-SCNC: 141 MMOL/L (ref 136–145)
SP GR UR STRIP.AUTO: 1.01 (ref 1–1.03)
T AXIS: 40 DEGREES
TOTAL CELLS COUNTED: 100
UROBILINOGEN UR STRIP.AUTO-MCNC: <2 MG/DL
VENTRICULAR RATE: 105 BPM
WBC # BLD AUTO: 3.3 X10(3) UL (ref 4–11)
WBC #/AREA URNS AUTO: >50 /HPF

## 2020-08-28 PROCEDURE — 99284 EMERGENCY DEPT VISIT MOD MDM: CPT

## 2020-08-28 PROCEDURE — 80053 COMPREHEN METABOLIC PANEL: CPT | Performed by: EMERGENCY MEDICINE

## 2020-08-28 PROCEDURE — 85027 COMPLETE CBC AUTOMATED: CPT | Performed by: EMERGENCY MEDICINE

## 2020-08-28 PROCEDURE — 96361 HYDRATE IV INFUSION ADD-ON: CPT

## 2020-08-28 PROCEDURE — 0JQ10ZZ REPAIR FACE SUBCUTANEOUS TISSUE AND FASCIA, OPEN APPROACH: ICD-10-PCS | Performed by: EMERGENCY MEDICINE

## 2020-08-28 PROCEDURE — 96365 THER/PROPH/DIAG IV INF INIT: CPT

## 2020-08-28 PROCEDURE — 85007 BL SMEAR W/DIFF WBC COUNT: CPT | Performed by: EMERGENCY MEDICINE

## 2020-08-28 PROCEDURE — 85025 COMPLETE CBC W/AUTO DIFF WBC: CPT | Performed by: EMERGENCY MEDICINE

## 2020-08-28 PROCEDURE — 81001 URINALYSIS AUTO W/SCOPE: CPT | Performed by: EMERGENCY MEDICINE

## 2020-08-28 RX ORDER — ACETAMINOPHEN 500 MG
1000 TABLET ORAL ONCE
Status: COMPLETED | OUTPATIENT
Start: 2020-08-28 | End: 2020-08-28

## 2020-08-28 RX ORDER — DILTIAZEM HYDROCHLORIDE 120 MG/1
120 CAPSULE, COATED, EXTENDED RELEASE ORAL DAILY
Qty: 30 CAPSULE | Refills: 0 | OUTPATIENT
Start: 2020-08-28

## 2020-08-28 RX ORDER — LOSARTAN POTASSIUM 100 MG/1
100 TABLET ORAL DAILY
Qty: 90 TABLET | Refills: 1 | Status: SHIPPED | OUTPATIENT
Start: 2020-08-28 | End: 2020-10-16

## 2020-08-28 RX ORDER — DILTIAZEM HYDROCHLORIDE 120 MG/1
CAPSULE, EXTENDED RELEASE ORAL
Qty: 90 CAPSULE | Refills: 1 | Status: SHIPPED | OUTPATIENT
Start: 2020-08-28 | End: 2020-10-02 | Stop reason: DRUGHIGH

## 2020-08-28 RX ORDER — SODIUM CHLORIDE 9 MG/ML
125 INJECTION, SOLUTION INTRAVENOUS CONTINUOUS
Status: DISCONTINUED | OUTPATIENT
Start: 2020-08-28 | End: 2020-08-28

## 2020-08-28 NOTE — TELEPHONE ENCOUNTER
Rx Request  CARTIA  MG Oral Capsule SR 24 Hr    Disp:      30              R: 0    Last Visit: 08/07/2020    Last Refilled: 07/27/2020    Protocol Passed?  Yes[ x ]       No[  ]

## 2020-08-28 NOTE — ED NOTES
PT alert, oriented and in no apparent distress. Reports mild headache, but denies need for medication at this time. Pt was ambulatory to the restroom without assistance. Gait steady.

## 2020-08-28 NOTE — TELEPHONE ENCOUNTER
Pt had an ER visit today for confusion.   Son was given instruction by ER doc on how to continue with bp medication (see ER note) and son wants Dr. Salud Solitario opinion if this is okay

## 2020-08-28 NOTE — TELEPHONE ENCOUNTER
Please review previous message this is what Er advised Re BP  If blood pressure is higher than 160/100 when he goes home, it is okay to give her morning blood pressure medications.   If not okay to wait for tomorrow to restart medications

## 2020-08-28 NOTE — TELEPHONE ENCOUNTER
From: Daniella Car  To: Adriana Valladares DO  Sent: 8/27/2020 8:18 PM CDT  Subject: Visit Follow-up Question    Hi Doctor,  Would there be any possibility for you to see my mom tomorrow sometime during the day.  She was kind of confused and her blood pressu

## 2020-08-28 NOTE — TELEPHONE ENCOUNTER
Serenity Ferrara took her back to ER this am, they are giving her IV Rocephin   He is concerned about her BP meds and was given instructions which I reinforced was good. He will give her oral Keflex as ordered.

## 2020-08-28 NOTE — ED INITIAL ASSESSMENT (HPI)
Pt here yesterday for same, dx with uti, prescribed meds but didn't take any yet, per medics son states pt more confused had mri yesterday

## 2020-08-28 NOTE — TELEPHONE ENCOUNTER
Patients son called. Patient will not have enough of the medication listed below to get thru the weekend. CARTIA  MG Oral Capsule SR 24 Hr    losartan 100 MG Oral Tab    [address] [facility] [place], [place] - [address], [phone]    Please advise.

## 2020-08-28 NOTE — ED PROVIDER NOTES
Patient Seen in: [facility] Emergency Department      History   Patient presents with:  Urinary Symptoms  Altered Mental Status    Stated Complaint: confused, uti    HPI    80-year-old female presents for evaluation of altered mental status.   Son  perforation or abscess without bleeding 1/14/2019   • Dysphagia, pharyngoesophageal phase 10/10/2018   • Epistaxis    • Esophageal reflux    • Exposure to unspecified radiation 2013    right lower lung   • Gluteal tendinitis 9/10/2013   • Gram-negative pne 5726,1217    bunionectomy,bilateral   • OTHER SURGICAL HISTORY  12/05/2017    Cysto- Dr. Toscano Boards  2009    removal of upper right lobe   • TONSILLECTOMY                      Social History    Tobacco Use      Smoking status: Never RBC URINE 3-5 (*)     Mucous Urine 1+ (*)     All other components within normal limits   MANUAL DIFFERENTIAL - Abnormal; Notable for the following components:    Lymphocyte Absolute Manual 0.63 (*)     RBC Morphology See morphology below (*)     All other diagnosis)    Disposition:  Discharge  8/28/2020 12:32 pm    Follow-up:  Timothy Coughlin DO  [address] [place] [address] [address] [phone]                Medications Prescribed:  Current Discharge Medication List

## 2020-08-29 ENCOUNTER — APPOINTMENT (OUTPATIENT)
Dept: CT IMAGING | Facility: HOSPITAL | Age: 80
DRG: 988 | End: 2020-08-29
Attending: EMERGENCY MEDICINE
Payer: MEDICARE

## 2020-08-29 ENCOUNTER — APPOINTMENT (OUTPATIENT)
Dept: CT IMAGING | Facility: HOSPITAL | Age: 80
DRG: 988 | End: 2020-08-29
Attending: NURSE PRACTITIONER
Payer: MEDICARE

## 2020-08-29 PROBLEM — S01.81XA FACIAL LACERATION, INITIAL ENCOUNTER: Status: ACTIVE | Noted: 2020-08-29

## 2020-08-29 PROBLEM — S06.5X9A ACUTE SUBDURAL HEMATOMA (HCC): Status: ACTIVE | Noted: 2020-08-29

## 2020-08-29 PROBLEM — N39.0 URINARY TRACT INFECTION WITHOUT HEMATURIA, SITE UNSPECIFIED: Status: ACTIVE | Noted: 2020-08-29

## 2020-08-29 PROBLEM — N39.0 URINARY TRACT INFECTION WITHOUT HEMATURIA: Status: ACTIVE | Noted: 2020-08-29

## 2020-08-29 PROBLEM — G93.40 ACUTE ENCEPHALOPATHY: Status: ACTIVE | Noted: 2020-08-29

## 2020-08-29 PROBLEM — S06.5XAA ACUTE SUBDURAL HEMATOMA (HCC): Status: ACTIVE | Noted: 2020-08-29

## 2020-08-29 PROBLEM — S06.5X9A ACUTE SUBDURAL HEMATOMA: Status: ACTIVE | Noted: 2020-08-29

## 2020-08-29 PROBLEM — S06.5XAA ACUTE SUBDURAL HEMATOMA: Status: ACTIVE | Noted: 2020-08-29

## 2020-08-29 LAB
ALBUMIN SERPL-MCNC: 3.2 G/DL (ref 3.4–5)
ALBUMIN SERPL-MCNC: 3.2 G/DL (ref 3.4–5)
ALBUMIN/GLOB SERPL: 0.8 {RATIO} (ref 1–2)
ALBUMIN/GLOB SERPL: 0.9 {RATIO} (ref 1–2)
ALP LIVER SERPL-CCNC: 112 U/L (ref 55–142)
ALP LIVER SERPL-CCNC: 116 U/L (ref 55–142)
ALT SERPL-CCNC: 14 U/L (ref 13–56)
ALT SERPL-CCNC: 16 U/L (ref 13–56)
ANION GAP SERPL CALC-SCNC: 6 MMOL/L (ref 0–18)
ANION GAP SERPL CALC-SCNC: 7 MMOL/L (ref 0–18)
ANION GAP SERPL CALC-SCNC: 7 MMOL/L (ref 0–18)
APTT PPP: 28.8 SECONDS (ref 25.4–36.1)
AST SERPL-CCNC: 17 U/L (ref 15–37)
AST SERPL-CCNC: 19 U/L (ref 15–37)
ATRIAL RATE: 107 BPM
BASOPHILS # BLD: 0 X10(3) UL (ref 0–0.2)
BASOPHILS # BLD: 0.03 X10(3) UL (ref 0–0.2)
BASOPHILS NFR BLD: 0 %
BASOPHILS NFR BLD: 1 %
BILIRUB SERPL-MCNC: 0.5 MG/DL (ref 0.1–2)
BILIRUB SERPL-MCNC: 0.6 MG/DL (ref 0.1–2)
BUN BLD-MCNC: 26 MG/DL (ref 7–18)
BUN BLD-MCNC: 26 MG/DL (ref 7–18)
BUN BLD-MCNC: 27 MG/DL (ref 7–18)
BUN/CREAT SERPL: 18.1 (ref 10–20)
BUN/CREAT SERPL: 18.7 (ref 10–20)
BUN/CREAT SERPL: 18.7 (ref 10–20)
CALCIUM BLD-MCNC: 8.9 MG/DL (ref 8.5–10.1)
CALCIUM BLD-MCNC: 9.4 MG/DL (ref 8.5–10.1)
CALCIUM BLD-MCNC: 9.4 MG/DL (ref 8.5–10.1)
CHLORIDE SERPL-SCNC: 108 MMOL/L (ref 98–112)
CHLORIDE SERPL-SCNC: 108 MMOL/L (ref 98–112)
CHLORIDE SERPL-SCNC: 110 MMOL/L (ref 98–112)
CHOLEST SMN-MCNC: 283 MG/DL (ref ?–200)
CO2 SERPL-SCNC: 24 MMOL/L (ref 21–32)
CO2 SERPL-SCNC: 24 MMOL/L (ref 21–32)
CO2 SERPL-SCNC: 25 MMOL/L (ref 21–32)
CREAT BLD-MCNC: 1.39 MG/DL (ref 0.55–1.02)
CREAT BLD-MCNC: 1.39 MG/DL (ref 0.55–1.02)
CREAT BLD-MCNC: 1.49 MG/DL (ref 0.55–1.02)
DEPRECATED RDW RBC AUTO: 52.1 FL (ref 35.1–46.3)
DEPRECATED RDW RBC AUTO: 52.3 FL (ref 35.1–46.3)
EOSINOPHIL # BLD: 0.06 X10(3) UL (ref 0–0.7)
EOSINOPHIL # BLD: 0.13 X10(3) UL (ref 0–0.7)
EOSINOPHIL NFR BLD: 2 %
EOSINOPHIL NFR BLD: 4 %
ERYTHROCYTE [DISTWIDTH] IN BLOOD BY AUTOMATED COUNT: 13.5 % (ref 11–15)
ERYTHROCYTE [DISTWIDTH] IN BLOOD BY AUTOMATED COUNT: 13.7 % (ref 11–15)
EST. AVERAGE GLUCOSE BLD GHB EST-MCNC: 97 MG/DL (ref 68–126)
GLOBULIN PLAS-MCNC: 3.6 G/DL (ref 2.8–4.4)
GLOBULIN PLAS-MCNC: 3.8 G/DL (ref 2.8–4.4)
GLUCOSE BLD-MCNC: 101 MG/DL (ref 70–99)
GLUCOSE BLD-MCNC: 101 MG/DL (ref 70–99)
GLUCOSE BLD-MCNC: 104 MG/DL (ref 70–99)
GLUCOSE BLD-MCNC: 113 MG/DL (ref 70–99)
GLUCOSE BLD-MCNC: 117 MG/DL (ref 70–99)
GLUCOSE BLD-MCNC: 97 MG/DL (ref 70–99)
GLUCOSE BLD-MCNC: 98 MG/DL (ref 70–99)
HAV IGM SER QL: 1.7 MG/DL (ref 1.6–2.6)
HBA1C MFR BLD HPLC: 5 % (ref ?–5.7)
HCT VFR BLD AUTO: 34.2 % (ref 35–48)
HCT VFR BLD AUTO: 34.6 % (ref 35–48)
HDLC SERPL-MCNC: 55 MG/DL (ref 40–59)
HGB BLD-MCNC: 11.1 G/DL (ref 12–16)
HGB BLD-MCNC: 11.1 G/DL (ref 12–16)
INR BLD: 1.06 (ref 0.89–1.11)
LDLC SERPL CALC-MCNC: 201 MG/DL (ref ?–100)
LYMPHOCYTES NFR BLD: 0.7 X10(3) UL (ref 1–4)
LYMPHOCYTES NFR BLD: 0.83 X10(3) UL (ref 1–4)
LYMPHOCYTES NFR BLD: 22 %
LYMPHOCYTES NFR BLD: 26 %
M PROTEIN MFR SERPL ELPH: 6.8 G/DL (ref 6.4–8.2)
M PROTEIN MFR SERPL ELPH: 7 G/DL (ref 6.4–8.2)
MCH RBC QN AUTO: 33.3 PG (ref 26–34)
MCH RBC QN AUTO: 33.4 PG (ref 26–34)
MCHC RBC AUTO-ENTMCNC: 32.1 G/DL (ref 31–37)
MCHC RBC AUTO-ENTMCNC: 32.5 G/DL (ref 31–37)
MCV RBC AUTO: 103 FL (ref 80–100)
MCV RBC AUTO: 103.9 FL (ref 80–100)
MONOCYTES # BLD: 0.35 X10(3) UL (ref 0.1–1)
MONOCYTES # BLD: 0.51 X10(3) UL (ref 0.1–1)
MONOCYTES NFR BLD: 11 %
MONOCYTES NFR BLD: 16 %
NEUTROPHILS # BLD AUTO: 1.36 X10 (3) UL (ref 1.5–7.7)
NEUTROPHILS # BLD AUTO: 1.43 X10 (3) UL (ref 1.5–7.7)
NEUTROPHILS NFR BLD: 46 %
NEUTROPHILS NFR BLD: 52 %
NEUTS BAND NFR BLD: 11 %
NEUTS BAND NFR BLD: 9 %
NEUTS HYPERSEG # BLD: 1.76 X10(3) UL (ref 1.5–7.7)
NEUTS HYPERSEG # BLD: 2.02 X10(3) UL (ref 1.5–7.7)
NONHDLC SERPL-MCNC: 228 MG/DL (ref ?–130)
OSMOLALITY SERPL CALC.SUM OF ELEC: 293 MOSM/KG (ref 275–295)
OSMOLALITY SERPL CALC.SUM OF ELEC: 293 MOSM/KG (ref 275–295)
OSMOLALITY SERPL CALC.SUM OF ELEC: 297 MOSM/KG (ref 275–295)
P AXIS: 58 DEGREES
P-R INTERVAL: 176 MS
PLATELET # BLD AUTO: 154 10(3)UL (ref 150–450)
PLATELET # BLD AUTO: 168 10(3)UL (ref 150–450)
PLATELET MORPHOLOGY: NORMAL
PLATELET MORPHOLOGY: NORMAL
POTASSIUM SERPL-SCNC: 3.5 MMOL/L (ref 3.5–5.1)
POTASSIUM SERPL-SCNC: 3.5 MMOL/L (ref 3.5–5.1)
POTASSIUM SERPL-SCNC: 3.8 MMOL/L (ref 3.5–5.1)
PSA SERPL DL<=0.01 NG/ML-MCNC: 14.1 SECONDS (ref 12.4–14.6)
Q-T INTERVAL: 360 MS
QRS DURATION: 88 MS
QTC CALCULATION (BEZET): 480 MS
R AXIS: 5 DEGREES
RBC # BLD AUTO: 3.32 X10(6)UL (ref 3.8–5.3)
RBC # BLD AUTO: 3.33 X10(6)UL (ref 3.8–5.3)
SARS-COV-2 RNA RESP QL NAA+PROBE: NOT DETECTED
SODIUM SERPL-SCNC: 139 MMOL/L (ref 136–145)
SODIUM SERPL-SCNC: 139 MMOL/L (ref 136–145)
SODIUM SERPL-SCNC: 141 MMOL/L (ref 136–145)
T AXIS: 40 DEGREES
TOTAL CELLS COUNTED: 100
TOTAL CELLS COUNTED: 100
TRIGL SERPL-MCNC: 137 MG/DL (ref 30–149)
TROPONIN I SERPL-MCNC: <0.045 NG/ML (ref ?–0.04)
VENTRICULAR RATE: 107 BPM
VLDLC SERPL CALC-MCNC: 27 MG/DL (ref 0–30)
WBC # BLD AUTO: 3.2 X10(3) UL (ref 4–11)
WBC # BLD AUTO: 3.2 X10(3) UL (ref 4–11)

## 2020-08-29 PROCEDURE — 99233 SBSQ HOSP IP/OBS HIGH 50: CPT | Performed by: NEUROLOGICAL SURGERY

## 2020-08-29 PROCEDURE — 70450 CT HEAD/BRAIN W/O DYE: CPT | Performed by: EMERGENCY MEDICINE

## 2020-08-29 PROCEDURE — 99291 CRITICAL CARE FIRST HOUR: CPT | Performed by: NURSE PRACTITIONER

## 2020-08-29 PROCEDURE — 99223 1ST HOSP IP/OBS HIGH 75: CPT | Performed by: HOSPITALIST

## 2020-08-29 PROCEDURE — 99291 CRITICAL CARE FIRST HOUR: CPT | Performed by: OTHER

## 2020-08-29 PROCEDURE — 70450 CT HEAD/BRAIN W/O DYE: CPT | Performed by: NURSE PRACTITIONER

## 2020-08-29 RX ORDER — HYDRALAZINE HYDROCHLORIDE 20 MG/ML
10 INJECTION INTRAMUSCULAR; INTRAVENOUS
Status: DISCONTINUED | OUTPATIENT
Start: 2020-08-29 | End: 2020-09-03

## 2020-08-29 RX ORDER — METOCLOPRAMIDE HYDROCHLORIDE 5 MG/ML
10 INJECTION INTRAMUSCULAR; INTRAVENOUS EVERY 8 HOURS PRN
Status: DISCONTINUED | OUTPATIENT
Start: 2020-08-29 | End: 2020-08-29

## 2020-08-29 RX ORDER — ONDANSETRON 2 MG/ML
4 INJECTION INTRAMUSCULAR; INTRAVENOUS EVERY 6 HOURS PRN
Status: DISCONTINUED | OUTPATIENT
Start: 2020-08-29 | End: 2020-08-29

## 2020-08-29 RX ORDER — LOSARTAN POTASSIUM 100 MG/1
100 TABLET ORAL DAILY
Status: DISCONTINUED | OUTPATIENT
Start: 2020-08-29 | End: 2020-09-03

## 2020-08-29 RX ORDER — MONTELUKAST SODIUM 10 MG/1
10 TABLET ORAL NIGHTLY
Status: DISCONTINUED | OUTPATIENT
Start: 2020-08-29 | End: 2020-09-03

## 2020-08-29 RX ORDER — MORPHINE SULFATE 4 MG/ML
1 INJECTION, SOLUTION INTRAMUSCULAR; INTRAVENOUS EVERY 2 HOUR PRN
Status: DISCONTINUED | OUTPATIENT
Start: 2020-08-29 | End: 2020-08-29

## 2020-08-29 RX ORDER — DILTIAZEM HYDROCHLORIDE 120 MG/1
120 CAPSULE, EXTENDED RELEASE ORAL DAILY
Status: DISCONTINUED | OUTPATIENT
Start: 2020-08-29 | End: 2020-09-03

## 2020-08-29 RX ORDER — SODIUM CHLORIDE 9 MG/ML
INJECTION, SOLUTION INTRAVENOUS CONTINUOUS
Status: DISCONTINUED | OUTPATIENT
Start: 2020-08-29 | End: 2020-08-29

## 2020-08-29 RX ORDER — ACETAMINOPHEN 325 MG/1
650 TABLET ORAL EVERY 6 HOURS PRN
Status: DISCONTINUED | OUTPATIENT
Start: 2020-08-29 | End: 2020-08-29

## 2020-08-29 RX ORDER — METOCLOPRAMIDE HYDROCHLORIDE 5 MG/ML
5 INJECTION INTRAMUSCULAR; INTRAVENOUS EVERY 8 HOURS PRN
Status: DISCONTINUED | OUTPATIENT
Start: 2020-08-29 | End: 2020-09-01

## 2020-08-29 RX ORDER — LORAZEPAM 2 MG/ML
1 INJECTION INTRAMUSCULAR
Status: DISCONTINUED | OUTPATIENT
Start: 2020-08-29 | End: 2020-09-03

## 2020-08-29 RX ORDER — FAMOTIDINE 10 MG/ML
20 INJECTION, SOLUTION INTRAVENOUS DAILY
Status: DISCONTINUED | OUTPATIENT
Start: 2020-08-29 | End: 2020-08-29

## 2020-08-29 RX ORDER — MORPHINE SULFATE 4 MG/ML
2 INJECTION, SOLUTION INTRAMUSCULAR; INTRAVENOUS EVERY 2 HOUR PRN
Status: DISCONTINUED | OUTPATIENT
Start: 2020-08-29 | End: 2020-08-29

## 2020-08-29 RX ORDER — ONDANSETRON 2 MG/ML
4 INJECTION INTRAMUSCULAR; INTRAVENOUS EVERY 6 HOURS PRN
Status: DISCONTINUED | OUTPATIENT
Start: 2020-08-29 | End: 2020-09-03

## 2020-08-29 RX ORDER — PHENYLEPHRINE HCL IN 0.9% NACL 50MG/250ML
PLASTIC BAG, INJECTION (ML) INTRAVENOUS CONTINUOUS PRN
Status: DISCONTINUED | OUTPATIENT
Start: 2020-08-29 | End: 2020-08-29

## 2020-08-29 RX ORDER — LABETALOL HYDROCHLORIDE 5 MG/ML
10 INJECTION, SOLUTION INTRAVENOUS EVERY 10 MIN PRN
Status: COMPLETED | OUTPATIENT
Start: 2020-08-29 | End: 2020-08-29

## 2020-08-29 RX ORDER — METOPROLOL SUCCINATE 100 MG/1
100 TABLET, EXTENDED RELEASE ORAL
Status: DISCONTINUED | OUTPATIENT
Start: 2020-08-29 | End: 2020-09-03

## 2020-08-29 RX ORDER — FAMOTIDINE 20 MG/1
20 TABLET ORAL DAILY
Status: DISCONTINUED | OUTPATIENT
Start: 2020-08-29 | End: 2020-08-29

## 2020-08-29 RX ORDER — FAMOTIDINE 20 MG/1
20 TABLET ORAL DAILY
Status: DISCONTINUED | OUTPATIENT
Start: 2020-08-29 | End: 2020-09-03

## 2020-08-29 RX ORDER — SENNOSIDES 8.6 MG
17.2 TABLET ORAL NIGHTLY
Status: DISCONTINUED | OUTPATIENT
Start: 2020-08-29 | End: 2020-09-03

## 2020-08-29 RX ORDER — IPRATROPIUM BROMIDE 21 UG/1
1 SPRAY, METERED NASAL 2 TIMES DAILY
Status: DISCONTINUED | OUTPATIENT
Start: 2020-08-29 | End: 2020-09-03

## 2020-08-29 RX ORDER — ACETAMINOPHEN 650 MG/1
650 SUPPOSITORY RECTAL EVERY 4 HOURS PRN
Status: DISCONTINUED | OUTPATIENT
Start: 2020-08-29 | End: 2020-09-03

## 2020-08-29 RX ORDER — ACETAMINOPHEN 325 MG/1
650 TABLET ORAL EVERY 4 HOURS PRN
Status: DISCONTINUED | OUTPATIENT
Start: 2020-08-29 | End: 2020-09-03

## 2020-08-29 RX ORDER — METOCLOPRAMIDE HYDROCHLORIDE 5 MG/ML
5 INJECTION INTRAMUSCULAR; INTRAVENOUS EVERY 8 HOURS PRN
Status: DISCONTINUED | OUTPATIENT
Start: 2020-08-29 | End: 2020-08-29

## 2020-08-29 NOTE — PROGRESS NOTES
NEUROCRITICAL CARE PROGRESS NOTE    Miranda Malloy Patient Status:  Inpatient    1940 MRN [id number]   [facility] 6NE-A Attending Hakeem Arevalo MD   Hosp Day # 0 PCP Jamshid Zafarch,      Chief Complaint/Reason for Admission: Hb/Hct  - Monitor Coags  Recent Labs   Lab 08/29/20  0027 08/29/20 0334   HGB 11.1* 11.1*   HCT 34.6* 34.2*   .0 154.0     Recent Labs   Lab 08/29/20 0334   PTT 28.8   INR 1.06     GI:  GERD  - Diet: Cardiac diet  - Continue famotidine  - Prn bo 1.1 oz (59 kg)  08/27/20 : 130 lb (59 kg)    General: Awake, alert. Uncooperative. Restless. CVS: S1 + S2, RRR. Equal pulses bilaterally. Pulm: CTA bilaterally  Abdomen: Soft ND/NT. BS +  Skin: small laceration on bridge of nose.   Extremities: No clubbi

## 2020-08-29 NOTE — PLAN OF CARE
Assumed care at [address] [facility]. Pt following commands and only oriented to self and time (month/year). 0500 neuro assessment- pt had delayed responses, only oriented to self, and not following commands as well.  Charge RN assessed as well and neuro APN called, STAT

## 2020-08-29 NOTE — H&P
[facility]  History and Physical     Burgess Yoder Patient Status:  Emergency    1940 MRN [id number]   Location [address] [facility] Attending Volodymyr Mckeon MD   Hosp Day # 0 PCP Catherine Wytat DO     Chief Complaint 4/3/2018   • Hypokalemia    • Leukocytosis 4/3/2018   • Low grade B-cell lymphoma ([facility] [address] 9/10/2013   • Malignant neoplasm of bronchus and lung, unspecified site 6/29/2012   • Mass of right forearm 61/4/1046   • Metabolic acidosis 5/0/1060   • NHL (nodular h system) Mother    • Cancer Father         of the mouth   • Hypertension Father         Allergies:   Penicillins             ITCHING  Radiology Contrast *    OTHER (SEE COMMENTS)    Comment:HARSH ON KIDNEYS    Medications:  No current facility-administered (SINGULAIR) 10 MG Oral Tab, Take 10 mg by mouth nightly., Disp: , Rfl:   Saline Nasal Spray 0.65 % Nasal Solution, 1 spray by Nasal route as needed for congestion. , Disp: , Rfl:   acetaminophen 500 MG Oral Tab, Take 500 mg by mouth every 6 (six) hours as n to CCU  2. Follow-up CT results  3. NS and N-CC consult  2. Acute encephalopathy d/t UTI  3. UTI  1. IV abx  2. FOllow-up UC  4. COPD  1. BD  5. Essential hypertension  1. Cardizem  2. Toprol  3. Losartan  4. Monitor hemodynamics  5. Telemetry  6.  Chronic

## 2020-08-29 NOTE — PROGRESS NOTES
[facility]  Neurocritical Care APRN Progress Note    NAME: Terese Bellamy - ROOM: 0271/5059-L - MRN: [id number] - Age: 79year old - :1940    History Of Present Illness:  Terese Bellamy is a 79year old female with PMHx signi • Hearing impairment     bilateral hearing aids    • Hearing loss    • Hemorrhoids    • HIGH BLOOD PRESSURE    • History of lung cancer 2/18/2014   • Hyperglycemia 4/3/2018   • Hypokalemia    • Leukocytosis 4/3/2018   • Low grade B-cell lymphoma ([facility] [address] 9/10 Neck: Supple, symmetrical, trachea midline, no carotid bruits, adenopathy, or JVD  Lungs: Clear to auscultation bilaterally, respirations unlabored  Heart: Regular rate and rhythm, S1 and S2 normal, no murmur, rub or gallop  Abdomen: Soft, non-tender, bo CONCLUSION:  1. There is renal cortical increased echogenicity bilaterally consistent chronic medical renal disease. 2. No obstructive uropathy. 3. Multiple bilateral simple cysts and a probable complex right upper pole renal cyst.  Details above.      Dict - Maintain SBP between 100-150 mmHg  - Cardene gtt PRN to maintain -150  - Labetalol 10mg IV PRN   - 0.9NS @ 75/hr  - Lipid panel.   Restart/Continue Statin, goal LDL<70 and HDL>40  - Hold all anticoagulants and antiplatelets   - CT scan in 6 hours (

## 2020-08-29 NOTE — ED PROVIDER NOTES
Patient Seen in: [facility] Emergency Department      History   Patient presents with:  Fall    Stated Complaint: fall out of bed, nose lac    HPI    Patient 71-year-old woman with multiple medical problems outlined below including low-grade myelopr bleeding 1/14/2019   • Dysphagia, pharyngoesophageal phase 10/10/2018   • Epistaxis    • Esophageal reflux    • Exposure to unspecified radiation 2013    right lower lung   • Gluteal tendinitis 9/10/2013   • Gram[facility]negative [facility]    • Hearing impai bunionectomy,bilateral   • OTHER SURGICAL HISTORY  12/05/2017    Cysto- Dr. Aaron Orozco  2009    removal of upper right lobe   • TONSILLECTOMY                      Social History    Tobacco Use      Smoking status: Never Smoker      S Behavior normal.              ED Course     Labs Reviewed   COMP METABOLIC PANEL (14) - Abnormal; Notable for the following components:       Result Value    Glucose 104 (*)     BUN 27 (*)     Creatinine 1.49 (*)     Calculated Osmolality 297 (*)     GFR, DIFFERENTIAL - Abnormal; Notable for the following components:    WBC 3.2 (*)     RBC 3.33 (*)     HGB 11.1 (*)     HCT 34.6 (*)     .9 (*)     RDW-SD 52.3 (*)     Neutrophil Absolute Prelim 1.36 (*)     All other components within normal limits   C the wound. The wound was closed using a simple closure with skin adhesive and steri strips. The quality of the closure was excellent.       A total of 35 minutes of critical care time (exclusive of billable procedures) was administered to manage the patien

## 2020-08-29 NOTE — PLAN OF CARE
Received pt around 0730. Resting in bed. Q1 neuro checks, waxes and wanes drastically. One hour will be A/Ox4 and following commands, strong and equal strength in all 4 extremities. Does have a slight left facial droop.  Appears to see better out of right

## 2020-08-29 NOTE — CONSULTS
[facility]  Neurosurgery Consult    Jennifer Marrow Frish Patient Status:  Inpatient    1940 MRN [id number]   [facility] 6NE-A Attending Rahul Correa MD   Hosp Day # 0 PCP Adrien Perez, DO     REASON FOR CONSULTATION:  Fall, marte 4/3/2018   • NHL (nodular histiocytic lymphoma) (HCC)    • Nocturnal leg cramps 8/6/2013   • Normocytic normochromic anemia 7/19/2016   • Osteoarthritis    • Osteoporosis    • Personal history of antineoplastic chemotherapy     LAST 10/2019   • Personal hi total) by mouth 2 (two) times a day for 7 days. , Disp: 14 capsule, Rfl: 0, 8/27/2020 at 2000  famoTIDine 20 MG Oral Tab, Take 1 tablet (20 mg total) by mouth daily. , Disp: 180 tablet, Rfl: 0, 8/28/2020 at 0800  ipratropium-albuterol 0.5-2.5 (3) MG/3ML Steven Johnston Unknown at Unknown time  Saline Nasal Spray 0.65 % Nasal Solution, 1 spray by Nasal route as needed for congestion. , Disp: , Rfl: , Unknown at Unknown time  acetaminophen 500 MG Oral Tab, Take 500 mg by mouth every 6 (six) hours as needed for Pain., Disp: distress. NEUROLOGICAL:     Cognition: alert and oriented x 2, knows she is in a hospital but says '[facility]'    Cranial nerves: Pupils equally round and reactive to light. EOMs intact. Face is symmetrical and sensation is intact. Tongue is midline.    Jcarlos

## 2020-08-29 NOTE — DIETARY NOTE
[facility]    NUTRITION ASSESSMENT    Pt does not meet malnutrition criteria.     NUTRITION DIAGNOSIS/PROBLEM:    Increased nutrient needs related to increased demands of healing as evidenced by recent fall with SDH    Inadequate Oral intake related to HISTORY:  Appetite: Poor  Intake: 10%  Intake Meeting Needs: No, but supplements to maximize  Food Allergies: No  Cultural/Ethnic/P[employer]ism Preferences Addresses: Yes    NUTRITION RELATED PHYSICAL FINDINGS:     1. Body Fat/Muscle Mass: not assessed per vis

## 2020-08-29 NOTE — ED NOTES
Pt arrives in loose stool. Pt changed into clean dry, pads, sheet and gown, warm blanket for comfort.

## 2020-08-29 NOTE — SLP NOTE
ADULT SWALLOWING EVALUATION    ASSESSMENT    ASSESSMENT/OVERALL IMPRESSION:  Orders received for swallow study. Pt with confusion possible expressive aphasia and waxing and waning alert state per RN. Pt easy to arouse with family present.   Pt speakin encephalopathy    Facial laceration, initial encounter    Acute subdural hematoma [facility])      Past Medical History  Past Medical History:   Diagnosis Date   • Abdominal hernia    • Azotemia 4/3/2018   • Bilateral lung cancer ([facility]    • Cancer ([facility] 2009 to organism    • Pulmonary embolism ([facility] 2012    right lung,cleared on own per pt   • Renal insufficiency 11/5/2015   • Scoliosis (and kyphoscoliosis), idiopathic 6/29/2012   • Shortness of breath    • SOB (shortness of breath) 3/27/2018   • Urticaria, un Phase of Swallow: Impaired  Bolus Retrieval: Impaired  Bilabial Seal: Impaired                Pharyngeal Phase of Swallow: Impaired           (Please note: Silent aspiration cannot be evaluated clinically.  Videofluoroscopic Swallow Study is required to rul

## 2020-08-29 NOTE — PROGRESS NOTES
Pharmacy renal dose adjustment for metoclopramide (Reglan)    [facility] has been prescribed metoclopramide 10 mg every 8 hours as needed for nausea/vomiting. Estimated Creatinine Clearance: 23.6 mL/min (A) (based on SCr of 1.39 mg/dL (H)).     Sulema Barthel

## 2020-08-29 NOTE — PLAN OF CARE
Problem: Impaired Swallowing  Goal: Minimize aspiration risk  Description  Interventions:  - Patient should be alert and upright for all feedings (90 degrees preferred)  - Offer food and liquids at a slow rate  - No straws  - Encourage small bites of cruz

## 2020-08-30 ENCOUNTER — APPOINTMENT (OUTPATIENT)
Dept: CT IMAGING | Facility: HOSPITAL | Age: 80
DRG: 988 | End: 2020-08-30
Attending: NEUROLOGICAL SURGERY
Payer: MEDICARE

## 2020-08-30 LAB
ANION GAP SERPL CALC-SCNC: 9 MMOL/L (ref 0–18)
BUN BLD-MCNC: 25 MG/DL (ref 7–18)
BUN/CREAT SERPL: 17.6 (ref 10–20)
CALCIUM BLD-MCNC: 10.1 MG/DL (ref 8.5–10.1)
CHLORIDE SERPL-SCNC: 113 MMOL/L (ref 98–112)
CO2 SERPL-SCNC: 20 MMOL/L (ref 21–32)
CREAT BLD-MCNC: 1.42 MG/DL (ref 0.55–1.02)
GLUCOSE BLD-MCNC: 121 MG/DL (ref 70–99)
GLUCOSE BLD-MCNC: 90 MG/DL (ref 70–99)
GLUCOSE BLD-MCNC: 90 MG/DL (ref 70–99)
GLUCOSE BLD-MCNC: 91 MG/DL (ref 70–99)
HAV IGM SER QL: 1.9 MG/DL (ref 1.6–2.6)
OSMOLALITY SERPL CALC.SUM OF ELEC: 298 MOSM/KG (ref 275–295)
POTASSIUM SERPL-SCNC: 4.2 MMOL/L (ref 3.5–5.1)
SODIUM SERPL-SCNC: 142 MMOL/L (ref 136–145)

## 2020-08-30 PROCEDURE — 70450 CT HEAD/BRAIN W/O DYE: CPT | Performed by: NEUROLOGICAL SURGERY

## 2020-08-30 PROCEDURE — 99232 SBSQ HOSP IP/OBS MODERATE 35: CPT | Performed by: INTERNAL MEDICINE

## 2020-08-30 PROCEDURE — 99231 SBSQ HOSP IP/OBS SF/LOW 25: CPT | Performed by: PHYSICIAN ASSISTANT

## 2020-08-30 PROCEDURE — 99233 SBSQ HOSP IP/OBS HIGH 50: CPT | Performed by: OTHER

## 2020-08-30 NOTE — PROGRESS NOTES
[facility]  Progress Note     Florrie Nageotte Patient Status:  Inpatient    1940 MRN [id number]   [facility] 7NE-A Attending Cyntha Leventhal, MD   Hosp Day # 1 PCP Lesly Cohen DO     Chief Complaint: AMS    S: Patient wi --    AST 14* 19 17  --    ALT 16 16 14  --    BILT 0.5 0.5 0.6  --    TP 7.1 6.8 7.0  --        Estimated Creatinine Clearance: 23.1 mL/min (A) (based on SCr of 1.42 mg/dL (H)).     Recent Labs   Lab 08/29/20  0334   PTP 14.1   INR 1.06       Recent Labs inpatient services that will reasonably be expected to span two midnight's based on the clinical documentation in H+P. Based on patients current state of illness, I anticipate that, after discharge, patient will require TBD.

## 2020-08-30 NOTE — PROGRESS NOTES
[id number] [facility] Neurosurgery Progress Note    Parris Read Patient Status:  Inpatient    1940 MRN [id number]   [facility] 7NE-A Attending Chet Aguilar MD   Hosp Day # 1 PCP Erik Rosales DO     CC:Patient presents with than 50%  Devora Jasso of education / counseling: Pathology, treatment options, and expected outcomes. Varsha Robles M.S., PA-C  [facility]  [address] [facility], [address] [facility], [address]  [phone]  8/30/2020  11:03 AM     I

## 2020-08-30 NOTE — PROGRESS NOTES
Patient admitted via [address] to room. Safety precautions initiated. Bed in low position. Call light in reach.     Unable to complete Neuro exam, pt uncooperative and unable to follow commands  Pt combative and agitated, paged MD for restraints ord

## 2020-08-30 NOTE — PHYSICAL THERAPY NOTE
PHYSICAL THERAPY EVALUATION - INPATIENT     Room Number: 3597/3180-I  Evaluation Date: 8/30/2020  Type of Evaluation: Initial  Physician Order: PT Eval and Treat    Presenting Problem: UTI, SDH  Reason for Therapy: Mobility Dysfunction and Discharge obstructive pulmonary disease) (HCC)     no O2   • COPD (chronic obstructive pulmonary disease) with acute bronchitis ([facility] 5/14/2019   • COPD exacerbation (HCC)    • Dependence on supplemental oxygen 12/17/2019   • Diaphragmatic hernia without mention of Kayleigh Maria MD at [facility] ENDOSCOPY   • CHOLECYSTECTOMY     • COLONOSCOPY  9/23/08   • HERNIA SURGERY     • HYSTERECTOMY  1970's    vaginal   • MPR EXCISION/BIOPSY LESION/NEVI/MASS Right 12/11/2014    Performed by Francis Charles MD at [place] [address] are grossly 4/5, decreased  strength B    Lower extremity ROM is within functional limits    Lower extremity strength is within functional limits, NT specifically due to pt confused by commands for MMT    BALANCE  Static Sitting: Fair -  Dynamic Sittin rigoberto restraint. RN reports ok to remove restraints for eval. Sitter present throughout session. Pt completes supine to sit with min assist. Pt seated at EOB with min assist. Leans posteriorly and does not self correct.  Pt completes extremity testing as no patient is demonstrating a 50.57% degree of impairment in mobility. Research supports that patients with this level of impairment may benefit from NATO. Subacute rehab is recommended for 14-17 days.   After this period of rehabilitation patient should Esperanza Big 8/30/2020

## 2020-08-30 NOTE — PLAN OF CARE
Assumed care of pt at 1900. Son at bedside. Pt confused and agitated. RA.  .  NSR/ST. Neuro per protocol. Restraints per protocol. Will continue to monitor. 0646 Paged Dr. Leonid Root for sitter. Order taken. Notified Dr. Hemant Mcknight of pt status.   Pt

## 2020-08-30 NOTE — PLAN OF CARE
Pt is oriented to self, drowsy but easily aroused. Pt very confused, speech clear but not appropriate. Pt moving all extremities with equal strength. Does not follow all commands. Restraints and sitter at bedside. PT recommending NATO. Pt sons aware.  EEG to

## 2020-08-30 NOTE — CM/SW NOTE
CM received recommendation from Ward [place] in Physical therapy for NATO for patient. Referral placed via Martin.     Seymour Dandy, RN, BSN   [phone]

## 2020-08-30 NOTE — PROGRESS NOTES
[facility]    Progress Note    Ridge Car Patient Status:  Inpatient    1940 MRN [id number]   [facility] 7NE-A Attending Stella Chavarria MD   [facility] Day # 1 PCP Daphne Valenzuela DO     Subjective:  Lara Jo is a(n) NEUROLOGICAL: Awake and alert, oriented to self, after couple attempts able to tell me her son name. Speech is fluent, inappropriate answers at time, able to follow simple commands  CN: PERRL, EOMI, face symmetric, hearing loss at baseline.  Rest intact Urinary tract infection without hematuria     Urinary tract infection without hematuria, site unspecified     Acute encephalopathy     Facial laceration, initial encounter     Acute subdural hematoma (HCC)      Delirium likely more due to UTI.  SDH is too s

## 2020-08-31 ENCOUNTER — NURSE ONLY (OUTPATIENT)
Dept: ELECTROPHYSIOLOGY | Facility: HOSPITAL | Age: 80
DRG: 988 | End: 2020-08-31
Attending: Other
Payer: MEDICARE

## 2020-08-31 LAB
ANION GAP SERPL CALC-SCNC: 10 MMOL/L (ref 0–18)
BUN BLD-MCNC: 22 MG/DL (ref 7–18)
BUN/CREAT SERPL: 16.7 (ref 10–20)
CALCIUM BLD-MCNC: 9.3 MG/DL (ref 8.5–10.1)
CHLORIDE SERPL-SCNC: 115 MMOL/L (ref 98–112)
CO2 SERPL-SCNC: 20 MMOL/L (ref 21–32)
CREAT BLD-MCNC: 1.32 MG/DL (ref 0.55–1.02)
GLUCOSE BLD-MCNC: 108 MG/DL (ref 70–99)
GLUCOSE BLD-MCNC: 89 MG/DL (ref 70–99)
GLUCOSE BLD-MCNC: 98 MG/DL (ref 70–99)
HAV IGM SER QL: 1.7 MG/DL (ref 1.6–2.6)
OSMOLALITY SERPL CALC.SUM OF ELEC: 303 MOSM/KG (ref 275–295)
POTASSIUM SERPL-SCNC: 3.3 MMOL/L (ref 3.5–5.1)
SODIUM SERPL-SCNC: 145 MMOL/L (ref 136–145)

## 2020-08-31 PROCEDURE — 99232 SBSQ HOSP IP/OBS MODERATE 35: CPT | Performed by: INTERNAL MEDICINE

## 2020-08-31 PROCEDURE — 99233 SBSQ HOSP IP/OBS HIGH 50: CPT | Performed by: OTHER

## 2020-08-31 PROCEDURE — 95816 EEG AWAKE AND DROWSY: CPT | Performed by: OTHER

## 2020-08-31 RX ORDER — MAGNESIUM OXIDE 400 MG (241.3 MG MAGNESIUM) TABLET
400 TABLET ONCE
Status: COMPLETED | OUTPATIENT
Start: 2020-08-31 | End: 2020-08-31

## 2020-08-31 RX ORDER — HALOPERIDOL 5 MG/ML
1 INJECTION INTRAMUSCULAR ONCE
Status: COMPLETED | OUTPATIENT
Start: 2020-08-31 | End: 2020-08-31

## 2020-08-31 NOTE — PROGRESS NOTES
[facility]  Progress Note     Florrie Nageotte Patient Status:  Inpatient    1940 MRN [id number]   [facility] 7NE-A Attending Cyntha Leventhal, MD   Hosp Day # 2 PCP Josi Collins DO     Chief Complaint: AMS    S: Patient wi 110 108  108 113* 115*   CO2 24.0 25.0 24.0  24.0 20.0* 20.0*   ALKPHO 117 112 116  --   --    AST 14* 19 17  --   --    ALT 16 16 14  --   --    BILT 0.5 0.5 0.6  --   --    TP 7.1 6.8 7.0  --   --        Estimated Creatinine Clearance: 24.8 mL/min (A) (b dependent on: progress  At this point Ms. Mitch Collins is expected to be discharge to: home    Plan of care discussed with patient, RN    Elva Rodriguez MD          **Certification      PHYSICIAN Certification of Need for Inpatient Hospitalization - Initial Cer

## 2020-08-31 NOTE — PROGRESS NOTES
[id number] [facility] with [facility]  8/31/2020    9:01 AM    Addendum notes, see other progress notes  Cc:  Confusion slowly brewing few days attributed to UTI, seen in ER and sent home, and fell at home hittin

## 2020-08-31 NOTE — SLP NOTE
SPEECH DAILY NOTE - INPATIENT    ASSESSMENT & PLAN   ASSESSMENT  Pt seen for dysphagia tx to assess tolerance with recommended diet, ensure appropriate utilization of aspiration precautions and reassess swallow fxn for possible upgrade in diet.  Pt found ly accuracy over 1-2 session(s). goal met   Goal #2 The patient/family/caregiver will demonstrate understanding and implementation of aspiration precautions and swallow strategies independently over 1-2 session(s).      In Progress   Goal #3  Pt will particip

## 2020-08-31 NOTE — CM/SW NOTE
Spoke with pt's son Laura Goff about [employer]. Gave accepting SNF list from [facility] to son. He wants to discuss the choices with his brother before making any decisions.   Pt has been living with both her sons and is usually independent for her adls and walks

## 2020-08-31 NOTE — PLAN OF CARE
Pt alert and oriented to self, neuro exam unchanged. Pt pleasant and cooperative. Diet advanced to reg/thin liquids. Potassium and mag replaced. EEG normal. Plan to dc home with C NATO [place]. Awaiting final plan from pt rita.

## 2020-08-31 NOTE — PROGRESS NOTES
[id number] [facility] Neurology Progress Note    Florrie Nageotte Patient Status:  Inpatient    1940 MRN [id number]   [facility] 7NE-A Attending Cyntha Leventhal, MD   Hosp Day # 2 PCP Lesly Cohen DO     NEUROLOGY   Attending Jitendra Roper Range    Glucose 89 70 - 99 mg/dL    Sodium 145 136 - 145 mmol/L    Potassium 3.3 (L) 3.5 - 5.1 mmol/L    Chloride 115 (H) 98 - 112 mmol/L    CO2 20.0 (L) 21.0 - 32.0 mmol/L    Anion Gap 10 0 - 18 mmol/L    BUN 22 (H) 7 - 18 mg/dL    Creatinine 1.32 (H) 0. anticoagulation) presented to ER with confusion, altered mental status and a fall hitting her head.       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

## 2020-08-31 NOTE — OCCUPATIONAL THERAPY NOTE
OCCUPATIONAL THERAPY EVALUATION - INPATIENT     Room Number: 9333/5443-G  Evaluation Date: 8/31/2020  Type of Evaluation: Initial  Presenting Problem: (UTI, fall, SDH)    Physician Order: IP Consult to Occupational Therapy  Reason for Therapy: ADL/IADL Dys Acute encephalopathy    Facial laceration, initial encounter    Acute subdural hematoma [facility])      Past Medical History  Past Medical History:   Diagnosis Date   • Abdominal hernia    • Azotemia 4/3/2018   • Bilateral lung cancer (HCC)    • Cancer ([facility] 20 due to organism    • Pulmonary embolism ([facility] 2012    right lung,cleared on own per pt   • Renal insufficiency 11/5/2015   • Scoliosis (and kyphoscoliosis), idiopathic 6/29/2012   • Shortness of breath    • SOB (shortness of breath) 3/27/2018   • Urticaria oriented to person  Memory:  decreased recall of recent events and decreased short term memory  Following Commands:  follows one step commands without difficulty  Initiation: cues to initiate tasks  Motor Planning: intact  Perseveration: perseverates penny assistance    Skilled Therapy Provided:   PPE worn by writer: droplet mask, gloves. Patient received with sitter present, assisting patient from EOB to standing up with RW. Patient needed min A for RW management and balance to ambulate into BR.  Easily di demonstrating a  47% degree impairment in activities of daily living. Research supports that patients with this level of impairment often benefit from SNF.      Recommend NATO when stable for discharge, elos 14-17 days    Patient Complexity  Occupational Pro

## 2020-08-31 NOTE — PLAN OF CARE
Received patient awake with family and sitter at bedside, restraints in place. Patient's conversation involved someone trying to kill her and her talking to her father about it. On room air, breath sounds diminished. On tele, SR.  Patient had been given a 5

## 2020-08-31 NOTE — CM/SW NOTE
Talked with both sons at length about d/c plan choices. They are concerned of pt getting Covid in a SNF. They said pt has a compromised immune system since being a cancer pt and are worried about her getting sick.   They are deciding on the possibility of

## 2020-09-01 ENCOUNTER — SOCIAL WORK SERVICES (OUTPATIENT)
Dept: HEMATOLOGY/ONCOLOGY | Facility: HOSPITAL | Age: 80
End: 2020-09-01

## 2020-09-01 LAB
GLUCOSE BLD-MCNC: 92 MG/DL (ref 70–99)
HAV IGM SER QL: 1.9 MG/DL (ref 1.6–2.6)
POTASSIUM SERPL-SCNC: 3.7 MMOL/L (ref 3.5–5.1)

## 2020-09-01 PROCEDURE — 99231 SBSQ HOSP IP/OBS SF/LOW 25: CPT | Performed by: NURSE PRACTITIONER

## 2020-09-01 PROCEDURE — 99232 SBSQ HOSP IP/OBS MODERATE 35: CPT | Performed by: INTERNAL MEDICINE

## 2020-09-01 RX ORDER — QUETIAPINE 25 MG/1
25 TABLET, FILM COATED ORAL NIGHTLY
Status: DISCONTINUED | OUTPATIENT
Start: 2020-09-01 | End: 2020-09-03

## 2020-09-01 NOTE — PROGRESS NOTES
completed [place] Paperwork per [employer] requests and faxed to [facility] at Z#[phone]

## 2020-09-01 NOTE — PLAN OF CARE
Pt alert. Oriented x3 at this time. States \"I am hearing I was so confused last night. I was dreaming things that I acutally thought were happening. I usually sleep well at home. I hope I get some sleep tonight. \" Both sons here.  They state this is the b

## 2020-09-01 NOTE — CM/SW NOTE
Spoke with pt's son Prosper Maurice who said he and his brother have decided not to have pt go to [facility]. They prefer to take pt home and son Prosper Maurice plans to take vacation time to stay home with pt. They would like .   Referral sent to Bulmaro Landon at [place] [address]

## 2020-09-01 NOTE — PROGRESS NOTES
[facility]  Progress Note     Michell Pierre Patient Status:  Inpatient    1940 MRN [id number]   [facility] 7NE-A Attending Reanna Edwards MD   [facility] Day # 3 PCP Zac Le DO     Chief Complaint: AMS    S: Patient wi 9. 3   ALB 3.2* 3.2* 3.2*  --   --     141 139  139 142 145   K 3.8 3.8 3.5  3.5 4.2 3.3*    110 108  108 113* 115*   CO2 24.0 25.0 24.0  24.0 20.0* 20.0*   ALKPHO 117 112 116  --   --    AST 14* 19 17  --   --    ALT 16 16 14  --   --    BILT 0 this point Ms. Evette Thomas is expected to be discharge to: home with [facility] vs [facility]    Plan of care discussed with patient, RN    Dayami Jane MD          **Certification      PHYSICIAN Certification of Need for Inpatient Hospitalization - Initial Certification

## 2020-09-01 NOTE — PLAN OF CARE
Assumed care 1900  Patient alert and oriented x3 beginning of shift- able to get up and use the restroom with assist- cooperative  2300 patient now aggressive and agitated, confused, hallucinating  In wrist restraints and posey still restless and agitated

## 2020-09-01 NOTE — PROGRESS NOTES
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

## 2020-09-01 NOTE — PHYSICAL THERAPY NOTE
PHYSICAL THERAPY TREATMENT NOTE - INPATIENT    Room Number: 2340/1688-D     Session: 1   Number of Visits to Meet Established Goals: 5    Presenting Problem: UTI, SDH  History related to current admission: Pt is 79year old female admitted on 8/28/2020 fr bronchitis ([facility] 5/14/2019   • COPD exacerbation ([facility]    • Dependence on supplemental oxygen 12/17/2019   • Diaphragmatic hernia without mention of obstruction or gangrene    • Diarrhea 11/5/2015   • Diverticulitis of large intestine without perforation o • HYSTERECTOMY  1970's    vaginal   • MPR EXCISION/BIOPSY LESION/NEVI/MASS Right 12/11/2014    Performed by Lotus Givens MD at [facility] MAIN OR   • OTHER      lung biopsies   • OTHER SURGICAL HISTORY  1/1/08    gallbladder surgery   • OTHER SURGICAL HISTOR walker  Pattern: Shuffle  Stoop/Curb Assistance: Not tested       Skilled Therapy Provided: The writer had droplet mask and gloves on during session. Pt was masked for out of room mobility. \  Pt performed serg le ex in supine to improve strength.  See below Bed mobility; Coordination; Endurance; Energy conservation;Patient education; Family education;Gait training;Neuromuscular re-educate;Strengthening;Stair training;Transfer training;Balance training  Rehab Potential : Good  Frequency (Obs): 5x/week    CURRENT G

## 2020-09-01 NOTE — SLP NOTE
SPEECH DAILY NOTE - INPATIENT    ASSESSMENT & PLAN   ASSESSMENT  Pt seen for dysphagia tx to assess tolerance with recommended diet, ensure appropriate utilization of aspiration precautions and provide pt/family education.  Pt found sitting up in bedside ch Follow-up Date: 09/01/20  Number of Visits to Meet Established Goals: (1-2)    Session: 2    If you have any questions, please contact Evens Kuhn

## 2020-09-01 NOTE — PROGRESS NOTES
spoke with Edi Ferrara and provided emotional support as Patient is in the hospital. Son had numerous questions on FMLA; provided education and guidance on requesting FMLA from work to be sent to Jasiel Pickett at [employer].  Son stated that it will be f

## 2020-09-01 NOTE — SLP NOTE
Attempted to see patient for follow up. Pt found sleeping with sitter and family present at bedside. Both reported patient \"up all night,\" given sedative and now finally sleeping. Agreed to let patient sleep.  Will re-attempt as patient appropriate and sc

## 2020-09-02 ENCOUNTER — TELEPHONE (OUTPATIENT)
Dept: FAMILY MEDICINE CLINIC | Facility: CLINIC | Age: 80
End: 2020-09-02

## 2020-09-02 PROCEDURE — 99233 SBSQ HOSP IP/OBS HIGH 50: CPT | Performed by: OTHER

## 2020-09-02 PROCEDURE — 99232 SBSQ HOSP IP/OBS MODERATE 35: CPT | Performed by: INTERNAL MEDICINE

## 2020-09-02 RX ORDER — POTASSIUM CHLORIDE 20 MEQ/1
40 TABLET, EXTENDED RELEASE ORAL ONCE
Status: COMPLETED | OUTPATIENT
Start: 2020-09-02 | End: 2020-09-02

## 2020-09-02 NOTE — DIETARY NOTE
[facility]    NUTRITION ASSESSMENT    Pt does not meet malnutrition criteria.     NUTRITION DIAGNOSIS/PROBLEM:    Increased nutrient needs related to increased demands of healing as evidenced by recent fall with SDH - ongoing     Inadequate Oral intake (130 lb)  08/07/20 : 58.5 kg (129 lb)  08/01/20 : 57.9 kg (127 lb 11.2 oz)  07/29/20 : 58.1 kg (128 lb)  07/28/20 : 59.3 kg (130 lb 12.8 oz)  07/17/20 : 58.5 kg (129 lb)  07/02/20 : 60.7 kg (133 lb 12.8 oz)  07/01/20 : 59.2 kg (130 lb 9.6 oz)  06/04/20 : 5

## 2020-09-02 NOTE — HOME CARE LIAISON
Received referral from SHUKRI Mobley  . Called patient's son Crandon Numbers to discuss home health services and offer choice. HE is agreeable to [facility] services at discharge for his mother. Contact information provided. Any questions addressed. Will follow.      Patient's son

## 2020-09-02 NOTE — PLAN OF CARE
Assumed care at Doctor Aishwarya [address] and oriented x2-3, pleasant & cooperative  Neuro Q4, following commands, intact  Seizure precautions maintained  -150  Facial bruising noted, steristrip to bridge of nose c/d/I  Sitter at bedside  Plan of care discussed w Term Goal  Description  Family's Short Term Goal: less cofusion    Interventions:   - See additional Care Plan goals for specific interventions   Outcome: Progressing     Problem: Safety Risk - Non-Violent Restraints  Goal: Patient will remain free from se performing ADLs such as feeding, grooming, and bathing  - Educate and encourage patient/family in tolerated functional activity level and precautions during self-care     Outcome: Progressing

## 2020-09-02 NOTE — CM/SW NOTE
Per unit RN, pt can d/c today, son at bedside seems not ready to bring pt home. SHUKRI met with pt and her son Mundo Morejon at bedside. Mundo Morejon is concerned about pt's d/c, does not feel that pt is ready yet.  He said that pt's son Serafin Howard is on his way here, Serafin Howard was [employer]

## 2020-09-02 NOTE — PLAN OF CARE
Received patient A/Ox3, RA, NSR on tele at 0700  Neuro q4, no new deficits, see flowsheet  Ambulated in hallway with PT, stairs completed   SBP >150, Hydralazine given, /61  By the end of the shift, patient A/Ox4, able to identify her son  Denying p of mobility/gait  Description  Interventions:  - Assess patient's functional ability and stability  - Promote increasing activity/tolerance for mobility and gait  - Educate and engage patient/family in tolerated activity level and precautions  - Recommend delirium  Description  Interventions:  - Encourage use of hearing aids, eye glasses  - Promote highest level of mobility daily  - Provide frequent reorientation  - Promote wakefulness i.e. lights on, blinds open  - Promote sleep, encourage patient's normal

## 2020-09-02 NOTE — PHYSICAL THERAPY NOTE
PHYSICAL THERAPY TREATMENT NOTE - INPATIENT    Room Number: 0919/0268-K     Session: 2  Number of Visits to Meet Established Goals: 5    Presenting Problem: UTI, SDH    Problem List  Principal Problem:    Urinary tract infection without hematuria, site un 7/19/2016   • Osteoarthritis    • Osteoporosis    • Personal history of antineoplastic chemotherapy     LAST 10/2019   • Personal history of malignant neoplasm of bronchus and lung    • Personal history of urinary (tract) infection    • Plantar fascial fib BASIC MOBILITY  How much difficulty does the patient currently have. ..  -   Turning over in bed (including adjusting bedclothes, sheets and blankets)?: None   -   Sitting down on and standing up from a chair with arms (e.g., wheelchair, bedside commode, et progressing in function, at this time recommending NATO improve level of function to independent, however family prefers pt to d/c to home. If pt goes home HHPT and 24 hour supervision is recommended.      DISCHARGE RECOMMENDATIONS  PT Discharge Recommendati

## 2020-09-02 NOTE — PROGRESS NOTES
[facility]  Progress Note     Felicita Garnett Patient Status:  Inpatient    1940 MRN [id number]   [facility] 7NE-A Attending Doinne Schultz MD   [facility] Day # 4 PCP Estiven Williamson DO     Chief Complaint: AMS    S: Patient wi --     141 139  139 142 145  --    K 3.8 3.8 3.5  3.5 4.2 3.3* 3.7    110 108  108 113* 115*  --    CO2 24.0 25.0 24.0  24.0 20.0* 20.0*  --    ALKPHO 117 112 116  --   --   --    AST 14* 19 17  --   --   --    ALT 16 16 14  --   --   --    ROSELINE discharge: today  Discharge is dependent on: progress  At this point Ms. Mitch Collins is expected to be discharge to: home with [facility] vs [facility]    Plan of care discussed with patient, RN    Elva Rodriguez MD          **Certification      PHYSICIAN Certification of Ne

## 2020-09-02 NOTE — PROGRESS NOTES
[id number] Jessica Aiken Neurology Progress Note    Dolly Cates Patient Status:  Inpatient    1940 MRN [id number]   [facility] 7NE-A Attending Sloan Johnson MD   [facility] Day # 4 PCP Kristian Yee DO     NEUROLOGY   Attending Kunal Gasca reviewed/ordered - 1  (   ) new diagnosis: - 2  ( x ) Images independently reviewed -2  ( x ) Case/studies discussed with other caregivers - 2  (   ) Fam meetings - patient not present --non F2F  Non Face to Face CPT code 84579/00519 applies as documented Furoate-Vilanterol  1 puff Inhalation Daily   • dilTIAZem HCl ER Coated Beads  120 mg Oral Daily   • Ipratropium Bromide  1 spray Nasal BID   • cycloSPORINE  1 drop Both Eyes BID   • Montelukast Sodium  10 mg Oral Nightly   • Metoprolol Succinate ER  100 m [place]  9/2/2020  10:30 AM  Spectra [id number]

## 2020-09-02 NOTE — DISCHARGE SUMMARY
[facility] [place] HOSPITALIST  DISCHARGE SUMMARY     [place] Patient Status:  Inpatient    1940 MRN [id number]   [facility] 7NE-A Attending Rahul Correa MD   [facility] Day # 5 PCP Adrien Perez DO     Date of Admission: 2020  Jp Instructions Prescription details   acetaminophen 500 MG Tabs  Commonly known as:  TYLENOL EXTRA STRENGTH      Take 500 mg by mouth every 6 (six) hours as needed for Pain.    Refills:  0     Albuterol Sulfate  (90 Base) MCG/ACT Aers      Inhale 2 puf for congestion. Refills:  0     Singulair 10 MG Tabs  Generic drug:  Montelukast Sodium      Take 10 mg by mouth nightly.    Refills:  0        STOP taking these medications    cephALEXin 500 MG Caps  Commonly known as:  Trevon Tran              Where to Get Y

## 2020-09-02 NOTE — CM/SW NOTE
Giancarlo appeal received. Copy of DND, appeal, and IM faxed to medical records. Copy of DND given to patient/family. Await determination.

## 2020-09-03 VITALS
TEMPERATURE: 99 F | DIASTOLIC BLOOD PRESSURE: 79 MMHG | BODY MASS INDEX: 25 KG/M2 | HEART RATE: 81 BPM | RESPIRATION RATE: 18 BRPM | OXYGEN SATURATION: 96 % | WEIGHT: 126.56 LBS | SYSTOLIC BLOOD PRESSURE: 141 MMHG

## 2020-09-03 LAB — POTASSIUM SERPL-SCNC: 4.1 MMOL/L (ref 3.5–5.1)

## 2020-09-03 PROCEDURE — 99239 HOSP IP/OBS DSCHRG MGMT >30: CPT | Performed by: INTERNAL MEDICINE

## 2020-09-03 RX ORDER — QUETIAPINE 25 MG/1
12.5 TABLET, FILM COATED ORAL NIGHTLY PRN
Qty: 7 TABLET | Refills: 0 | Status: SHIPPED | OUTPATIENT
Start: 2020-09-03 | End: 2020-09-28 | Stop reason: ALTCHOICE

## 2020-09-03 NOTE — PROGRESS NOTES
[id number] [facility] Neurology Progress Note    Patrick Rondon Patient Status:  Inpatient    1940 MRN [id number]   [facility] 7NE-A Attending Kristeen Hashimoto, MD   [facility] Day # 5 PCP Ed Thapa DO       Subjective:  Delia Gonzalez noted in HPI.       PHYSICAL EXAMINATION:  VITAL SIGNS: /80 (BP Location: Right arm)   Pulse 80   Temp 98.8 °F (37.1 °C) (Oral)   Resp 16   Wt 126 lb 8.7 oz (57.4 kg)   SpO2 96%   BMI 24.71 kg/m²   GENERAL:  Patient is a 79year old female in no acute

## 2020-09-03 NOTE — CM/SW NOTE
Son is agreeable to taking pt home today despite the appeal.  [facility] aware of pt's d/c home today. Pt does not have a qualifying diagnosis for a hospital bed. Sons may look into renting one.

## 2020-09-03 NOTE — CM/SW NOTE
09/03/20 1600   Discharge disposition   Expected discharge disposition Home-Health   Name of Facillity/Home Care/Hospice Residential   Home services after discharge Skilled home care   Discharge transportation Private car

## 2020-09-03 NOTE — PLAN OF CARE
Assumed care at 1930  AOx4, neuro checks q4h, no deficits  RA, SR via tele  Up with one and a walker  Family appealing discharge  Will continue to monitor

## 2020-09-03 NOTE — OCCUPATIONAL THERAPY NOTE
OCCUPATIONAL THERAPY TREATMENT NOTE - INPATIENT     Room Number: 0926/7730-U  Session: 1   Number of Visits to Meet Established Goals: 3    Presenting Problem: (UTI, fall, SDH)    History related to current admission:Admitted 8/28 from home via ER for a fa Medical History  Past Medical History:   Diagnosis Date   • Abdominal hernia    • Azotemia 4/3/2018   • B[facility]    • Cancer ([facility] 2009    right lung   • Cancer [facility])     lymphoma   • Chronic cough    • Clostridium difficile colitis    • insufficiency 11/5/2015   • Scoliosis (and kyphoscoliosis), idiopathic 6/29/2012   • Shortness of breath    • SOB (shortness of breath) 3/27/2018   • Urticaria, unspecified    • Visual impairment     glasses   • Wears glasses        Past Surgical History ASSESSMENT  Supine to Sit : Supervision  Sit to Stand: Supervision    Skilled Therapy Provided: Pleasant. Son present. Per son, much improved mentation and memory. Supervision supine to sit.  Able to pull her socks by bring foot close to her reach, CGA for Goal  Patient will be supervision with bilateral AROM HEP (home exercise program).      Additional goals:  Visual perceptual screening  Short Blessed Test

## 2020-09-03 NOTE — TELEPHONE ENCOUNTER
Chandana James   to Ismaelmarco Vitale, DO          9/2/20 9:17 PM   My mom should be getting out of the hospital Thursday is Friday to soon to see you for an after hospitalization visit?      Spoke with son, he states he made appt for 9/4/2020 1pm.

## 2020-09-03 NOTE — PLAN OF CARE
NURSING DISCHARGE NOTE    Discharged Home via Wheelchair. Accompanied by Family member and Support staff  Belongings Taken by patient/family.     Received patient A/Ox4, RA, NSR on tele at 0700  Neuro q 4, no new deficits, see flowsheet  Patient ambula highest/safest level of mobility/gait  Description  Interventions:  - Assess patient's functional ability and stability  - Promote increasing activity/tolerance for mobility and gait  - Educate and engage patient/family in tolerated activity level and prec Minimize duration of delirium  Description  Interventions:  - Encourage use of hearing aids, eye glasses  - Promote highest level of mobility daily  - Provide frequent reorientation  - Promote wakefulness i.e. lights on, blinds open  - Promote sleep, encou

## 2020-09-03 NOTE — PROGRESS NOTES
[facility]  Progress Note     Angel Donaldson Patient Status:  Inpatient    1940 MRN [id number]   [facility] 7NE-A Attending Edwina Henao MD   [facility] Day # 5 PCP Milana Coy DO     Chief Complaint: AMS    S: Patient wi ALB 3.2* 3.2* 3.2*  --   --   --   --     141 139  139 142 145  --   --    K 3.8 3.8 3.5  3.5 4.2 3.3* 3.7 4.1    110 108  108 113* 115*  --   --    CO2 24.0 25.0 24.0  24.0 20.0* 20.0*  --   --    ALKPHO 117 112 116  --   --   --   --    AST discharge?: no  Estimated date of discharge: today  Discharge is dependent on: progress  At this point Ms. Mitch Collins is expected to be discharge to: home with [facility]    Plan of care discussed with patient, RN    Elva Rodriguez MD          **Certification

## 2020-09-04 ENCOUNTER — TELEPHONE (OUTPATIENT)
Dept: FAMILY MEDICINE CLINIC | Facility: CLINIC | Age: 80
End: 2020-09-04

## 2020-09-04 ENCOUNTER — PATIENT OUTREACH (OUTPATIENT)
Dept: CASE MANAGEMENT | Age: 80
End: 2020-09-04

## 2020-09-04 ENCOUNTER — OFFICE VISIT (OUTPATIENT)
Dept: FAMILY MEDICINE CLINIC | Facility: CLINIC | Age: 80
End: 2020-09-04
Payer: MEDICARE

## 2020-09-04 VITALS
BODY MASS INDEX: 25 KG/M2 | OXYGEN SATURATION: 98 % | TEMPERATURE: 97 F | DIASTOLIC BLOOD PRESSURE: 94 MMHG | HEART RATE: 94 BPM | SYSTOLIC BLOOD PRESSURE: 148 MMHG | WEIGHT: 130 LBS | RESPIRATION RATE: 18 BRPM

## 2020-09-04 DIAGNOSIS — N39.0 CHRONIC UTI: ICD-10-CM

## 2020-09-04 DIAGNOSIS — N17.9 ACUTE KIDNEY INJURY (HCC): Primary | ICD-10-CM

## 2020-09-04 DIAGNOSIS — Z02.9 ENCOUNTERS FOR UNSPECIFIED ADMINISTRATIVE PURPOSE: ICD-10-CM

## 2020-09-04 PROBLEM — A41.9 SEPSIS (HCC): Status: ACTIVE | Noted: 2020-09-04

## 2020-09-04 LAB
APPEARANCE: CLEAR
MULTISTIX LOT#: NORMAL NUMERIC
PH, URINE: 5.5 (ref 4.5–8)
PROTEIN (URINE DIPSTICK): 100 MG/DL
SPECIFIC GRAVITY: 1.02 (ref 1–1.03)
URINE-COLOR: YELLOW
UROBILINOGEN,SEMI-QN: 0.2 MG/DL (ref 0–1.9)

## 2020-09-04 PROCEDURE — 87086 URINE CULTURE/COLONY COUNT: CPT | Performed by: FAMILY MEDICINE

## 2020-09-04 PROCEDURE — 81003 URINALYSIS AUTO W/O SCOPE: CPT | Performed by: FAMILY MEDICINE

## 2020-09-04 PROCEDURE — 1111F DSCHRG MED/CURRENT MED MERGE: CPT | Performed by: FAMILY MEDICINE

## 2020-09-04 PROCEDURE — 99214 OFFICE O/P EST MOD 30 MIN: CPT | Performed by: FAMILY MEDICINE

## 2020-09-04 NOTE — PROGRESS NOTES
Patient completed HFU 9-4-20 with PCP, which is within 2 business days of discharge. NCM closing encounter.

## 2020-09-04 NOTE — TELEPHONE ENCOUNTER
Per Dr Priscilla Seymour pt can discontinue Lasix. If feet swell and pt has greater than 1 pound weight gain pt can resume Lasix 20mg daily. Pt son informed and understanding verbalized.

## 2020-09-04 NOTE — TELEPHONE ENCOUNTER
Dr Jose De Paz please review previous message should pt be taking Lasix  20mg daily or PRN? please advise.

## 2020-09-04 NOTE — PROGRESS NOTES
Presents with her son with whom she lives she had a very rough past several weeks of complicated UTIs with secondary urosepsis. She was treated aggressively and appropriately with antibiotics.   Initially she developed a very remarkable course of delirium

## 2020-09-08 ENCOUNTER — TELEPHONE (OUTPATIENT)
Dept: FAMILY MEDICINE CLINIC | Facility: CLINIC | Age: 80
End: 2020-09-08

## 2020-09-16 ENCOUNTER — HOSPITAL ENCOUNTER (EMERGENCY)
Age: 80
Discharge: HOME | End: 2020-09-16
Attending: EMERGENCY MEDICINE
Payer: MEDICARE

## 2020-09-16 VITALS
HEIGHT: 60.24 IN | HEART RATE: 78 BPM | TEMPERATURE: 99 F | BODY MASS INDEX: 24.41 KG/M2 | OXYGEN SATURATION: 99 % | DIASTOLIC BLOOD PRESSURE: 80 MMHG | SYSTOLIC BLOOD PRESSURE: 148 MMHG | WEIGHT: 126 LBS | RESPIRATION RATE: 16 BRPM

## 2020-09-16 DIAGNOSIS — R68.83 CHILLS: Primary | ICD-10-CM

## 2020-09-16 LAB
BILIRUB UR QL STRIP.AUTO: NEGATIVE
CLARITY UR REFRACT.AUTO: CLEAR
COLOR UR AUTO: YELLOW
GLUCOSE UR STRIP.AUTO-MCNC: NEGATIVE MG/DL
KETONES UR STRIP.AUTO-MCNC: NEGATIVE MG/DL
NITRITE UR QL STRIP.AUTO: NEGATIVE
PH UR STRIP.AUTO: 5 [PH] (ref 4.5–8)
RBC UR QL AUTO: NEGATIVE
SP GR UR STRIP.AUTO: 1.02 (ref 1–1.03)
UROBILINOGEN UR STRIP.AUTO-MCNC: 0.2 MG/DL

## 2020-09-16 PROCEDURE — 99282 EMERGENCY DEPT VISIT SF MDM: CPT

## 2020-09-16 PROCEDURE — 81001 URINALYSIS AUTO W/SCOPE: CPT | Performed by: EMERGENCY MEDICINE

## 2020-09-17 NOTE — ED PROVIDER NOTES
Patient Seen in: THE [facility] Emergency Department In [facility]      History   Patient presents with:  Urinary Symptoms    Stated Complaint: chills, recently hospitalized d/c last thurs HPI    The patient is a 21-year-old female who is on Rituxan every 3 m pulmonary disease) with acute bronchitis ([facility] 5/14/2019   • COPD exacerbation ([facility]    • Dependence on supplemental oxygen 12/17/2019   • Diaphragmatic hernia without mention of obstruction or gangrene    • Diarrhea 11/5/2015   • Diverticulitis of large i HERNIA SURGERY     • HYSTERECTOMY  1970's    vaginal   • MPR EXCISION/BIOPSY LESION/NEVI/MASS Right 12/11/2014    Performed by Joshua Martinez MD at [facility]   • OTHER      lung biopsies   • OTHER SURGICAL HISTORY  1/1/08    gallbladder surgery   • OTHE Nontender throughout abdomen to superficial and deep palpation throughout all 4 quadrants, epigastrium and suprapubic regions. No CVA tenderness. Extremities: No deformity, nontender throughout, and normal active range of motion of all 4 extremities.   Dis because of this transient episode, and she was instructed to get checked if she ever developed any symptoms. She states that now she feels well, and has no symptoms. And she has a very benign exam.  She is afebrile, hemodynamically stable, no distress.

## 2020-09-17 NOTE — ED INITIAL ASSESSMENT (HPI)
Pt presented to ER c/o chills. Pt states she had a urine infection last week. And was admitted to the hospital. Pt states she has the same symptoms now and is here to have her urine tested.

## 2020-09-27 ENCOUNTER — PATIENT MESSAGE (OUTPATIENT)
Dept: FAMILY MEDICINE CLINIC | Facility: CLINIC | Age: 80
End: 2020-09-27

## 2020-09-28 ENCOUNTER — TELEPHONE (OUTPATIENT)
Dept: FAMILY MEDICINE CLINIC | Facility: CLINIC | Age: 80
End: 2020-09-28

## 2020-09-28 ENCOUNTER — OFFICE VISIT (OUTPATIENT)
Dept: FAMILY MEDICINE CLINIC | Facility: CLINIC | Age: 80
End: 2020-09-28
Payer: MEDICARE

## 2020-09-28 ENCOUNTER — OFFICE VISIT (OUTPATIENT)
Dept: NEUROLOGY | Facility: CLINIC | Age: 80
End: 2020-09-28
Payer: MEDICARE

## 2020-09-28 VITALS
HEART RATE: 74 BPM | BODY MASS INDEX: 24.61 KG/M2 | HEIGHT: 60.24 IN | WEIGHT: 127 LBS | TEMPERATURE: 97 F | SYSTOLIC BLOOD PRESSURE: 138 MMHG | RESPIRATION RATE: 16 BRPM | OXYGEN SATURATION: 98 % | DIASTOLIC BLOOD PRESSURE: 72 MMHG

## 2020-09-28 VITALS — DIASTOLIC BLOOD PRESSURE: 72 MMHG | HEART RATE: 76 BPM | SYSTOLIC BLOOD PRESSURE: 132 MMHG

## 2020-09-28 DIAGNOSIS — S06.5X9A SDH (SUBDURAL HEMATOMA) (HCC): Primary | ICD-10-CM

## 2020-09-28 DIAGNOSIS — R05.3 CHRONIC COUGH: ICD-10-CM

## 2020-09-28 DIAGNOSIS — N30.00 ACUTE CYSTITIS WITHOUT HEMATURIA: ICD-10-CM

## 2020-09-28 DIAGNOSIS — I10 ESSENTIAL HYPERTENSION: Primary | ICD-10-CM

## 2020-09-28 LAB
APPEARANCE: CLEAR
MULTISTIX LOT#: NORMAL NUMERIC
PH, URINE: 5.5 (ref 4.5–8)
PROTEIN (URINE DIPSTICK): 100 MG/DL
SPECIFIC GRAVITY: 1.03 (ref 1–1.03)
URINE-COLOR: YELLOW
UROBILINOGEN,SEMI-QN: 0.2 MG/DL (ref 0–1.9)

## 2020-09-28 PROCEDURE — 1111F DSCHRG MED/CURRENT MED MERGE: CPT | Performed by: PHYSICIAN ASSISTANT

## 2020-09-28 PROCEDURE — 99214 OFFICE O/P EST MOD 30 MIN: CPT | Performed by: FAMILY MEDICINE

## 2020-09-28 PROCEDURE — 1111F DSCHRG MED/CURRENT MED MERGE: CPT | Performed by: FAMILY MEDICINE

## 2020-09-28 PROCEDURE — 87086 URINE CULTURE/COLONY COUNT: CPT | Performed by: FAMILY MEDICINE

## 2020-09-28 PROCEDURE — 99213 OFFICE O/P EST LOW 20 MIN: CPT | Performed by: PHYSICIAN ASSISTANT

## 2020-09-28 PROCEDURE — 81003 URINALYSIS AUTO W/O SCOPE: CPT | Performed by: FAMILY MEDICINE

## 2020-09-28 RX ORDER — METOPROLOL SUCCINATE 50 MG/1
50 TABLET, EXTENDED RELEASE ORAL 2 TIMES DAILY
Qty: 180 TABLET | Refills: 1 | Status: SHIPPED | OUTPATIENT
Start: 2020-09-28 | End: 2021-01-04

## 2020-09-28 RX ORDER — FLUTICASONE PROPIONATE 50 MCG
2 SPRAY, SUSPENSION (ML) NASAL DAILY
Qty: 1 BOTTLE | Refills: 1 | Status: SHIPPED | OUTPATIENT
Start: 2020-09-28 | End: 2020-12-07

## 2020-09-28 NOTE — TELEPHONE ENCOUNTER
HAS Q'S ABOUT THE BP MEDS THAT DR HU ADJUSTED. HE WANTS TO KNOW IF SHE CAN TAKE THE MED EARLIER? HE FEELS THIS MED IS WEARING OFF TOO FAST. HE IS REFERRING TO THE HYDRALAZINE 25MG.         PLS CALL BACK TO ADVISE  AS HE FEELS LIKE THE MED IS NOT

## 2020-09-28 NOTE — PROGRESS NOTES
Neurosurgery Clinic Visit  2020    [facility] PCP:  Stuart White DO    1940 MRN [id number]       CC:  F/u SDH    HPI:    Carrillo Scales is here with her son for f/u of hospital admission 2020 for SDH.   She denies any headache for the Esophageal reflux     • Exposure to unspecified radiation 2013     right lower lung   • Gluteal tendinitis 9/10/2013   • Gram-negative pneumonia (HCC)     • Hearing impairment       bilateral hearing aids    • Hearing loss     • Hemorrhoids     • HIGH BLOO HISTORY   12/05/2017     Cysto- Dr. Wilma Girard   2009     removal of upper right lobe   • TONSILLECTOMY             FAMILY HISTORY:  family history includes Cancer in her father; Hypertension in her father; immune system in her mothe

## 2020-09-28 NOTE — PROGRESS NOTES
HPI:    Patient ID: Leah Umanzor is a 79year old female. Hypertension  This is a chronic problem. The current episode started more than 1 year ago. The problem has been waxing and waning since onset.  The problem is uncontrolled (bp readings up to 180 Suspension 2 sprays by Each Nare route daily. 1 Bottle 1   • hydrALAzine HCl 25 MG Oral Tab Take 1 tablet (25 mg total) by mouth 2 (two) times daily as needed (if BP > 150).  90 tablet 1   • CARTIA  MG Oral Capsule SR 24 Hr Take 1 capsule by mouth onc Eyes: Conjunctivae are normal. Right eye exhibits no discharge. Left eye exhibits no discharge. Cardiovascular: Normal rate, regular rhythm and normal heart sounds. Pulmonary/Chest: Effort normal and breath sounds normal. No respiratory distress.  She

## 2020-09-28 NOTE — TELEPHONE ENCOUNTER
From: Ezra Car  To: Philomena Juarez DO  Sent: 9/27/2020 6:23 PM CDT  Subject: Non-Urgent Medical Question    Her blood pressure was low in the afternoon so I did not give her the medicine.  Attached are the lists of recent blood pressure reading for t

## 2020-09-28 NOTE — TELEPHONE ENCOUNTER
From: Familia Car  To: Forest Dietrich DO  Sent: 9/27/2020 12:26 PM CDT  Subject: Non-Urgent Medical Question    Doctor we held of as of her getting up as 2 where above 150 and the next below. We will check later as well.    We will see you at around 2:0

## 2020-09-29 ENCOUNTER — OFFICE VISIT (OUTPATIENT)
Dept: NEUROLOGY | Facility: CLINIC | Age: 80
End: 2020-09-29
Payer: MEDICARE

## 2020-09-29 ENCOUNTER — TELEPHONE (OUTPATIENT)
Dept: FAMILY MEDICINE CLINIC | Facility: CLINIC | Age: 80
End: 2020-09-29

## 2020-09-29 VITALS
SYSTOLIC BLOOD PRESSURE: 146 MMHG | BODY MASS INDEX: 25 KG/M2 | RESPIRATION RATE: 16 BRPM | DIASTOLIC BLOOD PRESSURE: 84 MMHG | HEART RATE: 76 BPM | WEIGHT: 127 LBS

## 2020-09-29 DIAGNOSIS — R27.8 WORSENED HANDWRITING: ICD-10-CM

## 2020-09-29 DIAGNOSIS — R41.3 MEMORY CHANGE: Primary | ICD-10-CM

## 2020-09-29 PROCEDURE — 99213 OFFICE O/P EST LOW 20 MIN: CPT | Performed by: OTHER

## 2020-09-29 NOTE — TELEPHONE ENCOUNTER
Spoke to son this morning, see previous telephone note. Awaiting YP response, also several mychart messages sent.

## 2020-09-29 NOTE — TELEPHONE ENCOUNTER
[facility] nurse calling to report pt's elevated bp and requesting instructions

## 2020-09-29 NOTE — TELEPHONE ENCOUNTER
Pt's PT b/p this morning right 190.110, left 170/100  Pt did not take her hydralzine last night,  States her b/p was good last night 122/78,  She did take hydralzine this morning at 7am.  She will take her other b/p meds at 11am    Pt has not symptoms, please advise.

## 2020-09-29 NOTE — PROGRESS NOTES
Neurology H&P    Evon Kingfredo Car Patient Status:  No patient class for patient encounter    1940 MRN [id number]   Location ED [facility], [address], [address] [facility] Attending No att. providers found   Hosp Day # 0 PCP DO dementia. Denies any episodes of getting lost. She does not drive. She cooks and has no problems with this. Per son have left the stove on \"herre and there\". No resting tremors. No falls.  Still endorses + audio hallucinations of pleasant music      Curr Take 10 mg by mouth nightly. • Saline Nasal Spray 0.65 % Nasal Solution 1 spray by Nasal route as needed for congestion. • acetaminophen 500 MG Oral Tab Take 500 mg by mouth every 6 (six) hours as needed for Pain.          Problem List:  Patient Act Diarrhea 11/5/2015   • Diverticulitis of large intestine without perforation or abscess without bleeding 1/14/2019   • Dysphagia, pharyngoesophageal phase 10/10/2018   • Epistaxis    • Esophageal reflux    • Exposure to unspecified radiation 2013    right HISTORY  1/1/08    gallbladder surgery   • OTHER SURGICAL HISTORY  0887,8384    bunionectomy,bilateral   • OTHER SURGICAL HISTORY  12/05/2017    Cysto- Dr. Wilma Girard  2009    removal of upper right lobe   • TONSILLECTOMY         Soc shuffling gait     Labs:       Imaging:  MRI Brain IAC 8/2/20  =====  CONCLUSION:    1. There is progression of small vessel ischemic change in white matter. There is no acute ischemia, hemorrhage or mass.   2. Previously described 2 mm focus of hyper enha

## 2020-09-29 NOTE — TELEPHONE ENCOUNTER
Spoke with Carlos Melissa, patient son:  PT took patient's b/p this morning right 190.110/ left 170/100  Patient did not take her hydralzine last night,  States her b/p was good last night 122/78,  She did take hydralazine 25mg this morning at 7am.  She will take he

## 2020-09-29 NOTE — PROGRESS NOTES
Patient is having difficulty with word finding. Patient is not having confusion. Patient states decrease in balance. Patient is having increase in fatigue. Denies recent falls.

## 2020-09-29 NOTE — TELEPHONE ENCOUNTER
Pt said they were talking about splitting her meds. She wants to know how to take the hydralazine and metoprolol succinate. Because her BP spikes in the morning due to the medication not making it threw the night into the next dose in the morning.

## 2020-09-29 NOTE — TELEPHONE ENCOUNTER
Pt metoprolol 100mg, losartan 100mg and cartia 120 at 11:00am,   Pt did not take hydralazine last night, because b/p was good 122/78, however 7am this morning b/p  157/1010, and 158/92, took her hydralzine now.  Will recheck b/p in a little while to see

## 2020-09-30 ENCOUNTER — PATIENT MESSAGE (OUTPATIENT)
Dept: FAMILY MEDICINE CLINIC | Facility: CLINIC | Age: 80
End: 2020-09-30

## 2020-09-30 ENCOUNTER — TELEPHONE (OUTPATIENT)
Dept: FAMILY MEDICINE CLINIC | Facility: CLINIC | Age: 80
End: 2020-09-30

## 2020-09-30 DIAGNOSIS — I10 HTN (HYPERTENSION), MALIGNANT: Primary | ICD-10-CM

## 2020-09-30 NOTE — TELEPHONE ENCOUNTER
From: Ingrid Car  To:  Ezra Parra DO  Sent: 9/30/2020 9:11 AM CDT  Subject: Prescription Question    Doctor   My mom woke up around 6 and we took her /100 took 2 hydalazine    Took BP at 9:00 3 times both machines  187/90 old machine  188/86

## 2020-09-30 NOTE — TELEPHONE ENCOUNTER
CALLING BECAUSE HE STATES HIS MOMS BP /88  TAKEN WITHIN THE HOUR. HE GAVE HER HER MEDS AT 9:00 AM.    HE IS AFRAID WITH THESE HIGH READINGS SHE WILL STROKE OUT AS THE MEDS ARE NOT OFFERING ANY CONTROL. NEEDS A CALL ASAP TO ADVISE.

## 2020-09-30 NOTE — TELEPHONE ENCOUNTER
Per verbal convo with Dr Master Salas; YP spoke to son, Carlos Melissa, last night and advised to take the following, beginning immediately:  50mg Metoprolol ER BID  50mg Hydralazine QAM  25mg Hydralazine QPM  AND 25-50mg Hydralazine PRN BID    Dr Master Salas also told me to adv

## 2020-10-01 ENCOUNTER — PATIENT MESSAGE (OUTPATIENT)
Dept: FAMILY MEDICINE CLINIC | Facility: CLINIC | Age: 80
End: 2020-10-01

## 2020-10-01 NOTE — TELEPHONE ENCOUNTER
From: Miguel Car  To: Lacie Tobin DO  Sent: 10/1/2020  6:45 AM CDT  Subject: Visit Follow-up Question    Please see attached for last nights and this mornings reading. 7:00 am still high 185/89    I gave her 2 hydalazine.  And she went back to bed

## 2020-10-01 NOTE — TELEPHONE ENCOUNTER
From: Anpuam Car  To: Asael Rodríguez DO  Sent: 9/30/2020 7:26 PM CDT  Subject: Prescription Question    Once the 2 doses of metroplolol are in my moms system. Can she take the morning hydalazine at the same time as he morning meds.  10:00am. Currently

## 2020-10-01 NOTE — TELEPHONE ENCOUNTER
Spoke with Jamal Zamudio states he doesn't want to keep, \"chasing the blood pressure\", doesn't want to have to keep taking BP and then supplementing with hydralazine, reports patient's BP was too low on higher dose of cardia at \"043\" systolic.  States am

## 2020-10-01 NOTE — TELEPHONE ENCOUNTER
From: Cary Car  To: Mayo Nava DO  Sent: 10/1/2020 6:45 AM CDT  Subject: Visit Follow-up Question    Please see attached for last nights and this mornings reading. 7:00 am still high 185/89    I gave her 2 hydalazine. And she went back to bed.

## 2020-10-02 RX ORDER — DILTIAZEM HYDROCHLORIDE 180 MG/1
180 CAPSULE, COATED, EXTENDED RELEASE ORAL DAILY
Qty: 30 CAPSULE | Refills: 1 | Status: SHIPPED | OUTPATIENT
Start: 2020-10-02 | End: 2020-10-16

## 2020-10-02 NOTE — TELEPHONE ENCOUNTER
Spoke to son with directions from Dr. Alex Scott. US was ordered and new medication sent to pharmacy. Per son b/p's today - am-175/102, lunch 166/101, afternoon 129/75.

## 2020-10-02 NOTE — TELEPHONE ENCOUNTER
Have her go for renal artery US and since its being so high we should increase the cardia. I am sorry but we have to take chance of BP getting lower. After increasing cartia we can decrease hydralazine to 25 again. I dont she is going to get too low.   A

## 2020-10-10 ENCOUNTER — HOSPITAL ENCOUNTER (OUTPATIENT)
Dept: CT IMAGING | Age: 80
Discharge: HOME | End: 2020-10-10
Attending: INTERNAL MEDICINE
Payer: MEDICARE

## 2020-10-10 ENCOUNTER — HOSPITAL ENCOUNTER (OUTPATIENT)
Dept: ULTRASOUND IMAGING | Age: 80
Discharge: HOME | End: 2020-10-10
Attending: FAMILY MEDICINE
Payer: MEDICARE

## 2020-10-10 ENCOUNTER — HOSPITAL ENCOUNTER (OUTPATIENT)
Dept: CT IMAGING | Age: 80
Discharge: HOME | End: 2020-10-10
Attending: PHYSICIAN ASSISTANT
Payer: MEDICARE

## 2020-10-10 DIAGNOSIS — I10 HTN (HYPERTENSION), MALIGNANT: ICD-10-CM

## 2020-10-10 DIAGNOSIS — S06.5X9A SDH (SUBDURAL HEMATOMA) (HCC): ICD-10-CM

## 2020-10-10 DIAGNOSIS — C34.81 MALIGNANT NEOPLASM OF OVERLAPPING SITES OF RIGHT LUNG (HCC): ICD-10-CM

## 2020-10-10 DIAGNOSIS — C88.4 EXTRANODAL MARGINAL ZONE B-CELL LYMPHOMA OF MUCOSA-ASSOCIATED LYMPHOID TISSUE (MALT) (HCC): ICD-10-CM

## 2020-10-10 PROCEDURE — 76775 US EXAM ABDO BACK WALL LIM: CPT | Performed by: FAMILY MEDICINE

## 2020-10-10 PROCEDURE — 93975 VASCULAR STUDY: CPT | Performed by: FAMILY MEDICINE

## 2020-10-10 PROCEDURE — 71250 CT THORAX DX C-: CPT | Performed by: INTERNAL MEDICINE

## 2020-10-10 PROCEDURE — 74176 CT ABD & PELVIS W/O CONTRAST: CPT | Performed by: INTERNAL MEDICINE

## 2020-10-10 PROCEDURE — 70450 CT HEAD/BRAIN W/O DYE: CPT | Performed by: PHYSICIAN ASSISTANT

## 2020-10-12 ENCOUNTER — TELEPHONE (OUTPATIENT)
Dept: SURGERY | Facility: CLINIC | Age: 80
End: 2020-10-12

## 2020-10-12 NOTE — TELEPHONE ENCOUNTER
Contacted the patient's son, Dante Arriaga to review CT. SDH has resolved. She does not need any further imaging. She does not need any further follow up.     Appt canceled

## 2020-10-13 ENCOUNTER — OFFICE VISIT (OUTPATIENT)
Dept: FAMILY MEDICINE CLINIC | Facility: CLINIC | Age: 80
End: 2020-10-13
Payer: MEDICARE

## 2020-10-13 VITALS
HEART RATE: 70 BPM | DIASTOLIC BLOOD PRESSURE: 80 MMHG | OXYGEN SATURATION: 98 % | WEIGHT: 129 LBS | SYSTOLIC BLOOD PRESSURE: 132 MMHG | TEMPERATURE: 97 F | HEIGHT: 60.24 IN | BODY MASS INDEX: 24.99 KG/M2 | RESPIRATION RATE: 16 BRPM

## 2020-10-13 DIAGNOSIS — I10 ESSENTIAL HYPERTENSION: Primary | ICD-10-CM

## 2020-10-13 DIAGNOSIS — Z23 NEED FOR INFLUENZA VACCINATION: ICD-10-CM

## 2020-10-13 PROCEDURE — 99213 OFFICE O/P EST LOW 20 MIN: CPT | Performed by: FAMILY MEDICINE

## 2020-10-13 PROCEDURE — G0008 ADMIN INFLUENZA VIRUS VAC: HCPCS | Performed by: FAMILY MEDICINE

## 2020-10-13 PROCEDURE — 90662 IIV NO PRSV INCREASED AG IM: CPT | Performed by: FAMILY MEDICINE

## 2020-10-13 RX ORDER — DILTIAZEM HYDROCHLORIDE 240 MG/1
240 CAPSULE, EXTENDED RELEASE ORAL DAILY
Qty: 30 CAPSULE | Refills: 1 | Status: SHIPPED | OUTPATIENT
Start: 2020-10-13 | End: 2020-11-02

## 2020-10-13 NOTE — PROGRESS NOTES
HPI:    Patient ID: Parvin Clark is a 79year old female. Hypertension  This is a chronic problem. The current episode started more than 1 year ago. The problem has been waxing and waning since onset.  The problem is uncontrolled (uncontrolled in morni 1   • losartan 100 MG Oral Tab Take 1 tablet (100 mg total) by mouth daily. 90 tablet 1   • NARCAN 4 MG/0.1ML Nasal Liquid CALL 911.  ADMINISTER A SINGLE SPRAY INTRANASALLY INTO ONE NOSTRIL UPON SIGNS OF OPIOID OVERDOSE. MAY REPEAT AFTER 3 MINUTES IF NO RES influenza vaccination    1. Essential hypertension  Pt should increase diltiazem to 240 MG at this time. Blood pressures should be recorded regularly and sent through [facility] every 2 days.  She is also encouraged to f/u with cardiology to discuss blood pres

## 2020-10-18 ENCOUNTER — PATIENT MESSAGE (OUTPATIENT)
Dept: FAMILY MEDICINE CLINIC | Facility: CLINIC | Age: 80
End: 2020-10-18

## 2020-10-19 NOTE — TELEPHONE ENCOUNTER
From: Carlos Car  To: Colette Drake DO  Sent: 10/18/2020 4:53 PM CDT  Subject: Non-Urgent Medical Question    My question is regarding vitamins. My mother was taking 1000mg of vitamin c per doctors instructions.     Prior she was taking a multivit

## 2020-10-20 ENCOUNTER — OFFICE VISIT (OUTPATIENT)
Dept: HEMATOLOGY/ONCOLOGY | Age: 80
End: 2020-10-20
Attending: INTERNAL MEDICINE
Payer: MEDICARE

## 2020-10-20 VITALS
SYSTOLIC BLOOD PRESSURE: 163 MMHG | HEART RATE: 73 BPM | HEIGHT: 59.25 IN | OXYGEN SATURATION: 96 % | DIASTOLIC BLOOD PRESSURE: 76 MMHG | WEIGHT: 129.19 LBS | TEMPERATURE: 97 F | BODY MASS INDEX: 26.04 KG/M2 | RESPIRATION RATE: 18 BRPM

## 2020-10-20 DIAGNOSIS — C34.81 MALIGNANT NEOPLASM OF OVERLAPPING SITES OF RIGHT LUNG (HCC): ICD-10-CM

## 2020-10-20 DIAGNOSIS — C34.90 EGFR-RELATED LUNG CANCER (HCC): Primary | ICD-10-CM

## 2020-10-20 DIAGNOSIS — C88.4 EXTRANODAL MARGINAL ZONE B-CELL LYMPHOMA OF MUCOSA-ASSOCIATED LYMPHOID TISSUE (MALT) (HCC): ICD-10-CM

## 2020-10-20 DIAGNOSIS — N18.4 CKD (CHRONIC KIDNEY DISEASE) STAGE 4, GFR 15-29 ML/MIN (HCC): ICD-10-CM

## 2020-10-20 DIAGNOSIS — C88.4 EXTRANODAL MARGINAL ZONE B-CELL LYMPHOMA OF MUCOSA-ASSOCIATED LYMPHOID TISSUE (MALT) (HCC): Primary | ICD-10-CM

## 2020-10-20 DIAGNOSIS — C85.10 LOW GRADE B-CELL LYMPHOMA (HCC): ICD-10-CM

## 2020-10-20 DIAGNOSIS — D47.2 MONOCLONAL PARAPROTEINEMIA: ICD-10-CM

## 2020-10-20 DIAGNOSIS — M79.89 LEG SWELLING: ICD-10-CM

## 2020-10-20 PROCEDURE — 99215 OFFICE O/P EST HI 40 MIN: CPT | Performed by: INTERNAL MEDICINE

## 2020-10-20 NOTE — PROGRESS NOTES
Cancer Center Progress Note    Patient Name: Angelica Jensen   YOB: 1940   Medical Record Number: [id number]   CSN: [id number]   Attending Physician: Augustina Dukes M.D.    Referring Physician: Uvaldo Park DO      Date of Visit: 10/20/2 \"adjuvant\" Tarceva 2/2015. Stopped after she was placed on Ibrutinib (given concerns for overlapping side effects. Had been FIONA for lung cancer then).       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 , Disp: 180 tablet, Rfl: 3  •  dilTIAZem HCl ER Coated Beads (CARTIA XT) 180 MG Oral Capsule SR 24 Hr, Take 1 capsule (180 mg total) by mouth nightly., Disp: 30 capsule, Rfl: 3  •  hydrA Disp: , Rfl:   •  Montelukast Sodium (SINGULAIR) 10 MG Oral Tab, Take 10 mg by mouth nightly., Disp: , Rfl:   •  Saline Nasal Spray 0.65 % Nasal Solution, 1 spray by Nasal route as needed for congestion. 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 No rash. Cranial prosthesis. Psych:  Mood and affect appropriate  HEENT: EOMs intact. PERRL. Oropharynx w/o patches or exudates.  No significant mucositis  Ne ALT 19 10/20/2020    BILT 0.3 10/20/2020    TP 7.4 10/20/2020    TP 7.0 10/20/2020    ALB 3.5 10/20/2020    ALB 4.03 10/20/2020    GLOBULIN 3.9 10/20/2020     (H) 10/20/2020    K 4.0 10/20/2020     (H) 10/20/2020    CO2 22.0 10/20/2020 lymphoma. She denies any current complaints. FINDINGS:       CHEST:  LUNGS:  Scarring within the left lung apex. Dependent atelectasis bilaterally. Postsurgical changes within the right lung.   Stable nodular infiltrates noted within the right lo aorta. .                   =====  CONCLUSION:    1. Stable postsurgical changes within the right lung with stable nodular infiltrates and stable areas of consolidation. 2. Small right-sided pleural effusion.     3. Colonic diverticulosis without evidence of 1:15 PM.     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 A measurable mass in the right   lower lobe is stable. Stable tiny nodule left lower lobe and stable ground-glass opacity left upper lobe.         Dictated by: Toney Reece MD on 6/13/2020 at 11:17 AM       Fin

## 2020-10-20 NOTE — PROGRESS NOTES
Pt here for 3 month MD f/u. Energy level is \"lazy, appetite is fair. Denies pain. Pt notes BP has been high so cardio is modifying meds and BP is better now. Pt had 5 day hospital stay for disorientation with UTI 8/28. Pt had home PT after DC.      [address]

## 2020-11-10 ENCOUNTER — LAB ENCOUNTER (OUTPATIENT)
Dept: LAB | Age: 80
End: 2020-11-10
Attending: PHYSICIAN ASSISTANT
Payer: MEDICARE

## 2020-11-10 DIAGNOSIS — E78.5 DYSLIPIDEMIA: ICD-10-CM

## 2020-11-10 PROCEDURE — 80061 LIPID PANEL: CPT

## 2020-11-10 PROCEDURE — 36415 COLL VENOUS BLD VENIPUNCTURE: CPT

## 2020-11-18 ENCOUNTER — TELEPHONE (OUTPATIENT)
Dept: HEMATOLOGY/ONCOLOGY | Facility: HOSPITAL | Age: 80
End: 2020-11-18

## 2020-11-18 ENCOUNTER — VIRTUAL PHONE E/M (OUTPATIENT)
Dept: FAMILY MEDICINE CLINIC | Facility: CLINIC | Age: 80
End: 2020-11-18
Payer: MEDICARE

## 2020-11-18 DIAGNOSIS — Z20.822 EXPOSURE TO COVID-19 VIRUS: Primary | ICD-10-CM

## 2020-11-18 DIAGNOSIS — J06.9 URI, ACUTE: ICD-10-CM

## 2020-11-18 PROCEDURE — 99213 OFFICE O/P EST LOW 20 MIN: CPT | Performed by: FAMILY MEDICINE

## 2020-11-18 NOTE — PROGRESS NOTES
Virtual Telephone Check-In    Jennifer Jeff verbally consents to a Virtual/Telephone Check-In visit on 11/18/20. Patient has been referred to the [facility] website at [email or web address] to review the yearly Consent to Treat document.     Patient unders Anyone who has been in close contact with someone who has COVID-19 should quarantine for at least 14 days from the time of exposure at home and follow the below recommendations. See recommendations below if COVID19 test is positive.     What counts as close 9. Avoid sharing personal items with other people in your household, like dishes, towels, and bedding   10. Clean all surfaces that are touched often, like counters, tabletops, and doorknobs.  Use household cleaning sprays or wipes according to the label in Please call your primary care provider within 2 days of your discharge to arrange for a telehealth follow-up.  CDC does not recommend repeat testing after a positive test.  Convalescent Plasma Donation Program  [address], in conjunction with [place] Centers for Disease Control & Prevention (CDC)  10 things you can do to manage your health at home, Florenciochange.nl. pdf  https://www.EvalYou/

## 2020-11-20 ENCOUNTER — APPOINTMENT (OUTPATIENT)
Dept: LAB | Age: 80
End: 2020-11-20
Attending: FAMILY MEDICINE
Payer: MEDICARE

## 2020-11-20 DIAGNOSIS — Z20.822 EXPOSURE TO COVID-19 VIRUS: ICD-10-CM

## 2020-11-20 DIAGNOSIS — J06.9 URI, ACUTE: ICD-10-CM

## 2020-11-23 ENCOUNTER — PATIENT MESSAGE (OUTPATIENT)
Dept: NEUROLOGY | Facility: CLINIC | Age: 80
End: 2020-11-23

## 2020-11-23 NOTE — TELEPHONE ENCOUNTER
From: Earlene Car  To: Pepe Gray DO  Sent: 11/23/2020 10:26 AM CST  Subject: Non-Urgent Medical Question    Hello Doctor,  Writing for my mother, she seems to be doing well. Do you still need to see her at the 3 month point?  She still would n

## 2020-12-07 DIAGNOSIS — R05.3 CHRONIC COUGH: ICD-10-CM

## 2020-12-07 RX ORDER — FLUTICASONE PROPIONATE 50 MCG
SPRAY, SUSPENSION (ML) NASAL
Qty: 16 G | Refills: 3 | Status: SHIPPED | OUTPATIENT
Start: 2020-12-07 | End: 2021-03-22

## 2021-01-10 ENCOUNTER — HOSPITAL ENCOUNTER (OUTPATIENT)
Dept: CT IMAGING | Age: 81
Discharge: HOME | End: 2021-01-10
Attending: INTERNAL MEDICINE
Payer: MEDICARE

## 2021-01-10 DIAGNOSIS — C85.10 LOW GRADE B-CELL LYMPHOMA (HCC): ICD-10-CM

## 2021-01-10 DIAGNOSIS — C34.81 MALIGNANT NEOPLASM OF OVERLAPPING SITES OF RIGHT LUNG (HCC): ICD-10-CM

## 2021-01-10 DIAGNOSIS — C88.4 EXTRANODAL MARGINAL ZONE B-CELL LYMPHOMA OF MUCOSA-ASSOCIATED LYMPHOID TISSUE (MALT) (HCC): ICD-10-CM

## 2021-01-10 DIAGNOSIS — C34.90 EGFR-RELATED LUNG CANCER (HCC): ICD-10-CM

## 2021-01-10 PROCEDURE — 74176 CT ABD & PELVIS W/O CONTRAST: CPT | Performed by: INTERNAL MEDICINE

## 2021-01-10 PROCEDURE — 71250 CT THORAX DX C-: CPT | Performed by: INTERNAL MEDICINE

## 2021-01-19 ENCOUNTER — PATIENT MESSAGE (OUTPATIENT)
Dept: HEMATOLOGY/ONCOLOGY | Age: 81
End: 2021-01-19

## 2021-01-19 ENCOUNTER — OFFICE VISIT (OUTPATIENT)
Dept: HEMATOLOGY/ONCOLOGY | Age: 81
End: 2021-01-19
Attending: INTERNAL MEDICINE
Payer: MEDICARE

## 2021-01-19 VITALS
HEIGHT: 59.25 IN | SYSTOLIC BLOOD PRESSURE: 121 MMHG | HEART RATE: 70 BPM | DIASTOLIC BLOOD PRESSURE: 68 MMHG | BODY MASS INDEX: 27.69 KG/M2 | RESPIRATION RATE: 18 BRPM | TEMPERATURE: 97 F | OXYGEN SATURATION: 96 % | WEIGHT: 137.38 LBS

## 2021-01-19 DIAGNOSIS — N18.4 CKD (CHRONIC KIDNEY DISEASE) STAGE 4, GFR 15-29 ML/MIN (HCC): ICD-10-CM

## 2021-01-19 DIAGNOSIS — C34.81 MALIGNANT NEOPLASM OF OVERLAPPING SITES OF RIGHT LUNG (HCC): ICD-10-CM

## 2021-01-19 DIAGNOSIS — C34.90 EGFR-RELATED LUNG CANCER (HCC): ICD-10-CM

## 2021-01-19 DIAGNOSIS — D47.2 MONOCLONAL PARAPROTEINEMIA: ICD-10-CM

## 2021-01-19 DIAGNOSIS — C85.10 LOW GRADE B-CELL LYMPHOMA (HCC): Primary | ICD-10-CM

## 2021-01-19 DIAGNOSIS — M79.89 LEG SWELLING: ICD-10-CM

## 2021-01-19 DIAGNOSIS — C88.4 EXTRANODAL MARGINAL ZONE B-CELL LYMPHOMA OF MUCOSA-ASSOCIATED LYMPHOID TISSUE (MALT) (HCC): ICD-10-CM

## 2021-01-19 DIAGNOSIS — D63.0 ANEMIA COMPLICATING NEOPLASTIC DISEASE: ICD-10-CM

## 2021-01-19 LAB
ALBUMIN SERPL-MCNC: 3.5 G/DL (ref 3.4–5)
ALBUMIN/GLOB SERPL: 1.1 {RATIO} (ref 1–2)
ALP LIVER SERPL-CCNC: 155 U/L
ALT SERPL-CCNC: 20 U/L
ANION GAP SERPL CALC-SCNC: 7 MMOL/L (ref 0–18)
AST SERPL-CCNC: 15 U/L (ref 15–37)
BASOPHILS # BLD: 0 X10(3) UL (ref 0–0.2)
BASOPHILS NFR BLD: 0 %
BILIRUB SERPL-MCNC: 0.4 MG/DL (ref 0.1–2)
BUN BLD-MCNC: 57 MG/DL (ref 7–18)
BUN/CREAT SERPL: 32.2 (ref 10–20)
CALCIUM BLD-MCNC: 9.2 MG/DL (ref 8.5–10.1)
CEA SERPL-MCNC: 1.4 NG/ML (ref ?–5)
CHLORIDE SERPL-SCNC: 115 MMOL/L (ref 98–112)
CO2 SERPL-SCNC: 22 MMOL/L (ref 21–32)
CREAT BLD-MCNC: 1.77 MG/DL
DEPRECATED RDW RBC AUTO: 56.2 FL (ref 35.1–46.3)
EOSINOPHIL # BLD: 0.05 X10(3) UL (ref 0–0.7)
EOSINOPHIL NFR BLD: 1 %
ERYTHROCYTE [DISTWIDTH] IN BLOOD BY AUTOMATED COUNT: 14.4 % (ref 11–15)
GLOBULIN PLAS-MCNC: 3.3 G/DL (ref 2.8–4.4)
GLUCOSE BLD-MCNC: 97 MG/DL (ref 70–99)
HCT VFR BLD AUTO: 34.9 %
HGB BLD-MCNC: 11.1 G/DL
LDH SERPL L TO P-CCNC: 206 U/L
LYMPHOCYTES NFR BLD: 0.8 X10(3) UL (ref 1–4)
LYMPHOCYTES NFR BLD: 16 %
M PROTEIN MFR SERPL ELPH: 6.8 G/DL (ref 6.4–8.2)
MCH RBC QN AUTO: 33.6 PG (ref 26–34)
MCHC RBC AUTO-ENTMCNC: 31.8 G/DL (ref 31–37)
MCV RBC AUTO: 105.8 FL
METAMYELOCYTES # BLD: 0.45 X10(3) UL
METAMYELOCYTES NFR BLD: 9 %
MONOCYTES # BLD: 0.25 X10(3) UL (ref 0.1–1)
MONOCYTES NFR BLD: 5 %
NEUTROPHILS # BLD AUTO: 3.4 X10 (3) UL (ref 1.5–7.7)
NEUTROPHILS NFR BLD: 46 %
NEUTS BAND NFR BLD: 23 %
NEUTS HYPERSEG # BLD: 3.45 X10(3) UL (ref 1.5–7.7)
OSMOLALITY SERPL CALC.SUM OF ELEC: 314 MOSM/KG (ref 275–295)
PATIENT FASTING Y/N/NP: NO
PLATELET # BLD AUTO: 170 10(3)UL (ref 150–450)
PLATELET MORPHOLOGY: NORMAL
POTASSIUM SERPL-SCNC: 4.4 MMOL/L (ref 3.5–5.1)
RBC # BLD AUTO: 3.3 X10(6)UL
SODIUM SERPL-SCNC: 144 MMOL/L (ref 136–145)
TOTAL CELLS COUNTED: 100
WBC # BLD AUTO: 5 X10(3) UL (ref 4–11)

## 2021-01-19 PROCEDURE — 99215 OFFICE O/P EST HI 40 MIN: CPT | Performed by: INTERNAL MEDICINE

## 2021-01-19 NOTE — TELEPHONE ENCOUNTER
From: Terese Sat  To: Ernst Burrell MD  Sent: 1/19/2021 3:10 PM CST  Subject: Test Results Question    I have a question about COMP METABOLIC PANEL (14) resulted on 1/19/21, 12:15 PM.    Looking at the BUN, BUN/creatinine and creatinine levels the

## 2021-01-19 NOTE — PROGRESS NOTES
Pt here for 3 month MD f/u. Energy level is fair, appetite is good. Denies pain. No breathing issues.      Outpatient Oncology Care Plan  Problem list:  fatigue  knowledge deficit    Problems related to:    disease/disease progression    Interventions:  pro

## 2021-01-19 NOTE — PROGRESS NOTES
Cancer Center Progress Note    Patient Name: Emir Torrez   YOB: 1940   Medical Record Number: [id number]   CSN: [id number]   Attending Physician: Tona Matta M.D.    Referring Physician: Donn Medina DO      Date of Visit: 1/19/2021 \"adjuvant\" Tarceva 2/2015. Stopped after she was placed on Ibrutinib (given concerns for overlapping side effects. Had been FIONA for lung cancer then).       - Stage I NSCLC- adenocarcinoma of the RLL diagnosed 5/02/2013 s/p SBRT     - Stage IB (T2N0M0) we Medications:    Current Outpatient Medications:   •  losartan Potassium 50 MG Oral Tab, Take 1 tablet (50 mg total) by mouth 2 (two) times daily. , Disp: 180 tablet, Rfl: 2  •  Metoprolol Succinate ER 50 MG Oral Tablet 24 Hr, Take 1 tablet (50 mg total) by Solution, 1 spray by Nasal route as needed for congestion. 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 Oropharynx w/o patches or exudates. No significant mucositis  Neck: No JVD. No palpable lymphadenopathy. Neck is supple. Lymphatics:  There is no palpable lymphadenopathy   Chest: Diminished BS but no crackles or wheeze  Heart: Regular rate and rhyt Value Ref Range    Neutrophil Absolute Manual 3.45 1.50 - 7.70 x10(3) uL    Lymphocyte Absolute Manual 0.80 (L) 1.00 - 4.00 x10(3) uL    Monocyte Absolute Manual 0.25 0.10 - 1.00 x10(3) uL    Eosinophil Absolute Manual 0.05 0.00 - 0.70 x10(3) uL    Basophi unchanged. Confluent wedge-shaped opacity in the right lower lobe on image 55 measures 3.5 x 1.6 cm and is unchanged. Ground-glass density in the left upper   lobe measures 1.1 x 0.9 cm and is unchanged.   MEDIASTINUM:  Moderate hiatal hernia is unchanged appearance of the remainder of the right lower lobe. There are stable nodules in the left lung as well. 2. Small right pleural effusion is unchanged. 3. Moderate hiatal hernia.   4. Nonspecific stranding in the mesentery consistent with panniculitis is u ABDOMEN/PELVIS:  LIVER:  Slight nodular contour to the liver. BILIARY:  Cholecystectomy clips. Common bile duct dilatation measuring 1.3 cm. .  SPLEEN:  Normal.  No enlargement or focal lesion.    PANCREAS:  No luis angel-pancreatic inflammatory stranding  AD with Rituxan maintenance. On Rituxan maintenance (received 7/28/20) presented to the ED the following day for abdominal pain attributed to diverticulitis. She was placed on antibiotics.  Family reported also appeared to have mild confusion after that infus

## 2021-01-20 LAB
IGA SERPL-MCNC: 45.4 MG/DL (ref 70–312)
IGM SERPL-MCNC: 505 MG/DL (ref 43–279)
IMMUNOGLOBULIN PNL SER-MCNC: 370 MG/DL (ref 791–1643)

## 2021-01-22 LAB
ALBUMIN SERPL ELPH-MCNC: 4.01 G/DL (ref 3.75–5.21)
ALBUMIN/GLOB SERPL: 1.44 {RATIO} (ref 1–2)
ALPHA1 GLOB SERPL ELPH-MCNC: 0.34 G/DL (ref 0.19–0.46)
ALPHA2 GLOB SERPL ELPH-MCNC: 0.97 G/DL (ref 0.48–1.05)
B-GLOBULIN SERPL ELPH-MCNC: 0.69 G/DL (ref 0.68–1.23)
GAMMA GLOB SERPL ELPH-MCNC: 0.79 G/DL (ref 0.62–1.7)
KAPPA LC FREE SER-MCNC: 1.61 MG/DL (ref 0.33–1.94)
KAPPA LC FREE/LAMBDA FREE SER NEPH: 0.35 {RATIO} (ref 0.26–1.65)
LAMBDA LC FREE SERPL-MCNC: 4.55 MG/DL (ref 0.57–2.63)
M PROTEIN MFR SERPL ELPH: 6.8 G/DL (ref 6.4–8.2)
M-SPIKE 1: 0.48 G/DL (ref ?–0)

## 2021-01-26 DIAGNOSIS — Z23 NEED FOR VACCINATION: ICD-10-CM

## 2021-01-29 ENCOUNTER — SOCIAL WORK SERVICES (OUTPATIENT)
Dept: HEMATOLOGY/ONCOLOGY | Facility: HOSPITAL | Age: 81
End: 2021-01-29

## 2021-01-29 NOTE — PROGRESS NOTES
received [facility] Paperwork for Gez[facility]; per Dr. Oswaldo Aguillon, will sign off for Office visits only as Patient is not on active treatment.  Forms completed for visits every 3months and faxed to [facility] at A#[phone]

## 2021-01-30 ENCOUNTER — IMMUNIZATION (OUTPATIENT)
Dept: LAB | Age: 81
End: 2021-01-30
Attending: HOSPITALIST
Payer: MEDICARE

## 2021-01-30 DIAGNOSIS — Z23 NEED FOR VACCINATION: Primary | ICD-10-CM

## 2021-01-30 PROCEDURE — 0001A SARSCOV2 VAC 30MCG/0.3ML IM: CPT

## 2021-02-03 NOTE — TELEPHONE ENCOUNTER
Requesting Voltaren. Filled: 1/4/16 #300g with 0 refills.
[Follow-up Visit ___] : a follow-up visit  for [unfilled]

## 2021-02-04 ENCOUNTER — LAB ENCOUNTER (OUTPATIENT)
Dept: LAB | Age: 81
End: 2021-02-04
Attending: PEDIATRICS
Payer: MEDICARE

## 2021-02-04 DIAGNOSIS — E78.5 DYSLIPIDEMIA: ICD-10-CM

## 2021-02-04 LAB
ALBUMIN SERPL-MCNC: 3.8 G/DL (ref 3.4–5)
ALP LIVER SERPL-CCNC: 164 U/L
ALT SERPL-CCNC: 22 U/L
AST SERPL-CCNC: 18 U/L (ref 15–37)
BILIRUB DIRECT SERPL-MCNC: 0.1 MG/DL (ref 0–0.2)
BILIRUB SERPL-MCNC: 0.5 MG/DL (ref 0.1–2)
CHOLEST SMN-MCNC: 189 MG/DL (ref ?–200)
HDLC SERPL-MCNC: 69 MG/DL (ref 40–59)
LDLC SERPL CALC-MCNC: 101 MG/DL (ref ?–100)
M PROTEIN MFR SERPL ELPH: 7.1 G/DL (ref 6.4–8.2)
NONHDLC SERPL-MCNC: 120 MG/DL (ref ?–130)
PATIENT FASTING Y/N/NP: YES
TRIGL SERPL-MCNC: 97 MG/DL (ref 30–149)
VLDLC SERPL CALC-MCNC: 19 MG/DL (ref 0–30)

## 2021-02-04 PROCEDURE — 80076 HEPATIC FUNCTION PANEL: CPT

## 2021-02-04 PROCEDURE — 80061 LIPID PANEL: CPT

## 2021-02-04 PROCEDURE — 36415 COLL VENOUS BLD VENIPUNCTURE: CPT

## 2021-02-11 ENCOUNTER — OFFICE VISIT (OUTPATIENT)
Dept: NEPHROLOGY | Facility: CLINIC | Age: 81
End: 2021-02-11
Payer: MEDICARE

## 2021-02-11 VITALS — SYSTOLIC BLOOD PRESSURE: 144 MMHG | DIASTOLIC BLOOD PRESSURE: 78 MMHG | BODY MASS INDEX: 28 KG/M2 | WEIGHT: 136.81 LBS

## 2021-02-11 DIAGNOSIS — N18.30 STAGE 3 CHRONIC KIDNEY DISEASE, UNSPECIFIED WHETHER STAGE 3A OR 3B CKD (HCC): Primary | ICD-10-CM

## 2021-02-11 DIAGNOSIS — I10 ESSENTIAL HYPERTENSION: ICD-10-CM

## 2021-02-11 PROCEDURE — 99214 OFFICE O/P EST MOD 30 MIN: CPT | Performed by: INTERNAL MEDICINE

## 2021-02-11 NOTE — PROGRESS NOTES
Nephrology Progress Note      ASSESSMENT/PLAN:        1) CKD 3- baseline Cr 1.6-1.7 mg/dl likely due to hypertensive / age-related nephrosclerosis; may also have a mild underlying glomerulopathy given modest proteinuria.   Would not pursue renal biopsy give large intestine without perforation or abscess without bleeding 1/14/2019   • Dysphagia, pharyngoesophageal phase 10/10/2018   • Epistaxis    • Esophageal reflux    • Exposure to unspecified radiation 2013    right lower lung   • Gluteal tendinitis 9/10/20 SURGICAL HISTORY  2863,8965    bunionectomy,bilateral   • OTHER SURGICAL HISTORY  12/05/2017    Cysto- Dr. Leonora Adams  2009    removal of upper right lobe   • TONSILLECTOMY        Family History   Problem Relation Age of Onset   • Ot Nasal Solution 1 spray by Nasal route 2 (two) times daily. • RESTASIS 0.05 % Ophthalmic Emulsion INSTILL ONE DROP INTO EACH EYE TWICE DAILY 20 mL 6   • BREO ELLIPTA 200-25 MCG/INH Inhalation Aerosol Powder, Breath Activated 1 puff daily.      • [place]

## 2021-02-20 ENCOUNTER — IMMUNIZATION (OUTPATIENT)
Dept: LAB | Age: 81
End: 2021-02-20
Attending: HOSPITALIST
Payer: MEDICARE

## 2021-02-20 DIAGNOSIS — Z23 NEED FOR VACCINATION: Primary | ICD-10-CM

## 2021-02-20 PROCEDURE — 0002A SARSCOV2 VAC 30MCG/0.3ML IM: CPT

## 2021-03-16 RX ORDER — LOSARTAN POTASSIUM 50 MG/1
TABLET ORAL
Qty: 90 TABLET | Refills: 0 | Status: SHIPPED | OUTPATIENT
Start: 2021-03-16 | End: 2021-09-08

## 2021-03-21 DIAGNOSIS — R05.3 CHRONIC COUGH: ICD-10-CM

## 2021-03-22 ENCOUNTER — LAB ENCOUNTER (OUTPATIENT)
Dept: LAB | Age: 81
End: 2021-03-22
Attending: FAMILY MEDICINE
Payer: MEDICARE

## 2021-03-22 DIAGNOSIS — E55.9 VITAMIN D DEFICIENCY: ICD-10-CM

## 2021-03-22 LAB — VIT D+METAB SERPL-MCNC: 33 NG/ML (ref 30–100)

## 2021-03-22 PROCEDURE — 82306 VITAMIN D 25 HYDROXY: CPT

## 2021-03-22 PROCEDURE — 36415 COLL VENOUS BLD VENIPUNCTURE: CPT

## 2021-03-22 RX ORDER — FLUTICASONE PROPIONATE 50 MCG
SPRAY, SUSPENSION (ML) NASAL
Qty: 48 G | Refills: 0 | Status: SHIPPED | OUTPATIENT
Start: 2021-03-22 | End: 2021-05-10

## 2021-04-10 ENCOUNTER — LAB ENCOUNTER (OUTPATIENT)
Dept: LAB | Age: 81
End: 2021-04-10
Attending: INTERNAL MEDICINE
Payer: MEDICARE

## 2021-04-10 ENCOUNTER — HOSPITAL ENCOUNTER (OUTPATIENT)
Dept: CT IMAGING | Age: 81
Discharge: HOME | End: 2021-04-10
Attending: INTERNAL MEDICINE
Payer: MEDICARE

## 2021-04-10 DIAGNOSIS — C34.81 MALIGNANT NEOPLASM OF OVERLAPPING SITES OF RIGHT LUNG (HCC): ICD-10-CM

## 2021-04-10 DIAGNOSIS — C88.4 EXTRANODAL MARGINAL ZONE B-CELL LYMPHOMA OF MUCOSA-ASSOCIATED LYMPHOID TISSUE (MALT) (HCC): ICD-10-CM

## 2021-04-10 DIAGNOSIS — D47.2 MONOCLONAL PARAPROTEINEMIA: ICD-10-CM

## 2021-04-10 PROCEDURE — 84165 PROTEIN E-PHORESIS SERUM: CPT

## 2021-04-10 PROCEDURE — 74176 CT ABD & PELVIS W/O CONTRAST: CPT | Performed by: INTERNAL MEDICINE

## 2021-04-10 PROCEDURE — 85025 COMPLETE CBC W/AUTO DIFF WBC: CPT

## 2021-04-10 PROCEDURE — 83615 LACTATE (LD) (LDH) ENZYME: CPT

## 2021-04-10 PROCEDURE — 36415 COLL VENOUS BLD VENIPUNCTURE: CPT

## 2021-04-10 PROCEDURE — 86334 IMMUNOFIX E-PHORESIS SERUM: CPT

## 2021-04-10 PROCEDURE — 83883 ASSAY NEPHELOMETRY NOT SPEC: CPT

## 2021-04-10 PROCEDURE — 71250 CT THORAX DX C-: CPT | Performed by: INTERNAL MEDICINE

## 2021-04-10 PROCEDURE — 80053 COMPREHEN METABOLIC PANEL: CPT

## 2021-04-10 PROCEDURE — 82784 ASSAY IGA/IGD/IGG/IGM EACH: CPT

## 2021-04-12 DIAGNOSIS — R93.89 ABNORMAL CT OF THE CHEST: ICD-10-CM

## 2021-04-12 DIAGNOSIS — C88.4 EXTRANODAL MARGINAL ZONE B-CELL LYMPHOMA OF MUCOSA-ASSOCIATED LYMPHOID TISSUE (MALT) (HCC): Primary | ICD-10-CM

## 2021-04-12 DIAGNOSIS — C34.81 MALIGNANT NEOPLASM OF OVERLAPPING SITES OF RIGHT LUNG (HCC): ICD-10-CM

## 2021-04-16 ENCOUNTER — OFFICE VISIT (OUTPATIENT)
Dept: HEMATOLOGY/ONCOLOGY | Facility: HOSPITAL | Age: 81
End: 2021-04-16
Attending: INTERNAL MEDICINE
Payer: MEDICARE

## 2021-04-16 VITALS
HEIGHT: 59.02 IN | WEIGHT: 142 LBS | BODY MASS INDEX: 28.63 KG/M2 | SYSTOLIC BLOOD PRESSURE: 145 MMHG | TEMPERATURE: 97 F | DIASTOLIC BLOOD PRESSURE: 77 MMHG | HEART RATE: 69 BPM | RESPIRATION RATE: 16 BRPM | OXYGEN SATURATION: 94 %

## 2021-04-16 DIAGNOSIS — C85.80 MARGINAL ZONE B-CELL LYMPHOMA (HCC): ICD-10-CM

## 2021-04-16 DIAGNOSIS — D47.2 MONOCLONAL PARAPROTEINEMIA: ICD-10-CM

## 2021-04-16 DIAGNOSIS — M79.89 LEG SWELLING: ICD-10-CM

## 2021-04-16 DIAGNOSIS — C34.81 MALIGNANT NEOPLASM OF OVERLAPPING SITES OF RIGHT LUNG (HCC): Primary | ICD-10-CM

## 2021-04-16 DIAGNOSIS — C85.10 LOW GRADE B-CELL LYMPHOMA (HCC): ICD-10-CM

## 2021-04-16 DIAGNOSIS — N18.4 CKD (CHRONIC KIDNEY DISEASE) STAGE 4, GFR 15-29 ML/MIN (HCC): ICD-10-CM

## 2021-04-16 DIAGNOSIS — D63.0 ANEMIA COMPLICATING NEOPLASTIC DISEASE: ICD-10-CM

## 2021-04-16 DIAGNOSIS — C34.90 EGFR-RELATED LUNG CANCER (HCC): ICD-10-CM

## 2021-04-16 PROCEDURE — 99215 OFFICE O/P EST HI 40 MIN: CPT | Performed by: INTERNAL MEDICINE

## 2021-04-16 NOTE — PROGRESS NOTES
Cancer Center Progress Note    Patient Name: Miranda Malloy   YOB: 1940   Medical Record Number: [id number]   CSN: [id number]   Attending Physician: Carisa Rock M.D.    Referring Physician: Jacqui Valadez DO      Date of Visit: 4/16/2021 I NSCLC right medial lung base- well differentiated adenocarcinoma with lepidic growth pattern diagnosed 12/3/14 s/p SBRT. EGFR exon 19 positive. Started \"adjuvant\" Tarceva 2/2015.  Stopped after she was placed on Ibrutinib (given concerns for overlapping to her HTN medications. She is hesitant to change these even though her ankles swell as her BP is finally well controlled. She has stopped diuretics as had been making her kidney function worse.  Denies chest or abdominal pain, change in bowel or urinary ha daily., Disp: , Rfl:   •  RESTASIS 0.05 % Ophthalmic Emulsion, INSTILL ONE DROP INTO EACH EYE TWICE DAILY, Disp: 20 mL, Rfl: 6  •  BREO ELLIPTA 200-25 MCG/INH Inhalation Aerosol Powder, Breath Activated, 1 puff daily. , Disp: , Rfl:   •  Montelukast Sodium 4/3/2018   • NHL (nodular histiocytic lymphoma) (HCC)    • Nocturnal leg cramps 8/6/2013   • Normocytic normochromic anemia 7/19/2016   • Osteoarthritis    • Osteoporosis    • Personal history of antineoplastic chemotherapy     LAST 10/2019   • Personal hi used    Substance and Sexual Activity      Alcohol use: No      Drug use: No      Sexual activity: Not on file    Other Topics      Concerns:         Service: Not Asked        Blood Transfusions: Not Asked        Caffeine Concern: Yes          soda lb)   SpO2 94%   BMI 28.66 kg/m²       Physical Examination:  General: Patient is alert and oriented x 3, not in acute distress. No rash. Psych:  Mood and affect appropriate  HEENT: EOMs intact. PERRL. Oropharynx w/o patches or exudates.  No significant GLOBULIN 3.1 04/10/2021     04/10/2021    K 4.2 04/10/2021     (H) 04/10/2021    CO2 24.0 04/10/2021        Ref.  Range 1/19/2021 11:39 4/10/2021 11:02   IMMUNOGLOBULIN A Latest Ref Range: 70.00 - 312.00 mg/dL 45.40 (L) 43.30 (L)   IMMUNOGLOBULI CHEST:     LUNGS:  2.4 x 1.6 cm right infrahilar confluent opacity is stable.  Diffuse reticular nodular densities throughout the right lower lobe are stable.  1.3 x 1.0 cm ground-glass nodule in the left upper lobe (image 48) was 1.1 x 0.9 cm.  Addition with multiple nonenlarged mesenteric lymph nodes is stable.  There is diverticulosis of the colon without evidence of acute diverticulitis.  Extension of bowel into the right ventral abdominal   wall is again noted without evidence of bowel obstruction. EGFR-related lung cancer (HCC) C85.10 Low grade B-cell lymphoma ([address] C34.81 Malignant neoplasm of overlapping sites of right lung (HCC) C88.4 Extranoda*     TECHNIQUE:  Following oral contrast administration, unenhanced multislice CT scanning is performed KIDNEYS:  Multiple mixed attenuation exophytic nodules in the right kidney are unchanged. These are probably cysts and hyperdense cysts. Similar exophytic low-density lesions in the left kidney are unchanged and probably cysts.   ADRENALS:  No mass or e Doubt from (1) with how good labs look. 2. H/o multiple lung cancers- Reviewed recent scans with her and her son. Worrisome new/progressive findings though continues to feel pretty good. PET ordered.      3. Anemia- Multifactorial. Hb adequate and stab

## 2021-04-20 ENCOUNTER — APPOINTMENT (OUTPATIENT)
Dept: HEMATOLOGY/ONCOLOGY | Age: 81
End: 2021-04-20
Attending: INTERNAL MEDICINE
Payer: MEDICARE

## 2021-04-26 ENCOUNTER — TELEPHONE (OUTPATIENT)
Dept: HEMATOLOGY/ONCOLOGY | Facility: HOSPITAL | Age: 81
End: 2021-04-26

## 2021-04-26 NOTE — TELEPHONE ENCOUNTER
Patient wanted to confirm instructions for PET tomorrow. Reviewed what it says and gave phone number to confirm if steak and eggs are fine for dinner tonight.

## 2021-04-27 ENCOUNTER — HOSPITAL ENCOUNTER (OUTPATIENT)
Dept: NUCLEAR MEDICINE | Facility: HOSPITAL | Age: 81
Discharge: HOME | End: 2021-04-27
Attending: INTERNAL MEDICINE
Payer: MEDICARE

## 2021-04-27 DIAGNOSIS — C88.4 EXTRANODAL MARGINAL ZONE B-CELL LYMPHOMA OF MUCOSA-ASSOCIATED LYMPHOID TISSUE (MALT) (HCC): ICD-10-CM

## 2021-04-27 DIAGNOSIS — R93.89 ABNORMAL CT OF THE CHEST: ICD-10-CM

## 2021-04-27 DIAGNOSIS — C34.81 MALIGNANT NEOPLASM OF OVERLAPPING SITES OF RIGHT LUNG (HCC): ICD-10-CM

## 2021-04-27 PROCEDURE — 78815 PET IMAGE W/CT SKULL-THIGH: CPT | Performed by: INTERNAL MEDICINE

## 2021-04-27 PROCEDURE — 82962 GLUCOSE BLOOD TEST: CPT

## 2021-04-28 ENCOUNTER — TELEPHONE (OUTPATIENT)
Dept: HEMATOLOGY/ONCOLOGY | Facility: HOSPITAL | Age: 81
End: 2021-04-28

## 2021-04-28 DIAGNOSIS — R93.89 ABNORMAL CT OF THE CHEST: ICD-10-CM

## 2021-04-28 DIAGNOSIS — C85.10 LOW GRADE B-CELL LYMPHOMA (HCC): ICD-10-CM

## 2021-04-28 DIAGNOSIS — C34.81 MALIGNANT NEOPLASM OF OVERLAPPING SITES OF RIGHT LUNG (HCC): Primary | ICD-10-CM

## 2021-04-28 NOTE — TELEPHONE ENCOUNTER
Reviewed imaging at lung conference and consensus was to biopsy left sided findings. Right sided findings not felt to be as worrisome. Jose M Jensen and spoke to her son Nessa Johnston as well. Biopsy arranged through IR.

## 2021-05-02 ENCOUNTER — LAB ENCOUNTER (OUTPATIENT)
Dept: LAB | Facility: HOSPITAL | Age: 81
End: 2021-05-02
Attending: INTERNAL MEDICINE
Payer: MEDICARE

## 2021-05-02 DIAGNOSIS — R93.89 ABNORMAL CT OF THE CHEST: ICD-10-CM

## 2021-05-02 DIAGNOSIS — C85.10 LOW GRADE B-CELL LYMPHOMA (HCC): ICD-10-CM

## 2021-05-02 DIAGNOSIS — C34.81 MALIGNANT NEOPLASM OF OVERLAPPING SITES OF RIGHT LUNG (HCC): ICD-10-CM

## 2021-05-05 ENCOUNTER — NURSE ONLY (OUTPATIENT)
Dept: LAB | Facility: HOSPITAL | Age: 81
End: 2021-05-05
Attending: INTERNAL MEDICINE
Payer: MEDICARE

## 2021-05-05 ENCOUNTER — HOSPITAL ENCOUNTER (OUTPATIENT)
Dept: CT IMAGING | Facility: HOSPITAL | Age: 81
Discharge: HOME | End: 2021-05-05
Attending: INTERNAL MEDICINE
Payer: MEDICARE

## 2021-05-05 ENCOUNTER — HOSPITAL ENCOUNTER (OUTPATIENT)
Dept: GENERAL RADIOLOGY | Facility: HOSPITAL | Age: 81
Discharge: HOME | End: 2021-05-05
Attending: RADIOLOGY
Payer: MEDICARE

## 2021-05-05 VITALS
RESPIRATION RATE: 18 BRPM | HEIGHT: 59 IN | BODY MASS INDEX: 28.22 KG/M2 | TEMPERATURE: 98 F | WEIGHT: 140 LBS | SYSTOLIC BLOOD PRESSURE: 124 MMHG | HEART RATE: 55 BPM | OXYGEN SATURATION: 92 % | DIASTOLIC BLOOD PRESSURE: 64 MMHG

## 2021-05-05 DIAGNOSIS — C85.10 LOW GRADE B-CELL LYMPHOMA (HCC): ICD-10-CM

## 2021-05-05 DIAGNOSIS — R93.89 ABNORMAL CT OF THE CHEST: ICD-10-CM

## 2021-05-05 DIAGNOSIS — C34.81 MALIGNANT NEOPLASM OF OVERLAPPING SITES OF RIGHT LUNG (HCC): ICD-10-CM

## 2021-05-05 DIAGNOSIS — C34.81 MALIGNANT NEOPLASM OF OVERLAPPING SITES OF RIGHT LUNG (HCC): Primary | ICD-10-CM

## 2021-05-05 LAB
INR BLD: 0.96 (ref 0.89–1.11)
PSA SERPL DL<=0.01 NG/ML-MCNC: 13.1 SECONDS (ref 12.4–14.6)

## 2021-05-05 PROCEDURE — 88342 IMHCHEM/IMCYTCHM 1ST ANTB: CPT | Performed by: INTERNAL MEDICINE

## 2021-05-05 PROCEDURE — 71045 X-RAY EXAM CHEST 1 VIEW: CPT | Performed by: RADIOLOGY

## 2021-05-05 PROCEDURE — 99153 MOD SED SAME PHYS/QHP EA: CPT | Performed by: INTERNAL MEDICINE

## 2021-05-05 PROCEDURE — 88341 IMHCHEM/IMCYTCHM EA ADD ANTB: CPT | Performed by: INTERNAL MEDICINE

## 2021-05-05 PROCEDURE — 36415 COLL VENOUS BLD VENIPUNCTURE: CPT

## 2021-05-05 PROCEDURE — 32408 CORE NDL BX LNG/MED PERQ: CPT | Performed by: INTERNAL MEDICINE

## 2021-05-05 PROCEDURE — 99152 MOD SED SAME PHYS/QHP 5/>YRS: CPT | Performed by: INTERNAL MEDICINE

## 2021-05-05 PROCEDURE — 88305 TISSUE EXAM BY PATHOLOGIST: CPT | Performed by: INTERNAL MEDICINE

## 2021-05-05 PROCEDURE — 88312 SPECIAL STAINS GROUP 1: CPT | Performed by: INTERNAL MEDICINE

## 2021-05-05 PROCEDURE — 85610 PROTHROMBIN TIME: CPT

## 2021-05-05 RX ORDER — MIDAZOLAM HYDROCHLORIDE 1 MG/ML
1 INJECTION INTRAMUSCULAR; INTRAVENOUS EVERY 5 MIN PRN
Status: DISCONTINUED | OUTPATIENT
Start: 2021-05-05 | End: 2021-05-05

## 2021-05-05 RX ORDER — FLUMAZENIL 0.1 MG/ML
0.2 INJECTION, SOLUTION INTRAVENOUS AS NEEDED
Status: DISCONTINUED | OUTPATIENT
Start: 2021-05-05 | End: 2021-05-07

## 2021-05-05 RX ORDER — MIDAZOLAM HYDROCHLORIDE 1 MG/ML
INJECTION INTRAMUSCULAR; INTRAVENOUS
Status: COMPLETED
Start: 2021-05-05 | End: 2021-05-05

## 2021-05-05 RX ORDER — SODIUM CHLORIDE 9 MG/ML
INJECTION, SOLUTION INTRAVENOUS CONTINUOUS
Status: DISCONTINUED | OUTPATIENT
Start: 2021-05-05 | End: 2021-05-07

## 2021-05-05 RX ORDER — NALOXONE HYDROCHLORIDE 0.4 MG/ML
80 INJECTION, SOLUTION INTRAMUSCULAR; INTRAVENOUS; SUBCUTANEOUS AS NEEDED
Status: DISCONTINUED | OUTPATIENT
Start: 2021-05-05 | End: 2021-05-07

## 2021-05-05 RX ADMIN — MIDAZOLAM HYDROCHLORIDE 1 MG: 1 INJECTION INTRAMUSCULAR; INTRAVENOUS at 08:56:00

## 2021-05-05 RX ADMIN — SODIUM CHLORIDE: 9 INJECTION, SOLUTION INTRAVENOUS at 08:50:00

## 2021-05-05 RX ADMIN — MIDAZOLAM HYDROCHLORIDE 0.5 MG: 1 INJECTION INTRAMUSCULAR; INTRAVENOUS at 09:01:00

## 2021-05-05 NOTE — IMAGING NOTE
Received pt to rad holding accompanied by son, Abhishek Reveles [phone]. Pt verified name and  as well as allergies. Pt states NPO since 2100 last night. Pt states she does not take blood thinners. Lab results of PT/INR and PLT reviewed.   Informed consent

## 2021-05-05 NOTE — H&P
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 , Disp: 90 tablet, Rfl: 2  •  dilTIAZem HCl ER Coated Beads (CARTIA XT) 180 MG Oral Capsule SR 24 Hr, Take 1 capsule (180 mg total) by mouth nightly., Disp: 90 capsule, Rfl: 2  •  hydrALAzine HCl 50 MG Oral Tab, Take 1 tablet (50 mg t DP    Results:   Labs:  No results for input(s): RBC, HGB, HCT, MCV, MCH, MCHC, RDW, NEPRELIM, WBC, PLT in the last 168 hours.   Recent Labs   Lab 05/05/21  0728   PTP 13.1   INR 0.96     No results for input(s): GLU, BUN, CREATSERUM, GFRAA, GFRNAA, CA, NA,

## 2021-05-05 NOTE — PROCEDURES
[facility]  Procedure Note    Leia Loges Fri Patient Status:  Outpatient    1940 MRN [id number]   [facility] CT Attending Jonatan Bennett MD   [facility] Day # 0 PCP Luis Manuel Bowen DO     Procedure: CT guided L lung biopsy    Pre-Pro

## 2021-05-07 ENCOUNTER — TELEPHONE (OUTPATIENT)
Dept: HEMATOLOGY/ONCOLOGY | Facility: HOSPITAL | Age: 81
End: 2021-05-07

## 2021-05-07 NOTE — TELEPHONE ENCOUNTER
Asael Carlson with biopsy results- necrotizing granulomas. Recommended she f/u with Pulmonary. Path forwarded to them.

## 2021-05-10 ENCOUNTER — TELEPHONE (OUTPATIENT)
Dept: HEMATOLOGY/ONCOLOGY | Facility: HOSPITAL | Age: 81
End: 2021-05-10

## 2021-05-10 DIAGNOSIS — R05.3 CHRONIC COUGH: ICD-10-CM

## 2021-05-10 RX ORDER — FLUTICASONE PROPIONATE 50 MCG
SPRAY, SUSPENSION (ML) NASAL
Qty: 16 G | Refills: 0 | Status: SHIPPED | OUTPATIENT
Start: 2021-05-10 | End: 2021-06-07

## 2021-05-17 ENCOUNTER — LAB ENCOUNTER (OUTPATIENT)
Dept: LAB | Age: 81
End: 2021-05-17
Attending: INTERNAL MEDICINE
Payer: MEDICARE

## 2021-05-17 DIAGNOSIS — J18.1 UNRESOLVED LOBAR PNEUMONIA (HCC): Primary | ICD-10-CM

## 2021-05-17 PROCEDURE — 86480 TB TEST CELL IMMUN MEASURE: CPT

## 2021-05-17 PROCEDURE — 36415 COLL VENOUS BLD VENIPUNCTURE: CPT

## 2021-06-04 ENCOUNTER — APPOINTMENT (OUTPATIENT)
Dept: CV DIAGNOSTICS | Facility: HOSPITAL | Age: 81
End: 2021-06-04
Attending: INTERNAL MEDICINE
Payer: MEDICARE

## 2021-06-04 ENCOUNTER — APPOINTMENT (OUTPATIENT)
Dept: ULTRASOUND IMAGING | Facility: HOSPITAL | Age: 81
End: 2021-06-04
Attending: INTERNAL MEDICINE
Payer: MEDICARE

## 2021-06-04 ENCOUNTER — HOSPITAL ENCOUNTER (EMERGENCY)
Facility: HOSPITAL | Age: 81
Discharge: HOME | End: 2021-06-04
Attending: EMERGENCY MEDICINE
Payer: MEDICARE

## 2021-06-04 ENCOUNTER — APPOINTMENT (OUTPATIENT)
Dept: GENERAL RADIOLOGY | Facility: HOSPITAL | Age: 81
End: 2021-06-04
Attending: EMERGENCY MEDICINE
Payer: MEDICARE

## 2021-06-04 ENCOUNTER — APPOINTMENT (OUTPATIENT)
Dept: NUCLEAR MEDICINE | Facility: HOSPITAL | Age: 81
End: 2021-06-04
Attending: EMERGENCY MEDICINE
Payer: MEDICARE

## 2021-06-04 VITALS
OXYGEN SATURATION: 96 % | TEMPERATURE: 98 F | HEART RATE: 70 BPM | DIASTOLIC BLOOD PRESSURE: 88 MMHG | SYSTOLIC BLOOD PRESSURE: 160 MMHG | RESPIRATION RATE: 16 BRPM

## 2021-06-04 DIAGNOSIS — M54.9 UPPER BACK PAIN ON LEFT SIDE: Primary | ICD-10-CM

## 2021-06-04 DIAGNOSIS — I82.401 ACUTE DEEP VEIN THROMBOSIS (DVT) OF RIGHT LOWER EXTREMITY, UNSPECIFIED VEIN (HCC): ICD-10-CM

## 2021-06-04 PROCEDURE — 80053 COMPREHEN METABOLIC PANEL: CPT | Performed by: EMERGENCY MEDICINE

## 2021-06-04 PROCEDURE — 36415 COLL VENOUS BLD VENIPUNCTURE: CPT

## 2021-06-04 PROCEDURE — 99285 EMERGENCY DEPT VISIT HI MDM: CPT

## 2021-06-04 PROCEDURE — 78582 LUNG VENTILAT&PERFUS IMAGING: CPT | Performed by: EMERGENCY MEDICINE

## 2021-06-04 PROCEDURE — 93005 ELECTROCARDIOGRAM TRACING: CPT

## 2021-06-04 PROCEDURE — 93306 TTE W/DOPPLER COMPLETE: CPT | Performed by: INTERNAL MEDICINE

## 2021-06-04 PROCEDURE — 84484 ASSAY OF TROPONIN QUANT: CPT | Performed by: EMERGENCY MEDICINE

## 2021-06-04 PROCEDURE — 71045 X-RAY EXAM CHEST 1 VIEW: CPT | Performed by: EMERGENCY MEDICINE

## 2021-06-04 PROCEDURE — 85379 FIBRIN DEGRADATION QUANT: CPT | Performed by: EMERGENCY MEDICINE

## 2021-06-04 PROCEDURE — 85025 COMPLETE CBC W/AUTO DIFF WBC: CPT | Performed by: EMERGENCY MEDICINE

## 2021-06-04 PROCEDURE — 93970 EXTREMITY STUDY: CPT | Performed by: INTERNAL MEDICINE

## 2021-06-04 PROCEDURE — 93010 ELECTROCARDIOGRAM REPORT: CPT

## 2021-06-04 RX ORDER — ACETAMINOPHEN 500 MG
TABLET ORAL
Status: COMPLETED
Start: 2021-06-04 | End: 2021-06-04

## 2021-06-04 RX ORDER — ASPIRIN 81 MG/1
324 TABLET, CHEWABLE ORAL ONCE
Status: COMPLETED | OUTPATIENT
Start: 2021-06-04 | End: 2021-06-04

## 2021-06-04 RX ORDER — MAGNESIUM HYDROXIDE/ALUMINUM HYDROXICE/SIMETHICONE 120; 1200; 1200 MG/30ML; MG/30ML; MG/30ML
30 SUSPENSION ORAL ONCE
Status: DISCONTINUED | OUTPATIENT
Start: 2021-06-04 | End: 2021-06-04

## 2021-06-04 RX ORDER — ACETAMINOPHEN 500 MG
1000 TABLET ORAL ONCE
Status: COMPLETED | OUTPATIENT
Start: 2021-06-04 | End: 2021-06-04

## 2021-06-04 RX ORDER — LIDOCAINE HYDROCHLORIDE 20 MG/ML
10 SOLUTION OROPHARYNGEAL ONCE
Status: DISCONTINUED | OUTPATIENT
Start: 2021-06-04 | End: 2021-06-04

## 2021-06-04 NOTE — CONSULTS
Sampson Hernandez [address] [facility]/[place] [address] Note [facility]  Report of Consultation    Laracooper Chaudharyfernando Patient Status:  Emergency    1940 MRN [id number]   Location [address] [facility] Attend exacerbation ([facility] [address]    • Dependence on supplemental oxygen 12/17/2019   • Diaphragmatic hernia without mention of obstruction or gangrene    • Diarrhea 11/5/2015   • Diverticulitis of large intestine without perforation or abscess without bleeding 1/14/2019 bunionectomy,bilateral   • OTHER SURGICAL HISTORY  12/05/2017    Cysto- Dr. Webb August 2009    removal of upper right lobe   • TONSILLECTOMY       Family History   Problem Relation Age of Onset   • Other (immune system) Mother    • MG/3ML Inhalation Solution, 1USE 1 AMPULE IN NEBULIZER EVERY 6 HOURS AS NEEDED, Disp: 1 Container, Rfl: 1  •  RESTASIS 0.05 % Ophthalmic Emulsion, INSTILL ONE DROP INTO EACH EYE TWICE DAILY (Patient taking differently: as needed.  ), Disp: 20 mL, Rfl: 6  • data in the 24 hours ending 06/04/21 1122    PHYSICAL EXAM  GEN: Appears alert, oriented, comfortable on RA.  No acute distress  PSYCH: Normal mood and affect, AAOX3  NEURO: CN 2-12 grossly intact, no focal deficits  HEENT: Atraumatic, normocephalic, EOMI, visit on 06/04/21. No results for input(s): Nora Dickens, [address], [address], [address], [address] [facility], [address] [facility], NITRITE, LEUUR, WBCUR, RBCUR, BACUR, EPIUR in the last 168 hours.     Radiology:     XR CHEST AP PORTABLE  (CPT=71045) obstruction on Breo    PLAN  Continue close monitoring of respiratory status, wean o2 for sats >89%  Check VQ scan, echo and BLE US  Consider cardiac evaluation  Continue bronchodilators  Follow fluid balance, lytes, urine output  PPX: HSQ  Dispo: Floor

## 2021-06-04 NOTE — CM/SW NOTE
Xarelto starter pack will be brought to patient from the [facility] outpatient pharmacy. Spoke to patient and son at the bedside. Pt will be taking the blood thinner for a DVT. Pt will take 15 mg BID for 21 days and then transition to 20 mg daily for 9 days.  I

## 2021-06-04 NOTE — ED PROVIDER NOTES
Patient Seen in: [facility] Emergency Department      History   Patient presents with:  Chest Pain Angina    Stated Complaint: chest pain    HPI/Subjective:    HPI    This is a very pleasant 81-year-old female who presents with chest pain with past Diverticulitis of large intestine without perforation or abscess without bleeding 1/14/2019   • Dysphagia, pharyngoesophageal phase 10/10/2018   • Epistaxis    • Esophageal reflux    • Exposure to unspecified radiation 2013    right lower lung   • Gluteal Social History    Tobacco Use      Smoking status: Never Smoker      Smokeless tobacco: Never Used    Vaping Use      Vaping Use: Never used    Alcohol use: No    Drug use:  No             Review of Systems    Positive for stated complaint: c All other components within normal limits   TROPONIN I - Normal   TROPONIN I - Normal   RAPID SARS-COV-2 BY PCR - Normal   CBC WITH DIFFERENTIAL WITH PLATELET    Narrative:      The following orders were created for panel order CBC With Differential Amada Murillo opacity lateral to the right hilum measures 2.3 x 1.5 cm. Left lung is unremarkable.   There is a small right effusion    Dictated by (CST): Najma Schaefer MD on 6/04/2021 at 8:46 AM     Finalized by (CST): Najma Schaefer MD on 6/04/2021 at 8:51 AM Creatinine 1.53. Glucose 101. Bicarb 24. Troponin was negative. Rapid Covid negative. D-dimer elevated 8.51. 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 Followed by pulmonary.   Discussed case with Dr. Cole Aburto who did [id number]  [phone]    On 6/7/2021      Jeremy Cabrera MD  [address] [phone]    On 6/7/2021            Medications Prescribed:  Current Discharge Medication List

## 2021-06-07 ENCOUNTER — TELEPHONE (OUTPATIENT)
Dept: FAMILY MEDICINE CLINIC | Facility: CLINIC | Age: 81
End: 2021-06-07

## 2021-06-07 ENCOUNTER — OFFICE VISIT (OUTPATIENT)
Dept: FAMILY MEDICINE CLINIC | Facility: CLINIC | Age: 81
End: 2021-06-07
Payer: MEDICARE

## 2021-06-07 VITALS
TEMPERATURE: 98 F | HEIGHT: 59 IN | WEIGHT: 138 LBS | DIASTOLIC BLOOD PRESSURE: 68 MMHG | OXYGEN SATURATION: 91 % | SYSTOLIC BLOOD PRESSURE: 116 MMHG | RESPIRATION RATE: 18 BRPM | HEART RATE: 71 BPM | BODY MASS INDEX: 27.82 KG/M2

## 2021-06-07 DIAGNOSIS — R05.3 CHRONIC COUGH: ICD-10-CM

## 2021-06-07 DIAGNOSIS — I82.401 ACUTE DEEP VEIN THROMBOSIS (DVT) OF RIGHT LOWER EXTREMITY, UNSPECIFIED VEIN (HCC): Primary | ICD-10-CM

## 2021-06-07 DIAGNOSIS — R07.9 CHEST PAIN, UNSPECIFIED TYPE: ICD-10-CM

## 2021-06-07 PROCEDURE — 99214 OFFICE O/P EST MOD 30 MIN: CPT | Performed by: FAMILY MEDICINE

## 2021-06-07 PROCEDURE — 1111F DSCHRG MED/CURRENT MED MERGE: CPT | Performed by: FAMILY MEDICINE

## 2021-06-07 RX ORDER — FLUTICASONE PROPIONATE 50 MCG
SPRAY, SUSPENSION (ML) NASAL
Qty: 16 G | Refills: 0 | Status: SHIPPED | OUTPATIENT
Start: 2021-06-07 | End: 2021-07-13

## 2021-06-07 RX ORDER — RIVAROXABAN 15 MG-20MG
KIT ORAL
COMMUNITY
Start: 2021-06-04 | End: 2021-06-25

## 2021-06-07 NOTE — TELEPHONE ENCOUNTER
[de-identified] yo female with PMHx of lung cancer, HTN, hld, who presented to ED with chest pain on 6/4/21. Chest pain started at rest but resolved quickly with administration of  nitroglycerin and aspirin.   ED EKG NSR, troponin neg, ECHO showing just pulm htn 45mmhg

## 2021-06-07 NOTE — PROGRESS NOTES
HPI/Subjective:   Patient ID: Evangelina Lin is a [de-identified]year old female. DVT  Chronicity: dx on 6/4/21 in the ED. The problem occurs constantly. The problem has been unchanged.  Associated symptoms comments: + DVT of right, lower extremity, + elevated D-D (two) times a day. 180 tablet 2   • Rosuvastatin Calcium (CRESTOR) 5 MG Oral Tab Take 1 tablet (5 mg total) by mouth daily.  90 tablet 2   • dilTIAZem HCl ER Coated Beads (CARTIA XT) 180 MG Oral Capsule SR 24 Hr Take 1 capsule (180 mg total) by mouth nightl Left eye: No discharge. Conjunctiva/sclera: Conjunctivae normal.   Cardiovascular:      Rate and Rhythm: Normal rate and regular rhythm. Heart sounds: Normal heart sounds.    Pulmonary:      Effort: Pulmonary effort is normal. No respirato

## 2021-06-08 NOTE — TELEPHONE ENCOUNTER
Cards team, patient PCP would like to know:  Do you advise pharmacological lexiscan stress test or quick follow up with office?

## 2021-06-09 ENCOUNTER — TELEPHONE (OUTPATIENT)
Dept: FAMILY MEDICINE CLINIC | Facility: CLINIC | Age: 81
End: 2021-06-09

## 2021-06-09 NOTE — TELEPHONE ENCOUNTER
Pt had a hosp fup with Dr. Dalia Graham last week   It was recommended that she follow up with her cardiologist     Pt can not get in until 06/26/21 with a nurse practitioner  She wants to know if Dr. Dalia Graham is able to order the stress test   Please advise

## 2021-06-09 NOTE — TELEPHONE ENCOUNTER
Cardiologist is not recommending stress test at this time, so I dont want to over step that. But continue to follow up with the nurse practioner. If it happens again go to ED.

## 2021-06-19 ENCOUNTER — HOSPITAL ENCOUNTER (OUTPATIENT)
Dept: CT IMAGING | Age: 81
Discharge: HOME | End: 2021-06-19
Attending: INTERNAL MEDICINE
Payer: MEDICARE

## 2021-06-19 DIAGNOSIS — J18.1 PNEUMONIA, LOBAR (HCC): ICD-10-CM

## 2021-06-19 PROCEDURE — 71250 CT THORAX DX C-: CPT | Performed by: INTERNAL MEDICINE

## 2021-06-25 ENCOUNTER — TELEPHONE (OUTPATIENT)
Dept: HEMATOLOGY/ONCOLOGY | Facility: HOSPITAL | Age: 81
End: 2021-06-25

## 2021-06-25 PROBLEM — A41.9 SEPSIS (HCC): Status: RESOLVED | Noted: 2020-09-04 | Resolved: 2021-06-25

## 2021-06-25 PROBLEM — I82.439 ACUTE DEEP VEIN THROMBOSIS (DVT) OF POPLITEAL VEIN (HCC): Status: ACTIVE | Noted: 2021-06-25

## 2021-07-09 ENCOUNTER — APPOINTMENT (OUTPATIENT)
Dept: HEMATOLOGY/ONCOLOGY | Facility: HOSPITAL | Age: 81
End: 2021-07-09
Attending: INTERNAL MEDICINE
Payer: MEDICARE

## 2021-07-13 ENCOUNTER — OFFICE VISIT (OUTPATIENT)
Dept: HEMATOLOGY/ONCOLOGY | Age: 81
End: 2021-07-13
Attending: INTERNAL MEDICINE
Payer: MEDICARE

## 2021-07-13 VITALS
SYSTOLIC BLOOD PRESSURE: 138 MMHG | BODY MASS INDEX: 27.6 KG/M2 | HEIGHT: 59.02 IN | HEART RATE: 78 BPM | RESPIRATION RATE: 16 BRPM | DIASTOLIC BLOOD PRESSURE: 81 MMHG | TEMPERATURE: 99 F | OXYGEN SATURATION: 98 % | WEIGHT: 136.88 LBS

## 2021-07-13 DIAGNOSIS — D47.2 MONOCLONAL PARAPROTEINEMIA: ICD-10-CM

## 2021-07-13 DIAGNOSIS — D63.0 ANEMIA COMPLICATING NEOPLASTIC DISEASE: ICD-10-CM

## 2021-07-13 DIAGNOSIS — C85.10 LOW GRADE B-CELL LYMPHOMA (HCC): ICD-10-CM

## 2021-07-13 DIAGNOSIS — C34.81 MALIGNANT NEOPLASM OF OVERLAPPING SITES OF RIGHT LUNG (HCC): Primary | ICD-10-CM

## 2021-07-13 DIAGNOSIS — R93.89 ABNORMAL CT OF THE CHEST: ICD-10-CM

## 2021-07-13 DIAGNOSIS — I82.431 ACUTE DEEP VEIN THROMBOSIS (DVT) OF POPLITEAL VEIN OF RIGHT LOWER EXTREMITY (HCC): ICD-10-CM

## 2021-07-13 DIAGNOSIS — C34.90 EGFR-RELATED LUNG CANCER (HCC): ICD-10-CM

## 2021-07-13 LAB
ALBUMIN SERPL-MCNC: 3.6 G/DL (ref 3.4–5)
ALBUMIN/GLOB SERPL: 1 {RATIO} (ref 1–2)
ALP LIVER SERPL-CCNC: 169 U/L
ALT SERPL-CCNC: 19 U/L
ANION GAP SERPL CALC-SCNC: 7 MMOL/L (ref 0–18)
AST SERPL-CCNC: 13 U/L (ref 15–37)
BASOPHILS # BLD AUTO: 0.04 X10(3) UL (ref 0–0.2)
BASOPHILS NFR BLD AUTO: 0.9 %
BILIRUB SERPL-MCNC: 0.4 MG/DL (ref 0.1–2)
BUN BLD-MCNC: 43 MG/DL (ref 7–18)
BUN/CREAT SERPL: 23.1 (ref 10–20)
CALCIUM BLD-MCNC: 9.3 MG/DL (ref 8.5–10.1)
CHLORIDE SERPL-SCNC: 115 MMOL/L (ref 98–112)
CO2 SERPL-SCNC: 21 MMOL/L (ref 21–32)
CREAT BLD-MCNC: 1.86 MG/DL
DEPRECATED RDW RBC AUTO: 52.9 FL (ref 35.1–46.3)
EOSINOPHIL # BLD AUTO: 0.14 X10(3) UL (ref 0–0.7)
EOSINOPHIL NFR BLD AUTO: 3.3 %
ERYTHROCYTE [DISTWIDTH] IN BLOOD BY AUTOMATED COUNT: 13.8 % (ref 11–15)
GLOBULIN PLAS-MCNC: 3.5 G/DL (ref 2.8–4.4)
GLUCOSE BLD-MCNC: 96 MG/DL (ref 70–99)
HCT VFR BLD AUTO: 36.5 %
HGB BLD-MCNC: 11.7 G/DL
IGA SERPL-MCNC: 55 MG/DL (ref 70–312)
IGM SERPL-MCNC: 459 MG/DL (ref 43–279)
IMM GRANULOCYTES # BLD AUTO: 0.07 X10(3) UL (ref 0–1)
IMM GRANULOCYTES NFR BLD: 1.6 %
IMMUNOGLOBULIN PNL SER-MCNC: 292 MG/DL (ref 791–1643)
LDH SERPL L TO P-CCNC: 199 U/L
LYMPHOCYTES # BLD AUTO: 0.46 X10(3) UL (ref 1–4)
LYMPHOCYTES NFR BLD AUTO: 10.8 %
M PROTEIN MFR SERPL ELPH: 7.1 G/DL (ref 6.4–8.2)
MCH RBC QN AUTO: 33.4 PG (ref 26–34)
MCHC RBC AUTO-ENTMCNC: 32.1 G/DL (ref 31–37)
MCV RBC AUTO: 104.3 FL
MONOCYTES # BLD AUTO: 0.72 X10(3) UL (ref 0.1–1)
MONOCYTES NFR BLD AUTO: 16.9 %
NEUTROPHILS # BLD AUTO: 2.82 X10 (3) UL (ref 1.5–7.7)
NEUTROPHILS # BLD AUTO: 2.82 X10(3) UL (ref 1.5–7.7)
NEUTROPHILS NFR BLD AUTO: 66.5 %
OSMOLALITY SERPL CALC.SUM OF ELEC: 307 MOSM/KG (ref 275–295)
PATIENT FASTING Y/N/NP: NO
PLATELET # BLD AUTO: 202 10(3)UL (ref 150–450)
POTASSIUM SERPL-SCNC: 4.1 MMOL/L (ref 3.5–5.1)
RBC # BLD AUTO: 3.5 X10(6)UL
SODIUM SERPL-SCNC: 143 MMOL/L (ref 136–145)
WBC # BLD AUTO: 4.3 X10(3) UL (ref 4–11)

## 2021-07-13 PROCEDURE — 99215 OFFICE O/P EST HI 40 MIN: CPT | Performed by: INTERNAL MEDICINE

## 2021-07-13 NOTE — PROGRESS NOTES
Cancer Center Progress Note    Patient Name: Melissa Murphy   YOB: 1940   Medical Record Number: [id number]   CSN: [id number]   Attending Physician: Kristal Arevalo M.D.    Referring Physician: Zac Robison DO      Date of Visit: 7/13/2021 Stopped after she was placed on Ibrutinib (given concerns for overlapping side effects. Had been FIONA for lung cancer then).       - Stage I NSCLC- adenocarcinoma of the RLL diagnosed 5/02/2013 s/p SBRT     - Stage IB (T2N0M0) well to moderately differentiat and biopsy of the nodule was recommended. This was performed by IR on 5/5/21 which showed necrotizing granulomatous inflammation- full path report below. She followed up with there pulmonologist who recommended expectant management as asymptomatic.  She had granulomatous inflammation. Special stains (AFB, GMS, and PAS) show no definitive fungal or acid-fast organisms. Immunoperoxidase stains were performed due to the patient's history of lymphoma. CD3 highlights background small T lymphocytes.   CD20 and PA See Comment  PASF                                See Comment                                                 Positive tissue controls were utilized in the staining process.   These slides were reviewed by the signout Pathologist and showed appropriate SR 24 Hr, Take 1 capsule (180 mg total) by mouth nightly., Disp: 90 capsule, Rfl: 2  •  hydrALAzine HCl 50 MG Oral Tab, Take 1 tablet (50 mg total) by mouth 2 (two) times a day., Disp: 180 tablet, Rfl: 2  •  Iron, Ferrous Sulfate, 325 (65 Fe) MG Oral Tab, acute bronchitis ([facility] 5/14/2019   • COPD exacerbation ([facility]    • Dependence on supplemental oxygen 12/17/2019   • Diaphragmatic hernia without mention of obstruction or gangrene    • Diarrhea 11/5/2015   • Diverticulitis of large intestine without perfora gallbladder surgery   • OTHER SURGICAL HISTORY  5911,3051    bunionectomy,bilateral   • OTHER SURGICAL HISTORY  12/05/2017    Cysto- Dr. Salvador Jo  2009    removal of upper right lobe   • TONSILLECTOMY         Family Medical History of Exercise per Session:   Stress:       Feeling of Stress :   Social Connections:       Frequency of Communication with Friends and Family:       Frequency of Social Gatherings with Friends and Family:       Attends Religion Services:       Active Member Creatinine 1.86 (H) 0.55 - 1.02 mg/dL    BUN/CREA Ratio 23.1 (H) 10.0 - 20.0    Calcium, Total 9.3 8.5 - 10.1 mg/dL    Calculated Osmolality 307 (H) 275 - 295 mOsm/kg    GFR, Non- 25 (L) >=60    GFR, -American 29 (L) >=60    AST 1 Ref Range: 0.48 - 1.05 g/dL 0.92   BETA GLOBULINS Latest Ref Range: 0.68 - 1.23 g/dL 0.69   GAMMA GLOBULINS Latest Ref Range: 0.62 - 1.70 g/dL 0.71   ALBUMIN/GLOBULIN RATIO Latest Ref Range: 1.00 - 2.00  1.49   M-Charles Latest Ref Range: <=0.00 g/dL 0.47 (H right lower lobe medially adjacent to the mediastinal pleura measuring 23 x 19 mm which is stable since previous study.         Left upper lobe micro nodularity anteriorly appears to have slightly worsened since the previous study with several new nodules small right pleural effusion       3.  Complex lesion of the right kidney which is not well described on a noncontrast CT.  If there is clinical concern for renal neoplasm, renal mass protocol CT would be recommended.         Dictated by (CST): Loreto Persaud CHEST+ABDOMEN+PELVIS(CPT=71250/43168)       COMPARISON:  [place], CT, CT CHEST+ABDOMEN+PELVIS(CPT=71250/90114), 1/10/2021, 11:08 AM.       INDICATIONS:  C34.81 Malignant neoplasm of overlapping sites of right lung (HCC) C88.4 Extranodal marginal zone B- axillary adenopathy.     AORTA:  No aneurysm or dissection.     VASCULATURE:  No visible pulmonary arterial thrombus or attenuation.         ABDOMEN/PELVIS:   LIVER:  No enlargement, atrophy, abnormal density, or significant focal lesion.     BILIARY:  Seq density in the superior segment right lower lobe is also noted.  This may be due to progression of malignancy.  Overlapping infection cannot be excluded.       4. Multiple cystic and high density lesions in the right kidney are again identified.       5.  E PM.     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 KIDNEYS:  Multiple mixed attenuation exophytic nodules in the right kidney are unchanged. These are probably cysts and hyperdense cysts.   Similar exophytic low-density lesions in the left kidney are unchanged and probably c low, light chains and SPEP pending. 2. H/o multiple lung cancers- Reviewed recent scans with her and her son. FIndings on recent CT ordered by pulmonary not felt to be from malignancy. 3. Anemia- Multifactorial. Hb adequate and stable       4.  Necr

## 2021-07-13 NOTE — PROGRESS NOTES
Outpatient Oncology Care Plan  Problem list:  respiratory  cough slightly worse, SOB with activity, frequent urination Q 3H at night, fatigue    Problems related to:    disease/disease progression    Interventions:  provided general teaching    Expected ou

## 2021-07-16 LAB
ALBUMIN SERPL ELPH-MCNC: 3.92 G/DL (ref 3.75–5.21)
ALBUMIN/GLOB SERPL: 1.41 {RATIO} (ref 1–2)
ALPHA1 GLOB SERPL ELPH-MCNC: 0.37 G/DL (ref 0.19–0.46)
ALPHA2 GLOB SERPL ELPH-MCNC: 1.03 G/DL (ref 0.48–1.05)
B-GLOBULIN SERPL ELPH-MCNC: 0.7 G/DL (ref 0.68–1.23)
GAMMA GLOB SERPL ELPH-MCNC: 0.69 G/DL (ref 0.62–1.7)
KAPPA LC FREE SER-MCNC: 1.5 MG/DL (ref 0.33–1.94)
KAPPA LC FREE/LAMBDA FREE SER NEPH: 0.44 {RATIO} (ref 0.26–1.65)
LAMBDA LC FREE SERPL-MCNC: 3.44 MG/DL (ref 0.57–2.63)
M PROTEIN MFR SERPL ELPH: 6.7 G/DL (ref 6.4–8.2)
M-SPIKE 1: 0.46 G/DL (ref ?–0)

## 2021-07-27 ENCOUNTER — TELEPHONE (OUTPATIENT)
Dept: HEMATOLOGY/ONCOLOGY | Facility: HOSPITAL | Age: 81
End: 2021-07-27

## 2021-07-27 NOTE — TELEPHONE ENCOUNTER
Patient is calling for a #90 day supply on Xarelto. Originally ordered by PCP but Dr Linda Cash to now manage. Refill sent, authorized by Dr Linda Cash.

## 2021-07-29 ENCOUNTER — TELEPHONE (OUTPATIENT)
Dept: HEMATOLOGY/ONCOLOGY | Facility: HOSPITAL | Age: 81
End: 2021-07-29

## 2021-07-29 ENCOUNTER — SOCIAL WORK SERVICES (OUTPATIENT)
Dept: HEMATOLOGY/ONCOLOGY | Facility: HOSPITAL | Age: 81
End: 2021-07-29

## 2021-07-29 NOTE — TELEPHONE ENCOUNTER
Patient's copay for a #90 day supply for Xarelto is almost $500. According to [facility] there is a coverage gap. Pharmacy will call patient to update her about this.  In the meantime, reached out to social workers to help patient with copay assista

## 2021-07-29 NOTE — PROGRESS NOTES
Sw spoke with patient about [employer] and [employer] patient assistance application. Patient reports that she would like application emailed to her and she would have son assist in printing, completion and return to [employer] for submission.

## 2021-08-02 ENCOUNTER — SOCIAL WORK SERVICES (OUTPATIENT)
Dept: HEMATOLOGY/ONCOLOGY | Facility: HOSPITAL | Age: 81
End: 2021-08-02

## 2021-08-13 DIAGNOSIS — C88.4 MALT LYMPHOMA (HCC): Primary | ICD-10-CM

## 2021-08-19 ENCOUNTER — OFFICE VISIT (OUTPATIENT)
Dept: FAMILY MEDICINE CLINIC | Facility: CLINIC | Age: 81
End: 2021-08-19
Payer: MEDICARE

## 2021-08-19 VITALS
OXYGEN SATURATION: 96 % | SYSTOLIC BLOOD PRESSURE: 126 MMHG | RESPIRATION RATE: 20 BRPM | BODY MASS INDEX: 27.82 KG/M2 | TEMPERATURE: 98 F | DIASTOLIC BLOOD PRESSURE: 60 MMHG | WEIGHT: 138 LBS | HEART RATE: 62 BPM | HEIGHT: 59 IN

## 2021-08-19 DIAGNOSIS — R22.2 LOCALIZED SWELLING OF BACK: Primary | ICD-10-CM

## 2021-08-19 PROCEDURE — 99213 OFFICE O/P EST LOW 20 MIN: CPT | Performed by: FAMILY MEDICINE

## 2021-08-22 ENCOUNTER — HOSPITAL ENCOUNTER (OUTPATIENT)
Dept: CT IMAGING | Age: 81
Discharge: HOME | End: 2021-08-22
Attending: INTERNAL MEDICINE
Payer: MEDICARE

## 2021-08-22 DIAGNOSIS — C88.4 MALT LYMPHOMA (HCC): ICD-10-CM

## 2021-08-22 PROCEDURE — 71250 CT THORAX DX C-: CPT | Performed by: INTERNAL MEDICINE

## 2021-08-22 PROCEDURE — 74176 CT ABD & PELVIS W/O CONTRAST: CPT | Performed by: INTERNAL MEDICINE

## 2021-08-23 ENCOUNTER — TELEPHONE (OUTPATIENT)
Dept: FAMILY MEDICINE CLINIC | Facility: CLINIC | Age: 81
End: 2021-08-23

## 2021-08-23 NOTE — TELEPHONE ENCOUNTER
Patient wanted to let YP know that she had her   CAT Scan yesterday at 2:00     He doesn't need to call her. Just updating that it was completed for an issue with her back.

## 2021-08-25 NOTE — PROGRESS NOTES
HPI/Subjective:   Patient ID: Fco Méndez is a [de-identified]year old female. Large Swelling of left low back. Recently noticed by one of her sons. Unsure of how long its been there. No pain. No unexpected weight loss.   Getting Ct scan done a couple day af Inhalation Aerosol Powder, Breath Activated 1 puff daily. • Montelukast Sodium (SINGULAIR) 10 MG Oral Tab Take 10 mg by mouth nightly. • Saline Nasal Spray 0.65 % Nasal Solution 1 spray by Nasal route as needed for congestion.      • acetaminophen 5

## 2021-09-04 ENCOUNTER — PATIENT MESSAGE (OUTPATIENT)
Dept: FAMILY MEDICINE CLINIC | Facility: CLINIC | Age: 81
End: 2021-09-04

## 2021-09-06 ENCOUNTER — OFFICE VISIT (OUTPATIENT)
Dept: FAMILY MEDICINE CLINIC | Facility: CLINIC | Age: 81
End: 2021-09-06
Payer: MEDICARE

## 2021-09-06 VITALS
BODY MASS INDEX: 26.9 KG/M2 | TEMPERATURE: 98 F | HEART RATE: 61 BPM | HEIGHT: 60 IN | RESPIRATION RATE: 16 BRPM | OXYGEN SATURATION: 98 % | WEIGHT: 137 LBS

## 2021-09-06 DIAGNOSIS — N30.01 ACUTE CYSTITIS WITH HEMATURIA: Primary | ICD-10-CM

## 2021-09-06 DIAGNOSIS — R39.9 UTI SYMPTOMS: ICD-10-CM

## 2021-09-06 LAB
BILIRUBIN: NEGATIVE
GLUCOSE (URINE DIPSTICK): NEGATIVE MG/DL
KETONES (URINE DIPSTICK): NEGATIVE MG/DL
MULTISTIX LOT#: ABNORMAL NUMERIC
NITRITE, URINE: NEGATIVE
PH, URINE: 5.5 (ref 4.5–8)
PROTEIN (URINE DIPSTICK): 100 MG/DL
SPECIFIC GRAVITY: 1.02 (ref 1–1.03)
URINE-COLOR: YELLOW
UROBILINOGEN,SEMI-QN: 0.2 MG/DL (ref 0–1.9)

## 2021-09-06 PROCEDURE — 81003 URINALYSIS AUTO W/O SCOPE: CPT | Performed by: NURSE PRACTITIONER

## 2021-09-06 PROCEDURE — 87086 URINE CULTURE/COLONY COUNT: CPT | Performed by: NURSE PRACTITIONER

## 2021-09-06 PROCEDURE — 99213 OFFICE O/P EST LOW 20 MIN: CPT | Performed by: NURSE PRACTITIONER

## 2021-09-06 RX ORDER — CEFDINIR 300 MG/1
300 CAPSULE ORAL 2 TIMES DAILY
Qty: 20 CAPSULE | Refills: 0 | Status: SHIPPED | OUTPATIENT
Start: 2021-09-06 | End: 2021-09-16

## 2021-09-06 NOTE — PROGRESS NOTES
Patrick Rondon is a [de-identified]year old female. HPI:   Patient presents with symptoms of UTI for 1 days. Complaining of urinary frequency, urgency  Denies back pain, fever, hematuria. Pt has history of UTI in past. Pt has no new sexual partners.  Pt had a UTI Emulsion, INSTILL ONE DROP INTO EACH EYE TWICE DAILY (Patient taking differently: as needed.  ), Disp: 20 mL, Rfl: 6  BREO ELLIPTA 200-25 MCG/INH Inhalation Aerosol Powder, Breath Activated, 1 puff daily. , Disp: , Rfl:   Montelukast Sodium (SINGULAIR) 10 M Metabolic acidosis 8/8/4996   • NHL (nodular histiocytic lymphoma) (HCC)    • Nocturnal leg cramps 8/6/2013   • Normocytic normochromic anemia 7/19/2016   • Osteoarthritis    • Osteoporosis    • Personal history of antineoplastic chemotherapy     LAST 10/2 Ketones, UA Negative Negative - Trace mg/dL    Spec Gravity 1.025 1.005 - 1.030    Blood Urine Moderate (A) Negative    PH Urine 5.5 5.0 - 8.0    Protein Urine 100  (A) Negative - Trace mg/dL    Urobilinogen Urine 0.2 0.2 - 1.0 mg/dL    Nitrite Urine Negat of your body. · Empty your bladder. Always empty your bladder when you feel the urge to pee. And always pee before going to sleep. Urine that stays in your bladder can lead to infection. Try to pee before and after sex as well.   · Practice good personal h information is not intended as a substitute for professional medical care. Always follow your healthcare professional's instructions. The patient indicates understanding of these issues and agrees to the plan.

## 2021-09-07 ENCOUNTER — TELEPHONE (OUTPATIENT)
Dept: FAMILY MEDICINE CLINIC | Facility: CLINIC | Age: 81
End: 2021-09-07

## 2021-09-07 NOTE — TELEPHONE ENCOUNTER
Pt feeling a little better, wanted f/u with UTI and yeast infection. Pt taking her meds. appt made 9/9 with YP  Pt refused appt with another provider.

## 2021-09-07 NOTE — TELEPHONE ENCOUNTER
Pt said Dr. Autumn Flood gave her some meds for a yeast infection and wanted her to follow up with him. Over the wknd she went to the IC for a UTI. Appt was sched for next avail, 9/13 - pt asking if she can be squeezed in sooner.

## 2021-09-07 NOTE — TELEPHONE ENCOUNTER
From: Miriam Car  To: Zac Robison DO  Sent: 9/4/2021 3:45 PM CDT  Subject: Prescription Question    I don't know how I got it. I used to get it every so often when I was a lot younger. I was told that tight pants can cause it.  So I have been wearin

## 2021-09-08 ENCOUNTER — TELEPHONE (OUTPATIENT)
Dept: FAMILY MEDICINE CLINIC | Facility: CLINIC | Age: 81
End: 2021-09-08

## 2021-09-08 NOTE — TELEPHONE ENCOUNTER
Taking probiotics when taking Cefidinir, only took 2 days of Cefdinir, was told by UC that the urine culture was negative and to discontinue the Cefdinir. Patient states yeast infection is back.  Patient concerned about the dip stick vs the culture, advised

## 2021-09-09 ENCOUNTER — OFFICE VISIT (OUTPATIENT)
Dept: FAMILY MEDICINE CLINIC | Facility: CLINIC | Age: 81
End: 2021-09-09
Payer: MEDICARE

## 2021-09-09 VITALS
HEIGHT: 60 IN | OXYGEN SATURATION: 98 % | BODY MASS INDEX: 27.48 KG/M2 | SYSTOLIC BLOOD PRESSURE: 138 MMHG | RESPIRATION RATE: 20 BRPM | WEIGHT: 140 LBS | HEART RATE: 81 BPM | DIASTOLIC BLOOD PRESSURE: 80 MMHG | TEMPERATURE: 98 F

## 2021-09-09 DIAGNOSIS — Z00.00 MEDICARE ANNUAL WELLNESS VISIT, SUBSEQUENT: Primary | ICD-10-CM

## 2021-09-09 DIAGNOSIS — R31.29 MICROSCOPIC HEMATURIA: ICD-10-CM

## 2021-09-09 DIAGNOSIS — N89.8 VAGINAL ITCHING: ICD-10-CM

## 2021-09-09 DIAGNOSIS — R39.15 URINARY URGENCY: ICD-10-CM

## 2021-09-09 LAB
APPEARANCE: CLEAR
BILIRUBIN: NEGATIVE
GLUCOSE (URINE DIPSTICK): NEGATIVE MG/DL
KETONES (URINE DIPSTICK): NEGATIVE MG/DL
MULTISTIX LOT#: 5077 NUMERIC
NITRITE, URINE: NEGATIVE
PH, URINE: 5.5 (ref 4.5–8)
SPECIFIC GRAVITY: 1.02 (ref 1–1.03)
URINE-COLOR: YELLOW
UROBILINOGEN,SEMI-QN: 0.2 MG/DL (ref 0–1.9)

## 2021-09-09 PROCEDURE — 87510 GARDNER VAG DNA DIR PROBE: CPT | Performed by: FAMILY MEDICINE

## 2021-09-09 PROCEDURE — 90732 PPSV23 VACC 2 YRS+ SUBQ/IM: CPT | Performed by: FAMILY MEDICINE

## 2021-09-09 PROCEDURE — G0439 PPPS, SUBSEQ VISIT: HCPCS | Performed by: FAMILY MEDICINE

## 2021-09-09 PROCEDURE — 81003 URINALYSIS AUTO W/O SCOPE: CPT | Performed by: FAMILY MEDICINE

## 2021-09-09 PROCEDURE — 87480 CANDIDA DNA DIR PROBE: CPT | Performed by: FAMILY MEDICINE

## 2021-09-09 PROCEDURE — 87660 TRICHOMONAS VAGIN DIR PROBE: CPT | Performed by: FAMILY MEDICINE

## 2021-09-09 PROCEDURE — G0009 ADMIN PNEUMOCOCCAL VACCINE: HCPCS | Performed by: FAMILY MEDICINE

## 2021-09-09 RX ORDER — FLUCONAZOLE 150 MG/1
TABLET ORAL
COMMUNITY
Start: 2021-09-04 | End: 2022-01-25

## 2021-09-09 RX ORDER — FLUCONAZOLE 150 MG/1
150 TABLET ORAL ONCE
Qty: 1 TABLET | Refills: 0 | Status: SHIPPED | OUTPATIENT
Start: 2021-09-09 | End: 2021-09-09

## 2021-09-10 ENCOUNTER — PATIENT MESSAGE (OUTPATIENT)
Dept: FAMILY MEDICINE CLINIC | Facility: CLINIC | Age: 81
End: 2021-09-10

## 2021-09-10 NOTE — TELEPHONE ENCOUNTER
From: Jose Car  To: Merrick Ni DO  Sent: 9/10/2021 11:05 AM CDT  Subject: Prescription Question    Hi Dr Miguel Myers. Since the vagina swab can back negative, should I still take the Fluconazole medicine in three days?

## 2021-09-13 NOTE — TELEPHONE ENCOUNTER
Patient called to check status of the medication/results of swab. Please let her know if she is to take this medication today. Please call her.

## 2021-09-13 NOTE — PROGRESS NOTES
HPI:   Felicita Garnett is a [de-identified]year old female who presents for a Medicare Subsequent Annual Wellness visit (Pt already had Initial Annual Wellness). + urinary urgency, itching. After diflucan it resolved.   But then after took some abx which recaused file in [address]. She has never smoked tobacco.    CAGE screening score of 0 on 9/8/2021, showing low risk of alcohol abuse.         Patient Care Team: Patient Care Team:  Rene Rodriguez DO as PCP - General (Family Medicine)  Sis Mccarty MD (Radiation Max He Lab Results   Component Value Date    WBC 4.3 07/13/2021    HGB 11.7 (L) 07/13/2021    .0 07/13/2021        ALLERGIES:   She is allergic to penicillins and radiology contrast iodinated dyes.     CURRENT MEDICATIONS:   fluconazole 150 MG Oral Tab, daily.  Montelukast Sodium (SINGULAIR) 10 MG Oral Tab, Take 10 mg by mouth nightly. Saline Nasal Spray 0.65 % Nasal Solution, 1 spray by Nasal route as needed for congestion.   acetaminophen 500 MG Oral Tab, Take 500 mg by mouth every 6 (six) hours as need (2012), Renal insufficiency (11/5/2015), Scoliosis (and kyphoscoliosis), idiopathic (6/29/2012), Shortness of breath, SOB (shortness of breath) (3/27/2018), Urticaria, unspecified, Visual impairment, and Wears glasses.     She  has a past surgical history t Supple, symmetrical, trachea midline, no adenopathy;  thyroid: not enlarged, symmetric, no tenderness/mass/nodules; no carotid bruit or JVD   Back:   Symmetric, no curvature, ROM normal, no CVA tenderness   Lungs:   Clear to auscultation bilaterally, respi Future  -     triamcinolone acetonide 0.1 % External Cream; Apply topically 2 (two) times daily as needed.  For up to 5 days    Vaginal itching  -     VAGINITIS/VAGINOSIS, DNA PROBE; Future  -     triamcinolone acetonide 0.1 % External Cream; Apply topicall apparent signs or symptoms of cardiovascular disease Lab Results   Component Value Date    CHOLEST 189 02/04/2021    HDL 69 (H) 02/04/2021     (H) 02/04/2021    TRIG 97 02/04/2021         Electrocardiogram (EKG)   Covered if needed at [place] to [facility] certain risk factors   -  No recommendations at this time    Tetanus Toxoid Not covered by Medicare Part B unless medically necessary (cut with metal); may be covered with your pharmacy prescription benefits -    Tetanus, Diptheria and Pertusis TD and TDaP

## 2021-09-21 ENCOUNTER — TELEPHONE (OUTPATIENT)
Dept: HEMATOLOGY/ONCOLOGY | Facility: HOSPITAL | Age: 81
End: 2021-09-21

## 2021-09-23 ENCOUNTER — LAB ENCOUNTER (OUTPATIENT)
Dept: LAB | Age: 81
End: 2021-09-23
Attending: FAMILY MEDICINE
Payer: MEDICARE

## 2021-09-23 ENCOUNTER — TELEPHONE (OUTPATIENT)
Dept: FAMILY MEDICINE CLINIC | Facility: CLINIC | Age: 81
End: 2021-09-23

## 2021-09-23 DIAGNOSIS — R35.0 URINARY FREQUENCY: ICD-10-CM

## 2021-09-23 DIAGNOSIS — R35.0 URINARY FREQUENCY: Primary | ICD-10-CM

## 2021-09-23 LAB
BILIRUB UR QL STRIP.AUTO: NEGATIVE
CLARITY UR REFRACT.AUTO: CLEAR
GLUCOSE UR STRIP.AUTO-MCNC: NEGATIVE MG/DL
KETONES UR STRIP.AUTO-MCNC: NEGATIVE MG/DL
NITRITE UR QL STRIP.AUTO: NEGATIVE
PH UR STRIP.AUTO: 5 [PH] (ref 5–8)
PROT UR STRIP.AUTO-MCNC: NEGATIVE MG/DL
RBC UR QL AUTO: NEGATIVE
SP GR UR STRIP.AUTO: 1.01 (ref 1–1.03)
UROBILINOGEN UR STRIP.AUTO-MCNC: <2 MG/DL

## 2021-09-23 PROCEDURE — 81001 URINALYSIS AUTO W/SCOPE: CPT

## 2021-09-24 ENCOUNTER — SOCIAL WORK SERVICES (OUTPATIENT)
Dept: HEMATOLOGY/ONCOLOGY | Facility: HOSPITAL | Age: 81
End: 2021-09-24

## 2021-09-24 NOTE — PROGRESS NOTES
ANALY received call from patient reporting atht she had not heard back on her [facility] and [facility] patient assistance application.  Analy called [facility] and spoke with Tr Sheffield who reported that all documents were received and they would have a determinati

## 2021-09-28 ENCOUNTER — SOCIAL WORK SERVICES (OUTPATIENT)
Dept: HEMATOLOGY/ONCOLOGY | Facility: HOSPITAL | Age: 81
End: 2021-09-28

## 2021-09-28 NOTE — PROGRESS NOTES
SHUKRI spoke with Maico Jessica and [place] patient assistance [facility], who reports that patient was denied at this time because patient has to have an out of pocket expense of 1160.00 before she can be approved. SW updated patient.

## 2021-10-18 DIAGNOSIS — C85.10 LOW GRADE B-CELL LYMPHOMA (HCC): ICD-10-CM

## 2021-10-18 DIAGNOSIS — C34.81 MALIGNANT NEOPLASM OF OVERLAPPING SITES OF RIGHT LUNG (HCC): Primary | ICD-10-CM

## 2021-10-18 DIAGNOSIS — D47.2 MONOCLONAL PARAPROTEINEMIA: ICD-10-CM

## 2021-10-18 DIAGNOSIS — Z79.899 OTHER LONG TERM (CURRENT) DRUG THERAPY: ICD-10-CM

## 2021-10-19 ENCOUNTER — OFFICE VISIT (OUTPATIENT)
Dept: HEMATOLOGY/ONCOLOGY | Age: 81
End: 2021-10-19
Attending: INTERNAL MEDICINE
Payer: MEDICARE

## 2021-10-19 VITALS
SYSTOLIC BLOOD PRESSURE: 147 MMHG | TEMPERATURE: 98 F | WEIGHT: 137.69 LBS | HEIGHT: 59.02 IN | HEART RATE: 72 BPM | BODY MASS INDEX: 27.76 KG/M2 | RESPIRATION RATE: 16 BRPM | DIASTOLIC BLOOD PRESSURE: 79 MMHG | OXYGEN SATURATION: 93 %

## 2021-10-19 DIAGNOSIS — N18.4 CKD (CHRONIC KIDNEY DISEASE) STAGE 4, GFR 15-29 ML/MIN (HCC): ICD-10-CM

## 2021-10-19 DIAGNOSIS — C34.90 EGFR-RELATED LUNG CANCER (HCC): Primary | ICD-10-CM

## 2021-10-19 DIAGNOSIS — C85.10 LOW GRADE B-CELL LYMPHOMA (HCC): ICD-10-CM

## 2021-10-19 DIAGNOSIS — I82.431 ACUTE DEEP VEIN THROMBOSIS (DVT) OF POPLITEAL VEIN OF RIGHT LOWER EXTREMITY (HCC): ICD-10-CM

## 2021-10-19 DIAGNOSIS — Z79.899 OTHER LONG TERM (CURRENT) DRUG THERAPY: ICD-10-CM

## 2021-10-19 DIAGNOSIS — D47.2 MONOCLONAL PARAPROTEINEMIA: ICD-10-CM

## 2021-10-19 DIAGNOSIS — C85.80 MARGINAL ZONE B-CELL LYMPHOMA (HCC): ICD-10-CM

## 2021-10-19 DIAGNOSIS — C34.81 MALIGNANT NEOPLASM OF OVERLAPPING SITES OF RIGHT LUNG (HCC): ICD-10-CM

## 2021-10-19 DIAGNOSIS — D63.0 ANEMIA COMPLICATING NEOPLASTIC DISEASE: ICD-10-CM

## 2021-10-19 PROCEDURE — 99215 OFFICE O/P EST HI 40 MIN: CPT | Performed by: INTERNAL MEDICINE

## 2021-10-19 NOTE — PROGRESS NOTES
Cancer Center Progress Note    Patient Name: Parris Read   YOB: 1940   Medical Record Number: [id number]   CSN: [id number]   Attending Physician: Daren Srinivasan M.D.    Referring Physician: DO Dr. Swapna Conde    Date of positive. Started \"adjuvant\" Tarceva 2/2015. Stopped after she was placed on Ibrutinib (given concerns for overlapping side effects. Had been FIONA for lung cancer then).       - Stage I NSCLC- adenocarcinoma of the RLL diagnosed 5/02/2013 s/p SBRT     - St requiring mask ventilation. She was subsequently admitted after the procedure to ICU. Respiratory distress felt to be from nasal blood aspiration with bronchospasm. Was placed on empiric steroids, nebulizers and supplemental O2.  Nose bleed stopped and impr 180 tablet, Rfl: 2  •  Rosuvastatin Calcium (CRESTOR) 5 MG Oral Tab, Take 1 tablet (5 mg total) by mouth daily. , Disp: 90 tablet, Rfl: 2  •  Iron, Ferrous Sulfate, 325 (65 Fe) MG Oral Tab, Take by mouth daily.  CLARIFY DOSE WITH PATIENT: THIS WAS ORDERED BY with acute bronchitis ([facility] 5/14/2019   • COPD exacerbation ([facility]    • Dependence on supplemental oxygen 12/17/2019   • Diaphragmatic hernia without mention of obstruction or gangrene    • Diarrhea 11/5/2015   • Diverticulitis of large intestine without pe gallbladder surgery   • OTHER SURGICAL HISTORY  3345,9668    bunionectomy,bilateral   • OTHER SURGICAL HISTORY  12/05/2017    Cysto- Dr. Eunice Parrish  2009    removal of upper right lobe   • TONSILLECTOMY         [facility] Medical Hist file  Physical Activity:       Days of Exercise per Week: Not on file      Minutes of Exercise per Session: Not on file  Stress:       Feeling of Stress : Not on file  Social Connections:       Frequency of Communication with Friends and Family: Not on noemy mild  Neurological: Grossly intact.         Laboratory:  Recent Results (from the past 24 hour(s))   COMP METABOLIC PANEL (14)    Collection Time: 10/19/21 10:52 AM   Result Value Ref Range    Glucose 92 70 - 99 mg/dL    Sodium 142 136 - 145 mmol/L    [place] % 0.8 %       Radiology:  PROCEDURE:  CT CHEST+ABDOMEN+PELVIS(CPT=71250/67905)       COMPARISON:  [place], CT, CT CHEST+ABDOMEN+PELVIS(CPT=71250/06898), 4/10/2021, 11:31 AM.  [place], CT, CT CHEST (CPT=71250), 6/19/2021, 12:03 PM.       INDICATIONS: dilatation, or atrophy.     SPLEEN:  No enlargement or focal lesion.     KIDNEYS:  Multiple cysts in the kidneys are again noted.  Representative cyst in the right kidney upper pole measures 4.7 cm.  Representative cyst in the left kidney lower pole measur INDICATIONS:  J18.1 Pneumonia, lobar ([address]       TECHNIQUE:  Unenhanced multislice CT scanning is performed through the chest.  Dose reduction techniques were used.  Dose information is transmitted to the [facility] of Radiology) Almita Barone [address] [place] [address] significant calcification. PLEURA: Loistine Dimmer is a small layering right pleural effusion which is stable since previous study. THORACIC AORTA:  Unremarkable as seen on non-contrast imaging.    CHEST WALL:  No mass or axillary adenopathy.     LIMITED ABDOMEN sapheno-femoral junction, and posterior tibial veins.       PATIENT STATED HISTORY: (As transcribed by Technologist)  Patient offered no additional history at this time.            FINDINGS:     SAPHENOFEMORAL JUNCTION:  No reflux.    THROMBI: Caitlyn Skinner are fi densities throughout the right lower lobe are stable.  1.3 x 1.0 cm ground-glass nodule in the left upper lobe (image 48) was 1.1 x 0.9 cm.  Additional   reticular nodular densities in the left upper lobe are identified.  These are new compared to prior ex evidence of acute diverticulitis.  Extension of bowel into the right ventral abdominal   wall is again noted without evidence of bowel obstruction. ABDOMINAL WALL:  Additional fat containing bilateral inguinal hernias are noted.    URINARY BLADDER:  No vi the lungs were obtained in the 6 standard projections.  This was followed by IV injection of 5.5 mCi Tc-99m MAA, and similar projection images were   obtained.       FINDINGS:     PERFUSION:    Overall, there is reduced radiotracer uptake diffusely through level high- NHL and h/o multiple lung cancers       Carisa Rock MD  THE [facility] [place] [facility]

## 2021-11-26 ENCOUNTER — PATIENT MESSAGE (OUTPATIENT)
Dept: FAMILY MEDICINE CLINIC | Facility: CLINIC | Age: 81
End: 2021-11-26

## 2021-11-29 RX ORDER — CYCLOSPORINE 0.5 MG/ML
1 EMULSION OPHTHALMIC EVERY 12 HOURS PRN
Qty: 1 EACH | Refills: 5 | Status: SHIPPED | OUTPATIENT
Start: 2021-11-29

## 2021-11-29 NOTE — TELEPHONE ENCOUNTER
Last refill:  RESTASIS 0.05 % Ophthalmic Emulsion 20 mL 6 5/6/2019    Sig:   INSTILL ONE DROP INTO EACH EYE TWICE DAILY     Patient taking differently:   as needed.      Route:   (none)     Cosign for Ordering:   Accepted by Scottie Polo DO on 5/6/2019

## 2021-11-29 NOTE — TELEPHONE ENCOUNTER
From: Camila Car  To: Ludwig Guillaume DO  Sent: 11/26/2021 11:57 AM CST  Subject: Restasis prescription     Dr. Tanja Bosworth- I need a new prescription for Restasis 0.05 percent EMU ALL. Qty 60 (3 months supply). Send to mail service [facility]. Thank you.

## 2021-12-02 ENCOUNTER — PATIENT OUTREACH (OUTPATIENT)
Dept: CASE MANAGEMENT | Age: 81
End: 2021-12-02

## 2021-12-13 ENCOUNTER — TELEPHONE (OUTPATIENT)
Dept: HEMATOLOGY/ONCOLOGY | Facility: HOSPITAL | Age: 81
End: 2021-12-13

## 2021-12-18 ENCOUNTER — HOSPITAL ENCOUNTER (EMERGENCY)
Age: 81
Discharge: HOME | End: 2021-12-18
Attending: EMERGENCY MEDICINE
Payer: MEDICARE

## 2021-12-18 VITALS
WEIGHT: 140 LBS | OXYGEN SATURATION: 93 % | BODY MASS INDEX: 27.48 KG/M2 | TEMPERATURE: 98 F | SYSTOLIC BLOOD PRESSURE: 167 MMHG | HEART RATE: 80 BPM | HEIGHT: 60 IN | DIASTOLIC BLOOD PRESSURE: 81 MMHG | RESPIRATION RATE: 18 BRPM

## 2021-12-18 DIAGNOSIS — K62.5 RECTAL BLEEDING: ICD-10-CM

## 2021-12-18 DIAGNOSIS — K59.00 CONSTIPATION, UNSPECIFIED CONSTIPATION TYPE: ICD-10-CM

## 2021-12-18 DIAGNOSIS — Z79.01 ANTICOAGULATED: ICD-10-CM

## 2021-12-18 DIAGNOSIS — N30.00 ACUTE CYSTITIS WITHOUT HEMATURIA: Primary | ICD-10-CM

## 2021-12-18 PROCEDURE — 96365 THER/PROPH/DIAG IV INF INIT: CPT

## 2021-12-18 PROCEDURE — 80053 COMPREHEN METABOLIC PANEL: CPT | Performed by: EMERGENCY MEDICINE

## 2021-12-18 PROCEDURE — 99284 EMERGENCY DEPT VISIT MOD MDM: CPT

## 2021-12-18 PROCEDURE — 86900 BLOOD TYPING SEROLOGIC ABO: CPT | Performed by: EMERGENCY MEDICINE

## 2021-12-18 PROCEDURE — 86901 BLOOD TYPING SEROLOGIC RH(D): CPT | Performed by: EMERGENCY MEDICINE

## 2021-12-18 PROCEDURE — 81015 MICROSCOPIC EXAM OF URINE: CPT | Performed by: EMERGENCY MEDICINE

## 2021-12-18 PROCEDURE — 85025 COMPLETE CBC W/AUTO DIFF WBC: CPT | Performed by: EMERGENCY MEDICINE

## 2021-12-18 PROCEDURE — 86850 RBC ANTIBODY SCREEN: CPT | Performed by: EMERGENCY MEDICINE

## 2021-12-18 PROCEDURE — 87086 URINE CULTURE/COLONY COUNT: CPT | Performed by: EMERGENCY MEDICINE

## 2021-12-18 PROCEDURE — 81001 URINALYSIS AUTO W/SCOPE: CPT | Performed by: EMERGENCY MEDICINE

## 2021-12-18 PROCEDURE — 83690 ASSAY OF LIPASE: CPT | Performed by: EMERGENCY MEDICINE

## 2021-12-18 RX ORDER — SULFAMETHOXAZOLE AND TRIMETHOPRIM 800; 160 MG/1; MG/1
1 TABLET ORAL 2 TIMES DAILY
Qty: 14 TABLET | Refills: 0 | Status: SHIPPED | OUTPATIENT
Start: 2021-12-18 | End: 2021-12-25

## 2021-12-18 RX ORDER — DOCUSATE SODIUM 100 MG/1
100 CAPSULE, LIQUID FILLED ORAL 2 TIMES DAILY
Qty: 60 CAPSULE | Refills: 0 | Status: SHIPPED | OUTPATIENT
Start: 2021-12-18 | End: 2022-01-25

## 2021-12-18 RX ORDER — HYDROCORTISONE 25 MG/G
1 CREAM TOPICAL 2 TIMES DAILY
Qty: 1 EACH | Refills: 0 | Status: SHIPPED | OUTPATIENT
Start: 2021-12-18

## 2021-12-19 NOTE — ED PROVIDER NOTES
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     Physical Exam     ED Triage Vitals [12/18/21 1827]   BP (!) 182/87   Pulse 93   Resp 20   Temp 98.2 °F (36.8 °C)   Temp src    SpO2 97 %   O2 Device None (Room air)       Current:BP (!) 168/83   Pulse 82   Temp 98.2 °F (3 (*)     Neutrophil Absolute Prelim 7.75 (*)     Neutrophil Absolute 7.75 (*)     Lymphocyte Absolute 0.78 (*)     Monocyte Absolute 1.07 (*)     All other components within normal limits   LIPASE - Normal   CBC WITH DIFFERENTIAL WITH PLATELET    Narrative: hematuria  (primary encounter diagnosis)  Constipation, unspecified constipation type  Rectal bleeding  Anticoagulated     Disposition:  Discharge  12/18/2021  7:28 pm    Follow-up:  DO Chino Johns

## 2021-12-20 ENCOUNTER — TELEPHONE (OUTPATIENT)
Dept: FAMILY MEDICINE CLINIC | Facility: CLINIC | Age: 81
End: 2021-12-20

## 2021-12-22 ENCOUNTER — TELEPHONE (OUTPATIENT)
Dept: HEMATOLOGY/ONCOLOGY | Facility: HOSPITAL | Age: 81
End: 2021-12-22

## 2021-12-23 ENCOUNTER — TELEPHONE (OUTPATIENT)
Dept: FAMILY MEDICINE CLINIC | Facility: CLINIC | Age: 81
End: 2021-12-23

## 2021-12-23 DIAGNOSIS — N30.00 ACUTE CYSTITIS WITHOUT HEMATURIA: Primary | ICD-10-CM

## 2021-12-23 NOTE — TELEPHONE ENCOUNTER
Pt is being treated for a UTI  She was in the ER last Saturday   She did not have symptoms but she had an infection   She is being treated with an antibiotic  She will be done with it on 12/25/21    She wants to know if Dr. Kimberly Bowen can put an order in for he

## 2021-12-24 NOTE — TELEPHONE ENCOUNTER
Pt had bladder pain. No dysuria. Pain now gone. UA was positive for leuks but urine culture was negative. She didn't stop the antibiotics.   She wants repeat urine test.  Put in order for urine culture and advised her follow up next week with me

## 2021-12-27 ENCOUNTER — TELEPHONE (OUTPATIENT)
Dept: FAMILY MEDICINE CLINIC | Facility: CLINIC | Age: 81
End: 2021-12-27

## 2021-12-27 NOTE — TELEPHONE ENCOUNTER
Ricarda Du, DO    12/23/21 6:11 PM  Note  Pt had bladder pain. No dysuria. Pain now gone. UA was positive for leuks but urine culture was negative. She didn't stop the antibiotics.   She wants repeat urine test.  Put in order for urine culture and ad

## 2021-12-28 ENCOUNTER — LAB ENCOUNTER (OUTPATIENT)
Dept: LAB | Age: 81
End: 2021-12-28
Attending: FAMILY MEDICINE
Payer: MEDICARE

## 2021-12-28 DIAGNOSIS — N30.00 ACUTE CYSTITIS WITHOUT HEMATURIA: ICD-10-CM

## 2021-12-28 PROCEDURE — 87086 URINE CULTURE/COLONY COUNT: CPT

## 2021-12-30 ENCOUNTER — PATIENT MESSAGE (OUTPATIENT)
Dept: FAMILY MEDICINE CLINIC | Facility: CLINIC | Age: 81
End: 2021-12-30

## 2021-12-30 ENCOUNTER — TELEPHONE (OUTPATIENT)
Dept: FAMILY MEDICINE CLINIC | Facility: CLINIC | Age: 81
End: 2021-12-30

## 2021-12-30 DIAGNOSIS — C85.10 LOW GRADE B-CELL LYMPHOMA (HCC): Primary | ICD-10-CM

## 2021-12-30 NOTE — TELEPHONE ENCOUNTER
From: Justina Car  To: Esperanza Lai DO  Sent: 12/30/2021 12:57 PM CST  Subject: Urine test for UTI    Could you please release my urine tests to my chart when you get a chance? Called in and they said they were normal no UTI, correct?

## 2021-12-30 NOTE — TELEPHONE ENCOUNTER
Patient notified, verbalized understanding of urine culture result. She states she is feeling really good, pain in lower abdomen has ceased. Dr. Lakeisha Singh, as Aixa Willson.

## 2022-01-03 NOTE — TELEPHONE ENCOUNTER
From: Ronna Meigs Frish  To: Simeon Dodge DO  Sent: 12/30/2021 4:55 PM CST  Subject: Question regarding URINE CULTURE, ROUTINE    The question I have is both this culture and the Culture taken at the ER showed not growth.  But the other urinalysis the ER do

## 2022-01-04 ENCOUNTER — PATIENT MESSAGE (OUTPATIENT)
Dept: FAMILY MEDICINE CLINIC | Facility: CLINIC | Age: 82
End: 2022-01-04

## 2022-01-04 DIAGNOSIS — D72.829 LEUKOCYTOSIS, UNSPECIFIED TYPE: Primary | ICD-10-CM

## 2022-01-04 NOTE — TELEPHONE ENCOUNTER
Please see pt message. I do not understand what she wants or thinks is missing. Do you want to order another UA?

## 2022-01-07 ENCOUNTER — LAB ENCOUNTER (OUTPATIENT)
Dept: LAB | Age: 82
End: 2022-01-07
Attending: FAMILY MEDICINE
Payer: MEDICARE

## 2022-01-07 DIAGNOSIS — D72.829 LEUKOCYTOSIS, UNSPECIFIED TYPE: ICD-10-CM

## 2022-01-07 LAB
ALBUMIN SERPL-MCNC: 3.3 G/DL (ref 3.4–5)
ALBUMIN/GLOB SERPL: 1.1 {RATIO} (ref 1–2)
ALP LIVER SERPL-CCNC: 124 U/L
ALT SERPL-CCNC: 20 U/L
ANION GAP SERPL CALC-SCNC: 7 MMOL/L (ref 0–18)
AST SERPL-CCNC: 16 U/L (ref 15–37)
BASOPHILS # BLD AUTO: 0.05 X10(3) UL (ref 0–0.2)
BASOPHILS NFR BLD AUTO: 0.8 %
BILIRUB SERPL-MCNC: 0.3 MG/DL (ref 0.1–2)
BILIRUB UR QL STRIP.AUTO: NEGATIVE
BUN BLD-MCNC: 32 MG/DL (ref 7–18)
CALCIUM BLD-MCNC: 9.2 MG/DL (ref 8.5–10.1)
CHLORIDE SERPL-SCNC: 117 MMOL/L (ref 98–112)
CLARITY UR REFRACT.AUTO: CLEAR
CO2 SERPL-SCNC: 24 MMOL/L (ref 21–32)
COLOR UR AUTO: YELLOW
CREAT BLD-MCNC: 1.64 MG/DL
EOSINOPHIL # BLD AUTO: 0.14 X10(3) UL (ref 0–0.7)
EOSINOPHIL NFR BLD AUTO: 2.2 %
ERYTHROCYTE [DISTWIDTH] IN BLOOD BY AUTOMATED COUNT: 13.8 %
FASTING STATUS PATIENT QL REPORTED: NO
GLOBULIN PLAS-MCNC: 3.1 G/DL (ref 2.8–4.4)
GLUCOSE BLD-MCNC: 89 MG/DL (ref 70–99)
GLUCOSE UR STRIP.AUTO-MCNC: NEGATIVE MG/DL
HCT VFR BLD AUTO: 34.4 %
HGB BLD-MCNC: 10.5 G/DL
HYALINE CASTS #/AREA URNS AUTO: PRESENT /LPF
IMM GRANULOCYTES # BLD AUTO: 0.05 X10(3) UL (ref 0–1)
IMM GRANULOCYTES NFR BLD: 0.8 %
KETONES UR STRIP.AUTO-MCNC: NEGATIVE MG/DL
LYMPHOCYTES # BLD AUTO: 0.82 X10(3) UL (ref 1–4)
LYMPHOCYTES NFR BLD AUTO: 12.8 %
MCH RBC QN AUTO: 32.9 PG (ref 26–34)
MCHC RBC AUTO-ENTMCNC: 30.5 G/DL (ref 31–37)
MCV RBC AUTO: 107.8 FL
MONOCYTES # BLD AUTO: 0.54 X10(3) UL (ref 0.1–1)
MONOCYTES NFR BLD AUTO: 8.4 %
NEUTROPHILS # BLD AUTO: 4.8 X10 (3) UL (ref 1.5–7.7)
NEUTROPHILS # BLD AUTO: 4.8 X10(3) UL (ref 1.5–7.7)
NEUTROPHILS NFR BLD AUTO: 75 %
NITRITE UR QL STRIP.AUTO: NEGATIVE
OSMOLALITY SERPL CALC.SUM OF ELEC: 312 MOSM/KG (ref 275–295)
PH UR STRIP.AUTO: 5 [PH] (ref 5–8)
PLATELET # BLD AUTO: 215 10(3)UL (ref 150–450)
POTASSIUM SERPL-SCNC: 4.3 MMOL/L (ref 3.5–5.1)
PROT SERPL-MCNC: 6.4 G/DL (ref 6.4–8.2)
PROT UR STRIP.AUTO-MCNC: 30 MG/DL
RBC # BLD AUTO: 3.19 X10(6)UL
RBC UR QL AUTO: NEGATIVE
SODIUM SERPL-SCNC: 148 MMOL/L (ref 136–145)
SP GR UR STRIP.AUTO: 1.01 (ref 1–1.03)
UROBILINOGEN UR STRIP.AUTO-MCNC: <2 MG/DL
WBC # BLD AUTO: 6.4 X10(3) UL (ref 4–11)

## 2022-01-07 PROCEDURE — 80053 COMPREHEN METABOLIC PANEL: CPT

## 2022-01-07 PROCEDURE — 85025 COMPLETE CBC W/AUTO DIFF WBC: CPT

## 2022-01-07 PROCEDURE — 87086 URINE CULTURE/COLONY COUNT: CPT

## 2022-01-07 PROCEDURE — 81001 URINALYSIS AUTO W/SCOPE: CPT

## 2022-01-07 PROCEDURE — 36415 COLL VENOUS BLD VENIPUNCTURE: CPT

## 2022-01-16 ENCOUNTER — HOSPITAL ENCOUNTER (OUTPATIENT)
Dept: CT IMAGING | Age: 82
Discharge: HOME | End: 2022-01-16
Attending: INTERNAL MEDICINE
Payer: MEDICARE

## 2022-01-16 DIAGNOSIS — C85.10 LOW GRADE B-CELL LYMPHOMA (HCC): ICD-10-CM

## 2022-01-16 DIAGNOSIS — C34.81 MALIGNANT NEOPLASM OF OVERLAPPING SITES OF RIGHT LUNG (HCC): ICD-10-CM

## 2022-01-16 PROCEDURE — 71250 CT THORAX DX C-: CPT | Performed by: INTERNAL MEDICINE

## 2022-01-16 PROCEDURE — 74176 CT ABD & PELVIS W/O CONTRAST: CPT | Performed by: INTERNAL MEDICINE

## 2022-01-17 RX ORDER — RIVAROXABAN 20 MG/1
TABLET, FILM COATED ORAL
Qty: 90 TABLET | Refills: 1 | Status: SHIPPED | OUTPATIENT
Start: 2022-01-17 | End: 2022-04-13

## 2022-01-25 ENCOUNTER — OFFICE VISIT (OUTPATIENT)
Dept: HEMATOLOGY/ONCOLOGY | Age: 82
End: 2022-01-25
Attending: INTERNAL MEDICINE
Payer: MEDICARE

## 2022-01-25 VITALS
HEART RATE: 69 BPM | OXYGEN SATURATION: 97 % | TEMPERATURE: 98 F | HEIGHT: 59.02 IN | RESPIRATION RATE: 16 BRPM | WEIGHT: 141.38 LBS | BODY MASS INDEX: 28.5 KG/M2 | SYSTOLIC BLOOD PRESSURE: 131 MMHG | DIASTOLIC BLOOD PRESSURE: 70 MMHG

## 2022-01-25 DIAGNOSIS — D63.0 ANEMIA COMPLICATING NEOPLASTIC DISEASE: ICD-10-CM

## 2022-01-25 DIAGNOSIS — C34.90 EGFR-RELATED LUNG CANCER (HCC): ICD-10-CM

## 2022-01-25 DIAGNOSIS — R93.89 ABNORMAL CT OF THE CHEST: ICD-10-CM

## 2022-01-25 DIAGNOSIS — Z79.899 OTHER LONG TERM (CURRENT) DRUG THERAPY: ICD-10-CM

## 2022-01-25 DIAGNOSIS — C34.81 MALIGNANT NEOPLASM OF OVERLAPPING SITES OF RIGHT LUNG (HCC): ICD-10-CM

## 2022-01-25 DIAGNOSIS — N18.4 CKD (CHRONIC KIDNEY DISEASE) STAGE 4, GFR 15-29 ML/MIN (HCC): ICD-10-CM

## 2022-01-25 DIAGNOSIS — I82.431 ACUTE DEEP VEIN THROMBOSIS (DVT) OF POPLITEAL VEIN OF RIGHT LOWER EXTREMITY (HCC): ICD-10-CM

## 2022-01-25 DIAGNOSIS — D64.9 NORMOCYTIC ANEMIA: Primary | ICD-10-CM

## 2022-01-25 DIAGNOSIS — C85.10 LOW GRADE B-CELL LYMPHOMA (HCC): ICD-10-CM

## 2022-01-25 DIAGNOSIS — C85.80 MARGINAL ZONE B-CELL LYMPHOMA (HCC): ICD-10-CM

## 2022-01-25 DIAGNOSIS — D47.2 MONOCLONAL PARAPROTEINEMIA: ICD-10-CM

## 2022-01-25 LAB
ALBUMIN SERPL-MCNC: 3.4 G/DL (ref 3.4–5)
ALBUMIN/GLOB SERPL: 1 {RATIO} (ref 1–2)
ALP LIVER SERPL-CCNC: 125 U/L
ALT SERPL-CCNC: 18 U/L
ANION GAP SERPL CALC-SCNC: 6 MMOL/L (ref 0–18)
AST SERPL-CCNC: 14 U/L (ref 15–37)
BASOPHILS # BLD AUTO: 0.02 X10(3) UL (ref 0–0.2)
BASOPHILS NFR BLD AUTO: 0.5 %
BILIRUB SERPL-MCNC: 0.4 MG/DL (ref 0.1–2)
BUN BLD-MCNC: 27 MG/DL (ref 7–18)
CALCIUM BLD-MCNC: 9.1 MG/DL (ref 8.5–10.1)
CHLORIDE SERPL-SCNC: 114 MMOL/L (ref 98–112)
CO2 SERPL-SCNC: 22 MMOL/L (ref 21–32)
CREAT BLD-MCNC: 1.56 MG/DL
EOSINOPHIL # BLD AUTO: 0.15 X10(3) UL (ref 0–0.7)
EOSINOPHIL NFR BLD AUTO: 3.4 %
ERYTHROCYTE [DISTWIDTH] IN BLOOD BY AUTOMATED COUNT: 14 %
FASTING STATUS PATIENT QL REPORTED: NO
GLOBULIN PLAS-MCNC: 3.5 G/DL (ref 2.8–4.4)
GLUCOSE BLD-MCNC: 137 MG/DL (ref 70–99)
HCT VFR BLD AUTO: 35.8 %
HGB BLD-MCNC: 11.5 G/DL
IMM GRANULOCYTES # BLD AUTO: 0.05 X10(3) UL (ref 0–1)
IMM GRANULOCYTES NFR BLD: 1.1 %
LDH SERPL L TO P-CCNC: 162 U/L
LYMPHOCYTES # BLD AUTO: 0.76 X10(3) UL (ref 1–4)
LYMPHOCYTES NFR BLD AUTO: 17.3 %
MCH RBC QN AUTO: 33.5 PG (ref 26–34)
MCHC RBC AUTO-ENTMCNC: 32.1 G/DL (ref 31–37)
MCV RBC AUTO: 104.4 FL
MONOCYTES # BLD AUTO: 0.61 X10(3) UL (ref 0.1–1)
MONOCYTES NFR BLD AUTO: 13.9 %
NEUTROPHILS # BLD AUTO: 2.8 X10 (3) UL (ref 1.5–7.7)
NEUTROPHILS # BLD AUTO: 2.8 X10(3) UL (ref 1.5–7.7)
NEUTROPHILS NFR BLD AUTO: 63.8 %
OSMOLALITY SERPL CALC.SUM OF ELEC: 301 MOSM/KG (ref 275–295)
PLATELET # BLD AUTO: 197 10(3)UL (ref 150–450)
POTASSIUM SERPL-SCNC: 3.7 MMOL/L (ref 3.5–5.1)
PROT SERPL-MCNC: 6.9 G/DL (ref 6.4–8.2)
RBC # BLD AUTO: 3.43 X10(6)UL
SARS-COV-2 IGG+IGM SERPL QL IA: NONREACTIVE
SODIUM SERPL-SCNC: 142 MMOL/L (ref 136–145)
WBC # BLD AUTO: 4.4 X10(3) UL (ref 4–11)

## 2022-01-25 PROCEDURE — 99215 OFFICE O/P EST HI 40 MIN: CPT | Performed by: INTERNAL MEDICINE

## 2022-01-25 NOTE — PROGRESS NOTES
Pt here for 3 month MD f/u. Energy level is pretty good, same as previous. Appetite has been pretty good. Denies pain. Pt had CT 1/16.      Outpatient Oncology Care Plan  Problem list:  fatigue  knowledge deficit    Problems related to:    disease/disease p

## 2022-01-25 NOTE — PROGRESS NOTES
Cancer Center Progress Note    Patient Name: Emil Smith   YOB: 1940   Medical Record Number: [id number]   CSN: [id number]   Attending Physician: Garrett Buckley M.D.    Referring Physician: DO Dr. Camden Atkinson    Date of positive. Started \"adjuvant\" Tarceva 2/2015. Stopped after she was placed on Ibrutinib (given concerns for overlapping side effects. Had been FIONA for lung cancer then).       - Stage I NSCLC- adenocarcinoma of the RLL diagnosed 5/02/2013 s/p SBRT     - St nodules and adenopathy. Procedure complicated by traumatic epistaxis and hypoxia. Then developed wheezing and tachycardia requiring mask ventilation. She was subsequently admitted after the procedure to ICU.  Respiratory distress felt to be from nasal blood succinate 50 MG Oral Tablet 24 Hr, Take 1 tablet (50 mg total) by mouth 2 (two) times a day., Disp: 180 tablet, Rfl: 1  •  DILTIAZEM HCL ER COATED BEADS 180 MG Oral Capsule SR 24 Hr, TAKE 1 CAPSULE NIGHTLY, Disp: 90 capsule, Rfl: 1  •  triamcinolone aceton laterality 4/4/2018   • Complete rupture of rotator cuff    • Constipation    • COPD (chronic obstructive pulmonary disease) ([facility])     no O2   • COPD (chronic obstructive pulmonary disease) [facility] 5/14/2019   • COPD exacerbation ([facility]) glasses        Past Surgical History:  Past Surgical History:   Procedure Laterality Date   • CHOLECYSTECTOMY     • COLONOSCOPY  9/23/08   • HERNIA SURGERY     • HYSTERECTOMY  1970's    vaginal   • OTHER      lung biopsies   • OTHER SURGICAL HISTORY  1/1/0 file  Physical Activity: Not on file  Stress: Not on file  Social Connections: Not on file  Intimate Partner Violence: Not on file  Housing Stability: Not on file      Allergies:    Penicillins             ITCHING  Radiology Contrast *    OTHER (SEE COMMEN Bilirubin, Total 0.4 0.1 - 2.0 mg/dL    Total Protein 6.9 6.4 - 8.2 g/dL    Albumin 3.4 3.4 - 5.0 g/dL    Globulin  3.5 2.8 - 4.4 g/dL    A/G Ratio 1.0 1.0 - 2.0    Patient Fasting for CMP?  No    LDH    Collection Time: 01/25/22 10:47 AM   Result Value Ref Technologist)  The patient states she has a hard mass on the left lateral back x 3 days.  She has a history of lymphoma.            FINDINGS:         CHEST:     LUNGS:  Numerous micro nodules throughout the lungs are again identified.  Representative 3 mm n WALL:  Extension of bowel into the right ventral abdominal wall is stable.  No evidence of bowel obstruction. URINARY BLADDER:  Fat containing bilateral inguinal hernias are noted.    PELVIC NODES:  No adenopathy.     PELVIC ORGANS:  Sequelae of hysterect bronchi measures 35 x 18 mm and    is stable since previous study.  There are satellite nodules medially within the right lower lobe on image 58 measuring 4 mm and image 60 measuring 4-5 mm which is stable.  There is a right lower lobe medial segment 5 mm  Overall, there is slight increase in the number of micro nodule seen bilaterally which could represent progression of the infectious/inflammatory process within the lungs.  Although, some of the nodules have improved.  This could represent fungal   infect     Left Baker's cyst.             Critical results were discussed with Dr. Dudley Lombard by Dr. Linn Bashir at approximately 15:28 on 6/4/21. Joann Arianna back was performed.           Dictated by (CST): Etta Tapia MD on 6/04/2021 at 3:23 PM       Finalized by (CST): Linn Bashir, nodular densities in the superior segment right lower lobe (image 40). MEDIASTINUM:  No mass or adenopathy.     PHILIPPE:  Right perihilar surgical clips are noted. CARDIAC:  No enlargement, pericardial thickening, or significant calcification.    PLEURA:   throughout the left lower lobe along with a 1.2 x 1.3 cm nodule that is new.  Increased size of ground-glass nodule left upper lobe is also noted.  This may be due to further progression of disease   in this location.  This may be related to lymphangitic s embolism.           Dictated by (CST): Fabi Gutierrez DO on 6/04/2021 at 11:55 AM       Finalized by (CST): Fabi Gutierrez DO on 6/04/2021 at 11:57 AM               Impression and Plan:  1.  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

## 2022-01-26 LAB
DEPRECATED HBV CORE AB SER IA-ACNC: 108.6 NG/ML
IRON SATN MFR SERPL: 22 %
IRON SERPL-MCNC: 70 UG/DL
TIBC SERPL-MCNC: 325 UG/DL (ref 240–450)
TRANSFERRIN SERPL-MCNC: 218 MG/DL (ref 200–360)

## 2022-02-07 ENCOUNTER — APPOINTMENT (OUTPATIENT)
Dept: CT IMAGING | Age: 82
End: 2022-02-07
Attending: EMERGENCY MEDICINE
Payer: MEDICARE

## 2022-02-07 ENCOUNTER — HOSPITAL ENCOUNTER (EMERGENCY)
Age: 82
Discharge: HOME | End: 2022-02-07
Attending: EMERGENCY MEDICINE
Payer: MEDICARE

## 2022-02-07 VITALS
DIASTOLIC BLOOD PRESSURE: 88 MMHG | OXYGEN SATURATION: 98 % | WEIGHT: 141.13 LBS | BODY MASS INDEX: 28 KG/M2 | HEART RATE: 78 BPM | TEMPERATURE: 98 F | SYSTOLIC BLOOD PRESSURE: 183 MMHG | RESPIRATION RATE: 16 BRPM

## 2022-02-07 DIAGNOSIS — S01.81XA FOREHEAD LACERATION, INITIAL ENCOUNTER: Primary | ICD-10-CM

## 2022-02-07 DIAGNOSIS — S50.10XA CONTUSION OF FOREARM, UNSPECIFIED LATERALITY, INITIAL ENCOUNTER: ICD-10-CM

## 2022-02-07 PROCEDURE — 12011 RPR F/E/E/N/L/M 2.5 CM/<: CPT

## 2022-02-07 PROCEDURE — 99284 EMERGENCY DEPT VISIT MOD MDM: CPT

## 2022-02-07 PROCEDURE — 70450 CT HEAD/BRAIN W/O DYE: CPT | Performed by: EMERGENCY MEDICINE

## 2022-02-08 NOTE — ED INITIAL ASSESSMENT (HPI)
States almost an hr ago she was reaching into her crawl space to get something and fell forward hitting her forehead on steel beam. Denies loss of consciousness.  Small lac noted

## 2022-02-10 ENCOUNTER — OFFICE VISIT (OUTPATIENT)
Dept: FAMILY MEDICINE CLINIC | Facility: CLINIC | Age: 82
End: 2022-02-10
Payer: MEDICARE

## 2022-02-10 VITALS
WEIGHT: 140 LBS | TEMPERATURE: 98 F | HEART RATE: 80 BPM | HEIGHT: 60 IN | BODY MASS INDEX: 27.48 KG/M2 | OXYGEN SATURATION: 98 % | RESPIRATION RATE: 20 BRPM | DIASTOLIC BLOOD PRESSURE: 80 MMHG | SYSTOLIC BLOOD PRESSURE: 156 MMHG

## 2022-02-10 DIAGNOSIS — S01.01XS LACERATION OF SCALP WITHOUT FOREIGN BODY, SEQUELA: Primary | ICD-10-CM

## 2022-02-10 PROCEDURE — 99213 OFFICE O/P EST LOW 20 MIN: CPT | Performed by: FAMILY MEDICINE

## 2022-02-14 ENCOUNTER — OFFICE VISIT (OUTPATIENT)
Dept: FAMILY MEDICINE CLINIC | Facility: CLINIC | Age: 82
End: 2022-02-14
Payer: MEDICARE

## 2022-02-14 VITALS
WEIGHT: 141 LBS | HEIGHT: 60 IN | RESPIRATION RATE: 18 BRPM | OXYGEN SATURATION: 99 % | SYSTOLIC BLOOD PRESSURE: 118 MMHG | DIASTOLIC BLOOD PRESSURE: 76 MMHG | HEART RATE: 79 BPM | BODY MASS INDEX: 27.68 KG/M2

## 2022-02-14 DIAGNOSIS — S09.90XD INJURY OF HEAD, SUBSEQUENT ENCOUNTER: ICD-10-CM

## 2022-02-14 DIAGNOSIS — Z48.02 VISIT FOR SUTURE REMOVAL: Primary | ICD-10-CM

## 2022-02-14 PROCEDURE — 99024 POSTOP FOLLOW-UP VISIT: CPT | Performed by: FAMILY MEDICINE

## 2022-02-14 PROCEDURE — 99213 OFFICE O/P EST LOW 20 MIN: CPT | Performed by: FAMILY MEDICINE

## 2022-02-14 RX ORDER — FAMOTIDINE 20 MG/1
20 TABLET, FILM COATED ORAL 2 TIMES DAILY
COMMUNITY

## 2022-03-21 ENCOUNTER — OFFICE VISIT (OUTPATIENT)
Dept: FAMILY MEDICINE CLINIC | Facility: CLINIC | Age: 82
End: 2022-03-21
Payer: MEDICARE

## 2022-03-21 VITALS
HEART RATE: 72 BPM | RESPIRATION RATE: 16 BRPM | WEIGHT: 142 LBS | TEMPERATURE: 97 F | BODY MASS INDEX: 24.85 KG/M2 | DIASTOLIC BLOOD PRESSURE: 82 MMHG | HEIGHT: 63.5 IN | SYSTOLIC BLOOD PRESSURE: 122 MMHG | OXYGEN SATURATION: 96 %

## 2022-03-21 DIAGNOSIS — R23.4 CRUSTING OF SKIN OF EYELID: Primary | ICD-10-CM

## 2022-03-21 PROCEDURE — 99213 OFFICE O/P EST LOW 20 MIN: CPT | Performed by: FAMILY MEDICINE

## 2022-03-21 RX ORDER — OLOPATADINE HYDROCHLORIDE 1 MG/ML
1 SOLUTION/ DROPS OPHTHALMIC 2 TIMES DAILY
Qty: 1 EACH | Refills: 0 | Status: SHIPPED | OUTPATIENT
Start: 2022-03-21

## 2022-04-17 ENCOUNTER — HOSPITAL ENCOUNTER (OUTPATIENT)
Dept: CT IMAGING | Age: 82
Discharge: HOME | End: 2022-04-17
Attending: INTERNAL MEDICINE
Payer: MEDICARE

## 2022-04-17 ENCOUNTER — HOSPITAL ENCOUNTER (OUTPATIENT)
Dept: CT IMAGING | Age: 82
End: 2022-04-17
Attending: INTERNAL MEDICINE
Payer: MEDICARE

## 2022-04-17 DIAGNOSIS — C34.81 MALIGNANT NEOPLASM OF OVERLAPPING SITES OF RIGHT LUNG (HCC): ICD-10-CM

## 2022-04-17 DIAGNOSIS — C85.10 LOW GRADE B-CELL LYMPHOMA (HCC): ICD-10-CM

## 2022-04-17 PROCEDURE — 74176 CT ABD & PELVIS W/O CONTRAST: CPT | Performed by: INTERNAL MEDICINE

## 2022-04-17 PROCEDURE — 71250 CT THORAX DX C-: CPT | Performed by: INTERNAL MEDICINE

## 2022-04-25 ENCOUNTER — TELEPHONE (OUTPATIENT)
Dept: HEMATOLOGY/ONCOLOGY | Facility: HOSPITAL | Age: 82
End: 2022-04-25

## 2022-04-26 ENCOUNTER — PATIENT MESSAGE (OUTPATIENT)
Dept: FAMILY MEDICINE CLINIC | Facility: CLINIC | Age: 82
End: 2022-04-26

## 2022-04-26 ENCOUNTER — OFFICE VISIT (OUTPATIENT)
Dept: HEMATOLOGY/ONCOLOGY | Age: 82
End: 2022-04-26
Attending: INTERNAL MEDICINE
Payer: MEDICARE

## 2022-04-26 VITALS
SYSTOLIC BLOOD PRESSURE: 142 MMHG | RESPIRATION RATE: 16 BRPM | WEIGHT: 141.31 LBS | OXYGEN SATURATION: 98 % | TEMPERATURE: 97 F | DIASTOLIC BLOOD PRESSURE: 75 MMHG | BODY MASS INDEX: 25 KG/M2 | HEART RATE: 86 BPM

## 2022-04-26 DIAGNOSIS — D64.9 NORMOCYTIC ANEMIA: ICD-10-CM

## 2022-04-26 DIAGNOSIS — C34.81 MALIGNANT NEOPLASM OF OVERLAPPING SITES OF RIGHT LUNG (HCC): ICD-10-CM

## 2022-04-26 DIAGNOSIS — C85.10 LOW GRADE B-CELL LYMPHOMA (HCC): ICD-10-CM

## 2022-04-26 DIAGNOSIS — R35.0 URINARY FREQUENCY: ICD-10-CM

## 2022-04-26 LAB
ALBUMIN SERPL-MCNC: 3.3 G/DL (ref 3.4–5)
ALBUMIN/GLOB SERPL: 1 {RATIO} (ref 1–2)
ALP LIVER SERPL-CCNC: 128 U/L
ALT SERPL-CCNC: 23 U/L
ANION GAP SERPL CALC-SCNC: 6 MMOL/L (ref 0–18)
AST SERPL-CCNC: 17 U/L (ref 15–37)
BASOPHILS # BLD AUTO: 0.03 X10(3) UL (ref 0–0.2)
BASOPHILS NFR BLD AUTO: 0.5 %
BILIRUB SERPL-MCNC: 0.4 MG/DL (ref 0.1–2)
BILIRUB UR QL STRIP.AUTO: NEGATIVE
BUN BLD-MCNC: 36 MG/DL (ref 7–18)
CALCIUM BLD-MCNC: 9.2 MG/DL (ref 8.5–10.1)
CHLORIDE SERPL-SCNC: 113 MMOL/L (ref 98–112)
CLARITY UR REFRACT.AUTO: CLEAR
CO2 SERPL-SCNC: 25 MMOL/L (ref 21–32)
COLOR UR AUTO: YELLOW
CREAT BLD-MCNC: 1.69 MG/DL
EOSINOPHIL # BLD AUTO: 0.1 X10(3) UL (ref 0–0.7)
EOSINOPHIL NFR BLD AUTO: 1.8 %
ERYTHROCYTE [DISTWIDTH] IN BLOOD BY AUTOMATED COUNT: 13.8 %
FASTING STATUS PATIENT QL REPORTED: NO
GLOBULIN PLAS-MCNC: 3.4 G/DL (ref 2.8–4.4)
GLUCOSE BLD-MCNC: 128 MG/DL (ref 70–99)
GLUCOSE UR STRIP.AUTO-MCNC: NEGATIVE MG/DL
HCT VFR BLD AUTO: 34.1 %
HGB BLD-MCNC: 10.6 G/DL
IGA SERPL-MCNC: 63.4 MG/DL (ref 70–312)
IGM SERPL-MCNC: 414 MG/DL (ref 43–279)
IMM GRANULOCYTES # BLD AUTO: 0.03 X10(3) UL (ref 0–1)
IMM GRANULOCYTES NFR BLD: 0.5 %
IMMUNOGLOBULIN PNL SER-MCNC: 417 MG/DL (ref 791–1643)
KETONES UR STRIP.AUTO-MCNC: NEGATIVE MG/DL
LDH SERPL L TO P-CCNC: 176 U/L
LYMPHOCYTES # BLD AUTO: 0.83 X10(3) UL (ref 1–4)
LYMPHOCYTES NFR BLD AUTO: 14.7 %
MCH RBC QN AUTO: 32.5 PG (ref 26–34)
MCHC RBC AUTO-ENTMCNC: 31.1 G/DL (ref 31–37)
MCV RBC AUTO: 104.6 FL
MONOCYTES # BLD AUTO: 0.55 X10(3) UL (ref 0.1–1)
MONOCYTES NFR BLD AUTO: 9.7 %
NEUTROPHILS # BLD AUTO: 4.11 X10 (3) UL (ref 1.5–7.7)
NEUTROPHILS # BLD AUTO: 4.11 X10(3) UL (ref 1.5–7.7)
NEUTROPHILS NFR BLD AUTO: 72.8 %
NITRITE UR QL STRIP.AUTO: NEGATIVE
OSMOLALITY SERPL CALC.SUM OF ELEC: 308 MOSM/KG (ref 275–295)
PH UR STRIP.AUTO: 5.5 [PH] (ref 5–8)
PLATELET # BLD AUTO: 190 10(3)UL (ref 150–450)
POTASSIUM SERPL-SCNC: 4.3 MMOL/L (ref 3.5–5.1)
PROT SERPL-MCNC: 6.7 G/DL (ref 6.4–8.2)
RBC # BLD AUTO: 3.26 X10(6)UL
RBC UR QL AUTO: NEGATIVE
SODIUM SERPL-SCNC: 144 MMOL/L (ref 136–145)
SP GR UR STRIP.AUTO: 1.02 (ref 1–1.03)
UROBILINOGEN UR STRIP.AUTO-MCNC: 0.2 MG/DL
WBC # BLD AUTO: 5.7 X10(3) UL (ref 4–11)

## 2022-04-26 PROCEDURE — 99214 OFFICE O/P EST MOD 30 MIN: CPT | Performed by: CLINICAL NURSE SPECIALIST

## 2022-04-26 RX ORDER — FUROSEMIDE 20 MG/1
20 TABLET ORAL DAILY PRN
Qty: 30 TABLET | Refills: 0 | Status: SHIPPED | OUTPATIENT
Start: 2022-04-26

## 2022-04-26 RX ORDER — DIPHENOXYLATE HYDROCHLORIDE AND ATROPINE SULFATE 2.5; .025 MG/1; MG/1
1 TABLET ORAL 4 TIMES DAILY PRN
Qty: 30 TABLET | Refills: 0 | Status: SHIPPED | OUTPATIENT
Start: 2022-04-26

## 2022-04-26 RX ORDER — CIPROFLOXACIN 500 MG/1
500 TABLET, FILM COATED ORAL 2 TIMES DAILY
Qty: 6 TABLET | Refills: 0 | Status: SHIPPED | OUTPATIENT
Start: 2022-04-26 | End: 2022-04-29

## 2022-04-26 RX ORDER — FUROSEMIDE 20 MG/1
TABLET ORAL
Qty: 90 TABLET | Refills: 0 | OUTPATIENT
Start: 2022-04-26

## 2022-04-26 NOTE — PROGRESS NOTES
Pt here for 3 month f/u. Pt had CT scan 4/17. Energy level is fair, but gets tired, appetite is good. Denies pain. Pt has no SOB or cough. Pt has no further complaints.      Outpatient Oncology Care Plan  Problem list:  fatigue  knowledge deficit    Problems related to:    disease/disease progression    Interventions:  provided general teaching    Expected outcomes:  safe in environment  symptoms relieved/minimized  understands plan of care    Progress towards outcome:  making progress    Education Record    Learner:  Patient and Family Member  Barriers / Limitations:  None  Method:  Reinforcement  Outcome:  Shows understanding  Comments:

## 2022-04-27 ENCOUNTER — TELEPHONE (OUTPATIENT)
Dept: FAMILY MEDICINE CLINIC | Facility: CLINIC | Age: 82
End: 2022-04-27

## 2022-04-27 RX ORDER — CEPHALEXIN 500 MG/1
500 CAPSULE ORAL 2 TIMES DAILY
Qty: 14 CAPSULE | Refills: 0 | Status: SHIPPED | OUTPATIENT
Start: 2022-04-27

## 2022-04-27 NOTE — TELEPHONE ENCOUNTER
Pt saw her oncologist yesterday and they gave her an antibiotic for the start of a UTI    They gave her ciprofloxacin and she wants to know if Dr. Suzi Dakins thinks that is a good antibiotic for a UTI    Please advise

## 2022-04-27 NOTE — TELEPHONE ENCOUNTER
Dr Anatoly Gaspar is out of office today; I am sending to him, but pt may not hear back until YP returns. Dr Anatoly Gaspar, please advise.

## 2022-04-27 NOTE — TELEPHONE ENCOUNTER
Please review previous messages . Pt had urinary Sx and UA done per oncology office. Pt prescribed ciprofloxacin.

## 2022-04-29 LAB
ALBUMIN SERPL ELPH-MCNC: 3.86 G/DL (ref 3.75–5.21)
ALBUMIN/GLOB SERPL: 1.46 {RATIO} (ref 1–2)
ALPHA1 GLOB SERPL ELPH-MCNC: 0.31 G/DL (ref 0.19–0.46)
ALPHA2 GLOB SERPL ELPH-MCNC: 0.92 G/DL (ref 0.48–1.05)
B-GLOBULIN SERPL ELPH-MCNC: 0.68 G/DL (ref 0.68–1.23)
GAMMA GLOB SERPL ELPH-MCNC: 0.72 G/DL (ref 0.62–1.7)
KAPPA LC FREE SER-MCNC: 2.2 MG/DL (ref 0.33–1.94)
KAPPA LC FREE/LAMBDA FREE SER NEPH: 0.55 {RATIO} (ref 0.26–1.65)
LAMBDA LC FREE SERPL-MCNC: 4 MG/DL (ref 0.57–2.63)
M PROTEIN 1 SERPL ELPH-MCNC: 0.42 G/DL (ref ?–0)
PROT SERPL-MCNC: 6.5 G/DL (ref 6.4–8.2)

## 2022-05-02 ENCOUNTER — TELEPHONE (OUTPATIENT)
Dept: HEMATOLOGY/ONCOLOGY | Facility: HOSPITAL | Age: 82
End: 2022-05-02

## 2022-05-02 NOTE — TELEPHONE ENCOUNTER
Results released in My Chart - Anuj Martin will also send note. Patient wondering what the culture results were. Per Anuj Martin - results of urine did not meet requirements for culture to be run. She has no symptoms. Completed antibiotic therapy. Patient stated she did not want to come in for a urine culture as offered. She will see her PCP  if she feels she is having symptoms. He is closer to her home. Patient instructed to call as needed.

## 2022-05-09 ENCOUNTER — TELEPHONE (OUTPATIENT)
Dept: HEMATOLOGY/ONCOLOGY | Facility: HOSPITAL | Age: 82
End: 2022-05-09

## 2022-05-09 ENCOUNTER — SOCIAL WORK SERVICES (OUTPATIENT)
Dept: HEMATOLOGY/ONCOLOGY | Facility: HOSPITAL | Age: 82
End: 2022-05-09

## 2022-05-09 NOTE — TELEPHONE ENCOUNTER
Vania Weiner called and wanted to speak to a  regarding FMLA paperwork. Please call when able. Thank you.

## 2022-05-09 NOTE — PROGRESS NOTES
SW spoke to pt's son, Glennon Nissen [phone] who requested assistance with FMLA paperwork as she is pt's caretaker. Kenneth to send this SW his FMLA for review with MD. [place], \[facility]\""W   at [place] [id number] [facility], [address] [place] [address], [address] [facility]  [facility] Rhoad[facility] , [place], [address] [place] [address] [place]  Ph: [phone]. [email or web address]  Fax: [phone]    '

## 2022-05-12 ENCOUNTER — SOCIAL WORK SERVICES (OUTPATIENT)
Dept: HEMATOLOGY/ONCOLOGY | Facility: HOSPITAL | Age: 82
End: 2022-05-12

## 2022-05-12 NOTE — PROGRESS NOTES
SW spoke to pt's son, Bahman Pool (ph: [phone]) who requested FMLA to assist with pt's care. SW completed and faxed to [phone]. SHUKRI sent a copy to pt via [employer]. SHUKRI also sent a copy to pt's son to his email per request. (email: [email or web address])       Ana Tapia LCSW   at [facility], [facility] [place], [facility] [address] [place] [address] [place]  Ph: [phone]. [email or web address]  Fax: [phone]

## 2022-06-02 ENCOUNTER — HOSPITAL ENCOUNTER (OUTPATIENT)
Age: 82
Discharge: HOME | End: 2022-06-02
Payer: MEDICARE

## 2022-06-02 ENCOUNTER — APPOINTMENT (OUTPATIENT)
Dept: GENERAL RADIOLOGY | Age: 82
End: 2022-06-02
Attending: NURSE PRACTITIONER
Payer: MEDICARE

## 2022-06-02 VITALS
OXYGEN SATURATION: 95 % | TEMPERATURE: 98 F | WEIGHT: 141 LBS | DIASTOLIC BLOOD PRESSURE: 66 MMHG | HEART RATE: 93 BPM | RESPIRATION RATE: 18 BRPM | BODY MASS INDEX: 28.43 KG/M2 | SYSTOLIC BLOOD PRESSURE: 162 MMHG | HEIGHT: 59 IN

## 2022-06-02 DIAGNOSIS — J32.9 SINOBRONCHITIS: Primary | ICD-10-CM

## 2022-06-02 DIAGNOSIS — J40 SINOBRONCHITIS: Primary | ICD-10-CM

## 2022-06-02 LAB — SARS-COV-2 RNA RESP QL NAA+PROBE: NOT DETECTED

## 2022-06-02 PROCEDURE — 71046 X-RAY EXAM CHEST 2 VIEWS: CPT | Performed by: NURSE PRACTITIONER

## 2022-06-02 PROCEDURE — 99213 OFFICE O/P EST LOW 20 MIN: CPT

## 2022-06-02 PROCEDURE — 99214 OFFICE O/P EST MOD 30 MIN: CPT

## 2022-06-02 RX ORDER — BENZONATATE 100 MG/1
100 CAPSULE ORAL 3 TIMES DAILY PRN
Qty: 30 CAPSULE | Refills: 0 | Status: SHIPPED | OUTPATIENT
Start: 2022-06-02 | End: 2022-07-02

## 2022-06-02 RX ORDER — AZITHROMYCIN 250 MG/1
TABLET, FILM COATED ORAL
Qty: 6 TABLET | Refills: 0 | Status: SHIPPED | OUTPATIENT
Start: 2022-06-02 | End: 2022-06-07

## 2022-06-03 RX ORDER — FUROSEMIDE 20 MG/1
TABLET ORAL
Qty: 90 TABLET | Refills: 0 | Status: SHIPPED | OUTPATIENT
Start: 2022-06-03

## 2022-06-09 ENCOUNTER — OFFICE VISIT (OUTPATIENT)
Dept: FAMILY MEDICINE CLINIC | Facility: CLINIC | Age: 82
End: 2022-06-09
Payer: MEDICARE

## 2022-06-09 VITALS
OXYGEN SATURATION: 98 % | HEART RATE: 67 BPM | SYSTOLIC BLOOD PRESSURE: 160 MMHG | WEIGHT: 138 LBS | DIASTOLIC BLOOD PRESSURE: 80 MMHG | RESPIRATION RATE: 20 BRPM | TEMPERATURE: 98 F | HEIGHT: 59 IN | BODY MASS INDEX: 27.82 KG/M2

## 2022-06-09 DIAGNOSIS — J06.9 VIRAL UPPER RESPIRATORY TRACT INFECTION: Primary | ICD-10-CM

## 2022-06-09 PROCEDURE — 99213 OFFICE O/P EST LOW 20 MIN: CPT | Performed by: FAMILY MEDICINE

## 2022-07-06 ENCOUNTER — TELEPHONE (OUTPATIENT)
Dept: HEMATOLOGY/ONCOLOGY | Facility: HOSPITAL | Age: 82
End: 2022-07-06

## 2022-07-17 ENCOUNTER — HOSPITAL ENCOUNTER (OUTPATIENT)
Dept: CT IMAGING | Age: 82
Discharge: HOME | End: 2022-07-17
Attending: INTERNAL MEDICINE
Payer: MEDICARE

## 2022-07-17 ENCOUNTER — HOSPITAL ENCOUNTER (OUTPATIENT)
Dept: CT IMAGING | Age: 82
End: 2022-07-17
Attending: INTERNAL MEDICINE
Payer: MEDICARE

## 2022-07-17 DIAGNOSIS — C34.81 MALIGNANT NEOPLASM OF OVERLAPPING SITES OF RIGHT LUNG (HCC): ICD-10-CM

## 2022-07-17 DIAGNOSIS — C85.10 LOW GRADE B-CELL LYMPHOMA (HCC): ICD-10-CM

## 2022-07-17 PROCEDURE — 71250 CT THORAX DX C-: CPT | Performed by: INTERNAL MEDICINE

## 2022-07-17 PROCEDURE — 74176 CT ABD & PELVIS W/O CONTRAST: CPT | Performed by: INTERNAL MEDICINE

## 2022-07-18 ENCOUNTER — HOSPITAL ENCOUNTER (OUTPATIENT)
Age: 82
Discharge: HOME | End: 2022-07-18
Attending: EMERGENCY MEDICINE
Payer: MEDICARE

## 2022-07-18 VITALS
SYSTOLIC BLOOD PRESSURE: 157 MMHG | BODY MASS INDEX: 27.82 KG/M2 | TEMPERATURE: 98 F | OXYGEN SATURATION: 99 % | WEIGHT: 138 LBS | DIASTOLIC BLOOD PRESSURE: 70 MMHG | RESPIRATION RATE: 18 BRPM | HEIGHT: 59 IN | HEART RATE: 92 BPM

## 2022-07-18 DIAGNOSIS — J90 PLEURAL EFFUSION: Primary | ICD-10-CM

## 2022-07-18 PROCEDURE — 99212 OFFICE O/P EST SF 10 MIN: CPT

## 2022-07-19 NOTE — ED INITIAL ASSESSMENT (HPI)
Pt presents tonight with c/o abnormal findings on CT scan pt just received today. Pt states that she did speak with her oncologist's office and her oncologist will follow-up with her on Wednesday. Pt states that the nurses at the oncologist's office thought it would be a good idea for someone to take a listen to the pt's lungs. Pt denies any fevers or SOB.

## 2022-07-20 ENCOUNTER — TELEPHONE (OUTPATIENT)
Dept: HEMATOLOGY/ONCOLOGY | Facility: HOSPITAL | Age: 82
End: 2022-07-20

## 2022-07-20 NOTE — TELEPHONE ENCOUNTER
Reviewed recommendations from [address]: see Dr Irine Goltz, fluid is not new, she has had waxing and waning CT scans.  SHe will call Dr Irine Goltz for appointment
PROVIDER:[TOKEN:[[id number]],FOLLOWUP:[1-3 days]]

## 2022-07-26 ENCOUNTER — OFFICE VISIT (OUTPATIENT)
Dept: HEMATOLOGY/ONCOLOGY | Age: 82
End: 2022-07-26
Attending: INTERNAL MEDICINE
Payer: MEDICARE

## 2022-07-26 VITALS
SYSTOLIC BLOOD PRESSURE: 176 MMHG | WEIGHT: 134.88 LBS | OXYGEN SATURATION: 98 % | TEMPERATURE: 98 F | HEIGHT: 59.02 IN | BODY MASS INDEX: 27.19 KG/M2 | DIASTOLIC BLOOD PRESSURE: 84 MMHG | HEART RATE: 66 BPM

## 2022-07-26 DIAGNOSIS — C85.80 MARGINAL ZONE B-CELL LYMPHOMA (HCC): ICD-10-CM

## 2022-07-26 DIAGNOSIS — C34.90 EGFR-RELATED LUNG CANCER (HCC): ICD-10-CM

## 2022-07-26 DIAGNOSIS — D63.0 ANEMIA COMPLICATING NEOPLASTIC DISEASE: ICD-10-CM

## 2022-07-26 DIAGNOSIS — D64.9 NORMOCYTIC ANEMIA: ICD-10-CM

## 2022-07-26 DIAGNOSIS — C34.81 MALIGNANT NEOPLASM OF OVERLAPPING SITES OF RIGHT LUNG (HCC): Primary | ICD-10-CM

## 2022-07-26 DIAGNOSIS — N18.4 CKD (CHRONIC KIDNEY DISEASE) STAGE 4, GFR 15-29 ML/MIN (HCC): ICD-10-CM

## 2022-07-26 DIAGNOSIS — D47.2 MONOCLONAL PARAPROTEINEMIA: ICD-10-CM

## 2022-07-26 DIAGNOSIS — R93.89 ABNORMAL CT OF THE CHEST: ICD-10-CM

## 2022-07-26 DIAGNOSIS — C85.10 LOW GRADE B-CELL LYMPHOMA (HCC): ICD-10-CM

## 2022-07-26 LAB
ALBUMIN SERPL-MCNC: 3.6 G/DL (ref 3.4–5)
ALBUMIN/GLOB SERPL: 1.1 {RATIO} (ref 1–2)
ALP LIVER SERPL-CCNC: 128 U/L
ALT SERPL-CCNC: 20 U/L
ANION GAP SERPL CALC-SCNC: 5 MMOL/L (ref 0–18)
AST SERPL-CCNC: 12 U/L (ref 15–37)
BASOPHILS # BLD AUTO: 0.03 X10(3) UL (ref 0–0.2)
BASOPHILS NFR BLD AUTO: 0.5 %
BILIRUB SERPL-MCNC: 0.5 MG/DL (ref 0.1–2)
BUN BLD-MCNC: 27 MG/DL (ref 7–18)
CALCIUM BLD-MCNC: 9.5 MG/DL (ref 8.5–10.1)
CHLORIDE SERPL-SCNC: 114 MMOL/L (ref 98–112)
CO2 SERPL-SCNC: 24 MMOL/L (ref 21–32)
CREAT BLD-MCNC: 1.51 MG/DL
EOSINOPHIL # BLD AUTO: 0.13 X10(3) UL (ref 0–0.7)
EOSINOPHIL NFR BLD AUTO: 2.2 %
ERYTHROCYTE [DISTWIDTH] IN BLOOD BY AUTOMATED COUNT: 14.4 %
FASTING STATUS PATIENT QL REPORTED: NO
GLOBULIN PLAS-MCNC: 3.2 G/DL (ref 2.8–4.4)
GLUCOSE BLD-MCNC: 99 MG/DL (ref 70–99)
HCT VFR BLD AUTO: 35.1 %
HGB BLD-MCNC: 10.9 G/DL
IMM GRANULOCYTES # BLD AUTO: 0.06 X10(3) UL (ref 0–1)
IMM GRANULOCYTES NFR BLD: 1 %
LDH SERPL L TO P-CCNC: 166 U/L
LYMPHOCYTES # BLD AUTO: 0.85 X10(3) UL (ref 1–4)
LYMPHOCYTES NFR BLD AUTO: 14.4 %
MCH RBC QN AUTO: 32.2 PG (ref 26–34)
MCHC RBC AUTO-ENTMCNC: 31.1 G/DL (ref 31–37)
MCV RBC AUTO: 103.5 FL
MONOCYTES # BLD AUTO: 0.48 X10(3) UL (ref 0.1–1)
MONOCYTES NFR BLD AUTO: 8.1 %
NEUTROPHILS # BLD AUTO: 4.34 X10 (3) UL (ref 1.5–7.7)
NEUTROPHILS # BLD AUTO: 4.34 X10(3) UL (ref 1.5–7.7)
NEUTROPHILS NFR BLD AUTO: 73.8 %
OSMOLALITY SERPL CALC.SUM OF ELEC: 301 MOSM/KG (ref 275–295)
PLATELET # BLD AUTO: 220 10(3)UL (ref 150–450)
POTASSIUM SERPL-SCNC: 3.9 MMOL/L (ref 3.5–5.1)
PROT SERPL-MCNC: 6.8 G/DL (ref 6.4–8.2)
RBC # BLD AUTO: 3.39 X10(6)UL
SODIUM SERPL-SCNC: 143 MMOL/L (ref 136–145)
WBC # BLD AUTO: 5.9 X10(3) UL (ref 4–11)

## 2022-07-26 PROCEDURE — 99215 OFFICE O/P EST HI 40 MIN: CPT | Performed by: INTERNAL MEDICINE

## 2022-07-26 NOTE — PROGRESS NOTES
Pt here for 3 month MD f/u. Energy level and appetite has been good. CT 7/17. Pt has chronic sciatic and back pain.      Outpatient Oncology Care Plan  Problem list:  fatigue  knowledge deficit    Problems related to:    disease/disease progression    Interventions:  provided general teaching    Expected outcomes:  safe in environment  symptoms relieved/minimized  understands plan of care    Progress towards outcome:  making progress    Education Record    Learner:  Patient  Barriers / Limitations:  none  Method:  Reinforcement  Outcome:  Shows understanding  Comments:

## 2022-07-28 LAB
DEPRECATED HBV CORE AB SER IA-ACNC: 99.2 NG/ML
IRON SATN MFR SERPL: 28 %
IRON SERPL-MCNC: 106 UG/DL
TIBC SERPL-MCNC: 377 UG/DL (ref 240–450)
TRANSFERRIN SERPL-MCNC: 253 MG/DL (ref 200–360)

## 2022-08-10 DIAGNOSIS — J47.1 BRONCHIECTASIS WITH (ACUTE) EXACERBATION (HCC): Primary | ICD-10-CM

## 2022-08-19 ENCOUNTER — LAB ENCOUNTER (OUTPATIENT)
Dept: LAB | Age: 82
End: 2022-08-19
Payer: MEDICARE

## 2022-08-19 DIAGNOSIS — E78.5 HYPERLIPIDEMIA: Primary | ICD-10-CM

## 2022-08-19 LAB
ALBUMIN SERPL-MCNC: 3.4 G/DL (ref 3.4–5)
ALBUMIN/GLOB SERPL: 1.1 {RATIO} (ref 1–2)
ALP LIVER SERPL-CCNC: 120 U/L
ALT SERPL-CCNC: 17 U/L
ANION GAP SERPL CALC-SCNC: 4 MMOL/L (ref 0–18)
AST SERPL-CCNC: 16 U/L (ref 15–37)
BILIRUB SERPL-MCNC: 0.4 MG/DL (ref 0.1–2)
BUN BLD-MCNC: 39 MG/DL (ref 7–18)
CALCIUM BLD-MCNC: 9.5 MG/DL (ref 8.5–10.1)
CHLORIDE SERPL-SCNC: 115 MMOL/L (ref 98–112)
CHOLEST SERPL-MCNC: 150 MG/DL (ref ?–200)
CO2 SERPL-SCNC: 24 MMOL/L (ref 21–32)
CREAT BLD-MCNC: 1.58 MG/DL
FASTING PATIENT LIPID ANSWER: YES
FASTING STATUS PATIENT QL REPORTED: YES
GFR SERPLBLD BASED ON 1.73 SQ M-ARVRAT: 33 ML/MIN/1.73M2 (ref 60–?)
GLOBULIN PLAS-MCNC: 3.2 G/DL (ref 2.8–4.4)
GLUCOSE BLD-MCNC: 88 MG/DL (ref 70–99)
HDLC SERPL-MCNC: 68 MG/DL (ref 40–59)
LDLC SERPL CALC-MCNC: 66 MG/DL (ref ?–100)
NONHDLC SERPL-MCNC: 82 MG/DL (ref ?–130)
OSMOLALITY SERPL CALC.SUM OF ELEC: 305 MOSM/KG (ref 275–295)
POTASSIUM SERPL-SCNC: 4.2 MMOL/L (ref 3.5–5.1)
PROT SERPL-MCNC: 6.6 G/DL (ref 6.4–8.2)
SODIUM SERPL-SCNC: 143 MMOL/L (ref 136–145)
TRIGL SERPL-MCNC: 87 MG/DL (ref 30–149)
VLDLC SERPL CALC-MCNC: 13 MG/DL (ref 0–30)

## 2022-08-19 PROCEDURE — 80053 COMPREHEN METABOLIC PANEL: CPT

## 2022-08-19 PROCEDURE — 80061 LIPID PANEL: CPT

## 2022-08-19 PROCEDURE — 36415 COLL VENOUS BLD VENIPUNCTURE: CPT

## 2022-08-22 DIAGNOSIS — R05.3 CHRONIC COUGH: ICD-10-CM

## 2022-08-22 RX ORDER — FLUTICASONE PROPIONATE 50 MCG
SPRAY, SUSPENSION (ML) NASAL
Qty: 16 G | Refills: 0 | OUTPATIENT
Start: 2022-08-22

## 2022-09-01 ENCOUNTER — OFFICE VISIT (OUTPATIENT)
Dept: FAMILY MEDICINE CLINIC | Facility: CLINIC | Age: 82
End: 2022-09-01

## 2022-09-01 VITALS
HEART RATE: 68 BPM | OXYGEN SATURATION: 98 % | RESPIRATION RATE: 20 BRPM | HEIGHT: 59 IN | BODY MASS INDEX: 27.82 KG/M2 | WEIGHT: 138 LBS | DIASTOLIC BLOOD PRESSURE: 80 MMHG | SYSTOLIC BLOOD PRESSURE: 150 MMHG

## 2022-09-01 DIAGNOSIS — M65.341 TRIGGER RING FINGER OF RIGHT HAND: ICD-10-CM

## 2022-09-01 DIAGNOSIS — Z00.00 ENCOUNTER FOR ANNUAL HEALTH EXAMINATION: Primary | ICD-10-CM

## 2022-09-01 DIAGNOSIS — H61.23 BILATERAL IMPACTED CERUMEN: ICD-10-CM

## 2022-09-01 DIAGNOSIS — C34.90 EGFR-RELATED LUNG CANCER (HCC): ICD-10-CM

## 2022-09-01 DIAGNOSIS — G89.29 CHRONIC GLUTEAL PAIN: ICD-10-CM

## 2022-09-01 DIAGNOSIS — E55.9 VITAMIN D DEFICIENCY: ICD-10-CM

## 2022-09-01 DIAGNOSIS — M79.18 CHRONIC GLUTEAL PAIN: ICD-10-CM

## 2022-09-01 DIAGNOSIS — M79.89 LEG SWELLING: ICD-10-CM

## 2022-09-01 DIAGNOSIS — C34.81 MALIGNANT NEOPLASM OF OVERLAPPING SITES OF RIGHT LUNG (HCC): Primary | ICD-10-CM

## 2022-09-01 DIAGNOSIS — C85.10 LOW GRADE B-CELL LYMPHOMA (HCC): ICD-10-CM

## 2022-09-01 PROCEDURE — G0439 PPPS, SUBSEQ VISIT: HCPCS | Performed by: FAMILY MEDICINE

## 2022-09-01 RX ORDER — DILTIAZEM HYDROCHLORIDE 180 MG/1
180 CAPSULE, EXTENDED RELEASE ORAL DAILY
COMMUNITY

## 2022-09-01 RX ORDER — HYDROCODONE BITARTRATE AND ACETAMINOPHEN 5; 325 MG/1; MG/1
1 TABLET ORAL EVERY 8 HOURS PRN
Qty: 20 TABLET | Refills: 0 | Status: SHIPPED | OUTPATIENT
Start: 2022-09-01

## 2022-09-01 RX ORDER — DILTIAZEM HYDROCHLORIDE 240 MG/1
240 CAPSULE, EXTENDED RELEASE ORAL DAILY
Qty: 90 CAPSULE | Refills: 3 | Status: SHIPPED | OUTPATIENT
Start: 2022-09-01 | End: 2022-09-01

## 2022-09-06 ENCOUNTER — LAB ENCOUNTER (OUTPATIENT)
Dept: LAB | Age: 82
End: 2022-09-06
Attending: INTERNAL MEDICINE
Payer: MEDICARE

## 2022-09-06 DIAGNOSIS — J47.1 BRONCHIECTASIS WITH (ACUTE) EXACERBATION (HCC): ICD-10-CM

## 2022-09-06 PROCEDURE — 87070 CULTURE OTHR SPECIMN AEROBIC: CPT

## 2022-09-06 PROCEDURE — 87188 SC STD MACROBROTH DIL METH: CPT

## 2022-09-06 PROCEDURE — 87015 SPECIMEN INFECT AGNT CONCNTJ: CPT

## 2022-09-06 PROCEDURE — 87158 CULTURE TYPING ADDED METHOD: CPT

## 2022-09-06 PROCEDURE — 87205 SMEAR GRAM STAIN: CPT

## 2022-09-06 PROCEDURE — 87116 MYCOBACTERIA CULTURE: CPT

## 2022-09-06 PROCEDURE — 87186 SC STD MICRODIL/AGAR DIL: CPT

## 2022-09-19 ENCOUNTER — HOSPITAL ENCOUNTER (OUTPATIENT)
Age: 82
Discharge: HOME | End: 2022-09-19

## 2022-09-19 VITALS
DIASTOLIC BLOOD PRESSURE: 61 MMHG | RESPIRATION RATE: 18 BRPM | HEART RATE: 70 BPM | TEMPERATURE: 98 F | SYSTOLIC BLOOD PRESSURE: 140 MMHG | OXYGEN SATURATION: 94 %

## 2022-09-19 DIAGNOSIS — R68.89 FLU-LIKE SYMPTOMS: ICD-10-CM

## 2022-09-19 DIAGNOSIS — U07.1 LAB TEST POSITIVE FOR DETECTION OF COVID-19 VIRUS: Primary | ICD-10-CM

## 2022-09-19 LAB — SARS-COV-2 RNA RESP QL NAA+PROBE: DETECTED

## 2022-09-19 PROCEDURE — 99213 OFFICE O/P EST LOW 20 MIN: CPT

## 2022-09-19 NOTE — ED INITIAL ASSESSMENT (HPI)
Pt here c/o cough since last night. Pt's son tested positive for covid on Sat. Also c/o h/a, runny nose.

## 2022-09-22 ENCOUNTER — TELEMEDICINE (OUTPATIENT)
Dept: FAMILY MEDICINE CLINIC | Facility: CLINIC | Age: 82
End: 2022-09-22

## 2022-09-22 DIAGNOSIS — U07.1 COVID: Primary | ICD-10-CM

## 2022-09-22 PROCEDURE — 99213 OFFICE O/P EST LOW 20 MIN: CPT | Performed by: FAMILY MEDICINE

## 2022-09-29 ENCOUNTER — TELEPHONE (OUTPATIENT)
Facility: CLINIC | Age: 82
End: 2022-09-29

## 2022-09-29 NOTE — TELEPHONE ENCOUNTER
Glad she is doing well post COVID  Will make sure she is taking the Flonase 2 puffs a day each nostril and would add saline nasal spray or her rinsing if she is not doing that already  Okay to use albuterol as needed to see if that helps with the congestion  Hopefully will improve in short order

## 2022-09-29 NOTE — TELEPHONE ENCOUNTER
Pt and son and tested positive on 9/18. They got the antibodies on 9/20. Would like to let tz know. Pt doing better. Residual cough. Is there anything she can take for throat congestion?

## 2022-10-04 ENCOUNTER — TELEPHONE (OUTPATIENT)
Facility: CLINIC | Age: 82
End: 2022-10-04

## 2022-10-04 DIAGNOSIS — J47.9 BRONCHIECTASIS WITHOUT COMPLICATION (HCC): Primary | ICD-10-CM

## 2022-10-04 RX ORDER — MONTELUKAST SODIUM 10 MG/1
TABLET ORAL
Qty: 90 TABLET | Refills: 0 | Status: SHIPPED | OUTPATIENT
Start: 2022-10-04

## 2022-10-04 RX ORDER — SODIUM CHLORIDE FOR INHALATION 3 %
3 VIAL, NEBULIZER (ML) INHALATION AS NEEDED
Qty: 30 EACH | Refills: 5 | Status: SHIPPED | OUTPATIENT
Start: 2022-10-04

## 2022-10-04 RX ORDER — AZELASTINE 1 MG/ML
1 SPRAY, METERED NASAL 2 TIMES DAILY
Qty: 1 EACH | Refills: 5 | Status: SHIPPED | OUTPATIENT
Start: 2022-10-04

## 2022-10-04 NOTE — TELEPHONE ENCOUNTER
Patient is feeling gradually better continues with sinus congestion and mild cough productive of clear to white-no significant dyspnea  Sputum culture reviewed--BILL-and normal respiratory erica-we will review with micro for sensitivities---.to set up   Plan to continue flutter valve, add vest, add saline nasal nebulizer treatments, continue Flonase, add Astelin and continue Mucinex  To see me in follow-up

## 2022-10-05 ENCOUNTER — TELEPHONE (OUTPATIENT)
Facility: CLINIC | Age: 82
End: 2022-10-05

## 2022-10-10 ENCOUNTER — TELEPHONE (OUTPATIENT)
Facility: CLINIC | Age: 82
End: 2022-10-10

## 2022-10-10 RX ORDER — SODIUM CHLORIDE FOR INHALATION 3 %
15 VIAL, NEBULIZER (ML) INHALATION AS NEEDED
Qty: 30 EACH | Refills: 5 | Status: SHIPPED | OUTPATIENT
Start: 2022-10-10

## 2022-10-10 NOTE — TELEPHONE ENCOUNTER
TZ ordered 4ml 3% sodium chloride neb medication and no pharmacy has it, they only have 15ml. Can she use this?   Also-  Does her ins cover smart vest?

## 2022-10-10 NOTE — TELEPHONE ENCOUNTER
Spoke with son. 4ml vials of NACL 3% on backorder. Only 15ml available. Needs a new order.  Advised son to talk with pharmacist about multiple use of vial.  Order pended of 15ml vial for your signature

## 2022-10-11 ENCOUNTER — TELEPHONE (OUTPATIENT)
Facility: CLINIC | Age: 82
End: 2022-10-11

## 2022-10-11 NOTE — TELEPHONE ENCOUNTER
Questions regarding saline solution. They did not get the correct dosage at first but will be getting the correct dosage today. Insurance is not covering this. Is there something else that she can take that her insurance covers?

## 2022-10-11 NOTE — TELEPHONE ENCOUNTER
Had a lengthy phone conversation with son. 3% NaCl for nebulizer is not covered and only 15ml. Per Pharnacist, pt cannot save and reuse the 10ml remaining in larger vial.  [address] has the recommended vials and informed pt after viewing that it is what TZ ordered. He will call insurance and find out if there is a covered alternative. Son states that Claritin at bedtime is helping decrease cough and they will try different times of day to take to further evaluate effectiveness. Also discussed the use of NeilMed sinus rinse as son states that pt has drainage. Reviewed with son how to use. He will call with any additional questions.

## 2022-10-11 NOTE — TELEPHONE ENCOUNTER
Received a fax that they need more information to process order for Smart Vest.  TZ notified and left on her desk to complete . Attempted to update son on status of order but mailbox full.

## 2022-10-11 NOTE — TELEPHONE ENCOUNTER
[facility] and Certificate of Medical Necessity completed and faxed along with Clinicals to [phone].

## 2022-10-13 ENCOUNTER — TELEPHONE (OUTPATIENT)
Facility: CLINIC | Age: 82
End: 2022-10-13

## 2022-10-13 NOTE — TELEPHONE ENCOUNTER
Pt unable to get 3% neb solution at the pharmacy, they don't have it and to go to another pharmacy it isn't covered and would cost her $60 for the month. She ordered some off [address], but if she's unable to get it from the pharmacy does she really need it? Also has questions about the smart vest that was ordered.

## 2022-10-15 ENCOUNTER — APPOINTMENT (OUTPATIENT)
Dept: GENERAL RADIOLOGY | Age: 82
End: 2022-10-15
Attending: PHYSICIAN ASSISTANT
Payer: MEDICARE

## 2022-10-15 ENCOUNTER — HOSPITAL ENCOUNTER (EMERGENCY)
Age: 82
Discharge: HOME | End: 2022-10-15
Payer: MEDICARE

## 2022-10-15 VITALS
OXYGEN SATURATION: 97 % | HEIGHT: 59 IN | SYSTOLIC BLOOD PRESSURE: 163 MMHG | RESPIRATION RATE: 18 BRPM | BODY MASS INDEX: 26.41 KG/M2 | HEART RATE: 75 BPM | WEIGHT: 131 LBS | TEMPERATURE: 97 F | DIASTOLIC BLOOD PRESSURE: 34 MMHG

## 2022-10-15 DIAGNOSIS — R09.82 POST-NASAL DRIP: Primary | ICD-10-CM

## 2022-10-15 DIAGNOSIS — R05.3 CHRONIC COUGH: ICD-10-CM

## 2022-10-15 PROCEDURE — 99283 EMERGENCY DEPT VISIT LOW MDM: CPT

## 2022-10-15 PROCEDURE — 71046 X-RAY EXAM CHEST 2 VIEWS: CPT | Performed by: PHYSICIAN ASSISTANT

## 2022-10-15 NOTE — ED INITIAL ASSESSMENT (HPI)
States dx with covid one month ago. States since that time has had post nasal drainage and runny nose since. Taking antihistamines but are not helping.  No cesilia

## 2022-10-17 ENCOUNTER — OFFICE VISIT (OUTPATIENT)
Facility: CLINIC | Age: 82
End: 2022-10-17
Payer: MEDICARE

## 2022-10-17 VITALS
SYSTOLIC BLOOD PRESSURE: 130 MMHG | BODY MASS INDEX: 27.21 KG/M2 | RESPIRATION RATE: 16 BRPM | OXYGEN SATURATION: 96 % | WEIGHT: 135 LBS | DIASTOLIC BLOOD PRESSURE: 62 MMHG | HEART RATE: 66 BPM | HEIGHT: 59 IN

## 2022-10-17 DIAGNOSIS — U09.9 POST COVID-19 CONDITION, UNSPECIFIED: Primary | ICD-10-CM

## 2022-10-17 RX ORDER — RIVAROXABAN 20 MG/1
TABLET, FILM COATED ORAL
Qty: 90 TABLET | Refills: 3 | Status: SHIPPED | OUTPATIENT
Start: 2022-10-17

## 2022-10-17 NOTE — PATIENT INSTRUCTIONS
-Plan: -- continue astelin nasal spray           -- continue flonase inhaler nasal spray            -- continue saline nasal rinse            -- continue claritin -every night            -- continue singulair --   Plan to get CT prior to seeing  Dr Tom Camacho   See me -- in December as planned -    -    Alvarado Armendariz MD  Pulmonary Medicine  10/17/2022

## 2022-10-19 ENCOUNTER — PATIENT MESSAGE (OUTPATIENT)
Facility: CLINIC | Age: 82
End: 2022-10-19

## 2022-10-19 RX ORDER — BENZONATATE 100 MG/1
100 CAPSULE ORAL 3 TIMES DAILY PRN
Qty: 45 CAPSULE | Refills: 1 | Status: SHIPPED | OUTPATIENT
Start: 2022-10-19

## 2022-10-19 NOTE — TELEPHONE ENCOUNTER
Consider adding sinus rinse daily if able   To trial tessalon perles   Plan for short course of steroids if not better

## 2022-10-19 NOTE — TELEPHONE ENCOUNTER
Call to patient. I went over meds in Plan of last office visit 10/17/22. Pt states she is taking all of these meds. Pt denies any knew symptoms since last office visit. Patient states her cough has gotten worse the last 2 nights, has had to sit up in bed to sleep. Pt would like to know if there is anything else she could take for cough.

## 2022-10-21 ENCOUNTER — OFFICE VISIT (OUTPATIENT)
Dept: FAMILY MEDICINE CLINIC | Facility: CLINIC | Age: 82
End: 2022-10-21
Payer: MEDICARE

## 2022-10-21 VITALS
RESPIRATION RATE: 16 BRPM | HEART RATE: 69 BPM | DIASTOLIC BLOOD PRESSURE: 74 MMHG | BODY MASS INDEX: 26.41 KG/M2 | SYSTOLIC BLOOD PRESSURE: 134 MMHG | OXYGEN SATURATION: 94 % | WEIGHT: 131 LBS | HEIGHT: 59 IN

## 2022-10-21 DIAGNOSIS — R09.81 SINUS CONGESTION: Primary | ICD-10-CM

## 2022-10-21 DIAGNOSIS — R05.2 SUBACUTE COUGH: ICD-10-CM

## 2022-10-21 PROCEDURE — 99213 OFFICE O/P EST LOW 20 MIN: CPT | Performed by: FAMILY MEDICINE

## 2022-10-21 RX ORDER — OMEPRAZOLE 20 MG/1
CAPSULE, DELAYED RELEASE ORAL
Qty: 60 CAPSULE | Refills: 0 | Status: SHIPPED | OUTPATIENT
Start: 2022-10-21 | End: 2022-10-22

## 2022-10-21 RX ORDER — PREDNISONE 20 MG/1
TABLET ORAL
Qty: 13 TABLET | Refills: 0 | Status: SHIPPED | OUTPATIENT
Start: 2022-10-21

## 2022-10-21 NOTE — PATIENT INSTRUCTIONS
Mucinex DM 600mg every 8 hrs  Prednisone as prescribed. Omeprazole as prescribed.   Benzonatate 200mg as needed every 8 hrs  If not better in 5 days then will start antibiotics

## 2022-10-22 RX ORDER — OMEPRAZOLE 20 MG/1
CAPSULE, DELAYED RELEASE ORAL
Qty: 117 CAPSULE | Refills: 0 | Status: SHIPPED | OUTPATIENT
Start: 2022-10-22

## 2022-10-24 ENCOUNTER — TELEPHONE (OUTPATIENT)
Dept: FAMILY MEDICINE CLINIC | Facility: CLINIC | Age: 82
End: 2022-10-24

## 2022-10-25 ENCOUNTER — TELEPHONE (OUTPATIENT)
Facility: CLINIC | Age: 82
End: 2022-10-25

## 2022-10-25 ENCOUNTER — PATIENT MESSAGE (OUTPATIENT)
Dept: FAMILY MEDICINE CLINIC | Facility: CLINIC | Age: 82
End: 2022-10-25

## 2022-10-25 RX ORDER — CEFUROXIME AXETIL 500 MG/1
500 TABLET ORAL 2 TIMES DAILY
Qty: 14 TABLET | Refills: 0 | Status: SHIPPED | OUTPATIENT
Start: 2022-10-25

## 2022-10-25 NOTE — TELEPHONE ENCOUNTER
Per Dr. Charity Espana, Please call and ask her to begin antibioitc now - call if not better   Add sinus rinse twic a day if able  . Pt notified that Rx for cefuroxime sent to pharmacy and she should start taking. Pt also instructed to use sinus rinse. Explained to pt how to mix saline packet in bottle with distilled water. Pt asking if she can use table salt. Pt states that she used to use table salt as a young adult for her sinus issues. Advised pt only to use the packets specifically for sinus rinsing. Pt advised to ask pharmacist if she cannot locate in the store.

## 2022-10-25 NOTE — TELEPHONE ENCOUNTER
Patient is still having sinus issues. Doing everything you suggested. On Predisone as well  Getting a vest on Thursday.   Mucus is turning yellowish-green and thick

## 2022-10-28 NOTE — TELEPHONE ENCOUNTER
Pt is calling because she is using the medication prescribed for the MAC infection you provided but Dr. Chapito Juarez is suggesting she follow it up with a z-fernando now just in case. Please contact pt to advise/discuss.

## 2022-10-28 NOTE — TELEPHONE ENCOUNTER
Reviewed- some better with augmentin   Less cough and now yellow and less   To get vest in am   To complete current antibiotic and follow

## 2022-10-30 ENCOUNTER — HOSPITAL ENCOUNTER (OUTPATIENT)
Dept: CT IMAGING | Age: 82
Discharge: HOME | End: 2022-10-30
Attending: INTERNAL MEDICINE
Payer: MEDICARE

## 2022-10-30 ENCOUNTER — HOSPITAL ENCOUNTER (OUTPATIENT)
Dept: CT IMAGING | Age: 82
End: 2022-10-30
Attending: INTERNAL MEDICINE
Payer: MEDICARE

## 2022-10-30 DIAGNOSIS — C85.10 LOW GRADE B-CELL LYMPHOMA (HCC): ICD-10-CM

## 2022-10-30 DIAGNOSIS — C34.81 MALIGNANT NEOPLASM OF OVERLAPPING SITES OF RIGHT LUNG (HCC): ICD-10-CM

## 2022-10-30 DIAGNOSIS — C34.90 EGFR-RELATED LUNG CANCER (HCC): ICD-10-CM

## 2022-10-30 PROCEDURE — 71250 CT THORAX DX C-: CPT | Performed by: INTERNAL MEDICINE

## 2022-10-30 PROCEDURE — 74176 CT ABD & PELVIS W/O CONTRAST: CPT | Performed by: INTERNAL MEDICINE

## 2022-11-01 ENCOUNTER — OFFICE VISIT (OUTPATIENT)
Dept: HEMATOLOGY/ONCOLOGY | Age: 82
End: 2022-11-01
Attending: INTERNAL MEDICINE
Payer: MEDICARE

## 2022-11-01 VITALS
DIASTOLIC BLOOD PRESSURE: 82 MMHG | OXYGEN SATURATION: 95 % | SYSTOLIC BLOOD PRESSURE: 148 MMHG | HEIGHT: 59.02 IN | WEIGHT: 131 LBS | BODY MASS INDEX: 26.41 KG/M2 | HEART RATE: 80 BPM | TEMPERATURE: 98 F

## 2022-11-01 DIAGNOSIS — R05.3 CHRONIC COUGH: ICD-10-CM

## 2022-11-01 DIAGNOSIS — R93.89 ABNORMAL CT OF THE CHEST: ICD-10-CM

## 2022-11-01 DIAGNOSIS — D64.9 NORMOCYTIC ANEMIA: ICD-10-CM

## 2022-11-01 DIAGNOSIS — A31.0 MAI (MYCOBACTERIUM AVIUM-INTRACELLULARE) (HCC): Primary | ICD-10-CM

## 2022-11-01 DIAGNOSIS — C85.10 LOW GRADE B-CELL LYMPHOMA (HCC): ICD-10-CM

## 2022-11-01 DIAGNOSIS — C34.81 MALIGNANT NEOPLASM OF OVERLAPPING SITES OF RIGHT LUNG (HCC): ICD-10-CM

## 2022-11-01 DIAGNOSIS — D47.2 MONOCLONAL PARAPROTEINEMIA: ICD-10-CM

## 2022-11-01 LAB
ALBUMIN SERPL-MCNC: 2.6 G/DL (ref 3.4–5)
ALBUMIN/GLOB SERPL: 0.7 {RATIO} (ref 1–2)
ALP LIVER SERPL-CCNC: 115 U/L
ALT SERPL-CCNC: 26 U/L
ANION GAP SERPL CALC-SCNC: 4 MMOL/L (ref 0–18)
AST SERPL-CCNC: 20 U/L (ref 15–37)
BASOPHILS # BLD AUTO: 0.04 X10(3) UL (ref 0–0.2)
BASOPHILS NFR BLD AUTO: 0.3 %
BILIRUB SERPL-MCNC: 0.4 MG/DL (ref 0.1–2)
BUN BLD-MCNC: 30 MG/DL (ref 7–18)
CALCIUM BLD-MCNC: 9 MG/DL (ref 8.5–10.1)
CHLORIDE SERPL-SCNC: 115 MMOL/L (ref 98–112)
CO2 SERPL-SCNC: 25 MMOL/L (ref 21–32)
CREAT BLD-MCNC: 1.45 MG/DL
EOSINOPHIL # BLD AUTO: 0.12 X10(3) UL (ref 0–0.7)
EOSINOPHIL NFR BLD AUTO: 0.9 %
ERYTHROCYTE [DISTWIDTH] IN BLOOD BY AUTOMATED COUNT: 14 %
FASTING STATUS PATIENT QL REPORTED: NO
FOLATE SERPL-MCNC: 16.3 NG/ML (ref 8.7–?)
GFR SERPLBLD BASED ON 1.73 SQ M-ARVRAT: 36 ML/MIN/1.73M2 (ref 60–?)
GLOBULIN PLAS-MCNC: 4 G/DL (ref 2.8–4.4)
GLUCOSE BLD-MCNC: 111 MG/DL (ref 70–99)
HCT VFR BLD AUTO: 32.7 %
HGB BLD-MCNC: 10.3 G/DL
HGB RETIC QN AUTO: 35.5 PG (ref 28.2–36.6)
IGA SERPL-MCNC: 70.1 MG/DL (ref 70–312)
IGM SERPL-MCNC: 267 MG/DL (ref 43–279)
IMM GRANULOCYTES # BLD AUTO: 0.19 X10(3) UL (ref 0–1)
IMM GRANULOCYTES NFR BLD: 1.4 %
IMM RETICS NFR: 0.17 RATIO (ref 0.1–0.3)
IMMUNOGLOBULIN PNL SER-MCNC: 421 MG/DL (ref 791–1643)
LDH SERPL L TO P-CCNC: 207 U/L
LYMPHOCYTES # BLD AUTO: 0.67 X10(3) UL (ref 1–4)
LYMPHOCYTES NFR BLD AUTO: 5.1 %
MCH RBC QN AUTO: 31.9 PG (ref 26–34)
MCHC RBC AUTO-ENTMCNC: 31.5 G/DL (ref 31–37)
MCV RBC AUTO: 101.2 FL
MONOCYTES # BLD AUTO: 0.81 X10(3) UL (ref 0.1–1)
MONOCYTES NFR BLD AUTO: 6.1 %
NEUTROPHILS # BLD AUTO: 11.41 X10 (3) UL (ref 1.5–7.7)
NEUTROPHILS # BLD AUTO: 11.41 X10(3) UL (ref 1.5–7.7)
NEUTROPHILS NFR BLD AUTO: 86.2 %
OSMOLALITY SERPL CALC.SUM OF ELEC: 305 MOSM/KG (ref 275–295)
PLATELET # BLD AUTO: 243 10(3)UL (ref 150–450)
POTASSIUM SERPL-SCNC: 3.8 MMOL/L (ref 3.5–5.1)
PROT SERPL-MCNC: 6.6 G/DL (ref 6.4–8.2)
RBC # BLD AUTO: 3.23 X10(6)UL
RETICS # AUTO: 48.8 X10(3) UL (ref 22.5–147.5)
RETICS/RBC NFR AUTO: 1.5 %
SODIUM SERPL-SCNC: 144 MMOL/L (ref 136–145)
VIT B12 SERPL-MCNC: 578 PG/ML (ref 193–986)
WBC # BLD AUTO: 13.2 X10(3) UL (ref 4–11)

## 2022-11-01 PROCEDURE — 99215 OFFICE O/P EST HI 40 MIN: CPT | Performed by: INTERNAL MEDICINE

## 2022-11-01 NOTE — PROGRESS NOTES
Pt here for 3mth MD fu visit. Productive cough, sinus congestion. Increase fatigue  Ceftin twice a day, last dose today. Completed Prednisone  Had CT scan done 10/30/22.

## 2022-11-03 LAB
ALBUMIN SERPL ELPH-MCNC: 2.83 G/DL (ref 3.75–5.21)
ALBUMIN/GLOB SERPL: 0.95 {RATIO} (ref 1–2)
ALPHA1 GLOB SERPL ELPH-MCNC: 0.5 G/DL (ref 0.19–0.46)
ALPHA2 GLOB SERPL ELPH-MCNC: 1.14 G/DL (ref 0.48–1.05)
B-GLOBULIN SERPL ELPH-MCNC: 0.67 G/DL (ref 0.68–1.23)
GAMMA GLOB SERPL ELPH-MCNC: 0.66 G/DL (ref 0.62–1.7)
KAPPA LC FREE SER-MCNC: 3.1 MG/DL (ref 0.33–1.94)
KAPPA LC FREE/LAMBDA FREE SER NEPH: 0.54 {RATIO} (ref 0.26–1.65)
LAMBDA LC FREE SERPL-MCNC: 5.69 MG/DL (ref 0.57–2.63)
M PROTEIN 1 SERPL ELPH-MCNC: 0.28 G/DL (ref ?–0)
PROT SERPL-MCNC: 5.8 G/DL (ref 6.4–8.2)

## 2022-11-08 NOTE — TELEPHONE ENCOUNTER
Patient called and stated she was to follow up with [employer] (from [address] [place] [address]) regarding her BP. No one ever called her. She said she left a message for a call back from Triage. There is not a telephone encounter I can see.     Can we please touchbase with donato
See previous message Pt states BP yesterday 121/71. And today 135/77 and 113/65. Pt states no sx  And feeling fine. Pt states off Cardizem but remains on all other meds.
Spoke to patient. Based on these readings can continue the same regimen of metoprolol, amlodipine, and losartan. Continue to monitor and call with any abnormal readings or symptoms.
show

## 2022-11-13 ENCOUNTER — OFFICE VISIT (OUTPATIENT)
Dept: FAMILY MEDICINE CLINIC | Facility: CLINIC | Age: 82
End: 2022-11-13
Payer: MEDICARE

## 2022-11-13 VITALS
RESPIRATION RATE: 18 BRPM | HEART RATE: 76 BPM | SYSTOLIC BLOOD PRESSURE: 140 MMHG | BODY MASS INDEX: 26.61 KG/M2 | OXYGEN SATURATION: 95 % | HEIGHT: 59 IN | TEMPERATURE: 97 F | WEIGHT: 132 LBS | DIASTOLIC BLOOD PRESSURE: 70 MMHG

## 2022-11-13 DIAGNOSIS — R05.3 PERSISTENT COUGH: Primary | ICD-10-CM

## 2022-11-13 PROCEDURE — 99213 OFFICE O/P EST LOW 20 MIN: CPT | Performed by: NURSE PRACTITIONER

## 2022-11-14 ENCOUNTER — TELEPHONE (OUTPATIENT)
Facility: CLINIC | Age: 82
End: 2022-11-14

## 2022-11-14 NOTE — TELEPHONE ENCOUNTER
Pt states she was to be brought up at [facility] and wanted a follow-up call to discuss the outcome please.

## 2022-11-15 NOTE — TELEPHONE ENCOUNTER
Phone call to patient  Overall feeling better cough is better remains sinus congestion and plans to see Dr. Juan Hernández after a CT of the sinuses. Breathing remains comfortable  CT [facility] at [facility]--ongoing waxing and waning of nodules largest 1 had resolved--compatible with underlying suspected BILL/bronchiectatic changes. --Chronic right pleural effusion is stable from 6 months ago increased from 1 year prior.     Scheduled for follow-up CT

## 2022-12-04 ENCOUNTER — OFFICE VISIT (OUTPATIENT)
Dept: FAMILY MEDICINE CLINIC | Facility: CLINIC | Age: 82
End: 2022-12-04
Payer: MEDICARE

## 2022-12-04 VITALS
BODY MASS INDEX: 26.81 KG/M2 | OXYGEN SATURATION: 95 % | RESPIRATION RATE: 16 BRPM | HEART RATE: 65 BPM | TEMPERATURE: 98 F | HEIGHT: 59 IN | DIASTOLIC BLOOD PRESSURE: 60 MMHG | SYSTOLIC BLOOD PRESSURE: 138 MMHG | WEIGHT: 133 LBS

## 2022-12-04 DIAGNOSIS — Z85.118 HISTORY OF LUNG CANCER: ICD-10-CM

## 2022-12-04 DIAGNOSIS — R05.3 CHRONIC COUGH: Primary | ICD-10-CM

## 2022-12-04 PROCEDURE — 99213 OFFICE O/P EST LOW 20 MIN: CPT | Performed by: NURSE PRACTITIONER

## 2022-12-08 ENCOUNTER — OFFICE VISIT (OUTPATIENT)
Facility: CLINIC | Age: 82
End: 2022-12-08
Payer: MEDICARE

## 2022-12-08 VITALS
HEART RATE: 63 BPM | BODY MASS INDEX: 26.81 KG/M2 | WEIGHT: 133 LBS | OXYGEN SATURATION: 96 % | DIASTOLIC BLOOD PRESSURE: 70 MMHG | SYSTOLIC BLOOD PRESSURE: 128 MMHG | RESPIRATION RATE: 16 BRPM | HEIGHT: 59 IN

## 2022-12-08 DIAGNOSIS — A31.0 MAI (MYCOBACTERIUM AVIUM-INTRACELLULARE) (HCC): Primary | ICD-10-CM

## 2022-12-08 PROCEDURE — 99214 OFFICE O/P EST MOD 30 MIN: CPT | Performed by: INTERNAL MEDICINE

## 2022-12-08 RX ORDER — CEFDINIR 300 MG/1
CAPSULE ORAL
COMMUNITY
Start: 2022-12-05

## 2022-12-08 NOTE — PATIENT INSTRUCTIONS
Plan:  -same medications -   -OK to use mucinex-   -see me in 6 months after [address] Gretel Barlow MD  Pulmonary Medicine  12/8/2022

## 2022-12-09 DIAGNOSIS — R27.8 WORSENED HANDWRITING: Primary | ICD-10-CM

## 2022-12-09 DIAGNOSIS — J47.9 BRONCHIECTASIS WITHOUT COMPLICATION (HCC): ICD-10-CM

## 2022-12-29 RX ORDER — MONTELUKAST SODIUM 10 MG/1
TABLET ORAL
Qty: 90 TABLET | Refills: 3 | Status: SHIPPED | OUTPATIENT
Start: 2022-12-29

## 2023-01-03 ENCOUNTER — TELEPHONE (OUTPATIENT)
Dept: HEMATOLOGY/ONCOLOGY | Facility: HOSPITAL | Age: 83
End: 2023-01-03

## 2023-01-03 ENCOUNTER — LAB ENCOUNTER (OUTPATIENT)
Dept: LAB | Age: 83
End: 2023-01-03
Attending: INTERNAL MEDICINE
Payer: MEDICARE

## 2023-01-03 DIAGNOSIS — D63.0 ANEMIA COMPLICATING NEOPLASTIC DISEASE: Primary | ICD-10-CM

## 2023-01-03 DIAGNOSIS — Z00.00 ENCOUNTER FOR ANNUAL HEALTH EXAMINATION: ICD-10-CM

## 2023-01-03 DIAGNOSIS — D63.0 ANEMIA COMPLICATING NEOPLASTIC DISEASE: ICD-10-CM

## 2023-01-03 DIAGNOSIS — E55.9 VITAMIN D DEFICIENCY: ICD-10-CM

## 2023-01-03 LAB
BASOPHILS # BLD AUTO: 0.05 X10(3) UL (ref 0–0.2)
BASOPHILS NFR BLD AUTO: 0.8 %
DEPRECATED HBV CORE AB SER IA-ACNC: 115.3 NG/ML
EOSINOPHIL # BLD AUTO: 0.21 X10(3) UL (ref 0–0.7)
EOSINOPHIL NFR BLD AUTO: 3.2 %
ERYTHROCYTE [DISTWIDTH] IN BLOOD BY AUTOMATED COUNT: 14.6 %
HCT VFR BLD AUTO: 34.8 %
HGB BLD-MCNC: 10.9 G/DL
IMM GRANULOCYTES # BLD AUTO: 0.04 X10(3) UL (ref 0–1)
IMM GRANULOCYTES NFR BLD: 0.6 %
IRON SATN MFR SERPL: 32 %
IRON SERPL-MCNC: 110 UG/DL
LYMPHOCYTES # BLD AUTO: 1.05 X10(3) UL (ref 1–4)
LYMPHOCYTES NFR BLD AUTO: 16.1 %
MCH RBC QN AUTO: 33 PG (ref 26–34)
MCHC RBC AUTO-ENTMCNC: 31.3 G/DL (ref 31–37)
MCV RBC AUTO: 105.5 FL
MONOCYTES # BLD AUTO: 0.69 X10(3) UL (ref 0.1–1)
MONOCYTES NFR BLD AUTO: 10.6 %
NEUTROPHILS # BLD AUTO: 4.5 X10 (3) UL (ref 1.5–7.7)
NEUTROPHILS # BLD AUTO: 4.5 X10(3) UL (ref 1.5–7.7)
NEUTROPHILS NFR BLD AUTO: 68.7 %
PLATELET # BLD AUTO: 228 10(3)UL (ref 150–450)
RBC # BLD AUTO: 3.3 X10(6)UL
TIBC SERPL-MCNC: 346 UG/DL (ref 240–450)
TRANSFERRIN SERPL-MCNC: 232 MG/DL (ref 200–360)
TSI SER-ACNC: 2.83 MIU/ML (ref 0.36–3.74)
VIT D+METAB SERPL-MCNC: 46.5 NG/ML (ref 30–100)
WBC # BLD AUTO: 6.5 X10(3) UL (ref 4–11)

## 2023-01-03 PROCEDURE — 84443 ASSAY THYROID STIM HORMONE: CPT

## 2023-01-03 PROCEDURE — 36415 COLL VENOUS BLD VENIPUNCTURE: CPT

## 2023-01-03 PROCEDURE — 85025 COMPLETE CBC W/AUTO DIFF WBC: CPT

## 2023-01-03 PROCEDURE — 83540 ASSAY OF IRON: CPT

## 2023-01-03 PROCEDURE — 82306 VITAMIN D 25 HYDROXY: CPT

## 2023-01-03 PROCEDURE — 83550 IRON BINDING TEST: CPT

## 2023-01-03 PROCEDURE — 82728 ASSAY OF FERRITIN: CPT

## 2023-01-03 NOTE — TELEPHONE ENCOUNTER
Received voicemail from patient. She states she stopped her oral iron pills due to recently being on an antibiotic. She has felt more tired and believes she may be anemic again. Asking if MD would like her to have labs drawn. MD notified.

## 2023-01-03 NOTE — TELEPHONE ENCOUNTER
Spoke with patient. Per MD, patient should have labs drawn. Patient verbalized understanding and will call back at a later time to schedule her lab appointment due to transportation issues.

## 2023-01-23 DIAGNOSIS — C34.81 MALIGNANT NEOPLASM OF OVERLAPPING SITES OF RIGHT LUNG (HCC): Primary | ICD-10-CM

## 2023-01-23 DIAGNOSIS — C85.10 LOW GRADE B-CELL LYMPHOMA (HCC): ICD-10-CM

## 2023-01-28 ENCOUNTER — HOSPITAL ENCOUNTER (OUTPATIENT)
Dept: CT IMAGING | Age: 83
Discharge: HOME | End: 2023-01-28
Attending: INTERNAL MEDICINE
Payer: MEDICARE

## 2023-01-28 DIAGNOSIS — C34.81 MALIGNANT NEOPLASM OF OVERLAPPING SITES OF RIGHT LUNG (HCC): ICD-10-CM

## 2023-01-28 DIAGNOSIS — C85.10 LOW GRADE B-CELL LYMPHOMA (HCC): ICD-10-CM

## 2023-01-28 PROCEDURE — 71250 CT THORAX DX C-: CPT | Performed by: INTERNAL MEDICINE

## 2023-01-28 PROCEDURE — 74176 CT ABD & PELVIS W/O CONTRAST: CPT | Performed by: INTERNAL MEDICINE

## 2023-02-07 ENCOUNTER — OFFICE VISIT (OUTPATIENT)
Dept: HEMATOLOGY/ONCOLOGY | Age: 83
End: 2023-02-07
Attending: INTERNAL MEDICINE
Payer: MEDICARE

## 2023-02-07 VITALS
OXYGEN SATURATION: 97 % | HEART RATE: 77 BPM | SYSTOLIC BLOOD PRESSURE: 150 MMHG | DIASTOLIC BLOOD PRESSURE: 74 MMHG | TEMPERATURE: 97 F | WEIGHT: 131.38 LBS | BODY MASS INDEX: 27 KG/M2

## 2023-02-07 DIAGNOSIS — D64.9 NORMOCYTIC ANEMIA: Primary | ICD-10-CM

## 2023-02-07 DIAGNOSIS — C85.10 LOW GRADE B-CELL LYMPHOMA (HCC): ICD-10-CM

## 2023-02-07 DIAGNOSIS — C34.90 EGFR-RELATED LUNG CANCER (HCC): ICD-10-CM

## 2023-02-07 DIAGNOSIS — A31.0 MAI (MYCOBACTERIUM AVIUM-INTRACELLULARE) (HCC): ICD-10-CM

## 2023-02-07 DIAGNOSIS — D63.0 ANEMIA COMPLICATING NEOPLASTIC DISEASE: ICD-10-CM

## 2023-02-07 DIAGNOSIS — R93.89 ABNORMAL CT OF THE CHEST: ICD-10-CM

## 2023-02-07 DIAGNOSIS — D47.2 MONOCLONAL PARAPROTEINEMIA: ICD-10-CM

## 2023-02-07 DIAGNOSIS — C34.81 MALIGNANT NEOPLASM OF OVERLAPPING SITES OF RIGHT LUNG (HCC): ICD-10-CM

## 2023-02-07 DIAGNOSIS — N18.4 CKD (CHRONIC KIDNEY DISEASE) STAGE 4, GFR 15-29 ML/MIN (HCC): ICD-10-CM

## 2023-02-07 DIAGNOSIS — Z79.899 OTHER LONG TERM (CURRENT) DRUG THERAPY: ICD-10-CM

## 2023-02-07 LAB
ALBUMIN SERPL-MCNC: 3.5 G/DL (ref 3.4–5)
ALBUMIN/GLOB SERPL: 1 {RATIO} (ref 1–2)
ALP LIVER SERPL-CCNC: 128 U/L
ALT SERPL-CCNC: 21 U/L
ANION GAP SERPL CALC-SCNC: 7 MMOL/L (ref 0–18)
AST SERPL-CCNC: 16 U/L (ref 15–37)
BASOPHILS # BLD AUTO: 0.04 X10(3) UL (ref 0–0.2)
BASOPHILS NFR BLD AUTO: 0.6 %
BILIRUB SERPL-MCNC: 0.3 MG/DL (ref 0.1–2)
BUN BLD-MCNC: 42 MG/DL (ref 7–18)
CALCIUM BLD-MCNC: 9.3 MG/DL (ref 8.5–10.1)
CHLORIDE SERPL-SCNC: 113 MMOL/L (ref 98–112)
CO2 SERPL-SCNC: 23 MMOL/L (ref 21–32)
CREAT BLD-MCNC: 2 MG/DL
EOSINOPHIL # BLD AUTO: 0.14 X10(3) UL (ref 0–0.7)
EOSINOPHIL NFR BLD AUTO: 2.3 %
ERYTHROCYTE [DISTWIDTH] IN BLOOD BY AUTOMATED COUNT: 14.3 %
FASTING STATUS PATIENT QL REPORTED: NO
GFR SERPLBLD BASED ON 1.73 SQ M-ARVRAT: 25 ML/MIN/1.73M2 (ref 60–?)
GLOBULIN PLAS-MCNC: 3.4 G/DL (ref 2.8–4.4)
GLUCOSE BLD-MCNC: 108 MG/DL (ref 70–99)
HCT VFR BLD AUTO: 33.3 %
HGB BLD-MCNC: 10.6 G/DL
IGA SERPL-MCNC: 107 MG/DL (ref 70–312)
IGM SERPL-MCNC: 353 MG/DL (ref 43–279)
IMM GRANULOCYTES # BLD AUTO: 0.04 X10(3) UL (ref 0–1)
IMM GRANULOCYTES NFR BLD: 0.6 %
IMMUNOGLOBULIN PNL SER-MCNC: 618 MG/DL (ref 791–1643)
LDH SERPL L TO P-CCNC: 166 U/L
LYMPHOCYTES # BLD AUTO: 0.9 X10(3) UL (ref 1–4)
LYMPHOCYTES NFR BLD AUTO: 14.5 %
MCH RBC QN AUTO: 32.3 PG (ref 26–34)
MCHC RBC AUTO-ENTMCNC: 31.8 G/DL (ref 31–37)
MCV RBC AUTO: 101.5 FL
MONOCYTES # BLD AUTO: 0.53 X10(3) UL (ref 0.1–1)
MONOCYTES NFR BLD AUTO: 8.5 %
NEUTROPHILS # BLD AUTO: 4.56 X10 (3) UL (ref 1.5–7.7)
NEUTROPHILS # BLD AUTO: 4.56 X10(3) UL (ref 1.5–7.7)
NEUTROPHILS NFR BLD AUTO: 73.5 %
OSMOLALITY SERPL CALC.SUM OF ELEC: 307 MOSM/KG (ref 275–295)
PLATELET # BLD AUTO: 223 10(3)UL (ref 150–450)
POTASSIUM SERPL-SCNC: 4.1 MMOL/L (ref 3.5–5.1)
PROT SERPL-MCNC: 6.9 G/DL (ref 6.4–8.2)
RBC # BLD AUTO: 3.28 X10(6)UL
SODIUM SERPL-SCNC: 143 MMOL/L (ref 136–145)
WBC # BLD AUTO: 6.2 X10(3) UL (ref 4–11)

## 2023-02-07 PROCEDURE — 99215 OFFICE O/P EST HI 40 MIN: CPT | Performed by: INTERNAL MEDICINE

## 2023-02-08 ENCOUNTER — TELEPHONE (OUTPATIENT)
Dept: HEMATOLOGY/ONCOLOGY | Facility: HOSPITAL | Age: 83
End: 2023-02-08

## 2023-02-10 LAB
ALBUMIN SERPL ELPH-MCNC: 3.8 G/DL (ref 3.75–5.21)
ALBUMIN/GLOB SERPL: 1.36 {RATIO} (ref 1–2)
ALPHA1 GLOB SERPL ELPH-MCNC: 0.34 G/DL (ref 0.19–0.46)
ALPHA2 GLOB SERPL ELPH-MCNC: 0.88 G/DL (ref 0.48–1.05)
B-GLOBULIN SERPL ELPH-MCNC: 0.71 G/DL (ref 0.68–1.23)
GAMMA GLOB SERPL ELPH-MCNC: 0.86 G/DL (ref 0.62–1.7)
KAPPA LC FREE SER-MCNC: 3.83 MG/DL (ref 0.33–1.94)
KAPPA LC FREE/LAMBDA FREE SER NEPH: 0.83 {RATIO} (ref 0.26–1.65)
LAMBDA LC FREE SERPL-MCNC: 4.61 MG/DL (ref 0.57–2.63)
M PROTEIN 1 SERPL ELPH-MCNC: 0.28 G/DL (ref ?–0)
PROT SERPL-MCNC: 6.6 G/DL (ref 6.4–8.2)

## 2023-02-13 ENCOUNTER — TELEPHONE (OUTPATIENT)
Dept: FAMILY MEDICINE CLINIC | Facility: CLINIC | Age: 83
End: 2023-02-13

## 2023-02-13 NOTE — TELEPHONE ENCOUNTER
Oncologist out of office for 1 wk. Nurse practitioner lowered the xarelto med frrom 20mg to 15mg.   Pt was told to advise Dr. Aris Arana to be sure this is okay

## 2023-02-14 NOTE — TELEPHONE ENCOUNTER
Spoke with patient. States Dr. Dorlene Lundborg is the one that changed the dose. Will be in contact with her office regarding medication and dosage.

## 2023-02-14 NOTE — TELEPHONE ENCOUNTER
If onco NP changed it then that NP should check with the oncologist.  Also what was their reason for changing it? If I know that then I can decide.

## 2023-02-28 ENCOUNTER — TELEPHONE (OUTPATIENT)
Facility: CLINIC | Age: 83
End: 2023-02-28

## 2023-02-28 NOTE — TELEPHONE ENCOUNTER
Pt is going to be shceduling CT sinues per ENT. She states that he has clear nasal discharge put is coughing up some greenish yellow drainage which may be PND. Asking if she should have CT chest also. Found an order for CT chest for Dr. Reji Ordoñez. Pt will try to get both exams on the same day and call us to let us know when it is done.

## 2023-03-01 ENCOUNTER — TELEPHONE (OUTPATIENT)
Facility: CLINIC | Age: 83
End: 2023-03-01

## 2023-03-01 ENCOUNTER — OFFICE VISIT (OUTPATIENT)
Dept: FAMILY MEDICINE CLINIC | Facility: CLINIC | Age: 83
End: 2023-03-01
Payer: MEDICARE

## 2023-03-01 VITALS
RESPIRATION RATE: 18 BRPM | DIASTOLIC BLOOD PRESSURE: 80 MMHG | WEIGHT: 128 LBS | HEART RATE: 76 BPM | OXYGEN SATURATION: 97 % | TEMPERATURE: 98 F | SYSTOLIC BLOOD PRESSURE: 130 MMHG | BODY MASS INDEX: 25.8 KG/M2 | HEIGHT: 59 IN

## 2023-03-01 DIAGNOSIS — J06.9 ACUTE UPPER RESPIRATORY INFECTION: Primary | ICD-10-CM

## 2023-03-01 DIAGNOSIS — J34.89 TENDERNESS OVER FRONTAL SINUS: ICD-10-CM

## 2023-03-01 PROCEDURE — 99214 OFFICE O/P EST MOD 30 MIN: CPT | Performed by: PHYSICIAN ASSISTANT

## 2023-03-01 RX ORDER — AZITHROMYCIN 250 MG/1
TABLET, FILM COATED ORAL
Qty: 6 TABLET | Refills: 0 | Status: SHIPPED | OUTPATIENT
Start: 2023-03-01 | End: 2023-03-06

## 2023-03-01 NOTE — TELEPHONE ENCOUNTER
Correction to message:  Pt wants Dr. Ellis Sharon to know that she was started on abx. Has no questions about CT scan.

## 2023-03-01 NOTE — TELEPHONE ENCOUNTER
Pt was started on a Z-pack by her other physicians and wanted Dr. Nneka Paris to know. Pt is unsure if she should still have her CT just before her June appt though?

## 2023-03-10 ENCOUNTER — HOSPITAL ENCOUNTER (EMERGENCY)
Age: 83
Discharge: HOME | End: 2023-03-10
Attending: EMERGENCY MEDICINE
Payer: MEDICARE

## 2023-03-10 ENCOUNTER — LAB ENCOUNTER (OUTPATIENT)
Dept: LAB | Age: 83
End: 2023-03-10
Attending: PHYSICIAN ASSISTANT
Payer: MEDICARE

## 2023-03-10 ENCOUNTER — OFFICE VISIT (OUTPATIENT)
Dept: FAMILY MEDICINE CLINIC | Facility: CLINIC | Age: 83
End: 2023-03-10
Payer: MEDICARE

## 2023-03-10 ENCOUNTER — PATIENT MESSAGE (OUTPATIENT)
Dept: FAMILY MEDICINE CLINIC | Facility: CLINIC | Age: 83
End: 2023-03-10

## 2023-03-10 VITALS
BODY MASS INDEX: 25.8 KG/M2 | OXYGEN SATURATION: 96 % | WEIGHT: 128 LBS | HEIGHT: 59 IN | RESPIRATION RATE: 16 BRPM | DIASTOLIC BLOOD PRESSURE: 72 MMHG | SYSTOLIC BLOOD PRESSURE: 140 MMHG | TEMPERATURE: 98 F | HEART RATE: 72 BPM

## 2023-03-10 VITALS
RESPIRATION RATE: 18 BRPM | TEMPERATURE: 98 F | BODY MASS INDEX: 25.8 KG/M2 | DIASTOLIC BLOOD PRESSURE: 77 MMHG | WEIGHT: 128 LBS | SYSTOLIC BLOOD PRESSURE: 162 MMHG | HEIGHT: 59 IN | OXYGEN SATURATION: 96 % | HEART RATE: 59 BPM

## 2023-03-10 DIAGNOSIS — N39.0 RECURRENT UTI: ICD-10-CM

## 2023-03-10 DIAGNOSIS — N39.0 URINARY TRACT INFECTION WITHOUT HEMATURIA, SITE UNSPECIFIED: Primary | ICD-10-CM

## 2023-03-10 DIAGNOSIS — R05.3 CHRONIC COUGH: Primary | ICD-10-CM

## 2023-03-10 LAB
BILIRUB UR QL STRIP.AUTO: NEGATIVE
CLARITY UR REFRACT.AUTO: CLEAR
COLOR UR AUTO: YELLOW
GLUCOSE UR STRIP.AUTO-MCNC: NEGATIVE MG/DL
KETONES UR STRIP.AUTO-MCNC: NEGATIVE MG/DL
NITRITE UR QL STRIP.AUTO: NEGATIVE
PH UR STRIP.AUTO: 5 [PH] (ref 5–8)
PROT UR STRIP.AUTO-MCNC: 100 MG/DL
RBC UR QL AUTO: NEGATIVE
SP GR UR STRIP.AUTO: 1.01 (ref 1–1.03)
UROBILINOGEN UR STRIP.AUTO-MCNC: <2 MG/DL

## 2023-03-10 PROCEDURE — 99284 EMERGENCY DEPT VISIT MOD MDM: CPT

## 2023-03-10 PROCEDURE — 87077 CULTURE AEROBIC IDENTIFY: CPT

## 2023-03-10 PROCEDURE — 99214 OFFICE O/P EST MOD 30 MIN: CPT | Performed by: PHYSICIAN ASSISTANT

## 2023-03-10 PROCEDURE — 81001 URINALYSIS AUTO W/SCOPE: CPT

## 2023-03-10 PROCEDURE — 99283 EMERGENCY DEPT VISIT LOW MDM: CPT

## 2023-03-10 PROCEDURE — 87086 URINE CULTURE/COLONY COUNT: CPT

## 2023-03-10 RX ORDER — HYDRALAZINE HYDROCHLORIDE 50 MG/1
TABLET, FILM COATED ORAL
COMMUNITY
Start: 2023-03-01

## 2023-03-10 RX ORDER — CEPHALEXIN 500 MG/1
500 CAPSULE ORAL 2 TIMES DAILY
Qty: 14 CAPSULE | Refills: 0 | Status: SHIPPED | OUTPATIENT
Start: 2023-03-10 | End: 2023-03-17

## 2023-03-10 RX ORDER — TRIAMCINOLONE ACETONIDE 1 MG/G
CREAM TOPICAL 2 TIMES DAILY PRN
Qty: 60 G | Refills: 3 | Status: SHIPPED | OUTPATIENT
Start: 2023-03-10

## 2023-03-10 NOTE — ED INITIAL ASSESSMENT (HPI)
States had follow up appointment today with pcp for cough. Also had urine test. Son saw results in my chart and is concerned that patient may have a uti. Called the primary physician and got the on call doctor and the physician was not helpful.  Would like to have patient reassessed as has hx of uti and sepsis

## 2023-03-11 ENCOUNTER — HOSPITAL ENCOUNTER (OUTPATIENT)
Dept: CT IMAGING | Age: 83
Discharge: HOME | End: 2023-03-11
Attending: OTOLARYNGOLOGY
Payer: MEDICARE

## 2023-03-11 ENCOUNTER — TELEPHONE (OUTPATIENT)
Dept: FAMILY MEDICINE CLINIC | Facility: CLINIC | Age: 83
End: 2023-03-11

## 2023-03-11 DIAGNOSIS — R05.3 CHRONIC COUGH: ICD-10-CM

## 2023-03-11 DIAGNOSIS — R09.82 PND (POST-NASAL DRIP): ICD-10-CM

## 2023-03-11 DIAGNOSIS — J34.89 NASAL OBSTRUCTION: ICD-10-CM

## 2023-03-11 PROCEDURE — 70486 CT MAXILLOFACIAL W/O DYE: CPT | Performed by: OTOLARYNGOLOGY

## 2023-03-13 ENCOUNTER — OFFICE VISIT (OUTPATIENT)
Dept: FAMILY MEDICINE CLINIC | Facility: CLINIC | Age: 83
End: 2023-03-13
Payer: MEDICARE

## 2023-03-13 VITALS
OXYGEN SATURATION: 98 % | SYSTOLIC BLOOD PRESSURE: 148 MMHG | HEART RATE: 76 BPM | RESPIRATION RATE: 16 BRPM | DIASTOLIC BLOOD PRESSURE: 80 MMHG

## 2023-03-13 DIAGNOSIS — N30.00 ACUTE CYSTITIS WITHOUT HEMATURIA: Primary | ICD-10-CM

## 2023-03-13 PROCEDURE — 99213 OFFICE O/P EST LOW 20 MIN: CPT | Performed by: FAMILY MEDICINE

## 2023-03-13 RX ORDER — CEPHALEXIN 500 MG/1
500 CAPSULE ORAL 2 TIMES DAILY
Qty: 14 CAPSULE | Refills: 0 | Status: SHIPPED | OUTPATIENT
Start: 2023-03-13 | End: 2023-03-20

## 2023-03-13 NOTE — TELEPHONE ENCOUNTER
From: Chino Almeida  Sent: 3/12/2023 6:05 PM CDT  To: [facility] Clinical Staff  Subject: Question regarding Nadeem Hills 3/13 at 9:00am.

## 2023-04-10 ENCOUNTER — OFFICE VISIT (OUTPATIENT)
Dept: FAMILY MEDICINE CLINIC | Facility: CLINIC | Age: 83
End: 2023-04-10
Payer: MEDICARE

## 2023-04-10 VITALS
WEIGHT: 129 LBS | RESPIRATION RATE: 16 BRPM | DIASTOLIC BLOOD PRESSURE: 80 MMHG | BODY MASS INDEX: 26 KG/M2 | SYSTOLIC BLOOD PRESSURE: 128 MMHG | OXYGEN SATURATION: 97 % | HEART RATE: 68 BPM | HEIGHT: 59 IN

## 2023-04-10 DIAGNOSIS — Z87.09 HISTORY OF CHRONIC SINUSITIS: Primary | ICD-10-CM

## 2023-04-10 PROCEDURE — 99213 OFFICE O/P EST LOW 20 MIN: CPT | Performed by: FAMILY MEDICINE

## 2023-04-30 ENCOUNTER — PATIENT MESSAGE (OUTPATIENT)
Facility: CLINIC | Age: 83
End: 2023-04-30

## 2023-05-01 ENCOUNTER — TELEPHONE (OUTPATIENT)
Dept: HEMATOLOGY/ONCOLOGY | Facility: HOSPITAL | Age: 83
End: 2023-05-01

## 2023-05-01 ENCOUNTER — TELEPHONE (OUTPATIENT)
Dept: FAMILY MEDICINE CLINIC | Facility: CLINIC | Age: 83
End: 2023-05-01

## 2023-05-01 NOTE — TELEPHONE ENCOUNTER
Pt went to [facility] over the weekend and had CXR. 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 She is calling [facility] to have her CXR re-read. Pt took Colome Darter yesterday but not today and has not started Cefdinir. Pt would like to know Dr. Tin Wesley opinion. Advised pt that Dr. Mario Johnson will not be able to see [facility]'s images of CXR. Pt will call back after she finds out what the second reading of CXR shows. Please advise.

## 2023-05-01 NOTE — TELEPHONE ENCOUNTER
Patient called. She was in the ER this weekend. She has another lung infection. She would like to speak to Dr Edison Gonzalez regarding her condition. Please call.

## 2023-05-02 NOTE — TELEPHONE ENCOUNTER
Phone call to patient--went to urgent care for slight increase in a.m. cough--denies any symptoms suggestive of pneumonia. Unable to view films  Discussed at length patient will complete Z-Melvin call with any changes.   Encouraged to call with concerns

## 2023-05-03 ENCOUNTER — TELEPHONE (OUTPATIENT)
Facility: CLINIC | Age: 83
End: 2023-05-03

## 2023-05-03 ENCOUNTER — TELEPHONE (OUTPATIENT)
Dept: HEMATOLOGY/ONCOLOGY | Facility: HOSPITAL | Age: 83
End: 2023-05-03

## 2023-05-03 NOTE — TELEPHONE ENCOUNTER
Pt has some questions for nurse/dr. She is wondering if she needs to have a CXR before she comes in to see Dr. Eliecer Hughes on Friday.

## 2023-05-03 NOTE — TELEPHONE ENCOUNTER
Per Dr. Paulie Downing, pt does not need a CXR prior to her visit on Friday. Pt wants to cancel appt on Friday 5-5-23 and will follow up with Dr. Paulie Downing on June 9th as scheduled. Appt cancelled Per PSR.

## 2023-05-03 NOTE — TELEPHONE ENCOUNTER
Patient called went to [facility] on Sunday was told that they see an infection was told it was pneumonia. Patient is calling because she wants proceeded with CT chest scan that is scheduled for Saturday. Please call to further discuss.

## 2023-05-09 ENCOUNTER — APPOINTMENT (OUTPATIENT)
Dept: HEMATOLOGY/ONCOLOGY | Age: 83
End: 2023-05-09
Attending: INTERNAL MEDICINE
Payer: MEDICARE

## 2023-05-12 ENCOUNTER — APPOINTMENT (OUTPATIENT)
Dept: HEMATOLOGY/ONCOLOGY | Facility: HOSPITAL | Age: 83
End: 2023-05-12
Attending: INTERNAL MEDICINE
Payer: MEDICARE

## 2023-05-18 DIAGNOSIS — R05.3 CHRONIC COUGH: ICD-10-CM

## 2023-05-18 RX ORDER — FLUTICASONE PROPIONATE 50 MCG
SPRAY, SUSPENSION (ML) NASAL
Qty: 16 G | Refills: 5 | Status: SHIPPED | OUTPATIENT
Start: 2023-05-18

## 2023-05-19 ENCOUNTER — OFFICE VISIT (OUTPATIENT)
Facility: LOCATION | Age: 83
End: 2023-05-19
Payer: MEDICARE

## 2023-05-19 DIAGNOSIS — H61.23 BILATERAL IMPACTED CERUMEN: Primary | ICD-10-CM

## 2023-05-19 PROCEDURE — 99214 OFFICE O/P EST MOD 30 MIN: CPT | Performed by: OTOLARYNGOLOGY

## 2023-05-19 PROCEDURE — 92504 EAR MICROSCOPY EXAMINATION: CPT | Performed by: OTOLARYNGOLOGY

## 2023-05-20 ENCOUNTER — LAB ENCOUNTER (OUTPATIENT)
Dept: LAB | Age: 83
End: 2023-05-20
Attending: INTERNAL MEDICINE
Payer: MEDICARE

## 2023-05-20 DIAGNOSIS — C85.10 LOW GRADE B-CELL LYMPHOMA (HCC): ICD-10-CM

## 2023-05-20 DIAGNOSIS — D47.2 MONOCLONAL PARAPROTEINEMIA: ICD-10-CM

## 2023-05-20 DIAGNOSIS — C34.81 MALIGNANT NEOPLASM OF OVERLAPPING SITES OF RIGHT LUNG (HCC): ICD-10-CM

## 2023-05-20 LAB
ALBUMIN SERPL-MCNC: 3.5 G/DL (ref 3.4–5)
ALBUMIN/GLOB SERPL: 1 {RATIO} (ref 1–2)
ALP LIVER SERPL-CCNC: 126 U/L
ALT SERPL-CCNC: 18 U/L
ANION GAP SERPL CALC-SCNC: 3 MMOL/L (ref 0–18)
AST SERPL-CCNC: 22 U/L (ref 15–37)
BASOPHILS # BLD AUTO: 0.06 X10(3) UL (ref 0–0.2)
BASOPHILS NFR BLD AUTO: 1.1 %
BILIRUB SERPL-MCNC: 0.4 MG/DL (ref 0.1–2)
BUN BLD-MCNC: 47 MG/DL (ref 7–18)
CALCIUM BLD-MCNC: 9.5 MG/DL (ref 8.5–10.1)
CHLORIDE SERPL-SCNC: 118 MMOL/L (ref 98–112)
CO2 SERPL-SCNC: 23 MMOL/L (ref 21–32)
CREAT BLD-MCNC: 1.57 MG/DL
EOSINOPHIL # BLD AUTO: 0.23 X10(3) UL (ref 0–0.7)
EOSINOPHIL NFR BLD AUTO: 4 %
ERYTHROCYTE [DISTWIDTH] IN BLOOD BY AUTOMATED COUNT: 14.7 %
FASTING STATUS PATIENT QL REPORTED: YES
GFR SERPLBLD BASED ON 1.73 SQ M-ARVRAT: 33 ML/MIN/1.73M2 (ref 60–?)
GLOBULIN PLAS-MCNC: 3.4 G/DL (ref 2.8–4.4)
GLUCOSE BLD-MCNC: 95 MG/DL (ref 70–99)
HCT VFR BLD AUTO: 36.7 %
HGB BLD-MCNC: 11.3 G/DL
IGA SERPL-MCNC: 141 MG/DL (ref 70–312)
IGM SERPL-MCNC: 336 MG/DL (ref 43–279)
IMM GRANULOCYTES # BLD AUTO: 0.05 X10(3) UL (ref 0–1)
IMM GRANULOCYTES NFR BLD: 0.9 %
IMMUNOGLOBULIN PNL SER-MCNC: 678 MG/DL (ref 791–1643)
LDH SERPL L TO P-CCNC: 172 U/L
LYMPHOCYTES # BLD AUTO: 0.88 X10(3) UL (ref 1–4)
LYMPHOCYTES NFR BLD AUTO: 15.4 %
MCH RBC QN AUTO: 32.5 PG (ref 26–34)
MCHC RBC AUTO-ENTMCNC: 30.8 G/DL (ref 31–37)
MCV RBC AUTO: 105.5 FL
MONOCYTES # BLD AUTO: 0.67 X10(3) UL (ref 0.1–1)
MONOCYTES NFR BLD AUTO: 11.8 %
NEUTROPHILS # BLD AUTO: 3.81 X10 (3) UL (ref 1.5–7.7)
NEUTROPHILS # BLD AUTO: 3.81 X10(3) UL (ref 1.5–7.7)
NEUTROPHILS NFR BLD AUTO: 66.8 %
OSMOLALITY SERPL CALC.SUM OF ELEC: 310 MOSM/KG (ref 275–295)
PLATELET # BLD AUTO: 232 10(3)UL (ref 150–450)
POTASSIUM SERPL-SCNC: 4.9 MMOL/L (ref 3.5–5.1)
PROT SERPL-MCNC: 6.9 G/DL (ref 6.4–8.2)
RBC # BLD AUTO: 3.48 X10(6)UL
SODIUM SERPL-SCNC: 144 MMOL/L (ref 136–145)
WBC # BLD AUTO: 5.7 X10(3) UL (ref 4–11)

## 2023-05-20 PROCEDURE — 80053 COMPREHEN METABOLIC PANEL: CPT

## 2023-05-20 PROCEDURE — 83615 LACTATE (LD) (LDH) ENZYME: CPT

## 2023-05-20 PROCEDURE — 83521 IG LIGHT CHAINS FREE EACH: CPT

## 2023-05-20 PROCEDURE — 84165 PROTEIN E-PHORESIS SERUM: CPT

## 2023-05-20 PROCEDURE — 85025 COMPLETE CBC W/AUTO DIFF WBC: CPT

## 2023-05-20 PROCEDURE — 82784 ASSAY IGA/IGD/IGG/IGM EACH: CPT

## 2023-05-20 PROCEDURE — 36415 COLL VENOUS BLD VENIPUNCTURE: CPT

## 2023-05-20 PROCEDURE — 86334 IMMUNOFIX E-PHORESIS SERUM: CPT

## 2023-05-21 ENCOUNTER — HOSPITAL ENCOUNTER (OUTPATIENT)
Dept: CT IMAGING | Age: 83
End: 2023-05-21
Attending: INTERNAL MEDICINE
Payer: MEDICARE

## 2023-05-21 ENCOUNTER — HOSPITAL ENCOUNTER (OUTPATIENT)
Dept: CT IMAGING | Age: 83
Discharge: HOME | End: 2023-05-21
Attending: INTERNAL MEDICINE
Payer: MEDICARE

## 2023-05-21 DIAGNOSIS — C85.10 LOW GRADE B-CELL LYMPHOMA (HCC): ICD-10-CM

## 2023-05-21 DIAGNOSIS — C34.81 MALIGNANT NEOPLASM OF OVERLAPPING SITES OF RIGHT LUNG (HCC): ICD-10-CM

## 2023-05-21 PROCEDURE — 71250 CT THORAX DX C-: CPT | Performed by: INTERNAL MEDICINE

## 2023-05-22 LAB
ALBUMIN SERPL ELPH-MCNC: 3.96 G/DL (ref 3.75–5.21)
ALBUMIN/GLOB SERPL: 1.4 {RATIO} (ref 1–2)
ALPHA1 GLOB SERPL ELPH-MCNC: 0.3 G/DL (ref 0.19–0.46)
ALPHA2 GLOB SERPL ELPH-MCNC: 0.92 G/DL (ref 0.48–1.05)
B-GLOBULIN SERPL ELPH-MCNC: 0.71 G/DL (ref 0.68–1.23)
GAMMA GLOB SERPL ELPH-MCNC: 0.91 G/DL (ref 0.62–1.7)
KAPPA LC FREE SER-MCNC: 3.88 MG/DL (ref 0.33–1.94)
KAPPA LC FREE/LAMBDA FREE SER NEPH: 0.78 {RATIO} (ref 0.26–1.65)
LAMBDA LC FREE SERPL-MCNC: 4.98 MG/DL (ref 0.57–2.63)
M PROTEIN 1 SERPL ELPH-MCNC: 0.29 G/DL (ref ?–0)
PROT SERPL-MCNC: 6.8 G/DL (ref 6.4–8.2)

## 2023-05-24 ENCOUNTER — HOSPITAL ENCOUNTER (EMERGENCY)
Age: 83
Discharge: HOME | End: 2023-05-24
Attending: EMERGENCY MEDICINE
Payer: MEDICARE

## 2023-05-24 ENCOUNTER — TELEPHONE (OUTPATIENT)
Dept: FAMILY MEDICINE CLINIC | Facility: CLINIC | Age: 83
End: 2023-05-24

## 2023-05-24 ENCOUNTER — APPOINTMENT (OUTPATIENT)
Dept: GENERAL RADIOLOGY | Age: 83
End: 2023-05-24
Attending: EMERGENCY MEDICINE
Payer: MEDICARE

## 2023-05-24 VITALS
BODY MASS INDEX: 25.4 KG/M2 | TEMPERATURE: 97 F | HEIGHT: 59 IN | SYSTOLIC BLOOD PRESSURE: 132 MMHG | RESPIRATION RATE: 14 BRPM | WEIGHT: 126 LBS | HEART RATE: 66 BPM | DIASTOLIC BLOOD PRESSURE: 72 MMHG | OXYGEN SATURATION: 97 %

## 2023-05-24 DIAGNOSIS — M54.32 SCIATICA OF LEFT SIDE: Primary | ICD-10-CM

## 2023-05-24 PROCEDURE — 73502 X-RAY EXAM HIP UNI 2-3 VIEWS: CPT | Performed by: EMERGENCY MEDICINE

## 2023-05-24 PROCEDURE — 72110 X-RAY EXAM L-2 SPINE 4/>VWS: CPT | Performed by: EMERGENCY MEDICINE

## 2023-05-24 PROCEDURE — 99283 EMERGENCY DEPT VISIT LOW MDM: CPT

## 2023-05-24 PROCEDURE — 99284 EMERGENCY DEPT VISIT MOD MDM: CPT

## 2023-05-24 RX ORDER — HYDROCODONE BITARTRATE AND ACETAMINOPHEN 5; 325 MG/1; MG/1
1 TABLET ORAL EVERY 6 HOURS PRN
Qty: 10 TABLET | Refills: 0 | Status: SHIPPED | OUTPATIENT
Start: 2023-05-24 | End: 2023-05-29

## 2023-05-24 RX ORDER — HYDROCODONE BITARTRATE AND ACETAMINOPHEN 5; 325 MG/1; MG/1
1 TABLET ORAL ONCE
Status: COMPLETED | OUTPATIENT
Start: 2023-05-24 | End: 2023-05-24

## 2023-05-24 RX ORDER — PREDNISONE 20 MG/1
60 TABLET ORAL ONCE
Status: COMPLETED | OUTPATIENT
Start: 2023-05-24 | End: 2023-05-24

## 2023-05-24 RX ORDER — PREDNISONE 20 MG/1
40 TABLET ORAL DAILY
Qty: 10 TABLET | Refills: 0 | Status: SHIPPED | OUTPATIENT
Start: 2023-05-24 | End: 2023-05-29

## 2023-05-24 NOTE — TELEPHONE ENCOUNTER
Spoke to pt she states she has left hip pain for several days. Pain radiates down leg while walking. Pt fell about a week ago but states her hip was not hurting at the time. No other sx. I instructed pt no availability today and she could go to immediate care for evaluation. Pt verbalized understanding.

## 2023-05-24 NOTE — TELEPHONE ENCOUNTER
Pt has right hip pain. States she fell a few weeks ago, and this hip pain is gradually getting worse. Please call.

## 2023-05-24 NOTE — DISCHARGE INSTRUCTIONS
Try menthol or lidocaine patches to the affected area. These are available over-the-counter    Follow-up with your PCP in 1 week if not better.     Return here sooner for worsening or changing symptoms

## 2023-05-26 ENCOUNTER — APPOINTMENT (OUTPATIENT)
Dept: HEMATOLOGY/ONCOLOGY | Facility: HOSPITAL | Age: 83
End: 2023-05-26
Attending: INTERNAL MEDICINE
Payer: MEDICARE

## 2023-05-26 ENCOUNTER — PATIENT MESSAGE (OUTPATIENT)
Dept: FAMILY MEDICINE CLINIC | Facility: CLINIC | Age: 83
End: 2023-05-26

## 2023-05-26 NOTE — TELEPHONE ENCOUNTER
From: Fanta Car  Sent: 5/26/2023 9:10 AM CDT  To: [facility] Clinical Staff  Subject: Prednisone     Also if she is feeling better should she stop them before the full 5 days?

## 2023-06-09 ENCOUNTER — OFFICE VISIT (OUTPATIENT)
Facility: CLINIC | Age: 83
End: 2023-06-09
Payer: MEDICARE

## 2023-06-09 ENCOUNTER — OFFICE VISIT (OUTPATIENT)
Dept: HEMATOLOGY/ONCOLOGY | Facility: HOSPITAL | Age: 83
End: 2023-06-09
Attending: INTERNAL MEDICINE
Payer: MEDICARE

## 2023-06-09 VITALS
TEMPERATURE: 97 F | SYSTOLIC BLOOD PRESSURE: 157 MMHG | OXYGEN SATURATION: 93 % | HEIGHT: 59.02 IN | HEART RATE: 66 BPM | RESPIRATION RATE: 16 BRPM | WEIGHT: 129 LBS | DIASTOLIC BLOOD PRESSURE: 71 MMHG | BODY MASS INDEX: 26 KG/M2

## 2023-06-09 VITALS
RESPIRATION RATE: 14 BRPM | SYSTOLIC BLOOD PRESSURE: 120 MMHG | WEIGHT: 129 LBS | OXYGEN SATURATION: 97 % | HEART RATE: 68 BPM | HEIGHT: 59 IN | DIASTOLIC BLOOD PRESSURE: 70 MMHG | BODY MASS INDEX: 26 KG/M2

## 2023-06-09 DIAGNOSIS — N18.4 CKD (CHRONIC KIDNEY DISEASE) STAGE 4, GFR 15-29 ML/MIN (HCC): ICD-10-CM

## 2023-06-09 DIAGNOSIS — I82.431 ACUTE DEEP VEIN THROMBOSIS (DVT) OF POPLITEAL VEIN OF RIGHT LOWER EXTREMITY (HCC): ICD-10-CM

## 2023-06-09 DIAGNOSIS — D64.9 NORMOCYTIC ANEMIA: ICD-10-CM

## 2023-06-09 DIAGNOSIS — C34.90 EGFR-RELATED LUNG CANCER (HCC): ICD-10-CM

## 2023-06-09 DIAGNOSIS — C85.10 LOW GRADE B-CELL LYMPHOMA (HCC): ICD-10-CM

## 2023-06-09 DIAGNOSIS — A31.0 MAI (MYCOBACTERIUM AVIUM-INTRACELLULARE) (HCC): ICD-10-CM

## 2023-06-09 DIAGNOSIS — R93.89 ABNORMAL CT OF THE CHEST: ICD-10-CM

## 2023-06-09 DIAGNOSIS — R91.8 PULMONARY NODULES/LESIONS, MULTIPLE: Primary | ICD-10-CM

## 2023-06-09 DIAGNOSIS — D47.2 MONOCLONAL PARAPROTEINEMIA: ICD-10-CM

## 2023-06-09 DIAGNOSIS — C34.81 MALIGNANT NEOPLASM OF OVERLAPPING SITES OF RIGHT LUNG (HCC): Primary | ICD-10-CM

## 2023-06-09 PROCEDURE — 99214 OFFICE O/P EST MOD 30 MIN: CPT | Performed by: INTERNAL MEDICINE

## 2023-06-09 RX ORDER — FLUTICASONE FUROATE AND VILANTEROL TRIFENATATE 200; 25 UG/1; UG/1
POWDER RESPIRATORY (INHALATION)
COMMUNITY
Start: 2023-04-26

## 2023-06-09 NOTE — PATIENT INSTRUCTIONS
-Plan:  -resume VEST once a day - increase to twice a day if you get sick   - continue breo   - plan for CT in 3 months then see me     Rossana Lozano MD  Pulmonary Medicine  06/9/23

## 2023-06-29 ENCOUNTER — TELEPHONE (OUTPATIENT)
Dept: HEMATOLOGY/ONCOLOGY | Age: 83
End: 2023-06-29

## 2023-08-03 ENCOUNTER — OFFICE VISIT (OUTPATIENT)
Dept: FAMILY MEDICINE CLINIC | Facility: CLINIC | Age: 83
End: 2023-08-03
Payer: MEDICARE

## 2023-08-03 VITALS
SYSTOLIC BLOOD PRESSURE: 144 MMHG | BODY MASS INDEX: 25.8 KG/M2 | HEART RATE: 71 BPM | HEIGHT: 59 IN | DIASTOLIC BLOOD PRESSURE: 70 MMHG | WEIGHT: 128 LBS

## 2023-08-03 DIAGNOSIS — M54.50 ACUTE LEFT-SIDED LOW BACK PAIN WITHOUT SCIATICA: ICD-10-CM

## 2023-08-03 DIAGNOSIS — R05.3 CHRONIC COUGH: Primary | ICD-10-CM

## 2023-08-03 PROCEDURE — 99213 OFFICE O/P EST LOW 20 MIN: CPT | Performed by: FAMILY MEDICINE

## 2023-08-03 RX ORDER — HYDROCODONE BITARTRATE AND ACETAMINOPHEN 5; 325 MG/1; MG/1
1 TABLET ORAL EVERY 8 HOURS PRN
Qty: 24 TABLET | Refills: 0 | Status: SHIPPED | OUTPATIENT
Start: 2023-08-03

## 2023-08-03 RX ORDER — DOXYCYCLINE HYCLATE 100 MG/1
100 CAPSULE ORAL 2 TIMES DAILY
Qty: 20 CAPSULE | Refills: 0 | COMMUNITY
Start: 2023-07-30 | End: 2023-08-03

## 2023-08-03 RX ORDER — PANTOPRAZOLE SODIUM 40 MG/1
TABLET, DELAYED RELEASE ORAL
Qty: 60 TABLET | Refills: 0 | Status: SHIPPED | OUTPATIENT
Start: 2023-08-03

## 2023-08-03 NOTE — PROGRESS NOTES
Subjective:   Patient ID: Cedrick Klein is a 81year old female. Last Saturday went to  cause of dry cough that she has had for 3 weeks. Mainly when she wakes up in the middle of the night. Occasionally during the day. Doing daily antihistamine, azelasting, flonase, montelukast, sinus rinse. No F/C/Sweats/CP/SOB/N/V. No earache, muffled hearing sore throat. History/Other:   Review of Systems   All other systems reviewed and are negative. Current Outpatient Medications   Medication Sig Dispense Refill    pantoprazole 40 MG Oral Tab EC Twice daily x 2 weeks, then once daily x 2 weeks. 60 tablet 0    HYDROcodone-acetaminophen (NORCO) 5-325 MG Oral Tab Take 1 tablet by mouth every 8 (eight) hours as needed for Pain. 24 tablet 0    BREO ELLIPTA 200-25 MCG/ACT Inhalation Aerosol Powder, Breath Activated       FLUTICASONE PROPIONATE 50 MCG/ACT Nasal Suspension SHAKE LIQUID AND USE 2 SPRAYS IN EACH NOSTRIL DAILY 16 g 5    rivaroxaban (XARELTO) 15 MG Oral Tab Take 1 tablet (15 mg total) by mouth daily with food. 30 tablet 0    hydrALAZINE 50 MG Oral Tab Take 1 tablet (50 mg total) by mouth 3 (three) times daily. MONTELUKAST 10 MG Oral Tab TAKE 1 TABLET EVERY EVENING 90 tablet 3    cycloSPORINE (RESTASIS) 0.05 % Ophthalmic Emulsion Place 1 drop into both eyes every 12 (twelve) hours as needed. 3 each 5    sodium chloride, hypertonic, 3 % Inhalation Nebu Soln Take 15 mL by nebulization as needed for Other. 30 each 5    azelastine 0.1 % Nasal Solution 1 spray by Nasal route 2 (two) times daily. 1 each 5    dilTIAZem  MG Oral Capsule SR 24 Hr Take 1 capsule (180 mg total) by mouth daily. diphenoxylate-atropine 2.5-0.025 MG Oral Tab Take 1 tablet by mouth 4 (four) times daily as needed for Diarrhea. 30 tablet 0    rosuvastatin 5 MG Oral Tab Take 1 tablet (5 mg total) by mouth nightly.  90 tablet 3    metoprolol succinate 50 MG Oral Tablet 24 Hr Take 1 tablet (50 mg total) by mouth 2 (two) times a day. 180 tablet 1    LOSARTAN POTASSIUM 50 MG Oral Tab TAKE 1 TABLET TWICE A  tablet 2    Iron, Ferrous Sulfate, 325 (65 Fe) MG Oral Tab Take by mouth daily. CLARIFY DOSE WITH PATIENT: THIS WAS ORDERED BY ONCOLOGY  30 tablet 0    Albuterol Sulfate  (90 Base) MCG/ACT Inhalation Aero Soln Inhale 2 puffs into the lungs every 4 (four) hours as needed. prn      Saline Nasal Spray 0.65 % Nasal Solution 1 spray by Nasal route as needed for congestion. acetaminophen 500 MG Oral Tab Take 1 tablet (500 mg total) by mouth every 6 (six) hours as needed for Pain. Allergies:  Bactrim [Sulfametho*    HIVES    Comment:Facial hives  Other                   OTHER (SEE COMMENTS), DIZZINESS  Penicillins             ITCHING  Radiology Contrast *    OTHER (SEE COMMENTS)    Comment:HARSH ON KIDNEYS    Objective:   Physical Exam  Vitals reviewed. Constitutional:       General: She is not in acute distress. Appearance: She is well-developed. She is not diaphoretic. Eyes:      General: No scleral icterus. Right eye: No discharge. Left eye: No discharge. Conjunctiva/sclera: Conjunctivae normal.   Cardiovascular:      Rate and Rhythm: Normal rate and regular rhythm. Heart sounds: Normal heart sounds. No murmur heard. No friction rub. No gallop. Pulmonary:      Effort: Pulmonary effort is normal. No respiratory distress. Breath sounds: Normal breath sounds. No wheezing or rales. Musculoskeletal:      Comments: + left low back tenderness. No numbness, or tingling. Assessment & Plan:   Chronic cough  (primary encounter diagnosis)  Acute left-sided low back pain without sciatica    1. Chronic cough  - pantoprazole 40 MG Oral Tab EC; Twice daily x 2 weeks, then once daily x 2 weeks. Dispense: 60 tablet; Refill: 0    2. Acute left-sided low back pain without sciatica  - HYDROcodone-acetaminophen (NORCO) 5-325 MG Oral Tab;  Take 1 tablet by mouth every 8 (eight) hours as needed for Pain. Dispense: 24 tablet; Refill: 0    Meds This Visit:  Requested Prescriptions     Signed Prescriptions Disp Refills    pantoprazole 40 MG Oral Tab EC 60 tablet 0     Sig: Twice daily x 2 weeks, then once daily x 2 weeks. HYDROcodone-acetaminophen (NORCO) 5-325 MG Oral Tab 24 tablet 0     Sig: Take 1 tablet by mouth every 8 (eight) hours as needed for Pain.      Imaging & Referrals:  None

## 2023-09-10 ENCOUNTER — HOSPITAL ENCOUNTER (OUTPATIENT)
Dept: CT IMAGING | Age: 83
Discharge: HOME | End: 2023-09-10
Attending: INTERNAL MEDICINE
Payer: MEDICARE

## 2023-09-10 ENCOUNTER — HOSPITAL ENCOUNTER (OUTPATIENT)
Dept: CT IMAGING | Age: 83
End: 2023-09-10
Attending: INTERNAL MEDICINE
Payer: MEDICARE

## 2023-09-10 DIAGNOSIS — C85.10 LOW GRADE B-CELL LYMPHOMA (HCC): ICD-10-CM

## 2023-09-10 DIAGNOSIS — C34.81 MALIGNANT NEOPLASM OF OVERLAPPING SITES OF RIGHT LUNG (HCC): ICD-10-CM

## 2023-09-10 PROCEDURE — 71250 CT THORAX DX C-: CPT | Performed by: INTERNAL MEDICINE

## 2023-09-14 ENCOUNTER — OFFICE VISIT (OUTPATIENT)
Dept: HEMATOLOGY/ONCOLOGY | Facility: HOSPITAL | Age: 83
End: 2023-09-14
Attending: INTERNAL MEDICINE
Payer: MEDICARE

## 2023-09-14 ENCOUNTER — OFFICE VISIT (OUTPATIENT)
Facility: CLINIC | Age: 83
End: 2023-09-14
Payer: MEDICARE

## 2023-09-14 VITALS
BODY MASS INDEX: 26.41 KG/M2 | HEART RATE: 62 BPM | WEIGHT: 131 LBS | RESPIRATION RATE: 18 BRPM | DIASTOLIC BLOOD PRESSURE: 76 MMHG | OXYGEN SATURATION: 94 % | HEIGHT: 59.02 IN | SYSTOLIC BLOOD PRESSURE: 148 MMHG | TEMPERATURE: 97 F

## 2023-09-14 VITALS
HEART RATE: 64 BPM | BODY MASS INDEX: 26.41 KG/M2 | HEIGHT: 59 IN | OXYGEN SATURATION: 97 % | DIASTOLIC BLOOD PRESSURE: 62 MMHG | SYSTOLIC BLOOD PRESSURE: 122 MMHG | RESPIRATION RATE: 14 BRPM | WEIGHT: 131 LBS

## 2023-09-14 DIAGNOSIS — C34.81 MALIGNANT NEOPLASM OF OVERLAPPING SITES OF RIGHT LUNG (HCC): ICD-10-CM

## 2023-09-14 DIAGNOSIS — I82.431 ACUTE DEEP VEIN THROMBOSIS (DVT) OF POPLITEAL VEIN OF RIGHT LOWER EXTREMITY (HCC): ICD-10-CM

## 2023-09-14 DIAGNOSIS — A31.0 MAI (MYCOBACTERIUM AVIUM-INTRACELLULARE) (HCC): ICD-10-CM

## 2023-09-14 DIAGNOSIS — R93.89 ABNORMAL CT OF THE CHEST: ICD-10-CM

## 2023-09-14 DIAGNOSIS — D47.2 MONOCLONAL PARAPROTEINEMIA: ICD-10-CM

## 2023-09-14 DIAGNOSIS — D63.0 ANEMIA COMPLICATING NEOPLASTIC DISEASE: ICD-10-CM

## 2023-09-14 DIAGNOSIS — C34.90 EGFR-RELATED LUNG CANCER (HCC): ICD-10-CM

## 2023-09-14 DIAGNOSIS — N18.4 CKD (CHRONIC KIDNEY DISEASE) STAGE 4, GFR 15-29 ML/MIN (HCC): ICD-10-CM

## 2023-09-14 DIAGNOSIS — R91.8 PULMONARY NODULES/LESIONS, MULTIPLE: Primary | ICD-10-CM

## 2023-09-14 DIAGNOSIS — C85.10 LOW GRADE B-CELL LYMPHOMA (HCC): ICD-10-CM

## 2023-09-14 DIAGNOSIS — D64.9 NORMOCYTIC ANEMIA: Primary | ICD-10-CM

## 2023-09-14 LAB
ALBUMIN SERPL-MCNC: 3.4 G/DL (ref 3.4–5)
ALBUMIN/GLOB SERPL: 0.9 {RATIO} (ref 1–2)
ALP LIVER SERPL-CCNC: 117 U/L
ALT SERPL-CCNC: 23 U/L
ANION GAP SERPL CALC-SCNC: 9 MMOL/L (ref 0–18)
AST SERPL-CCNC: 15 U/L (ref 15–37)
BASOPHILS # BLD AUTO: 0.07 X10(3) UL (ref 0–0.2)
BASOPHILS NFR BLD AUTO: 1 %
BILIRUB SERPL-MCNC: 0.3 MG/DL (ref 0.1–2)
BUN BLD-MCNC: 34 MG/DL (ref 7–18)
CALCIUM BLD-MCNC: 9.1 MG/DL (ref 8.5–10.1)
CHLORIDE SERPL-SCNC: 117 MMOL/L (ref 98–112)
CO2 SERPL-SCNC: 21 MMOL/L (ref 21–32)
CREAT BLD-MCNC: 1.62 MG/DL
EGFRCR SERPLBLD CKD-EPI 2021: 32 ML/MIN/1.73M2 (ref 60–?)
EOSINOPHIL # BLD AUTO: 0.14 X10(3) UL (ref 0–0.7)
EOSINOPHIL NFR BLD AUTO: 2.1 %
ERYTHROCYTE [DISTWIDTH] IN BLOOD BY AUTOMATED COUNT: 13.9 %
GLOBULIN PLAS-MCNC: 3.7 G/DL (ref 2.8–4.4)
GLUCOSE BLD-MCNC: 102 MG/DL (ref 70–99)
HCT VFR BLD AUTO: 35.2 %
HGB BLD-MCNC: 11 G/DL
IGA SERPL-MCNC: 146 MG/DL (ref 70–312)
IGM SERPL-MCNC: 299 MG/DL (ref 43–279)
IMM GRANULOCYTES # BLD AUTO: 0.07 X10(3) UL (ref 0–1)
IMM GRANULOCYTES NFR BLD: 1 %
IMMUNOGLOBULIN PNL SER-MCNC: 620 MG/DL (ref 791–1643)
LDH SERPL L TO P-CCNC: 182 U/L
LYMPHOCYTES # BLD AUTO: 1.15 X10(3) UL (ref 1–4)
LYMPHOCYTES NFR BLD AUTO: 17.2 %
MCH RBC QN AUTO: 32.5 PG (ref 26–34)
MCHC RBC AUTO-ENTMCNC: 31.3 G/DL (ref 31–37)
MCV RBC AUTO: 104.1 FL
MONOCYTES # BLD AUTO: 0.63 X10(3) UL (ref 0.1–1)
MONOCYTES NFR BLD AUTO: 9.4 %
NEUTROPHILS # BLD AUTO: 4.64 X10 (3) UL (ref 1.5–7.7)
NEUTROPHILS # BLD AUTO: 4.64 X10(3) UL (ref 1.5–7.7)
NEUTROPHILS NFR BLD AUTO: 69.3 %
OSMOLALITY SERPL CALC.SUM OF ELEC: 312 MOSM/KG (ref 275–295)
PLATELET # BLD AUTO: 210 10(3)UL (ref 150–450)
POTASSIUM SERPL-SCNC: 4.6 MMOL/L (ref 3.5–5.1)
PROT SERPL-MCNC: 7.1 G/DL (ref 6.4–8.2)
RBC # BLD AUTO: 3.38 X10(6)UL
SODIUM SERPL-SCNC: 147 MMOL/L (ref 136–145)
WBC # BLD AUTO: 6.7 X10(3) UL (ref 4–11)

## 2023-09-14 PROCEDURE — 99215 OFFICE O/P EST HI 40 MIN: CPT | Performed by: INTERNAL MEDICINE

## 2023-09-14 PROCEDURE — 99214 OFFICE O/P EST MOD 30 MIN: CPT | Performed by: INTERNAL MEDICINE

## 2023-09-14 NOTE — PROGRESS NOTES
Patient is here today for 3 month follow up with Chelly Dixon for NSCLC and Non Hodgkin's Lymphoma. Patient denies pain. Saw Pulmonary - had some wheezing in her lungs - was told to wear her percussion vest. Fatigued. Medication list and medical history were reviewed and updated. Education Record    Learner:  Patient      Disease / Diagnosis: Non Hodgkin's Lymphoma / NSCLC    Barriers / Limitations:  None   Comments:    Method:  Brief focused, Discussion, Printed material and Reinforcement   Comments:    General Topics:  Medication, Pain, Procedure and Plan of care reviewed   Comments:    Outcome:  Shows understanding   Comments: Follow up in 3 months [facility] CT scan    AVS provided and follow up reviewed. Patient instructed to call as needed.

## 2023-09-19 LAB
ALBUMIN SERPL ELPH-MCNC: 3.91 G/DL (ref 3.75–5.21)
ALBUMIN/GLOB SERPL: 1.45 {RATIO} (ref 1–2)
ALPHA1 GLOB SERPL ELPH-MCNC: 0.34 G/DL (ref 0.19–0.46)
ALPHA2 GLOB SERPL ELPH-MCNC: 0.89 G/DL (ref 0.48–1.05)
B-GLOBULIN SERPL ELPH-MCNC: 0.69 G/DL (ref 0.68–1.23)
GAMMA GLOB SERPL ELPH-MCNC: 0.78 G/DL (ref 0.62–1.7)
KAPPA LC FREE SER-MCNC: 4.09 MG/DL (ref 0.33–1.94)
KAPPA LC FREE/LAMBDA FREE SER NEPH: 0.84 {RATIO} (ref 0.26–1.65)
LAMBDA LC FREE SERPL-MCNC: 4.86 MG/DL (ref 0.57–2.63)
M PROTEIN 1 SERPL ELPH-MCNC: 0.32 G/DL (ref ?–0)
PROT SERPL-MCNC: 6.6 G/DL (ref 6.4–8.2)

## 2023-09-29 NOTE — LETTER
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     I understand that the treat Refill request.   have questions, by calling [phone]. Additional written information will be given to me prior to start of therapy. Additionally, I will receive a copy of this consent form. I have read and fully understand this consent to cancer treatment.  I ac

## 2023-10-16 ENCOUNTER — PATIENT MESSAGE (OUTPATIENT)
Dept: FAMILY MEDICINE CLINIC | Facility: CLINIC | Age: 83
End: 2023-10-16

## 2023-10-16 ENCOUNTER — APPOINTMENT (OUTPATIENT)
Dept: CT IMAGING | Age: 83
End: 2023-10-16
Attending: EMERGENCY MEDICINE
Payer: MEDICARE

## 2023-10-16 ENCOUNTER — HOSPITAL ENCOUNTER (EMERGENCY)
Age: 83
Discharge: HOME | End: 2023-10-16
Attending: EMERGENCY MEDICINE
Payer: MEDICARE

## 2023-10-16 VITALS
BODY MASS INDEX: 26 KG/M2 | WEIGHT: 128 LBS | OXYGEN SATURATION: 97 % | TEMPERATURE: 98 F | DIASTOLIC BLOOD PRESSURE: 79 MMHG | RESPIRATION RATE: 20 BRPM | HEART RATE: 75 BPM | SYSTOLIC BLOOD PRESSURE: 149 MMHG

## 2023-10-16 DIAGNOSIS — J90 PLEURAL EFFUSION: ICD-10-CM

## 2023-10-16 DIAGNOSIS — N39.0 URINARY TRACT INFECTION WITHOUT HEMATURIA, SITE UNSPECIFIED: Primary | ICD-10-CM

## 2023-10-16 DIAGNOSIS — R10.31 RIGHT INGUINAL PAIN: ICD-10-CM

## 2023-10-16 DIAGNOSIS — M25.551 PAIN OF RIGHT HIP: ICD-10-CM

## 2023-10-16 DIAGNOSIS — N39.0 RECURRENT UTI: Primary | ICD-10-CM

## 2023-10-16 DIAGNOSIS — R91.1 LUNG NODULE: ICD-10-CM

## 2023-10-16 LAB
ALBUMIN SERPL-MCNC: 3.3 G/DL (ref 3.4–5)
ALBUMIN/GLOB SERPL: 1 {RATIO} (ref 1–2)
ALP LIVER SERPL-CCNC: 108 U/L
ALT SERPL-CCNC: 19 U/L
ANION GAP SERPL CALC-SCNC: 4 MMOL/L (ref 0–18)
AST SERPL-CCNC: 16 U/L (ref 15–37)
BASOPHILS # BLD AUTO: 0.04 X10(3) UL (ref 0–0.2)
BASOPHILS NFR BLD AUTO: 0.7 %
BILIRUB SERPL-MCNC: 0.3 MG/DL (ref 0.1–2)
BILIRUB UR QL STRIP.AUTO: NEGATIVE
BUN BLD-MCNC: 36 MG/DL (ref 7–18)
CALCIUM BLD-MCNC: 8.9 MG/DL (ref 8.5–10.1)
CHLORIDE SERPL-SCNC: 115 MMOL/L (ref 98–112)
CLARITY UR REFRACT.AUTO: CLEAR
CO2 SERPL-SCNC: 24 MMOL/L (ref 21–32)
COLOR UR AUTO: YELLOW
CREAT BLD-MCNC: 1.67 MG/DL
EGFRCR SERPLBLD CKD-EPI 2021: 30 ML/MIN/1.73M2 (ref 60–?)
EOSINOPHIL # BLD AUTO: 0.13 X10(3) UL (ref 0–0.7)
EOSINOPHIL NFR BLD AUTO: 2.4 %
ERYTHROCYTE [DISTWIDTH] IN BLOOD BY AUTOMATED COUNT: 14.1 %
GLOBULIN PLAS-MCNC: 3.4 G/DL (ref 2.8–4.4)
GLUCOSE BLD-MCNC: 94 MG/DL (ref 70–99)
GLUCOSE UR STRIP.AUTO-MCNC: NEGATIVE MG/DL
HCT VFR BLD AUTO: 33.3 %
HGB BLD-MCNC: 10.6 G/DL
IMM GRANULOCYTES # BLD AUTO: 0.04 X10(3) UL (ref 0–1)
IMM GRANULOCYTES NFR BLD: 0.7 %
KETONES UR STRIP.AUTO-MCNC: NEGATIVE MG/DL
LYMPHOCYTES # BLD AUTO: 1.11 X10(3) UL (ref 1–4)
LYMPHOCYTES NFR BLD AUTO: 20.3 %
MCH RBC QN AUTO: 32.8 PG (ref 26–34)
MCHC RBC AUTO-ENTMCNC: 31.8 G/DL (ref 31–37)
MCV RBC AUTO: 103.1 FL
MONOCYTES # BLD AUTO: 0.6 X10(3) UL (ref 0.1–1)
MONOCYTES NFR BLD AUTO: 11 %
NEUTROPHILS # BLD AUTO: 3.55 X10 (3) UL (ref 1.5–7.7)
NEUTROPHILS # BLD AUTO: 3.55 X10(3) UL (ref 1.5–7.7)
NEUTROPHILS NFR BLD AUTO: 64.9 %
NITRITE UR QL STRIP.AUTO: NEGATIVE
OSMOLALITY SERPL CALC.SUM OF ELEC: 304 MOSM/KG (ref 275–295)
PH UR STRIP.AUTO: 5 [PH] (ref 5–8)
PLATELET # BLD AUTO: 180 10(3)UL (ref 150–450)
POTASSIUM SERPL-SCNC: 4 MMOL/L (ref 3.5–5.1)
PROT SERPL-MCNC: 6.7 G/DL (ref 6.4–8.2)
RBC # BLD AUTO: 3.23 X10(6)UL
RBC UR QL AUTO: NEGATIVE
SODIUM SERPL-SCNC: 143 MMOL/L (ref 136–145)
SP GR UR STRIP.AUTO: 1.02 (ref 1–1.03)
UROBILINOGEN UR STRIP.AUTO-MCNC: 0.2 MG/DL
WBC # BLD AUTO: 5.5 X10(3) UL (ref 4–11)

## 2023-10-16 PROCEDURE — 81001 URINALYSIS AUTO W/SCOPE: CPT | Performed by: EMERGENCY MEDICINE

## 2023-10-16 PROCEDURE — 85025 COMPLETE CBC W/AUTO DIFF WBC: CPT | Performed by: EMERGENCY MEDICINE

## 2023-10-16 PROCEDURE — 81015 MICROSCOPIC EXAM OF URINE: CPT | Performed by: EMERGENCY MEDICINE

## 2023-10-16 PROCEDURE — 80053 COMPREHEN METABOLIC PANEL: CPT | Performed by: EMERGENCY MEDICINE

## 2023-10-16 PROCEDURE — 99284 EMERGENCY DEPT VISIT MOD MDM: CPT

## 2023-10-16 PROCEDURE — 74176 CT ABD & PELVIS W/O CONTRAST: CPT | Performed by: EMERGENCY MEDICINE

## 2023-10-16 PROCEDURE — 36415 COLL VENOUS BLD VENIPUNCTURE: CPT

## 2023-10-16 RX ORDER — CEPHALEXIN 500 MG/1
500 CAPSULE ORAL 4 TIMES DAILY
Qty: 40 CAPSULE | Refills: 0 | Status: SHIPPED | OUTPATIENT
Start: 2023-10-16 | End: 2023-10-16

## 2023-10-16 RX ORDER — CEPHALEXIN 500 MG/1
500 CAPSULE ORAL 4 TIMES DAILY
Qty: 40 CAPSULE | Refills: 0 | Status: SHIPPED | OUTPATIENT
Start: 2023-10-16 | End: 2023-10-26

## 2023-10-16 RX ORDER — CEPHALEXIN 250 MG/1
250 CAPSULE ORAL ONCE
Status: COMPLETED | OUTPATIENT
Start: 2023-10-16 | End: 2023-10-16

## 2023-10-16 RX ORDER — LEVOFLOXACIN 500 MG/1
500 TABLET, FILM COATED ORAL ONCE
Status: DISCONTINUED | OUTPATIENT
Start: 2023-10-16 | End: 2023-10-16

## 2023-10-17 ENCOUNTER — PATIENT MESSAGE (OUTPATIENT)
Facility: CLINIC | Age: 83
End: 2023-10-17

## 2023-10-17 NOTE — TELEPHONE ENCOUNTER
cephalexin 500 MG Oral Cap 40 capsule 0 10/16/2023 10/26/2023   Sig:   Take 1 capsule (500 mg total) by mouth 4 (four) times daily for 10 days. No culture was performed. Dr. Tiffanie Yousif, please see pt message, can she take BID or 2 caps BID? Do you want to order a urine culture?

## 2023-10-17 NOTE — TELEPHONE ENCOUNTER
Patient noted to have appt tomorrow 10/17. Kickboard msg sent advising to keep appt with Dr. Kacy Strickland for tomorrow.

## 2023-10-18 ENCOUNTER — OFFICE VISIT (OUTPATIENT)
Facility: CLINIC | Age: 83
End: 2023-10-18
Payer: MEDICARE

## 2023-10-18 VITALS
SYSTOLIC BLOOD PRESSURE: 124 MMHG | HEART RATE: 64 BPM | DIASTOLIC BLOOD PRESSURE: 64 MMHG | HEIGHT: 59 IN | BODY MASS INDEX: 26 KG/M2 | RESPIRATION RATE: 12 BRPM | OXYGEN SATURATION: 96 %

## 2023-10-18 DIAGNOSIS — A31.0 MAI (MYCOBACTERIUM AVIUM-INTRACELLULARE) (HCC): Primary | ICD-10-CM

## 2023-10-18 PROCEDURE — 99214 OFFICE O/P EST MOD 30 MIN: CPT | Performed by: INTERNAL MEDICINE

## 2023-10-18 NOTE — PATIENT INSTRUCTIONS
-Plan:   -- increase VEST   to twice a day if you get sick   - same sinus medications   - vaccines -- consider RSV and the flu --   - continue breo   - plan for CT in 4 months then see me     Melida Callahan MD  Pulmonary Medicine  [phone]

## 2023-10-19 ENCOUNTER — PATIENT OUTREACH (OUTPATIENT)
Dept: CASE MANAGEMENT | Age: 83
End: 2023-10-19

## 2023-10-19 NOTE — PROGRESS NOTES
1st attempt ER f/up apt request  PCP -decline, pt stated she has already been in contact w/ PCP  HEM/ONC -existing apt (12/8), pt doesn't want to move up apt  Closing encounter

## 2023-10-28 ENCOUNTER — HOSPITAL ENCOUNTER (OUTPATIENT)
Age: 83
Discharge: HOME | End: 2023-10-28
Payer: MEDICARE

## 2023-10-28 ENCOUNTER — APPOINTMENT (OUTPATIENT)
Dept: GENERAL RADIOLOGY | Age: 83
End: 2023-10-28
Attending: PHYSICIAN ASSISTANT
Payer: MEDICARE

## 2023-10-28 ENCOUNTER — LAB ENCOUNTER (OUTPATIENT)
Dept: LAB | Age: 83
End: 2023-10-28
Attending: FAMILY MEDICINE
Payer: MEDICARE

## 2023-10-28 VITALS
SYSTOLIC BLOOD PRESSURE: 164 MMHG | DIASTOLIC BLOOD PRESSURE: 79 MMHG | WEIGHT: 127 LBS | OXYGEN SATURATION: 94 % | TEMPERATURE: 98 F | RESPIRATION RATE: 18 BRPM | BODY MASS INDEX: 25.6 KG/M2 | HEART RATE: 79 BPM | HEIGHT: 59 IN

## 2023-10-28 DIAGNOSIS — N39.0 RECURRENT UTI: ICD-10-CM

## 2023-10-28 DIAGNOSIS — J90 BILATERAL PLEURAL EFFUSION: Primary | ICD-10-CM

## 2023-10-28 DIAGNOSIS — R05.3 CHRONIC COUGH: ICD-10-CM

## 2023-10-28 DIAGNOSIS — J98.11 ATELECTASIS: ICD-10-CM

## 2023-10-28 PROCEDURE — 87086 URINE CULTURE/COLONY COUNT: CPT

## 2023-10-28 PROCEDURE — 99214 OFFICE O/P EST MOD 30 MIN: CPT

## 2023-10-28 PROCEDURE — 99213 OFFICE O/P EST LOW 20 MIN: CPT

## 2023-10-28 PROCEDURE — 71046 X-RAY EXAM CHEST 2 VIEWS: CPT | Performed by: PHYSICIAN ASSISTANT

## 2023-10-28 NOTE — ED INITIAL ASSESSMENT (HPI)
2 months, c/o cough/chest congestion with post nasal drip and thick yellow mucous. Pt has a partial lung. Recent UTI that she was treated for.

## 2023-10-28 NOTE — DISCHARGE INSTRUCTIONS
If you develop fever, shortness of breath you need to be reevaluated by your primary care physician if you have any of these you need to be reevaluated    Please return to the Emergency department/clinic if symptoms worsen. Follow up with your primary care physician in 1-2 days as needed. Take any medications prescribed to you as instructed and complete any antibiotic prescription you begin.

## 2023-10-30 ENCOUNTER — TELEPHONE (OUTPATIENT)
Facility: CLINIC | Age: 83
End: 2023-10-30

## 2023-10-30 NOTE — TELEPHONE ENCOUNTER
To UC on Saturday - son to have breathing checked -- related to coughing - no real change   Feels fine now -- had UTI - at the time - dr Adelina Mead put in order -- told fine - completed AB   To see me

## 2023-11-15 ENCOUNTER — PATIENT MESSAGE (OUTPATIENT)
Facility: CLINIC | Age: 83
End: 2023-11-15

## 2023-11-16 ENCOUNTER — OFFICE VISIT (OUTPATIENT)
Dept: FAMILY MEDICINE CLINIC | Facility: CLINIC | Age: 83
End: 2023-11-16
Payer: MEDICARE

## 2023-11-16 VITALS
DIASTOLIC BLOOD PRESSURE: 70 MMHG | RESPIRATION RATE: 20 BRPM | BODY MASS INDEX: 25.6 KG/M2 | OXYGEN SATURATION: 96 % | SYSTOLIC BLOOD PRESSURE: 136 MMHG | HEART RATE: 71 BPM | WEIGHT: 127 LBS | HEIGHT: 59 IN

## 2023-11-16 DIAGNOSIS — R05.2 SUBACUTE COUGH: Primary | ICD-10-CM

## 2023-11-16 DIAGNOSIS — Z87.440 HISTORY OF RECURRENT UTIS: ICD-10-CM

## 2023-11-16 PROCEDURE — 99213 OFFICE O/P EST LOW 20 MIN: CPT | Performed by: FAMILY MEDICINE

## 2023-11-16 RX ORDER — AZITHROMYCIN 250 MG/1
TABLET, FILM COATED ORAL
Qty: 6 TABLET | Refills: 0 | Status: SHIPPED | OUTPATIENT
Start: 2023-11-16 | End: 2023-11-20

## 2023-11-16 RX ORDER — BENZONATATE 200 MG/1
200 CAPSULE ORAL EVERY 8 HOURS PRN
Qty: 30 CAPSULE | Refills: 0 | Status: SHIPPED | OUTPATIENT
Start: 2023-11-16

## 2023-11-16 RX ORDER — FLUTICASONE PROPIONATE 50 MCG
2 SPRAY, SUSPENSION (ML) NASAL DAILY
Qty: 1 EACH | Refills: 1 | Status: SHIPPED | OUTPATIENT
Start: 2023-11-16

## 2023-11-16 RX ORDER — PREDNISONE 20 MG/1
TABLET ORAL
Qty: 13 TABLET | Refills: 0 | Status: SHIPPED | OUTPATIENT
Start: 2023-11-16

## 2023-11-16 NOTE — PROGRESS NOTES
Subjective:   Patient ID: Chino Almeida is a 81year old female. Cough Monday. Got worse o Tuesday and on. Coughing up sputum yellow. No F/C. Swelling under the eyes. + frontal headache. No CP/SOB. Covid negative. Deep breath not causing cough. History/Other:   Review of Systems   All other systems reviewed and are negative. Current Outpatient Medications   Medication Sig Dispense Refill    benzonatate 200 MG Oral Cap Take 1 capsule (200 mg total) by mouth every 8 (eight) hours as needed for cough. 30 capsule 0    fluticasone propionate 50 MCG/ACT Nasal Suspension 2 sprays by Each Nare route daily. 1 each 1    predniSONE 20 MG Oral Tab 2.5 tab day 1-3, 2 tab day 4, 1.5 tab day 5, 1 tab day 6, 0.5 tab day 7 13 tablet 0    azithromycin (ZITHROMAX Z-ZIYAD) 250 MG Oral Tab Take 2 tablets (500 mg total) by mouth daily for 1 day, THEN 1 tablet (250 mg total) daily for 4 days. 6 tablet 0    rivaroxaban (XARELTO) 15 MG Oral Tab Take 1 tablet (15 mg total) by mouth daily with food. 90 tablet 3    pantoprazole 40 MG Oral Tab EC Twice daily x 2 weeks, then once daily x 2 weeks. 60 tablet 0    HYDROcodone-acetaminophen (NORCO) 5-325 MG Oral Tab Take 1 tablet by mouth every 8 (eight) hours as needed for Pain. 24 tablet 0    BREO ELLIPTA 200-25 MCG/ACT Inhalation Aerosol Powder, Breath Activated       FLUTICASONE PROPIONATE 50 MCG/ACT Nasal Suspension SHAKE LIQUID AND USE 2 SPRAYS IN EACH NOSTRIL DAILY 16 g 5    hydrALAZINE 50 MG Oral Tab Take 1 tablet (50 mg total) by mouth 3 (three) times daily. MONTELUKAST 10 MG Oral Tab TAKE 1 TABLET EVERY EVENING 90 tablet 3    cycloSPORINE (RESTASIS) 0.05 % Ophthalmic Emulsion Place 1 drop into both eyes every 12 (twelve) hours as needed. 3 each 5    sodium chloride, hypertonic, 3 % Inhalation Nebu Soln Take 15 mL by nebulization as needed for Other. 30 each 5    azelastine 0.1 % Nasal Solution 1 spray by Nasal route 2 (two) times daily.  1 each 5    dilTIAZem  MG Oral Capsule SR 24 Hr Take 1 capsule (180 mg total) by mouth daily. diphenoxylate-atropine 2.5-0.025 MG Oral Tab Take 1 tablet by mouth 4 (four) times daily as needed for Diarrhea. 30 tablet 0    rosuvastatin 5 MG Oral Tab Take 1 tablet (5 mg total) by mouth nightly. 90 tablet 3    metoprolol succinate 50 MG Oral Tablet 24 Hr Take 1 tablet (50 mg total) by mouth 2 (two) times a day. 180 tablet 1    LOSARTAN POTASSIUM 50 MG Oral Tab TAKE 1 TABLET TWICE A  tablet 2    Iron, Ferrous Sulfate, 325 (65 Fe) MG Oral Tab Take by mouth daily. CLARIFY DOSE WITH PATIENT: THIS WAS ORDERED BY ONCOLOGY  30 tablet 0    Albuterol Sulfate  (90 Base) MCG/ACT Inhalation Aero Soln Inhale 2 puffs into the lungs every 4 (four) hours as needed. prn      Saline Nasal Spray 0.65 % Nasal Solution 1 spray by Nasal route as needed for congestion. acetaminophen 500 MG Oral Tab Take 1 tablet (500 mg total) by mouth every 6 (six) hours as needed for Pain. Allergies: Allergies   Allergen Reactions    Bactrim [Sulfamethoxazole W/Trimethoprim] HIVES     Facial hives    Other OTHER (SEE COMMENTS) and DIZZINESS    Penicillins ITCHING    Radiology Contrast Iodinated Dyes OTHER (SEE COMMENTS)     HARSH ON KIDNEYS       Objective:   Physical Exam  Vitals reviewed. Constitutional:       General: She is not in acute distress. Appearance: She is well-developed. She is not diaphoretic. HENT:      Right Ear: External ear normal.      Left Ear: External ear normal.      Nose: No congestion or rhinorrhea. Mouth/Throat:      Pharynx: No oropharyngeal exudate or posterior oropharyngeal erythema. Eyes:      General: No scleral icterus. Right eye: No discharge. Left eye: No discharge. Conjunctiva/sclera: Conjunctivae normal.   Neck:      Thyroid: No thyromegaly. Cardiovascular:      Rate and Rhythm: Normal rate and regular rhythm. Heart sounds: Normal heart sounds. No murmur heard.      No friction rub. No gallop. Pulmonary:      Effort: Pulmonary effort is normal. No respiratory distress. Breath sounds: Normal breath sounds. No wheezing or rales. Musculoskeletal:      Cervical back: Normal range of motion and neck supple. Lymphadenopathy:      Cervical: No cervical adenopathy. Assessment & Plan:   1. Subacute cough    2. History of recurrent UTIs      1. Subacute cough  - benzonatate 200 MG Oral Cap; Take 1 capsule (200 mg total) by mouth every 8 (eight) hours as needed for cough. Dispense: 30 capsule; Refill: 0  - fluticasone propionate 50 MCG/ACT Nasal Suspension; 2 sprays by Each Nare route daily. Dispense: 1 each; Refill: 1  - predniSONE 20 MG Oral Tab; 2.5 tab day 1-3, 2 tab day 4, 1.5 tab day 5, 1 tab day 6, 0.5 tab day 7  Dispense: 13 tablet; Refill: 0  - azithromycin (ZITHROMAX Z-ZIYAD) 250 MG Oral Tab; Take 2 tablets (500 mg total) by mouth daily for 1 day, THEN 1 tablet (250 mg total) daily for 4 days. Dispense: 6 tablet; Refill: 0    2. History of recurrent UTIs  - Urine Culture, Routine [E]; Future  - Urinalysis, Routine [E]; Future    Meds This Visit:  Requested Prescriptions     Signed Prescriptions Disp Refills    benzonatate 200 MG Oral Cap 30 capsule 0     Sig: Take 1 capsule (200 mg total) by mouth every 8 (eight) hours as needed for cough. fluticasone propionate 50 MCG/ACT Nasal Suspension 1 each 1     Si sprays by Each Nare route daily. predniSONE 20 MG Oral Tab 13 tablet 0     Si.5 tab day 1-3, 2 tab day 4, 1.5 tab day 5, 1 tab day 6, 0.5 tab day 7    azithromycin (ZITHROMAX Z-ZIYAD) 250 MG Oral Tab 6 tablet 0     Sig: Take 2 tablets (500 mg total) by mouth daily for 1 day, THEN 1 tablet (250 mg total) daily for 4 days.        Imaging & Referrals:  None

## 2023-11-17 NOTE — TELEPHONE ENCOUNTER
Per Dr. Lolly Baer, unable to get head cold from rsv vaccine, pt might have gotten from someone. As long as pt remains stable, she's ok. [facility] sent with the info above. Advised to call if symptoms worsen.

## 2023-11-21 ENCOUNTER — OFFICE VISIT (OUTPATIENT)
Facility: CLINIC | Age: 83
End: 2023-11-21
Payer: MEDICARE

## 2023-11-21 VITALS
DIASTOLIC BLOOD PRESSURE: 64 MMHG | RESPIRATION RATE: 18 BRPM | HEART RATE: 71 BPM | BODY MASS INDEX: 25 KG/M2 | SYSTOLIC BLOOD PRESSURE: 120 MMHG | WEIGHT: 124 LBS | HEIGHT: 59 IN | OXYGEN SATURATION: 93 %

## 2023-11-21 DIAGNOSIS — R05.1 ACUTE COUGH: Primary | ICD-10-CM

## 2023-11-21 PROCEDURE — 99214 OFFICE O/P EST MOD 30 MIN: CPT | Performed by: NURSE PRACTITIONER

## 2023-11-21 NOTE — PATIENT INSTRUCTIONS
continue VEST twice a day if you get sick   same sinus medications   continue breo   complete zpak as ordered  call if symptoms do not improve  Followup with Dr Tara Davis as scheduled  Please contact office at [phone] with any decline in respiratory status, questions or concerns

## 2023-11-21 NOTE — TELEPHONE ENCOUNTER
Pt states she takes Breo as prescribed by her pulmonologist and also Proair but it does not work as well as the ventolin. Advised pt that if Dr Aleisha Hernandez prescribes ventolin, she will need to discontinue the Proair, pt verbalized understanding.     Pt also has neb S:  Comfortable with epidural    O:  VSS, afebrile  FHT's 135 with mod variability and accels, recurrent variables  Deceleration to 70's while flat on her back for exam, BP at that time 74/  Fluid bolus initiated  Contractions q 2-4 on 3mU pitocin  Cervix 6/80/-2  AROM copious clear fluid  IUPC and FSE applied without difficulty  Positioned on left side    A:  Category II tracing    P:  Continue pitocin

## 2023-11-22 ENCOUNTER — HOSPITAL ENCOUNTER (EMERGENCY)
Age: 83
Discharge: HOME | End: 2023-11-22
Payer: MEDICARE

## 2023-11-22 ENCOUNTER — APPOINTMENT (OUTPATIENT)
Dept: GENERAL RADIOLOGY | Age: 83
End: 2023-11-22
Attending: NURSE PRACTITIONER
Payer: MEDICARE

## 2023-11-22 VITALS
BODY MASS INDEX: 24.19 KG/M2 | WEIGHT: 120 LBS | HEART RATE: 75 BPM | DIASTOLIC BLOOD PRESSURE: 75 MMHG | HEIGHT: 59 IN | SYSTOLIC BLOOD PRESSURE: 133 MMHG | TEMPERATURE: 99 F | OXYGEN SATURATION: 95 % | RESPIRATION RATE: 18 BRPM

## 2023-11-22 DIAGNOSIS — J01.90 ACUTE SINUSITIS, RECURRENCE NOT SPECIFIED, UNSPECIFIED LOCATION: Primary | ICD-10-CM

## 2023-11-22 DIAGNOSIS — R09.82 PND (POST-NASAL DRIP): ICD-10-CM

## 2023-11-22 DIAGNOSIS — R05.9 COUGH, UNSPECIFIED TYPE: ICD-10-CM

## 2023-11-22 PROCEDURE — 94640 AIRWAY INHALATION TREATMENT: CPT

## 2023-11-22 PROCEDURE — 71045 X-RAY EXAM CHEST 1 VIEW: CPT | Performed by: NURSE PRACTITIONER

## 2023-11-22 PROCEDURE — 99283 EMERGENCY DEPT VISIT LOW MDM: CPT

## 2023-11-22 PROCEDURE — 99284 EMERGENCY DEPT VISIT MOD MDM: CPT

## 2023-11-22 RX ORDER — ALBUTEROL SULFATE 90 UG/1
8 AEROSOL, METERED RESPIRATORY (INHALATION) ONCE
Status: COMPLETED | OUTPATIENT
Start: 2023-11-22 | End: 2023-11-22

## 2023-11-22 NOTE — DISCHARGE INSTRUCTIONS
Please increase your water intake to help keep secretions in a thin liquid state. Please continue taking the antibiotic that you were prescribed. Please start the prednisone that you were prescribed by your previous care provider. If you develop any worsening symptoms please return to the emergency room for chest pain or shortness of breath.

## 2023-11-27 NOTE — PROGRESS NOTES
Patient presents for her first Medicare annual visit. She has a lung cancer survivor and has been doing well. She has seen her pulmonologist who has adjusted some of her medications including a discontinuance of her furosemide.   She is now on no diuretic DISCHARGE

## 2023-11-29 ENCOUNTER — PATIENT OUTREACH (OUTPATIENT)
Dept: CASE MANAGEMENT | Age: 83
End: 2023-11-29

## 2023-11-29 NOTE — PROGRESS NOTES
1st attempt ER f/up apt request  No answer, LVMTCB to schedule apts  PCP -unable to contact  Closing encounter

## 2023-11-30 RX ORDER — MONTELUKAST SODIUM 10 MG/1
TABLET ORAL
Qty: 90 TABLET | Refills: 1 | Status: SHIPPED | OUTPATIENT
Start: 2023-11-30

## 2023-12-08 ENCOUNTER — OFFICE VISIT (OUTPATIENT)
Dept: HEMATOLOGY/ONCOLOGY | Facility: HOSPITAL | Age: 83
End: 2023-12-08
Attending: INTERNAL MEDICINE
Payer: MEDICARE

## 2023-12-08 VITALS
HEART RATE: 66 BPM | WEIGHT: 124.38 LBS | TEMPERATURE: 97 F | BODY MASS INDEX: 25.08 KG/M2 | HEIGHT: 59.02 IN | OXYGEN SATURATION: 93 % | RESPIRATION RATE: 16 BRPM | SYSTOLIC BLOOD PRESSURE: 139 MMHG | DIASTOLIC BLOOD PRESSURE: 70 MMHG

## 2023-12-08 DIAGNOSIS — Z79.899 OTHER LONG TERM (CURRENT) DRUG THERAPY: ICD-10-CM

## 2023-12-08 DIAGNOSIS — R93.89 ABNORMAL CT OF THE CHEST: ICD-10-CM

## 2023-12-08 DIAGNOSIS — D63.0 ANEMIA COMPLICATING NEOPLASTIC DISEASE: ICD-10-CM

## 2023-12-08 DIAGNOSIS — C34.81 MALIGNANT NEOPLASM OF OVERLAPPING SITES OF RIGHT LUNG (HCC): ICD-10-CM

## 2023-12-08 DIAGNOSIS — C34.90 EGFR-RELATED LUNG CANCER (HCC): ICD-10-CM

## 2023-12-08 DIAGNOSIS — D47.2 MONOCLONAL PARAPROTEINEMIA: ICD-10-CM

## 2023-12-08 DIAGNOSIS — D64.9 NORMOCYTIC ANEMIA: Primary | ICD-10-CM

## 2023-12-08 DIAGNOSIS — I82.431 ACUTE DEEP VEIN THROMBOSIS (DVT) OF POPLITEAL VEIN OF RIGHT LOWER EXTREMITY (HCC): ICD-10-CM

## 2023-12-08 DIAGNOSIS — N18.32 CHRONIC KIDNEY DISEASE, STAGE 3B (HCC): ICD-10-CM

## 2023-12-08 DIAGNOSIS — C85.10 LOW GRADE B-CELL LYMPHOMA (HCC): ICD-10-CM

## 2023-12-08 LAB
ALBUMIN SERPL-MCNC: 3.1 G/DL (ref 3.4–5)
ALBUMIN/GLOB SERPL: 0.9 {RATIO} (ref 1–2)
ALP LIVER SERPL-CCNC: 120 U/L
ALT SERPL-CCNC: 18 U/L
ANION GAP SERPL CALC-SCNC: 3 MMOL/L (ref 0–18)
AST SERPL-CCNC: 17 U/L (ref 15–37)
BASOPHILS # BLD AUTO: 0.03 X10(3) UL (ref 0–0.2)
BASOPHILS NFR BLD AUTO: 0.4 %
BILIRUB SERPL-MCNC: 0.4 MG/DL (ref 0.1–2)
BUN BLD-MCNC: 34 MG/DL (ref 9–23)
CALCIUM BLD-MCNC: 9.2 MG/DL (ref 8.5–10.1)
CHLORIDE SERPL-SCNC: 117 MMOL/L (ref 98–112)
CO2 SERPL-SCNC: 24 MMOL/L (ref 21–32)
CREAT BLD-MCNC: 1.93 MG/DL
EGFRCR SERPLBLD CKD-EPI 2021: 26 ML/MIN/1.73M2 (ref 60–?)
EOSINOPHIL # BLD AUTO: 0.09 X10(3) UL (ref 0–0.7)
EOSINOPHIL NFR BLD AUTO: 1.3 %
ERYTHROCYTE [DISTWIDTH] IN BLOOD BY AUTOMATED COUNT: 14 %
FASTING STATUS PATIENT QL REPORTED: NO
GLOBULIN PLAS-MCNC: 3.6 G/DL (ref 2.8–4.4)
GLUCOSE BLD-MCNC: 98 MG/DL (ref 70–99)
HCT VFR BLD AUTO: 33.8 %
HGB BLD-MCNC: 10.6 G/DL
IGA SERPL-MCNC: 157 MG/DL (ref 70–312)
IGM SERPL-MCNC: 306 MG/DL (ref 43–279)
IMM GRANULOCYTES # BLD AUTO: 0.08 X10(3) UL (ref 0–1)
IMM GRANULOCYTES NFR BLD: 1.2 %
IMMUNOGLOBULIN PNL SER-MCNC: 581 MG/DL (ref 791–1643)
LDH SERPL L TO P-CCNC: 192 U/L
LYMPHOCYTES # BLD AUTO: 0.79 X10(3) UL (ref 1–4)
LYMPHOCYTES NFR BLD AUTO: 11.6 %
MCH RBC QN AUTO: 31.7 PG (ref 26–34)
MCHC RBC AUTO-ENTMCNC: 31.4 G/DL (ref 31–37)
MCV RBC AUTO: 101.2 FL
MONOCYTES # BLD AUTO: 0.65 X10(3) UL (ref 0.1–1)
MONOCYTES NFR BLD AUTO: 9.5 %
NEUTROPHILS # BLD AUTO: 5.17 X10 (3) UL (ref 1.5–7.7)
NEUTROPHILS # BLD AUTO: 5.17 X10(3) UL (ref 1.5–7.7)
NEUTROPHILS NFR BLD AUTO: 76 %
OSMOLALITY SERPL CALC.SUM OF ELEC: 306 MOSM/KG (ref 275–295)
PLATELET # BLD AUTO: 233 10(3)UL (ref 150–450)
POTASSIUM SERPL-SCNC: 4.4 MMOL/L (ref 3.5–5.1)
PROT SERPL-MCNC: 6.7 G/DL (ref 6.4–8.2)
RBC # BLD AUTO: 3.34 X10(6)UL
SODIUM SERPL-SCNC: 144 MMOL/L (ref 136–145)
WBC # BLD AUTO: 6.8 X10(3) UL (ref 4–11)

## 2023-12-08 PROCEDURE — 99215 OFFICE O/P EST HI 40 MIN: CPT | Performed by: INTERNAL MEDICINE

## 2023-12-09 ENCOUNTER — HOSPITAL ENCOUNTER (OUTPATIENT)
Dept: CT IMAGING | Age: 83
Discharge: HOME | End: 2023-12-09
Attending: INTERNAL MEDICINE
Payer: MEDICARE

## 2023-12-09 ENCOUNTER — HOSPITAL ENCOUNTER (OUTPATIENT)
Dept: CT IMAGING | Age: 83
Discharge: HOME | End: 2023-12-09
Attending: OTOLARYNGOLOGY
Payer: MEDICARE

## 2023-12-09 DIAGNOSIS — C34.81 MALIGNANT NEOPLASM OF OVERLAPPING SITES OF RIGHT LUNG (HCC): ICD-10-CM

## 2023-12-09 DIAGNOSIS — J32.9 CHRONIC SINUSITIS, UNSPECIFIED LOCATION: ICD-10-CM

## 2023-12-09 PROCEDURE — 70486 CT MAXILLOFACIAL W/O DYE: CPT | Performed by: OTOLARYNGOLOGY

## 2023-12-09 PROCEDURE — 71250 CT THORAX DX C-: CPT | Performed by: INTERNAL MEDICINE

## 2023-12-09 PROCEDURE — 74176 CT ABD & PELVIS W/O CONTRAST: CPT | Performed by: INTERNAL MEDICINE

## 2023-12-12 LAB
ALBUMIN SERPL ELPH-MCNC: 3.21 G/DL (ref 3.75–5.21)
ALBUMIN/GLOB SERPL: 1.11 {RATIO} (ref 1–2)
ALPHA1 GLOB SERPL ELPH-MCNC: 0.41 G/DL (ref 0.19–0.46)
ALPHA2 GLOB SERPL ELPH-MCNC: 1.01 G/DL (ref 0.48–1.05)
B-GLOBULIN SERPL ELPH-MCNC: 0.68 G/DL (ref 0.68–1.23)
GAMMA GLOB SERPL ELPH-MCNC: 0.79 G/DL (ref 0.62–1.7)
KAPPA LC FREE SER-MCNC: 3.38 MG/DL (ref 0.33–1.94)
KAPPA LC FREE/LAMBDA FREE SER NEPH: 0.61 {RATIO} (ref 0.26–1.65)
LAMBDA LC FREE SERPL-MCNC: 5.59 MG/DL (ref 0.57–2.63)
M PROTEIN 1 SERPL ELPH-MCNC: 0.29 G/DL (ref ?–0)
PROT SERPL-MCNC: 6.1 G/DL (ref 5.7–8.2)

## 2023-12-18 ENCOUNTER — TELEPHONE (OUTPATIENT)
Facility: CLINIC | Age: 83
End: 2023-12-18

## 2023-12-18 NOTE — TELEPHONE ENCOUNTER
[facility] sent to pt to notify Wang Atwood is out of the office today.  That I will forward her msg to MD.

## 2023-12-19 ENCOUNTER — TELEPHONE (OUTPATIENT)
Facility: CLINIC | Age: 83
End: 2023-12-19

## 2023-12-19 NOTE — TELEPHONE ENCOUNTER
Pt has a sinus infection and Dr. Parish Chen heard a rumbling in her chest. She wonders if Dr. Consuelo Daley would like to see her.

## 2023-12-19 NOTE — TELEPHONE ENCOUNTER
Pt was seen by Dr. Capri Waters and was started on a zpack for sinus infection. Dr. Capri Waters told pt he hears a rumbling in her chest.  She is coughing all the time for about 4 weeks. She denies fever, chills, headaches and body aches. Covid test 2 weeks ago was negative. States she is coughing up mucus and has runny nose both slightly yellow to clear and is sticky. Thinks that this dripping from sinuses is causing her to cough. Pt requesting appt with Dr. Norma Poe but no appts available. Scheduled to see Garland SALDANA. Pt also would like to discuss the results of her CT scan from 12-9-23. States Dr. Dilia Torres was supposed to message Dr. Norma Poe about the results.

## 2023-12-20 ENCOUNTER — TELEPHONE (OUTPATIENT)
Facility: CLINIC | Age: 83
End: 2023-12-20

## 2023-12-20 NOTE — TELEPHONE ENCOUNTER
Spoke with Kacy SALDANA.  She would like to listen to pt's lungs when she see pt tomorrow for office visit before she orders CXR.  Pt notified.  Pt asking if zpak will cover bronchitis is she has it.  Informed pt that a definitive answer cannot be given for that question due to sensitivity, viral vs. Bacterial etc.  Pt will discuss with Kacy tomorrow.

## 2023-12-20 NOTE — TELEPHONE ENCOUNTER
Patient saw PCP and they gave her a zpac, seeing MG tomorrow.  Wondering if she should get a cxr before her visit to determine if she has pneumonia?

## 2023-12-21 ENCOUNTER — OFFICE VISIT (OUTPATIENT)
Facility: CLINIC | Age: 83
End: 2023-12-21
Payer: MEDICARE

## 2023-12-21 VITALS
RESPIRATION RATE: 16 BRPM | HEIGHT: 59 IN | HEART RATE: 74 BPM | BODY MASS INDEX: 25.2 KG/M2 | WEIGHT: 125 LBS | DIASTOLIC BLOOD PRESSURE: 70 MMHG | SYSTOLIC BLOOD PRESSURE: 140 MMHG | OXYGEN SATURATION: 97 %

## 2023-12-21 DIAGNOSIS — J44.9 CHRONIC OBSTRUCTIVE PULMONARY DISEASE, UNSPECIFIED COPD TYPE (HCC): ICD-10-CM

## 2023-12-21 DIAGNOSIS — R05.1 ACUTE COUGH: Primary | ICD-10-CM

## 2023-12-21 PROCEDURE — 99214 OFFICE O/P EST MOD 30 MIN: CPT | Performed by: NURSE PRACTITIONER

## 2023-12-21 RX ORDER — GUAIFENESIN/DEXTROMETHORPHAN 100-10MG/5
5 SYRUP ORAL NIGHTLY
COMMUNITY
End: 2023-12-25

## 2023-12-21 RX ORDER — GUAIFENESIN 600 MG/1
1200 TABLET, EXTENDED RELEASE ORAL
COMMUNITY
End: 2023-12-25

## 2023-12-21 RX ORDER — AZITHROMYCIN 250 MG/1
250 TABLET, FILM COATED ORAL
COMMUNITY
Start: 2023-12-19 | End: 2023-12-25

## 2023-12-25 ENCOUNTER — APPOINTMENT (OUTPATIENT)
Dept: GENERAL RADIOLOGY | Age: 83
End: 2023-12-25
Attending: EMERGENCY MEDICINE
Payer: MEDICARE

## 2023-12-25 ENCOUNTER — HOSPITAL ENCOUNTER (EMERGENCY)
Age: 83
Discharge: HOME | End: 2023-12-25
Attending: EMERGENCY MEDICINE
Payer: MEDICARE

## 2023-12-25 VITALS
SYSTOLIC BLOOD PRESSURE: 150 MMHG | HEART RATE: 66 BPM | WEIGHT: 125 LBS | RESPIRATION RATE: 16 BRPM | OXYGEN SATURATION: 96 % | HEIGHT: 59 IN | TEMPERATURE: 98 F | DIASTOLIC BLOOD PRESSURE: 76 MMHG | BODY MASS INDEX: 25.2 KG/M2

## 2023-12-25 DIAGNOSIS — R04.2 HEMOPTYSIS: Primary | ICD-10-CM

## 2023-12-25 PROCEDURE — 71045 X-RAY EXAM CHEST 1 VIEW: CPT | Performed by: EMERGENCY MEDICINE

## 2023-12-25 PROCEDURE — 99283 EMERGENCY DEPT VISIT LOW MDM: CPT

## 2023-12-25 PROCEDURE — 99284 EMERGENCY DEPT VISIT MOD MDM: CPT

## 2023-12-25 NOTE — ED INITIAL ASSESSMENT (HPI)
Reports has a sinus infection and has had a productive cough. Today with coughing up bright red blood.

## 2023-12-26 ENCOUNTER — OFFICE VISIT (OUTPATIENT)
Facility: CLINIC | Age: 83
End: 2023-12-26
Payer: MEDICARE

## 2023-12-26 VITALS
SYSTOLIC BLOOD PRESSURE: 136 MMHG | BODY MASS INDEX: 25.2 KG/M2 | OXYGEN SATURATION: 96 % | WEIGHT: 125 LBS | RESPIRATION RATE: 16 BRPM | HEART RATE: 74 BPM | DIASTOLIC BLOOD PRESSURE: 80 MMHG | HEIGHT: 59 IN

## 2023-12-26 DIAGNOSIS — J47.9 BRONCHIECTASIS WITHOUT COMPLICATION (HCC): ICD-10-CM

## 2023-12-26 DIAGNOSIS — R05.1 ACUTE COUGH: ICD-10-CM

## 2023-12-26 DIAGNOSIS — R04.2 HEMOPTYSIS: ICD-10-CM

## 2023-12-26 DIAGNOSIS — C34.90 MALIGNANT NEOPLASM OF LUNG, UNSPECIFIED LATERALITY, UNSPECIFIED PART OF LUNG (HCC): ICD-10-CM

## 2023-12-26 DIAGNOSIS — R91.8 PULMONARY NODULES/LESIONS, MULTIPLE: ICD-10-CM

## 2023-12-26 DIAGNOSIS — A31.0 MAI (MYCOBACTERIUM AVIUM-INTRACELLULARE) (HCC): Primary | ICD-10-CM

## 2023-12-26 PROCEDURE — 99214 OFFICE O/P EST MOD 30 MIN: CPT | Performed by: NURSE PRACTITIONER

## 2023-12-26 NOTE — PATIENT INSTRUCTIONS
continue VEST twice a day if you get sick   same sinus medications   continue breo   followup in February as scheduled   Please contact office at [phone] with any decline in respiratory status, questions or concerns

## 2023-12-28 ENCOUNTER — PATIENT OUTREACH (OUTPATIENT)
Dept: CASE MANAGEMENT | Age: 83
End: 2023-12-28

## 2023-12-28 NOTE — PROGRESS NOTES
1st attempt ER f/up apt request  No answer, LVMTCB to schedule apt  PCP -unable to contact  PULM -existing apt (12/26)  Closing encounter

## 2024-01-29 ENCOUNTER — TELEPHONE (OUTPATIENT)
Facility: CLINIC | Age: 84
End: 2024-01-29

## 2024-01-29 NOTE — TELEPHONE ENCOUNTER
[facility] added to pt's pharmacy list and [facility] removed.  Pt has no prescriptions that need to be sent at this time.

## 2024-02-02 ENCOUNTER — OFFICE VISIT (OUTPATIENT)
Facility: CLINIC | Age: 84
End: 2024-02-02
Payer: MEDICARE

## 2024-02-02 VITALS
BODY MASS INDEX: 25.6 KG/M2 | HEART RATE: 76 BPM | WEIGHT: 127 LBS | DIASTOLIC BLOOD PRESSURE: 70 MMHG | HEIGHT: 59 IN | SYSTOLIC BLOOD PRESSURE: 130 MMHG | TEMPERATURE: 98 F | OXYGEN SATURATION: 97 % | RESPIRATION RATE: 16 BRPM

## 2024-02-02 DIAGNOSIS — R91.8 PULMONARY NODULES/LESIONS, MULTIPLE: ICD-10-CM

## 2024-02-02 DIAGNOSIS — R05.1 ACUTE COUGH: Primary | ICD-10-CM

## 2024-02-02 PROCEDURE — 99213 OFFICE O/P EST LOW 20 MIN: CPT | Performed by: NURSE PRACTITIONER

## 2024-02-02 NOTE — PROGRESS NOTES
Subjective:   Rossy Car is a 83 year old female who presents for cough and sinus congestion. Seeing ENT PA; s/p zpak x 2 and now on Doxycycline. Reports mucous has cleared up; still with edema under both eyes that she was told was due to her sinusitis. No f/c/ns. Continues with Bk med sinus rinse, nasal spray, VEST tx    Previously 12/26/24  a 83 year old female who presents for evaluation s/p ER visit (Coughing up blood). Reports having 4 episodes of hemoptysis yesterday; < tsp total per son. Had not happened prior or since. Has cough still however less often and clear phlegm today. No f/c/ns. Feels better overall . On Xarelto for hx of DVT managed by Dr Chang.      Previously 12/21/23  a 83 year old female who presents for follow - up. Reports productive cough clear-light yellow in color, was in the ER end of September, saw ENT was on a round of antibiotics and steroids. All seem to start after the RSV shot.       Started zpak 2 days ago; less sinus drainage, less cough, feels better; no f/c/ns  Continues with mucinex 600mg BID; robitussin DM at night  Continues with Astelin per ENT and saline nasal spray; using neilmed wash 1-2 x times     Previously 11/2023   a 83 year old female who presents for evaluation of persistent cough that is worse than. Pt seen by PCP and cardiologist and told her lungs are clear. Son Kenneth is concerned the cough seems worse and more productive  of clear/cream colored phlegm and she is not getting better. Given zpak by PCP 11/16/23; just started zpak yesterday as they were not sure it would help.  No f/c/ns.      Previously 10/18/23Dr Jiang  a 83 year old female presenting to pulmonary clinic   COVID 9/19/2020  Has sinus infection- saw boni- - and has abnl CT sinus and started bactrim then changed to ceftin- to start tomorrow   Cough - nonprod now -occasionally  A littile   smartvest- twice a day   Stopped flutter -   breo and used rescue once-- not in over one year    flonase , xclear - off claritin      Flare of low back pain- then with sciatica - had xrays no obvious issues - norco - #10 helped not now -   Cough when lays down- clearing throat and when awakenings - and nasal drip   No cough or rare during the day- - unless- not expectorating   No shortness of breath   Stopped vest thinks before CT - nothing was coming up      9.23 - - doesn't want covid vaccine....  Not doing the VEST -   Now with some coughing -- when lays down at night - never wakes her up-- thinks clearing throat   Breathing is OK_ - no mucus   No cp-   Remains on breo   Remains on flonase and astlein  Stopped vest -- nothing coming up -- last used - at least 2 months -       10.23- to er - with rt hip area pain-- found UTI-   Pain remains - thinks related to hip - thinks related to movement - with walking and standing- - recent onset one week ago - lower back issues   Breathing is good- rare coughing -              History/Other:    Chief Complaint Reviewed and Verified  Nursing Notes Reviewed and   Verified  Tobacco Reviewed  Allergies Reviewed  Medications Reviewed    Problem List Reviewed  Medical History Reviewed  Surgical History   Reviewed  Family History Reviewed         Tobacco:  She has never smoked tobacco.    Current Outpatient Medications   Medication Sig Dispense Refill    montelukast 10 MG Oral Tab TAKE 1 TABLET EVERY EVENING 90 tablet 1    rivaroxaban (XARELTO) 15 MG Oral Tab Take 1 tablet (15 mg total) by mouth daily with food. 90 tablet 3    HYDROcodone-acetaminophen (NORCO) 5-325 MG Oral Tab Take 1 tablet by mouth every 8 (eight) hours as needed for Pain. 24 tablet 0    BREO ELLIPTA 200-25 MCG/ACT Inhalation Aerosol Powder, Breath Activated       hydrALAZINE 50 MG Oral Tab Take 1 tablet (50 mg total) by mouth 3 (three) times daily.      sodium chloride, hypertonic, 3 % Inhalation Nebu Soln Take 15 mL by nebulization as needed for Other. 30 each 5    azelastine 0.1 % Nasal  Solution 1 spray by Nasal route 2 (two) times daily. 1 each 5    dilTIAZem  MG Oral Capsule SR 24 Hr Take 1 capsule (180 mg total) by mouth daily.      diphenoxylate-atropine 2.5-0.025 MG Oral Tab Take 1 tablet by mouth 4 (four) times daily as needed for Diarrhea. 30 tablet 0    rosuvastatin 5 MG Oral Tab Take 1 tablet (5 mg total) by mouth nightly. 90 tablet 3    metoprolol succinate 50 MG Oral Tablet 24 Hr Take 1 tablet (50 mg total) by mouth 2 (two) times a day. 180 tablet 1    LOSARTAN POTASSIUM 50 MG Oral Tab TAKE 1 TABLET TWICE A  tablet 2    Iron, Ferrous Sulfate, 325 (65 Fe) MG Oral Tab Take by mouth daily. CLARIFY DOSE WITH PATIENT: THIS WAS ORDERED BY ONCOLOGY  30 tablet 0    Albuterol Sulfate  (90 Base) MCG/ACT Inhalation Aero Soln Inhale 2 puffs into the lungs every 4 (four) hours as needed. prn      Saline Nasal Spray 0.65 % Nasal Solution 1 spray by Nasal route as needed for congestion.      acetaminophen 500 MG Oral Tab Take 1 tablet (500 mg total) by mouth every 6 (six) hours as needed for Pain.      guaiFENesin-codeine (CHERATUSSIN AC) 100-10 MG/5ML Oral Solution Take 5 mL by mouth every 6 (six) hours as needed for cough. (Patient not taking: Reported on 2/2/2024) 237 mL 0         Review of Systems:  Review of Systems   Constitutional:  Negative for activity change, appetite change, chills, diaphoresis, fatigue, fever and unexpected weight change.   HENT:  Positive for rhinorrhea. Negative for congestion, postnasal drip, sinus pressure, sinus pain, sneezing, sore throat, trouble swallowing and voice change.    Respiratory:  Positive for cough. Negative for apnea, choking, chest tightness, shortness of breath, wheezing and stridor.    Cardiovascular:  Negative for chest pain, palpitations and leg swelling.   Musculoskeletal:  Negative for arthralgias.   Skin:  Negative for pallor and rash.   Allergic/Immunologic: Negative for immunocompromised state.   Neurological:  Negative for  dizziness and headaches.   Hematological:  Negative for adenopathy.         Objective:   /70 (BP Location: Right arm, Patient Position: Sitting, Cuff Size: adult)   Pulse 76   Resp 16   Ht 4' 11\" (1.499 m)   Wt 127 lb (57.6 kg)   SpO2 97%   BMI 25.65 kg/m²  Estimated body mass index is 25.65 kg/m² as calculated from the following:    Height as of this encounter: 4' 11\" (1.499 m).    Weight as of this encounter: 127 lb (57.6 kg).  Physical Exam  Vitals reviewed.   Constitutional:       Appearance: Normal appearance. She is normal weight.   HENT:      Head: Normocephalic and atraumatic.      Mouth/Throat:      Mouth: Mucous membranes are moist.   Eyes:      Extraocular Movements: Extraocular movements intact.   Cardiovascular:      Rate and Rhythm: Normal rate and regular rhythm.      Pulses: Normal pulses.      Heart sounds: Normal heart sounds.   Pulmonary:      Effort: Pulmonary effort is normal.      Breath sounds: Normal breath sounds and air entry.   Abdominal:      Palpations: Abdomen is soft.   Musculoskeletal:         General: Normal range of motion.      Cervical back: Normal range of motion.   Skin:     General: Skin is warm and dry.   Neurological:      General: No focal deficit present.      Mental Status: She is alert and oriented to person, place, and time.   Psychiatric:         Mood and Affect: Mood normal.         Behavior: Behavior normal.           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  Initially 2013  IgM greater than 4000 with monoclonal spike  Continues to follow with hematology  No recent issues        #Lung cancers x3  Stage IB right upper lobe 2009 status post right upper lobectomy  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  Stage I right medial lung base lepidic  pattern 2014 status post SBRT  Ongoing surveillance     6/23 increased size and existing nodules suspect BILL/lack of vest therapy for short interval follow-up  10/23 with repeat CT planned in 4 months  12/2023 CT C/A/P 12/9/23; reviewed with oncologist; continue with followup in February 2/2024 Follows with oncology; CT scans now q 6 months     #Diastolic dysfunction  Some increased fluid status off diuretics with increasing chronic kidney disease  Increased swelling over the summer to trial elevation stockings attempting to avoid diuretics  Now with increased swelling thought questionably related to amlodipine-ongoing recent med changes per primary  5/21 now following with Dr. Concepcion of water pills  6/21 with echo PAS 40-45 no change in chronic swelling  8/22 saw Dr. Cardoso told no need for water pills and following  12.22- seeing dr barrera now - BP meds- changed to 3 times a day and better- -  Off water pills now   6.23- told blood work - off water pill still   10/23 fluid status seems very stable        #History-- chronic obstructive lung disease  Spirometry 2016 with ratio 66%  Normal PFTs 2015 reduced DLCO  Continues to note ongoing benefit from ICS/LABA--continue present management  6.23 - remains on Breo -   10/23 continue present management  11/2023 No e/o exacerbation; CCT  12/2023 Stable; continue current treatment  2/2024 Stable; CCT        #Cough  Fairly recent onset   resurgence with  bacterial pneumonia and had stopped meds  Seems primarily related to postnasal drip now resolved on resuming medications  Remains on coverage for bronchospasm as well continuing Breo  Slight flare 5/19  related to recent upper respiratory infection  Suspect primary issue of sinus congestion/postnasal drip  Now resolved  Discussed role of allergy testing and immunotherapy  11/19 with significant resurgence  To resume Incruse and nasal saline  Unclear role of losartan in 2020-questionably increased cough  8/22 resolved after 2 months suspect related to postnasal drip ongoing  12/22 there is significant flare in response to COVID-19 infection--better with aggressive therapy with plans to continue present management  6/23 very well controlled denies active issues  9.23- rare sense - - stopped vest   10/23 now remains very compliant with her vest-continuing to follow cough is minimal  11/2023 CCT and complete zpak as recommended. Likely associated with rhinitis and PND See ENT as scheduled. 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  Patient remains essentially asymptomatic  Bronchoscopy 12/19 of the right lower lobe with negative AFB and fungus  Negative gold  And short interval follow-up left upper lobe lesion is smaller but increased groundglass opacity  Repeat CT 10/21 improved remains without symptoms  1/22 CT with significant clearing still with nodules but most are smaller  8/20 2 repeat scan with left upper lobe area resolved--new right middle lobe infiltrates and nodules--suspected BILL  9/6/2022 with positive cultures for BILL  12/22-last CT was some evidence of improvement though continues with waxing and waning--overall disease load is mild--- plan to continue--using and benefiting from vest therapy-to reinstitute  flutter valve when sick as well  No indication for treatment at this time  6.23- increased size of multiple nodules - stopped the VEST - to resume and for short interval follow up   10/23 clinically stable plan for repeat CT  12/2023 CT C/A/P as noted above; continue to monitor  2/2024 See above     # pleural effusion   Minimal effusion in 2020  Since that time with some waxing and waning certainly increased from 2020---Seems unchanged from oct 2022 and forward   12/2023 No e/o recurrence     Refuses covid vaccine         -Plan:  - continue VEST twice a day if you get sick   - same sinus medications   - continue breo   - followup in April as scheduled        SHANA Conway, 2/2/2024, 9:00 AM

## 2024-02-13 ENCOUNTER — TELEPHONE (OUTPATIENT)
Facility: CLINIC | Age: 84
End: 2024-02-13

## 2024-02-13 RX ORDER — FLUTICASONE FUROATE AND VILANTEROL TRIFENATATE 200; 25 UG/1; UG/1
1 POWDER RESPIRATORY (INHALATION) DAILY
Qty: 3 EACH | Refills: 1 | Status: SHIPPED | OUTPATIENT
Start: 2024-02-13

## 2024-02-13 NOTE — TELEPHONE ENCOUNTER
Pt requesting Breo refill. Last OV on 2/2/24    Per Kacy SALDANA note: Remains on breo     Next OV on 4/8/24.     Kacy SALDANA notified. Agreeable to refill. Rx refilled. MCM sent to pt notifying.

## 2024-03-02 ENCOUNTER — OFFICE VISIT (OUTPATIENT)
Dept: FAMILY MEDICINE CLINIC | Facility: CLINIC | Age: 84
End: 2024-03-02
Payer: MEDICARE

## 2024-03-02 ENCOUNTER — PATIENT MESSAGE (OUTPATIENT)
Dept: FAMILY MEDICINE CLINIC | Facility: CLINIC | Age: 84
End: 2024-03-02

## 2024-03-02 VITALS
BODY MASS INDEX: 25.8 KG/M2 | SYSTOLIC BLOOD PRESSURE: 128 MMHG | HEART RATE: 73 BPM | OXYGEN SATURATION: 95 % | DIASTOLIC BLOOD PRESSURE: 84 MMHG | HEIGHT: 59 IN | RESPIRATION RATE: 16 BRPM | WEIGHT: 128 LBS

## 2024-03-02 DIAGNOSIS — H57.89 REDNESS, EYE: ICD-10-CM

## 2024-03-02 DIAGNOSIS — R41.3 MEMORY CHANGE: Primary | ICD-10-CM

## 2024-03-02 DIAGNOSIS — M79.89 LEG SWELLING: ICD-10-CM

## 2024-03-02 PROCEDURE — 99214 OFFICE O/P EST MOD 30 MIN: CPT | Performed by: FAMILY MEDICINE

## 2024-03-02 RX ORDER — TRIAMCINOLONE ACETONIDE 1 MG/G
CREAM TOPICAL 2 TIMES DAILY PRN
Qty: 1 EACH | Refills: 1 | Status: SHIPPED | OUTPATIENT
Start: 2024-03-02

## 2024-03-02 RX ORDER — MEMANTINE HYDROCHLORIDE 5 MG/1
5 TABLET ORAL 2 TIMES DAILY
Qty: 60 TABLET | Refills: 1 | Status: SHIPPED | OUTPATIENT
Start: 2024-03-02

## 2024-03-02 RX ORDER — PREDNISOLONE ACETATE 10 MG/ML
1 SUSPENSION/ DROPS OPHTHALMIC 4 TIMES DAILY
Qty: 1 EACH | Refills: 1 | Status: SHIPPED | OUTPATIENT
Start: 2024-03-02

## 2024-03-02 NOTE — PROGRESS NOTES
Subjective:   Patient ID: Rossy Car is a 83 year old female.    + memory changes.  slower processing and trouble finding words.  Concern for dementia.    2. Redness, eye.  B/l upper eyelid swelling.  Seen 2 different eye doctors without improvement.  Sticky gunk coming out of eyes.  No vision changes.      3. Leg swelling.  New.  B/l.  No calf pain.  Son thinks it might be the antihistamine, so just stopped it.  Swelling is down every morning but builds up throughout the day.  No CP/SOB.        History/Other:   Review of Systems   All other systems reviewed and are negative.    Current Outpatient Medications   Medication Sig Dispense Refill    prednisoLONE 1 % Ophthalmic Suspension Place 1 drop into both eyes 4 (four) times daily. Up to 1 week, then 1 week break 1 each 1    triamcinolone 0.1 % External Cream Apply topically 2 (two) times daily as needed. Up to 5, then 1 week break before restarting this cycle as needed. 1 each 1    memantine 5 MG Oral Tab Take 1 tablet (5 mg total) by mouth 2 (two) times daily. 60 tablet 1    BREO ELLIPTA 200-25 MCG/ACT Inhalation Aerosol Powder, Breath Activated Inhale 1 puff into the lungs daily. 3 each 1    montelukast 10 MG Oral Tab TAKE 1 TABLET EVERY EVENING 90 tablet 1    rivaroxaban (XARELTO) 15 MG Oral Tab Take 1 tablet (15 mg total) by mouth daily with food. 90 tablet 3    HYDROcodone-acetaminophen (NORCO) 5-325 MG Oral Tab Take 1 tablet by mouth every 8 (eight) hours as needed for Pain. 24 tablet 0    BREO ELLIPTA 200-25 MCG/ACT Inhalation Aerosol Powder, Breath Activated       hydrALAZINE 50 MG Oral Tab Take 1 tablet (50 mg total) by mouth 3 (three) times daily.      sodium chloride, hypertonic, 3 % Inhalation Nebu Soln Take 15 mL by nebulization as needed for Other. 30 each 5    azelastine 0.1 % Nasal Solution 1 spray by Nasal route 2 (two) times daily. 1 each 5    dilTIAZem  MG Oral Capsule SR 24 Hr Take 1 capsule (180 mg total) by mouth daily.       diphenoxylate-atropine 2.5-0.025 MG Oral Tab Take 1 tablet by mouth 4 (four) times daily as needed for Diarrhea. 30 tablet 0    rosuvastatin 5 MG Oral Tab Take 1 tablet (5 mg total) by mouth nightly. 90 tablet 3    metoprolol succinate 50 MG Oral Tablet 24 Hr Take 1 tablet (50 mg total) by mouth 2 (two) times a day. 180 tablet 1    LOSARTAN POTASSIUM 50 MG Oral Tab TAKE 1 TABLET TWICE A  tablet 2    Iron, Ferrous Sulfate, 325 (65 Fe) MG Oral Tab Take by mouth daily. CLARIFY DOSE WITH PATIENT: THIS WAS ORDERED BY ONCOLOGY  30 tablet 0    Albuterol Sulfate  (90 Base) MCG/ACT Inhalation Aero Soln Inhale 2 puffs into the lungs every 4 (four) hours as needed. prn      Saline Nasal Spray 0.65 % Nasal Solution 1 spray by Nasal route as needed for congestion.      acetaminophen 500 MG Oral Tab Take 1 tablet (500 mg total) by mouth every 6 (six) hours as needed for Pain.       Allergies:  Allergies   Allergen Reactions    Bactrim [Sulfamethoxazole W/Trimethoprim] HIVES     Facial hives    Other OTHER (SEE COMMENTS) and DIZZINESS    Penicillins ITCHING    Radiology Contrast Iodinated Dyes OTHER (SEE COMMENTS)     HARSH ON KIDNEYS       Objective:   Physical Exam  Vitals reviewed.   Constitutional:       General: She is not in acute distress.     Appearance: She is well-developed. She is not diaphoretic.   Eyes:      General: No scleral icterus.        Right eye: No discharge.         Left eye: No discharge.      Conjunctiva/sclera: Conjunctivae normal.   Cardiovascular:      Rate and Rhythm: Normal rate and regular rhythm.      Heart sounds: Normal heart sounds. No murmur heard.     No friction rub. No gallop.   Pulmonary:      Effort: Pulmonary effort is normal. No respiratory distress.      Breath sounds: Normal breath sounds. No wheezing or rales.         Assessment & Plan:   1. Memory change    2. Redness, eye    3. Leg swelling      1. Memory change  - memantine 5 MG Oral Tab; Take 1 tablet (5 mg total) by  mouth 2 (two) times daily.  Dispense: 60 tablet; Refill: 1  - MRI BRAIN (W+WO) (CPT=70553); Future    2. Redness, eye  - prednisoLONE 1 % Ophthalmic Suspension; Place 1 drop into both eyes 4 (four) times daily. Up to 1 week, then 1 week break  Dispense: 1 each; Refill: 1  - triamcinolone 0.1 % External Cream; Apply topically 2 (two) times daily as needed. Up to 5, then 1 week break before restarting this cycle as needed.  Dispense: 1 each; Refill: 1  - Ophthalmology Referral - In Network    3. Leg swelling  Leg elevation 3 times a day, 15 mins at a time.  Trial without antihistamine  If that doesn't help do trial without singulair  If no then trial without famotidine.    Meds This Visit:  Requested Prescriptions     Signed Prescriptions Disp Refills    prednisoLONE 1 % Ophthalmic Suspension 1 each 1     Sig: Place 1 drop into both eyes 4 (four) times daily. Up to 1 week, then 1 week break    triamcinolone 0.1 % External Cream 1 each 1     Sig: Apply topically 2 (two) times daily as needed. Up to 5, then 1 week break before restarting this cycle as needed.    memantine 5 MG Oral Tab 60 tablet 1     Sig: Take 1 tablet (5 mg total) by mouth 2 (two) times daily.       Imaging & Referrals:  OPHTHALMOLOGY - INTERNAL  MRI BRAIN (W+WO) (CPT=70553)

## 2024-03-03 ENCOUNTER — APPOINTMENT (OUTPATIENT)
Dept: MRI IMAGING | Facility: HOSPITAL | Age: 84
End: 2024-03-03
Attending: EMERGENCY MEDICINE
Payer: MEDICARE

## 2024-03-03 ENCOUNTER — PATIENT MESSAGE (OUTPATIENT)
Dept: FAMILY MEDICINE CLINIC | Facility: CLINIC | Age: 84
End: 2024-03-03

## 2024-03-03 ENCOUNTER — APPOINTMENT (OUTPATIENT)
Dept: GENERAL RADIOLOGY | Facility: HOSPITAL | Age: 84
End: 2024-03-03
Attending: EMERGENCY MEDICINE
Payer: MEDICARE

## 2024-03-03 ENCOUNTER — HOSPITAL ENCOUNTER (EMERGENCY)
Facility: HOSPITAL | Age: 84
Discharge: HOME | End: 2024-03-03
Attending: EMERGENCY MEDICINE
Payer: MEDICARE

## 2024-03-03 ENCOUNTER — APPOINTMENT (OUTPATIENT)
Dept: CT IMAGING | Facility: HOSPITAL | Age: 84
End: 2024-03-03
Attending: EMERGENCY MEDICINE
Payer: MEDICARE

## 2024-03-03 VITALS
SYSTOLIC BLOOD PRESSURE: 175 MMHG | DIASTOLIC BLOOD PRESSURE: 76 MMHG | BODY MASS INDEX: 24.19 KG/M2 | OXYGEN SATURATION: 96 % | HEIGHT: 59 IN | WEIGHT: 120 LBS | RESPIRATION RATE: 17 BRPM | HEART RATE: 67 BPM

## 2024-03-03 DIAGNOSIS — R42 DIZZINESS: Primary | ICD-10-CM

## 2024-03-03 DIAGNOSIS — S50.02XA CONTUSION OF LEFT ELBOW, INITIAL ENCOUNTER: ICD-10-CM

## 2024-03-03 LAB
ALBUMIN SERPL-MCNC: 3.5 G/DL (ref 3.4–5)
ALBUMIN/GLOB SERPL: 1 {RATIO} (ref 1–2)
ALP LIVER SERPL-CCNC: 116 U/L
ALT SERPL-CCNC: 13 U/L
ANION GAP SERPL CALC-SCNC: 2 MMOL/L (ref 0–18)
AST SERPL-CCNC: 18 U/L (ref 15–37)
ATRIAL RATE: 75 BPM
BASOPHILS # BLD AUTO: 0.03 X10(3) UL (ref 0–0.2)
BASOPHILS NFR BLD AUTO: 0.6 %
BILIRUB SERPL-MCNC: 0.5 MG/DL (ref 0.1–2)
BILIRUB UR QL STRIP.AUTO: NEGATIVE
BUN BLD-MCNC: 29 MG/DL (ref 9–23)
CALCIUM BLD-MCNC: 9.5 MG/DL (ref 8.5–10.1)
CHLORIDE SERPL-SCNC: 114 MMOL/L (ref 98–112)
CLARITY UR REFRACT.AUTO: CLEAR
CO2 SERPL-SCNC: 26 MMOL/L (ref 21–32)
COLOR UR AUTO: COLORLESS
CREAT BLD-MCNC: 1.52 MG/DL
EGFRCR SERPLBLD CKD-EPI 2021: 34 ML/MIN/1.73M2 (ref 60–?)
EOSINOPHIL # BLD AUTO: 0.15 X10(3) UL (ref 0–0.7)
EOSINOPHIL NFR BLD AUTO: 2.9 %
ERYTHROCYTE [DISTWIDTH] IN BLOOD BY AUTOMATED COUNT: 14.3 %
GLOBULIN PLAS-MCNC: 3.6 G/DL (ref 2.8–4.4)
GLUCOSE BLD-MCNC: 94 MG/DL (ref 70–99)
GLUCOSE UR STRIP.AUTO-MCNC: NORMAL MG/DL
HCT VFR BLD AUTO: 32.6 %
HGB BLD-MCNC: 10.1 G/DL
IMM GRANULOCYTES # BLD AUTO: 0.04 X10(3) UL (ref 0–1)
IMM GRANULOCYTES NFR BLD: 0.8 %
KETONES UR STRIP.AUTO-MCNC: NEGATIVE MG/DL
LEUKOCYTE ESTERASE UR QL STRIP.AUTO: 25
LYMPHOCYTES # BLD AUTO: 0.76 X10(3) UL (ref 1–4)
LYMPHOCYTES NFR BLD AUTO: 14.6 %
MAGNESIUM SERPL-MCNC: 2.1 MG/DL (ref 1.6–2.6)
MCH RBC QN AUTO: 31.5 PG (ref 26–34)
MCHC RBC AUTO-ENTMCNC: 31 G/DL (ref 31–37)
MCV RBC AUTO: 101.6 FL
MONOCYTES # BLD AUTO: 0.55 X10(3) UL (ref 0.1–1)
MONOCYTES NFR BLD AUTO: 10.5 %
NEUTROPHILS # BLD AUTO: 3.69 X10 (3) UL (ref 1.5–7.7)
NEUTROPHILS # BLD AUTO: 3.69 X10(3) UL (ref 1.5–7.7)
NEUTROPHILS NFR BLD AUTO: 70.6 %
NITRITE UR QL STRIP.AUTO: NEGATIVE
NT-PROBNP SERPL-MCNC: 1131 PG/ML (ref ?–450)
OSMOLALITY SERPL CALC.SUM OF ELEC: 300 MOSM/KG (ref 275–295)
P AXIS: 42 DEGREES
P-R INTERVAL: 206 MS
PH UR STRIP.AUTO: 6 [PH] (ref 5–8)
PLATELET # BLD AUTO: 213 10(3)UL (ref 150–450)
POTASSIUM SERPL-SCNC: 3.8 MMOL/L (ref 3.5–5.1)
PROT SERPL-MCNC: 7.1 G/DL (ref 6.4–8.2)
Q-T INTERVAL: 396 MS
QRS DURATION: 90 MS
QTC CALCULATION (BEZET): 442 MS
R AXIS: -5 DEGREES
RBC # BLD AUTO: 3.21 X10(6)UL
RBC UR QL AUTO: NEGATIVE
SODIUM SERPL-SCNC: 142 MMOL/L (ref 136–145)
SP GR UR STRIP.AUTO: 1.01 (ref 1–1.03)
T AXIS: 28 DEGREES
TROPONIN I SERPL HS-MCNC: 13 NG/L
TROPONIN I SERPL HS-MCNC: 15 NG/L
UROBILINOGEN UR STRIP.AUTO-MCNC: NORMAL MG/DL
VENTRICULAR RATE: 75 BPM
WBC # BLD AUTO: 5.2 X10(3) UL (ref 4–11)

## 2024-03-03 PROCEDURE — 83735 ASSAY OF MAGNESIUM: CPT | Performed by: EMERGENCY MEDICINE

## 2024-03-03 PROCEDURE — 80053 COMPREHEN METABOLIC PANEL: CPT | Performed by: EMERGENCY MEDICINE

## 2024-03-03 PROCEDURE — 71045 X-RAY EXAM CHEST 1 VIEW: CPT | Performed by: EMERGENCY MEDICINE

## 2024-03-03 PROCEDURE — 96360 HYDRATION IV INFUSION INIT: CPT

## 2024-03-03 PROCEDURE — 93010 ELECTROCARDIOGRAM REPORT: CPT

## 2024-03-03 PROCEDURE — 83880 ASSAY OF NATRIURETIC PEPTIDE: CPT | Performed by: EMERGENCY MEDICINE

## 2024-03-03 PROCEDURE — 93005 ELECTROCARDIOGRAM TRACING: CPT

## 2024-03-03 PROCEDURE — 70551 MRI BRAIN STEM W/O DYE: CPT | Performed by: EMERGENCY MEDICINE

## 2024-03-03 PROCEDURE — 84484 ASSAY OF TROPONIN QUANT: CPT | Performed by: EMERGENCY MEDICINE

## 2024-03-03 PROCEDURE — 81001 URINALYSIS AUTO W/SCOPE: CPT | Performed by: EMERGENCY MEDICINE

## 2024-03-03 PROCEDURE — 99285 EMERGENCY DEPT VISIT HI MDM: CPT

## 2024-03-03 PROCEDURE — 70450 CT HEAD/BRAIN W/O DYE: CPT | Performed by: EMERGENCY MEDICINE

## 2024-03-03 PROCEDURE — 85025 COMPLETE CBC W/AUTO DIFF WBC: CPT | Performed by: EMERGENCY MEDICINE

## 2024-03-03 PROCEDURE — 96361 HYDRATE IV INFUSION ADD-ON: CPT

## 2024-03-03 RX ORDER — MECLIZINE HYDROCHLORIDE 25 MG/1
25 TABLET ORAL 3 TIMES DAILY PRN
Qty: 20 TABLET | Refills: 0 | Status: SHIPPED | OUTPATIENT
Start: 2024-03-03

## 2024-03-03 NOTE — ED INITIAL ASSESSMENT (HPI)
Pt arrives to ED for evaluation of a fall this morning and for evaluation of feeling dizzy. Pt states she felt dizzy prior to falling while attempting to go to the bathroom. Pt states there has been no medication changes. Pt states she did not take Singulair last night. Pt states she fell onto her left elbow, pt does present with bruising to her left elbow.

## 2024-03-03 NOTE — ED PROVIDER NOTES
Patient Seen in: [facility] Emergency Department      History     Chief Complaint   Patient presents with    Dizziness    Fall     Stated Complaint: dizziness then fell on thinners denies hittinghead    Subjective:   83-year-old female, presents with dizziness.  States she was a little bit dizzy before bed last night.  Then woke up, sat up and felt dizzy again.  Sat edge of her bed, then use the wall to get to her bathroom but tripped over a table and fell onto her left elbow.  States she did not hit her head.  She is on blood thinners.  States her dizziness is mostly resolved now, states is not dizzy but just feels a little bit off.  No headache.  No blurred vision.  No neck pain.  No chest pain cough or shortness of breath.  No abdominal pain back pain nausea vomiting diarrhea, numbness or acute focal weakness.            Objective:   Past Medical History:   Diagnosis Date    Abdominal hernia     Anemia     Arthritis     Azotemia 04/03/2018    Bilateral lung cancer (HCC)     Cancer (HCC) 2009    right lung    Cancer (HCC)     lymphoma    Chronic cough     Clostridium difficile colitis     Community acquired pneumonia, unspecified laterality 04/04/2018    Complete rupture of rotator cuff     Constipation     COPD (chronic obstructive pulmonary disease) (HCC)     no O2    COPD (chronic obstructive pulmonary disease) with acute bronchitis (HCC) 05/14/2019    COPD exacerbation (HCC)     Dependence on supplemental oxygen 12/17/2019    Diaphragmatic hernia without mention of obstruction or gangrene     Diarrhea 11/05/2015    Diverticulitis of large intestine without perforation or abscess without bleeding 01/14/2019    Dysphagia, pharyngoesophageal phase 10/10/2018    Epistaxis     Esophageal reflux     Essential hypertension     Exposure to unspecified radiation 2013    right lower lung    Gluteal tendinitis 09/10/2013    Gram-negative pneumonia     Hearing impairment     bilateral hearing aids     Hearing loss      Hemorrhoids     HIGH BLOOD PRESSURE     History of blood clots     History of lung cancer 2014    Hyperglycemia 2018    Hyperlipidemia     Hypokalemia     Leaking of urine     Leg swelling     Leukocytosis 2018    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    Malignant neoplasm of bronchus and lung, unspecified site 2012    Mass of right forearm 2014    Metabolic acidosis 2018    NHL (nodular histiocytic lymphoma) (HCC)     Nocturnal leg cramps 2013    Normocytic normochromic anemia 2016    Osteoarthritis     Osteoporosis     Personal history of antineoplastic chemotherapy     LAST 10/2019    Personal history of malignant neoplasm of bronchus and lung     Personal history of urinary (tract) infection     Plantar fascial fibromatosis 2012    Pneumonia due to infectious organism     Pneumonia due to organism     Pulmonary embolism (HCC)     right lung,cleared on own per pt    Renal insufficiency 2015    Scoliosis (and kyphoscoliosis), idiopathic 2012    Shortness of breath     SOB (shortness of breath) 2018    Stool incontinence     Urticaria, unspecified     Visual impairment     glasses    Wears glasses               Past Surgical History:   Procedure Laterality Date    CHOLECYSTECTOMY      COLONOSCOPY  2008    COLONOSCOPY      HERNIA SURGERY      HYSTERECTOMY  1970's    vaginal          OTHER      lung biopsies    OTHER SURGICAL HISTORY  2008    gallbladder surgery    OTHER SURGICAL HISTORY  ,    bunionectomy,bilateral    OTHER SURGICAL HISTORY  2017    Cysto-  Deepti    REMOVAL GALLBLADDER      REMOVAL OF LUNG,LOBECTOMY  2009    removal of upper right lobe    REPAIR ING HERNIA,5+Y/O,REDUCIBL      TONSILLECTOMY      TOTAL ABDOM HYSTERECTOMY                  Social History     Socioeconomic History    Marital status:    Tobacco Use    Smoking status: Never     Passive exposure: Never    Smokeless  tobacco: Never   Vaping Use    Vaping Use: Never used   Substance and Sexual Activity    Alcohol use: No    Drug use: No   Other Topics Concern    Caffeine Concern Yes     Comment: soda    Exercise No              Review of Systems   Constitutional:  Negative for fever.   HENT:  Negative for congestion.    Eyes:  Negative for photophobia and visual disturbance.   Respiratory:  Negative for cough and shortness of breath.    Cardiovascular:  Negative for chest pain.   Gastrointestinal:  Negative for abdominal pain, nausea and vomiting.   Genitourinary:  Negative for dysuria.   Neurological:  Positive for dizziness. Negative for seizures, facial asymmetry, speech difficulty, weakness, numbness and headaches.   Hematological:  Bruises/bleeds easily.       Positive for stated complaint: dizziness then fell on thinners denies hittinghead  Other systems are as noted in HPI.  Constitutional and vital signs reviewed.      All other systems reviewed and negative except as noted above.    Physical Exam     ED Triage Vitals [03/03/24 1102]   BP (!) 166/80   Pulse 71   Resp 16   Temp    Temp src    SpO2 98 %   O2 Device None (Room air)       Current:BP (!) 178/84   Pulse 61   Resp 14   Ht 149.9 cm (4' 11\")   Wt 54.4 kg   SpO2 95%   BMI 24.24 kg/m²         Physical Exam  Vitals and nursing note reviewed.   Constitutional:       General: She is not in acute distress.     Appearance: She is well-developed. She is not ill-appearing, toxic-appearing or diaphoretic.   HENT:      Head: Normocephalic and atraumatic.   Eyes:      General: No visual field deficit.     Extraocular Movements: Extraocular movements intact.      Right eye: Normal extraocular motion and no nystagmus.      Left eye: Normal extraocular motion and no nystagmus.      Pupils: Pupils are equal, round, and reactive to light.   Cardiovascular:      Rate and Rhythm: Normal rate.   Pulmonary:      Effort: Pulmonary effort is normal. No respiratory distress.       Breath sounds: Normal breath sounds.   Abdominal:      General: Bowel sounds are normal. There is no distension.      Palpations: Abdomen is soft.      Tenderness: There is no abdominal tenderness.   Musculoskeletal:         General: Normal range of motion.      Cervical back: Normal range of motion and neck supple.   Skin:     General: Skin is warm and dry.   Neurological:      Mental Status: She is alert.      Cranial Nerves: No cranial nerve deficit, dysarthria or facial asymmetry.      Sensory: No sensory deficit.      Motor: No weakness.      Coordination: Coordination normal.   Psychiatric:         Mood and Affect: Mood normal.         Behavior: Behavior normal.               ED Course     Labs Reviewed   COMP METABOLIC PANEL (14) - Abnormal; Notable for the following components:       Result Value    Chloride 114 (*)     BUN 29 (*)     Creatinine 1.52 (*)     Calculated Osmolality 300 (*)     eGFR-Cr 34 (*)     All other components within normal limits   URINALYSIS, ROUTINE - Abnormal; Notable for the following components:    Urine Color Colorless (*)     Protein Urine Trace (*)     Leukocyte Esterase Urine 25 (*)     Bacteria Urine Rare (*)     All other components within normal limits   PRO BETA NATRIURETIC PEPTIDE - Abnormal; Notable for the following components:    Pro-Beta Natriuretic Peptide 1,131 (*)     All other components within normal limits   CBC W/ DIFFERENTIAL - Abnormal; Notable for the following components:    RBC 3.21 (*)     HGB 10.1 (*)     HCT 32.6 (*)     .6 (*)     Lymphocyte Absolute 0.76 (*)     All other components within normal limits   TROPONIN I HIGH SENSITIVITY - Normal   MAGNESIUM - Normal   CBC WITH DIFFERENTIAL WITH PLATELET    Narrative:     The following orders were created for panel order CBC With Differential With Platelet.  Procedure                               Abnormality         Status                     ---------                               -----------          ------                     CBC W/ DIFFERENTIAL[[id number]]          Abnormal            Final result                 Please view results for these tests on the individual orders.   TROPONIN I HIGH SENSITIVITY   RAINBOW DRAW LAVENDER   RAINBOW DRAW LIGHT GREEN   RAINBOW DRAW BLUE     EKG    Rate, intervals and axes as noted on EKG Report.  Rate: 75  Rhythm: Sinus Rhythm  Reading: EKG sinus rhythm 75 bpm.  Normal axis.  No ST elevations.  Intervals are stable.  When compared to June 2021, no significant changes are noted    Patient placed on cardiac monitor for telemetry monitoring secondary to dizziness. Interpretation at bedside by me is sinus rhythm.                            MDM      CT BRAIN OR HEAD (07341)    Result Date: 3/3/2024  CONCLUSION:  No acute intracranial abnormality identified.  Stable chronic ischemic changes as above.  If there is clinical concern for acute ischemia/infarction, an MRI of the brain would be recommended for further evaluation.  LOCATION:  [id number]   Dictated by (CST): Juan Tyson MD on 3/03/2024 at 2:21 PM     Finalized by (CST): Juan Tyson MD on 3/03/2024 at 2:26 PM       XR CHEST AP PORTABLE  (CPT=71045)    Result Date: 3/3/2024  CONCLUSION:  There is a small right pleural effusion with patchy opacity in the lower right lung that could represent underlying pneumonia, with metastatic disease or other etiologies not entirely excluded.  Clinical correlation recommended.    LOCATION:  [id number]      Dictated by (CST): Juan Tyson MD on 3/03/2024 at 12:26 PM     Finalized by (CST): Juan Tyson MD on 3/03/2024 at 12:29 PM        i independent interpreted x-ray of the chest and note the right basilar findings and I compared to the previous they are not significantly changed    Son at bedside helpful to provide information on the history presenting illness    Differential diagnosis includes was not limited to, peripheral vertigo, central vertigo, TIA, CVA, dehydration,  electrolyte disturbance, infection, dehydration    External chart review demonstrates outpatient telemedicine visit as recently as yesterday where she was worked up for memory changes                                       Medical Decision Making      Disposition and Plan     Clinical Impression:  No diagnosis found.     Disposition:  There is no disposition on file for this visit.  There is no disposition time on file for this visit.    Follow-up:  No follow-up provider specified.        Medications Prescribed:  Current Discharge Medication List                                          to help prevent relapse or worsening.  Patient was instructed to follow-up with their primary care provider for further evaluation and treatment, return immediately to ER for worsening, concerning, new, or changing/persisting symptoms. I discussed the case with the patient and they had no questions, complaints, or concerns.  Patient was comfortable going home.     Per the discharge paperwork, patients are encouraged to and given instructions on how to sign up for MyChart, where they have access to their records, including any/all incidental findings.     This note was prepared using Dragon Medical voice recognition dictation software. As a result errors may occur. When identified these errors have been corrected. While every attempt is made to correct errors during dictation discrepancies may still exist    Note to patient: The 21st Century Cures Act makes medical notes like these available to patients in the interest of transparency. However, this is a medical document intended as peer to peer communication. It is written in medical language and may contain abbreviations or verbiage that are unfamiliar. It may appear blunt or direct. Medical documents are intended to carry relevant information, facts as evident, and the clinical opinion of the practitioner.                                      Medical Decision Making      Disposition and Plan     Clinical Impression:  1. Dizziness    2. Contusion of left elbow, initial encounter         Disposition:  Discharge  3/3/2024  6:54 pm    Follow-up:  Rl Chau DO  [address] [place] [address]  [address]  [facility] [address] [phone]    Follow up      Monika Arriola DO  [address] [facility]  [address]  [facility] [address] [phone]    Follow up            Medications Prescribed:  Discharge Medication List as of 3/3/2024  6:57 PM        START taking these medications    Details   meclizine 25 MG Oral Tab Take 1 tablet (25 mg total) by mouth 3 (three) times daily  as needed for Dizziness., Normal, Disp-20 tablet, R-0

## 2024-03-04 ENCOUNTER — TELEPHONE (OUTPATIENT)
Dept: FAMILY MEDICINE CLINIC | Facility: CLINIC | Age: 84
End: 2024-03-04

## 2024-03-04 NOTE — TELEPHONE ENCOUNTER
Pt here on Saturday with Dr. Chau.  Went to the ER this wknd for a fall.  Son wants her to be seen today

## 2024-03-04 NOTE — TELEPHONE ENCOUNTER
From: Rossy Car  To: Rl Chau  Sent: 3/3/2024 7:00 PM CST  Subject: MRI brain from ER     My mom got a MRI brain done yesterday that mentions severe changes in grey matter. However is also states it was stable. Which in 2020 Dr Steen never mentions severe disease and only possible mild dementia could you take a look at the MRI. He cognitive test at that time was also 25/30 so similar to today. I just want to make sure she is not getting worse. And also wanted to wait to start the new drive until we talk to you. Of possible maybe we could see you sooner.

## 2024-03-04 NOTE — TELEPHONE ENCOUNTER
Patients son calling - patient was seen in ER over the weekend.  They do not know why she was dizzy and he is concerned that she will fall again.    Please advise.

## 2024-03-04 NOTE — ED PROVIDER NOTES
Patient endorsed by outgoing physician at shift change pending MRI brain with plan to DC home if negative for acute infarct.    MRI results reviewed as above noting no evidence of acute infarct and stable cavernoma.  MRI also notes opacification of the right frontal sinus.  Patient was informed of findings and states that she was just recently treated with antibiotics and will follow-up with her ENT doctor.  Will discharge home as planned.      MRI BRAIN (CPT=70551)    Result Date: 3/3/2024  CONCLUSION:   1. No evidence of an acute infarct.  2. Sequelae of marked severe chronic small vessel ischemic disease is stable.  3. 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  4. Interval marked opacification of the right frontal sinus.   LOCATION:  [facility]   Dictated by (CST): Paulo Fountain MD on 3/03/2024 at 6:26 PM     Finalized by (CST): Paulo Fountain MD on 3/03/2024 at 6:28 PM

## 2024-03-04 NOTE — TELEPHONE ENCOUNTER
Several concerns on dementia and if pt is on correct mediation and dose.  Has appt 3/5/24 to discuss

## 2024-03-04 NOTE — TELEPHONE ENCOUNTER
From: Rossy Car  To: Rl Chau  Sent: 3/2/2024 9:48 PM CST  Subject: Mementine questions     Hi Doctor,    I have a couple of questions this drug says it’s for moderate to severe dementia. But off label has been used to treat mild is this correct?    A few years back my mom seen a neurologist and he suggested Aricept and is for “mild to moderate” AD. Is one better than the other?     Also reading the normal dosing is says usually 5mg once a day to start for the first week and then 2 times a day. You wrote 5mg 2 times a day to start. Just don’t want my mom to have any issues.     Thanks in advance and I hope you don’t feel like I’m questioning your knowledge. I just want her on the best med if she needs it and to not have any issues.     Thanks,  Kenneth

## 2024-03-05 ENCOUNTER — TELEPHONE (OUTPATIENT)
Dept: FAMILY MEDICINE CLINIC | Facility: CLINIC | Age: 84
End: 2024-03-05

## 2024-03-05 ENCOUNTER — PATIENT MESSAGE (OUTPATIENT)
Facility: CLINIC | Age: 84
End: 2024-03-05

## 2024-03-05 ENCOUNTER — OFFICE VISIT (OUTPATIENT)
Dept: FAMILY MEDICINE CLINIC | Facility: CLINIC | Age: 84
End: 2024-03-05
Payer: MEDICARE

## 2024-03-05 VITALS
HEIGHT: 59 IN | BODY MASS INDEX: 24.19 KG/M2 | DIASTOLIC BLOOD PRESSURE: 62 MMHG | WEIGHT: 120 LBS | SYSTOLIC BLOOD PRESSURE: 126 MMHG | OXYGEN SATURATION: 95 % | HEART RATE: 82 BPM

## 2024-03-05 DIAGNOSIS — R29.6 FALLS FREQUENTLY: ICD-10-CM

## 2024-03-05 DIAGNOSIS — R41.3 MEMORY DIFFICULTIES: Primary | ICD-10-CM

## 2024-03-05 DIAGNOSIS — R42 VERTIGO: Primary | ICD-10-CM

## 2024-03-05 DIAGNOSIS — R29.898 WEAKNESS OF BOTH LOWER EXTREMITIES: ICD-10-CM

## 2024-03-05 DIAGNOSIS — R42 VERTIGO: ICD-10-CM

## 2024-03-05 PROCEDURE — 99213 OFFICE O/P EST LOW 20 MIN: CPT | Performed by: FAMILY MEDICINE

## 2024-03-06 ENCOUNTER — SOCIAL WORK SERVICES (OUTPATIENT)
Dept: HEMATOLOGY/ONCOLOGY | Facility: HOSPITAL | Age: 84
End: 2024-03-06

## 2024-03-06 ENCOUNTER — TELEPHONE (OUTPATIENT)
Dept: HEMATOLOGY/ONCOLOGY | Facility: HOSPITAL | Age: 84
End: 2024-03-06

## 2024-03-06 NOTE — PROGRESS NOTES
SW spoke to pt's son, Kenneth ph [phone] to discuss FMLA.  He is requesting intermittent leave (same as done in 2022). SW confirmed that he has fax number and will pursue FMLA with his employer.     SW to assist with completion when received.    Lela Del Castillo, MICHELETW   at [address], [place] [address]  Ph: [phone] [email or web address]  Fax: [phone]

## 2024-03-06 NOTE — PROGRESS NOTES
Subjective:   Patient ID: Rossy Car is a 83 year old female.    Recent vertigo 2 days ago that caused fall.  Now vertigo resolved.  Had room spinning.  No CP/SOB/HA/N/V/F/C.  Denied sinus pain or congestion or rhinorrhea.  No pain from fall.  Had MRI which was stable.  Saw neuro yesterday.  They think her dementia is vascular and thus namenda will not really help.    Falls, leg weakness.  Needs another walker.        History/Other:   Review of Systems   All other systems reviewed and are negative.    Current Outpatient Medications   Medication Sig Dispense Refill    meclizine 25 MG Oral Tab Take 1 tablet (25 mg total) by mouth 3 (three) times daily as needed for Dizziness. 20 tablet 0    prednisoLONE 1 % Ophthalmic Suspension Place 1 drop into both eyes 4 (four) times daily. Up to 1 week, then 1 week break 1 each 1    triamcinolone 0.1 % External Cream Apply topically 2 (two) times daily as needed. Up to 5, then 1 week break before restarting this cycle as needed. 1 each 1    memantine 5 MG Oral Tab Take 1 tablet (5 mg total) by mouth 2 (two) times daily. 60 tablet 1    BREO ELLIPTA 200-25 MCG/ACT Inhalation Aerosol Powder, Breath Activated Inhale 1 puff into the lungs daily. 3 each 1    montelukast 10 MG Oral Tab TAKE 1 TABLET EVERY EVENING 90 tablet 1    rivaroxaban (XARELTO) 15 MG Oral Tab Take 1 tablet (15 mg total) by mouth daily with food. 90 tablet 3    HYDROcodone-acetaminophen (NORCO) 5-325 MG Oral Tab Take 1 tablet by mouth every 8 (eight) hours as needed for Pain. 24 tablet 0    BREO ELLIPTA 200-25 MCG/ACT Inhalation Aerosol Powder, Breath Activated       hydrALAZINE 50 MG Oral Tab Take 1 tablet (50 mg total) by mouth 3 (three) times daily.      sodium chloride, hypertonic, 3 % Inhalation Nebu Soln Take 15 mL by nebulization as needed for Other. 30 each 5    azelastine 0.1 % Nasal Solution 1 spray by Nasal route 2 (two) times daily. 1 each 5    dilTIAZem  MG Oral Capsule SR 24 Hr Take 1  capsule (180 mg total) by mouth daily.      diphenoxylate-atropine 2.5-0.025 MG Oral Tab Take 1 tablet by mouth 4 (four) times daily as needed for Diarrhea. 30 tablet 0    rosuvastatin 5 MG Oral Tab Take 1 tablet (5 mg total) by mouth nightly. 90 tablet 3    metoprolol succinate 50 MG Oral Tablet 24 Hr Take 1 tablet (50 mg total) by mouth 2 (two) times a day. 180 tablet 1    LOSARTAN POTASSIUM 50 MG Oral Tab TAKE 1 TABLET TWICE A  tablet 2    Iron, Ferrous Sulfate, 325 (65 Fe) MG Oral Tab Take by mouth daily. CLARIFY DOSE WITH PATIENT: THIS WAS ORDERED BY ONCOLOGY  30 tablet 0    Albuterol Sulfate  (90 Base) MCG/ACT Inhalation Aero Soln Inhale 2 puffs into the lungs every 4 (four) hours as needed. prn      Saline Nasal Spray 0.65 % Nasal Solution 1 spray by Nasal route as needed for congestion.      acetaminophen 500 MG Oral Tab Take 1 tablet (500 mg total) by mouth every 6 (six) hours as needed for Pain.       Allergies:  Allergies   Allergen Reactions    Bactrim [Sulfamethoxazole W/Trimethoprim] HIVES     Facial hives    Other OTHER (SEE COMMENTS) and DIZZINESS    Penicillins ITCHING    Radiology Contrast Iodinated Dyes OTHER (SEE COMMENTS)     HARSH ON KIDNEYS       Objective:   Physical Exam  Vitals reviewed.   Constitutional:       General: She is not in acute distress.     Appearance: She is well-developed. She is not diaphoretic.   HENT:      Right Ear: External ear normal.      Left Ear: External ear normal.      Mouth/Throat:      Pharynx: No oropharyngeal exudate.   Eyes:      General: No scleral icterus.        Right eye: No discharge.         Left eye: No discharge.      Conjunctiva/sclera: Conjunctivae normal.   Neck:      Thyroid: No thyromegaly.   Cardiovascular:      Rate and Rhythm: Normal rate and regular rhythm.      Heart sounds: Normal heart sounds. No murmur heard.     No friction rub. No gallop.   Pulmonary:      Effort: Pulmonary effort is normal. No respiratory distress.       Breath sounds: Normal breath sounds. No wheezing or rales.   Musculoskeletal:      Cervical back: Normal range of motion and neck supple.   Lymphadenopathy:      Cervical: No cervical adenopathy.       Assessment & Plan:   1. Vertigo    2. Weakness of both lower extremities    3. Falls frequently      1. Vertigo  Resolved  Monitor  Discussed what to watch for.    2. Weakness of both lower extremities  - Walker Folding Adult    3. Falls frequently  - Walker Folding Adult      Meds This Visit:  Requested Prescriptions      No prescriptions requested or ordered in this encounter       Imaging & Referrals:  [facility] W[facility] FOLDING ADULT

## 2024-03-06 NOTE — TELEPHONE ENCOUNTER
Pt's son is calling to speak to a  regarding FMLA paperwork, transferred to [facility]

## 2024-03-12 ENCOUNTER — PATIENT MESSAGE (OUTPATIENT)
Dept: FAMILY MEDICINE CLINIC | Facility: CLINIC | Age: 84
End: 2024-03-12

## 2024-03-13 ENCOUNTER — TELEPHONE (OUTPATIENT)
Age: 84
End: 2024-03-13

## 2024-03-14 ENCOUNTER — OFFICE VISIT (OUTPATIENT)
Dept: FAMILY MEDICINE CLINIC | Facility: CLINIC | Age: 84
End: 2024-03-14
Payer: MEDICARE

## 2024-03-14 ENCOUNTER — TELEPHONE (OUTPATIENT)
Age: 84
End: 2024-03-14

## 2024-03-14 VITALS
SYSTOLIC BLOOD PRESSURE: 132 MMHG | HEIGHT: 59 IN | DIASTOLIC BLOOD PRESSURE: 80 MMHG | RESPIRATION RATE: 16 BRPM | HEART RATE: 75 BPM | WEIGHT: 127 LBS | BODY MASS INDEX: 25.6 KG/M2 | OXYGEN SATURATION: 95 %

## 2024-03-14 DIAGNOSIS — C82.03 FOLLICULAR LYMPHOMA GRADE I OF INTRA-ABDOMINAL LYMPH NODES (HCC): ICD-10-CM

## 2024-03-14 DIAGNOSIS — L98.9 SKIN LESION OF RIGHT LEG: ICD-10-CM

## 2024-03-14 DIAGNOSIS — Z00.00 ENCOUNTER FOR ANNUAL HEALTH EXAMINATION: Primary | ICD-10-CM

## 2024-03-14 DIAGNOSIS — C85.10 LOW GRADE B-CELL LYMPHOMA (HCC): ICD-10-CM

## 2024-03-14 DIAGNOSIS — D69.6 THROMBOCYTOPENIA (HCC): ICD-10-CM

## 2024-03-14 DIAGNOSIS — N18.31 STAGE 3A CHRONIC KIDNEY DISEASE (HCC): ICD-10-CM

## 2024-03-14 PROBLEM — I82.439 ACUTE DEEP VEIN THROMBOSIS (DVT) OF POPLITEAL VEIN (HCC): Status: RESOLVED | Noted: 2021-06-25 | Resolved: 2024-03-14

## 2024-03-14 PROBLEM — S06.5XAA ACUTE SUBDURAL HEMATOMA (HCC): Status: RESOLVED | Noted: 2020-08-29 | Resolved: 2024-03-14

## 2024-03-14 PROBLEM — N17.9 ACUTE KIDNEY INJURY (HCC): Status: RESOLVED | Noted: 2020-08-01 | Resolved: 2024-03-14

## 2024-03-14 PROBLEM — N17.9 ACUTE KIDNEY INJURY: Status: RESOLVED | Noted: 2020-08-01 | Resolved: 2024-03-14

## 2024-03-14 PROCEDURE — 88305 TISSUE EXAM BY PATHOLOGIST: CPT | Performed by: FAMILY MEDICINE

## 2024-03-14 NOTE — TELEPHONE ENCOUNTER
Jayne Ayoub  [address]  [facility] [address]  Phone: [phone]  [email or web address]     [facility]  [address] [place] [address] [address]  [place] [address]  Phone: [phone]  [email or web address]     [facility]  [address]  [facility] [address]  Phone: [phone]  [email or web address]     [facility]  [id number]  [facility] [address]  Phone: [phone]  [email or web address]  [address]  [facility] [address]  Phone: [phone]  [email or web address]

## 2024-03-15 ENCOUNTER — PATIENT MESSAGE (OUTPATIENT)
Dept: FAMILY MEDICINE CLINIC | Facility: CLINIC | Age: 84
End: 2024-03-15

## 2024-03-15 DIAGNOSIS — C44.92 SQUAMOUS CELL SKIN CANCER: Primary | ICD-10-CM

## 2024-03-15 NOTE — PATIENT INSTRUCTIONS
Rossy Car's SCREENING SCHEDULE   Tests on this list are recommended by your physician but may not be covered, or covered at this frequency, by your insurer.   Please check with your insurance carrier before scheduling to verify coverage.   PREVENTATIVE SERVICES FREQUENCY &  COVERAGE DETAILS LAST COMPLETION DATE   Diabetes Screening    Fasting Blood Sugar /  Glucose    One screening every 12 months if never tested or if previously tested but not diagnosed with pre-diabetes   One screening every 6 months if diagnosed with pre-diabetes Lab Results   Component Value Date    GLU 94 03/03/2024        Cardiovascular Disease Screening    Lipid Panel  Cholesterol  Lipoprotein (HDL)  Triglycerides Covered every 5 years for all Medicare beneficiaries without apparent signs or symptoms of cardiovascular disease Lab Results   Component Value Date    CHOLEST 150 08/19/2022    HDL 68 (H) 08/19/2022    LDL 66 08/19/2022    TRIG 87 08/19/2022         Electrocardiogram (EKG)   Covered if needed at Welcome to Medicare, and non-screening if indicated for medical reasons 03/03/2024      Ultrasound Screening for Abdominal Aortic Aneurysm (AAA) Covered once in a lifetime for one of the following risk factors   • Men who are 65-75 years old and have ever smoked   • Anyone with a family history -     Colorectal Cancer Screening  Covered for ages 50-85; only need ONE of the following:    Colonoscopy   Covered every 10 years    Covered every 2 years if patient is at high risk or previous colonoscopy was abnormal -    No recommendations at this time    Flexible Sigmoidoscopy   Covered every 4 years -    Fecal Occult Blood Test Covered annually -   Bone Density Screening    Bone density screening    Covered every 2 years after age 65 if diagnosed with risk of osteoporosis or estrogen deficiency.    Covered yearly for long-term glucocorticoid medication use (Steroids) No results found for this or any previous visit.      No  recommendations at this time   Pap and Pelvic    Pap   Covered every 2 years for women at normal risk; Annually if at high risk -  No recommendations at this time    Chlamydia Annually if high risk -  No recommendations at this time   Screening Mammogram    Mammogram     Recommend annually for all female patients aged 40 and older    One baseline mammogram covered for patients aged 35-39 -    No recommendations at this time    Immunizations    Influenza Covered once per flu season  Please get every year 11/04/2023  No recommendations at this time    Pneumococcal Each vaccine ([id number] & [id number]) covered once after 65 Prevnar 13: 09/22/2015    [id number]: 09/09/2021     No recommendations at this time    Hepatitis B One screening covered for patients with certain risk factors   -  No recommendations at this time    Tetanus Toxoid Not covered by Medicare Part B unless medically necessary (cut with metal); may be covered with your pharmacy prescription benefits -    Tetanus, Diptheria and Pertusis TD and TDaP Not covered by Medicare Part B -  No recommendations at this time    Zoster Not covered by Medicare Part B; may be covered with your pharmacy  prescription benefits 01/01/2009  No recommendations at this time     Annual Monitoring of Persistent Medications (ACE/ARB, digoxin diuretics, anticonvulsants)    Potassium Annually Lab Results   Component Value Date    K 3.8 03/03/2024         Creatinine   Annually Lab Results   Component Value Date    CREATSERUM 1.52 (H) 03/03/2024         BUN Annually Lab Results   Component Value Date    BUN 29 (H) 03/03/2024       Drug Serum Conc Annually No results found for: \"DIGOXIN\", \"DIG\", \"VALP\"

## 2024-03-15 NOTE — PROGRESS NOTES
Subjective:   Rossy Car is a 83 year old female who presents for a Medicare Subsequent Annual Wellness visit (Pt already had Initial Annual Wellness) and scheduled follow up of multiple significant but stable problems and acute uncomplicated problem.   2. Skin lesion of right leg  Will check to see if its cancer.  Pt feels like its just psoriasis.    3. Stage 3a chronic kidney disease (HCC)  Chronic, stable. No new symptoms.     4. Low grade B-cell lymphoma (HCC)  Chronic, stable. No new symptoms.     5. 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 No new symptoms.     6. Thrombocytopenia (HCC)  Chronic, stable. No new symptoms.     History/Other:   Fall Risk Assessment:   She has been screened for Falls and is High Risk. Fall Prevention information provided to patient in After Visit Summary.    Do you feel unsteady when standing or walking?: No  Do you worry about falling?: No  Have you fallen in the past year?: Yes  Were you injured?: No (bruised)     Cognitive Assessment:   She had a completely normal cognitive assessment - see flowsheet entries     Functional Ability/Status:   Rossy Car has some abnormal functions as listed below:  She has difficulties Affording Meds based on screening of functional status.       Depression Screening (PHQ-2/PHQ-9): PHQ-2 SCORE: 0  , done 3/14/2024   Last [place] Suicide Screening on 3/14/2024 was No Risk.          Advanced Directives:   She does NOT have a Living Will. [Do you have a living will?: No]  She has a Power of  for Health Care on file in [facility].  Discussed Advance Care Planning with patient (and family/surrogate if present). Standard forms made available to patient in After Visit Summary.      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)    Thrombocytopenia (HCC)    Anemia    Hyperglycemia    Prerenal azotemia    Acute diverticulitis    Failure of outpatient treatment    CKD (chronic kidney disease) stage 3, GFR 30-59 ml/min (HCC)    Lymphoproliferative disorder (HCC)    Auditory hallucinations    Urinary tract infection without hematuria    Urinary tract infection without hematuria, site unspecified    Acute encephalopathy    Facial laceration, initial encounter    Memory change    Worsened handwriting     Allergies:  She is allergic to bactrim [sulfamethoxazole w/trimethoprim], other, penicillins, and radiology contrast iodinated dyes.    Current Medications:  Outpatient Medications Marked as Taking for the 3/14/24 encounter (Office Visit) with Rl Chau, DO   Medication Sig    meclizine 25 MG Oral Tab Take 1 tablet (25 mg total) by mouth 3 (three) times daily as needed for Dizziness.    prednisoLONE 1 % Ophthalmic Suspension Place 1 drop into both eyes 4 (four) times daily. Up to 1 week, then 1 week break    triamcinolone 0.1 % External Cream Apply topically 2 (two) times daily as needed. Up to 5, then 1 week break before restarting this cycle as needed.    memantine 5 MG Oral Tab Take 1 tablet (5 mg total) by mouth 2 (two) times daily.    BREO ELLIPTA 200-25 MCG/ACT Inhalation Aerosol Powder, Breath Activated Inhale 1 puff into the lungs daily.    montelukast 10 MG Oral Tab TAKE 1 TABLET EVERY EVENING    rivaroxaban (XARELTO) 15 MG Oral Tab Take 1 tablet (15 mg total) by mouth daily with food.    HYDROcodone-acetaminophen (NORCO) 5-325 MG Oral Tab Take 1 tablet by mouth every 8 (eight) hours as needed for Pain.    BREO ELLIPTA 200-25 MCG/ACT Inhalation Aerosol Powder, Breath Activated     hydrALAZINE 50 MG Oral Tab Take 1 tablet (50 mg total) by mouth 3 (three) times daily.    sodium chloride, hypertonic, 3 % Inhalation Nebu Soln Take 15 mL by  nebulization as needed for Other.    azelastine 0.1 % Nasal Solution 1 spray by Nasal route 2 (two) times daily.    dilTIAZem  MG Oral Capsule SR 24 Hr Take 1 capsule (180 mg total) by mouth daily.    diphenoxylate-atropine 2.5-0.025 MG Oral Tab Take 1 tablet by mouth 4 (four) times daily as needed for Diarrhea.    rosuvastatin 5 MG Oral Tab Take 1 tablet (5 mg total) by mouth nightly.    metoprolol succinate 50 MG Oral Tablet 24 Hr Take 1 tablet (50 mg total) by mouth 2 (two) times a day.    LOSARTAN POTASSIUM 50 MG Oral Tab TAKE 1 TABLET TWICE A DAY    Iron, Ferrous Sulfate, 325 (65 Fe) MG Oral Tab Take by mouth daily. CLARIFY DOSE WITH PATIENT: THIS WAS ORDERED BY ONCOLOGY     Albuterol Sulfate  (90 Base) MCG/ACT Inhalation Aero Soln Inhale 2 puffs into the lungs every 4 (four) hours as needed. prn    Saline Nasal Spray 0.65 % Nasal Solution 1 spray by Nasal route as needed for congestion.    acetaminophen 500 MG Oral Tab Take 1 tablet (500 mg total) by mouth every 6 (six) hours as needed for Pain.       Medical History:  She  has a past medical history of Abdominal hernia, Acute kidney injury (HCC), Acute subdural hematoma (HCC), Anemia, Arthritis, Azotemia (04/03/2018), Bilateral lung cancer ([facility]), Cancer (HCC) (2009), Cancer (HCC), Chronic cough, Clostridium difficile colitis, Community acquired pneumonia, unspecified laterality (04/04/2018), Complete rupture of rotator cuff, Constipation, COPD (chronic obstructive pulmonary disease) (Formerly [facility]), COPD (chronic obstructive pulmonary disease) with acute bronchitis (HCC) (05/14/2019), COPD exacerbation ([facility]), Dependence on supplemental oxygen (12/17/2019), Diaphragmatic hernia without mention of obstruction or gangrene, Diarrhea (11/05/2015), Diverticulitis of large intestine without perforation or abscess without bleeding (01/14/2019), Dysphagia, pharyngoesophageal phase (10/10/2018), Epistaxis, Esophageal reflux, Essential hypertension, Exposure to  unspecified radiation (), Gluteal tendinitis (09/10/2013), Gram-negative pneumonia, Hearing impairment, Hearing loss, Hemorrhoids, HIGH BLOOD PRESSURE, History of blood clots, History of lung cancer (2014), Hyperglycemia (2018), Hyperlipidemia, Hypokalemia, Leaking of urine, Leg swelling, Leukocytosis (2018), Low grade B-cell lymphoma (HCC) (09/10/2013), Lung cancer (HCC), Malignant neoplasm of bronchus and lung, unspecified site (2012), Mass of right forearm (2014), Metabolic acidosis (2018), NHL (nodular histiocytic lymphoma) (), Nocturnal leg cramps (2013), Normocytic normochromic anemia (2016), Osteoarthritis, Osteoporosis, Personal history of antineoplastic chemotherapy, Personal history of malignant neoplasm of bronchus and lung, Personal history of urinary (tract) infection, Plantar fascial fibromatosis (2012), Pneumonia due to infectious organism, Pneumonia due to organism, Pulmonary embolism (HCC) (), Renal insufficiency (2015), Scoliosis (and kyphoscoliosis), idiopathic (2012), Shortness of breath, SOB (shortness of breath) (2018), Stool incontinence, Urticaria, unspecified, Visual impairment, and Wears glasses.  Surgical History:  She  has a past surgical history that includes hernia surgery; tonsillectomy; cholecystectomy; colonoscopy (2008); hysterectomy ('s); other surgical history (2008); other surgical history (,); other surgical history (2017); other; removal of lung,lobectomy (); repair ing hernia,5+y/o,reducibl; removal gallbladder; total abdom hysterectomy; colonoscopy; and .   Family History:  Her family history includes Cancer in her father, mother, son, and son; Hypertension in her father; immune system in her mother.  Social History:  She  reports that she has never smoked. She has never been exposed to tobacco smoke. She has never used smokeless tobacco. She reports that  she does not drink alcohol and does not use drugs.    Tobacco:  She has never smoked tobacco.    CAGE Alcohol Screen:   CAGE screening score of 0 on 3/14/2024, showing low risk of alcohol abuse.      Patient Care Team:  Rl Chau DO as PCP - General (Family Medicine)  Bk Gandhi MD (Radiation Oncology)  Adolph Rivera PA (Physician Assistant)  Yony Steen DO as Consulting Physician (NEUROLOGY)  Maggie Mccoy APRN (Nurse Practitioner)    Review of Systems   All other systems reviewed and are negative.    Objective:   Physical Exam  Vitals reviewed.   Constitutional:       General: She is not in acute distress.     Appearance: She is well-developed. She is not diaphoretic.   HENT:      Right Ear: External ear normal.      Left Ear: External ear normal.      Nose: Nose normal.      Mouth/Throat:      Pharynx: No oropharyngeal exudate.   Eyes:      General: No scleral icterus.        Right eye: No discharge.         Left eye: No discharge.      Conjunctiva/sclera: Conjunctivae normal.   Neck:      Thyroid: No thyromegaly.   Cardiovascular:      Rate and Rhythm: Normal rate and regular rhythm.      Heart sounds: Normal heart sounds. No murmur heard.     No friction rub. No gallop.   Pulmonary:      Effort: Pulmonary effort is normal. No respiratory distress.      Breath sounds: Normal breath sounds. No wheezing or rales.   Abdominal:      General: Bowel sounds are normal. There is no distension.      Palpations: Abdomen is soft. There is no mass.      Tenderness: There is no abdominal tenderness. There is no guarding or rebound.   Musculoskeletal:         General: No tenderness. Normal range of motion.      Cervical back: Normal range of motion and neck supple.   Lymphadenopathy:      Cervical: No cervical adenopathy.   Skin:     General: Skin is warm and dry.      Findings: No erythema or rash.             Comments: Red raised skin lesion on right leg.   Neurological:      Mental Status: She is alert  and oriented to person, place, and time.      Motor: No abnormal muscle tone.      Deep Tendon Reflexes: Reflexes are normal and symmetric.   Psychiatric:         Behavior: Behavior normal.         Thought Content: Thought content normal.         Judgment: Judgment normal.        /80   Pulse 75   Resp 16   Ht 4' 11\" (1.499 m)   Wt 127 lb (57.6 kg)   SpO2 95%   BMI 25.65 kg/m²  Estimated body mass index is 25.65 kg/m² as calculated from the following:    Height as of this encounter: 4' 11\" (1.499 m).    Weight as of this encounter: 127 lb (57.6 kg).    Medicare Hearing Assessment:   Hearing Screening    Time taken: 3/14/2024  5:58 PM  Screening Method: Whisper Test  Whisper Test Result: Fail       Visual Acuity:   Right Eye Visual Acuity: Corrected Right Eye Chart Acuity: 20/30   Left Eye Visual Acuity: Corrected Left Eye Chart Acuity: 20/30   Both Eyes Visual Acuity: Corrected Both Eyes Chart Acuity: 20/30   Able To Tolerate Visual Acuity: Yes      Assessment & Plan:   Rossy Car is a 83 year old female who presents for a Medicare Assessment.     1. Encounter for annual health examination (Primary)  2. Skin lesion of right leg  -     Specimen to Pathology Tissue; Future; Expected date: 03/14/2024  -     Specimen to Pathology Tissue  3. Stage 3a chronic kidney disease (HCC)  Chronic, stable. CPM   4. Low grade B-cell lymphoma (HCC)  Chronic, stable. CPM   5. 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 CPM   6. Thrombocytopenia (HCC)  Chronic, stable. CPM \    The patient indicates understanding of these issues and agrees to the plan.  Reinforced healthy diet, lifestyle, and exercise.      Return in about 3 months (around 6/14/2024).     Rl Chau DO, 3/14/2024     Supplementary Documentation:   General Health:  In the past six months, have you lost more than 10 pounds without trying?: 2 - No  Has your appetite been poor?: No  Type of Diet: Balanced  How  does the patient maintain a good energy level?: Other  How would you describe your daily physical activity?: Light  How would you describe your current health state?: Good  How do you maintain positive mental well-being?: Visiting Family  On a scale of 0 to 10, with 0 being no pain and 10 being severe pain, what is your pain level?: 1 - (Mild)  In the past six months, have you experienced urine leakage?: 0-No  At any time do you feel concerned for the safety/well-being of yourself and/or your children, in your home or elsewhere?: No  Have you had any immunizations at another office such as Influenza, Hepatitis B, Tetanus, or Pneumococcal?: No         Rossy Car's SCREENING SCHEDULE   Tests on this list are recommended by your physician but may not be covered, or covered at this frequency, by your insurer.   Please check with your insurance carrier before scheduling to verify coverage.   PREVENTATIVE SERVICES FREQUENCY &  COVERAGE DETAILS LAST COMPLETION DATE   Diabetes Screening    Fasting Blood Sugar /  Glucose    One screening every 12 months if never tested or if previously tested but not diagnosed with pre-diabetes   One screening every 6 months if diagnosed with pre-diabetes Lab Results   Component Value Date    GLU 94 03/03/2024        Cardiovascular Disease Screening    Lipid Panel  Cholesterol  Lipoprotein (HDL)  Triglycerides Covered every 5 years for all Medicare beneficiaries without apparent signs or symptoms of cardiovascular disease Lab Results   Component Value Date    CHOLEST 150 08/19/2022    HDL 68 (H) 08/19/2022    LDL 66 08/19/2022    TRIG 87 08/19/2022         Electrocardiogram (EKG)   Covered if needed at Welcome to Medicare, and non-screening if indicated for medical reasons 03/03/2024      Ultrasound Screening for Abdominal Aortic Aneurysm (AAA) Covered once in a lifetime for one of the following risk factors    Men who are 65-75 years old and have ever smoked    Anyone with a family  history -     Colorectal Cancer Screening  Covered for ages 50-85; only need ONE of the following:    Colonoscopy   Covered every 10 years    Covered every 2 years if patient is at high risk or previous colonoscopy was abnormal -    No recommendations at this time    Flexible Sigmoidoscopy   Covered every 4 years -    Fecal Occult Blood Test Covered annually -   Bone Density Screening    Bone density screening    Covered every 2 years after age 65 if diagnosed with risk of osteoporosis or estrogen deficiency.    Covered yearly for long-term glucocorticoid medication use (Steroids) No results found for this or any previous visit.      No recommendations at this time   Pap and Pelvic    Pap   Covered every 2 years for women at normal risk; Annually if at high risk -  No recommendations at this time    Chlamydia Annually if high risk -  No recommendations at this time   Screening Mammogram    Mammogram     Recommend annually for all female patients aged 40 and older    One baseline mammogram covered for patients aged 35-39 -    No recommendations at this time    Immunizations    Influenza Covered once per flu season  Please get every year 11/04/2023  No recommendations at this time    Pneumococcal Each vaccine ([id number] & [id number]) covered once after 65 Prevnar 13: 09/22/2015    [id number]: 09/09/2021     No recommendations at this time    Hepatitis B One screening covered for patients with certain risk factors   -  No recommendations at this time    Tetanus Toxoid Not covered by Medicare Part B unless medically necessary (cut with metal); may be covered with your pharmacy prescription benefits -    Tetanus, Diptheria and Pertusis TD and TDaP Not covered by Medicare Part B -  No recommendations at this time    Zoster Not covered by Medicare Part B; may be covered with your pharmacy  prescription benefits 01/01/2009  No recommendations at this time     Annual Monitoring of Persistent Medications (ACE/ARB, digoxin  diuretics, anticonvulsants)    Potassium Annually Lab Results   Component Value Date    K 3.8 03/03/2024         Creatinine   Annually Lab Results   Component Value Date    CREATSERUM 1.52 (H) 03/03/2024         BUN Annually Lab Results   Component Value Date    BUN 29 (H) 03/03/2024       Drug Serum Conc Annually No results found for: \"DIGOXIN\", \"DIG\", \"VALP\"

## 2024-03-15 NOTE — TELEPHONE ENCOUNTER
Please review messages from pt's son also review attached pictures.  Pt had OV yesterday and had lesion removed.

## 2024-03-18 NOTE — TELEPHONE ENCOUNTER
Called and spoke to son.  Put her in for 5pm Thursday with me and cancel appointment with Shree per son.  Ok to double book me.  Can we get her appointment for next week with Dr. Han or godfrey.

## 2024-03-20 DIAGNOSIS — C34.90 EGFR-RELATED LUNG CANCER (HCC): ICD-10-CM

## 2024-03-20 DIAGNOSIS — C85.10 LOW GRADE B-CELL LYMPHOMA (HCC): Primary | ICD-10-CM

## 2024-03-20 NOTE — TELEPHONE ENCOUNTER
Spoke with Jayne and her other son.    Infection is improving on cefadroxil.  Had vertigo episode today.  BP has been 140s-160s.

## 2024-03-21 ENCOUNTER — OFFICE VISIT (OUTPATIENT)
Dept: FAMILY MEDICINE CLINIC | Facility: CLINIC | Age: 84
End: 2024-03-21
Payer: MEDICARE

## 2024-03-21 VITALS
WEIGHT: 127 LBS | SYSTOLIC BLOOD PRESSURE: 138 MMHG | OXYGEN SATURATION: 98 % | HEIGHT: 59 IN | DIASTOLIC BLOOD PRESSURE: 74 MMHG | HEART RATE: 80 BPM | BODY MASS INDEX: 25.6 KG/M2 | RESPIRATION RATE: 20 BRPM

## 2024-03-21 DIAGNOSIS — N18.32 STAGE 3B CHRONIC KIDNEY DISEASE (HCC): Primary | ICD-10-CM

## 2024-03-21 DIAGNOSIS — Z79.899 MEDICATION MANAGEMENT: ICD-10-CM

## 2024-03-21 DIAGNOSIS — L03.90 CELLULITIS, UNSPECIFIED CELLULITIS SITE: ICD-10-CM

## 2024-03-21 DIAGNOSIS — L08.9 INFECTED WOUND: ICD-10-CM

## 2024-03-21 DIAGNOSIS — T14.8XXA INFECTED WOUND: ICD-10-CM

## 2024-03-21 PROCEDURE — 99214 OFFICE O/P EST MOD 30 MIN: CPT | Performed by: FAMILY MEDICINE

## 2024-03-21 PROCEDURE — 87070 CULTURE OTHR SPECIMN AEROBIC: CPT | Performed by: FAMILY MEDICINE

## 2024-03-21 PROCEDURE — 87205 SMEAR GRAM STAIN: CPT | Performed by: FAMILY MEDICINE

## 2024-03-21 RX ORDER — CEFADROXIL 500 MG/1
CAPSULE ORAL
COMMUNITY
Start: 2024-03-18

## 2024-03-22 ENCOUNTER — LAB ENCOUNTER (OUTPATIENT)
Dept: LAB | Age: 84
End: 2024-03-22
Attending: FAMILY MEDICINE
Payer: MEDICARE

## 2024-03-22 DIAGNOSIS — Z79.899 MEDICATION MANAGEMENT: ICD-10-CM

## 2024-03-22 DIAGNOSIS — N18.32 STAGE 3B CHRONIC KIDNEY DISEASE (HCC): ICD-10-CM

## 2024-03-22 LAB
ANION GAP SERPL CALC-SCNC: 4 MMOL/L (ref 0–18)
BUN BLD-MCNC: 26 MG/DL (ref 9–23)
CALCIUM BLD-MCNC: 9.4 MG/DL (ref 8.5–10.1)
CHLORIDE SERPL-SCNC: 116 MMOL/L (ref 98–112)
CO2 SERPL-SCNC: 23 MMOL/L (ref 21–32)
CREAT BLD-MCNC: 1.3 MG/DL
EGFRCR SERPLBLD CKD-EPI 2021: 41 ML/MIN/1.73M2 (ref 60–?)
FASTING STATUS PATIENT QL REPORTED: YES
GLUCOSE BLD-MCNC: 96 MG/DL (ref 70–99)
OSMOLALITY SERPL CALC.SUM OF ELEC: 301 MOSM/KG (ref 275–295)
POTASSIUM SERPL-SCNC: 4.2 MMOL/L (ref 3.5–5.1)
SODIUM SERPL-SCNC: 143 MMOL/L (ref 136–145)

## 2024-03-22 PROCEDURE — 36415 COLL VENOUS BLD VENIPUNCTURE: CPT

## 2024-03-22 PROCEDURE — 80048 BASIC METABOLIC PNL TOTAL CA: CPT

## 2024-03-27 ENCOUNTER — DOCUMENTATION ONLY (OUTPATIENT)
Dept: FAMILY MEDICINE CLINIC | Facility: CLINIC | Age: 84
End: 2024-03-27

## 2024-03-28 ENCOUNTER — TELEPHONE (OUTPATIENT)
Dept: FAMILY MEDICINE CLINIC | Facility: CLINIC | Age: 84
End: 2024-03-28

## 2024-04-01 NOTE — PROGRESS NOTES
Subjective:   Patient ID: Rossy Car is a 83 year old female.    Infection at biopsy sight.  Turned into cellulitis. + squamous cell skin cancer.  Improving on antibiotics.  No F/C. Only mild discharge.  1. Stage 3b chronic kidney disease (HCC).  Chronic.  Stable.  2. Medication management.  On multiple medications worried about kidney function.        History/Other:   Review of Systems   All other systems reviewed and are negative.    Current Outpatient Medications   Medication Sig Dispense Refill    cefadroxil 500 MG Oral Cap       meclizine 25 MG Oral Tab Take 1 tablet (25 mg total) by mouth 3 (three) times daily as needed for Dizziness. 20 tablet 0    prednisoLONE 1 % Ophthalmic Suspension Place 1 drop into both eyes 4 (four) times daily. Up to 1 week, then 1 week break 1 each 1    triamcinolone 0.1 % External Cream Apply topically 2 (two) times daily as needed. Up to 5, then 1 week break before restarting this cycle as needed. 1 each 1    memantine 5 MG Oral Tab Take 1 tablet (5 mg total) by mouth 2 (two) times daily. 60 tablet 1    BREO ELLIPTA 200-25 MCG/ACT Inhalation Aerosol Powder, Breath Activated Inhale 1 puff into the lungs daily. 3 each 1    montelukast 10 MG Oral Tab TAKE 1 TABLET EVERY EVENING 90 tablet 1    rivaroxaban (XARELTO) 15 MG Oral Tab Take 1 tablet (15 mg total) by mouth daily with food. 90 tablet 3    HYDROcodone-acetaminophen (NORCO) 5-325 MG Oral Tab Take 1 tablet by mouth every 8 (eight) hours as needed for Pain. 24 tablet 0    BREO ELLIPTA 200-25 MCG/ACT Inhalation Aerosol Powder, Breath Activated       hydrALAZINE 50 MG Oral Tab Take 1 tablet (50 mg total) by mouth 3 (three) times daily.      sodium chloride, hypertonic, 3 % Inhalation Nebu Soln Take 15 mL by nebulization as needed for Other. 30 each 5    azelastine 0.1 % Nasal Solution 1 spray by Nasal route 2 (two) times daily. 1 each 5    dilTIAZem  MG Oral Capsule SR 24 Hr Take 1 capsule (180 mg total) by mouth daily.       diphenoxylate-atropine 2.5-0.025 MG Oral Tab Take 1 tablet by mouth 4 (four) times daily as needed for Diarrhea. 30 tablet 0    rosuvastatin 5 MG Oral Tab Take 1 tablet (5 mg total) by mouth nightly. 90 tablet 3    metoprolol succinate 50 MG Oral Tablet 24 Hr Take 1 tablet (50 mg total) by mouth 2 (two) times a day. 180 tablet 1    LOSARTAN POTASSIUM 50 MG Oral Tab TAKE 1 TABLET TWICE A  tablet 2    Iron, Ferrous Sulfate, 325 (65 Fe) MG Oral Tab Take by mouth daily. CLARIFY DOSE WITH PATIENT: THIS WAS ORDERED BY ONCOLOGY  30 tablet 0    Albuterol Sulfate  (90 Base) MCG/ACT Inhalation Aero Soln Inhale 2 puffs into the lungs every 4 (four) hours as needed. prn      Saline Nasal Spray 0.65 % Nasal Solution 1 spray by Nasal route as needed for congestion.      acetaminophen 500 MG Oral Tab Take 1 tablet (500 mg total) by mouth every 6 (six) hours as needed for Pain.       Allergies:  Allergies   Allergen Reactions    Bactrim [Sulfamethoxazole W/Trimethoprim] HIVES     Facial hives    Other OTHER (SEE COMMENTS) and DIZZINESS    Penicillins ITCHING    Radiology Contrast Iodinated Dyes OTHER (SEE COMMENTS)     HARSH ON KIDNEYS       Objective:   Physical Exam  Vitals reviewed.   Constitutional:       General: She is not in acute distress.     Appearance: She is well-developed. She is not diaphoretic.   Eyes:      General: No scleral icterus.        Right eye: No discharge.         Left eye: No discharge.      Conjunctiva/sclera: Conjunctivae normal.   Cardiovascular:      Rate and Rhythm: Normal rate and regular rhythm.      Heart sounds: Normal heart sounds. No murmur heard.     No friction rub. No gallop.   Pulmonary:      Effort: Pulmonary effort is normal. No respiratory distress.      Breath sounds: Normal breath sounds. No wheezing or rales.   Skin:     Comments: Skin biopsy sight with cellulitis mild.  Debrided due to tiny amount of necrosis.         Assessment & Plan:   1. Stage 3b chronic kidney  disease (HCC)    2. Medication management    3. Infected wound    4. Cellulitis, unspecified cellulitis site      1. Stage 3b chronic kidney disease (HCC)  - Basic Metabolic Panel (8) [E]; Future    2. Medication management  - Basic Metabolic Panel (8) [E]; Future    3. Infected wound  - Aerobic Bacterial Culture [E]; Future  - Aerobic Bacterial Culture [E]    4. Cellulitis, unspecified cellulitis site  - Aerobic Bacterial Culture [E]; Future  - Aerobic Bacterial Culture [E]    Orders Placed This Encounter   Procedures    Basic Metabolic Panel (8) [E]    Aerobic Bacterial Culture [E]       Meds This Visit:  Requested Prescriptions      No prescriptions requested or ordered in this encounter       Imaging & Referrals:  None

## 2024-04-07 ENCOUNTER — HOSPITAL ENCOUNTER (OUTPATIENT)
Dept: CT IMAGING | Age: 84
End: 2024-04-07
Attending: INTERNAL MEDICINE
Payer: MEDICARE

## 2024-04-07 ENCOUNTER — HOSPITAL ENCOUNTER (OUTPATIENT)
Dept: CT IMAGING | Age: 84
Discharge: HOME | End: 2024-04-07
Attending: INTERNAL MEDICINE
Payer: MEDICARE

## 2024-04-07 DIAGNOSIS — C85.10 LOW GRADE B-CELL LYMPHOMA (HCC): ICD-10-CM

## 2024-04-07 DIAGNOSIS — C34.90 EGFR-RELATED LUNG CANCER (HCC): ICD-10-CM

## 2024-04-07 PROCEDURE — 74176 CT ABD & PELVIS W/O CONTRAST: CPT | Performed by: INTERNAL MEDICINE

## 2024-04-07 PROCEDURE — 71250 CT THORAX DX C-: CPT | Performed by: INTERNAL MEDICINE

## 2024-04-08 ENCOUNTER — APPOINTMENT (OUTPATIENT)
Dept: HEMATOLOGY/ONCOLOGY | Facility: HOSPITAL | Age: 84
End: 2024-04-08
Attending: INTERNAL MEDICINE
Payer: MEDICARE

## 2024-04-11 ENCOUNTER — PATIENT MESSAGE (OUTPATIENT)
Facility: CLINIC | Age: 84
End: 2024-04-11

## 2024-04-11 ENCOUNTER — OFFICE VISIT (OUTPATIENT)
Facility: CLINIC | Age: 84
End: 2024-04-11
Payer: MEDICARE

## 2024-04-11 ENCOUNTER — OFFICE VISIT (OUTPATIENT)
Dept: HEMATOLOGY/ONCOLOGY | Facility: HOSPITAL | Age: 84
End: 2024-04-11
Attending: INTERNAL MEDICINE
Payer: MEDICARE

## 2024-04-11 VITALS
HEIGHT: 59.02 IN | TEMPERATURE: 97 F | SYSTOLIC BLOOD PRESSURE: 157 MMHG | WEIGHT: 127 LBS | OXYGEN SATURATION: 92 % | RESPIRATION RATE: 16 BRPM | BODY MASS INDEX: 25.6 KG/M2 | HEART RATE: 62 BPM | DIASTOLIC BLOOD PRESSURE: 72 MMHG

## 2024-04-11 VITALS
WEIGHT: 126 LBS | DIASTOLIC BLOOD PRESSURE: 70 MMHG | SYSTOLIC BLOOD PRESSURE: 148 MMHG | RESPIRATION RATE: 16 BRPM | OXYGEN SATURATION: 96 % | BODY MASS INDEX: 25.4 KG/M2 | HEIGHT: 59 IN | HEART RATE: 65 BPM

## 2024-04-11 DIAGNOSIS — D47.2 MONOCLONAL PARAPROTEINEMIA: ICD-10-CM

## 2024-04-11 DIAGNOSIS — N18.32 CHRONIC KIDNEY DISEASE, STAGE 3B (HCC): ICD-10-CM

## 2024-04-11 DIAGNOSIS — C85.80 MARGINAL ZONE B-CELL LYMPHOMA (HCC): ICD-10-CM

## 2024-04-11 DIAGNOSIS — R91.8 PULMONARY NODULES/LESIONS, MULTIPLE: ICD-10-CM

## 2024-04-11 DIAGNOSIS — R05.9 COUGH, UNSPECIFIED TYPE: ICD-10-CM

## 2024-04-11 DIAGNOSIS — C34.81 MALIGNANT NEOPLASM OF OVERLAPPING SITES OF RIGHT LUNG (HCC): ICD-10-CM

## 2024-04-11 DIAGNOSIS — O22.30 DVT (DEEP VEIN THROMBOSIS) IN PREGNANCY (HCC): ICD-10-CM

## 2024-04-11 DIAGNOSIS — J47.9 BRONCHIECTASIS WITHOUT COMPLICATION (HCC): ICD-10-CM

## 2024-04-11 DIAGNOSIS — J90 PLEURAL EFFUSION: ICD-10-CM

## 2024-04-11 DIAGNOSIS — C34.90 MALIGNANT NEOPLASM OF LUNG, UNSPECIFIED LATERALITY, UNSPECIFIED PART OF LUNG (HCC): ICD-10-CM

## 2024-04-11 DIAGNOSIS — R93.89 ABNORMAL CT OF THE CHEST: ICD-10-CM

## 2024-04-11 DIAGNOSIS — J44.9 CHRONIC OBSTRUCTIVE PULMONARY DISEASE, UNSPECIFIED COPD TYPE (HCC): Primary | ICD-10-CM

## 2024-04-11 DIAGNOSIS — C85.10 LOW GRADE B-CELL LYMPHOMA (HCC): ICD-10-CM

## 2024-04-11 DIAGNOSIS — D64.9 NORMOCYTIC ANEMIA: Primary | ICD-10-CM

## 2024-04-11 DIAGNOSIS — J90 PLEURAL EFFUSION ON RIGHT: ICD-10-CM

## 2024-04-11 LAB
ALBUMIN SERPL-MCNC: 3.3 G/DL (ref 3.4–5)
ALBUMIN/GLOB SERPL: 0.9 {RATIO} (ref 1–2)
ALP LIVER SERPL-CCNC: 121 U/L
ALT SERPL-CCNC: 15 U/L
ANION GAP SERPL CALC-SCNC: 7 MMOL/L (ref 0–18)
AST SERPL-CCNC: 14 U/L (ref 15–37)
BASOPHILS # BLD AUTO: 0.02 X10(3) UL (ref 0–0.2)
BASOPHILS NFR BLD AUTO: 0.4 %
BILIRUB SERPL-MCNC: 0.4 MG/DL (ref 0.1–2)
BUN BLD-MCNC: 32 MG/DL (ref 9–23)
CALCIUM BLD-MCNC: 9.2 MG/DL (ref 8.5–10.1)
CHLORIDE SERPL-SCNC: 116 MMOL/L (ref 98–112)
CO2 SERPL-SCNC: 22 MMOL/L (ref 21–32)
CREAT BLD-MCNC: 1.52 MG/DL
DEPRECATED HBV CORE AB SER IA-ACNC: 102.9 NG/ML
EGFRCR SERPLBLD CKD-EPI 2021: 34 ML/MIN/1.73M2 (ref 60–?)
EOSINOPHIL # BLD AUTO: 0.14 X10(3) UL (ref 0–0.7)
EOSINOPHIL NFR BLD AUTO: 2.7 %
ERYTHROCYTE [DISTWIDTH] IN BLOOD BY AUTOMATED COUNT: 14.1 %
FOLATE SERPL-MCNC: 25.6 NG/ML (ref 8.7–?)
GLOBULIN PLAS-MCNC: 3.6 G/DL (ref 2.8–4.4)
GLUCOSE BLD-MCNC: 95 MG/DL (ref 70–99)
HCT VFR BLD AUTO: 31.1 %
HGB BLD-MCNC: 10.1 G/DL
HGB RETIC QN AUTO: 35.8 PG (ref 28.2–36.6)
IMM GRANULOCYTES # BLD AUTO: 0.02 X10(3) UL (ref 0–1)
IMM GRANULOCYTES NFR BLD: 0.4 %
IMM RETICS NFR: 0.14 RATIO (ref 0.1–0.3)
IRON SATN MFR SERPL: 24 %
IRON SERPL-MCNC: 72 UG/DL
LDH SERPL L TO P-CCNC: 150 U/L
LYMPHOCYTES # BLD AUTO: 0.95 X10(3) UL (ref 1–4)
LYMPHOCYTES NFR BLD AUTO: 18.3 %
MCH RBC QN AUTO: 32.2 PG (ref 26–34)
MCHC RBC AUTO-ENTMCNC: 32.5 G/DL (ref 31–37)
MCV RBC AUTO: 99 FL
MONOCYTES # BLD AUTO: 0.55 X10(3) UL (ref 0.1–1)
MONOCYTES NFR BLD AUTO: 10.6 %
NEUTROPHILS # BLD AUTO: 3.52 X10 (3) UL (ref 1.5–7.7)
NEUTROPHILS # BLD AUTO: 3.52 X10(3) UL (ref 1.5–7.7)
NEUTROPHILS NFR BLD AUTO: 67.6 %
OSMOLALITY SERPL CALC.SUM OF ELEC: 307 MOSM/KG (ref 275–295)
PLATELET # BLD AUTO: 193 10(3)UL (ref 150–450)
POTASSIUM SERPL-SCNC: 3.9 MMOL/L (ref 3.5–5.1)
PROT SERPL-MCNC: 6.9 G/DL (ref 6.4–8.2)
RBC # BLD AUTO: 3.14 X10(6)UL
RETICS # AUTO: 47.4 X10(3) UL (ref 22.5–147.5)
RETICS/RBC NFR AUTO: 1.5 %
SODIUM SERPL-SCNC: 145 MMOL/L (ref 136–145)
TIBC SERPL-MCNC: 302 UG/DL (ref 240–450)
TRANSFERRIN SERPL-MCNC: 203 MG/DL (ref 200–360)
VIT B12 SERPL-MCNC: 435 PG/ML (ref 193–986)
WBC # BLD AUTO: 5.2 X10(3) UL (ref 4–11)

## 2024-04-11 PROCEDURE — 99215 OFFICE O/P EST HI 40 MIN: CPT | Performed by: INTERNAL MEDICINE

## 2024-04-11 PROCEDURE — 99214 OFFICE O/P EST MOD 30 MIN: CPT | Performed by: NURSE PRACTITIONER

## 2024-04-11 NOTE — PROGRESS NOTES
Subjective:   Rossy Car is a 83 year old female with PMHx of lung cancer who presents for follow - up including review CT scan results. States coughing more seems from post nasal drip. Reports she stopped the medication she was previously taking that improved her PND and cough. No f/c/ns. No change in appetite or wt loss.     Previously 2/2024  a 83 year old female who presents for cough and sinus congestion. Seeing ENT PA; s/p zpak x 2 and now on Doxycycline. Reports mucous has cleared up; still with edema under both eyes that she was told was due to her sinusitis. No f/c/ns. Continues with Bk med sinus rinse, nasal spray, VEST tx     Previously 12/26/24  a 83 year old female who presents for evaluation s/p ER visit (Coughing up blood). Reports having 4 episodes of hemoptysis yesterday; < tsp total per son. Had not happened prior or since. Has cough still however less often and clear phlegm today. No f/c/ns. Feels better overall . On Xarelto for hx of DVT managed by Dr Chang.      Previously 12/21/23  a 83 year old female who presents for follow - up. Reports productive cough clear-light yellow in color, was in the ER end of September, saw ENT was on a round of antibiotics and steroids. All seem to start after the RSV shot.       Started zpak 2 days ago; less sinus drainage, less cough, feels better; no f/c/ns  Continues with mucinex 600mg BID; robitussin DM at night  Continues with Astelin per ENT and saline nasal spray; using neilmed wash 1-2 x times     Previously 11/2023   a 83 year old female who presents for evaluation of persistent cough that is worse than. Pt seen by PCP and cardiologist and told her lungs are clear. Son Kenneth is concerned the cough seems worse and more productive  of clear/cream colored phlegm and she is not getting better. Given zpak by PCP 11/16/23; just started zpak yesterday as they were not sure it would help.  No f/c/ns.      Previously 10/18/23Dr Jiang  a 83 year old  female presenting to pulmonary clinic   COVID 9/19/2020  Has sinus infection- saw boni- - and has abnl CT sinus and started bactrim then changed to ceftin- to start tomorrow   Cough - nonprod now -occasionally  A littile   smartvest- twice a day   Stopped flutter -   breo and used rescue once-- not in over one year   flonase , xclear - off claritin      Flare of low back pain- then with sciatica - had xrays no obvious issues - norco - #10 helped not now -   Cough when lays down- clearing throat and when awakenings - and nasal drip   No cough or rare during the day- - unless- not expectorating   No shortness of breath   Stopped vest thinks before CT - nothing was coming up      9.23 - - doesn't want covid vaccine....  Not doing the VEST -   Now with some coughing -- when lays down at night - never wakes her up-- thinks clearing throat   Breathing is OK_ - no mucus   No cp-   Remains on breo   Remains on flonase and astlein  Stopped vest -- nothing coming up -- last used - at least 2 months -       10.23- to er - with rt hip area pain-- found UTI-   Pain remains - thinks related to hip - thinks related to movement - with walking and standing- - recent onset one week ago - lower back issues   Breathing is good- rare coughing -               History/Other:    Chief Complaint Reviewed and Verified  Nursing Notes Reviewed and   Verified  Tobacco Reviewed  Allergies Reviewed  Medications Reviewed    Problem List Reviewed  Medical History Reviewed  Surgical History   Reviewed  Family History Reviewed         Tobacco:  She has never smoked tobacco.    Current Outpatient Medications   Medication Sig Dispense Refill    meclizine 25 MG Oral Tab Take 1 tablet (25 mg total) by mouth 3 (three) times daily as needed for Dizziness. 20 tablet 0    triamcinolone 0.1 % External Cream Apply topically 2 (two) times daily as needed. Up to 5, then 1 week break before restarting this cycle as needed. 1 each 1    memantine 5 MG  Oral Tab Take 1 tablet (5 mg total) by mouth 2 (two) times daily. 60 tablet 1    BREO ELLIPTA 200-25 MCG/ACT Inhalation Aerosol Powder, Breath Activated Inhale 1 puff into the lungs daily. 3 each 1    montelukast 10 MG Oral Tab TAKE 1 TABLET EVERY EVENING 90 tablet 1    rivaroxaban (XARELTO) 15 MG Oral Tab Take 1 tablet (15 mg total) by mouth daily with food. 90 tablet 3    HYDROcodone-acetaminophen (NORCO) 5-325 MG Oral Tab Take 1 tablet by mouth every 8 (eight) hours as needed for Pain. 24 tablet 0    hydrALAZINE 50 MG Oral Tab Take 1 tablet (50 mg total) by mouth 3 (three) times daily.      sodium chloride, hypertonic, 3 % Inhalation Nebu Soln Take 15 mL by nebulization as needed for Other. 30 each 5    dilTIAZem  MG Oral Capsule SR 24 Hr Take 1 capsule (180 mg total) by mouth daily.      diphenoxylate-atropine 2.5-0.025 MG Oral Tab Take 1 tablet by mouth 4 (four) times daily as needed for Diarrhea. 30 tablet 0    rosuvastatin 5 MG Oral Tab Take 1 tablet (5 mg total) by mouth nightly. 90 tablet 3    metoprolol succinate 50 MG Oral Tablet 24 Hr Take 1 tablet (50 mg total) by mouth 2 (two) times a day. 180 tablet 1    LOSARTAN POTASSIUM 50 MG Oral Tab TAKE 1 TABLET TWICE A  tablet 2    Iron, Ferrous Sulfate, 325 (65 Fe) MG Oral Tab Take by mouth daily. CLARIFY DOSE WITH PATIENT: THIS WAS ORDERED BY ONCOLOGY  30 tablet 0    Albuterol Sulfate  (90 Base) MCG/ACT Inhalation Aero Soln Inhale 2 puffs into the lungs every 4 (four) hours as needed. prn      Saline Nasal Spray 0.65 % Nasal Solution 1 spray by Nasal route as needed for congestion.      acetaminophen 500 MG Oral Tab Take 1 tablet (500 mg total) by mouth every 6 (six) hours as needed for Pain.      cefadroxil 500 MG Oral Cap       prednisoLONE 1 % Ophthalmic Suspension Place 1 drop into both eyes 4 (four) times daily. Up to 1 week, then 1 week break 1 each 1    BREO ELLIPTA 200-25 MCG/ACT Inhalation Aerosol Powder, Breath Activated        azelastine 0.1 % Nasal Solution 1 spray by Nasal route 2 (two) times daily. 1 each 5         Review of Systems:  Review of Systems   Constitutional:  Negative for activity change, appetite change, chills, diaphoresis, fatigue, fever and unexpected weight change.   HENT:  Positive for postnasal drip and rhinorrhea. Negative for congestion, sinus pressure, sinus pain, sneezing, sore throat, trouble swallowing and voice change.    Respiratory:  Positive for cough. Negative for apnea, choking, chest tightness, shortness of breath, wheezing and stridor.    Cardiovascular:  Negative for chest pain, palpitations and leg swelling.   Musculoskeletal:  Negative for arthralgias.   Skin:  Negative for pallor and rash.   Allergic/Immunologic: Negative for immunocompromised state.   Neurological:  Negative for dizziness and headaches.   Hematological:  Negative for adenopathy.         Objective:   /70 (BP Location: Left arm, Patient Position: Sitting, Cuff Size: adult)   Pulse 65   Resp 16   Ht 4' 11\" (1.499 m)   Wt 126 lb (57.2 kg)   SpO2 96%   BMI 25.45 kg/m²  Estimated body mass index is 25.45 kg/m² as calculated from the following:    Height as of this encounter: 4' 11\" (1.499 m).    Weight as of this encounter: 126 lb (57.2 kg).  Physical Exam  Vitals reviewed.   Constitutional:       Appearance: Normal appearance. She is normal weight.   HENT:      Head: Normocephalic and atraumatic.      Mouth/Throat:      Mouth: Mucous membranes are moist.   Eyes:      Extraocular Movements: Extraocular movements intact.   Cardiovascular:      Rate and Rhythm: Normal rate and regular rhythm.      Pulses: Normal pulses.      Heart sounds: Normal heart sounds.   Pulmonary:      Effort: Pulmonary effort is normal.      Breath sounds: Normal breath sounds and air entry.   Abdominal:      Palpations: Abdomen is soft.   Musculoskeletal:         General: Normal range of motion.      Cervical back: Normal range of motion.   Skin:      General: Skin is warm and dry.   Neurological:      General: No focal deficit present.      Mental Status: She is alert and oriented to person, place, and time.   Psychiatric:         Mood and Affect: Mood normal.         Behavior: Behavior normal.           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  Initially 2013  IgM greater than 4000 with monoclonal spike  Continues to follow with hematology  No recent issues        #Lung cancers x3  Stage IB right upper lobe 2009 status post right upper lobectomy  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  Stage I right medial lung base lepidic  pattern 2014 status post SBRT  Ongoing surveillance  6/23 increased size and existing nodules suspect BILL/lack of vest therapy for short interval follow-up  10/23 with repeat CT planned in 4 months  12/2023 CT C/A/P 12/9/23; reviewed with oncologist; continue with followup in February 2/2024 Follows with oncology; CT scans now q 6 months  4/2024 CT chest shows slight increase in the left lung nodule and increased right pleural effusion  Pt case discussed at lung conference yesterday; recommendations for thoracentesis  Pt to see oncologist today and will discuss further    #Diastolic dysfunction  Some increased fluid status off diuretics with increasing chronic kidney disease  Increased swelling over the summer to trial  elevation stockings attempting to avoid diuretics  Now with increased swelling thought questionably related to amlodipine-ongoing recent med changes per primary  5/21 now following with Dr. Concepcion of water pills  6/21 with echo PAS 40-45 no change in chronic swelling  8/22 saw Dr. Cardoso told no need for water pills and following  12.22- seeing dr barrera now - BP meds- changed to 3 times a day and better- -  Off water pills now   6.23- told blood work - off water pill still   10/23 fluid status seems very stable  4/2024 Denies new complaints        #History-- chronic obstructive lung disease  Spirometry 2016 with ratio 66%  Normal PFTs 2015 reduced DLCO  Continues to note ongoing benefit from ICS/LABA--continue present management  6.23 - remains on Breo -   10/23 continue present management  11/2023 No e/o exacerbation; CCT  12/2023 Stable; continue current treatment  2/2024 Stable; CCT   4/2024 Stable     #Cough  Fairly recent onset   resurgence with  bacterial pneumonia and had stopped meds  Seems primarily related to postnasal drip now resolved on resuming medications  Remains on coverage for bronchospasm as well continuing Breo  Slight flare 5/19 related to recent upper respiratory infection  Suspect primary issue of sinus congestion/postnasal drip  Now resolved  Discussed role of allergy testing and immunotherapy  11/19 with significant resurgence  To resume Incruse and nasal saline  Unclear role of losartan in 2020-questionably increased cough  8/22 resolved after 2 months suspect related to postnasal drip ongoing  12/22 there is significant flare in response to COVID-19 infection--better with aggressive therapy with plans to continue present management  6/23 very well controlled denies active issues  9.23- rare sense - - stopped vest   10/23 now remains very compliant with her vest-continuing to follow cough is minimal  11/2023 CCT and complete zpak as recommended. Likely associated with rhinitis and PND  See ENT as scheduled. 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  Patient remains essentially asymptomatic  Bronchoscopy 12/19 of the right lower lobe with negative AFB and fungus  Negative gold  And short interval follow-up left upper lobe lesion is smaller but increased groundglass opacity  Repeat CT 10/21 improved remains without symptoms  1/22 CT with significant clearing still with nodules but most are smaller  8/20 2 repeat scan with left upper lobe area resolved--new right middle lobe infiltrates and nodules--suspected BILL  9/6/2022 with positive cultures for BILL  12/22-last CT was some evidence of improvement though continues with waxing and waning--overall disease load is mild--- plan to continue--using and benefiting from vest therapy-to reinstitute flutter valve when sick as well  No indication for treatment at this time  6.23- increased size of multiple nodules - stopped the VEST - to resume and for short interval follow up   10/23 clinically stable plan for repeat CT  12/2023 CT C/A/P as noted above; continue to monitor  2/2024 See above  4/2024 See above     # pleural effusion   Minimal effusion in 2020  Since that time with some waxing and waning certainly increased from 2020---Seems unchanged from oct 2022 and forward   12/2023 Small right pleural effusion; will monitor  4/2024 Increase noted in the right pleural effusion.Discussed with Dr Torres. Reviewed imaging with pt and son.  Plan for  right US guided thoracentesis as per lung conference recommendations.   Will await  to hear from oncology and will need to hold AC for 48hrs        -Plan:  - continue VEST twice a day if you get sick   - resume sinus medications   - continue breo   - ffollowup to be determined if having thoracentesis or not    Jazz Dow, SHANA, 4/11/2024, 5:53 PM

## 2024-04-11 NOTE — PROGRESS NOTES
Cancer Center Progress Note    Patient Name: Rossy Car   YOB: 1940   Medical Record Number: [id number]   CSN: [id number]   Attending Physician: Debbie Chang M.D.   Referring Physician: DO Dr. Beata Garcia    Date of Visit: 4/11/2024     Chief Complaint:  Chief Complaint   Patient presents with    Follow - Up     Lung/NHL/dvt pt here for 4 month f/u and to review CT. Energy good, some BAIN/cough, denies bleeding, denies B symptoms. Continues on xarelto/oral iron without difficulty.        Problem List:  1. 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 No significant dysplastic changes nor increased blasts, lymphocytosis or plasmacytosis noted on aspirate. Cytogenetics from bone marrow biopsy showed translocation of chromosomes 11 and 18 which is expected to result in a BIRC3/MALT1 gene rearrangement which is seen in approximately 50% of extranodal marginal zone B cell or MALT-type lymphomas. MYDD 88 L265P was not detected making LPL less likely.      - PET from 1/2018 showed new GGO/mass in left apex with SUV of 3.1. CT guided biopsy was done and this showed low grade non-Hodgkin B cell lymphoma with focal plasmacytic differentiation c/w MALT vs LPL. Immunoprofile was similar to previous lymph node biopsy.      - Ibrutinib 4/12/18- 10/2018. Decided to stop on her own due to SE.     - Scans done showed worsening adenopathy with symptoms felt to potentially be from NHL and received 4 weekly doses of Rituxan for low grade NHL. Last dose was 10/18/19     -  c/o vague symptoms but (fatigue, cough but no B symptoms)  had increasing adenopathy with rapid increase of her light chains with IgM around 4K and increased viscosity.     -  BR 3/10/20- 7/2020        2.  Lung cancer x 3:   - Stage I NSCLC right medial lung base- well differentiated adenocarcinoma with lepidic growth pattern diagnosed 12/3/14 s/p SBRT. EGFR exon 19 positive. Started \"adjuvant\" Tarceva 2/2015. Stopped after she was placed on Ibrutinib (given concerns for overlapping side effects. Had been FIONA for lung cancer then).      - Stage I NSCLC- adenocarcinoma of the RLL diagnosed 5/02/2013 s/p SBRT     - Stage IB (T2N0M0) well to moderately differentiated adenocarcinoma of the RUL s/p right thoracotomy with right upper lobectomy and mediastinal lymphadenectomy on 09-.      - had her surveillance scans 6/2021 which showed increased reticular nodular densities throughout the LLL along with a 1.2 x 1.3 cm nodule which was new. PET was then done which showed new nodule in the left lung had an SUV of 3.9 which was suspicious. Imaging was reviewed at lung conference and biopsy of the nodule was recommended. This was performed by IR on 5/5/21 which showed necrotizing granulomatous inflammation. She followed up with there pulmonologist who recommended expectant management as asymptomatic. She had a f/u CT which showed slight increase in micronodularity in both lungs felt to likely be infections/inflammatory with some nodules improved.     3. Recurrent VTE   - Right sided PE diagnosed 9/2011- seen on surveillance scans. Was placed on anticoagulation during that time.      - June 2021 presented to the ED c/o chest pain. She was brought by the paramedics who gave her an aspirin. By the time she arrived at the ED she was pain free. Cardiac enzymes were negative with normal EKG. D dimer was elevated and had V/Q scan which was low prob for DVT. BLE Dopplers showed showed DVT involving the right distal popliteal and right proximal posterior tibial vein. She was placed on Xarelto.     4. Anemia     - felt to be mainly from CKD.    - also with h/o iron deficiency anemia in 2016. Hb had dropped lower than her baseline  and w/u revealed BRANNON. Had EGD/c-scope and capsule endoscopy which were unrevealing. Malabsorption?    5. Underwent a bronch 12/17/19 for enlarging right sided lung nodules and adenopathy. Procedure complicated by traumatic epistaxis and hypoxia. Then developed wheezing and tachycardia requiring mask ventilation. She was subsequently admitted after the procedure to ICU. Respiratory distress felt to be from nasal blood aspiration with bronchospasm. Was placed on empiric steroids, nebulizers and supplemental O2. Nose bleed stopped and improved and was discharged home 12/20/19. Path from bronch showed organizing pneumonia and granulation tissue on biopsy of RLL. Cytology from FNA of subcarinal node showed a small monotypic B cell population on flow cytometry of unclear significance.      -  Sputum from 10/2022 positive for BILL.     6. Hospitalized 8/2020 in August with MS changes after a fall. Found to have SDH and UTI. Was placed on antibiotics. NSG was consulted and no surgical intervention was felt indicated. Had delirium during her hospital stay which responded well to seroquel.  Was discharged home 9/9/20 with home PT      History of Present Illness:  She says she is doing ok. Energy is good. She has some BAIN with stairs/cough but no more than usual. Breathing she feels has been stable.  Cough she feels mainly from PND. No wheezing. No fevers.  Appetite good. Denies chest pain or abdominal pain, change in urinary habits, headaches, dizziness or visual symptoms. No B symptoms. No problems on Xarelto. Takes oral iron w/o difficulty. Had recent skin cancer removed on right shin.       Current Medications:    Current Outpatient Medications:     cefadroxil 500 MG Oral Cap, , Disp: , Rfl:     meclizine 25 MG Oral Tab, Take 1 tablet (25 mg total) by mouth 3 (three) times daily as needed for Dizziness., Disp: 20 tablet, Rfl: 0    prednisoLONE 1 % Ophthalmic Suspension, Place 1 drop into both eyes 4 (four) times daily. Up  to 1 week, then 1 week break, Disp: 1 each, Rfl: 1    triamcinolone 0.1 % External Cream, Apply topically 2 (two) times daily as needed. Up to 5, then 1 week break before restarting this cycle as needed., Disp: 1 each, Rfl: 1    memantine 5 MG Oral Tab, Take 1 tablet (5 mg total) by mouth 2 (two) times daily., Disp: 60 tablet, Rfl: 1    BREO ELLIPTA 200-25 MCG/ACT Inhalation Aerosol Powder, Breath Activated, Inhale 1 puff into the lungs daily., Disp: 3 each, Rfl: 1    montelukast 10 MG Oral Tab, TAKE 1 TABLET EVERY EVENING, Disp: 90 tablet, Rfl: 1    rivaroxaban (XARELTO) 15 MG Oral Tab, Take 1 tablet (15 mg total) by mouth daily with food., Disp: 90 tablet, Rfl: 3    HYDROcodone-acetaminophen (NORCO) 5-325 MG Oral Tab, Take 1 tablet by mouth every 8 (eight) hours as needed for Pain., Disp: 24 tablet, Rfl: 0    BREO ELLIPTA 200-25 MCG/ACT Inhalation Aerosol Powder, Breath Activated, , Disp: , Rfl:     hydrALAZINE 50 MG Oral Tab, Take 1 tablet (50 mg total) by mouth 3 (three) times daily., Disp: , Rfl:     sodium chloride, hypertonic, 3 % Inhalation Nebu Soln, Take 15 mL by nebulization as needed for Other., Disp: 30 each, Rfl: 5    azelastine 0.1 % Nasal Solution, 1 spray by Nasal route 2 (two) times daily., Disp: 1 each, Rfl: 5    dilTIAZem  MG Oral Capsule SR 24 Hr, Take 1 capsule (180 mg total) by mouth daily., Disp: , Rfl:     diphenoxylate-atropine 2.5-0.025 MG Oral Tab, Take 1 tablet by mouth 4 (four) times daily as needed for Diarrhea., Disp: 30 tablet, Rfl: 0    rosuvastatin 5 MG Oral Tab, Take 1 tablet (5 mg total) by mouth nightly., Disp: 90 tablet, Rfl: 3    metoprolol succinate 50 MG Oral Tablet 24 Hr, Take 1 tablet (50 mg total) by mouth 2 (two) times a day., Disp: 180 tablet, Rfl: 1    LOSARTAN POTASSIUM 50 MG Oral Tab, TAKE 1 TABLET TWICE A DAY, Disp: 180 tablet, Rfl: 2    Iron, Ferrous Sulfate, 325 (65 Fe) MG Oral Tab, Take by mouth daily. CLARIFY DOSE WITH PATIENT: THIS WAS ORDERED BY ONCOLOGY  , Disp: 30 tablet, Rfl: 0    Albuterol Sulfate  (90 Base) MCG/ACT Inhalation Aero Soln, Inhale 2 puffs into the lungs every 4 (four) hours as needed. prn, Disp: , Rfl:     Saline Nasal Spray 0.65 % Nasal Solution, 1 spray by Nasal route as needed for congestion., Disp: , Rfl:     acetaminophen 500 MG Oral Tab, Take 1 tablet (500 mg total) by mouth every 6 (six) hours as needed for Pain., Disp: , Rfl:     Past Medical History:  Past Medical History:    Abdominal hernia    Acute kidney injury (HCC)    Acute subdural hematoma (HCC)    Anemia    Arthritis    Azotemia    Bilateral lung cancer (HCC)    Cancer (HCC)    right lung    Cancer (HCC)    lymphoma    Chronic cough    Clostridium difficile colitis    Community acquired pneumonia, unspecified laterality    Complete rupture of rotator cuff    Constipation    COPD (chronic obstructive pulmonary disease) (HCC)    no O2    COPD (chronic obstructive pulmonary disease) with acute bronchitis (HCC)    COPD exacerbation (HCC)    Dependence on supplemental oxygen    Diaphragmatic hernia without mention of obstruction or gangrene    Diarrhea    Diverticulitis of large intestine without perforation or abscess without bleeding    Dysphagia, pharyngoesophageal phase    Epistaxis    Esophageal reflux    Essential hypertension    Exposure to unspecified radiation    right lower lung    Gluteal tendinitis    Gram-negative pneumonia (HCC)    Hearing impairment    bilateral hearing aids     Hearing loss    Hemorrhoids    HIGH BLOOD PRESSURE    History of blood clots    History of lung cancer    Hyperglycemia    Hyperlipidemia    Hypokalemia    Leaking of urine    Leg swelling    Leukocytosis    Low grade B-cell lymphoma (HCC)    Lung cancer (HCC)    Malignant neoplasm of bronchus and lung, unspecified site    Mass of right forearm    Metabolic acidosis    NHL (nodular histiocytic lymphoma) (HCC)    Nocturnal leg cramps    Normocytic normochromic anemia    Osteoarthritis     Osteoporosis    Personal history of antineoplastic chemotherapy    LAST 10/2019    Personal history of malignant neoplasm of bronchus and lung    Personal history of urinary (tract) infection    Plantar fascial fibromatosis    Pneumonia due to infectious organism    Pneumonia due to organism    Pulmonary embolism (HCC)    right lung,cleared on own per pt    Renal insufficiency    Scoliosis (and kyphoscoliosis), idiopathic    Shortness of breath    SOB (shortness of breath)    Stool incontinence    Urticaria, unspecified    Visual impairment    glasses    Wears glasses       Past Surgical History:  Past Surgical History:   Procedure Laterality Date    Cholecystectomy  2008    Colonoscopy  2008    Hernia surgery      Hysterectomy      vaginal          Other      lung biopsies    Other surgical history  ,    bunionectomy,bilateral    Other surgical history  2017    Cysto- Dr. Tatum    Removal of lung,lobectomy      removal of upper right lobe    Repair ing hernia,5+y/o,reducibl      Tonsillectomy      Total abdom hysterectomy         Family Medical History:  Family History   Problem Relation Age of Onset    Other (immune system) Mother     Cancer Mother     Cancer Father         of the mouth    Hypertension Father     Cancer Son     Cancer Son        Gyne History:  OB History    Para Term  AB Living   3 3 2 1 0 2   SAB IAB Ectopic Multiple Live Births   0 0 0 2 2       Psychosocial History:  Social History     Socioeconomic History    Marital status:      Spouse name: Not on file    Number of children: Not on file    Years of education: Not on file    Highest education level: Not on file   Occupational History    Not on file   Tobacco Use    Smoking status: Never     Passive exposure: Never    Smokeless tobacco: Never   Vaping Use    Vaping status: Never Used   Substance and Sexual Activity    Alcohol use: No    Drug use: No    Sexual activity: Not on file    Other Topics Concern     Service Not Asked    Blood Transfusions Not Asked    Caffeine Concern Yes     Comment: soda    Occupational Exposure Not Asked    Hobby Hazards Not Asked    Sleep Concern Not Asked    Stress Concern Not Asked    Weight Concern Not Asked    Special Diet Not Asked    Back Care Not Asked    Exercise No    Bike Helmet Not Asked    Seat Belt Not Asked    Self-Exams Not Asked   Social History Narrative    Not on file     Social Determinants of Health     Financial Resource Strain: Not on file   Food Insecurity: Not on file   Transportation Needs: Not on file   Physical Activity: Not on file   Stress: Not on file   Social Connections: Not on file   Housing Stability: Not on file         Allergies:  Allergies   Allergen Reactions    Bactrim [Sulfamethoxazole W/Trimethoprim] HIVES     Facial hives    Other OTHER (SEE COMMENTS) and DIZZINESS    Penicillins ITCHING    Radiology Contrast Iodinated Dyes OTHER (SEE COMMENTS)     HARSH ON KIDNEYS        Review of Systems:  A 14-point ROS was done with pertinent positives and negative per the HPI    Vital Signs:  /72 (BP Location: Left arm, Patient Position: Sitting, Cuff Size: adult)   Pulse 62   Temp 96.6 °F (35.9 °C) (Tympanic)   Resp 16   Ht 1.499 m (4' 11.02\")   Wt 57.6 kg (127 lb)   SpO2 92%   BMI 25.64 kg/m²       Physical Examination:  General: Patient is alert and oriented x 3, not in acute distress. No rash.   Psych:  Mood and affect appropriate  HEENT: EOMs intact. PERRL. Oropharynx w/o patches or exudates. No significant mucositis  Neck: No JVD. No palpable lymphadenopathy. Neck is supple.  Lymphatics: There is no palpable lymphadenopathy   Chest: Diminished BS right base. No wheeze  Heart: Regular rate and rhythm.   Abdomen: Soft, non tender with good bowel sounds.  No hepatosplenomegaly.  No palpable mass.  Extremities: Pedal pulses are present.  BLE edema  Neurological: Grossly intact.        Laboratory:  Lab Results    Component Value Date    WBC 5.2 04/11/2024    RBC 3.14 (L) 04/11/2024    HGB 10.1 (L) 04/11/2024    HCT 31.1 (L) 04/11/2024    .0 04/11/2024    MPV 9.7 01/24/2012    MCV 99.0 04/11/2024    MCH 32.2 04/11/2024    MCHC 32.5 04/11/2024    RDW 14.1 04/11/2024    NEPRELIM 3.52 04/11/2024    NEUTABS 3.45 01/19/2021    LYMPHABS 0.80 (L) 01/19/2021    EOSABS 0.05 01/19/2021    BASABS 0.00 01/19/2021    NEUT 46 01/19/2021    LYMPH 16 01/19/2021    MON 5 01/19/2021    EOS 1 01/19/2021    BASO 0 01/19/2021    NEPERCENT 67.6 04/11/2024    LYPERCENT 18.3 04/11/2024    MOPERCENT 10.6 04/11/2024    EOPERCENT 2.7 04/11/2024    BAPERCENT 0.4 04/11/2024    NE 3.52 04/11/2024    LYMABS 0.95 (L) 04/11/2024    MOABSO 0.55 04/11/2024    EOABSO 0.14 04/11/2024    BAABSO 0.02 04/11/2024     Lab Results   Component Value Date    GLU 95 04/11/2024    BUN 32 (H) 04/11/2024    BUNCREA 23.1 (H) 07/13/2021    CREATSERUM 1.52 (H) 04/11/2024    ANIONGAP 7 04/11/2024    GFR 23 (L) 01/23/2018    GFRNAA 32 (L) 07/26/2022    GFRAA 37 (L) 07/26/2022    CA 9.2 04/11/2024    OSMOCALC 307 (H) 04/11/2024    ALKPHO 121 04/11/2024    AST 14 (L) 04/11/2024    ALT 15 04/11/2024    BILT 0.4 04/11/2024    TP 6.9 04/11/2024    ALB 3.3 (L) 04/11/2024    GLOBULIN 3.6 04/11/2024     04/11/2024    K 3.9 04/11/2024     (H) 04/11/2024    CO2 22.0 04/11/2024       Lab Component   Component Value Date/Time    CEA 1.4 01/19/2021 1139     Lab Component   Component Value Date/Time     12/08/2023 1045     07/15/2014 1253      Latest Reference Range & Units 04/11/24 14:16   LDH 84 - 246 U/L 150   Iron, Serum 50 - 170 ug/dL 72   Transferrin 200 - 360 mg/dL 203   Iron Bind.Cap.(TIBC) 240 - 450 ug/dL 302   Iron Saturation 15 - 50 % 24   FERRITIN 18.0 - 340.0 ng/mL 102.9   Vitamin B12 193 - 986 pg/mL 435   FOLATE (FOLIC ACID), SERUM >=8.7 ng/mL 25.6      Latest Reference Range & Units 12/08/23 10:45   A/G Ratio 1.0 - 2.0  0.9 (L)   KAPPA FREE  LIGHT CHAIN 0.330 - 1.940 mg/dL 3.383 (H)   LAMBDA FREE LIGHT CHAIN 0.571 - 2.630 mg/dL 5.591 (H)   KAPPA/LAMBDA FLC RATIO 0.26 - 1.65  0.61   PROTEIN, TOTAL 5.7 - 8.2 g/dL  6.4 - 8.2 g/dL 6.1  6.7   Albumin 3.75 - 5.21 g/dL  3.4 - 5.0 g/dL 3.21 (L)  3.1 (L)   ALPHA-1-GLOBULINS 0.19 - 0.46 g/dL 0.41   ALPHA-2-GLOBULINS 0.48 - 1.05 g/dL 1.01   BETA GLOBULINS 0.68 - 1.23 g/dL 0.68   GAMMA GLOBULINS 0.62 - 1.70 g/dL 0.79   ALBUMIN/GLOBULIN RATIO 1.00 - 2.00  1.11   M-Charles <=0.00 g/dL 0.29 (H)   SPE INTERPRETATION  Monoclonal spike in the gamma region.  Reviewed by Erwin Urban M.D. Pathology 12/12/23 at 8:30 PM     IMMUNOFIXATION  Monoclonal IgM lambda.If clinically indicated, 24 hour urine monoclonal  protein studies is suggested.  Reviewed by Erwin Urban M.D. Pathology 12/12/23 at 8:30 PM     (L): Data is abnormally low  (H): Data is abnormally high     Latest Reference Range & Units 12/08/23 10:45   IMMUNOGLOBULIN A 70.00 - 312.00 mg/dL 157.00   IMMUNOGLOBULIN G 791 - 1,643 mg/dL 581 (L)   IMMUNOGLOBULIN M 43.0 - 279.0 mg/dL 306.0 (H)       Radiology:  PROCEDURE:  CT CHEST+ABDOMEN+PELVIS(CPT=71250/75789)     COMPARISON:  [facility], CT, CT CHEST+ABDOMEN+PELVIS(CPT=71250/92092), 12/09/2023, 1:23 PM.     INDICATIONS:  C34.90 EGFR-related lung cancer (HCC) C85.10 Low grade B-cell lymphoma (HCC)     TECHNIQUE:  Following oral contrast administration, unenhanced multislice CT scanning is performed through the chest, abdomen, and pelvis.  Dose reduction techniques were used. Dose information is transmitted to the ACR (American College of Radiology)  NRDR (National Radiology Data Registry) which includes the Dose Index Registry.     PATIENT STATED HISTORY: (As transcribed by Technologist)  Disease surveillance dut to history of Lung Cander and Lymphoma. Patient denies any chest, abdomen or pelvic complaints.         FINDINGS:       CHEST:    LUNGS:  Spiculated airspace disease within the right lower  lobe measures approximately 1.8 x 2.9 cm, which is unchanged from prior and consistent with post treatment changes related to patient's known lung cancer.  There is surrounding reticular opacity,   which is similar to prior.  There is a subsolid nodule which measures approximately 2.5 x 1.8 cm in overall dimension (series 3, image 50), which is mildly increased in size.  The solid component measures up to 1.2 cm, previously 0.9 cm.  There are  multiple additional bilateral pulmonary nodules, no role stable from prior.  For example there is a stable 4 mm nodule in the left upper lobe (series 3, image 37), a stable 4 mm nodule in the left upper lobe (series 3, image 53), there is a stable 7 mm  nodule in the left lower lobe (series 3, image 65), there is a stable 5 mm nodule in the left lower lobe (series 3, image 65), there is a stable 6 mm nodule in the right lower lobe (series 3, image 57).  There are multiple additional stable pulmonary  nodules.    There is a new 5 mm ground-glass nodular opacity in the right lower lobe (series 3, image 55) which is nonspecific possibly infectious/inflammatory..    MEDIASTINUM:  No new or enlarging lymphadenopathy.  PHILIPPE:  No new or enlarging lymphadenopathy.  CARDIAC:  Coronary artery calcifications are present.  Mild stable cardiac enlargement.  No pericardial effusion or thickening.  PLEURA:  There is a moderate right pleural effusion, which is slightly increased in size from prior.  Trace left pleural effusion, stable from prior.  CHEST WALL:  No new or enlarging lymphadenopathy.  AORTA:  No aneurysm or dissection.    VASCULATURE:  Pulmonary vessels are unremarkable within the limits of a noncontrast CT.       ABDOMEN/PELVIS:  LIVER:  No enlargement, atrophy, abnormal density, or significant focal lesion.    BILIARY:  Status post cholecystectomy.  Stable dilation of the common bile duct.  PANCREAS:  No lesion, fluid collection, ductal dilatation, or atrophy.    SPLEEN:  No  enlargement or focal lesion.    KIDNEYS:  Grossly stable bilateral renal cysts.  No hydronephrosis or nephrolithiasis.  Stable subcentimeter lesions which are too small to characterize.  Stable 3.4 x 3.7 cm probable complex cyst within the interpolar region.  ADRENALS:  No mass or enlargement.    AORTA:  Atherosclerotic disease without aneurysm.  RETROPERITONEUM:  No new or enlarging lymphadenopathy.  BOWEL/MESENTERY:  Small to moderate hiatal hernia, unchanged from prior.  No dilated bowel or wall thickening.  Colonic diverticulosis, without diverticulitis.  Mesenteric stranding within the pelvis is unchanged from prior.  ABDOMINAL WALL:  Bilateral fat containing inguinal hernias.  URINARY BLADDER:  Nondistended  PELVIC NODES:  No adenopathy.    PELVIC ORGANS:  Hysterectomy  BONES:  No new osseous lesions.  Levoscoliosis.  Degenerative changes.                   Impression   CONCLUSION:    1. Slight interval increase in left upper lobe ground-glass and solid nodule (suspected neoplasm), which now measures approximately 2.5 x 1.8 cm with 1.2 cm solid component, previously approximately 2.2 x 1.8 cm with 0.9 cm solid component.  2. New nonspecific 5 mm ground-glass opacity within the right lower lobe, indeterminate, possibly infectious/inflammatory, attention on follow-up is recommended.  3. Moderate right pleural effusion, slightly increased in size from prior.  4. Otherwise stable examination with post treatment changes in the right lower lobe and numerous stable pulmonary nodules bilaterally.  5. Stable complex cystic lesion in the right kidney, possibly a hemorrhagic/proteinaceous cyst.     Please see above for further details.                 LOCATION:  [facility]           Dictated by (CST): Isac Damon MD on 4/07/2024 at 4:00 PM      Finalized by (CST): Isac Damon MD on 4/07/2024 at 4:21 PM       PROCEDURE:  CT CHEST+ABDOMEN+PELVIS(CPT=71250/97887)     COMPARISON:  [facility], CT, CT  CHEST+ABDOMEN+PELVIS(CPT=71250/62617), 7/17/2022, 1:10 PM.  [facility], CT, CT ABDOMEN+PELVIS(CPT=74176), 10/16/2023, 9:08 PM.  [facility], CT, CT CHEST (CPT=71250), 9/10/2023, 12:58 PM.  [facility], CT, CT CHEST  (CPT=71250), 5/21/2023, 12:48 PM.  [facility], CT, CT CHEST+ABDOMEN+PELVIS(CPT=71250/44871), 1/28/2023, 1:01 PM.     INDICATIONS:  C34.81 Malignant neoplasm of overlapping sites of right lung (HCC)     TECHNIQUE:  Following oral contrast administration, unenhanced multislice CT scanning is performed through the chest, abdomen, and pelvis.  Dose reduction techniques were used. Dose information is transmitted to the ACR (American College of Radiology)  NRDR (National Radiology Data Registry) which includes the Dose Index Registry.     PATIENT STATED HISTORY: (As transcribed by Technologist)  The patient states surveillance of lymphoma. She denies complaints.         FINDINGS:       CHEST:    LUNGS:  Within the right lower lobe spiculated airspace disease measuring approximately 18 x 30 mm is stable since multiple previous studies consistent with post treatment changes with patient's known lung cancer.  There is mild bronchiectasis noted  within the right lower lobe.  There is reticular nodular opacities in the right lower lobe which are not significantly changed since previous study in may represent chronic indolent inflammation.  There are stable nodules in the medial right lower lobe  measuring 6 and 4 mm in size on image 61. Stable nodule in the right lower lobe anteriorly measuring 5 x 3 mm in size on image 55.       Left lower lobe demonstrates multiple small nodules such as in the medial left lower lobe measuring 7 x 6 mm and more anterior left lower lobe measuring 6 x 3 mm which are stable since previous study.  The semi solid ground-glass and solid nodular  opacity within the left upper lobe posteriorly measures 22 x 18 mm which is slowly increasing in size when compared to older previous studies.   The multiple pulmonary nodules within the left upper lobe and lingula not significantly changed compared to  previous study with the largest measuring 4 mm.  MEDIASTINUM:  No mass or adenopathy.    PHILIPPE:  No mass or adenopathy.    CARDIAC:  There is mild coronary calcifications.  There is no cardiac enlargement or pericardial effusion.  PLEURA:  Stable small right pleural effusion.  CHEST WALL:  No mass or axillary adenopathy.    AORTA:  No aneurysm or dissection.    VASCULATURE:  Pulmonary vessels are unremarkable within the limits of a noncontrast CT.       ABDOMEN/PELVIS:  LIVER:  No enlargement, atrophy, abnormal density, or significant focal lesion.    BILIARY:  Status post cholecystectomy.  Dilated common bile duct to 20 mm is a stable finding.  There is no intrahepatic ductal dilatation.  PANCREAS:  No lesion, fluid collection, ductal dilatation, or atrophy.    SPLEEN:  No enlargement or focal lesion.    KIDNEYS:  There are multiple bilateral simple appearing renal cysts with the largest in the right upper/midpole measures 48 x 39 mm.  The largest simple cyst in the left lower pole of the kidney measures 31 x 32 mm.  There is a complex cyst of the right  kidney midpole exophytic with rounded area of high density within the cyst measures 34 x 35 mm which is unchanged likely represents a stable hemorrhagic or proteinaceous cyst.    ADRENALS:  No mass or enlargement.    AORTA:  No aneurysm or dissection.    RETROPERITONEUM:  No mass or adenopathy.    BOWEL/MESENTERY:  No visible mass, obstruction, or bowel wall thickening.  Moderate sliding-type hiatal hernia.  There is moderate to severe diverticulosis involving the sigmoid and left colon without ends of diverticulitis.  There is mild stranding to  the mesenteric fat consistent with mild panniculitis which is a stable finding.  There is a duodenal diverticulum of the 3/4 portion of the duodenum measuring 35 mm.  ABDOMINAL WALL:  Bilateral fat containing  inguinal hernias.  URINARY BLADDER:  No visible focal wall thickening, lesion, or calculus.    PELVIC NODES:  No adenopathy.    PELVIC ORGANS:  No visible mass.  Pelvic organs appropriate for patient age.    BONES:  No bony lesion or fracture.   Moderate to severe levoscoliosis of the lumbar spine with mild degenerative changes of the lumbar spine.                   Impression   CONCLUSION:       1. Slight increase in size of ground-glass and solid nodular density in the left upper lobe could represent neoplastic disease measuring 18 x 22 mm.     2. Multiple additional pulmonary nodules throughout both lungs may represent stable metastatic disease.     3. Post treatment changes with scarring and chronic indolent reticular nodular opacities likely representing chronic inflammatory infectious process in the right lower lobe.     4. Stable complex cyst within the right ovary likely representing a proteinaceous or hemorrhagic cyst.       5. Extensive diverticulosis of the left colon without acute diverticulitis.              LOCATION:  [facility]           Dictated by (CST): Kenneth Mckeon MD on 12/09/2023 at 4:43 PM      Finalized by (CST): Kenneth Mckeon MD on 12/09/2023 at 5:01 PM         Impression and Plan:  1. H/o multiple lung cancers-  No overt evidence of progression/recurrence on previous imaging. Most recent CT showed slight interval increase in right pleural effusion. Imaging reviewed at lung conference as well and lung findings felt to be stable. New 5 mm GGO RLL felt to be infectious/inflammatory. Has a h/o granulomatous disease (biopsy proven left lung nodule in 2021). Consensus of the group was to monitor effusion unless she has symptoms. She feels good she says and denies any respiratory symptoms. Also just saw pulmonary.     2. Pulmonary nodules- Imaging reviewed at lung conference. Findings felt to be likely infectious/inflammatory- has h/o BILL and granulomatous disease.  Continue vest therapy and inhalers.  Following with surveillance scans.     3. Anemia- Multifactorial. Known CKD with intermittent h/o BRANNON with previous GI w/u negative. No evidence of hemolysis. B12, iron and folate are ok. Has monoclonal paraproteinemia but IgM which is low and not myeloma. Levels stable     4. Low grade lymphoproliferative disorder- had excellent response with BR but reported SE that affected her QoL and therefore did not complete treatment as initially planned. Viscosity normalized and IgM levels and M spike have decreased significantly. Subsequent CTs showed resolution of adenopathy with decreased IgM. IgM, light chain and M spike levels still low. No worsening adenopathy on recent scans     5. DVT- distal and appears unprovoked.  As 2nd episode of VTE would favor indefinite anticoagulation as long as bleeding risk is low. On Xarelto but given renal insufficiency (calculated creatinine clearance is 21) at reduced dose 15 daily or can change to Apixaban. No reported bleeding.     6. Right pleural effusion (see [1]). Asymptomatic- monitor    Labs and imaging reviewed.        RTC 4 months     Risk level high- NHL and h/o multiple lung cancers       [facility]

## 2024-04-11 NOTE — PATIENT INSTRUCTIONS
Resume nasal sprays and meds per ENT and Breo daily  Plan for thoracentesis as decided by Dr Chang  Followup to be determined

## 2024-04-12 NOTE — TELEPHONE ENCOUNTER
From: Rossy Car  To: Beata Jiang  Sent: 4/11/2024 8:39 PM CDT  Subject: Plural effusion     Dr,    How much larger has the plural effusion gotten on my mom? The CT says it is slightly larger. She was under the impression for her visit with your team that she needed this done right away. But Dr GATES said as long as it’s not really bothering her breathing it is not something that needs to be done right away. Is this dangerous for her? She is feeling pretty good.     Just wanted to have this clarified.

## 2024-04-29 NOTE — TELEPHONE ENCOUNTER
Pt's son asking how he would now if the cough pt is experiencing is caused by the pleural effusion. Pt's spo2 WNL. Please advise.

## 2024-05-06 RX ORDER — MONTELUKAST SODIUM 10 MG/1
10 TABLET ORAL EVERY EVENING
Qty: 90 TABLET | Refills: 1 | Status: SHIPPED | OUTPATIENT
Start: 2024-05-06

## 2024-05-06 NOTE — TELEPHONE ENCOUNTER
Pt last seen by Kacy SALDANA on 4-11-24.  Per medication reconciliation, pt using Montelukast.  Next appt scheduled on 8-15-24.  Refill for Montelukast sent.

## 2024-06-07 RX ORDER — HYDRALAZINE HYDROCHLORIDE 50 MG/1
50 TABLET, FILM COATED ORAL 3 TIMES DAILY
Qty: 90 TABLET | Refills: 5 | Status: SHIPPED | OUTPATIENT
Start: 2024-06-07

## 2024-06-07 NOTE — TELEPHONE ENCOUNTER
A refill request was received for:  Requested Prescriptions     Pending Prescriptions Disp Refills    hydrALAZINE 50 MG Oral Tab  0     Sig: Take 1 tablet (50 mg total) by mouth 3 (three) times daily.       Last refill date:   3/1/2023    Last office visit:  3/21/2024    Follow up due:  Future Appointments   Date Time Provider Department Center   8/11/2024  1:00 PM [facility]   8/15/2024  2:45 PM Debbie Chang MD  [facility]

## 2024-08-15 ENCOUNTER — APPOINTMENT (OUTPATIENT)
Dept: HEMATOLOGY/ONCOLOGY | Facility: HOSPITAL | Age: 84
End: 2024-08-15
Attending: INTERNAL MEDICINE
Payer: MEDICARE

## 2024-08-16 NOTE — TELEPHONE ENCOUNTER
[facility] is calling on behalf of patient for Xarelto refill. Pharmacy is [facility]. Called 8/16/24 KM

## 2024-08-19 RX ORDER — FLUTICASONE FUROATE AND VILANTEROL TRIFENATATE 200; 25 UG/1; UG/1
1 POWDER RESPIRATORY (INHALATION) DAILY
Qty: 3 EACH | Refills: 0 | Status: SHIPPED | OUTPATIENT
Start: 2024-08-19

## 2024-08-22 ENCOUNTER — APPOINTMENT (OUTPATIENT)
Dept: HEMATOLOGY/ONCOLOGY | Facility: HOSPITAL | Age: 84
End: 2024-08-22
Attending: INTERNAL MEDICINE
Payer: MEDICARE

## 2024-08-25 ENCOUNTER — HOSPITAL ENCOUNTER (OUTPATIENT)
Dept: CT IMAGING | Age: 84
End: 2024-08-25
Attending: INTERNAL MEDICINE
Payer: MEDICARE

## 2024-08-25 ENCOUNTER — HOSPITAL ENCOUNTER (OUTPATIENT)
Dept: CT IMAGING | Age: 84
Discharge: HOME | End: 2024-08-25
Attending: INTERNAL MEDICINE
Payer: MEDICARE

## 2024-08-25 DIAGNOSIS — C85.10 LOW GRADE B-CELL LYMPHOMA (HCC): ICD-10-CM

## 2024-08-25 DIAGNOSIS — C34.81 MALIGNANT NEOPLASM OF OVERLAPPING SITES OF RIGHT LUNG (HCC): ICD-10-CM

## 2024-08-25 PROCEDURE — 74176 CT ABD & PELVIS W/O CONTRAST: CPT | Performed by: INTERNAL MEDICINE

## 2024-08-25 PROCEDURE — 71250 CT THORAX DX C-: CPT | Performed by: INTERNAL MEDICINE

## 2024-08-26 ENCOUNTER — PATIENT MESSAGE (OUTPATIENT)
Facility: CLINIC | Age: 84
End: 2024-08-26

## 2024-08-26 NOTE — TELEPHONE ENCOUNTER
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 But the results seem very similar as last CT. Kind of worried.     Thanks Kenneth

## 2024-08-26 NOTE — TELEPHONE ENCOUNTER
Call placed to son.  CT chest, abdomen and pelvis ordered by Dr. Chang.  Son states that he has spoken with Dr. Chang's office, and her case will be discussed at Tumor Board on Wednesday.  Pt has appointment with Dr. Goodwin on Thursday and he is not sure if pt will be following with Dr. Jiang or Dr. Goodwin.  Message forwarded to Dr. Jiang on 8-26-24.

## 2024-08-29 ENCOUNTER — OFFICE VISIT (OUTPATIENT)
Dept: HEMATOLOGY/ONCOLOGY | Facility: HOSPITAL | Age: 84
End: 2024-08-29
Attending: INTERNAL MEDICINE
Payer: MEDICARE

## 2024-08-29 VITALS
OXYGEN SATURATION: 93 % | BODY MASS INDEX: 24 KG/M2 | SYSTOLIC BLOOD PRESSURE: 117 MMHG | TEMPERATURE: 99 F | WEIGHT: 120.5 LBS | HEART RATE: 67 BPM | DIASTOLIC BLOOD PRESSURE: 60 MMHG

## 2024-08-29 DIAGNOSIS — C85.10 LOW GRADE B-CELL LYMPHOMA (HCC): ICD-10-CM

## 2024-08-29 DIAGNOSIS — D64.9 NORMOCYTIC ANEMIA: ICD-10-CM

## 2024-08-29 DIAGNOSIS — R91.8 LUNG NODULES: Primary | ICD-10-CM

## 2024-08-29 DIAGNOSIS — N18.32 CHRONIC KIDNEY DISEASE, STAGE 3B (HCC): ICD-10-CM

## 2024-08-29 DIAGNOSIS — C34.81 MALIGNANT NEOPLASM OF OVERLAPPING SITES OF RIGHT LUNG (HCC): ICD-10-CM

## 2024-08-29 DIAGNOSIS — D47.2 MONOCLONAL PARAPROTEINEMIA: ICD-10-CM

## 2024-08-29 DIAGNOSIS — A31.0 MAI (MYCOBACTERIUM AVIUM-INTRACELLULARE) (HCC): ICD-10-CM

## 2024-08-29 DIAGNOSIS — R93.89 ABNORMAL CT OF THE CHEST: ICD-10-CM

## 2024-08-29 LAB
ALBUMIN SERPL-MCNC: 4.3 G/DL (ref 3.2–4.8)
ALBUMIN/GLOB SERPL: 1.8 {RATIO} (ref 1–2)
ALP LIVER SERPL-CCNC: 117 U/L
ALT SERPL-CCNC: 13 U/L
ANION GAP SERPL CALC-SCNC: 7 MMOL/L (ref 0–18)
AST SERPL-CCNC: 17 U/L (ref ?–34)
BASOPHILS # BLD AUTO: 0.03 X10(3) UL (ref 0–0.2)
BASOPHILS NFR BLD AUTO: 0.5 %
BILIRUB SERPL-MCNC: 0.4 MG/DL (ref 0.2–1.1)
BUN BLD-MCNC: 41 MG/DL (ref 9–23)
CALCIUM BLD-MCNC: 9.8 MG/DL (ref 8.7–10.4)
CHLORIDE SERPL-SCNC: 113 MMOL/L (ref 98–112)
CO2 SERPL-SCNC: 24 MMOL/L (ref 21–32)
CREAT BLD-MCNC: 1.78 MG/DL
EGFRCR SERPLBLD CKD-EPI 2021: 28 ML/MIN/1.73M2 (ref 60–?)
EOSINOPHIL # BLD AUTO: 0.08 X10(3) UL (ref 0–0.7)
EOSINOPHIL NFR BLD AUTO: 1.4 %
ERYTHROCYTE [DISTWIDTH] IN BLOOD BY AUTOMATED COUNT: 14.7 %
FASTING STATUS PATIENT QL REPORTED: NO
GLOBULIN PLAS-MCNC: 2.4 G/DL (ref 2–3.5)
GLUCOSE BLD-MCNC: 126 MG/DL (ref 70–99)
HCT VFR BLD AUTO: 31 %
HGB BLD-MCNC: 10.2 G/DL
IGA SERPL-MCNC: 125.8 MG/DL (ref 40–350)
IGM SERPL-MCNC: 230.3 MG/DL (ref 50–300)
IMM GRANULOCYTES # BLD AUTO: 0.04 X10(3) UL (ref 0–1)
IMM GRANULOCYTES NFR BLD: 0.7 %
IMMUNOGLOBULIN PNL SER-MCNC: 610 MG/DL (ref 650–1600)
LDH SERPL L TO P-CCNC: 178 U/L
LYMPHOCYTES # BLD AUTO: 0.93 X10(3) UL (ref 1–4)
LYMPHOCYTES NFR BLD AUTO: 16.6 %
MCH RBC QN AUTO: 32.5 PG (ref 26–34)
MCHC RBC AUTO-ENTMCNC: 32.9 G/DL (ref 31–37)
MCV RBC AUTO: 98.7 FL
MONOCYTES # BLD AUTO: 0.56 X10(3) UL (ref 0.1–1)
MONOCYTES NFR BLD AUTO: 10 %
NEUTROPHILS # BLD AUTO: 3.95 X10 (3) UL (ref 1.5–7.7)
NEUTROPHILS # BLD AUTO: 3.95 X10(3) UL (ref 1.5–7.7)
NEUTROPHILS NFR BLD AUTO: 70.8 %
OSMOLALITY SERPL CALC.SUM OF ELEC: 310 MOSM/KG (ref 275–295)
PLATELET # BLD AUTO: 188 10(3)UL (ref 150–450)
POTASSIUM SERPL-SCNC: 4.3 MMOL/L (ref 3.5–5.1)
PROT SERPL-MCNC: 6.7 G/DL (ref 5.7–8.2)
RBC # BLD AUTO: 3.14 X10(6)UL
SODIUM SERPL-SCNC: 144 MMOL/L (ref 136–145)
WBC # BLD AUTO: 5.6 X10(3) UL (ref 4–11)

## 2024-08-29 PROCEDURE — 99215 OFFICE O/P EST HI 40 MIN: CPT | Performed by: INTERNAL MEDICINE

## 2024-08-29 NOTE — PROGRESS NOTES
Cancer Center Progress Note    Patient Name: Rossy Car   YOB: 1940   Medical Record Number: [id number]   CSN: [id number]   Attending Physician: Debbie Chang M.D.   Referring Physician:    Date of Visit: 8/29/2024     Chief Complaint:  Chief Complaint   Patient presents with    Follow - Up     Lung/NHL/dvt pt here for 4 month f/u and to review CT.  She continues on xarelto/oral iron without difficulty.  Denies bleeding/SOB. Some cough related to sinuses. Appetite good, energy okay, gets tired in late afternoon but recovers quickly.       [facility] Center Progress Note    Patient Name: Rossy Car   YOB: 1940   Medical Record Number: [id number]   CSN: [id number]   Attending Physician: Debbie Chang M.D.   Referring Physician: DO Dr. Beata Garcia    Date of Visit: 4/11/2024     Chief Complaint:  Chief Complaint   Patient presents with    Follow - Up     Lung/NHL/dvt pt here for 4 month f/u and to review CT.  She continues on xarelto/oral iron without difficulty.  Denies bleeding/SOB. Some cough related to sinuses. Appetite good, energy okay, gets tired in late afternoon but recovers quickly.         Problem List:  1. 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 No significant dysplastic changes nor increased blasts, lymphocytosis or plasmacytosis noted on aspirate. Cytogenetics from bone marrow biopsy showed translocation of chromosomes 11 and 18 which is expected to result in a BIRC3/MALT1 gene rearrangement which is seen in approximately 50% of extranodal marginal zone B cell or MALT-type lymphomas. MYDD 88 L265P was not detected making LPL less likely.      - PET from 1/2018 showed new GGO/mass in left apex with SUV of 3.1. CT guided  biopsy was done and this showed low grade non-Hodgkin B cell lymphoma with focal plasmacytic differentiation c/w MALT vs LPL. Immunoprofile was similar to previous lymph node biopsy.      - Ibrutinib 4/12/18- 10/2018. Decided to stop on her own due to SE.     - Scans done showed worsening adenopathy with symptoms felt to potentially be from NHL and received 4 weekly doses of Rituxan for low grade NHL. Last dose was 10/18/19     -  c/o vague symptoms but (fatigue, cough but no B symptoms)  had increasing adenopathy with rapid increase of her light chains with IgM around 4K and increased viscosity.     -  BR 3/10/20- 7/2020        2. Lung cancer x 3:   - Stage I NSCLC right medial lung base- well differentiated adenocarcinoma with lepidic growth pattern diagnosed 12/3/14 s/p SBRT. EGFR exon 19 positive. Started \"adjuvant\" Tarceva 2/2015. Stopped after she was placed on Ibrutinib (given concerns for overlapping side effects. Had been FIONA for lung cancer then).      - Stage I NSCLC- adenocarcinoma of the RLL diagnosed 5/02/2013 s/p SBRT     - Stage IB (T2N0M0) well to moderately differentiated adenocarcinoma of the RUL s/p right thoracotomy with right upper lobectomy and mediastinal lymphadenectomy on 09-.      - had her surveillance scans 6/2021 which showed increased reticular nodular densities throughout the LLL along with a 1.2 x 1.3 cm nodule which was new. PET was then done which showed new nodule in the left lung had an SUV of 3.9 which was suspicious. Imaging was reviewed at lung conference and biopsy of the nodule was recommended. This was performed by IR on 5/5/21 which showed necrotizing granulomatous inflammation. She followed up with there pulmonologist who recommended expectant management as asymptomatic. She had a f/u CT which showed slight increase in micronodularity in both lungs felt to likely be infections/inflammatory with some nodules improved.     3. Recurrent VTE   - Right sided PE  diagnosed 9/2011- seen on surveillance scans. Was placed on anticoagulation during that time.      - June 2021 presented to the ED c/o chest pain. She was brought by the paramedics who gave her an aspirin. By the time she arrived at the ED she was pain free. Cardiac enzymes were negative with normal EKG. D dimer was elevated and had V/Q scan which was low prob for DVT. BLE Dopplers showed showed DVT involving the right distal popliteal and right proximal posterior tibial vein. She was placed on Xarelto.     4. Anemia     - felt to be mainly from CKD.    - also with h/o iron deficiency anemia in 2016. Hb had dropped lower than her baseline and w/u revealed BRANNON. Had EGD/c-scope and capsule endoscopy which were unrevealing. Malabsorption?    5. Underwent a bronch 12/17/19 for enlarging right sided lung nodules and adenopathy. Procedure complicated by traumatic epistaxis and hypoxia. Then developed wheezing and tachycardia requiring mask ventilation. She was subsequently admitted after the procedure to ICU. Respiratory distress felt to be from nasal blood aspiration with bronchospasm. Was placed on empiric steroids, nebulizers and supplemental O2. Nose bleed stopped and improved and was discharged home 12/20/19. Path from bronch showed organizing pneumonia and granulation tissue on biopsy of RLL. Cytology from FNA of subcarinal node showed a small monotypic B cell population on flow cytometry of unclear significance.      -  Sputum from 10/2022 positive for BILL.     6. Hospitalized 8/2020 in August with MS changes after a fall. Found to have SDH and UTI. Was placed on antibiotics. NSG was consulted and no surgical intervention was felt indicated. Had delirium during her hospital stay which responded well to seroquel.  Was discharged home 9/9/20 with home PT      History of Present Illness:  She says she is doing ok. Energy is good. She has some BAIN with stairs/cough but no more than usual. Breathing she feels has been  stable.  Cough she feels mainly from PND. No wheezing. No fevers.  Appetite good. Denies chest pain or abdominal pain, change in urinary habits, headaches, dizziness or visual symptoms. No B symptoms. No problems on Xarelto. Takes oral iron w/o difficulty. Had recent skin cancer removed on right shin.       Current Medications:    Current Outpatient Medications:     BREO ELLIPTA 200-25 MCG/ACT Inhalation Aerosol Powder, Breath Activated, Inhale 1 puff into the lungs daily., Disp: 3 each, Rfl: 0    rivaroxaban (XARELTO) 15 MG Oral Tab, Take 1 tablet (15 mg total) by mouth daily with food., Disp: 90 tablet, Rfl: 3    montelukast 10 MG Oral Tab, Take 1 tablet (10 mg total) by mouth every evening., Disp: 90 tablet, Rfl: 1    triamcinolone 0.1 % External Cream, Apply topically 2 (two) times daily as needed. Up to 5, then 1 week break before restarting this cycle as needed., Disp: 1 each, Rfl: 1    azelastine 0.1 % Nasal Solution, 1 spray by Nasal route 2 (two) times daily., Disp: 1 each, Rfl: 5    dilTIAZem  MG Oral Capsule SR 24 Hr, Take 1 capsule (180 mg total) by mouth daily., Disp: , Rfl:     diphenoxylate-atropine 2.5-0.025 MG Oral Tab, Take 1 tablet by mouth 4 (four) times daily as needed for Diarrhea., Disp: 30 tablet, Rfl: 0    rosuvastatin 5 MG Oral Tab, Take 1 tablet (5 mg total) by mouth nightly., Disp: 90 tablet, Rfl: 3    metoprolol succinate 50 MG Oral Tablet 24 Hr, Take 1 tablet (50 mg total) by mouth 2 (two) times a day., Disp: 180 tablet, Rfl: 1    LOSARTAN POTASSIUM 50 MG Oral Tab, TAKE 1 TABLET TWICE A DAY, Disp: 180 tablet, Rfl: 2    Iron, Ferrous Sulfate, 325 (65 Fe) MG Oral Tab, Take by mouth daily. CLARIFY DOSE WITH PATIENT: THIS WAS ORDERED BY ONCOLOGY , Disp: 30 tablet, Rfl: 0    Albuterol Sulfate  (90 Base) MCG/ACT Inhalation Aero Soln, Inhale 2 puffs into the lungs every 4 (four) hours as needed. prn, Disp: , Rfl:     Saline Nasal Spray 0.65 % Nasal Solution, 1 spray by Nasal route  as needed for congestion., Disp: , Rfl:     acetaminophen 500 MG Oral Tab, Take 1 tablet (500 mg total) by mouth every 6 (six) hours as needed for Pain., Disp: , Rfl:     HYDROcodone-acetaminophen (NORCO) 5-325 MG Oral Tab, Take 1 tablet by mouth every 8 (eight) hours as needed for Pain. (Patient not taking: Reported on 8/29/2024), Disp: 24 tablet, Rfl: 0    Past Medical History:  Past Medical History:    Abdominal hernia    Acute kidney injury (HCC)    Acute subdural hematoma (HCC)    Anemia    Arthritis    Azotemia    Bilateral lung cancer (HCC)    Cancer (HCC)    right lung    Cancer (HCC)    lymphoma    Chronic cough    Clostridium difficile colitis    Community acquired pneumonia, unspecified laterality    Complete rupture of rotator cuff    Constipation    COPD (chronic obstructive pulmonary disease) (HCC)    no O2    COPD (chronic obstructive pulmonary disease) with acute bronchitis (HCC)    COPD exacerbation (HCC)    Dependence on supplemental oxygen    Diaphragmatic hernia without mention of obstruction or gangrene    Diarrhea    Diverticulitis of large intestine without perforation or abscess without bleeding    Dysphagia, pharyngoesophageal phase    Epistaxis    Esophageal reflux    Essential hypertension    Exposure to unspecified radiation    right lower lung    Gluteal tendinitis    Gram-negative pneumonia (HCC)    Hearing impairment    bilateral hearing aids     Hearing loss    Hemorrhoids    HIGH BLOOD PRESSURE    History of blood clots    History of lung cancer    Hyperglycemia    Hyperlipidemia    Hypokalemia    Leaking of urine    Leg swelling    Leukocytosis    Low grade B-cell lymphoma (HCC)    Lung cancer (HCC)    Malignant neoplasm of bronchus and lung, unspecified site    Mass of right forearm    Metabolic acidosis    NHL (nodular histiocytic lymphoma) (HCC)    Nocturnal leg cramps    Normocytic normochromic anemia    Osteoarthritis    Osteoporosis    Personal history of antineoplastic  chemotherapy    LAST 10/2019    Personal history of malignant neoplasm of bronchus and lung    Personal history of urinary (tract) infection    Plantar fascial fibromatosis    Pneumonia due to infectious organism    Pneumonia due to organism    Pulmonary embolism (HCC)    right lung,cleared on own per pt    Renal insufficiency    Scoliosis (and kyphoscoliosis), idiopathic    Shortness of breath    SOB (shortness of breath)    Stool incontinence    Urticaria, unspecified    Visual impairment    glasses    Wears glasses       Past Surgical History:  Past Surgical History:   Procedure Laterality Date    Cholecystectomy  2008    Colonoscopy  2008    Hernia surgery      Hysterectomy  's    vaginal          Other      lung biopsies    Other surgical history  ,    bunionectomy,bilateral    Other surgical history  2017    Cysto- Dr. Tatum    Removal of lung,lobectomy      removal of upper right lobe    Repair ing hernia,5+y/o,reducibl      Tonsillectomy      Total abdom hysterectomy         Family Medical History:  Family History   Problem Relation Age of Onset    Other (immune system) Mother     Cancer Mother     Cancer Father         of the mouth    Hypertension Father     Cancer Son     Cancer Son        Gyne History:  OB History    Para Term  AB Living   3 3 2 1 0 2   SAB IAB Ectopic Multiple Live Births   0 0 0 2 2       Psychosocial History:  Social History     Socioeconomic History    Marital status:      Spouse name: Not on file    Number of children: Not on file    Years of education: Not on file    Highest education level: Not on file   Occupational History    Not on file   Tobacco Use    Smoking status: Never     Passive exposure: Never    Smokeless tobacco: Never   Vaping Use    Vaping status: Never Used   Substance and Sexual Activity    Alcohol use: No    Drug use: No    Sexual activity: Not on file   Other Topics Concern     Service Not Asked     Blood Transfusions Not Asked    Caffeine Concern Yes     Comment: soda    Occupational Exposure Not Asked    Hobby Hazards Not Asked    Sleep Concern Not Asked    Stress Concern Not Asked    Weight Concern Not Asked    Special Diet Not Asked    Back Care Not Asked    Exercise No    Bike Helmet Not Asked    Seat Belt Not Asked    Self-Exams Not Asked   Social History Narrative    Not on file     Social Determinants of Health     Financial Resource Strain: Not on file   Food Insecurity: Not on file   Transportation Needs: Not on file   Physical Activity: Not on file   Stress: Not on file   Social Connections: Not on file   Housing Stability: Not on file         Allergies:  Allergies   Allergen Reactions    Bactrim [Sulfamethoxazole W/Trimethoprim] HIVES     Facial hives    Other OTHER (SEE COMMENTS) and DIZZINESS    Penicillins ITCHING    Radiology Contrast Iodinated Dyes OTHER (SEE COMMENTS)     HARSH ON KIDNEYS        Review of Systems:  A 14-point ROS was done with pertinent positives and negative per the HPI    Vital Signs:  /60 (BP Location: Left arm, Patient Position: Sitting, Cuff Size: adult)   Pulse 67   Temp 98.7 °F (37.1 °C) (Oral)   Wt 54.7 kg (120 lb 8 oz)   SpO2 93%   BMI 24.32 kg/m²       Physical Examination:  General: Patient is alert and oriented x 3, not in acute distress. No rash.   Psych:  Mood and affect appropriate  HEENT: EOMs intact. PERRL. Oropharynx w/o patches or exudates. No significant mucositis  Neck: No JVD. No palpable lymphadenopathy. Neck is supple.  Lymphatics: There is no palpable lymphadenopathy   Chest: Diminished BS right base. No wheeze  Heart: Regular rate and rhythm.   Abdomen: Soft, non tender with good bowel sounds.  No hepatosplenomegaly.  No palpable mass.  Extremities: Pedal pulses are present.  BLE edema  Neurological: Grossly intact.        Laboratory:  Lab Results   Component Value Date    WBC 5.2 04/11/2024    RBC 3.14 (L) 04/11/2024    HGB 10.1 (L)  04/11/2024    HCT 31.1 (L) 04/11/2024    .0 04/11/2024    MPV 9.7 01/24/2012    MCV 99.0 04/11/2024    MCH 32.2 04/11/2024    MCHC 32.5 04/11/2024    RDW 14.1 04/11/2024    NEPRELIM 3.52 04/11/2024    NEUTABS 3.45 01/19/2021    LYMPHABS 0.80 (L) 01/19/2021    EOSABS 0.05 01/19/2021    BASABS 0.00 01/19/2021    NEUT 46 01/19/2021    LYMPH 16 01/19/2021    MON 5 01/19/2021    EOS 1 01/19/2021    BASO 0 01/19/2021    NEPERCENT 67.6 04/11/2024    LYPERCENT 18.3 04/11/2024    MOPERCENT 10.6 04/11/2024    EOPERCENT 2.7 04/11/2024    BAPERCENT 0.4 04/11/2024    NE 3.52 04/11/2024    LYMABS 0.95 (L) 04/11/2024    MOABSO 0.55 04/11/2024    EOABSO 0.14 04/11/2024    BAABSO 0.02 04/11/2024     Lab Results   Component Value Date    GLU 95 04/11/2024    BUN 32 (H) 04/11/2024    BUNCREA 23.1 (H) 07/13/2021    CREATSERUM 1.52 (H) 04/11/2024    ANIONGAP 7 04/11/2024    GFR 23 (L) 01/23/2018    GFRNAA 32 (L) 07/26/2022    GFRAA 37 (L) 07/26/2022    CA 9.2 04/11/2024    OSMOCALC 307 (H) 04/11/2024    ALKPHO 121 04/11/2024    AST 14 (L) 04/11/2024    ALT 15 04/11/2024    BILT 0.4 04/11/2024    TP 6.9 04/11/2024    ALB 3.3 (L) 04/11/2024    GLOBULIN 3.6 04/11/2024     04/11/2024    K 3.9 04/11/2024     (H) 04/11/2024    CO2 22.0 04/11/2024       Lab Component   Component Value Date/Time    CEA 1.4 01/19/2021 1139     Lab Component   Component Value Date/Time     04/11/2024 1416     07/15/2014 1253      Latest Reference Range & Units 04/11/24 14:16   LDH 84 - 246 U/L 150   Iron, Serum 50 - 170 ug/dL 72   Transferrin 200 - 360 mg/dL 203   Iron Bind.Cap.(TIBC) 240 - 450 ug/dL 302   Iron Saturation 15 - 50 % 24   FERRITIN 18.0 - 340.0 ng/mL 102.9   Vitamin B12 193 - 986 pg/mL 435   FOLATE (FOLIC ACID), SERUM >=8.7 ng/mL 25.6      Latest Reference Range & Units 12/08/23 10:45   A/G Ratio 1.0 - 2.0  0.9 (L)   KAPPA FREE LIGHT CHAIN 0.330 - 1.940 mg/dL 3.383 (H)   LAMBDA FREE LIGHT CHAIN 0.571 - 2.630 mg/dL  5.591 (H)   KAPPA/LAMBDA FLC RATIO 0.26 - 1.65  0.61   PROTEIN, TOTAL 5.7 - 8.2 g/dL  6.4 - 8.2 g/dL 6.1  6.7   Albumin 3.75 - 5.21 g/dL  3.4 - 5.0 g/dL 3.21 (L)  3.1 (L)   ALPHA-1-GLOBULINS 0.19 - 0.46 g/dL 0.41   ALPHA-2-GLOBULINS 0.48 - 1.05 g/dL 1.01   BETA GLOBULINS 0.68 - 1.23 g/dL 0.68   GAMMA GLOBULINS 0.62 - 1.70 g/dL 0.79   ALBUMIN/GLOBULIN RATIO 1.00 - 2.00  1.11   M-Charles <=0.00 g/dL 0.29 (H)   SPE INTERPRETATION  Monoclonal spike in the gamma region.  Reviewed by Erwin Urban M.D. Pathology 12/12/23 at 8:30 PM     IMMUNOFIXATION  Monoclonal IgM lambda.If clinically indicated, 24 hour urine monoclonal  protein studies is suggested.  Reviewed by Erwin Urban M.D. Pathology 12/12/23 at 8:30 PM     (L): Data is abnormally low  (H): Data is abnormally high     Latest Reference Range & Units 12/08/23 10:45   IMMUNOGLOBULIN A 70.00 - 312.00 mg/dL 157.00   IMMUNOGLOBULIN G 791 - 1,643 mg/dL 581 (L)   IMMUNOGLOBULIN M 43.0 - 279.0 mg/dL 306.0 (H)       Radiology:  PROCEDURE:  CT CHEST+ABDOMEN+PELVIS(CPT=71250/16721)     COMPARISON:  [facility], CT, CT CHEST+ABDOMEN+PELVIS(CPT=71250/46121), 12/09/2023, 1:23 PM.     INDICATIONS:  C34.90 EGFR-related lung cancer (HCC) C85.10 Low grade B-cell lymphoma (HCC)     TECHNIQUE:  Following oral contrast administration, unenhanced multislice CT scanning is performed through the chest, abdomen, and pelvis.  Dose reduction techniques were used. Dose information is transmitted to the ACR (American College of Radiology)  NRDR (National Radiology Data Registry) which includes the Dose Index Registry.     PATIENT STATED HISTORY: (As transcribed by Technologist)  Disease surveillance dut to history of Lung Cander and Lymphoma. Patient denies any chest, abdomen or pelvic complaints.         FINDINGS:       CHEST:    LUNGS:  Spiculated airspace disease within the right lower lobe measures approximately 1.8 x 2.9 cm, which is unchanged from prior and consistent with  post treatment changes related to patient's known lung cancer.  There is surrounding reticular opacity,   which is similar to prior.  There is a subsolid nodule which measures approximately 2.5 x 1.8 cm in overall dimension (series 3, image 50), which is mildly increased in size.  The solid component measures up to 1.2 cm, previously 0.9 cm.  There are  multiple additional bilateral pulmonary nodules, no role stable from prior.  For example there is a stable 4 mm nodule in the left upper lobe (series 3, image 37), a stable 4 mm nodule in the left upper lobe (series 3, image 53), there is a stable 7 mm  nodule in the left lower lobe (series 3, image 65), there is a stable 5 mm nodule in the left lower lobe (series 3, image 65), there is a stable 6 mm nodule in the right lower lobe (series 3, image 57).  There are multiple additional stable pulmonary  nodules.    There is a new 5 mm ground-glass nodular opacity in the right lower lobe (series 3, image 55) which is nonspecific possibly infectious/inflammatory..    MEDIASTINUM:  No new or enlarging lymphadenopathy.  PHILIPPE:  No new or enlarging lymphadenopathy.  CARDIAC:  Coronary artery calcifications are present.  Mild stable cardiac enlargement.  No pericardial effusion or thickening.  PLEURA:  There is a moderate right pleural effusion, which is slightly increased in size from prior.  Trace left pleural effusion, stable from prior.  CHEST WALL:  No new or enlarging lymphadenopathy.  AORTA:  No aneurysm or dissection.    VASCULATURE:  Pulmonary vessels are unremarkable within the limits of a noncontrast CT.       ABDOMEN/PELVIS:  LIVER:  No enlargement, atrophy, abnormal density, or significant focal lesion.    BILIARY:  Status post cholecystectomy.  Stable dilation of the common bile duct.  PANCREAS:  No lesion, fluid collection, ductal dilatation, or atrophy.    SPLEEN:  No enlargement or focal lesion.    KIDNEYS:  Grossly stable bilateral renal cysts.  No  hydronephrosis or nephrolithiasis.  Stable subcentimeter lesions which are too small to characterize.  Stable 3.4 x 3.7 cm probable complex cyst within the interpolar region.  ADRENALS:  No mass or enlargement.    AORTA:  Atherosclerotic disease without aneurysm.  RETROPERITONEUM:  No new or enlarging lymphadenopathy.  BOWEL/MESENTERY:  Small to moderate hiatal hernia, unchanged from prior.  No dilated bowel or wall thickening.  Colonic diverticulosis, without diverticulitis.  Mesenteric stranding within the pelvis is unchanged from prior.  ABDOMINAL WALL:  Bilateral fat containing inguinal hernias.  URINARY BLADDER:  Nondistended  PELVIC NODES:  No adenopathy.    PELVIC ORGANS:  Hysterectomy  BONES:  No new osseous lesions.  Levoscoliosis.  Degenerative changes.                   Impression   CONCLUSION:    1. Slight interval increase in left upper lobe ground-glass and solid nodule (suspected neoplasm), which now measures approximately 2.5 x 1.8 cm with 1.2 cm solid component, previously approximately 2.2 x 1.8 cm with 0.9 cm solid component.  2. New nonspecific 5 mm ground-glass opacity within the right lower lobe, indeterminate, possibly infectious/inflammatory, attention on follow-up is recommended.  3. Moderate right pleural effusion, slightly increased in size from prior.  4. Otherwise stable examination with post treatment changes in the right lower lobe and numerous stable pulmonary nodules bilaterally.  5. Stable complex cystic lesion in the right kidney, possibly a hemorrhagic/proteinaceous cyst.     Please see above for further details.                 LOCATION:  [facility]           Dictated by (CST): Isac Damon MD on 4/07/2024 at 4:00 PM      Finalized by (CST): Isac Damon MD on 4/07/2024 at 4:21 PM       PROCEDURE:  CT CHEST+ABDOMEN+PELVIS(CPT=71250/47579)     COMPARISON:  [facility], CT, CT CHEST+ABDOMEN+PELVIS(CPT=71250/74119), 7/17/2022, 1:10 PM.  [facility], CT, CT ABDOMEN+PELVIS(CPT=74176),  10/16/2023, 9:08 PM.  [facility], CT, CT CHEST (CPT=71250), 9/10/2023, 12:58 PM.  [facility], CT, CT CHEST  (CPT=71250), 5/21/2023, 12:48 PM.  [facility], CT, CT CHEST+ABDOMEN+PELVIS(CPT=71250/09771), 1/28/2023, 1:01 PM.     INDICATIONS:  C34.81 Malignant neoplasm of overlapping sites of right lung (HCC)     TECHNIQUE:  Following oral contrast administration, unenhanced multislice CT scanning is performed through the chest, abdomen, and pelvis.  Dose reduction techniques were used. Dose information is transmitted to the ACR (American College of Radiology)  NRDR (National Radiology Data Registry) which includes the Dose Index Registry.     PATIENT STATED HISTORY: (As transcribed by Technologist)  The patient states surveillance of lymphoma. She denies complaints.         FINDINGS:       CHEST:    LUNGS:  Within the right lower lobe spiculated airspace disease measuring approximately 18 x 30 mm is stable since multiple previous studies consistent with post treatment changes with patient's known lung cancer.  There is mild bronchiectasis noted  within the right lower lobe.  There is reticular nodular opacities in the right lower lobe which are not significantly changed since previous study in may represent chronic indolent inflammation.  There are stable nodules in the medial right lower lobe  measuring 6 and 4 mm in size on image 61. Stable nodule in the right lower lobe anteriorly measuring 5 x 3 mm in size on image 55.       Left lower lobe demonstrates multiple small nodules such as in the medial left lower lobe measuring 7 x 6 mm and more anterior left lower lobe measuring 6 x 3 mm which are stable since previous study.  The semi solid ground-glass and solid nodular  opacity within the left upper lobe posteriorly measures 22 x 18 mm which is slowly increasing in size when compared to older previous studies.  The multiple pulmonary nodules within the left upper lobe and lingula not significantly changed compared  to  previous study with the largest measuring 4 mm.  MEDIASTINUM:  No mass or adenopathy.    PHILIPPE:  No mass or adenopathy.    CARDIAC:  There is mild coronary calcifications.  There is no cardiac enlargement or pericardial effusion.  PLEURA:  Stable small right pleural effusion.  CHEST WALL:  No mass or axillary adenopathy.    AORTA:  No aneurysm or dissection.    VASCULATURE:  Pulmonary vessels are unremarkable within the limits of a noncontrast CT.       ABDOMEN/PELVIS:  LIVER:  No enlargement, atrophy, abnormal density, or significant focal lesion.    BILIARY:  Status post cholecystectomy.  Dilated common bile duct to 20 mm is a stable finding.  There is no intrahepatic ductal dilatation.  PANCREAS:  No lesion, fluid collection, ductal dilatation, or atrophy.    SPLEEN:  No enlargement or focal lesion.    KIDNEYS:  There are multiple bilateral simple appearing renal cysts with the largest in the right upper/midpole measures 48 x 39 mm.  The largest simple cyst in the left lower pole of the kidney measures 31 x 32 mm.  There is a complex cyst of the right  kidney midpole exophytic with rounded area of high density within the cyst measures 34 x 35 mm which is unchanged likely represents a stable hemorrhagic or proteinaceous cyst.    ADRENALS:  No mass or enlargement.    AORTA:  No aneurysm or dissection.    RETROPERITONEUM:  No mass or adenopathy.    BOWEL/MESENTERY:  No visible mass, obstruction, or bowel wall thickening.  Moderate sliding-type hiatal hernia.  There is moderate to severe diverticulosis involving the sigmoid and left colon without ends of diverticulitis.  There is mild stranding to  the mesenteric fat consistent with mild panniculitis which is a stable finding.  There is a duodenal diverticulum of the 3/4 portion of the duodenum measuring 35 mm.  ABDOMINAL WALL:  Bilateral fat containing inguinal hernias.  URINARY BLADDER:  No visible focal wall thickening, lesion, or calculus.    PELVIC NODES:  No  adenopathy.    PELVIC ORGANS:  No visible mass.  Pelvic organs appropriate for patient age.    BONES:  No bony lesion or fracture.   Moderate to severe levoscoliosis of the lumbar spine with mild degenerative changes of the lumbar spine.                   Impression   CONCLUSION:       1. Slight increase in size of ground-glass and solid nodular density in the left upper lobe could represent neoplastic disease measuring 18 x 22 mm.     2. Multiple additional pulmonary nodules throughout both lungs may represent stable metastatic disease.     3. Post treatment changes with scarring and chronic indolent reticular nodular opacities likely representing chronic inflammatory infectious process in the right lower lobe.     4. Stable complex cyst within the right ovary likely representing a proteinaceous or hemorrhagic cyst.       5. Extensive diverticulosis of the left colon without acute diverticulitis.              LOCATION:  [facility]           Dictated by (CST): Kenneth Mckeon MD on 12/09/2023 at 4:43 PM      Finalized by (CST): Kenneth Mckeon MD on 12/09/2023 at 5:01 PM         Impression and Plan:  1. H/o multiple lung cancers-  No overt evidence of progression/recurrence on previous imaging. Most recent CT showed slight interval increase in right pleural effusion. Imaging reviewed at lung conference as well and lung findings felt to be stable. New 5 mm GGO RLL felt to be infectious/inflammatory. Has a h/o granulomatous disease (biopsy proven left lung nodule in 2021). Consensus of the group was to monitor effusion unless she has symptoms. She feels good she says and denies any respiratory symptoms. Also just saw pulmonary.     2. Pulmonary nodules- Imaging reviewed at lung conference. Findings felt to be likely infectious/inflammatory- has h/o BILL and granulomatous disease.  Continue vest therapy and inhalers. Following with surveillance scans.     3. Anemia- Multifactorial. Known CKD with intermittent h/o BRANNON with  previous GI w/u negative. No evidence of hemolysis. B12, iron and folate are ok. Has monoclonal paraproteinemia but IgM which is low and not myeloma. Levels stable     4. Low grade lymphoproliferative disorder- had excellent response with BR but reported SE that affected her QoL and therefore did not complete treatment as initially planned. Viscosity normalized and IgM levels and M spike have decreased significantly. Subsequent CTs showed resolution of adenopathy with decreased IgM. IgM, light chain and M spike levels still low. No worsening adenopathy on recent scans     5. DVT- distal and appears unprovoked.  As 2nd episode of VTE would favor indefinite anticoagulation as long as bleeding risk is low. On Xarelto but given renal insufficiency (calculated creatinine clearance is 21) at reduced dose 15 daily or can change to Apixaban. No reported bleeding.     6. Right pleural effusion (see [1]). Asymptomatic- monitor    Labs and imaging reviewed.        RTC 4 months     Risk level high- NHL and h/o multiple lung cancers       [facility]   levoscoliosis of the lumbar spine with mild degenerative changes of the lumbar spine.                   Impression   CONCLUSION:       1. Slight increase in size of ground-glass and solid nodular density in the left upper lobe could represent neoplastic disease measuring 18 x 22 mm.     2. Multiple additional pulmonary nodules throughout both lungs may represent stable metastatic disease.     3. Post treatment changes with scarring and chronic indolent reticular nodular opacities likely representing chronic inflammatory infectious process in the right lower lobe.     4. Stable complex cyst within the right ovary likely representing a proteinaceous or hemorrhagic cyst.       5. Extensive diverticulosis of the left colon without acute diverticulitis.              LOCATION:  [facility]           Dictated by (CST): Kenneth Mckeon MD on 12/09/2023 at 4:43 PM      Finalized by (CST): Kenneth Mckeon MD on 12/09/2023 at 5:01 PM         Impression and Plan:  1. H/o multiple lung cancers-  No overt evidence of progression/recurrence on previous imaging. Most recent CT showed interval increase in LEO GG density nodule. Imaging reviewed at lung conference. Consensus was to proceed with PET and biopsy of LEO GG/solid nodule. Bronch was recommended but is hesitant to undergo this given previous episodes she had in the past (suffered traumatic epistaxis after nasal trumpet was placed by anesthesia prior to bronch). She prefers to undergo CT guided biopsy if possible as she had in the past. She is  followed by Dr. Jiang but also decided to seek an opinion with Dr. Goodwin who recommended the same.     2. Pulmonary nodules- except for enlarging LEO lesion (see 1), rest were described as stable. Findings felt to be likely infectious/inflammatory- has h/o BILL and granulomatous disease.  Continue vest therapy and inhalers. Following with surveillance scans.     3. Anemia- Multifactorial. Known CKD with intermittent h/o BRANNON with previous  GI w/u negative. No evidence of hemolysis. B12, iron and folate are ok. Has monoclonal paraproteinemia but IgM which low and not myeloma. Levels stable. Bone marrow biopsy aspirate from 2018 showed active trilineage hematopoiesis with no significant dysplastic changes, blasts, lymphocytosis or plasmacytosis.        4. Low grade lymphoproliferative disorder- had excellent response with BR but reported SE that affected her QoL and therefore did not complete treatment as initially planned. Viscosity normalized and IgM levels and M spike have decreased significantly. Subsequent CTs showed resolution of adenopathy with decreased IgM. IgM, light chain and M spike levels still low. No worsening adenopathy on recent scans     5. DVT- distal and appears unprovoked.  As 2nd episode of VTE would favor indefinite anticoagulation as long as bleeding risk is low. On Xarelto but given renal insufficiency (calculated creatinine clearance is 21) at reduced dose 15 daily or can change to Apixaban. She wants to stay on Xarelto. No reported bleeding.     6. Right pleural effusion (see [1]). Asymptomatic- monitor. CT showed stable    7. Right kidney lesion- slightly increased. Previous US in 2020 showed complex hypoechoic lesion  which back then was reported as stable going back to 2019. Monitor    Labs and imaging reviewed.   PET ordered  Bronch vs CT guided biopsy- she prefers the latter       Risk level high- NHL and h/o multiple lung cancers with abnormal CT      MD [facility]

## 2024-08-30 ENCOUNTER — TELEPHONE (OUTPATIENT)
Dept: HEMATOLOGY/ONCOLOGY | Facility: HOSPITAL | Age: 84
End: 2024-08-30

## 2024-08-30 NOTE — TELEPHONE ENCOUNTER
Spoke to Jayne. CT guided biopsy by IR not ideal given location of lesion and risk of PTX. (Will be going through a lot of lung). Recommended bronch. She reported understanding and will talk it over her son and decide if and who she would like to have this done

## 2024-09-03 ENCOUNTER — TELEPHONE (OUTPATIENT)
Dept: HEMATOLOGY/ONCOLOGY | Facility: HOSPITAL | Age: 84
End: 2024-09-03

## 2024-09-03 LAB
ALBUMIN SERPL ELPH-MCNC: 3.69 G/DL (ref 3.75–5.21)
ALBUMIN/GLOB SERPL: 1.36 {RATIO} (ref 1–2)
ALPHA1 GLOB SERPL ELPH-MCNC: 0.32 G/DL (ref 0.19–0.46)
ALPHA2 GLOB SERPL ELPH-MCNC: 0.91 G/DL (ref 0.48–1.05)
B-GLOBULIN SERPL ELPH-MCNC: 0.64 G/DL (ref 0.68–1.23)
GAMMA GLOB SERPL ELPH-MCNC: 0.84 G/DL (ref 0.62–1.7)
KAPPA LC FREE SER-MCNC: 4.07 MG/DL (ref 0.33–1.94)
KAPPA LC FREE/LAMBDA FREE SER NEPH: 0.68 {RATIO} (ref 0.26–1.65)
LAMBDA LC FREE SERPL-MCNC: 5.97 MG/DL (ref 0.57–2.63)
M PROTEIN 1 SERPL ELPH-MCNC: 0.3 G/DL (ref ?–0)
PROT SERPL-MCNC: 6.4 G/DL (ref 5.7–8.2)

## 2024-09-03 NOTE — TELEPHONE ENCOUNTER
Pt called stated she would like to send a message to Dr. Chang regarding her decision about a procedure that the doctor wants her to have     Pt stated she decided to go with Dr. Torres for the procedure       Pt is requesting a call back and would like the info for Dr. Torres

## 2024-09-05 ENCOUNTER — OFFICE VISIT (OUTPATIENT)
Facility: CLINIC | Age: 84
End: 2024-09-05
Payer: MEDICARE

## 2024-09-05 VITALS
BODY MASS INDEX: 24.8 KG/M2 | RESPIRATION RATE: 16 BRPM | HEIGHT: 59 IN | OXYGEN SATURATION: 97 % | DIASTOLIC BLOOD PRESSURE: 70 MMHG | WEIGHT: 123 LBS | HEART RATE: 70 BPM | SYSTOLIC BLOOD PRESSURE: 160 MMHG

## 2024-09-05 DIAGNOSIS — O22.30 DVT (DEEP VEIN THROMBOSIS) IN PREGNANCY (HCC): ICD-10-CM

## 2024-09-05 DIAGNOSIS — R91.8 PULMONARY NODULES/LESIONS, MULTIPLE: Primary | ICD-10-CM

## 2024-09-05 DIAGNOSIS — C34.90 MALIGNANT NEOPLASM OF LUNG, UNSPECIFIED LATERALITY, UNSPECIFIED PART OF LUNG (HCC): ICD-10-CM

## 2024-09-05 DIAGNOSIS — J47.9 BRONCHIECTASIS WITHOUT COMPLICATION (HCC): ICD-10-CM

## 2024-09-05 DIAGNOSIS — J44.9 CHRONIC OBSTRUCTIVE PULMONARY DISEASE, UNSPECIFIED COPD TYPE (HCC): ICD-10-CM

## 2024-09-05 PROCEDURE — 99214 OFFICE O/P EST MOD 30 MIN: CPT | Performed by: INTERNAL MEDICINE

## 2024-09-05 NOTE — PATIENT INSTRUCTIONS
We will await the Pet/CT results and determine whether to proceed with the bronchoscopy or not  Call/message with questions/concerns;

## 2024-09-05 NOTE — PROGRESS NOTES
[facility] Interventional Pulmonary Progress Note    History of Present Illness:  Rossy Car is a 84 year old female never smoker with significant PMH NSCLCx3, NHL/ lymphoproliferative disorder, COPD, HFpEF, CKD, HTN who presents today for evaluation of lung nodule. The patient has followed with Dr. Jiang and Dr. Chang and on recent CT imaging a LEO nodule has continued to increase in size. Her case was reviewed in [facility] and recommendation at the time was to obtain PET/CT and consider bronchoscopy/biopsy of the LEO nodule. She denies acute concerns today. No cough, dyspnea or pain. No fevers      Past Medical History:   Past Medical History:    Abdominal hernia    Acute kidney injury (HCC)    Acute subdural hematoma (HCC)    Anemia    Arthritis    Azotemia    Bilateral lung cancer (HCC)    Cancer (HCC)    right lung    Cancer (HCC)    lymphoma    Chronic cough    Clostridium difficile colitis    Community acquired pneumonia, unspecified laterality    Complete rupture of rotator cuff    Constipation    COPD (chronic obstructive pulmonary disease) (HCC)    no O2    COPD (chronic obstructive pulmonary disease) with acute bronchitis (HCC)    COPD exacerbation (HCC)    Dependence on supplemental oxygen    Diaphragmatic hernia without mention of obstruction or gangrene    Diarrhea    Diverticulitis of large intestine without perforation or abscess without bleeding    Dysphagia, pharyngoesophageal phase    Epistaxis    Esophageal reflux    Essential hypertension    Exposure to unspecified radiation    right lower lung    Gluteal tendinitis    Gram-negative pneumonia (HCC)    Hearing impairment    bilateral hearing aids     Hearing loss    Hemorrhoids    HIGH BLOOD PRESSURE    History of blood clots    History of lung cancer    Hyperglycemia    Hyperlipidemia    Hypokalemia    Leaking of urine    Leg swelling    Leukocytosis    Low grade B-cell lymphoma (HCC)    Lung cancer (HCC)    Malignant neoplasm of bronchus and lung,  unspecified site    Mass of right forearm    Metabolic acidosis    NHL (nodular histiocytic lymphoma) (HCC)    Nocturnal leg cramps    Normocytic normochromic anemia    Osteoarthritis    Osteoporosis    Personal history of antineoplastic chemotherapy    LAST 10/2019    Personal history of malignant neoplasm of bronchus and lung    Personal history of urinary (tract) infection    Plantar fascial fibromatosis    Pneumonia due to infectious organism    Pneumonia due to organism    Pulmonary embolism (HCC)    right lung,cleared on own per pt    Renal insufficiency    Scoliosis (and kyphoscoliosis), idiopathic    Shortness of breath    SOB (shortness of breath)    Stool incontinence    Urticaria, unspecified    Visual impairment    glasses    Wears glasses        Past Surgical History:   Past Surgical History:   Procedure Laterality Date    Cholecystectomy  2008    Colonoscopy  2008    Hernia surgery      Hysterectomy  's    vaginal          Other      lung biopsies    Other surgical history  ,    bunionectomy,bilateral    Other surgical history  2017    [place]- Dr. Tatum    Removal of lung,lobectomy      removal of upper right lobe    Repair ing hernia,5+y/o,reducibl      Tonsillectomy      Total abdom hysterectomy           Family Medical History:   Family History   Problem Relation Age of Onset    Other (immune system) Mother     Cancer Mother     Cancer Father         of the mouth    Hypertension Father     Cancer Son     Cancer Son         Social History:   Social History     Socioeconomic History    Marital status:      Spouse name: Not on file    Number of children: Not on file    Years of education: Not on file    Highest education level: Not on file   Occupational History    Not on file   Tobacco Use    Smoking status: Never     Passive exposure: Never    Smokeless tobacco: Never   Vaping Use    Vaping status: Never Used   Substance and Sexual Activity    Alcohol use: No     Drug use: No    Sexual activity: Not on file   Other Topics Concern     Service Not Asked    Blood Transfusions Not Asked    Caffeine Concern Yes     Comment: soda    Occupational Exposure Not Asked    Hobby Hazards Not Asked    Sleep Concern Not Asked    Stress Concern Not Asked    Weight Concern Not Asked    Special Diet Not Asked    Back Care Not Asked    Exercise No    Bike Helmet Not Asked    Seat Belt Not Asked    Self-Exams Not Asked   Social History Narrative    Not on file     Social Determinants of Health     Financial Resource Strain: Not on file   Food Insecurity: Not on file   Transportation Needs: Not on file   Physical Activity: Not on file   Stress: Not on file   Social Connections: Not on file   Housing Stability: Not on file       Medications:   Current Outpatient Medications   Medication Sig Dispense Refill    BREO ELLIPTA 200-25 MCG/ACT Inhalation Aerosol Powder, Breath Activated Inhale 1 puff into the lungs daily. 3 each 0    rivaroxaban (XARELTO) 15 MG Oral Tab Take 1 tablet (15 mg total) by mouth daily with food. 90 tablet 3    montelukast 10 MG Oral Tab Take 1 tablet (10 mg total) by mouth every evening. 90 tablet 1    triamcinolone 0.1 % External Cream Apply topically 2 (two) times daily as needed. Up to 5, then 1 week break before restarting this cycle as needed. 1 each 1    azelastine 0.1 % Nasal Solution 1 spray by Nasal route 2 (two) times daily. (Patient taking differently: 1 spray by Nasal route 2 (two) times daily. prn) 1 each 5    dilTIAZem  MG Oral Capsule SR 24 Hr Take 1 capsule (180 mg total) by mouth daily.      rosuvastatin 5 MG Oral Tab Take 1 tablet (5 mg total) by mouth nightly. 90 tablet 3    metoprolol succinate 50 MG Oral Tablet 24 Hr Take 1 tablet (50 mg total) by mouth 2 (two) times a day. 180 tablet 1    LOSARTAN POTASSIUM 50 MG Oral Tab TAKE 1 TABLET TWICE A  tablet 2    Iron, Ferrous Sulfate, 325 (65 Fe) MG Oral Tab Take by mouth daily. CLARIFY  DOSE WITH PATIENT: THIS WAS ORDERED BY ONCOLOGY  30 tablet 0    Albuterol Sulfate  (90 Base) MCG/ACT Inhalation Aero Soln Inhale 2 puffs into the lungs every 4 (four) hours as needed. prn      Saline Nasal Spray 0.65 % Nasal Solution 1 spray by Nasal route as needed for congestion.      acetaminophen 500 MG Oral Tab Take 1 tablet (500 mg total) by mouth every 6 (six) hours as needed for Pain.      HYDROcodone-acetaminophen (NORCO) 5-325 MG Oral Tab Take 1 tablet by mouth every 8 (eight) hours as needed for Pain. (Patient not taking: Reported on 8/29/2024) 24 tablet 0    diphenoxylate-atropine 2.5-0.025 MG Oral Tab Take 1 tablet by mouth 4 (four) times daily as needed for Diarrhea. (Patient not taking: Reported on 9/5/2024) 30 tablet 0        Review of Systems: Review of Systems   Constitutional: Negative.    HENT: Negative.     Respiratory: Negative.     Cardiovascular: Negative.    All other systems reviewed and are negative.       Physical Exam:  BP (!) 170/70 (BP Location: Right arm, Patient Position: Sitting, Cuff Size: adult)   Pulse 70   Resp 16   Ht 4' 11\" (1.499 m)   Wt 123 lb (55.8 kg)   SpO2 97%   BMI 24.84 kg/m²      Constitutional: alert, cooperative. No acute distress.  HEENT: Head NC/AT.   Cardio: Regular rate and rhythm. Normal S1 and S2. No murmurs.   Respiratory: Thorax symmetrical with no labored breathing. Clear to ausculation bilaterally with symmetrical breath sounds. No wheezing, rhonchi, rales, or crackles.   GI: NABS. Abd soft, non-tender.  Extremities: No clubbing or cyanosis. No LE edema.    Neurologic: A&Ox3. No gross motor deficits.  Skin: Warm, dry  Psych: Calm, cooperative. Pleasant affect.    Results:  Personally reviewed    WBC: 5.6, done on 8/29/2024.  HGB: 10.2, done on 8/29/2024.  PLT: 188, done on 8/29/2024.     Glucose: 126, done on 8/29/2024.  Cr: 1.78, done on 8/29/2024.  Last eGFR was 28 on 8/29/2024.  CA: 9.8, done on 8/29/2024.  Na: 144, done on 8/29/2024.  K:  4.3, done on 8/29/2024.  Cl: 113, done on 8/29/2024.  CO2: 24, done on 8/29/2024.  Last ALB was 4.3% done on 8/29/2024.  Last ALB was 3.69% done on 8/29/2024.     CT CHEST+ABDOMEN+PELVIS(CPT=71250/85721)    Result Date: 8/25/2024  CONCLUSION:  1. There is interval increase in size of the ground-glass density nodule in the left upper lobe (2.7 x 2.2 cm versus 2.5 x 1.8 cm previously).  This abuts the fissure in the left lung and causes some tenting of the fissure.  Imaging appearance would be most consistent with a low-grade lepidic type adenocarcinoma. 2. Other pulmonary parenchymal nodules are not significantly changed. 3. Right pleural effusion is not significantly changed. 4. Mixed attenuation lesion right kidney is noted.  This is slightly increased as compared to previous study.  This could be a hemorrhagic cyst but is nonspecific on a noncontrast study.  Renal neoplasm would not be excluded without the benefit of a contrast-enhanced study. 5. There is a hiatal hernia. 6. There is diverticulosis of colon without CT evidence of diverticulitis. 7. Other findings described above are unchanged.      LOCATION:  [place]    Dictated by (CST): Alan Enriquez MD on 8/25/2024 at 1:27 PM     Finalized by (CST): Alan Enriquez MD on 8/25/2024 at 2:09 PM         Assessment/Plan:  #1. Lung nodules  Long hx of NSCLC x 3 and NHL   8/2024 CT chest with enlarging LEO GGO nodule up to [id number].  Also with stable other lung nodules. Prominent nodes but no true LAD  Reviewed imaging findings and differential with the patient and son. We discussed next steps including robotic assisted bronchoscopy with biopsies of the LEO nodule and EBUS/TBNA of any LAD.  We reviewed the procedure in detail as well as benefits and risks including but not limited to infection, bleeding, pneumothorax and respiratory failure.  She has PET/CT planned tomorrow and based on results she is agreeable to proceed with bronchoscopy if this is the most appropriate  next step  Will need to hold rivaroxaban 2 days before procedure      Jamal Torres MD  9/5/2024

## 2024-09-06 ENCOUNTER — HOSPITAL ENCOUNTER (OUTPATIENT)
Dept: NUCLEAR MEDICINE | Facility: HOSPITAL | Age: 84
Discharge: HOME | End: 2024-09-06
Attending: INTERNAL MEDICINE
Payer: MEDICARE

## 2024-09-06 DIAGNOSIS — R91.8 LUNG NODULES: ICD-10-CM

## 2024-09-06 DIAGNOSIS — C34.81 MALIGNANT NEOPLASM OF OVERLAPPING SITES OF RIGHT LUNG (HCC): ICD-10-CM

## 2024-09-06 DIAGNOSIS — C85.10 LOW GRADE B-CELL LYMPHOMA (HCC): ICD-10-CM

## 2024-09-06 DIAGNOSIS — R91.8 LUNG NODULES: Primary | ICD-10-CM

## 2024-09-06 LAB — GLUCOSE BLD-MCNC: 99 MG/DL (ref 70–99)

## 2024-09-06 PROCEDURE — 82962 GLUCOSE BLOOD TEST: CPT

## 2024-09-06 PROCEDURE — 78815 PET IMAGE W/CT SKULL-THIGH: CPT | Performed by: INTERNAL MEDICINE

## 2024-09-10 ENCOUNTER — TELEPHONE (OUTPATIENT)
Facility: CLINIC | Age: 84
End: 2024-09-10

## 2024-09-10 NOTE — TELEPHONE ENCOUNTER
Patient has a sinus infection and cough, doesn't know if she should postpone the bronch that is scheduled for 9/20

## 2024-09-10 NOTE — TELEPHONE ENCOUNTER
Pt states she has sinus infection.  Complaining of drainage with cough.  Drainage right now is pale green.  Pt states that she was given an antibiotic by another MD and to start it if color of mucus becomes darker green.  Was informed that this is a borderline infection.  Bronchoscopy scheduled for 10 days from now with Dr. Torres but she is not comfortable proceeding with the procedure on 9-20-24.  She is afraid she will be still coughing or on antibiotics.  Message forwarded to Dr. Torres and to Ashley LOPEZ.

## 2024-09-23 ENCOUNTER — TELEPHONE (OUTPATIENT)
Dept: HEMATOLOGY/ONCOLOGY | Facility: HOSPITAL | Age: 84
End: 2024-09-23

## 2024-09-23 NOTE — TELEPHONE ENCOUNTER
----- Message from Pricila TRIPP sent at 9/23/2024  8:27 AM CDT -----  Regarding: FW: PET comparison   Contact: [phone]  Can you see the [facility] trail?   She asked that you call her to discuss the PET results  She is aware of  your [email or web address] message  ----- Message -----  From: Rossy Car \"Jayne\"  Sent: 9/22/2024   9:53 PM CDT  To: Elise Chang Rns  Subject: PET comparison                                   Hi Pricila,    I never did hear from Dr GATES when she returned. Also I wanted to let you I had to postpone the biopsy because I was ill with a cough and sinus infection still recovering.

## 2024-09-27 ENCOUNTER — APPOINTMENT (OUTPATIENT)
Dept: GENERAL RADIOLOGY | Age: 84
End: 2024-09-27
Attending: EMERGENCY MEDICINE
Payer: MEDICARE

## 2024-09-27 ENCOUNTER — HOSPITAL ENCOUNTER (EMERGENCY)
Age: 84
Discharge: HOME | End: 2024-09-27
Attending: EMERGENCY MEDICINE
Payer: MEDICARE

## 2024-09-27 VITALS
BODY MASS INDEX: 23.72 KG/M2 | SYSTOLIC BLOOD PRESSURE: 170 MMHG | WEIGHT: 113 LBS | RESPIRATION RATE: 18 BRPM | HEART RATE: 70 BPM | DIASTOLIC BLOOD PRESSURE: 84 MMHG | TEMPERATURE: 98 F | OXYGEN SATURATION: 97 % | HEIGHT: 58 IN

## 2024-09-27 DIAGNOSIS — N39.0 URINARY TRACT INFECTION WITHOUT HEMATURIA, SITE UNSPECIFIED: Primary | ICD-10-CM

## 2024-09-27 LAB
ALBUMIN SERPL-MCNC: 3.2 G/DL (ref 3.4–5)
ALBUMIN/GLOB SERPL: 0.8 {RATIO} (ref 1–2)
ALP LIVER SERPL-CCNC: 126 U/L
ALT SERPL-CCNC: 17 U/L
ANION GAP SERPL CALC-SCNC: 4 MMOL/L (ref 0–18)
AST SERPL-CCNC: 16 U/L (ref 15–37)
BASOPHILS # BLD AUTO: 0.04 X10(3) UL (ref 0–0.2)
BASOPHILS NFR BLD AUTO: 0.5 %
BILIRUB SERPL-MCNC: 0.3 MG/DL (ref 0.1–2)
BILIRUB UR QL STRIP.AUTO: NEGATIVE
BUN BLD-MCNC: 39 MG/DL (ref 9–23)
CALCIUM BLD-MCNC: 9.8 MG/DL (ref 8.5–10.1)
CHLORIDE SERPL-SCNC: 114 MMOL/L (ref 98–112)
CO2 SERPL-SCNC: 24 MMOL/L (ref 21–32)
COLOR UR AUTO: YELLOW
CREAT BLD-MCNC: 1.92 MG/DL
EGFRCR SERPLBLD CKD-EPI 2021: 26 ML/MIN/1.73M2 (ref 60–?)
EOSINOPHIL # BLD AUTO: 0.11 X10(3) UL (ref 0–0.7)
EOSINOPHIL NFR BLD AUTO: 1.4 %
ERYTHROCYTE [DISTWIDTH] IN BLOOD BY AUTOMATED COUNT: 14.7 %
GLOBULIN PLAS-MCNC: 3.8 G/DL (ref 2.8–4.4)
GLUCOSE BLD-MCNC: 94 MG/DL (ref 70–99)
GLUCOSE UR STRIP.AUTO-MCNC: NEGATIVE MG/DL
HCT VFR BLD AUTO: 32.9 %
HGB BLD-MCNC: 10.3 G/DL
HYALINE CASTS #/AREA URNS AUTO: PRESENT /LPF
IMM GRANULOCYTES # BLD AUTO: 0.05 X10(3) UL (ref 0–1)
IMM GRANULOCYTES NFR BLD: 0.7 %
KETONES UR STRIP.AUTO-MCNC: NEGATIVE MG/DL
LYMPHOCYTES # BLD AUTO: 1.44 X10(3) UL (ref 1–4)
LYMPHOCYTES NFR BLD AUTO: 19 %
MCH RBC QN AUTO: 32.6 PG (ref 26–34)
MCHC RBC AUTO-ENTMCNC: 31.3 G/DL (ref 31–37)
MCV RBC AUTO: 104.1 FL
MONOCYTES # BLD AUTO: 0.68 X10(3) UL (ref 0.1–1)
MONOCYTES NFR BLD AUTO: 9 %
NEUTROPHILS # BLD AUTO: 5.27 X10 (3) UL (ref 1.5–7.7)
NEUTROPHILS # BLD AUTO: 5.27 X10(3) UL (ref 1.5–7.7)
NEUTROPHILS NFR BLD AUTO: 69.4 %
NITRITE UR QL STRIP.AUTO: NEGATIVE
OSMOLALITY SERPL CALC.SUM OF ELEC: 303 MOSM/KG (ref 275–295)
PH UR STRIP.AUTO: 5 [PH] (ref 5–8)
PLATELET # BLD AUTO: 285 10(3)UL (ref 150–450)
POTASSIUM SERPL-SCNC: 4.4 MMOL/L (ref 3.5–5.1)
PROT SERPL-MCNC: 7 G/DL (ref 6.4–8.2)
RBC # BLD AUTO: 3.16 X10(6)UL
RBC UR QL AUTO: NEGATIVE
SARS-COV-2 RNA RESP QL NAA+PROBE: NOT DETECTED
SODIUM SERPL-SCNC: 142 MMOL/L (ref 136–145)
SP GR UR STRIP.AUTO: 1.02 (ref 1–1.03)
UROBILINOGEN UR STRIP.AUTO-MCNC: 0.2 MG/DL
WBC # BLD AUTO: 7.6 X10(3) UL (ref 4–11)

## 2024-09-27 PROCEDURE — 80053 COMPREHEN METABOLIC PANEL: CPT | Performed by: EMERGENCY MEDICINE

## 2024-09-27 PROCEDURE — 81015 MICROSCOPIC EXAM OF URINE: CPT | Performed by: EMERGENCY MEDICINE

## 2024-09-27 PROCEDURE — 99285 EMERGENCY DEPT VISIT HI MDM: CPT

## 2024-09-27 PROCEDURE — 81001 URINALYSIS AUTO W/SCOPE: CPT | Performed by: EMERGENCY MEDICINE

## 2024-09-27 PROCEDURE — 96365 THER/PROPH/DIAG IV INF INIT: CPT

## 2024-09-27 PROCEDURE — 99284 EMERGENCY DEPT VISIT MOD MDM: CPT

## 2024-09-27 PROCEDURE — 96361 HYDRATE IV INFUSION ADD-ON: CPT

## 2024-09-27 PROCEDURE — 71045 X-RAY EXAM CHEST 1 VIEW: CPT | Performed by: EMERGENCY MEDICINE

## 2024-09-27 PROCEDURE — 87086 URINE CULTURE/COLONY COUNT: CPT | Performed by: EMERGENCY MEDICINE

## 2024-09-27 PROCEDURE — 85025 COMPLETE CBC W/AUTO DIFF WBC: CPT | Performed by: EMERGENCY MEDICINE

## 2024-09-27 RX ORDER — CEPHALEXIN 500 MG/1
500 CAPSULE ORAL 2 TIMES DAILY
Qty: 14 CAPSULE | Refills: 0 | Status: SHIPPED | OUTPATIENT
Start: 2024-09-27 | End: 2024-10-04

## 2024-09-27 NOTE — ED INITIAL ASSESSMENT (HPI)
Pt has been keeping a log of b/p per cardiologist, today patient had various readings that begain with 99/63, 88/53 and then 30 minutes later a normal reading of 119/68. Patient has been feeling very fatigued for the last week, no known fevers.

## 2024-09-27 NOTE — ED PROVIDER NOTES
Patient Seen in: [facility] In [place]      History     Chief Complaint   Patient presents with    Hypotension     Pt was taking her BP at home, had readings of 99/63, 88/53 and 119/68 from 7832-3100 today, endorses fatigue. Denies dizziness/lightheadedness/vision disturbances.      Stated Complaint: Hypotension    Subjective:   HPI    84-year-old female presents today for evaluation.  Patient felt very fatigued throughout the day today.  She denies any headache, chest pain, abdominal pain, fevers, vomiting, diarrhea, dysuria, hematuria or any other localizing symptoms.  She checked her temperature and her O2 sat which were both normal.  She checked her blood pressure and around 3 this afternoon, it was as low as 88 systolic.  This was approximately 1 hour after taking her hydralazine.  Otherwise, she has been feeling well.  She recently was treated for sinus infection with doxycycline.  She had a cough recently however that resolved with Mucinex.  Patient denies any recent black or bloody stools.    Objective:   Past Medical History:    Abdominal hernia    Acute kidney injury (HCC)    Acute subdural hematoma (HCC)    Anemia    Arthritis    Azotemia    Bilateral lung cancer (HCC)    Cancer (HCC)    right lung    Cancer (HCC)    lymphoma    Chronic cough    Clostridium difficile colitis    Community acquired pneumonia, unspecified laterality    Complete rupture of rotator cuff    Constipation    COPD (chronic obstructive pulmonary disease) (HCC)    no O2    COPD (chronic obstructive pulmonary disease) with acute bronchitis (HCC)    COPD exacerbation (HCC)    Deep vein thrombosis (HCC)    Dependence on supplemental oxygen    Diaphragmatic hernia without mention of obstruction or gangrene    Diarrhea    Diverticulitis of large intestine without perforation or abscess without bleeding    Dysphagia, pharyngoesophageal phase    Epistaxis    Esophageal reflux    Essential hypertension    Exposure to  unspecified radiation    right lower lung    Gluteal tendinitis    Gram-negative pneumonia (HCC)    Hearing impairment    bilateral hearing aids     Hearing loss    Hemorrhoids    HIGH BLOOD PRESSURE    History of blood clots    History of lung cancer    Hyperglycemia    Hyperlipidemia    Hypokalemia    Leaking of urine    Leg swelling    Leukocytosis    Low grade B-cell lymphoma (HCC)    Lung cancer (HCC)    Malignant neoplasm of bronchus and lung, unspecified site    Mass of right forearm    Metabolic acidosis    NHL (nodular histiocytic lymphoma) (HCC)    Nocturnal leg cramps    Normocytic normochromic anemia    Osteoarthritis    Osteoporosis    Personal history of antineoplastic chemotherapy    LAST 10/2019    Personal history of malignant neoplasm of bronchus and lung    Personal history of urinary (tract) infection    Plantar fascial fibromatosis    Pneumonia due to infectious organism    Pneumonia due to organism    Pulmonary embolism (HCC)    right lung,cleared on own per pt    Renal disorder    Renal insufficiency    Scoliosis (and kyphoscoliosis), idiopathic    SOB (shortness of breath)    Stool incontinence    Urticaria, unspecified    Visual impairment    glasses    Wears glasses              Past Surgical History:   Procedure Laterality Date    Cholecystectomy  2008    Colonoscopy  2008    Hernia surgery      Hysterectomy  1970's    vaginal          Other      lung biopsies    Other surgical history  ,    bunionectomy,bilateral    Other surgical history  2017    [facility]- Dr. Tatum    Removal of lung,lobectomy      removal of upper right lobe    Repair ing hernia,6+y/o,reducibl      Tonsillectomy      Total abdom hysterectomy                  Social History     Socioeconomic History    Marital status:    Tobacco Use    Smoking status: Never     Passive exposure: Never    Smokeless tobacco: Never   Vaping Use    Vaping status: Never Used   Substance and Sexual Activity     Alcohol use: Not Currently    Drug use: No   Other Topics Concern    Caffeine Concern Yes     Comment: soda    Exercise No              Review of Systems    Positive for stated Chief Complaint: Hypotension (Pt was taking her BP at home, had readings of 99/63, 88/53 and 119/68 from 8184-3101 today, endorses fatigue. Denies dizziness/lightheadedness/vision disturbances. )    Other systems are as noted in HPI.  Constitutional and vital signs reviewed.      All other systems reviewed and negative except as noted above.    Physical Exam     ED Triage Vitals [09/27/24 1631]   /68   Pulse 63   Resp 18   Temp 98.1 °F (36.7 °C)   Temp src Temporal   SpO2 95 %   O2 Device None (Room air)       Current Vitals:   Vital Signs  BP: (!) 170/84  Pulse: 70  Resp: 18  Temp: 98.1 °F (36.7 °C)  Temp src: Oral    Oxygen Therapy  SpO2: 97 %  O2 Device: None (Room air)            Physical Exam  Vitals and nursing note reviewed.   Constitutional:       Appearance: Normal appearance.   HENT:      Head: Normocephalic and atraumatic.      Nose: Nose normal.      Mouth/Throat:      Mouth: Mucous membranes are moist.   Eyes:      Extraocular Movements: Extraocular movements intact.      Pupils: Pupils are equal, round, and reactive to light.   Cardiovascular:      Rate and Rhythm: Normal rate and regular rhythm.   Pulmonary:      Effort: Pulmonary effort is normal.      Breath sounds: Normal breath sounds.   Abdominal:      Palpations: Abdomen is soft.      Tenderness: There is no abdominal tenderness.   Musculoskeletal:         General: Normal range of motion.      Cervical back: Neck supple.   Skin:     General: Skin is warm.   Neurological:      General: No focal deficit present.      Mental Status: She is alert.      Cranial Nerves: No cranial nerve deficit.      Sensory: No sensory deficit.      Motor: No weakness.      Coordination: Coordination normal.   Psychiatric:         Mood and Affect: Mood normal.           ED Course      Labs Reviewed   CBC WITH DIFFERENTIAL WITH PLATELET - Abnormal; Notable for the following components:       Result Value    RBC 3.16 (*)     HGB 10.3 (*)     HCT 32.9 (*)     .1 (*)     All other components within normal limits   COMP METABOLIC PANEL (14) - Abnormal; Notable for the following components:    Chloride 114 (*)     BUN 39 (*)     Creatinine 1.92 (*)     Calculated Osmolality 303 (*)     eGFR-Cr 26 (*)     Albumin 3.2 (*)     A/G Ratio 0.8 (*)     All other components within normal limits   URINALYSIS WITH CULTURE REFLEX - Abnormal; Notable for the following components:    Clarity Urine Slightly Cloudy (*)     Protein Urine 30 mg/dL (*)     Leukocyte Esterase Urine Small (*)     All other components within normal limits   UA MICROSCOPIC ONLY, URINE - Abnormal; Notable for the following components:    WBC Urine 21-50 (*)     Bacteria Urine 2+ (*)     Squamous Epi. Cells Many (*)     Hyaline Casts Present (*)     All other components within normal limits   RAPID SARS-COV-2 BY PCR - Normal   URINE CULTURE, ROUTINE             XR CHEST AP PORTABLE  (CPT=71045)    Result Date: 9/27/2024  PROCEDURE:  XR CHEST AP PORTABLE  (CPT=71045)  TECHNIQUE:  AP chest radiograph was obtained.  COMPARISON:  [facility] , XR, XR CHEST AP PORTABLE  (CPT=71045), 3/03/2024, 11:16 AM.  [facility], XR, XR CHEST AP PORTABLE  (CPT=71045), 12/25/2023, 12:13 PM.  INDICATIONS:  Hypotension  PATIENT STATED HISTORY: (As transcribed by Technologist)  Sudden onset low blood pressure today, fatigue for past week.    FINDINGS:  Cardiac size is within normal limits.  Possible trace right-sided pleural effusion.. No pneumothorax.  Hyperexpansion lungs.  Right basilar opacity.  Mild interstitial opacities.  Surgical clips overlying the right mediastinum.  Old right-sided rib fracture deformities.  Cholecystectomy clips.             CONCLUSION:  1. Nodular opacity within the left upper lobe may represent superimposed vessels or  atelectasis/infiltrate but nodule cannot be excluded.  Consider CT chest to further evaluate. 2. Mild interstitial opacities which may represent chronic interstitial changes versus mild edema. 3. Hyperexpansion of the lungs. 4. Right basilar opacity representing atelectasis versus infiltrates with possible trace right-sided pleural effusion.      LOCATION:  [facility]      Dictated by (CST): Beryl Reyes MD on 9/27/2024 at 6:13 PM     Finalized by (CST): Beryl Reyes MD on 9/27/2024 at 6:15 PM       PET STANDARD BODY SCAN (ONCOLOGY) (CPT=78815)    Result Date: 9/6/2024  PROCEDURE:  PET/CT STANDARD BODY SCAN (ONCOLOGY) (CPT=78815)  COMPARISON:  [facility], CT CHEST+ABDOMEN+PELVIS(CPT=71250/64639), 8/25/2024, 12:59 PM.  INDICATIONS:   R91.8 Lung nodules C85.10 Low grade B-cell lymphoma (HCC) C34.81 Malignant neoplasm of overlapping sites of right lung (HCC)  TECHNIQUE:  PET/CT study from the skull base to mid thigh, done with 11.1 millicuries F-18 FDG injected into a extremity vein.  Low dose non-contrast CT scanning was performed using a dedicated integrated PET/CT scanner for attenuation correction and anatomic localization only.   PATIENT BLOOD GLUCOSE:  99 mg/dL.  Activity injected 0740 hours, scanning 2046 hours  FINDINGS:   Mediastinal blood pool is 2.4 max SUV, and hepatic blood pool is 2.7 max SUV.  The left upper lobe lesion seen on previous diagnostic imaging shows elevated activity SUV maximum 3.1.  Additional smaller nodules in the left upper lobe the more medial of which shows uptake 1.0, and the more lateral of which shows uptake 1.1 SUV maximum.  More superiorly in the left upper lobe a small nodule shows uptake 1.0 SUV maximum.  Right pleural effusion redemonstrated, appearing with greater loculation than on the prior scan of the chest from 8/25/2024.  Of the right lower lobe mass extending to the right hilum shows uptake 2.4 SUV maximum.  No abnormal lymph node activity.  No abnormal activity  patterns are seen within the head and neck.  Some activity in the mandible related to dental hardware does not appear typical for malignancy.  No abnormal activity patterns are seen within the abdomen and pelvis.  Otherwise, physiologic activity patterns are seen.  This includes benign-appearing muscular and skeletal uptake present, within multiple locations, without any associated mass or destructive features on the localizer CT images.  There is also symmetric benign-appearing head and neck uptake.  These uptake patterns are commonly visualized at the FDG uptake sensitivity level of the [facility] [employer] PET scan unit.  Hiatal hernia noted. Cholecystectomy. There are atherosclerotic changes including calcification involving the aorta, but no evidence for aneurysm involving the aorta.  Lumbar scoliosis.  Renal masses as better described on diagnostic CT performed recently without specific abnormal elevated uptake involving the kidneys on the PET scan.             CONCLUSION:   1. Abnormal thoracic the dominant-appearing mass in the left upper lobe shows elevated activity SUV maximum in the malignant range 3.1.  At least 3 smaller nodules in the left upper lobe are seen, showing elevated activity, but only in the range of approximately 1 SUV maximum.  2. Right pleural effusion probably stable in overall volume but appearing more loculated.  There is also a masslike opacity in the right lower lobe extending to the right hilum with activity elevated SUV maximum 2.4.    Deauville Score PET/CT Scan 1. No uptake above background. 2. Uptake at an initial site that is less than or equal to mediastinum. 3. Uptake at an initial site that is greater than mediastinum but less than or equal to liver. 4. Uptake at an initial site that is moderately increased compared to the liver at any site. 5a. Uptake at an initial site that is markedly increased compared to the liver. 5b. Uptake markedly increased compared to the liver at  any new site that is possibly related to lymphoma. X New areas of uptake unlikely related to lymphoma.   LOCATION:  [facility]   Dictated by (CST): Dave Aggarwal MD on 9/06/2024 at 9:33 AM     Finalized by (CST): Dave Aggarwal MD on 9/06/2024 at 9:41 AM                MDM      Differential Diagnosis  Nontoxic, well-appearing 84-year-old female presents today for evaluation of fatigue and a transient low blood pressure.  Patient is currently hemodynamically stable and in no acute distress.  Her belly is benign.  Her lungs are grossly clear and her work of breathing is unlabored.  She has a nonfocal neuroexam.  Will obtain labs to assess for signs of anemia or electrolyte abnormality.  Plan for UA to assess for UTI.  Plan for chest x-ray to assess for consolidation suggestive of pneumonia.  Will give fluids and reassess.    7:30 pm  Patient's hemoglobin is at 10.3 which is similar to her previous levels dating back to October of last year.  Her BUN and creatinine are 39 and 1.92 respectively.  Over the last year, she has had a range between 1.3 and 1.93 for creatinine and 26 and 41 for her BUN, this seems to be towards the higher end of her previous levels and may demonstrate a mild renal insufficiency. Urine demonstrates small leuk esterase and 21-50 WBCs along with 2+ bacteria.   There are many squamous cells so this may be contaminant although with her presenting symptoms, I feel it would be best to treat for possible UTI.  On reassessment, patient is asymptomatic while laying and remains hemodynamically stable.  I reviewed results with patient and son, and discussed my suspicion that her symptoms may be related to a UTI and we will treat her with antibiotics while awaiting urine culture results.  We discussed disposition, including admission to the hospital for observation versus discharge home, patient would prefer discharge home which I feel is reasonable currently.  Will reassess patient following antibiotic  administration.    8:24 pm  On reassessment, patient remains well-appearing.  She still prefers DC home.  I advised close PCP follow-up for reassessment.  Will discharge    I reviewed the x-ray findings noted today.  Son states that patient is having further outpatient evaluation because of a nodule noted in her chest.    External Chart Reviewed  I compared her labs today, including hemoglobin and BUN/creatinine to labs over the course the last year                                 Medical Decision Making      Disposition and Plan     Clinical Impression:  1. Urinary tract infection without hematuria, site unspecified         Disposition:  Discharge  9/27/2024  8:37 pm    Follow-up:  Rl Chau DO  [address] [place] [id number] [phone]    Call in 1 day(s)            Medications Prescribed:  Discharge Medication List as of 9/27/2024  8:41 PM        START taking these medications    Details   cephALEXin 500 MG Oral Cap Take 1 capsule (500 mg total) by mouth 2 (two) times daily for 7 days., Normal, Disp-14 capsule, R-0

## 2024-09-28 NOTE — DISCHARGE INSTRUCTIONS
Follow up with your primary care doctor in the next week for reassessment. Return for any vomiting, fevers above 100.4 F, lethargy or any other concerning symptoms.

## 2024-09-29 ENCOUNTER — PATIENT MESSAGE (OUTPATIENT)
Dept: FAMILY MEDICINE CLINIC | Facility: CLINIC | Age: 84
End: 2024-09-29

## 2024-09-29 NOTE — ED NOTES
Patient son calling regarding urine culture - asking if antibiotics are needed as culture is negative for growth and he is concerned re: antibiotic use. This RN informed son that he may stop antibiotic and follow up closely with PCP - patient son verbalized understanding

## 2024-10-06 NOTE — TELEPHONE ENCOUNTER
"Post vaginal intercourse on Friday, acute onset of abd pain radiating to the lower mid back, no change in bowel/urinary frequency, Aox4 - fallopian tubes are \"completely blocked\" per the pt.   " Called [facility]. Enlarging LEO lesion that shows uptake on PET is not same lesion that turned out to be lymphoma- that was left apical. Imaging reviewed. She will reschedule procedure when she is feeling better.

## 2024-10-08 ENCOUNTER — TELEPHONE (OUTPATIENT)
Facility: CLINIC | Age: 84
End: 2024-10-08

## 2024-10-22 ENCOUNTER — TELEPHONE (OUTPATIENT)
Facility: CLINIC | Age: 84
End: 2024-10-22

## 2024-10-22 NOTE — TELEPHONE ENCOUNTER
Received fax from [facility]. Fax states patient has abnormal lab values. Patient has procedure scheduled 11/08/24 with Dr Torres.  Patient has elevated creatinine of 1.92.  Fax placed in Dr Torres's in basket.     Closing encounter.

## 2024-10-23 RX ORDER — MONTELUKAST SODIUM 10 MG/1
10 TABLET ORAL EVERY EVENING
Qty: 90 TABLET | Refills: 3 | Status: SHIPPED | OUTPATIENT
Start: 2024-10-23

## 2024-10-23 NOTE — TELEPHONE ENCOUNTER
Pt last seen by Dr. Jiang on 10-18-23 but has been seen by Dr. Torres on 9-5-24.  Per medication reconciliation on 9-5-24, pt taking montelukast.  Pt scheduled for bronch with Dr. Torres on 11-8-24.  Refill for Montelukast pended for Dr. Jiang's review.

## 2024-10-25 ENCOUNTER — TELEPHONE (OUTPATIENT)
Dept: HEMATOLOGY/ONCOLOGY | Facility: HOSPITAL | Age: 84
End: 2024-10-25

## 2024-10-26 ENCOUNTER — EKG ENCOUNTER (OUTPATIENT)
Dept: LAB | Age: 84
End: 2024-10-26
Attending: INTERNAL MEDICINE
Payer: MEDICARE

## 2024-10-26 DIAGNOSIS — N18.31 STAGE 3A CHRONIC KIDNEY DISEASE (HCC): ICD-10-CM

## 2024-10-26 DIAGNOSIS — R93.89 ABNORMAL CT OF THE CHEST: ICD-10-CM

## 2024-10-26 DIAGNOSIS — I10 ESSENTIAL HYPERTENSION: ICD-10-CM

## 2024-10-26 LAB
ATRIAL RATE: 67 BPM
P AXIS: 57 DEGREES
P-R INTERVAL: 182 MS
Q-T INTERVAL: 410 MS
QRS DURATION: 86 MS
QTC CALCULATION (BEZET): 433 MS
R AXIS: -4 DEGREES
T AXIS: 34 DEGREES
VENTRICULAR RATE: 67 BPM

## 2024-10-26 PROCEDURE — 93005 ELECTROCARDIOGRAM TRACING: CPT

## 2024-10-26 PROCEDURE — 93010 ELECTROCARDIOGRAM REPORT: CPT | Performed by: INTERNAL MEDICINE

## 2024-10-30 ENCOUNTER — ANESTHESIA EVENT (OUTPATIENT)
Dept: ENDOSCOPY | Facility: HOSPITAL | Age: 84
End: 2024-10-30
Payer: MEDICARE

## 2024-11-03 ENCOUNTER — PATIENT MESSAGE (OUTPATIENT)
Facility: CLINIC | Age: 84
End: 2024-11-03

## 2024-11-04 ENCOUNTER — PATIENT MESSAGE (OUTPATIENT)
Facility: CLINIC | Age: 84
End: 2024-11-04

## 2024-11-04 RX ORDER — AZITHROMYCIN 250 MG/1
TABLET, FILM COATED ORAL
Qty: 6 TABLET | Refills: 0 | Status: SHIPPED | OUTPATIENT
Start: 2024-11-04

## 2024-11-04 NOTE — TELEPHONE ENCOUNTER
Returned patient's my chart message.  She started a week ago with increased nasal congestion and coughing.  Moderated amount of light green sputum and Nasal drainage noted.  No fever.  At night she does not cough.  Using Mucinex DM, Breo, nasal saline and albuterol.  Oxygen sat;s were 95-96% Negative for Covid 2 weeks ago.  Patient has a procedure on 11/08/24. Asking if she needs an antibiotic and if she should still have the procedure.  She will check a covid test. Will route message to SHANA Pulliam  Consulted with SHANA Pulliam and patient to start a Zpak.  Continue all other medications.  Will call back once we confirm with Dr. Torres on plan for procedure.

## 2024-11-06 NOTE — TELEPHONE ENCOUNTER
Called patient continues to cough with white sputum.  No wheezing or fever.  Negative covid test.  No coughing during the night but starts once she is awake.  On Azithromycin. With not change.  Will route message to Dr. Torres.  Per SHANA Pulliam and Dr. Torres patient to still have the procedure.  Patient notified and verbalized understanding.

## 2024-11-08 ENCOUNTER — ANESTHESIA (OUTPATIENT)
Dept: ENDOSCOPY | Facility: HOSPITAL | Age: 84
End: 2024-11-08
Payer: MEDICARE

## 2024-11-08 ENCOUNTER — HOSPITAL ENCOUNTER (OUTPATIENT)
Facility: HOSPITAL | Age: 84
Setting detail: HOSPITAL OUTPATIENT SURGERY
Discharge: HOME | End: 2024-11-08
Attending: INTERNAL MEDICINE | Admitting: INTERNAL MEDICINE
Payer: MEDICARE

## 2024-11-08 ENCOUNTER — APPOINTMENT (OUTPATIENT)
Dept: GENERAL RADIOLOGY | Facility: HOSPITAL | Age: 84
End: 2024-11-08
Attending: INTERNAL MEDICINE
Payer: MEDICARE

## 2024-11-08 ENCOUNTER — HOSPITAL ENCOUNTER (OUTPATIENT)
Dept: CT IMAGING | Facility: HOSPITAL | Age: 84
Discharge: HOME | End: 2024-11-08
Attending: INTERNAL MEDICINE
Payer: MEDICARE

## 2024-11-08 ENCOUNTER — PATIENT MESSAGE (OUTPATIENT)
Facility: CLINIC | Age: 84
End: 2024-11-08

## 2024-11-08 VITALS
WEIGHT: 114 LBS | HEART RATE: 67 BPM | HEIGHT: 59 IN | SYSTOLIC BLOOD PRESSURE: 161 MMHG | OXYGEN SATURATION: 92 % | DIASTOLIC BLOOD PRESSURE: 69 MMHG | RESPIRATION RATE: 20 BRPM | TEMPERATURE: 99 F | BODY MASS INDEX: 22.98 KG/M2

## 2024-11-08 DIAGNOSIS — R59.0 THORACIC LYMPHADENOPATHY: ICD-10-CM

## 2024-11-08 DIAGNOSIS — N18.31 STAGE 3A CHRONIC KIDNEY DISEASE (HCC): ICD-10-CM

## 2024-11-08 DIAGNOSIS — R91.8 LUNG NODULES: ICD-10-CM

## 2024-11-08 DIAGNOSIS — I10 ESSENTIAL HYPERTENSION: Primary | ICD-10-CM

## 2024-11-08 DIAGNOSIS — R93.89 ABNORMAL CT OF THE CHEST: ICD-10-CM

## 2024-11-08 PROBLEM — R91.1 LUNG NODULE: Status: ACTIVE | Noted: 2024-11-08

## 2024-11-08 LAB
BASOPHILS NFR BRONCH: 0 %
EOSINOPHIL NFR BRONCH: 0 %
LYMPHOCYTES NFR BRONCH: 29 %
MONOS+MACROS NFR BRONCH: 56 %
NEUTROPHILS NFR BRONCH: 15 %
TOTAL CELLS COUNTED FLD: 100

## 2024-11-08 PROCEDURE — 31629 BRONCHOSCOPY/NEEDLE BX EACH: CPT | Performed by: INTERNAL MEDICINE

## 2024-11-08 PROCEDURE — 31627 NAVIGATIONAL BRONCHOSCOPY: CPT | Performed by: INTERNAL MEDICINE

## 2024-11-08 PROCEDURE — 8E0W8CZ ROBOTIC ASSISTED PROCEDURE OF TRUNK REGION, VIA NATURAL OR ARTIFICIAL OPENING ENDOSCOPIC: ICD-10-PCS | Performed by: INTERNAL MEDICINE

## 2024-11-08 PROCEDURE — 71045 X-RAY EXAM CHEST 1 VIEW: CPT | Performed by: INTERNAL MEDICINE

## 2024-11-08 PROCEDURE — 8E0WXBZ COMPUTER ASSISTED PROCEDURE OF TRUNK REGION: ICD-10-PCS | Performed by: INTERNAL MEDICINE

## 2024-11-08 PROCEDURE — 76380 CAT SCAN FOLLOW-UP STUDY: CPT | Performed by: INTERNAL MEDICINE

## 2024-11-08 PROCEDURE — 0BDG8ZX EXTRACTION OF LEFT UPPER LUNG LOBE, VIA NATURAL OR ARTIFICIAL OPENING ENDOSCOPIC, DIAGNOSTIC: ICD-10-PCS | Performed by: INTERNAL MEDICINE

## 2024-11-08 PROCEDURE — 31624 DX BRONCHOSCOPE/LAVAGE: CPT | Performed by: INTERNAL MEDICINE

## 2024-11-08 PROCEDURE — 77012 CT SCAN FOR NEEDLE BIOPSY: CPT | Performed by: INTERNAL MEDICINE

## 2024-11-08 PROCEDURE — 07D78ZX EXTRACTION OF THORAX LYMPHATIC, VIA NATURAL OR ARTIFICIAL OPENING ENDOSCOPIC, DIAGNOSTIC: ICD-10-PCS | Performed by: INTERNAL MEDICINE

## 2024-11-08 PROCEDURE — 76497 UNLISTED CT PROCEDURE: CPT | Performed by: INTERNAL MEDICINE

## 2024-11-08 PROCEDURE — 31652 BRONCH EBUS SAMPLNG 1/2 NODE: CPT | Performed by: INTERNAL MEDICINE

## 2024-11-08 PROCEDURE — 31628 BRONCHOSCOPY/LUNG BX EACH: CPT | Performed by: INTERNAL MEDICINE

## 2024-11-08 PROCEDURE — 0B9G8ZX DRAINAGE OF LEFT UPPER LUNG LOBE, VIA NATURAL OR ARTIFICIAL OPENING ENDOSCOPIC, DIAGNOSTIC: ICD-10-PCS | Performed by: INTERNAL MEDICINE

## 2024-11-08 PROCEDURE — 31654 BRONCH EBUS IVNTJ PERPH LES: CPT | Performed by: INTERNAL MEDICINE

## 2024-11-08 RX ORDER — HYDROMORPHONE HYDROCHLORIDE 1 MG/ML
0.2 INJECTION, SOLUTION INTRAMUSCULAR; INTRAVENOUS; SUBCUTANEOUS EVERY 5 MIN PRN
Status: DISCONTINUED | OUTPATIENT
Start: 2024-11-08 | End: 2024-11-08 | Stop reason: HOSPADM

## 2024-11-08 RX ORDER — ALBUTEROL SULFATE 0.83 MG/ML
SOLUTION RESPIRATORY (INHALATION)
Status: COMPLETED
Start: 2024-11-08 | End: 2024-11-08

## 2024-11-08 RX ORDER — METOCLOPRAMIDE HYDROCHLORIDE 5 MG/ML
10 INJECTION INTRAMUSCULAR; INTRAVENOUS EVERY 8 HOURS PRN
Status: DISCONTINUED | OUTPATIENT
Start: 2024-11-08 | End: 2024-11-08 | Stop reason: HOSPADM

## 2024-11-08 RX ORDER — HYDROCODONE BITARTRATE AND ACETAMINOPHEN 5; 325 MG/1; MG/1
1 TABLET ORAL ONCE AS NEEDED
Status: DISCONTINUED | OUTPATIENT
Start: 2024-11-08 | End: 2024-11-08 | Stop reason: HOSPADM

## 2024-11-08 RX ORDER — IPRATROPIUM BROMIDE AND ALBUTEROL SULFATE 2.5; .5 MG/3ML; MG/3ML
3 SOLUTION RESPIRATORY (INHALATION) CONTINUOUS
Status: DISCONTINUED | OUTPATIENT
Start: 2024-11-08 | End: 2024-11-08

## 2024-11-08 RX ORDER — DIPHENHYDRAMINE HYDROCHLORIDE 50 MG/ML
12.5 INJECTION INTRAMUSCULAR; INTRAVENOUS AS NEEDED
Status: DISCONTINUED | OUTPATIENT
Start: 2024-11-08 | End: 2024-11-08 | Stop reason: HOSPADM

## 2024-11-08 RX ORDER — ONDANSETRON 2 MG/ML
4 INJECTION INTRAMUSCULAR; INTRAVENOUS EVERY 6 HOURS PRN
Status: DISCONTINUED | OUTPATIENT
Start: 2024-11-08 | End: 2024-11-08 | Stop reason: HOSPADM

## 2024-11-08 RX ORDER — HYDROMORPHONE HYDROCHLORIDE 1 MG/ML
0.6 INJECTION, SOLUTION INTRAMUSCULAR; INTRAVENOUS; SUBCUTANEOUS EVERY 5 MIN PRN
Status: DISCONTINUED | OUTPATIENT
Start: 2024-11-08 | End: 2024-11-08 | Stop reason: HOSPADM

## 2024-11-08 RX ORDER — HYDROMORPHONE HYDROCHLORIDE 1 MG/ML
0.4 INJECTION, SOLUTION INTRAMUSCULAR; INTRAVENOUS; SUBCUTANEOUS EVERY 5 MIN PRN
Status: DISCONTINUED | OUTPATIENT
Start: 2024-11-08 | End: 2024-11-08 | Stop reason: HOSPADM

## 2024-11-08 RX ORDER — IPRATROPIUM BROMIDE AND ALBUTEROL SULFATE 2.5; .5 MG/3ML; MG/3ML
SOLUTION RESPIRATORY (INHALATION)
Status: COMPLETED
Start: 2024-11-08 | End: 2024-11-08

## 2024-11-08 RX ORDER — ROCURONIUM BROMIDE 10 MG/ML
INJECTION, SOLUTION INTRAVENOUS AS NEEDED
Status: DISCONTINUED | OUTPATIENT
Start: 2024-11-08 | End: 2024-11-08 | Stop reason: SURG

## 2024-11-08 RX ORDER — NALOXONE HYDROCHLORIDE 0.4 MG/ML
80 INJECTION, SOLUTION INTRAMUSCULAR; INTRAVENOUS; SUBCUTANEOUS AS NEEDED
Status: DISCONTINUED | OUTPATIENT
Start: 2024-11-08 | End: 2024-11-08 | Stop reason: HOSPADM

## 2024-11-08 RX ORDER — SODIUM CHLORIDE, SODIUM LACTATE, POTASSIUM CHLORIDE, CALCIUM CHLORIDE 600; 310; 30; 20 MG/100ML; MG/100ML; MG/100ML; MG/100ML
INJECTION, SOLUTION INTRAVENOUS CONTINUOUS
Status: DISCONTINUED | OUTPATIENT
Start: 2024-11-08 | End: 2024-11-08

## 2024-11-08 RX ORDER — ONDANSETRON 2 MG/ML
INJECTION INTRAMUSCULAR; INTRAVENOUS AS NEEDED
Status: DISCONTINUED | OUTPATIENT
Start: 2024-11-08 | End: 2024-11-08 | Stop reason: SURG

## 2024-11-08 RX ORDER — DEXAMETHASONE SODIUM PHOSPHATE 4 MG/ML
VIAL (ML) INJECTION AS NEEDED
Status: DISCONTINUED | OUTPATIENT
Start: 2024-11-08 | End: 2024-11-08 | Stop reason: SURG

## 2024-11-08 RX ORDER — METOPROLOL TARTRATE 1 MG/ML
2.5 INJECTION, SOLUTION INTRAVENOUS ONCE
Status: DISCONTINUED | OUTPATIENT
Start: 2024-11-08 | End: 2024-11-08 | Stop reason: HOSPADM

## 2024-11-08 RX ORDER — ACETAMINOPHEN 500 MG
1000 TABLET ORAL ONCE AS NEEDED
Status: DISCONTINUED | OUTPATIENT
Start: 2024-11-08 | End: 2024-11-08 | Stop reason: HOSPADM

## 2024-11-08 RX ORDER — ALBUTEROL SULFATE 0.83 MG/ML
2.5 SOLUTION RESPIRATORY (INHALATION) ONCE
Status: COMPLETED | OUTPATIENT
Start: 2024-11-08 | End: 2024-11-08

## 2024-11-08 RX ORDER — SODIUM CHLORIDE, SODIUM LACTATE, POTASSIUM CHLORIDE, CALCIUM CHLORIDE 600; 310; 30; 20 MG/100ML; MG/100ML; MG/100ML; MG/100ML
INJECTION, SOLUTION INTRAVENOUS CONTINUOUS
Status: DISCONTINUED | OUTPATIENT
Start: 2024-11-08 | End: 2024-11-08 | Stop reason: HOSPADM

## 2024-11-08 RX ORDER — LIDOCAINE HYDROCHLORIDE 10 MG/ML
INJECTION, SOLUTION EPIDURAL; INFILTRATION; INTRACAUDAL; PERINEURAL AS NEEDED
Status: DISCONTINUED | OUTPATIENT
Start: 2024-11-08 | End: 2024-11-08 | Stop reason: SURG

## 2024-11-08 RX ORDER — MIDAZOLAM HYDROCHLORIDE 1 MG/ML
1 INJECTION INTRAMUSCULAR; INTRAVENOUS EVERY 5 MIN PRN
Status: DISCONTINUED | OUTPATIENT
Start: 2024-11-08 | End: 2024-11-08 | Stop reason: HOSPADM

## 2024-11-08 RX ORDER — HYDROCODONE BITARTRATE AND ACETAMINOPHEN 5; 325 MG/1; MG/1
2 TABLET ORAL ONCE AS NEEDED
Status: DISCONTINUED | OUTPATIENT
Start: 2024-11-08 | End: 2024-11-08 | Stop reason: HOSPADM

## 2024-11-08 RX ADMIN — ROCURONIUM BROMIDE 20 MG: 10 INJECTION, SOLUTION INTRAVENOUS at 13:34:00

## 2024-11-08 RX ADMIN — ONDANSETRON 4 MG: 2 INJECTION INTRAMUSCULAR; INTRAVENOUS at 13:34:00

## 2024-11-08 RX ADMIN — SODIUM CHLORIDE, SODIUM LACTATE, POTASSIUM CHLORIDE, CALCIUM CHLORIDE: 600; 310; 30; 20 INJECTION, SOLUTION INTRAVENOUS at 13:12:00

## 2024-11-08 RX ADMIN — SODIUM CHLORIDE, SODIUM LACTATE, POTASSIUM CHLORIDE, CALCIUM CHLORIDE: 600; 310; 30; 20 INJECTION, SOLUTION INTRAVENOUS at 14:48:00

## 2024-11-08 RX ADMIN — DEXAMETHASONE SODIUM PHOSPHATE 8 MG: 4 MG/ML VIAL (ML) INJECTION at 13:43:00

## 2024-11-08 RX ADMIN — ROCURONIUM BROMIDE 50 MG: 10 INJECTION, SOLUTION INTRAVENOUS at 13:22:00

## 2024-11-08 RX ADMIN — LIDOCAINE HYDROCHLORIDE 50 MG: 10 INJECTION, SOLUTION EPIDURAL; INFILTRATION; INTRACAUDAL; PERINEURAL at 13:21:00

## 2024-11-08 NOTE — ANESTHESIA PREPROCEDURE EVALUATION
PRE-OP EVALUATION    Patient Name: Rossy Car    Admit Diagnosis: Lung nodules [R91.8]  Thoracic lymphadenopathy [R59.0]    Pre-op Diagnosis: Lung nodules [R91.8]  Thoracic lymphadenopathy [R59.0]    ROBOT-ASSISTED NAVIGATIONAL BRONCHOSCOPY, ENDOBRONCHIAL ULTRASOUND /RADIAL PROBE ENDOBRONCHIAL ULTRASOUND,,FLUOROSCOPY/LINEAR ENDOBRONCHIAL ULTRASOUND/CRYOPROBE/CYTOLOGY    Anesthesia Procedure: ROBOT-ASSISTED NAVIGATIONAL BRONCHOSCOPY, ENDOBRONCHIAL ULTRASOUND /RADIAL PROBE ENDOBRONCHIAL ULTRASOUND,,FLUOROSCOPY/LINEAR ENDOBRONCHIAL ULTRASOUND/CRYOPROBE/CYTOLOGY  .    Surgeons and Role:     * Jamal Torres MD - Primary    Pre-op vitals reviewed.  Temp: 97.6 °F (36.4 °C)  Pulse: 76  Resp: 22  BP: 167/82  SpO2: 94 %  Body mass index is 23.03 kg/m².    Current medications reviewed.  Hospital Medications:   lactated ringers infusion   Intravenous Continuous    ipratropium-albuterol (Duoneb) 0.5-2.5 (3) MG/3ML inhalation solution 3 mL  3 mL Nebulization Continuous       Outpatient Medications:   Prescriptions Prior to Admission[1]    Allergies: Bactrim [sulfamethoxazole w/trimethoprim], Radiology contrast iodinated dyes, and Penicillins      Anesthesia Evaluation    Patient summary reviewed.    Anesthetic Complications  (-) history of anesthetic complications         GI/Hepatic/Renal      (+) GERD and well controlled      (+) chronic renal disease and CRI                   Cardiovascular    Negative cardiovascular ROS.  ECG reviewed.  Exercise tolerance: good     MET: >4      (+) hypertension and well controlled                                    Endo/Other               (+) anemia                   Pulmonary        (+) COPD and mild  COPD requiring home oxygen.                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)     Thrombocytopenia (HCC)     Anemia     Hyperglycemia     Prerenal azotemia     Acute diverticulitis     Failure of outpatient treatment     CKD (chronic kidney disease) stage 3, GFR 30-59 ml/min (HCC)     Lymphoproliferative disorder (HCC)     Auditory hallucinations     Urinary tract infection without hematuria     Urinary tract infection without hematuria, site unspecified     Acute encephalopathy     Facial laceration, initial encounter     Memory change     Worsened handwriting          Past Surgical History:   Procedure Laterality Date    Cholecystectomy  2008    Colonoscopy  2008    Hernia surgery      Hysterectomy  1970's    vaginal          Other      lung biopsies    Other surgical history  ,    bunionectomy,bilateral    Other surgical history  2017    [place]- Dr. Tatum    Removal of lung,lobectomy      removal of upper right lobe    Repair ing hernia,6+y/o,reducibl      Tonsillectomy      Total abdom hysterectomy       Social History     Socioeconomic History    Marital status:    Tobacco Use    Smoking status: Never     Passive exposure: Never    Smokeless tobacco: Never   Vaping Use    Vaping status: Never Used   Substance and Sexual Activity    Alcohol use: Not Currently    Drug use: No   Other Topics Concern    Caffeine Concern Yes     Comment: soda    Exercise No     History   Drug Use No     Available pre-op labs reviewed.  Lab Results   Component Value Date    WBC 7.6 2024    RBC 3.16 (L) 2024    HGB 10.3 (L) 2024    HCT 32.9 (L) 2024    .1 (H) 2024    MCH 32.6 2024    MCHC 31.3 2024    RDW 14.7 2024    .0 2024     Lab Results   Component Value Date     2024    K 4.4 2024     (H) 2024    CO2 24.0 2024    BUN 39 (H) 2024    CREATSERUM 1.92 (H)  09/27/2024    GLU 94 09/27/2024    CA 9.8 09/27/2024            Airway      Mallampati: II  Mouth opening: >3 FB  TM distance: > 6 cm  Neck ROM: full Cardiovascular      Rhythm: regular  Rate: normal  (-) murmur   Dental  Comment: Dentition is grossly intact;  Patient does not demonstrate loose teeth to inspection.           Pulmonary  Comment: Unlabored ventilatory effort, no retractions.  Pulmonary exam normal.  Breath sounds clear to auscultation bilaterally.               Other findings              ASA: 3   Plan: general  NPO status verified and patient meets guidelines.        Comment: I explained intrinsic risks of general anesthesia, including nausea, dental damage, sore throat, mouth injury,and hoarseness from airway management.  All questions were answered and understanding was demonstrated of risks.  Informed permission was obtained to proceed as documented in the signed consent form.      Patient overall has increased anesthesia risks secondary to current medical conditions that qualify for ASA status III.  As documented in the informed consent, risks have been accepted.    Plan/risks discussed with: patient and child/children                Present on Admission:  **None**             [1]   Medications Prior to Admission   Medication Sig Dispense Refill Last Dose/Taking    MONTELUKAST 10 MG Oral Tab TAKE 1 TABLET EVERY EVENING 90 tablet 3 11/7/2024    BREO ELLIPTA 200-25 MCG/ACT Inhalation Aerosol Powder, Breath Activated Inhale 1 puff into the lungs daily. 3 each 0 11/8/2024    rivaroxaban (XARELTO) 15 MG Oral Tab Take 1 tablet (15 mg total) by mouth daily with food. 90 tablet 3 11/4/2024    triamcinolone 0.1 % External Cream Apply topically 2 (two) times daily as needed. Up to 5, then 1 week break before restarting this cycle as needed. 1 each 1 Past Month    azelastine 0.1 % Nasal Solution 1 spray by Nasal route 2 (two) times daily. (Patient taking differently: 1 spray by Nasal route 2 (two) times  daily. prn) 1 each 5 Taking Differently    dilTIAZem  MG Oral Capsule SR 24 Hr Take 1 capsule (180 mg total) by mouth daily.   11/7/2024    rosuvastatin 5 MG Oral Tab Take 1 tablet (5 mg total) by mouth nightly. 90 tablet 3 11/7/2024    metoprolol succinate 50 MG Oral Tablet 24 Hr Take 1 tablet (50 mg total) by mouth 2 (two) times a day. 180 tablet 1 11/8/2024    LOSARTAN POTASSIUM 50 MG Oral Tab TAKE 1 TABLET TWICE A  tablet 2 11/8/2024    Iron, Ferrous Sulfate, 325 (65 Fe) MG Oral Tab Take by mouth daily. CLARIFY DOSE WITH PATIENT: THIS WAS ORDERED BY ONCOLOGY  30 tablet 0 Past Month    Saline Nasal Spray 0.65 % Nasal Solution 1 spray by Nasal route as needed for congestion.   11/8/2024    acetaminophen 500 MG Oral Tab Take 1 tablet (500 mg total) by mouth every 6 (six) hours as needed for Pain.   Past Month    azithromycin 250 MG Oral Tab take 2 tablet (500MG)  by ORAL route  every day for 1 day then 1 tablet (250 mg) by oral route once daily for 4 days 6 tablet 0     diphenoxylate-atropine 2.5-0.025 MG Oral Tab Take 1 tablet by mouth 4 (four) times daily as needed for Diarrhea. 30 tablet 0 More than a month    Albuterol Sulfate  (90 Base) MCG/ACT Inhalation Aero Soln Inhale 2 puffs into the lungs every 4 (four) hours as needed. prn   More than a month

## 2024-11-08 NOTE — DISCHARGE SUMMARY
Outpatient Surgery Brief Discharge Summary         Patient ID:  Rossy Car  [id number]  84 year old  4/13/1940    Discharge Diagnoses: Lung nodules, thoracic lymphadenopathy    Procedures: bronchoscopy    Discharged Condition: stable    Disposition: home    Patient Instructions: Follow-up with Jamal Torres MD in 1-2 weeks.    Diet: regular diet  Activity:  as tolerated    Jamal Torres MD  11/8/2024  2:46 PM

## 2024-11-08 NOTE — DISCHARGE INSTRUCTIONS
Post Bronchoscopy Discharge Instructions    Once the numbness in your throat and the anesthesia wear off, you may eat normally. You may consider avoiding tough foods for the next 24 hours as you may have a sore throat.  Throat lozenges such as Cepacol or Halls, may help relieve the discomfort.   A low grade fever and a small amount of bloody sputum is expected but should resolve within the next 1-2 days. You may take acetaminophen (tylenol) for the fever, if needed.  Do not drive or operate heavy machinery, drink alcohol, or sign any important documents for 24 hours. Please contact us if bloody sputum, fever, or other respiratory symptoms persist beyond this period or worsen.   Follow-up as previously scheduled.   You may resume rivaroxaban on 11/9 per your usual timing    Home Care Instructions Following Bronchoscopy / Endobronchial Ultrasound with Sedation    Diet:  Prior to your examination, a local anesthetic was used to numb the back of your throat. Do not eat or drink for two hours. Your nurse will instruct you with the time you may resume your diet.  Sip fluids initially and advance to your regular diet as tolerated.  Do not drink alcohol today.    Medication:  If you have questions about resuming your normal medications, please contact your Primary Care Physician.    Activities:  Do not drive today.  Do not operate any machinery today (including kitchen equipment).    What to Expect:  A sore throat  A cough  Hoarseness  A small amount of blood in your sputum    Contact Your Doctor If:  You have difficulty breathing  You have chest pain  You have a fever greater than 102°F  You cough up more than a few tablespoons of blood in your sputum    **If unable to reach your doctor, please go to the [facility] Emergency Room**    - Your referring physician will receive a full report of your examination.  - Please contact your physician’s office within one week for results if appointment not scheduled.

## 2024-11-08 NOTE — ANESTHESIA POSTPROCEDURE EVALUATION
[facility]    Rossy Car Patient Status:  Hospital Outpatient Surgery   Age/Gender 84 year old female MRN [id number]   Location [facility] Attending Jamal Torres MD   Hosp Day # 0 PCP Rl Chau DO       Anesthesia Post-op Note    ROBOT-ASSISTED NAVIGATIONAL BRONCHOSCOPY, ENDOBRONCHIAL ULTRASOUND /FLUOROSCOPY/RADIAL PROBE/CRYOPROBE/CYTOLOGY/TRANSBRONCHIAL NEEDLE ASPIRATION/BIOPSIES/BRONCHOALVEOLAR LAVAGE    Procedure Summary       Date: 11/08/24 Room / Location:  ENDOSCOPY 04 /  ENDOSCOPY    Anesthesia Start: 1312 Anesthesia Stop: 1448    Procedures:       ROBOT-ASSISTED NAVIGATIONAL BRONCHOSCOPY, ENDOBRONCHIAL ULTRASOUND /FLUOROSCOPY/RADIAL PROBE/CRYOPROBE/CYTOLOGY/TRANSBRONCHIAL NEEDLE ASPIRATION/BIOPSIES/BRONCHOALVEOLAR LAVAGE      . Diagnosis:       Lung nodules      Thoracic lymphadenopathy      (Lung nodules, thoracic lymphadenopathy)    Surgeons: Jamal Torres MD Anesthesiologist: Cortes Askew MD    Anesthesia Type: general ASA Status: 3            Anesthesia Type: general    Vitals Value Taken Time   /73 11/08/24 1449   Temp 97.1 11/08/24 1449   Pulse 67 11/08/24 1449   Resp 18 11/08/24 1449   SpO2 98 % 11/08/24 1449       Patient Location: PACU    Anesthesia Type: general    Airway Patency: patent    Postop Pain Control: adequate    Mental Status: mildly sedated but able to meaningfully participate in the post-anesthesia evaluation    Nausea/Vomiting: none    Cardiopulmonary/Hydration status: stable euvolemic    Complications: no apparent anesthesia related complications    Postop vital signs: stable    Comments: The endotracheal airway was removed in the procedure area.  Cable monitors were removed, and the patient was transported with observation to the recovery area personally with the OR team.  The patient was responsive in a meaningful way and demonstrated a good airway.  PACU monitors were then applied with device connection to Epic.  Full  report signout, including report, identifications, history, procedure, anesthesia course, recovery expectations with chance for questions was provided to a responsible recovery RN.    Report to NAKUL Padron.    Dental Exam: Unchanged from Preop    Patient to be discharged from PACU when criteria met.

## 2024-11-08 NOTE — OPERATIVE REPORT
[facility]    Rossy Car Patient Status:  Hospital Outpatient Surgery    1940 MRN [id number]   Location [facility] Attending Jamal Torres MD   Hosp Day # 0 PCP Rl Chau DO     OPERATIVE REPORT:     DATE OF OPERATION: 24    PREOPERATIVE DIAGNOSIS(ES): lung nodule, thoracic lymphadenopathy     POSTOPERATIVE DIAGNOSIS(ES): same     OPERATION(S) PERFORMED:   Robotic navigational bronchoscopy with transbronchial needle aspiration, transbronchial biopsy, transbronchial cryobiopsies and bronchoalveolar lavage of the left upper lobe anterior segment with radial probe endobronchial ultrasound and fluoroscopic guidance.  Bronchoscopy with endobronchial ultrasound- guided transbronchial needle aspiration of left interlobar lymphadenopathy     SURGEON: Jamal Torres MD    ANESTHESIA: General. Please see separate flow sheet.      EQUIPMENT:   Olympus 7.5 MHz endobronchial ultrasound bronchoscope with dedicated 22-gauge ViziShot needle.   Olympus 20 MHz radial probe, endobronchial ultrasound  Olympus adult therapeutic video bronchoscope  Robotic Ion navigation software with vision probe  Joinnus 3D spin cone beam fluoroscopy unit  Standard forceps   21 gauge Intuitive Flexision needle  ERBE 1.1mm cryoprobe and ERBE console    INDICATION: The patient is a 84 year old female who was found to have FDG avid left upper lobe nodule with concern for malignancy.  The procedure is being undertaken for diagnostic purposes and mediastinal staging. Differential diagnosis, risks, benefits and alternatives were discussed at length. Alternatives such as mediastinoscopy and conventional transbronchial needle biopsy were discussed. Endobronchial ultrasound guided transbronchial needle aspiration is  superior to blind transbronchial needle aspiration and is the recommended approach for this indication.       CONSENT:  Risks and benefits were reviewed with the patient in detail regarding  the procedure as well as anesthesia prior to the procedure. All questions were answered, and the patient was agreeable.     PROCEDURE:    Time out done prior to the start procedure.  The patient's CT scan and 3-dimensional DICOM format was loaded onto the Intuitive Ion Planpoint software. Target point T1 in the left upper lobe was marked and measured at 20 mm. Virtual pathways were created to the lesion. This information was stored to a jump drive and loaded onto the Intuitive Ion controller software in the bronchoscopy suite.    Robotic navigation, airway evaluation and biopsies  The patient was placed in a supine position, anesthesia administered, ETT was placed. The flexible bronchoscope was inserted and airway inspection was performed down to the subsegmental levels. There was no evidence of endobronchial lesions/disease bilaterally. There was mild amount of white/tan mucus throughout the bronchial tree which was cleared with suction.  The right upper lobe stump appeared normal and intact. The scope was then withdrawn.   The robotic Ion catheter was introduced via the swivel adaptor in the ETT tube. Registration was performed and confirmed with acceptable divergence. Using shape sensing robotic catheter guidance, the left upper lobe lesion was located within the anterior segment. Subsequently, radial probe ultrasound was introduced through the robotic catheter confirming appropriate positioning with snowstorm finding.   Using additional fluoroscopic guidance, transbronchial needle aspirations, transbronchial biopsies, transbronchial cryobiopsies were performed. Passes were given to the cytopathologist for screening with report of abnormal cells.   Further transbronchial needle aspirations were placed into saline for cytology.   Additional transbronchial lung biopsies were placed into formalin for histopathological processing.   Transbronchial cryobiopsies were placed into formalin for histopathological  processing.   A bronchoalveolar lavage was then performed in the left upper lobe anterior segment with the instillation of 100 ml sterile saline and return of 15 ml which was sent for microbiological studies.  There was no evidence of active bleeding. Robotic catheter was withdrawn.    Endobronchial ultrasound  The ultrasound videobronchoscope was used and inserted via swivel adaptor attached to the endotracheal tube. The bronchoscope was then advanced into the trachea.  Ultrasound examination revealed a 8mm lymph node in the 11L left hilar location, a 3 mm lymph node in the 7 subcarinal location, two adjacent 3-5 mm lymph node in the 4L left paratracheal location, a 2-3 mm lymph node in the 4R right paratracheal location, and a 3 mm lymph node in the 11R right hilar location.    10 passes were taken at the 11L left hilar location, with 1 pass given to the cytotechnician/cytopathologist for screening, and the others placed into saline for processing.     Hemostasis was visually confirmed and the bronchoscope was removed. The patient tolerated the procedure well without immediate complications.     EBL:  <5mL     IMPRESSION:   Lung nodule  Thoracic lymphadenopathy     PLAN:   await results of bronchoscopy for further delineation of care.      Jamal Torres MD

## 2024-11-08 NOTE — ANESTHESIA PROCEDURE NOTES
Airway  Date/Time: 11/8/2024 1:24 PM  Urgency: elective      General Information and Staff    Patient location during procedure: OR  Anesthesiologist: Cortes Askew MD  Performed: anesthesiologist   Performed by: Cortes Askew MD  Authorized by: Cortes Askew MD      Indications and Patient Condition  Indications for airway management: anesthesia  Sedation level: deep  Preoxygenated: yes  Patient position: sniffing  Mask difficulty assessment: 1 - vent by mask    Final Airway Details  Final airway type: endotracheal airway      Successful airway: ETT  Cuffed: yes   Successful intubation technique: direct laryngoscopy  Endotracheal tube insertion site: oral  Blade: Suzanne  Blade size: #3  ETT size (mm): 8.5    Cormack-Lehane Classification: grade I - full view of glottis  Placement verified by: capnometry   Measured from: lips  ETT to lips (cm): 21  Number of attempts at approach: 1    Additional Comments   OETT placed without airway or dental trauma.

## 2024-11-08 NOTE — H&P
[facility] Interventional Pulmonary Progress Note    History of Present Illness:  Rossy Car is a 84 year old female never smoker with significant PMH NSCLCx3, NHL/ lymphoproliferative disorder, COPD, HFpEF, CKD, HTN who presents today for bronchoscopy. She had been well but had a cold/URI over the past week, now better.  Still with runny nose. Chronic cough and dyspnea stable.    9/2024 previously  the patient has followed with Dr. Jiang and Dr. Chang and on recent CT imaging a LEO nodule has continued to increase in size. Her case was reviewed in [facility] and recommendation at the time was to obtain PET/CT and consider bronchoscopy/biopsy of the LEO nodule. She denies acute concerns today. No cough, dyspnea or pain. No fevers      Past Medical History:   Past Medical History:    Abdominal hernia    Acute kidney injury (HCC)    Acute subdural hematoma (HCC)    Anemia    Arthritis    Azotemia    Bilateral lung cancer (HCC)    Cancer (HCC)    right lung    Cancer (HCC)    lymphoma    Chronic cough    Clostridium difficile colitis    Community acquired pneumonia, unspecified laterality    Complete rupture of rotator cuff    Constipation    COPD (chronic obstructive pulmonary disease) (HCC)    no O2    COPD (chronic obstructive pulmonary disease) with acute bronchitis (HCC)    COPD exacerbation (HCC)    Deep vein thrombosis (HCC)    Dependence on supplemental oxygen    Diaphragmatic hernia without mention of obstruction or gangrene    Diarrhea    Diverticulitis of large intestine without perforation or abscess without bleeding    Dysphagia, pharyngoesophageal phase    Epistaxis    Esophageal reflux    Essential hypertension    Exposure to unspecified radiation    right lower lung    Gluteal tendinitis    Gram-negative pneumonia (HCC)    Hearing impairment    bilateral hearing aids     Hearing loss    Hemorrhoids    HIGH BLOOD PRESSURE    High cholesterol    History of blood clots    History of lung cancer    Hyperglycemia     Hyperlipidemia    Hypokalemia    Leaking of urine    Leg swelling    Leukocytosis    Low grade B-cell lymphoma (HCC)    Lung cancer (HCC)    Malignant neoplasm of bronchus and lung, unspecified site    Mass of right forearm    Metabolic acidosis    NHL (nodular histiocytic lymphoma) (HCC)    Nocturnal leg cramps    Normocytic normochromic anemia    Osteoarthritis    Osteoporosis    Personal history of antineoplastic chemotherapy    LAST 10/2019    Personal history of malignant neoplasm of bronchus and lung    Personal history of urinary (tract) infection    Plantar fascial fibromatosis    Pneumonia due to infectious organism    Pneumonia due to organism    Pulmonary embolism (HCC)    right lung,cleared on own per pt    Renal disorder    Renal insufficiency    Scoliosis (and kyphoscoliosis), idiopathic    SOB (shortness of breath)    Stool incontinence    Urticaria, unspecified    Visual impairment    glasses    Wears glasses        Past Surgical History:   Past Surgical History:   Procedure Laterality Date    Cholecystectomy  2008    Colonoscopy  2008    Hernia surgery      Hysterectomy  's    vaginal          Other      lung biopsies    Other surgical history  ,    bunionectomy,bilateral    Other surgical history  2017    [place]- Dr. Tatum    Removal of lung,lobectomy      removal of upper right lobe    Repair ing hernia,5+y/o,reducibl      Tonsillectomy      Total abdom hysterectomy           Family Medical History:   Family History   Problem Relation Age of Onset    Other (immune system) Mother     Cancer Mother     Cancer Father         of the mouth    Hypertension Father     Cancer Son     Cancer Son         Social History:   Social History     Socioeconomic History    Marital status:      Spouse name: Not on file    Number of children: Not on file    Years of education: Not on file    Highest education level: Not on file   Occupational History    Not on file    Tobacco Use    Smoking status: Never     Passive exposure: Never    Smokeless tobacco: Never   Vaping Use    Vaping status: Never Used   Substance and Sexual Activity    Alcohol use: Not Currently    Drug use: No    Sexual activity: Not on file   Other Topics Concern     Service Not Asked    Blood Transfusions Not Asked    Caffeine Concern Yes     Comment: soda    Occupational Exposure Not Asked    Hobby Hazards Not Asked    Sleep Concern Not Asked    Stress Concern Not Asked    Weight Concern Not Asked    Special Diet Not Asked    Back Care Not Asked    Exercise No    Bike Helmet Not Asked    Seat Belt Not Asked    Self-Exams Not Asked   Social History Narrative    Not on file     Social Drivers of Health     Financial Resource Strain: Not on file   Food Insecurity: Not on file   Transportation Needs: Not on file   Physical Activity: Not on file   Stress: Not on file   Social Connections: Not on file   Housing Stability: Not on file       Medications:   No current outpatient medications on file.        Review of Systems: Review of Systems   Constitutional: Negative.    HENT: Negative.     Respiratory: Negative.     Cardiovascular: Negative.    All other systems reviewed and are negative.       Physical Exam:  BP (!) 167/82 (BP Location: Left arm)   Pulse 76   Temp 97.6 °F (36.4 °C) (Temporal)   Resp 22   Ht 4' 11\" (1.499 m)   Wt 114 lb (51.7 kg)   SpO2 94%   BMI 23.03 kg/m²      Constitutional: alert, cooperative. No acute distress.  HEENT: Head NC/AT.   Cardio: Regular rate and rhythm. Normal S1 and S2. No murmurs.   Respiratory: Thorax symmetrical with no labored breathing. Clear to ausculation bilaterally with symmetrical breath sounds. No wheezing, rhonchi, rales, or crackles.   GI: NABS. Abd soft, non-tender.  Extremities: No clubbing or cyanosis. No LE edema.    Neurologic: A&Ox3. No gross motor deficits.  Skin: Warm, dry  Psych: Calm, cooperative. Pleasant affect.    Results:  Personally  reviewed    WBC: 7.6, done on 9/27/2024.  HGB: 10.3, done on 9/27/2024.  PLT: 285, done on 9/27/2024.     Glucose: 94, done on 9/27/2024.  Cr: 1.92, done on 9/27/2024.  Last eGFR was 26 on 9/27/2024.  CA: 9.8, done on 9/27/2024.  Na: 142, done on 9/27/2024.  K: 4.4, done on 9/27/2024.  Cl: 114, done on 9/27/2024.  CO2: 24, done on 9/27/2024.  Last ALB was 3.2% done on 9/27/2024.     XR CHEST AP PORTABLE  (CPT=71045)    Result Date: 9/27/2024  CONCLUSION:  1. Nodular opacity within the left upper lobe may represent superimposed vessels or atelectasis/infiltrate but nodule cannot be excluded.  Consider CT chest to further evaluate. 2. Mild interstitial opacities which may represent chronic interstitial changes versus mild edema. 3. Hyperexpansion of the lungs. 4. Right basilar opacity representing atelectasis versus infiltrates with possible trace right-sided pleural effusion.      LOCATION:  [facility]      Dictated by (CST): Beryl Reyes MD on 9/27/2024 at 6:13 PM     Finalized by (CST): Beryl Reyes MD on 9/27/2024 at 6:15 PM       PET STANDARD BODY SCAN (ONCOLOGY) (CPT=78815)    Result Date: 9/6/2024  CONCLUSION:   1. Abnormal thoracic the dominant-appearing mass in the left upper lobe shows elevated activity SUV maximum in the malignant range 3.1.  At least 3 smaller nodules in the left upper lobe are seen, showing elevated activity, but only in the range of approximately 1 SUV maximum.  2. Right pleural effusion probably stable in overall volume but appearing more loculated.  There is also a masslike opacity in the right lower lobe extending to the right hilum with activity elevated SUV maximum 2.4.    Deauville Score PET/CT Scan 1. No uptake above background. 2. Uptake at an initial site that is less than or equal to mediastinum. 3. Uptake at an initial site that is greater than mediastinum but less than or equal to liver. 4. Uptake at an initial site that is moderately increased compared to the liver at  any site. 5a. Uptake at an initial site that is markedly increased compared to the liver. 5b. Uptake markedly increased compared to the liver at any new site that is possibly related to lymphoma. X New areas of uptake unlikely related to lymphoma.   LOCATION:  [facility]   Dictated by (CST): Dave Aggarwal MD on 9/06/2024 at 9:33 AM     Finalized by (CST): Dave Aggarwal MD on 9/06/2024 at 9:41 AM       CT CHEST+ABDOMEN+PELVIS(CPT=71250/26351)    Result Date: 8/25/2024  CONCLUSION:  1. There is interval increase in size of the ground-glass density nodule in the left upper lobe (2.7 x 2.2 cm versus 2.5 x 1.8 cm previously).  This abuts the fissure in the left lung and causes some tenting of the fissure.  Imaging appearance would be most consistent with a low-grade lepidic type adenocarcinoma. 2. Other pulmonary parenchymal nodules are not significantly changed. 3. Right pleural effusion is not significantly changed. 4. Mixed attenuation lesion right kidney is noted.  This is slightly increased as compared to previous study.  This could be a hemorrhagic cyst but is nonspecific on a noncontrast study.  Renal neoplasm would not be excluded without the benefit of a contrast-enhanced study. 5. There is a hiatal hernia. 6. There is diverticulosis of colon without CT evidence of diverticulitis. 7. Other findings described above are unchanged.      LOCATION:  [facility]    Dictated by (CST): Alan Enriquez MD on 8/25/2024 at 1:27 PM     Finalized by (CST): Alan Enriquez MD on 8/25/2024 at 2:09 PM         Assessment/Plan:  #1. Lung nodules  Long hx of NSCLC x 3 and NHL   8/2024 CT chest with enlarging LEO GGO nodule up to [id number].  Also with stable other lung nodules. Prominent nodes but no true LAD  11/2024 bronch CT with stable findings  Reviewed imaging findings and differential with the patient and son. We again reviewed plan today for robotic assisted bronchoscopy with biopsies of the LEO nodule and EBUS/TBNA of any LAD.  We  reviewed the procedure in detail as well as benefits and risks including but not limited to infection, bleeding, pneumothorax and respiratory failure.      Jamal Torres MD

## 2024-11-11 LAB — M TB CMPLX RRNA SPEC QL PROBE: NOT DETECTED

## 2024-11-11 NOTE — TELEPHONE ENCOUNTER
Returned patient's MyChart message.  Patent continues to have frequent coughing with moderate amount of Sputum.  Mucinex and Robitussin is in use. No shortness of breath. Concerned about CXR report post procedure. Consulted with Dr. Torres.  Patient does not have a hemophage.  It is normal from the biopsy and the fluid washing that was done from the procedure.  Continue taking Mucinex and Robitussin and will discuss results at Her Friday appointment.

## 2024-11-13 ENCOUNTER — TELEPHONE (OUTPATIENT)
Dept: HEMATOLOGY/ONCOLOGY | Facility: HOSPITAL | Age: 84
End: 2024-11-13

## 2024-11-13 LAB — ASPERGILLUS AG BAL/SERUM: 0.12 INDEX

## 2024-11-13 NOTE — TELEPHONE ENCOUNTER
Called Jayne- went to voicemail. Biopsy showed NSCLC- adeno. Reviewed at [facility]. Plan SBRT and will refer to Rad Onc.

## 2024-11-15 ENCOUNTER — OFFICE VISIT (OUTPATIENT)
Age: 84
End: 2024-11-15
Payer: MEDICARE

## 2024-11-15 VITALS
DIASTOLIC BLOOD PRESSURE: 70 MMHG | WEIGHT: 115.5 LBS | SYSTOLIC BLOOD PRESSURE: 142 MMHG | HEIGHT: 59 IN | RESPIRATION RATE: 16 BRPM | OXYGEN SATURATION: 98 % | BODY MASS INDEX: 23.28 KG/M2 | HEART RATE: 65 BPM

## 2024-11-15 DIAGNOSIS — J44.9 CHRONIC OBSTRUCTIVE PULMONARY DISEASE, UNSPECIFIED COPD TYPE (HCC): ICD-10-CM

## 2024-11-15 DIAGNOSIS — C34.92 NSCLC OF LEFT LUNG (HCC): Primary | ICD-10-CM

## 2024-11-15 DIAGNOSIS — A31.0 MAI (MYCOBACTERIUM AVIUM-INTRACELLULARE) (HCC): ICD-10-CM

## 2024-11-15 DIAGNOSIS — C34.90 MALIGNANT NEOPLASM OF LUNG, UNSPECIFIED LATERALITY, UNSPECIFIED PART OF LUNG (HCC): ICD-10-CM

## 2024-11-15 DIAGNOSIS — J47.9 BRONCHIECTASIS WITHOUT COMPLICATION (HCC): ICD-10-CM

## 2024-11-15 DIAGNOSIS — R91.8 PULMONARY NODULES/LESIONS, MULTIPLE: ICD-10-CM

## 2024-11-15 DIAGNOSIS — J90 PLEURAL EFFUSION: ICD-10-CM

## 2024-11-15 PROCEDURE — 99214 OFFICE O/P EST MOD 30 MIN: CPT | Performed by: INTERNAL MEDICINE

## 2024-11-15 RX ORDER — FLUOROURACIL 50 MG/G
CREAM TOPICAL 2 TIMES DAILY
COMMUNITY

## 2024-11-15 RX ORDER — HYDRALAZINE HYDROCHLORIDE 50 MG/1
TABLET, FILM COATED ORAL
COMMUNITY
Start: 2024-09-17

## 2024-11-15 NOTE — PROGRESS NOTES
[facility] Progress Note    History of Present Illness:  Rossy Car is a 84 year old female  never smoker with significant PMH NSCLCx3, NHL/ lymphoproliferative disorder, COPD, HFpEF, CKD, HTN who presents today post bronchoscopy. She denies new issues today. Stable chronic cough. No dyspnea. No pain    October 2024 previously  She had been well but had a cold/URI over the past week, now better.  Still with runny nose. Chronic cough and dyspnea stable.    9/2024 previously  the patient has followed with Dr. Jiang and Dr. Chang and on recent CT imaging a LEO nodule has continued to increase in size. Her case was reviewed in [facility] and recommendation at the time was to obtain PET/CT and consider bronchoscopy/biopsy of the LEO nodule. She denies acute concerns today. No cough, dyspnea or pain. No fevers      Past Medical History:   Past Medical History:    Abdominal hernia    Acute kidney injury (HCC)    Acute subdural hematoma (HCC)    Anemia    Arthritis    Azotemia    Bilateral lung cancer (HCC)    Cancer (HCC)    right lung    Cancer (HCC)    lymphoma    Chronic cough    Clostridium difficile colitis    Community acquired pneumonia, unspecified laterality    Complete rupture of rotator cuff    Constipation    COPD (chronic obstructive pulmonary disease) (HCC)    no O2    COPD (chronic obstructive pulmonary disease) with acute bronchitis (HCC)    COPD exacerbation (HCC)    Deep vein thrombosis (HCC)    Dependence on supplemental oxygen    Diaphragmatic hernia without mention of obstruction or gangrene    Diarrhea    Diverticulitis of large intestine without perforation or abscess without bleeding    Dysphagia, pharyngoesophageal phase    Epistaxis    Esophageal reflux    Essential hypertension    Exposure to unspecified radiation    right lower lung    Gluteal tendinitis    Gram-negative pneumonia (HCC)    Hearing impairment    bilateral hearing aids     Hearing loss    Hemorrhoids    HIGH BLOOD  PRESSURE    High cholesterol    History of blood clots    History of lung cancer    Hyperglycemia    Hyperlipidemia    Hypokalemia    Leaking of urine    Leg swelling    Leukocytosis    Low grade B-cell lymphoma (HCC)    Lung cancer (HCC)    Malignant neoplasm of bronchus and lung, unspecified site    Mass of right forearm    Metabolic acidosis    NHL (nodular histiocytic lymphoma) (HCC)    Nocturnal leg cramps    Normocytic normochromic anemia    Osteoarthritis    Osteoporosis    Personal history of antineoplastic chemotherapy    LAST 10/2019    Personal history of malignant neoplasm of bronchus and lung    Personal history of urinary (tract) infection    Plantar fascial fibromatosis    Pneumonia due to infectious organism    Pneumonia due to organism    Pulmonary embolism (HCC)    right lung,cleared on own per pt    Renal disorder    Renal insufficiency    Scoliosis (and kyphoscoliosis), idiopathic    SOB (shortness of breath)    Stool incontinence    Urticaria, unspecified    Visual impairment    glasses    Wears glasses        Past Surgical History:   Past Surgical History:   Procedure Laterality Date    Cholecystectomy  2008    Colonoscopy  2008    Hernia surgery      Hysterectomy  's    vaginal          Other      lung biopsies    Other surgical history  ,    bunionectomy,bilateral    Other surgical history  2017    Cysto- Dr. Tatum    Removal of lung,lobectomy      removal of upper right lobe    Repair ing hernia,5+y/o,reducibl      Tonsillectomy      Total abdom hysterectomy         Family Medical History:   Family History   Problem Relation Age of Onset    Other (immune system) Mother     Cancer Mother     Cancer Father         of the mouth    Hypertension Father     Cancer Son     Cancer Son         Social History:   Social History     Socioeconomic History    Marital status:      Spouse name: Not on file    Number of children: Not on file    Years of education:  Not on file    Highest education level: Not on file   Occupational History    Not on file   Tobacco Use    Smoking status: Never     Passive exposure: Never    Smokeless tobacco: Never   Vaping Use    Vaping status: Never Used   Substance and Sexual Activity    Alcohol use: Not Currently    Drug use: No    Sexual activity: Not on file   Other Topics Concern     Service Not Asked    Blood Transfusions Not Asked    Caffeine Concern Yes     Comment: soda    Occupational Exposure Not Asked    Hobby Hazards Not Asked    Sleep Concern Not Asked    Stress Concern Not Asked    Weight Concern Not Asked    Special Diet Not Asked    Back Care Not Asked    Exercise No    Bike Helmet Not Asked    Seat Belt Not Asked    Self-Exams Not Asked   Social History Narrative    Not on file     Social Drivers of Health     Financial Resource Strain: Not on file   Food Insecurity: Not on file   Transportation Needs: Not on file   Physical Activity: Not on file   Stress: Not on file   Social Connections: Not on file   Housing Stability: Not on file        Medications:   Current Outpatient Medications   Medication Sig Dispense Refill    hydrALAZINE 50 MG Oral Tab       fluorouracil 5 % External Cream Apply topically 2 (two) times daily. prn      MONTELUKAST 10 MG Oral Tab TAKE 1 TABLET EVERY EVENING 90 tablet 3    BREO ELLIPTA 200-25 MCG/ACT Inhalation Aerosol Powder, Breath Activated Inhale 1 puff into the lungs daily. 3 each 0    rivaroxaban (XARELTO) 15 MG Oral Tab Take 1 tablet (15 mg total) by mouth daily with food. 90 tablet 3    azelastine 0.1 % Nasal Solution 1 spray by Nasal route 2 (two) times daily. 1 each 5    dilTIAZem  MG Oral Capsule SR 24 Hr Take 1 capsule (180 mg total) by mouth daily.      diphenoxylate-atropine 2.5-0.025 MG Oral Tab Take 1 tablet by mouth 4 (four) times daily as needed for Diarrhea. 30 tablet 0    rosuvastatin 5 MG Oral Tab Take 1 tablet (5 mg total) by mouth nightly. 90 tablet 3     metoprolol succinate 50 MG Oral Tablet 24 Hr Take 1 tablet (50 mg total) by mouth 2 (two) times a day. 180 tablet 1    LOSARTAN POTASSIUM 50 MG Oral Tab TAKE 1 TABLET TWICE A  tablet 2    Iron, Ferrous Sulfate, 325 (65 Fe) MG Oral Tab Take by mouth daily. CLARIFY DOSE WITH PATIENT: THIS WAS ORDERED BY ONCOLOGY  30 tablet 0    Albuterol Sulfate  (90 Base) MCG/ACT Inhalation Aero Soln Inhale 2 puffs into the lungs every 4 (four) hours as needed. prn      Saline Nasal Spray 0.65 % Nasal Solution 1 spray by Nasal route as needed for congestion.      acetaminophen 500 MG Oral Tab Take 1 tablet (500 mg total) by mouth every 6 (six) hours as needed for Pain.      azithromycin 250 MG Oral Tab take 2 tablet (500MG)  by ORAL route  every day for 1 day then 1 tablet (250 mg) by oral route once daily for 4 days (Patient not taking: Reported on 11/15/2024) 6 tablet 0    triamcinolone 0.1 % External Cream Apply topically 2 (two) times daily as needed. Up to 5, then 1 week break before restarting this cycle as needed. (Patient not taking: Reported on 11/15/2024) 1 each 1       Review of Systems: Review of Systems   Constitutional: Negative.    HENT: Negative.     Respiratory:  Positive for cough. Negative for shortness of breath, wheezing and stridor.    Cardiovascular: Negative.    All other systems reviewed and are negative.       Physical Exam:  /70 (BP Location: Right arm, Patient Position: Sitting, Cuff Size: adult)   Pulse 65   Resp 16   Ht 4' 11\" (1.499 m)   Wt 115 lb 8 oz (52.4 kg)   SpO2 98%   BMI 23.33 kg/m²      Constitutional: alert, cooperative. No acute distress.  HEENT: Head NC/AT.    Cardio: Regular rate and rhythm. Normal S1 and S2. No murmurs.   Respiratory: Thorax symmetrical with no labored breathing. Clear to ausculation bilaterally with symmetrical breath sounds. No wheezing, rhonchi, rales, or crackles.   Extremities: No clubbing or cyanosis. No BLE edema.    Neurologic: A&Ox3. No  gross motor deficits.  Skin: Warm, dry  Psych: Calm, cooperative. Pleasant affect.    Results:  Personally reviewed    WBC: 7.6, done on 9/27/2024.  HGB: 10.3, done on 9/27/2024.  PLT: 285, done on 9/27/2024.     Glucose: 94, done on 9/27/2024.  Cr: 1.92, done on 9/27/2024.  Last eGFR was 26 on 9/27/2024.  CA: 9.8, done on 9/27/2024.  Na: 142, done on 9/27/2024.  K: 4.4, done on 9/27/2024.  Cl: 114, done on 9/27/2024.  CO2: 24, done on 9/27/2024.  Last ALB was 3.2% done on 9/27/2024.     XR CHEST AP PORTABLE  (CPT=71045)    Result Date: 11/8/2024  CONCLUSION:  1. No pneumothorax. 2. Left upper lobe nodular opacity with adjacent interstitial changes and bronchiectasis again noted as was seen on the previous chest CT. 3. New airspace infiltrate in the left lung apex could reflect pulmonary hemorrhage in a patient who has had recent lung biopsy.  Correlate clinically.  Pneumonia is also within the differential. 4. Volume loss in the right hemithorax.  Worsening mixed interstitial and airspace infiltrate at the right lung base and slight interval increase in size of a small right pleural effusion. 5. Cardiomegaly without edema.    LOCATION:  [facility]      Dictated by (CST): Marc Marie MD on 11/08/2024 at 3:35 PM     Finalized by (CST): Marc Marie MD on 11/08/2024 at 3:40 PM       CT CHEST BRONCH NAVIGATION PRE OP LESS THAN 6 WEEKS (CPT=76497)    Result Date: 11/8/2024  CONCLUSION:  1. No significant change, allowing for differences in technique when compared to most recent noncontrast CT of the chest performed 8/25/2024 as detailed above.   LOCATION:  [facility]   Dictated by (CST): Saadia Zamora MD on 11/08/2024 at 3:07 PM     Finalized by (CST): Saadia Zamora MD on 11/08/2024 at 3:19 PM       XR CT SCAN FOLLOW UP STUDY (CPT=76380)    Result Date: 11/8/2024  CONCLUSION:  Procedural imaging assistance performed.   Dictated by (CST): Buck Smith MD on 11/08/2024 at 2:47 PM     Finalized by (CST): Buck Smith MD  on 11/08/2024 at 2:48 PM       XR ENDOSCOPY - N/C    Result Date: 11/8/2024  CONCLUSION:  Fluoroscopic assistance provided.   LOCATION:  [facility]   Dictated by (CST): Buck Smith MD on 11/08/2024 at 2:46 PM     Finalized by (CST): Buck Smith MD on 11/08/2024 at 2:47 PM       XR CHEST AP PORTABLE  (CPT=71045)    Result Date: 9/27/2024  CONCLUSION:  1. Nodular opacity within the left upper lobe may represent superimposed vessels or atelectasis/infiltrate but nodule cannot be excluded.  Consider CT chest to further evaluate. 2. Mild interstitial opacities which may represent chronic interstitial changes versus mild edema. 3. Hyperexpansion of the lungs. 4. Right basilar opacity representing atelectasis versus infiltrates with possible trace right-sided pleural effusion.      LOCATION:  [facility]      Dictated by (CST): Beryl Reyes MD on 9/27/2024 at 6:13 PM     Finalized by (CST): Beryl Reyes MD on 9/27/2024 at 6:15 PM       PET STANDARD BODY SCAN (ONCOLOGY) (CPT=78815)    Result Date: 9/6/2024  CONCLUSION:   1. Abnormal thoracic the dominant-appearing mass in the left upper lobe shows elevated activity SUV maximum in the malignant range 3.1.  At least 3 smaller nodules in the left upper lobe are seen, showing elevated activity, but only in the range of approximately 1 SUV maximum.  2. Right pleural effusion probably stable in overall volume but appearing more loculated.  There is also a masslike opacity in the right lower lobe extending to the right hilum with activity elevated SUV maximum 2.4.    Deauville Score PET/CT Scan 1. No uptake above background. 2. Uptake at an initial site that is less than or equal to mediastinum. 3. Uptake at an initial site that is greater than mediastinum but less than or equal to liver. 4. Uptake at an initial site that is moderately increased compared to the liver at any site. 5a. Uptake at an initial site that is markedly increased compared to the liver. 5b. Uptake markedly  increased compared to the liver at any new site that is possibly related to lymphoma. X New areas of uptake unlikely related to lymphoma.   LOCATION:  [facility]   Dictated by (CST): Dave Aggarwal MD on 9/06/2024 at 9:33 AM     Finalized by (CST): Dave Aggarwal MD on 9/06/2024 at 9:41 AM       CT CHEST+ABDOMEN+PELVIS(CPT=71250/37814)    Result Date: 8/25/2024  CONCLUSION:  1. There is interval increase in size of the ground-glass density nodule in the left upper lobe (2.7 x 2.2 cm versus 2.5 x 1.8 cm previously).  This abuts the fissure in the left lung and causes some tenting of the fissure.  Imaging appearance would be most consistent with a low-grade lepidic type adenocarcinoma. 2. Other pulmonary parenchymal nodules are not significantly changed. 3. Right pleural effusion is not significantly changed. 4. Mixed attenuation lesion right kidney is noted.  This is slightly increased as compared to previous study.  This could be a hemorrhagic cyst but is nonspecific on a noncontrast study.  Renal neoplasm would not be excluded without the benefit of a contrast-enhanced study. 5. There is a hiatal hernia. 6. There is diverticulosis of colon without CT evidence of diverticulitis. 7. Other findings described above are unchanged.      LOCATION:  [facility]    Dictated by (CST): Alan Enriquez MD on 8/25/2024 at 1:27 PM     Finalized by (CST): Alan Enriquez MD on 8/25/2024 at 2:09 PM         Assessment/Plan:  #1. Lingula NSCLC  Long hx of NSCLC x 3 and NHL   8/2024 CT chest with enlarging LEO GGO nodule up to 23m41xv.  Also with stable other lung nodules. Prominent nodes but no true LAD  11/2024 robotic bronch bx of lingula and EBUS/TBNA of 11L; path from lingula biopsy with adenoca with lepidic pattern.  11L nondiagnostic  Her case was reviewed in [facility] with recommendation to proceed with SBRT -[facility]  Will have further CT imaging post radiation treatment    #2. Lung  nodules  As above    Jamal Torres MD  11/15/2024

## 2024-11-19 RX ORDER — FLUTICASONE FUROATE AND VILANTEROL TRIFENATATE 200; 25 UG/1; UG/1
1 POWDER RESPIRATORY (INHALATION) DAILY
Qty: 170 EACH | Refills: 3 | Status: SHIPPED | OUTPATIENT
Start: 2024-11-19

## 2024-11-27 NOTE — PROGRESS NOTES
Nursing Re- Consult Note    Patient: Rossy Car  YOB: 1940  Age: 84 year old  Radiation Oncologist:Bk Urban MD  Referring Physician: Bk Urban  Diagnosis:No diagnosis found.  Consult Date: 11/27/2024    History of Present Illness: Hx of NSCLC and NHL. CT c/a/p 8/25/24 showed increase in size of GG nodule in LEO. PET done 9/6/24. S/p bronch with biopsy of LEO done 11/8/24- path + adenocarcinoma with lepidic pattern. Hx of SBRT to R lung x 2 (2013 and 2015). Here for re-con today. Pt feels well today. Reports has a cough from postnasal drip. Uses Robitussin with relief. Has some SOB with moderate exertion.       Vital Signs: BP (!) 173/77 (BP Location: Left arm, Patient Position: Sitting, Cuff Size: adult)   Pulse 59   Temp 97.3 °F (36.3 °C) (Oral)   Resp 18   Wt 54.3 kg (119 lb 9.6 oz)   SpO2 98%   BMI 24.16 kg/m²     Wt Readings from Last 6 Encounters:   12/02/24 54.3 kg (119 lb 9.6 oz)   11/15/24 52.4 kg (115 lb 8 oz)   11/08/24 51.7 kg (114 lb)   09/27/24 51.3 kg (113 lb)   09/10/24 54 kg (119 lb)   09/05/24 55.8 kg (123 lb)       ROS: Review of Systems   Constitutional: Negative.    HENT:          Postnasal drip   Eyes: Negative.    Respiratory:  Positive for shortness of breath.         Moderate exertion   Cardiovascular: Negative.    Gastrointestinal: Negative.    Genitourinary: Negative.    Musculoskeletal:  Positive for back pain.   Skin:         R lower leg skin CA- Dr. Robison   Neurological: Negative.    Endo/Heme/Allergies: Negative.    Psychiatric/Behavioral: Negative.         Medications and Allergies review by RN: yes

## 2024-12-01 ENCOUNTER — HOSPITAL ENCOUNTER (OUTPATIENT)
Dept: RADIATION ONCOLOGY | Facility: HOSPITAL | Age: 84
Discharge: HOME | End: 2024-12-01
Attending: RADIOLOGY
Payer: MEDICARE

## 2024-12-02 ENCOUNTER — HOSPITAL ENCOUNTER (OUTPATIENT)
Dept: RADIATION ONCOLOGY | Facility: HOSPITAL | Age: 84
Discharge: HOME | End: 2024-12-02
Attending: RADIOLOGY
Payer: MEDICARE

## 2024-12-02 ENCOUNTER — HOSPITAL ENCOUNTER (OUTPATIENT)
Dept: RADIATION ONCOLOGY | Facility: HOSPITAL | Age: 84
End: 2024-12-02
Attending: RADIOLOGY
Payer: MEDICARE

## 2024-12-02 VITALS
BODY MASS INDEX: 24 KG/M2 | SYSTOLIC BLOOD PRESSURE: 173 MMHG | HEART RATE: 59 BPM | DIASTOLIC BLOOD PRESSURE: 77 MMHG | WEIGHT: 119.63 LBS | OXYGEN SATURATION: 98 % | TEMPERATURE: 97 F | RESPIRATION RATE: 18 BRPM

## 2024-12-02 DIAGNOSIS — C34.12 MALIGNANT NEOPLASM OF UPPER LOBE OF LEFT LUNG (HCC): Primary | ICD-10-CM

## 2024-12-02 PROCEDURE — 99213 OFFICE O/P EST LOW 20 MIN: CPT

## 2024-12-02 PROCEDURE — 77399 UNLISTED PX MED RADJ PHYSICS: CPT | Performed by: RADIOLOGY

## 2024-12-02 PROCEDURE — 77334 RADIATION TREATMENT AID(S): CPT | Performed by: RADIOLOGY

## 2024-12-02 PROCEDURE — 77470 SPECIAL RADIATION TREATMENT: CPT | Performed by: RADIOLOGY

## 2024-12-02 NOTE — PATIENT INSTRUCTIONS
- WE WILL CALL TO SCHEDULE YOUR RADIATION TREATMENT ONCE PLANNING IS COMPLETE AND HAS BEEN APPROVED BY INSURANCE.       - IF YOU HAVE ANY QUESTIONS OR CONCERNS REGARDING RADIATION THERAPY, PLEASE CALL THE NURSING LINE AT [phone].

## 2024-12-02 NOTE — CONSULTS
[facility]    PATIENT'S NAME: ALO PHIPPS   RADIATION ONCOLOGIST: Bk Urban M.D.   PATIENT ACCOUNT #: [id number] LOCATION: [facility]   [facility]   MEDICAL RECORD #: [id number] YOB: 1940   CONSULTATION DATE: 12/02/2024       RADIATION ONCOLOGY CONSULTATION    REFERRING PHYSICIAN:  Dr. Chang.    DIAGNOSIS:  Adenocarcinoma of the left upper lobe.    HISTORY OF PRESENT ILLNESS:  The patient is an 84-year-old female who is well known to me.  I have treated her twice for separate adenocarcinomas in the right lung.  These were done back in 2013 and 2015.  I have not seen her in quite some time, but those prior tumors were treated successfully with no evidence of recurrence and no side effects.  Since that time, she has been following with, among others, Dr. Chang.  The patient recently had imaging suggestive of an increasing ground-glass opacity in the left upper lobe.  This had been followed on serial imaging and by August 2024 reached 2.7 cm in greatest dimension when it had been 2.5 cm on the prior scan 4 months ago.  Based upon this modest enlargement, additional imaging was recommended, including a PET scan.  This was done on 09/06/2024 and showed some elevated activity with an SUV in the malignant range of 3.1.  There was no evidence of any lymphadenopathy.  There were 3 smaller nodules in the left upper lobe seen with some elevated activity but only in the range of approximately 1 SUV maximum and could be related to numerous other causes.  The patient did undergo a biopsy from Dr. Torres.  This took place on 11/08/2024, and the pathology from the bronchoscopy and biopsy showed adenocarcinoma with lepidic pattern.  There was a sample taken from 11L, which was nondiagnostic.  The patient's case was then presented at our multidisciplinary thoracic oncology clinic, and it was felt that the patient could be a good candidate for a third course of SBRT, this time to the contralateral  lung.    The patient otherwise is asymptomatic from her current disease.  She denies any changes in cough, shortness of breath, has no hemoptysis, and has had no weight loss or changes in appetite.  She denies any bony or joint pain and has had no neurologic changes.    PHYSICAL EXAMINATION:    GENERAL:  This is an 84-year-old female who is pleasant, cooperative, and alert, awake, oriented x3.  She is in no acute distress.  She has an ECOG performance score of 1 and a current pain score of 0.  VITAL SIGNS:  Blood pressure of 173/77, pulse of 59, respiratory rate of 18, and a temperature of 97.3.  She has a weight of 119 pounds.  NECK:  Supple with no lymphadenopathy.  LUNGS:  Clear to auscultation bilaterally.  HEART:  Regular rate and rhythm.  Normal S1, S2, and no audible murmurs.    LYMPHATICS:  There is no supraclavicular, axillary, or inguinal lymphadenopathy.  ABDOMEN:  Soft, nontender, nondistended with normoactive bowel sounds and no hepatosplenomegaly.  EXTREMITIES:  Without clubbing, cyanosis, or edema.  NEUROLOGIC:  Cranial nerves II through XII are grossly intact.  There are no focal deficits.    IMPRESSION:  This is an 84-year-old female who has a history of multiple successfully-treated right lung adenocarcinomas who now presents with a newly diagnosed adenocarcinoma in the left upper lobe.  She is not a great surgical candidate based upon her lung functioning and age.    RECOMMENDATIONS:  I do believe the patient is an excellent candidate for treatment with ablative radiotherapy to her left upper lobe lung cancer.  This treatment has been successful in the past, as she has been treated back in 2013 and 2015 to the contralateral right side.  I am optimistic she would tolerate this treatment similarly, and I do expect a good result.  I would recommend 5000 cGy in 5 fractions utilizing ablative techniques to an area encompassing the newly-diagnosed left upper lung tumor.    I believe the patient should  tolerate this treatment well and I do not expect much in way of significant morbidity.  She tolerated the treatments extremely well in the past.  While this is no guarantee of future success, I certainly am optimistic that her body should tolerate this treatment similarly.  There is the possibility that she may have some chest wall discomfort or rib pain.  There is also the possibility of radiation pneumonitis or other pulmonary dysfunction, though I would expect this risk to be quite low.  Following this long and thorough discussion of all the risks and benefits of treatment, the patient and her son indicate that they understand all these issues and do wish to proceed with treatment as I previously dictated.    We therefore will proceed with simulation today with the intent to begin treatment shortly thereafter.      Thank you very much for allowing me the opportunity to participate in the care of this patient.  If there should be any questions regarding the radiotherapy, please feel free to contact me at any time.    Dictated By Bk Urban M.D.  d: 12/02/2024 09:32:21  t: 12/02/2024 10:24:50  [facility] [id number]  [facility]/    cc: LG Cruz MD David J. McElligott IV, MD Yayati S Patel, MD     14-Jan-2020 20:52

## 2024-12-03 ENCOUNTER — TELEPHONE (OUTPATIENT)
Dept: HEMATOLOGY/ONCOLOGY | Facility: CLINIC | Age: 84
End: 2024-12-03

## 2024-12-03 PROCEDURE — 77300 RADIATION THERAPY DOSE PLAN: CPT | Performed by: RADIOLOGY

## 2024-12-03 PROCEDURE — 77301 RADIOTHERAPY DOSE PLAN IMRT: CPT | Performed by: RADIOLOGY

## 2024-12-03 PROCEDURE — 77338 DESIGN MLC DEVICE FOR IMRT: CPT | Performed by: RADIOLOGY

## 2024-12-03 PROCEDURE — 77293 RESPIRATOR MOTION MGMT SIMUL: CPT | Performed by: RADIOLOGY

## 2024-12-05 NOTE — TELEPHONE ENCOUNTER
Family Medical Leave Act for Son Kenneth received via email. No Authorization attached, sending [employer] message to obtain Release of Information completion, logged for processing.

## 2024-12-06 NOTE — TELEPHONE ENCOUNTER
Patient Sent [email or web address] message for instructions on how to fill out authorization. Advise to call the forms department for instructions.

## 2024-12-13 ENCOUNTER — HOSPITAL ENCOUNTER (OUTPATIENT)
Dept: RADIATION ONCOLOGY | Facility: HOSPITAL | Age: 84
Discharge: HOME | End: 2024-12-13
Attending: RADIOLOGY
Payer: MEDICARE

## 2024-12-13 PROCEDURE — 77373 STRTCTC BDY RAD THER TX DLVR: CPT | Performed by: RADIOLOGY

## 2024-12-13 NOTE — TELEPHONE ENCOUNTER
Patient son called back with details.  informed.   Type of Leave: intermittent Family Medical Leave Act   Reason for Leave: lung cancer   Start date of leave: 12/13/24  End date of leave: 12/13/25  How many flare ups per month/length?: 1-5 Flare ups per month, lasting 1-2 days  How many appts per month/length?: 1-8 appointments per month, lasting 1 days   Was Fee and Turnaround info Given?: yes

## 2024-12-15 ENCOUNTER — HOSPITAL ENCOUNTER (EMERGENCY)
Age: 84
Discharge: HOME | End: 2024-12-15
Attending: EMERGENCY MEDICINE
Payer: MEDICARE

## 2024-12-15 VITALS
RESPIRATION RATE: 18 BRPM | TEMPERATURE: 98 F | HEIGHT: 59 IN | HEART RATE: 77 BPM | OXYGEN SATURATION: 98 % | WEIGHT: 117 LBS | SYSTOLIC BLOOD PRESSURE: 156 MMHG | DIASTOLIC BLOOD PRESSURE: 75 MMHG | BODY MASS INDEX: 23.59 KG/M2

## 2024-12-15 DIAGNOSIS — R11.2 NAUSEA VOMITING AND DIARRHEA: Primary | ICD-10-CM

## 2024-12-15 DIAGNOSIS — R19.7 NAUSEA VOMITING AND DIARRHEA: Primary | ICD-10-CM

## 2024-12-15 DIAGNOSIS — A04.72 C. DIFFICILE DIARRHEA: ICD-10-CM

## 2024-12-15 LAB
ALBUMIN SERPL-MCNC: 4.4 G/DL (ref 3.2–4.8)
ALBUMIN/GLOB SERPL: 1.6 {RATIO} (ref 1–2)
ALP LIVER SERPL-CCNC: 126 U/L
ALT SERPL-CCNC: 12 U/L
ANION GAP SERPL CALC-SCNC: 7 MMOL/L (ref 0–18)
AST SERPL-CCNC: 21 U/L (ref ?–34)
BASOPHILS # BLD AUTO: 0.01 X10(3) UL (ref 0–0.2)
BASOPHILS NFR BLD AUTO: 0.1 %
BILIRUB SERPL-MCNC: 0.4 MG/DL (ref 0.2–1.1)
BUN BLD-MCNC: 34 MG/DL (ref 9–23)
C DIFF GDH + TOXINS A+B STL QL IA.RAPID: NOT DETECTED
C DIFF TOX B STL QL: POSITIVE
CALCIUM BLD-MCNC: 10 MG/DL (ref 8.7–10.4)
CHLORIDE SERPL-SCNC: 115 MMOL/L (ref 98–112)
CO2 SERPL-SCNC: 20 MMOL/L (ref 21–32)
CREAT BLD-MCNC: 1.66 MG/DL
EGFRCR SERPLBLD CKD-EPI 2021: 30 ML/MIN/1.73M2 (ref 60–?)
EOSINOPHIL # BLD AUTO: 0.01 X10(3) UL (ref 0–0.7)
EOSINOPHIL NFR BLD AUTO: 0.1 %
ERYTHROCYTE [DISTWIDTH] IN BLOOD BY AUTOMATED COUNT: 14.8 %
GLOBULIN PLAS-MCNC: 2.8 G/DL (ref 2–3.5)
GLUCOSE BLD-MCNC: 106 MG/DL (ref 70–99)
HCT VFR BLD AUTO: 36.3 %
HGB BLD-MCNC: 11.4 G/DL
IMM GRANULOCYTES # BLD AUTO: 0.04 X10(3) UL (ref 0–1)
IMM GRANULOCYTES NFR BLD: 0.5 %
LYMPHOCYTES # BLD AUTO: 0.42 X10(3) UL (ref 1–4)
LYMPHOCYTES NFR BLD AUTO: 5.7 %
MCH RBC QN AUTO: 32.9 PG (ref 26–34)
MCHC RBC AUTO-ENTMCNC: 31.4 G/DL (ref 31–37)
MCV RBC AUTO: 104.6 FL
MONOCYTES # BLD AUTO: 0.4 X10(3) UL (ref 0.1–1)
MONOCYTES NFR BLD AUTO: 5.5 %
NEUTROPHILS # BLD AUTO: 6.44 X10 (3) UL (ref 1.5–7.7)
NEUTROPHILS # BLD AUTO: 6.44 X10(3) UL (ref 1.5–7.7)
NEUTROPHILS NFR BLD AUTO: 88.1 %
OSMOLALITY SERPL CALC.SUM OF ELEC: 302 MOSM/KG (ref 275–295)
PLATELET # BLD AUTO: 235 10(3)UL (ref 150–450)
POTASSIUM SERPL-SCNC: 4.6 MMOL/L (ref 3.5–5.1)
PROT SERPL-MCNC: 7.2 G/DL (ref 5.7–8.2)
RBC # BLD AUTO: 3.47 X10(6)UL
SODIUM SERPL-SCNC: 142 MMOL/L (ref 136–145)
WBC # BLD AUTO: 7.3 X10(3) UL (ref 4–11)

## 2024-12-15 PROCEDURE — 96360 HYDRATION IV INFUSION INIT: CPT

## 2024-12-15 PROCEDURE — 99284 EMERGENCY DEPT VISIT MOD MDM: CPT

## 2024-12-15 PROCEDURE — 87507 IADNA-DNA/RNA PROBE TQ 12-25: CPT | Performed by: EMERGENCY MEDICINE

## 2024-12-15 PROCEDURE — 87493 C DIFF AMPLIFIED PROBE: CPT | Performed by: EMERGENCY MEDICINE

## 2024-12-15 PROCEDURE — 80053 COMPREHEN METABOLIC PANEL: CPT | Performed by: EMERGENCY MEDICINE

## 2024-12-15 PROCEDURE — 85025 COMPLETE CBC W/AUTO DIFF WBC: CPT | Performed by: EMERGENCY MEDICINE

## 2024-12-15 RX ORDER — ONDANSETRON 4 MG/1
4 TABLET, ORALLY DISINTEGRATING ORAL EVERY 4 HOURS PRN
Qty: 10 TABLET | Refills: 0 | Status: SHIPPED | OUTPATIENT
Start: 2024-12-15 | End: 2024-12-22

## 2024-12-15 RX ORDER — VANCOMYCIN HYDROCHLORIDE 125 MG/1
125 CAPSULE ORAL 4 TIMES DAILY
Qty: 56 CAPSULE | Refills: 0 | Status: SHIPPED | OUTPATIENT
Start: 2024-12-15 | End: 2024-12-16

## 2024-12-15 RX ORDER — SODIUM CHLORIDE 9 MG/ML
INJECTION, SOLUTION INTRAVENOUS ONCE
Status: COMPLETED | OUTPATIENT
Start: 2024-12-15 | End: 2024-12-15

## 2024-12-16 ENCOUNTER — HOSPITAL ENCOUNTER (EMERGENCY)
Facility: HOSPITAL | Age: 84
Discharge: HOME | End: 2024-12-16
Attending: EMERGENCY MEDICINE
Payer: MEDICARE

## 2024-12-16 ENCOUNTER — TELEPHONE (OUTPATIENT)
Dept: CASE MANAGEMENT | Facility: HOSPITAL | Age: 84
End: 2024-12-16

## 2024-12-16 ENCOUNTER — APPOINTMENT (OUTPATIENT)
Dept: RADIATION ONCOLOGY | Facility: HOSPITAL | Age: 84
End: 2024-12-16
Attending: RADIOLOGY
Payer: MEDICARE

## 2024-12-16 ENCOUNTER — PATIENT MESSAGE (OUTPATIENT)
Dept: FAMILY MEDICINE CLINIC | Facility: CLINIC | Age: 84
End: 2024-12-16

## 2024-12-16 ENCOUNTER — TELEPHONE (OUTPATIENT)
Dept: FAMILY MEDICINE CLINIC | Facility: CLINIC | Age: 84
End: 2024-12-16

## 2024-12-16 VITALS
RESPIRATION RATE: 18 BRPM | BODY MASS INDEX: 23 KG/M2 | DIASTOLIC BLOOD PRESSURE: 74 MMHG | OXYGEN SATURATION: 95 % | WEIGHT: 113 LBS | HEART RATE: 68 BPM | SYSTOLIC BLOOD PRESSURE: 159 MMHG | TEMPERATURE: 99 F

## 2024-12-16 DIAGNOSIS — A04.72 C. DIFFICILE DIARRHEA: Primary | ICD-10-CM

## 2024-12-16 DIAGNOSIS — A08.11 NOROVIRUS: Primary | ICD-10-CM

## 2024-12-16 LAB
ADENOVIRUS F 40/41 PCR: NEGATIVE
ASTROVIRUS PCR: NEGATIVE
C CAYETANENSIS DNA SPEC QL NAA+PROBE: NEGATIVE
CAMPY SP DNA.DIARRHEA STL QL NAA+PROBE: NEGATIVE
CRYPTOSP DNA SPEC QL NAA+PROBE: NEGATIVE
EAEC PAA PLAS AGGR+AATA ST NAA+NON-PRB: NEGATIVE
EC STX1+STX2 + H7 FLIC SPEC NAA+PROBE: NEGATIVE
ENTAMOEBA HISTOLYTICA PCR: NEGATIVE
EPEC EAE GENE STL QL NAA+NON-PROBE: NEGATIVE
ETEC LTA+ST1A+ST1B TOX ST NAA+NON-PROBE: NEGATIVE
GIARDIA LAMBLIA PCR: NEGATIVE
NOROVIRUS GI/GII PCR: POSITIVE
P SHIGELLOIDES DNA STL QL NAA+PROBE: NEGATIVE
ROTAVIRUS A PCR: NEGATIVE
SALMONELLA DNA SPEC QL NAA+PROBE: NEGATIVE
SAPOVIRUS PCR: NEGATIVE
SHIGELLA SP+EIEC IPAH ST NAA+NON-PROBE: NEGATIVE
V CHOLERAE DNA SPEC QL NAA+PROBE: NEGATIVE
VIBRIO DNA SPEC NAA+PROBE: NEGATIVE
YERSINIA DNA SPEC NAA+PROBE: NEGATIVE

## 2024-12-16 PROCEDURE — 99282 EMERGENCY DEPT VISIT SF MDM: CPT

## 2024-12-16 PROCEDURE — 99283 EMERGENCY DEPT VISIT LOW MDM: CPT

## 2024-12-16 NOTE — ED PROVIDER NOTES
Patient Seen in: [facility] [place]      History     Chief Complaint   Patient presents with    Nausea/Vomiting/Diarrhea     Stated Complaint: diarrhea    Subjective:   HPI      84-year-old female from home presents to ED for evaluation today for diarrhea.  She has had multiple episodes of loose stools today.  She vomited twice.  No abdominal pain.No blood per orifice.  She has a history of C. difficile about 3 years ago.Son states that the patient has been on a Z-Melvin recently sometime in the past few months.  No fever.  No abdominal pain.  No other complaints.    Objective:     Past Medical History:    Abdominal hernia    Acute kidney injury (HCC)    Acute subdural hematoma (HCC)    Anemia    Arthritis    Azotemia    Bilateral lung cancer (HCC)    Cancer (HCC)    right lung    Cancer (HCC)    lymphoma    Chronic cough    Clostridium difficile colitis    Community acquired pneumonia, unspecified laterality    Complete rupture of rotator cuff    Constipation    COPD (chronic obstructive pulmonary disease) (HCC)    no O2    COPD (chronic obstructive pulmonary disease) with acute bronchitis (HCC)    COPD exacerbation (HCC)    Deep vein thrombosis (HCC)    Dependence on supplemental oxygen    Diaphragmatic hernia without mention of obstruction or gangrene    Diarrhea    Diverticulitis of large intestine without perforation or abscess without bleeding    Dysphagia, pharyngoesophageal phase    Epistaxis    Esophageal reflux    Essential hypertension    Exposure to unspecified radiation    right lower lung    Gluteal tendinitis    Gram-negative pneumonia (HCC)    Hearing impairment    bilateral hearing aids     Hearing loss    Hemorrhoids    HIGH BLOOD PRESSURE    High cholesterol    History of blood clots    History of lung cancer    Hyperglycemia    Hyperlipidemia    Hypokalemia    Leaking of urine    Leg swelling    Leukocytosis    Low grade B-cell lymphoma (HCC)    Lung cancer (HCC)     Malignant neoplasm of bronchus and lung, unspecified site    Mass of right forearm    Metabolic acidosis    NHL (nodular histiocytic lymphoma) (HCC)    Nocturnal leg cramps    Normocytic normochromic anemia    Osteoarthritis    Osteoporosis    Personal history of antineoplastic chemotherapy    LAST 10/2019    Personal history of malignant neoplasm of bronchus and lung    Personal history of urinary (tract) infection    Plantar fascial fibromatosis    Pneumonia due to infectious organism    Pneumonia due to organism    Pulmonary embolism (HCC)    right lung,cleared on own per pt    Renal disorder    Renal insufficiency    Scoliosis (and kyphoscoliosis), idiopathic    SOB (shortness of breath)    Stool incontinence    Urticaria, unspecified    Visual impairment    glasses    Wears glasses              Past Surgical History:   Procedure Laterality Date    Cholecystectomy  2008    Colonoscopy  2008    Hernia surgery      Hysterectomy  's    vaginal          Other      lung biopsies    Other surgical history  ,    bunionectomy,bilateral    Other surgical history  2017    [place]- Dr. Tatum    Removal of lung,lobectomy  2009    removal of upper right lobe    Repair ing hernia,5+y/o,reducibl      Tonsillectomy      Total abdom hysterectomy                  Social History     Socioeconomic History    Marital status:    Tobacco Use    Smoking status: Never     Passive exposure: Never    Smokeless tobacco: Never   Vaping Use    Vaping status: Never Used   Substance and Sexual Activity    Alcohol use: Not Currently    Drug use: No   Other Topics Concern    Caffeine Concern Yes     Comment: soda    Exercise No                  Physical Exam     ED Triage Vitals [12/15/24 1712]   /60   Pulse 70   Resp 16   Temp 97.6 °F (36.4 °C)   Temp src Oral   SpO2 96 %   O2 Device None (Room air)       Current Vitals:   Vital Signs  BP: 115/62  Pulse: 70  Resp: 16  Temp: 97.6 °F (36.4 °C)  Temp src:  Oral    Oxygen Therapy  SpO2: 96 %  O2 Device: None (Room air)        Physical Exam  Vitals and nursing note reviewed.   Constitutional:       General: She is not in acute distress.     Appearance: She is well-developed. She is not toxic-appearing.   HENT:      Head: Normocephalic and atraumatic.   Eyes:      General: No scleral icterus.     Conjunctiva/sclera: Conjunctivae normal.   Cardiovascular:      Rate and Rhythm: Normal rate.   Pulmonary:      Effort: Pulmonary effort is normal. No respiratory distress.      Breath sounds: Normal breath sounds.   Abdominal:      General: There is no distension.      Tenderness: There is no abdominal tenderness. There is no guarding or rebound.   Musculoskeletal:         General: No tenderness. Normal range of motion.      Cervical back: Normal range of motion and neck supple.   Skin:     General: Skin is warm and dry.      Findings: No rash.   Neurological:      General: No focal deficit present.      Mental Status: She is alert and oriented to person, place, and time.      Motor: No abnormal muscle tone.      Coordination: Coordination normal.   Psychiatric:         Mood and Affect: Mood normal.         Behavior: Behavior normal.            ED Course     Labs Reviewed   CBC WITH DIFFERENTIAL WITH PLATELET - Abnormal; Notable for the following components:       Result Value    RBC 3.47 (*)     HGB 11.4 (*)     .6 (*)     Lymphocyte Absolute 0.42 (*)     All other components within normal limits   COMP METABOLIC PANEL (14) - Abnormal; Notable for the following components:    Glucose 106 (*)     Chloride 115 (*)     CO2 20.0 (*)     BUN 34 (*)     Creatinine 1.66 (*)     Calculated Osmolality 302 (*)     eGFR-Cr 30 (*)     All other components within normal limits   RAINBOW DRAW LAVENDER   RAINBOW DRAW LIGHT GREEN   RAINBOW DRAW BLUE   RAINBOW DRAW GOLD   GI STOOL PANEL BY PCR   C. DIFFICILE(TOXIGENIC)PCR                    MDM            -History source other than  patient - son        -Comorbidities did add complexity to the management are mentioned in the HPI above        -I personally reviewed the prior external notes and the medical record to obtain additional history I reviewed patient's bronchoscopy note November 8, 2024, found to have thoracic lymphadenopathy lung nodules        -DDX: Includes but not limited to gastroenteritis, dehydration, C. difficile which is a medical condition that can pose a threat to life/function    Labs Reviewed   CBC WITH DIFFERENTIAL WITH PLATELET - Abnormal; Notable for the following components:       Result Value    RBC 3.47 (*)     HGB 11.4 (*)     .6 (*)     Lymphocyte Absolute 0.42 (*)     All other components within normal limits   COMP METABOLIC PANEL (14) - Abnormal; Notable for the following components:    Glucose 106 (*)     Chloride 115 (*)     CO2 20.0 (*)     BUN 34 (*)     Creatinine 1.66 (*)     Calculated Osmolality 302 (*)     eGFR-Cr 30 (*)     All other components within normal limits   RAINBOW DRAW LAVENDER   RAINBOW DRAW LIGHT GREEN   RAINBOW DRAW BLUE   RAINBOW DRAW GOLD   GI STOOL PANEL BY PCR   C. DIFFICILE(TOXIGENIC)PCR     Labs reviewed renal function is better than baseline.  No major metabolic disturbance.  Hemoglobin is 11.4.  Patient was given stool collection kit and stool studies were sent.  She has not had any vomiting or diarrhea in the ED.  She was given a gentle fluid bolus and remains hemodynamically stable.  Her abdomen remains soft and benign.  Patient comfortable going home.  Will follow-up with PCP.  Return if any concerns or problems.  Discharged home stable condition.                Medical Decision Making      Disposition and Plan     Clinical Impression:  1. Nausea vomiting and diarrhea         Disposition:  Discharge  12/15/2024  7:05 pm    Follow-up:  Rl Chau DO  [address] [place] [id number] [phone]    Follow up in 2 day(s)            Medications  Prescribed:  Current Discharge Medication List        START taking these medications    Details   ondansetron 4 MG Oral Tablet Dispersible Take 1 tablet (4 mg total) by mouth every 4 (four) hours as needed for Nausea.  Qty: 10 tablet, Refills: 0                 Supplementary Documentation:

## 2024-12-16 NOTE — ED INITIAL ASSESSMENT (HPI)
Patient presents for evaluation of diarrhea. She was seen at the [facility] yesterday and had stool studies completed. Her original Cdiff test came back positive but the reflex showed possible old infection. She was prescribed vancomycin. She also tested positive for norovirus. Son is concerned if she needs to continue to take vancomycin for cdiff.

## 2024-12-16 NOTE — ED PROVIDER NOTES
Patient Seen in: [facility] Emergency Department      History     Chief Complaint   Patient presents with    Nausea/Vomiting/Diarrhea     Stated Complaint: DIARRHEA, WEAKNESS    Subjective:     HPI      84-year-old woman reports experiencing significant diarrhea, which she suspects is due to norovirus, since she tested positive yesterday.  She has had C. difficile in the past and the reflex testing was negative. She denies any blood in the stool, except for irritation from frequent wiping. Rossy has taken Pepto-Bismol for the diarrhea but has not found it effective. She has not been drinking fluids adequately and acknowledges the need to increase her fluid intake, particularly Gatorade.     Objective:   Past Medical History:    Abdominal hernia    Acute kidney injury (HCC)    Acute subdural hematoma (HCC)    Anemia    Arthritis    Azotemia    Bilateral lung cancer (HCC)    Cancer (HCC)    right lung    Cancer (HCC)    lymphoma    Chronic cough    Clostridium difficile colitis    Community acquired pneumonia, unspecified laterality    Complete rupture of rotator cuff    Constipation    COPD (chronic obstructive pulmonary disease) (HCC)    no O2    COPD (chronic obstructive pulmonary disease) with acute bronchitis (HCC)    COPD exacerbation (HCC)    Deep vein thrombosis (HCC)    Dependence on supplemental oxygen    Diaphragmatic hernia without mention of obstruction or gangrene    Diarrhea    Diverticulitis of large intestine without perforation or abscess without bleeding    Dysphagia, pharyngoesophageal phase    Epistaxis    Esophageal reflux    Essential hypertension    Exposure to unspecified radiation    right lower lung    Gluteal tendinitis    Gram-negative pneumonia (HCC)    Hearing impairment    bilateral hearing aids     Hearing loss    Hemorrhoids    HIGH BLOOD PRESSURE    High cholesterol    History of blood clots    History of lung cancer    Hyperglycemia    Hyperlipidemia    Hypokalemia     Leaking of urine    Leg swelling    Leukocytosis    Low grade B-cell lymphoma (HCC)    Lung cancer (HCC)    Malignant neoplasm of bronchus and lung, unspecified site    Mass of right forearm    Metabolic acidosis    NHL (nodular histiocytic lymphoma) (HCC)    Nocturnal leg cramps    Normocytic normochromic anemia    Osteoarthritis    Osteoporosis    Personal history of antineoplastic chemotherapy    LAST 10/2019    Personal history of malignant neoplasm of bronchus and lung    Personal history of urinary (tract) infection    Plantar fascial fibromatosis    Pneumonia due to infectious organism    Pneumonia due to organism    Pulmonary embolism (HCC)    right lung,cleared on own per pt    Renal disorder    Renal insufficiency    Scoliosis (and kyphoscoliosis), idiopathic    SOB (shortness of breath)    Stool incontinence    Urticaria, unspecified    Visual impairment    glasses    Wears glasses              Past Surgical History:   Procedure Laterality Date    Cholecystectomy  2008    Colonoscopy  2008    Hernia surgery      Hysterectomy  1970's    vaginal          Other      lung biopsies    Other surgical history  ,    bunionectomy,bilateral    Other surgical history  2017    [place]- Dr. Tatum    Removal of lung,lobectomy      removal of upper right lobe    Repair ing hernia,5+y/o,reducibl      Tonsillectomy      Total abdom hysterectomy                  Social History     Socioeconomic History    Marital status:    Tobacco Use    Smoking status: Never     Passive exposure: Never    Smokeless tobacco: Never   Vaping Use    Vaping status: Never Used   Substance and Sexual Activity    Alcohol use: Not Currently    Drug use: No   Other Topics Concern    Caffeine Concern Yes     Comment: soda    Exercise No              Review of Systems    Positive for stated complaint: DIARRHEA, WEAKNESS  Other systems are as noted in HPI.  Constitutional and vital signs reviewed.      All other  systems reviewed and negative except as noted above.    Physical Exam     ED Triage Vitals [12/16/24 1354]   /69   Pulse 76   Resp 18   Temp 99.2 °F (37.3 °C)   Temp src Temporal   SpO2 93 %   O2 Device None (Room air)       Current:/74   Pulse 68   Temp 99.2 °F (37.3 °C) (Temporal)   Resp 18   Wt 51.3 kg   SpO2 95%   BMI 22.82 kg/m²       General:  Vitals as listed.  No acute distress   HEENT: Sclerae anicteric.  Conjunctivae show no pallor.  Oropharynx clear, mucous membranes moist   Lungs: good air exchange  Abdomen: Soft and nontender.  No abdominal masses.  No peritoneal signs   Extremities: no edema, normal peripheral pulses   Neuro: Alert oriented and nonfocal      ED COURSE and MDM     Sources of the medical history included the patient and her son    Patient to push fluids.  She can use Pepto-Bismol to control her diarrhea.  She can use half dose Lomotil, if necessary, though I discouraged this.    She can discontinue the oral vancomycin.    I have discussed with the patient the results of testing, differential diagnosis, and treatment plan. They expressed clear understanding of these instructions and agrees to the plan provided.    Disposition and Plan     Clinical Impression:  1. Norovirus         Disposition:  Discharge  12/16/2024  4:18 pm    Follow-up:  Rl Chau DO  [address] [place] [id number] [phone]    Schedule an appointment as soon as possible for a visit in 3 day(s)          Medications Prescribed:  Discharge Medication List as of 12/16/2024  4:33 PM

## 2024-12-16 NOTE — ED NOTES
Son notified of culture results and all his questions were answered.  Per dr harrison, pt son informed to keep her on the vanc until the diarrhea stops and add an additional 48 hours.  Instructed to follow up with her primary md.  Son voiced understanding of all instructions

## 2024-12-16 NOTE — PROGRESS NOTES
Patient's son Kenneth called regarding concerns with vancomycin. I reviewed results and discussed with Dr. Reddy. Patient C diff PCR+, EIA- indicating colonization, but not necessarily active infection. She is also positive for Norovirus. Per discussion with ED MD it is appropriate to discontinue oral vancomycin at this time. I informed the son of this update. Son informed me the patient has returned to the [facility] ED due to concern for dehydration.    Donal Reynoso, PharmD  12/16/24 2:05 PM  Clinical Pharmacist Specialist- Emergency Medicine  [facility] [phone]

## 2024-12-16 NOTE — DISCHARGE INSTRUCTIONS
Drink plenty of fluids - especially low-calorie sports drinks like Gatorade.    Take Pepto-Bismol to control the diarrhea.  If that does not work, use half dose Imodium, if absolutely necessary.

## 2024-12-16 NOTE — CM/SW NOTE
EDCM received a call from patient's son Kenneth, verbal consent from patient Rossy given. Son was questioning if patient needed to be on Vancomycin anymore since Norovirus came back positive and Stool culture was negative for CDIFF. Son transferred to ER pharmacist for clarification.

## 2024-12-17 NOTE — TELEPHONE ENCOUNTER
Patient son Kenneth( on verbal) called forms dept. Patient son calling wanting statu son his Family Medical Leave Act forms. Informed patient son forms were completed today and sent for signature. Patient son states he will call providers office because forms are needed by 12/19/24.

## 2024-12-17 NOTE — TELEPHONE ENCOUNTER
Dr. Chang,       Please sign off on form if you agree to: Family Medical Leave Act Intermittent 12/13/2024-12/12/2025  (place your signature on the first page only)    -From your Inbasket, Highlight the patient and click Chart   -Double click the 12/03/2024 Forms Completion telephone encounter  -Scroll down to the Media section   -Click the blue Hyperlink: Family Medical Leave Act Dr. Debbie Chang 12/17/2024  -Click Acknowledge located in the top right ribbon/menu   -Drag the mouse into the blank space of the document and a + sign will appear. Left click to   electronically sign the document.     Thank you,  Elizabeth GOLDSTEIN

## 2024-12-17 NOTE — TELEPHONE ENCOUNTER
Family Medical Leave Act completed and faxed to [employer] Leave [employer] [phone]. Sent patient [employer] message

## 2024-12-18 ENCOUNTER — APPOINTMENT (OUTPATIENT)
Dept: RADIATION ONCOLOGY | Facility: HOSPITAL | Age: 84
End: 2024-12-18
Attending: RADIOLOGY
Payer: MEDICARE

## 2024-12-18 NOTE — TELEPHONE ENCOUNTER
Patient's son called stating no signature was found, directed him to upper left hand corner of form where he did see electronic signature.  Per request will print and [employer] signature then re-fax to [phone], pending received. MyChart sent to inform patient.

## 2024-12-19 ENCOUNTER — TELEPHONE (OUTPATIENT)
Dept: FAMILY MEDICINE CLINIC | Facility: CLINIC | Age: 84
End: 2024-12-19

## 2024-12-19 DIAGNOSIS — C34.81 MALIGNANT NEOPLASM OF OVERLAPPING SITES OF RIGHT LUNG (HCC): Primary | ICD-10-CM

## 2024-12-19 DIAGNOSIS — R19.7 DIARRHEA: ICD-10-CM

## 2024-12-19 NOTE — TELEPHONE ENCOUNTER
Pt stopped vancomycine,  retest day 7? Please place orders    Loose stools improving.  Do you want CMP done?

## 2024-12-19 NOTE — TELEPHONE ENCOUNTER
Spoke with patient.  States she is on a more regular diet as of today. Diarrhea has finally subsided. Adequate fluid intake.She is concerned about possible UTI although she is presently exhibiting no symptoms at all.  States she will wait a few days and should symptoms develop, she will call to inform us.  Agreed to note.

## 2024-12-20 ENCOUNTER — TELEPHONE (OUTPATIENT)
Age: 84
End: 2024-12-20

## 2024-12-20 ENCOUNTER — NURSE ONLY (OUTPATIENT)
Age: 84
End: 2024-12-20
Attending: FAMILY MEDICINE
Payer: MEDICARE

## 2024-12-20 ENCOUNTER — APPOINTMENT (OUTPATIENT)
Dept: RADIATION ONCOLOGY | Facility: HOSPITAL | Age: 84
End: 2024-12-20
Attending: INTERNAL MEDICINE
Payer: MEDICARE

## 2024-12-20 DIAGNOSIS — C34.81 MALIGNANT NEOPLASM OF OVERLAPPING SITES OF RIGHT LUNG (HCC): Primary | ICD-10-CM

## 2024-12-20 DIAGNOSIS — C34.81 MALIGNANT NEOPLASM OF OVERLAPPING SITES OF RIGHT LUNG (HCC): ICD-10-CM

## 2024-12-20 DIAGNOSIS — R30.9 PAIN PASSING URINE: ICD-10-CM

## 2024-12-20 DIAGNOSIS — R19.7 DIARRHEA: ICD-10-CM

## 2024-12-20 LAB
ANION GAP SERPL CALC-SCNC: 10 MMOL/L (ref 0–18)
BUN BLD-MCNC: 34 MG/DL (ref 9–23)
CALCIUM BLD-MCNC: 8.9 MG/DL (ref 8.7–10.4)
CHLORIDE SERPL-SCNC: 119 MMOL/L (ref 98–112)
CO2 SERPL-SCNC: 17 MMOL/L (ref 21–32)
CREAT BLD-MCNC: 2.41 MG/DL
EGFRCR SERPLBLD CKD-EPI 2021: 19 ML/MIN/1.73M2 (ref 60–?)
FASTING STATUS PATIENT QL REPORTED: NO
GLUCOSE BLD-MCNC: 91 MG/DL (ref 70–99)
OSMOLALITY SERPL CALC.SUM OF ELEC: 309 MOSM/KG (ref 275–295)
POTASSIUM SERPL-SCNC: 3.8 MMOL/L (ref 3.5–5.1)
SODIUM SERPL-SCNC: 146 MMOL/L (ref 136–145)

## 2024-12-20 PROCEDURE — 77373 STRTCTC BDY RAD THER TX DLVR: CPT | Performed by: RADIOLOGY

## 2024-12-23 ENCOUNTER — HOSPITAL ENCOUNTER (EMERGENCY)
Facility: HOSPITAL | Age: 84
Discharge: HOME | End: 2024-12-23
Attending: EMERGENCY MEDICINE
Payer: MEDICARE

## 2024-12-23 ENCOUNTER — PATIENT MESSAGE (OUTPATIENT)
Age: 84
End: 2024-12-23

## 2024-12-23 ENCOUNTER — APPOINTMENT (OUTPATIENT)
Dept: RADIATION ONCOLOGY | Facility: HOSPITAL | Age: 84
End: 2024-12-23
Attending: INTERNAL MEDICINE
Payer: MEDICARE

## 2024-12-23 ENCOUNTER — TELEPHONE (OUTPATIENT)
Dept: FAMILY MEDICINE CLINIC | Facility: CLINIC | Age: 84
End: 2024-12-23

## 2024-12-23 ENCOUNTER — TELEPHONE (OUTPATIENT)
Dept: RADIATION ONCOLOGY | Facility: HOSPITAL | Age: 84
End: 2024-12-23

## 2024-12-23 ENCOUNTER — APPOINTMENT (OUTPATIENT)
Dept: RADIATION ONCOLOGY | Facility: HOSPITAL | Age: 84
End: 2024-12-23
Attending: RADIOLOGY
Payer: MEDICARE

## 2024-12-23 VITALS
HEART RATE: 83 BPM | DIASTOLIC BLOOD PRESSURE: 89 MMHG | BODY MASS INDEX: 22.78 KG/M2 | HEIGHT: 59 IN | WEIGHT: 113 LBS | RESPIRATION RATE: 18 BRPM | TEMPERATURE: 98 F | SYSTOLIC BLOOD PRESSURE: 158 MMHG | OXYGEN SATURATION: 98 %

## 2024-12-23 DIAGNOSIS — E87.20 METABOLIC ACIDOSIS: ICD-10-CM

## 2024-12-23 DIAGNOSIS — N17.9 ACUTE KIDNEY INJURY (HCC): Primary | ICD-10-CM

## 2024-12-23 DIAGNOSIS — D64.9 ANEMIA, UNSPECIFIED TYPE: ICD-10-CM

## 2024-12-23 DIAGNOSIS — E87.0 HYPERNATREMIA: ICD-10-CM

## 2024-12-23 LAB
ALBUMIN SERPL-MCNC: 3.6 G/DL (ref 3.2–4.8)
ALBUMIN/GLOB SERPL: 1.3 {RATIO} (ref 1–2)
ALP LIVER SERPL-CCNC: 93 U/L
ALT SERPL-CCNC: 11 U/L
ANION GAP SERPL CALC-SCNC: 10 MMOL/L (ref 0–18)
AST SERPL-CCNC: 18 U/L (ref ?–34)
BASOPHILS # BLD AUTO: 0.03 X10(3) UL (ref 0–0.2)
BASOPHILS NFR BLD AUTO: 0.5 %
BILIRUB SERPL-MCNC: 0.2 MG/DL (ref 0.2–1.1)
BILIRUB UR QL STRIP.AUTO: NEGATIVE
BUN BLD-MCNC: 29 MG/DL (ref 9–23)
CALCIUM BLD-MCNC: 8.8 MG/DL (ref 8.7–10.4)
CHLORIDE SERPL-SCNC: 118 MMOL/L (ref 98–112)
CLARITY UR REFRACT.AUTO: CLEAR
CO2 SERPL-SCNC: 18 MMOL/L (ref 21–32)
CREAT BLD-MCNC: 1.67 MG/DL
EGFRCR SERPLBLD CKD-EPI 2021: 30 ML/MIN/1.73M2 (ref 60–?)
EOSINOPHIL # BLD AUTO: 0.11 X10(3) UL (ref 0–0.7)
EOSINOPHIL NFR BLD AUTO: 1.8 %
ERYTHROCYTE [DISTWIDTH] IN BLOOD BY AUTOMATED COUNT: 14.2 %
GLOBULIN PLAS-MCNC: 2.8 G/DL (ref 2–3.5)
GLUCOSE BLD-MCNC: 99 MG/DL (ref 70–99)
GLUCOSE UR STRIP.AUTO-MCNC: NORMAL MG/DL
HCT VFR BLD AUTO: 30.3 %
HGB BLD-MCNC: 9.5 G/DL
IMM GRANULOCYTES # BLD AUTO: 0.07 X10(3) UL (ref 0–1)
IMM GRANULOCYTES NFR BLD: 1.1 %
KETONES UR STRIP.AUTO-MCNC: NEGATIVE MG/DL
LEUKOCYTE ESTERASE UR QL STRIP.AUTO: 250
LIPASE SERPL-CCNC: 54 U/L (ref 12–53)
LYMPHOCYTES # BLD AUTO: 0.84 X10(3) UL (ref 1–4)
LYMPHOCYTES NFR BLD AUTO: 13.5 %
MCH RBC QN AUTO: 31.8 PG (ref 26–34)
MCHC RBC AUTO-ENTMCNC: 31.4 G/DL (ref 31–37)
MCV RBC AUTO: 101.3 FL
MONOCYTES # BLD AUTO: 0.58 X10(3) UL (ref 0.1–1)
MONOCYTES NFR BLD AUTO: 9.3 %
NEUTROPHILS # BLD AUTO: 4.6 X10 (3) UL (ref 1.5–7.7)
NEUTROPHILS # BLD AUTO: 4.6 X10(3) UL (ref 1.5–7.7)
NEUTROPHILS NFR BLD AUTO: 73.8 %
NITRITE UR QL STRIP.AUTO: NEGATIVE
OSMOLALITY SERPL CALC.SUM OF ELEC: 308 MOSM/KG (ref 275–295)
PH UR STRIP.AUTO: 5.5 [PH] (ref 5–8)
PLATELET # BLD AUTO: 211 10(3)UL (ref 150–450)
POTASSIUM SERPL-SCNC: 3.8 MMOL/L (ref 3.5–5.1)
PROT SERPL-MCNC: 6.4 G/DL (ref 5.7–8.2)
PROT UR STRIP.AUTO-MCNC: 30 MG/DL
RBC # BLD AUTO: 2.99 X10(6)UL
RBC UR QL AUTO: NEGATIVE
SODIUM SERPL-SCNC: 146 MMOL/L (ref 136–145)
SP GR UR STRIP.AUTO: 1.01 (ref 1–1.03)
UROBILINOGEN UR STRIP.AUTO-MCNC: NORMAL MG/DL
WBC # BLD AUTO: 6.2 X10(3) UL (ref 4–11)

## 2024-12-23 PROCEDURE — 85025 COMPLETE CBC W/AUTO DIFF WBC: CPT | Performed by: EMERGENCY MEDICINE

## 2024-12-23 PROCEDURE — 81001 URINALYSIS AUTO W/SCOPE: CPT | Performed by: EMERGENCY MEDICINE

## 2024-12-23 PROCEDURE — 83690 ASSAY OF LIPASE: CPT

## 2024-12-23 PROCEDURE — 80053 COMPREHEN METABOLIC PANEL: CPT | Performed by: EMERGENCY MEDICINE

## 2024-12-23 PROCEDURE — 87086 URINE CULTURE/COLONY COUNT: CPT | Performed by: EMERGENCY MEDICINE

## 2024-12-23 PROCEDURE — 99284 EMERGENCY DEPT VISIT MOD MDM: CPT

## 2024-12-23 PROCEDURE — 85025 COMPLETE CBC W/AUTO DIFF WBC: CPT

## 2024-12-23 PROCEDURE — 96360 HYDRATION IV INFUSION INIT: CPT

## 2024-12-23 PROCEDURE — 83690 ASSAY OF LIPASE: CPT | Performed by: EMERGENCY MEDICINE

## 2024-12-23 NOTE — ED INITIAL ASSESSMENT (HPI)
Pt to ER with complaints of dehydration. Norovirus last week. Requesting kidney function retested. Having diarrhea

## 2024-12-23 NOTE — TELEPHONE ENCOUNTER
Received VM from pt's son Kenneth asking if pt can go to the cancer center lab to have labs drawn    Called Kenneth back  Pt got on the phone as well (speakerphone)  Pt states she has a little bit of diarrhea, multiple times per day  Pt states she doesn't think she can come in for her RT treatment today  \"It would just be too uncomfortable and I'm scared\"  Pt then states she thinks she's going to go to the ER for dehydration  Advised pt to keep us posted   Next RT appt on Thursday 12/26/24.

## 2024-12-24 NOTE — ED PROVIDER NOTES
Patient Seen in: [facility] Emergency Department      History     Chief Complaint   Patient presents with    Nausea/Vomiting/Diarrhea     Stated Complaint: recent norovirus, concerned about labs    Subjective:   HPI      Patient presents with dehydration.  She has been diagnosed with norovirus.  She had bad diarrhea initially which has significantly improved.  She states she was in the ER 1 time at the beginning for IV fluids.  She states that she is eating better and her stools are formed in the mornings but tend to get looser still as the day goes on.  She took half of an Imodium today for the first time.  She denies any pain.  Her son states that her kidney function was impaired on a blood draw last week and they want to make sure that she is doing better.  She does not feel faint or dizzy.  She denies abdominal pain or fever.  She denies any urinary symptoms.  Her son would like to have her urine tested as she does sometimes have urinary tract infections with no symptoms.    Objective:     Past Medical History:    Abdominal hernia    Acute kidney injury (HCC)    Acute subdural hematoma (HCC)    Anemia    Arthritis    Azotemia    Bilateral lung cancer (HCC)    Cancer (HCC)    right lung    Cancer (HCC)    lymphoma    Chronic cough    Clostridium difficile colitis    Community acquired pneumonia, unspecified laterality    Complete rupture of rotator cuff    Constipation    COPD (chronic obstructive pulmonary disease) (HCC)    no O2    COPD (chronic obstructive pulmonary disease) with acute bronchitis (HCC)    COPD exacerbation (HCC)    Deep vein thrombosis (HCC)    Dependence on supplemental oxygen    Diaphragmatic hernia without mention of obstruction or gangrene    Diarrhea    Diverticulitis of large intestine without perforation or abscess without bleeding    Dysphagia, pharyngoesophageal phase    Epistaxis    Esophageal reflux    Essential hypertension    Exposure to unspecified radiation    right lower  lung    Gluteal tendinitis    Gram-negative pneumonia (HCC)    Hearing impairment    bilateral hearing aids     Hearing loss    Hemorrhoids    HIGH BLOOD PRESSURE    High cholesterol    History of blood clots    History of lung cancer    Hyperglycemia    Hyperlipidemia    Hypokalemia    Leaking of urine    Leg swelling    Leukocytosis    Low grade B-cell lymphoma (HCC)    Lung cancer (HCC)    Malignant neoplasm of bronchus and lung, unspecified site    Mass of right forearm    Metabolic acidosis    NHL (nodular histiocytic lymphoma) (HCC)    Nocturnal leg cramps    Normocytic normochromic anemia    Osteoarthritis    Osteoporosis    Personal history of antineoplastic chemotherapy    LAST 10/2019    Personal history of malignant neoplasm of bronchus and lung    Personal history of urinary (tract) infection    Plantar fascial fibromatosis    Pneumonia due to infectious organism    Pneumonia due to organism    Pulmonary embolism (HCC)    right lung,cleared on own per pt    Renal disorder    Renal insufficiency    Scoliosis (and kyphoscoliosis), idiopathic    SOB (shortness of breath)    Stool incontinence    Urticaria, unspecified    Visual impairment    glasses    Wears glasses              Past Surgical History:   Procedure Laterality Date    Cholecystectomy  2008    Colonoscopy  2008    Hernia surgery      Hysterectomy  1970's    vaginal          Other      lung biopsies    Other surgical history  ,    bunionectomy,bilateral    Other surgical history  2017    Cysto-  Deepti    Removal of lung,lobectomy  2009    removal of upper right lobe    Repair ing hernia,5+y/o,reducibl      Tonsillectomy      Total abdom hysterectomy                  Social History     Socioeconomic History    Marital status:    Tobacco Use    Smoking status: Never     Passive exposure: Never    Smokeless tobacco: Never   Vaping Use    Vaping status: Never Used   Substance and Sexual Activity    Alcohol use:  Not Currently    Drug use: No   Other Topics Concern    Caffeine Concern Yes     Comment: soda    Exercise No                  Physical Exam     ED Triage Vitals [12/23/24 1443]   /78   Pulse 82   Resp 18   Temp 97.6 °F (36.4 °C)   Temp src Temporal   SpO2 97 %   O2 Device None (Room air)       Current Vitals:   Vital Signs  BP: (!) 172/87  Pulse: 76  Resp: 20  Temp: 97.6 °F (36.4 °C)  Temp src: Temporal  MAP (mmHg): (!) 112    Oxygen Therapy  SpO2: 100 %  O2 Device: None (Room air)        Physical Exam  General: Alert and oriented x3 in no acute distress.  HEENT: Normocephalic, atraumatic, pupils equal round and reactive to light.  Neck: Supple.  Cardiovascular: Regular rate and rhythm, no murmurs.  Respiratory: Lungs clear to auscultation.  Abdomen: Soft, nontender, no rebound or guarding, normal active bowel sounds, no CVA tenderness.  Extremities: No CCE.  Skin: Warm and dry.    ED Course     Labs Reviewed   CBC WITH DIFFERENTIAL WITH PLATELET - Abnormal; Notable for the following components:       Result Value    RBC 2.99 (*)     HGB 9.5 (*)     HCT 30.3 (*)     .3 (*)     Lymphocyte Absolute 0.84 (*)     All other components within normal limits   LIPASE - Abnormal; Notable for the following components:    Lipase 54 (*)     All other components within normal limits   URINALYSIS WITH CULTURE REFLEX - Abnormal; Notable for the following components:    Protein Urine 30 (*)     Leukocyte Esterase Urine 250 (*)     WBC Urine 11-20 (*)     Squamous Epi. Cells Few (*)     All other components within normal limits   COMP METABOLIC PANEL (14) - Abnormal; Notable for the following components:    Sodium 146 (*)     Chloride 118 (*)     CO2 18.0 (*)     BUN 29 (*)     Creatinine 1.67 (*)     Calculated Osmolality 308 (*)     eGFR-Cr 30 (*)     All other components within normal limits   URINE CULTURE, ROUTINE          Medications   sodium chloride 0.9 % IV bolus 1,000 mL (1,000 mL Intravenous New Bag 12/23/24  [id number])     Patient was given a bolus of normal saline given her recent diarrhea and report of impaired kidney function.       MDM      The patient presents with recent diarrhea and still loose stools.  Differential diagnosis includes but is not limited to acute kidney injury and electrolyte abnormalities.  The patient does have still some renal insufficiency but it appears improved from a few days ago and is close to her baseline at this point.  She did still have metabolic acidosis and hyponatremia.  I believe she will benefit from the saline bolus.  She does not have a fever or leukocytosis and has a benign abdominal exam.  She has anemia that has been worsening but she does not have evidence of active bleeding.  I think her symptoms sound like they are improving and she can take Imodium if needed if diarrhea recurs.  She overall appears well.  Family feels comfortable with taking her home and will follow-up with her doctor outpatient.        MDM    Disposition and Plan     Clinical Impression:  1. Acute kidney injury (HCC)    2. Hypernatremia    3. Anemia, unspecified type    4. Metabolic acidosis         Disposition:  There is no disposition on file for this visit.  There is no disposition time on file for this visit.    Follow-up:  Rl Chau DO  [address] [place] [id number] [phone]    Follow up            Medications Prescribed:  Current Discharge Medication List              Supplementary Documentation:

## 2024-12-26 ENCOUNTER — HOSPITAL ENCOUNTER (OUTPATIENT)
Dept: RADIATION ONCOLOGY | Facility: HOSPITAL | Age: 84
Discharge: HOME | End: 2024-12-26
Attending: RADIOLOGY
Payer: MEDICARE

## 2024-12-26 PROCEDURE — 77373 STRTCTC BDY RAD THER TX DLVR: CPT | Performed by: RADIOLOGY

## 2024-12-30 ENCOUNTER — HOSPITAL ENCOUNTER (OUTPATIENT)
Dept: RADIATION ONCOLOGY | Facility: HOSPITAL | Age: 84
Discharge: HOME | End: 2024-12-30
Attending: RADIOLOGY
Payer: MEDICARE

## 2024-12-30 PROCEDURE — 77336 RADIATION PHYSICS CONSULT: CPT | Performed by: RADIOLOGY

## 2024-12-30 PROCEDURE — 77373 STRTCTC BDY RAD THER TX DLVR: CPT | Performed by: RADIOLOGY

## 2025-01-01 ENCOUNTER — HOSPITAL ENCOUNTER (OUTPATIENT)
Dept: RADIATION ONCOLOGY | Facility: HOSPITAL | Age: 85
Discharge: HOME | End: 2025-01-01
Attending: RADIOLOGY
Payer: MEDICARE

## 2025-01-02 ENCOUNTER — HOSPITAL ENCOUNTER (OUTPATIENT)
Dept: RADIATION ONCOLOGY | Facility: HOSPITAL | Age: 85
Discharge: HOME | End: 2025-01-02
Attending: RADIOLOGY
Payer: MEDICARE

## 2025-01-02 VITALS
SYSTOLIC BLOOD PRESSURE: 164 MMHG | OXYGEN SATURATION: 97 % | HEART RATE: 70 BPM | RESPIRATION RATE: 18 BRPM | DIASTOLIC BLOOD PRESSURE: 78 MMHG | TEMPERATURE: 97 F

## 2025-01-02 DIAGNOSIS — C34.12 MALIGNANT NEOPLASM OF UPPER LOBE OF LEFT LUNG (HCC): Primary | ICD-10-CM

## 2025-01-02 PROCEDURE — 77373 STRTCTC BDY RAD THER TX DLVR: CPT | Performed by: RADIOLOGY

## 2025-01-02 NOTE — PROGRESS NOTES
[facility] Radiation Treatment Management Note 1-5    Patient:  Rossy Car  Age:  84 year old  Visit Diagnosis:    1. 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 Bk Urban    Site Delivered Dose (cGy) Prescribed Dose (cGy) Fraction #   SBRT LEO 5000 5000 5/5     First treatment date:  12/13/24  Concurrent chemotherapy: N/A        12/23/2024     7:00 PM 12/23/2024     7:30 PM 1/2/2025     3:25 PM   Oncology Vitals   /86 158/89 164/78   Pulse 79 83 70   Resp 18 18 18   Temp   97.4 °F (36.3 °C)   SpO2 98 % 98 % 97 %   Pain Score   0        Toxicities:  Fatigue Grade 1= Fatigue relieved by rest  Dysphagia Grade 0= None  Dyspepsia Grade 0= None  Mucositis Grade 0= None  Nausea Grade 0= None  Vomiting Grade 0= None  Cough Grade 1= Mild symptoms; nonprescription intervention indicated  Dyspnea Grade 1= Shortness of breath with moderate exertion     Nursing Note:  Pt tolerated RT well  No complaints today  Cough and SOB no different  Denies pain  F/u per Dr. Cesar SMITH RN MD NOTE   Reviewed and agree with RN note above.  Setup imaging reviewed in [facility] and approved.    S:  -tolerated SBRT lung w/o issues    O:  -no changes    A/P:  -f/u per Dr Cesar Urban with CT    Rosie Cerna MD  Radiation Oncology

## 2025-01-02 NOTE — PATIENT INSTRUCTIONS
- WE WILL CALL TO SCHEDULE YOUR FOLLOW-UP APPOINTMENT WITH DR. ZACK ULRICH    - CALL CENTRAL SCHEDULING AT [phone] TO SCHEDULE CT CHEST IN 3 MONTHS    - CALL THE NURSING LINE AT [phone] IF YOU HAVE ANY QUESTIONS/CONCERNS REGARDING RADIATION THERAPY

## 2025-01-06 DIAGNOSIS — C34.12 MALIGNANT NEOPLASM OF UPPER LOBE OF LEFT LUNG (HCC): Primary | ICD-10-CM

## 2025-01-09 RX ORDER — RIVAROXABAN 15 MG/1
15 TABLET, FILM COATED ORAL
Qty: 90 TABLET | Refills: 3 | Status: SHIPPED | OUTPATIENT
Start: 2025-01-09

## 2025-01-20 NOTE — ED PROVIDER NOTES
Labs discontinued by provider    Patient Seen in: THE [facility] Emergency Department In [place]      History   Patient presents with:   Other    Stated Complaint: \"im hearing music\"    HPI    Patient is a pleasant 71-year-old female, with multiple past medical problems, presenting for eval • HIGH BLOOD PRESSURE    • History of lung cancer 2/18/2014   • Hyperglycemia 4/3/2018   • Hypokalemia    • Leukocytosis 4/3/2018   • [facility] ([facility] 9/10/2013   • Malignant neoplasm of bronchus and lung, unspecified site 6/29/2012   • [employer]    Positive for stated complaint: \"im hearing music\"  Other systems are as noted in HPI. Constitutional and vital signs reviewed. All other systems reviewed and negative except as noted above.     Physical Exam     ED Triage Vitals [08/01/20 Absolute Manual 0.06 (*)     RBC Morphology See morphology below (*)     All other components within normal limits   CBC W/ DIFFERENTIAL - Abnormal; Notable for the following components:    RBC 3.03 (*)     HGB 10.0 (*)     HCT 31.7 (*)     .0 (*) nothing specific for acute infarct. There is no hemorrhage or mass lesion. SINUSES:           Trace mucosal thickening within the frontal ethmoid and sphenoid sinuses. MASTOIDS:          No sign of acute inflammation.  SKULL:             No evidence for Prescribed:  Current Discharge Medication List                       Present on Admission  Date Reviewed: 7/28/2020          ICD-10-CM Noted POA    Acute kidney injury ([facility] N17.9 8/1/2020 Yes    Anemia D64.9 8/1/2020 Yes    Hyperglycemia R73.9 8/1/2020 Adrian Bennett

## 2025-02-04 ENCOUNTER — APPOINTMENT (OUTPATIENT)
Dept: CT IMAGING | Facility: HOSPITAL | Age: 85
End: 2025-02-04
Attending: EMERGENCY MEDICINE
Payer: MEDICARE

## 2025-02-04 ENCOUNTER — HOSPITAL ENCOUNTER (EMERGENCY)
Facility: HOSPITAL | Age: 85
Discharge: HOME | End: 2025-02-04
Attending: EMERGENCY MEDICINE
Payer: MEDICARE

## 2025-02-04 ENCOUNTER — APPOINTMENT (OUTPATIENT)
Dept: GENERAL RADIOLOGY | Facility: HOSPITAL | Age: 85
End: 2025-02-04
Attending: EMERGENCY MEDICINE
Payer: MEDICARE

## 2025-02-04 VITALS
RESPIRATION RATE: 20 BRPM | OXYGEN SATURATION: 96 % | TEMPERATURE: 98 F | HEART RATE: 75 BPM | DIASTOLIC BLOOD PRESSURE: 73 MMHG | HEIGHT: 58 IN | BODY MASS INDEX: 24.35 KG/M2 | WEIGHT: 116 LBS | SYSTOLIC BLOOD PRESSURE: 170 MMHG

## 2025-02-04 DIAGNOSIS — S82.891A CLOSED FRACTURE OF RIGHT ANKLE, INITIAL ENCOUNTER: ICD-10-CM

## 2025-02-04 DIAGNOSIS — S00.03XA CONTUSION OF SCALP, INITIAL ENCOUNTER: Primary | ICD-10-CM

## 2025-02-04 PROCEDURE — 73610 X-RAY EXAM OF ANKLE: CPT | Performed by: EMERGENCY MEDICINE

## 2025-02-04 PROCEDURE — 70450 CT HEAD/BRAIN W/O DYE: CPT | Performed by: EMERGENCY MEDICINE

## 2025-02-04 PROCEDURE — 99284 EMERGENCY DEPT VISIT MOD MDM: CPT

## 2025-02-05 ENCOUNTER — TELEPHONE (OUTPATIENT)
Facility: CLINIC | Age: 85
End: 2025-02-05

## 2025-02-05 ENCOUNTER — OFFICE VISIT (OUTPATIENT)
Dept: ORTHOPEDICS CLINIC | Facility: CLINIC | Age: 85
End: 2025-02-05
Payer: MEDICARE

## 2025-02-05 ENCOUNTER — PATIENT OUTREACH (OUTPATIENT)
Dept: CASE MANAGEMENT | Age: 85
End: 2025-02-05

## 2025-02-05 ENCOUNTER — HOSPITAL ENCOUNTER (OUTPATIENT)
Dept: GENERAL RADIOLOGY | Age: 85
Discharge: HOME | End: 2025-02-05
Attending: PODIATRIST
Payer: MEDICARE

## 2025-02-05 VITALS — BODY MASS INDEX: 24.35 KG/M2 | HEIGHT: 58 IN | WEIGHT: 116 LBS

## 2025-02-05 DIAGNOSIS — S93.402A SPRAIN OF LEFT ANKLE, UNSPECIFIED LIGAMENT, INITIAL ENCOUNTER: Primary | ICD-10-CM

## 2025-02-05 DIAGNOSIS — Z91.81 AT RISK FOR FALLS: ICD-10-CM

## 2025-02-05 DIAGNOSIS — S82.891A CLOSED FRACTURE OF RIGHT ANKLE, INITIAL ENCOUNTER: ICD-10-CM

## 2025-02-05 DIAGNOSIS — S93.402A SPRAIN OF LEFT ANKLE, UNSPECIFIED LIGAMENT, INITIAL ENCOUNTER: ICD-10-CM

## 2025-02-05 PROCEDURE — 73610 X-RAY EXAM OF ANKLE: CPT | Performed by: PODIATRIST

## 2025-02-05 PROCEDURE — 99204 OFFICE O/P NEW MOD 45 MIN: CPT | Performed by: PODIATRIST

## 2025-02-05 NOTE — TELEPHONE ENCOUNTER
Patient son called requesting to see Dr. Noe for patient's right foot fracture.  Patients son concerned patient has no pain/feeling.  Future Appointments   Date Time Provider Department Center   2/5/2025  1:00 PM Pily Noe, [facility]   4/6/2025  1:00 PM [facility] CT RM1 [facility] [place]   4/15/2025  2:45 PM Debbie Chang MD [facility]       Please advise if further imaging is needed.

## 2025-02-05 NOTE — TELEPHONE ENCOUNTER
No Images needed.  Patient has appointment scheduled for today at 1 pm with Dr. Noe. Attempted to call son to reassure him Dr. Noe will look at foot today and assess thoroughly.   No answer.

## 2025-02-05 NOTE — ED PROVIDER NOTES
Patient Seen in: [facility] Emergency Department      History     Chief Complaint   Patient presents with    Trauma     Stated Complaint: S/P Fall; Right Foot Injury; No LOC - left Forehead bruise - On Xarelto    Subjective:   HPI      84-year-old female presents to the emergency room for evaluation after a fall this morning, patient states she tripped in her bedroom over a blanket, states she struck the left side of her head and injured her right ankle.  Patient states she did not lose conscious denies headache or neck pain denies back pain.  Denies chest pain or shortness of breath denies abdominal pain.  Nuys pelvic or hip pain.  Does have some pain to the right ankle.  Patient does take Xarelto.    Objective:     Past Medical History:    Abdominal hernia    Acute kidney injury    Acute subdural hematoma (HCC)    Anemia    Arthritis    Azotemia    Bilateral lung cancer (HCC)    Cancer (HCC)    right lung    Cancer (HCC)    lymphoma    Chronic cough    Clostridium difficile colitis    Community acquired pneumonia, unspecified laterality    Complete rupture of rotator cuff    Constipation    COPD (chronic obstructive pulmonary disease) (HCC)    no O2    COPD (chronic obstructive pulmonary disease) with acute bronchitis (HCC)    COPD exacerbation (HCC)    Deep vein thrombosis (HCC)    Dependence on supplemental oxygen    Diaphragmatic hernia without mention of obstruction or gangrene    Diarrhea    Diverticulitis of large intestine without perforation or abscess without bleeding    Dysphagia, pharyngoesophageal phase    Epistaxis    Esophageal reflux    Essential hypertension    Exposure to unspecified radiation    right lower lung    Gluteal tendinitis    Gram-negative pneumonia (HCC)    Hearing impairment    bilateral hearing aids     Hearing loss    Hemorrhoids    HIGH BLOOD PRESSURE    High cholesterol    History of blood clots    History of lung cancer    Hyperglycemia    Hyperlipidemia    Hypokalemia     Leaking of urine    Leg swelling    Leukocytosis    Low grade B-cell lymphoma (HCC)    Lung cancer (HCC)    Malignant neoplasm of bronchus and lung, unspecified site    Mass of right forearm    Metabolic acidosis    NHL (nodular histiocytic lymphoma) (HCC)    Nocturnal leg cramps    Normocytic normochromic anemia    Osteoarthritis    Osteoporosis    Personal history of antineoplastic chemotherapy    LAST 10/2019    Personal history of malignant neoplasm of bronchus and lung    Personal history of urinary (tract) infection    Plantar fascial fibromatosis    Pneumonia due to infectious organism    Pneumonia due to organism    Pulmonary embolism (HCC)    right lung,cleared on own per pt    Renal disorder    Renal insufficiency    Scoliosis (and kyphoscoliosis), idiopathic    SOB (shortness of breath)    Stool incontinence    Urticaria, unspecified    Visual impairment    glasses    Wears glasses              Past Surgical History:   Procedure Laterality Date    Cholecystectomy  2008    Colonoscopy  2008    Hernia surgery      Hysterectomy  1970's    vaginal          Other      lung biopsies    Other surgical history  ,    bunionectomy,bilateral    Other surgical history  2017    [place]- Dr. Tatum    Removal of lung,lobectomy      removal of upper right lobe    Repair ing hernia,5+y/o,reducibl      Tonsillectomy      Total abdom hysterectomy                  Social History     Socioeconomic History    Marital status:    Tobacco Use    Smoking status: Never     Passive exposure: Never    Smokeless tobacco: Never   Vaping Use    Vaping status: Never Used   Substance and Sexual Activity    Alcohol use: Not Currently    Drug use: No   Other Topics Concern    Caffeine Concern Yes     Comment: soda    Exercise No                  Physical Exam     ED Triage Vitals [25]   BP (!) 171/90   Pulse 80   Resp 20   Temp 98.4 °F (36.9 °C)   Temp src Oral   SpO2 94 %   O2 Device None  (Room air)       Current Vitals:   Vital Signs  BP: (!) 163/76  Pulse: 72  Resp: 20  Temp: 98.4 °F (36.9 °C)  Temp src: Oral  MAP (mmHg): 100    Oxygen Therapy  SpO2: 92 %  O2 Device: None (Room air)        Physical Exam  GENERAL: Patient is awake, alert, well-appearing, in no acute distress.  HEENT:  no scleral icterus.  Mucous membranes are moist contusion/hematoma to the left forehead.  No facial bone tenderness or step-off .  NECK: No midline cervical spine tenderness  HEART: Regular rate and rhythm, no murmurs.  LUNGS: Clear to auscultation bilaterally.  No Rales, no rhonchi, no wheezing, no stridor.  ABDOMEN: Soft, nondistended,non tender  EXTREMITIES: No tenderness to the bilateral clavicles or upper extremities.  Pelvis stable no tenderness to the bilateral hips.  No tenderness to the left lower extremity.  No tenderness to the right thigh, knee, proximal or midshaft tib-fib.  There is mild tenderness over the medial aspect of the right ankle without swelling there is slight ecchymosis.  No tenderness to the lateral malleolus.  Ankle joint is stable.  No tenderness of base of fifth metatarsal any bones of the foot.  No peripheral edema, no calf tenderness, dorsal pedal pulses present and equal bilaterally.  NEUROLOGIC EXAM: Tongue midline, no facial drooping, no ptosis, muscle strength +5/5 bilateral upper and lower extremities cerebellar finger to nose intact, no pronator drift, sensation intact.        ED Course   Labs Reviewed - No data to display         The patient's splint was checked after placement and was noted to have good placement.  Distal circulation and neurovascular exam was noted to be intact.         MDM        Differential diagnosis before testing includes but not limited to skull fracture, intracranial hemorrhage, ankle fracture, ankle sprain, which is a medical condition that poses a threat to life/function    Past Medical History/comorbidities-as noted in HPI    Radiographic images  I  personally reviewed the radiographs and my individual interpretation shows x-ray of the ankle, no dislocation noted  I also reviewed the official reports that showed ankle x-ray impacted medial malleolus fracture.,  CT brain no intracranial hemorrhage or extra-axial fluid collection    Course of Events during Emergency Room Visit include patient had imaging of the brain and ankle performed, discussed results with patient and son at bedside.  Patient does have impacted medial malleolar fracture, short leg posterior mold splint performed, patient was ambulate with a walker.  Discussed supportive care, follow-up with orthopedics.  Return to ER for any change or symptoms.  May use Tylenol as needed for pain.  Ice affected area.  Patient and son agree with plan.  Were discharged good condition    Shared decision making was utilized       Discharge  I have discussed with the patient the results of test, differential diagnosis, treatment plan, warning signs and symptoms which should prompt immediate return.  They expressed understanding of these instructions and agrees to the following plan provided.  They were given written discharge instructions and agrees to return for any concerns and voiced understanding and all questions were answered.    Note to patient: The 21st Century Cures Act makes medical notes like these available to patients in the interest of transparency. However, this is a medical document intended as peer to peer communication. It is written in medical language and may contain abbreviations or verbiage that are unfamiliar. It may appear blunt or direct. Medical documents are intended to carry relevant information, facts as evident, and the clinical opinion of the practitioner.                 Medical Decision Making      Disposition and Plan     Clinical Impression:  1. Contusion of scalp, initial encounter    2. Closed fracture of right ankle, initial encounter         Disposition:  Discharge  2/4/2025   9:16 pm    Follow-up:  Pily Noe, DPM  [address] [place] [id number]  [phone]    Call in 1 day(s)            Medications Prescribed:  Current Discharge Medication List              Supplementary Documentation:

## 2025-02-05 NOTE — ED INITIAL ASSESSMENT (HPI)
Patient here with c/o slip and fall this AM.  Patient has right ankle pain and bruising to forehead.  Patient hit her head on hardwood during the floor.  No LOC.  Patient is on blood thinners.

## 2025-02-05 NOTE — ED QUICK NOTES
Patient was doing laundry this morning, slipped and fell on hardwood floor. Patient takes Xarelto. Didn't notice any bruising at first. Has a small hematoma to left forehead. Bruising on bilateral knees and some scant bruising to right ankle. Patient is ambulatory without any limp. Is only concerned about the hematoma on her forehead.

## 2025-02-05 NOTE — PROGRESS NOTES
Transitions of Care Navigation  Discharge Date: 25  Contact Date: 2025    Transitions of Care Assessment:  ZE Initial Assessment    General:  Assessment completed with: Patient  Patient Subjective: NCM spoke with patient states she is feeling well. Patient followed up with ortho Dr. Noe today. Pt states was provided with a boot for her right foot to have better support. Patient is scheduled to follow up with ortho again in 3 weeks. Patient denies any pain overall, she reports some bruising. She denies any fevers, chills, nausea, vomiting, shortness of breath, chest pain or any others. Pt states has not need to use her walker at home. Pt denies any further outreaches, questions or concerns.  Chief Complaint: 1. Contusion of scalp, initial encounter   2. Closed fracture of right ankle, initial encounter  Verify patient name and  with patient/ caregiver: Yes    Hospital Stay/Discharge:  Tell me what you understand of why you were in the hospital or emergency department: Pt reports to ED after she had a fall at home.  Prior to leaving the hospital were your Discharge Instructions reviewed with you?: Yes  Did you receive a copy of your written Discharge Instructions?: Yes  What questions do you have about your Discharge Instructions?: none  Do you feel better or worse since you left the hospital or emergency department?: Better    Follow - Up Appointment:  Do you have a follow-up appointment?: Yes  Date: 25  Physician: Dr. Noe  Are there any barriers to getting to your follow-up appointment?: No    Home Health/DME:  Prior to leaving the hospital was Home Health (HH) arranged for you?: N/A  Are HH needs identified by staff during the assessment?: No     Prior to leaving the hospital or emergency department was Durable Medical Equipment (DME), medical supplies, or infusions arranged for you?: N/A  Are DME/medical supply/infusions needs identified by staff during this assessment?: No      Medications/Diet:  Did any of your medications change, during or after your hospital stay or ED visit?: No  Do you understand what your medications are for and possible side effects?: Yes  Are there any reasons that keep you from taking your medication as prescribed?: No  Any concerns about medication refills?: No    Were you given a different diet per your Discharge Instructions?: No  Reason: n/a     Questions/Concerns:  Do you have any questions or concerns that have not been discussed?: No   ZE Follow-up Assessment    General:  Assessment completed with: Patient  Patient Subjective: NCM spoke with patient states she is feeling well. Patient followed up with ortho Dr. Noe today. Pt states was provided with a boot for her right foot to have better support. Patient is scheduled to follow up with ortho again in 3 weeks. Patient denies any pain overall, she reports some bruising. She denies any fevers, chills, nausea, vomiting, shortness of breath, chest pain or any others. Pt states has not need to use her walker at home. Pt denies any further outreaches, questions or concerns.  Chief Complaint: 1. Contusion of scalp, initial encounter   2. Closed fracture of right ankle, initial encounter    Nursing Interventions:  All discharge instructions reviewed with the patient. Reviewed when to call MD vs when to call 911 or go the ED. Educated patient on the importance of taking all meds as prescribed as well as close f/u with PCP/specialists. Pt verbalized understanding and will contact the office with any further questions or concerns. Patient denies fevers, chills, nausea, vomiting, shortness of breath, chest pain, or any other symptoms at this time.  NCM provided contact information for any further questions/concerns. Patient verbalized understanding and agreeable.         Medications:  Medication Reconciliation:  I am aware of an inpatient discharge within the last 30 days.  The discharge medication list has  been reconciled with the patient's current medication list and reviewed by me. See medication list for additions of new medication, and changes to current doses of medications and discontinued medications.  Current Outpatient Medications   Medication Sig Dispense Refill    XARELTO 15 MG Oral Tab TAKE 1 TABLET DAILY WITH   FOOD 90 tablet 3    BREO ELLIPTA 200-25 MCG/ACT Inhalation Aerosol Powder, Breath Activated USE 1 INHALATION DAILY 170 each 3    hydrALAZINE 50 MG Oral Tab       fluorouracil 5 % External Cream Apply topically 2 (two) times daily. prn      azithromycin 250 MG Oral Tab take 2 tablet (500MG)  by ORAL route  every day for 1 day then 1 tablet (250 mg) by oral route once daily for 4 days 6 tablet 0    MONTELUKAST 10 MG Oral Tab TAKE 1 TABLET EVERY EVENING 90 tablet 3    triamcinolone 0.1 % External Cream Apply topically 2 (two) times daily as needed. Up to 5, then 1 week break before restarting this cycle as needed. 1 each 1    azelastine 0.1 % Nasal Solution 1 spray by Nasal route 2 (two) times daily. 1 each 5    dilTIAZem  MG Oral Capsule SR 24 Hr Take 1 capsule (180 mg total) by mouth daily.      diphenoxylate-atropine 2.5-0.025 MG Oral Tab Take 1 tablet by mouth 4 (four) times daily as needed for Diarrhea. 30 tablet 0    rosuvastatin 5 MG Oral Tab Take 1 tablet (5 mg total) by mouth nightly. 90 tablet 3    metoprolol succinate 50 MG Oral Tablet 24 Hr Take 1 tablet (50 mg total) by mouth 2 (two) times a day. 180 tablet 1    LOSARTAN POTASSIUM 50 MG Oral Tab TAKE 1 TABLET TWICE A  tablet 2    Iron, Ferrous Sulfate, 325 (65 Fe) MG Oral Tab Take by mouth daily. CLARIFY DOSE WITH PATIENT: THIS WAS ORDERED BY ONCOLOGY  30 tablet 0    Albuterol Sulfate  (90 Base) MCG/ACT Inhalation Aero Soln Inhale 2 puffs into the lungs every 4 (four) hours as needed. prn      Saline Nasal Spray 0.65 % Nasal Solution 1 spray by Nasal route as needed for congestion.      acetaminophen 500 MG Oral Tab  Take 1 tablet (500 mg total) by mouth every 6 (six) hours as needed for Pain.           Follow-up Appointments:  Your appointments       Date & Time Appointment Department ([place])    Feb 26, 2025 3:30 PM CST Follow Up Visit with Pily Noe DPM [address] ([facility])        Apr 06, 2025 1:00 PM CDT CT CHEST with BBK CT RM1 [facility] ([facility])    Please arrive 15 minutes prior to your scheduled appointment time.      Apr 15, 2025 2:45 PM CDT Follow Up Visit with Debbie Chang MD [facility] Hematology Oncology [facility]    Contact your primary care provider if your insurance requires a referral.    Please arrive 15 minutes prior to your scheduled appointment. Be sure to bring your current Insurance card, Photo ID, and medication bottles or a list of your current medications.      A 24 hour notice is required to cancel any appointment or you may be charged a $40 No Show Fee.     Important: 24 hour notice is required to cancel any appointment or you may be charged a $40 No Show Fee. Please notify your physician office.               [facility]  [address] [facility] [address]  [phone] [address]  [address] [place] [phone] [facility] Hematology Oncology [facility]  [address] [facility] [address]  [phone]            Transitional Care Clinic  Was TCC Ordered: No      Primary Care Provider (If no TCC appointment)  Does patient already have a PCP appointment scheduled? No  Nurse Care Manager Attempted to schedule PCP office ER Follow-up appointment with patient   -If no appointment scheduled: Explain : Pt declined states she followed up with     Specialist  Does  the patient have any other follow-up appointment(s) need to be scheduled? No         Book By Date: 2/11/25

## 2025-02-05 NOTE — PROGRESS NOTES
[facility] New Patient Note    CC:   Chief Complaint   Patient presents with    Ankle Pain     Right Ankle Inury  DOI: 2/4/2025       HPI: The patient is a 84 year old female who presents today with complaints of a fall yesterday  Her son is with her today  Pt did hit her head and contusion noted  Fracture of right ankle was discovered   She relates that she hit the inside of the ankles together when she fell.  But she is not sure how it really happened.  She also relates years ago on injury to her left ankle          Past Medical History:    Abdominal hernia    Acute kidney injury    Acute subdural hematoma (HCC)    Anemia    Arthritis    Azotemia    Bilateral lung cancer (HCC)    Cancer (HCC)    right lung    Cancer (HCC)    lymphoma    Chronic cough    Clostridium difficile colitis    Community acquired pneumonia, unspecified laterality    Complete rupture of rotator cuff    Constipation    COPD (chronic obstructive pulmonary disease) (HCC)    no O2    COPD (chronic obstructive pulmonary disease) with acute bronchitis (HCC)    COPD exacerbation (HCC)    Deep vein thrombosis (HCC)    Dependence on supplemental oxygen    Diaphragmatic hernia without mention of obstruction or gangrene    Diarrhea    Diverticulitis of large intestine without perforation or abscess without bleeding    Dysphagia, pharyngoesophageal phase    Epistaxis    Esophageal reflux    Essential hypertension    Exposure to unspecified radiation    right lower lung    Gluteal tendinitis    Gram-negative pneumonia (HCC)    Hearing impairment    bilateral hearing aids     Hearing loss    Hemorrhoids    HIGH BLOOD PRESSURE    High cholesterol    History of blood clots    History of lung cancer    Hyperglycemia    Hyperlipidemia    Hypokalemia    Leaking of urine    Leg swelling    Leukocytosis    Low grade B-cell lymphoma (HCC)    Lung cancer (HCC)    Malignant neoplasm of bronchus and lung, unspecified site    Mass of right forearm     Metabolic acidosis    NHL (nodular histiocytic lymphoma) (HCC)    Nocturnal leg cramps    Normocytic normochromic anemia    Osteoarthritis    Osteoporosis    Personal history of antineoplastic chemotherapy    LAST 10/2019    Personal history of malignant neoplasm of bronchus and lung    Personal history of urinary (tract) infection    Plantar fascial fibromatosis    Pneumonia due to infectious organism    Pneumonia due to organism    Pulmonary embolism (HCC)    right lung,cleared on own per pt    Renal disorder    Renal insufficiency    Scoliosis (and kyphoscoliosis), idiopathic    SOB (shortness of breath)    Stool incontinence    Urticaria, unspecified    Visual impairment    glasses    Wears glasses     Past Surgical History:   Procedure Laterality Date    Cholecystectomy  2008    Colonoscopy  2008    Hernia surgery      Hysterectomy  's    vaginal          Other      lung biopsies    Other surgical history  ,    bunionectomy,bilateral    Other surgical history  2017    Cysto- Dr. Tatum    Removal of lung,lobectomy  2009    removal of upper right lobe    Repair ing hernia,5+y/o,reducibl      Tonsillectomy      Total abdom hysterectomy       Current Outpatient Medications   Medication Sig Dispense Refill    XARELTO 15 MG Oral Tab TAKE 1 TABLET DAILY WITH   FOOD 90 tablet 3    BREO ELLIPTA 200-25 MCG/ACT Inhalation Aerosol Powder, Breath Activated USE 1 INHALATION DAILY 170 each 3    hydrALAZINE 50 MG Oral Tab       fluorouracil 5 % External Cream Apply topically 2 (two) times daily. prn      MONTELUKAST 10 MG Oral Tab TAKE 1 TABLET EVERY EVENING 90 tablet 3    azelastine 0.1 % Nasal Solution 1 spray by Nasal route 2 (two) times daily. 1 each 5    dilTIAZem  MG Oral Capsule SR 24 Hr Take 1 capsule (180 mg total) by mouth daily.      rosuvastatin 5 MG Oral Tab Take 1 tablet (5 mg total) by mouth nightly. 90 tablet 3    metoprolol succinate 50 MG Oral Tablet 24 Hr Take 1 tablet  (50 mg total) by mouth 2 (two) times a day. 180 tablet 1    LOSARTAN POTASSIUM 50 MG Oral Tab TAKE 1 TABLET TWICE A  tablet 2    Iron, Ferrous Sulfate, 325 (65 Fe) MG Oral Tab Take by mouth daily. CLARIFY DOSE WITH PATIENT: THIS WAS ORDERED BY ONCOLOGY  30 tablet 0    Albuterol Sulfate  (90 Base) MCG/ACT Inhalation Aero Soln Inhale 2 puffs into the lungs every 4 (four) hours as needed. prn      acetaminophen 500 MG Oral Tab Take 1 tablet (500 mg total) by mouth every 6 (six) hours as needed for Pain.      azithromycin 250 MG Oral Tab take 2 tablet (500MG)  by ORAL route  every day for 1 day then 1 tablet (250 mg) by oral route once daily for 4 days (Patient not taking: Reported on 2/5/2025) 6 tablet 0    triamcinolone 0.1 % External Cream Apply topically 2 (two) times daily as needed. Up to 5, then 1 week break before restarting this cycle as needed. (Patient not taking: Reported on 2/5/2025) 1 each 1    diphenoxylate-atropine 2.5-0.025 MG Oral Tab Take 1 tablet by mouth 4 (four) times daily as needed for Diarrhea. (Patient not taking: Reported on 12/2/2024) 30 tablet 0    Saline Nasal Spray 0.65 % Nasal Solution 1 spray by Nasal route as needed for congestion. (Patient not taking: Reported on 2/5/2025)       Allergies[1]  Family History   Problem Relation Age of Onset    Other (immune system) Mother     Cancer Mother     Cancer Father         of the mouth    Hypertension Father     Cancer Son     Cancer Son      Social History     Occupational History    Not on file   Tobacco Use    Smoking status: Never     Passive exposure: Never    Smokeless tobacco: Never   Vaping Use    Vaping status: Never Used   Substance and Sexual Activity    Alcohol use: Not Currently    Drug use: No    Sexual activity: Not on file        ROS:  Complete ROS reviewed by me and non-contributory to the chief complaint except as mentioned above.    Physical Exam:    Ht 4' 10\" (1.473 m)   Wt 116 lb (52.6 kg)   BMI 24.24 kg/m²        Sensation is intact sharp versus dull.  She can feel light touch to the tips of the toes  Palpable pedal pulses.  Hair growth is present.  Skin is supple and feet are well perfused and warm.    Strength testing deferred    Full range of motion bilateral ankles  Tender medial malleolus and inferior med malleolus right ankle  Left ankle , mild pain underlying medial malleolus.  Moderate pitting edema left LE  Very little swelling right LE      Imaging:  xrays 3 views right ankle - questionable fracture medial malleolus.  Old chip fracture  3 views left ankle - chronic chip off medial malleolus.  No apparent fracture.  Osteopenic bone, so difficult to assess   personally viewed, independently interpreted and radiology report read.      Assessment/Diagnoses:  Diagnoses and all orders for this visit:    Sprain of left ankle, unspecified ligament, initial encounter  -     XR ANKLE (MIN 3 VIEWS), LEFT (CPT=73610); Future    Closed fracture of right ankle, initial encounter    At risk for falls        Plan:  I reviewed imaging and exam findings with the patient and her son    Her x-rays are sort of inconclusive and she does have old chip fractures bilateral off the medial malleolus.  Questionable fracture through the medial malleolus right ankle and we will need to get a follow-up x-ray in about 3 weeks of that right ankle.  The type of fracture is discussed with the patient including anatomy, etiology, and location.  This can be treated conservatively with immobilization in a high profile boot.  Weight bearing status discussed - full weight allowed  Time to healing up to 6-8 weeks is anticipated.  Discussed delayed healing and potential for non union, arthritis, and continued pain.            Offered air stirrup brace left ankle  High profile boot right ankle.  If too cumbersome, she has a 1/2 profile cam boot at home from her bunion surgery.    RICE      PT for fall risk, prevention would be helpful once she is  better.  Gait evaluation ect  Ankle rehab    Follow up in 3 weeks .  Likely xrays right ankle      Pily Noe, DPMPodiatric Surgery  FACFAS  [facility]  [address], [facility] [address], [place] [address]   [address] [place], [place] [address] [email or web address]  [email or web address]  t: [phone]   f: [phone]              This document was partially prepared using Dragon Medical voice recognition software.          [1]   Allergies  Allergen Reactions    Bactrim [Sulfamethoxazole W/Trimethoprim] HIVES     Facial hives    Radiology Contrast Iodinated Dyes OTHER (SEE COMMENTS)     HARSH ON KIDNEYS    Penicillins ITCHING

## 2025-02-06 ENCOUNTER — TELEPHONE (OUTPATIENT)
Dept: ORTHOPEDICS CLINIC | Facility: CLINIC | Age: 85
End: 2025-02-06

## 2025-02-06 NOTE — TELEPHONE ENCOUNTER
Pt was a little confused with directions that were given to her and her son yesterday.    She has a right ankle fx and needs to wear a high profile boot.  She wasn't sure if she needed to wrap with the Ace wrap and then the boot, as we did in the office.    I did explain that this would be helpful with the swelling, and help keep her ankle stable.    Her Left ankle is sprained. She didn't think it was, so I verified with her, that yes, it is.  This ankle is to be wrapped with the Ace wrap, as she did not want to take the air cast that I offered her.    She voiced understanding to information given.  Advised to call anytime she has any questions.

## 2025-02-26 ENCOUNTER — TELEPHONE (OUTPATIENT)
Dept: ORTHOPEDICS CLINIC | Facility: CLINIC | Age: 85
End: 2025-02-26

## 2025-02-26 DIAGNOSIS — S82.891A CLOSED FRACTURE OF RIGHT ANKLE, INITIAL ENCOUNTER: Primary | ICD-10-CM

## 2025-02-27 ENCOUNTER — PATIENT MESSAGE (OUTPATIENT)
Dept: ORTHOPEDICS CLINIC | Facility: CLINIC | Age: 85
End: 2025-02-27

## 2025-02-27 ENCOUNTER — HOSPITAL ENCOUNTER (OUTPATIENT)
Dept: GENERAL RADIOLOGY | Age: 85
Discharge: HOME | End: 2025-02-27
Attending: PODIATRIST
Payer: MEDICARE

## 2025-02-27 ENCOUNTER — OFFICE VISIT (OUTPATIENT)
Dept: ORTHOPEDICS CLINIC | Facility: CLINIC | Age: 85
End: 2025-02-27
Payer: MEDICARE

## 2025-02-27 VITALS — HEIGHT: 58 IN | WEIGHT: 116 LBS | BODY MASS INDEX: 24.35 KG/M2

## 2025-02-27 DIAGNOSIS — S82.891D CLOSED FRACTURE OF RIGHT ANKLE WITH ROUTINE HEALING, SUBSEQUENT ENCOUNTER: Primary | ICD-10-CM

## 2025-02-27 DIAGNOSIS — S82.891A CLOSED FRACTURE OF RIGHT ANKLE, INITIAL ENCOUNTER: ICD-10-CM

## 2025-02-27 DIAGNOSIS — M85.89 OSTEOPENIA OF MULTIPLE SITES: ICD-10-CM

## 2025-02-27 DIAGNOSIS — Z91.81 AT RISK FOR FALLS: ICD-10-CM

## 2025-02-27 PROCEDURE — 99213 OFFICE O/P EST LOW 20 MIN: CPT | Performed by: PODIATRIST

## 2025-02-27 PROCEDURE — 73610 X-RAY EXAM OF ANKLE: CPT | Performed by: PODIATRIST

## 2025-02-27 NOTE — TELEPHONE ENCOUNTER
LOV today 2/27/25    Condition update sent via [employer].    The sleeve recommended did not fit her. No further information.  They picked up a soft ankle brace that fits well into her current shoes and reports pain has not had pain since using this.    States will get a good pair of shoes, as recommended in OV today.  Purchased cam boot for PRN.

## 2025-02-27 NOTE — PROGRESS NOTES
[facility] Progress Note    Subjective:     Mrs Car and son are here to check ankle        Objective:   Right  Exam - no tenderness about ankle, medial or lateral.  No foot pain  General tenderness of leg, sensitivity  Sensation intact bilateral feet and plantar  Mild swelling of left LE  Less swelling of right          Imaging: questionable fracture medial malleolus  Ossicle from previous fracture off med malleolus  osteopenia        Assessment/Plan:     Diagnoses and all orders for this visit:    Closed fracture of right ankle with routine healing, subsequent encounter    At risk for falls    Osteopenia of multiple sites      Ok to get out of the boot - however if ankle starts hurting again need to resume boot    Consider PT if strength not returning  May need help up and down stairs  Fall prevention  Son and she are going to see how she does    Try ankle sleeve and good tennis shoe  We discussed types    Follow up if continues withpain of ankle  Discussed findings on xray of old ossicle (possibly just irritated it) vs stress fx medial malleolus - challenging to see with osteopenic bone    She relates she never had pain  May have some neuropathy as well - balance issues and \"vibration\" symptoms bottom of feet            Pily Noe, DPMPodiatric Surgery  FACFAS  [facility]  [address] [place] [address], [address], [place] [address] [address] [place], [place] [id number] [email or web address]  [email or web address]  t: [phone]   f: [phone]            Dragon speech recognition software was used to prepare this note. If a word or phrase is confusing, it is likely do to a failure of recognition. Please contact me with any questions or clarifications.

## 2025-03-06 ENCOUNTER — PATIENT MESSAGE (OUTPATIENT)
Dept: FAMILY MEDICINE CLINIC | Facility: CLINIC | Age: 85
End: 2025-03-06

## 2025-03-08 ENCOUNTER — APPOINTMENT (OUTPATIENT)
Dept: ULTRASOUND IMAGING | Age: 85
End: 2025-03-08
Payer: MEDICARE

## 2025-03-08 ENCOUNTER — HOSPITAL ENCOUNTER (EMERGENCY)
Age: 85
Discharge: HOME | End: 2025-03-08
Attending: EMERGENCY MEDICINE
Payer: MEDICARE

## 2025-03-08 ENCOUNTER — APPOINTMENT (OUTPATIENT)
Dept: GENERAL RADIOLOGY | Age: 85
End: 2025-03-08
Attending: PHYSICIAN ASSISTANT
Payer: MEDICARE

## 2025-03-08 VITALS
DIASTOLIC BLOOD PRESSURE: 81 MMHG | RESPIRATION RATE: 18 BRPM | TEMPERATURE: 98 F | OXYGEN SATURATION: 95 % | HEART RATE: 67 BPM | SYSTOLIC BLOOD PRESSURE: 171 MMHG

## 2025-03-08 DIAGNOSIS — M79.89 LEG SWELLING: Primary | ICD-10-CM

## 2025-03-08 PROCEDURE — 99283 EMERGENCY DEPT VISIT LOW MDM: CPT

## 2025-03-08 PROCEDURE — 93971 EXTREMITY STUDY: CPT

## 2025-03-08 PROCEDURE — 73562 X-RAY EXAM OF KNEE 3: CPT | Performed by: PHYSICIAN ASSISTANT

## 2025-03-08 PROCEDURE — 99284 EMERGENCY DEPT VISIT MOD MDM: CPT

## 2025-03-08 NOTE — ED INITIAL ASSESSMENT (HPI)
fall 5 weeks ago - reports fracture to right ankle and a sprain to left ankle saw podiatry - reports swelling to right leg and lump to posterior knee

## 2025-03-08 NOTE — ED PROVIDER NOTES
Patient Seen in: [facility] In [place]      History     Chief Complaint   Patient presents with    Swelling Edema    Leg or Foot Injury     Stated Complaint: fall 5 weeks ago - reports fracture to right ankle and a sprain to left ankle     Subjective:   HPI    84-year-old female with past medical history of lung cancer, COPD, hypertension, hyperlipidemia who presents to the ER for left lower extremity swelling over the past 5 weeks.  Reports that prior to onset of swelling she had a fall.  She was seen in the ER for this and had x-ray imaging of her bilateral ankles.  Reports that swelling has persisted despite compression stockings.  Reports some transient pain of the left knee and left ankle last night with that then resolved after a few minutes but no other persistent pain.  Denies any right sided pain or swelling, shortness of breath, chest pain or any other complaints.    Objective:     Past Medical History:    Abdominal hernia    Acute kidney injury    Acute subdural hematoma (HCC)    Anemia    Arthritis    Azotemia    Bilateral lung cancer (HCC)    Cancer (HCC)    right lung    Cancer (HCC)    lymphoma    Chronic cough    Clostridium difficile colitis    Community acquired pneumonia, unspecified laterality    Complete rupture of rotator cuff    Constipation    COPD (chronic obstructive pulmonary disease) (HCC)    no O2    COPD (chronic obstructive pulmonary disease) with acute bronchitis (HCC)    COPD exacerbation (HCC)    Deep vein thrombosis (HCC)    Dependence on supplemental oxygen    Diaphragmatic hernia without mention of obstruction or gangrene    Diarrhea    Diverticulitis of large intestine without perforation or abscess without bleeding    Dysphagia, pharyngoesophageal phase    Epistaxis    Esophageal reflux    Essential hypertension    Exposure to unspecified radiation    right lower lung    Gluteal tendinitis    Gram-negative pneumonia (HCC)    Hearing impairment    bilateral  hearing aids     Hearing loss    Hemorrhoids    HIGH BLOOD PRESSURE    High cholesterol    History of blood clots    History of lung cancer    Hyperglycemia    Hyperlipidemia    Hypokalemia    Leaking of urine    Leg swelling    Leukocytosis    Low grade B-cell lymphoma (HCC)    Lung cancer (HCC)    Malignant neoplasm of bronchus and lung, unspecified site    Mass of right forearm    Metabolic acidosis    NHL (nodular histiocytic lymphoma) (HCC)    Nocturnal leg cramps    Normocytic normochromic anemia    Osteoarthritis    Osteoporosis    Personal history of antineoplastic chemotherapy    LAST 10/2019    Personal history of malignant neoplasm of bronchus and lung    Personal history of urinary (tract) infection    Plantar fascial fibromatosis    Pneumonia due to infectious organism    Pneumonia due to organism    Pulmonary embolism (HCC)    right lung,cleared on own per pt    Renal disorder    Renal insufficiency    Scoliosis (and kyphoscoliosis), idiopathic    SOB (shortness of breath)    Stool incontinence    Urticaria, unspecified    Visual impairment    glasses    Wears glasses              Past Surgical History:   Procedure Laterality Date    Cholecystectomy  2008    Colonoscopy  2008    Hernia surgery      Hysterectomy  's    vaginal          Other      lung biopsies    Other surgical history  ,    bunionectomy,bilateral    Other surgical history  2017    Cysto-  Deepti    Removal of lung,lobectomy  2009    removal of upper right lobe    Repair ing hernia,5+y/o,reducibl      Tonsillectomy      Total abdom hysterectomy                  Social History     Socioeconomic History    Marital status:    Tobacco Use    Smoking status: Never     Passive exposure: Never    Smokeless tobacco: Never   Vaping Use    Vaping status: Never Used   Substance and Sexual Activity    Alcohol use: Not Currently    Drug use: No   Other Topics Concern    Caffeine Concern Yes     Comment: soda     Exercise No     Social Drivers of Health     Food Insecurity: Unknown (2/5/2025)    NCSS - Food Insecurity     Worried About Running Out of Food in the Last Year: No   Transportation Needs: No Transportation Needs (2/5/2025)    NCSS - Transportation     Lack of Transportation: No   Housing Stability: Not At Risk (2/5/2025)    NCSS - Housing/Utilities     Has Housing: Yes     Worried About Losing Housing: No     Unable to Get Utilities: No                  Physical Exam     ED Triage Vitals [03/08/25 1118]   /76   Pulse 69   Resp 18   Temp 97.5 °F (36.4 °C)   Temp src Temporal   SpO2 95 %   O2 Device None (Room air)       Current Vitals:   Vital Signs  BP: (!) 171/81  Pulse: 67  Resp: 18  Temp: 97.5 °F (36.4 °C)  Temp src: Temporal    Oxygen Therapy  SpO2: 95 %  O2 Device: None (Room air)        Physical Exam  GENERAL APPEARANCE:  AxOx4, generally well-appearing, no acute distress.  HEENT:  NC, AT.   NECK:  Supple without lymphadenopathy.  No stiffness or restricted ROM.  RESPIRATORY: Normal rate, no respiratory distress.  EXTREMITIES: No palpable tenderness noted.  Normal range of motion of left knee and left ankle with no erythema or warmth noted.  No discomfort within the left calf but there is swelling from the left foot to the left lower extremity distal to the knee.  Without cyanosis.   NEUROLOGICAL:  Grossly nonfocal. Observed to ambulate with normal gait.  Skin: Normal color and no visible rashes.        ED Course   Labs Reviewed - No data to display         XR KNEE (3 VIEWS), LEFT (CPT=73562)    Result Date: 3/8/2025  PROCEDURE:  XR KNEE ROUTINE (3 VIEWS), LEFT (CPT=73562)  TECHNIQUE:  Three views were obtained including patellar view.  COMPARISON:  [facility], XR, XR KNEE (1 OR 2 VIEWS), LEFT (CPT=73560), 7/31/2019, 2:54 PM.  INDICATIONS:  Knee pain  PATIENT STATED HISTORY: (As transcribed by Technologist)  Patient fell and sprained her left ankle on 2/4/25 and noticed a bump on her left anterior  and lateral left knee a couple of weeks ago. Patient denies pain to area.     FINDINGS:  No acute fracture identified.  There is lateral subluxation of the patella, which is age indeterminate though appears new in comparison to 7/31/2029 knee radiograph.  There is tricompartmental osteoarthritis, greatest in the patellofemoral compartment with joint space narrowing and osteophyte formation.  Small joint effusion is present.            CONCLUSION:  Lateral subluxation of the patella, new in comparison to 7/31/2019, correlate clinically for possible soft tissue/ligamentous injury.  Otherwise no acute fracture.  Osteoarthritis.  LOCATION:  [facility]   Dictated by (CST): Isac Damon MD on 3/08/2025 at 1:08 PM     Finalized by (CST): Isac Damon MD on 3/08/2025 at 1:09 PM       US VENOUS DOPPLER LEG LEFT - DIAG IMG (CPT=93971)    Result Date: 3/8/2025  PROCEDURE:  US VENOUS DOPPLER LEG LEFT - DIAG IMG (CPT=93971)  COMPARISON:  None.  INDICATIONS:  Left lower extremity pain and swelling  TECHNIQUE:  Real time, grey scale, and duplex ultrasound was used to evaluate the lower extremity venous system. B-mode two-dimensional images of the vascular structures, Doppler spectral analysis, and color flow.  Doppler imaging were performed.  The following veins were imaged:  Common, deep, and superficial femoral, popliteal, sapheno-femoral junction, posterior tibial veins, and the contralateral common femoral vein.  PATIENT STATED HISTORY: (As transcribed by Technologist)  Patient complains of left leg swelling and pain.    FINDINGS:  EXTREMITY EXAMINED:  Left lower extremity SAPHENOFEMORAL JUNCTION:  No reflux. THROMBI:  None visible. COMPRESSION:  Normal compressibility, phasicity, and augmentation. OTHER:  Thin 3.4 x 1.5 x 0.2 cm probable Baker's cyst.            CONCLUSION:  No evidence of deep vein thrombus in the left lower extremity.  Probable left popliteal fossa Baker's cyst   LOCATION:  [facility]    Dictated by (CST):  Isac Damon MD on 3/08/2025 at 11:51 AM     Finalized by (CST): Isac Damon MD on 3/08/2025 at 11:52 AM       XR ANKLE (MIN 3 VIEWS), RIGHT (CPT=73610)    Result Date: 2/27/2025  PROCEDURE:  XR ANKLE (MIN 3 VIEWS), RIGHT (CPT=73610)  TECHNIQUE:  Three views were obtained.  COMPARISON:  [facility] , XR, XR ANKLE (MIN 3 VIEWS), RIGHT (CPT=73610), 2/04/2025, 8:46 PM.  INDICATIONS:  S82.891A Closed fracture of right ankle, initial encounter  PATIENT STATED HISTORY: (As transcribed by Technologist)  Patient here for ortho follow up of right ankle. She stated injury 3 weeks ago              CONCLUSION:    The tiny subtle irregularity along the undersurface of the medial malleolus redemonstrated could be a tiny fracture.  Stable appearance.  No new fractures seen.  Ankle mortise intact.  Mild DJD.  Decrease in tissue swelling all around the ankle.  Surgical changes in the foot with screws partially seen. Continued clinical and x-ray follow-up advised. LOCATION:  [facility]   Dictated by (CST): Dave Aggarwal MD on 2/27/2025 at 8:32 AM     Finalized by (CST): Dave Aggarwal MD on 2/27/2025 at 8:33 AM             MDM      84-year-old female who presents to the ER for left lower extremity swelling.  Vitals with elevated blood pressure but otherwise within normal limits.  Reviewed encounter from ER visit 5 weeks ago where patient had x-ray imaging completed.  Differential diagnosis includes but does not exclude DVT versus lymphedema versus cellulitis versus fracture.  Venous Doppler is negative, reveals Baker's cyst.  No signs of infection on exam.  X-ray imaging discusses patellar subluxation.  Patient was also seen and evaluated by Dr. Elaine who discussed results with patient.  Patient reported that she has had multiple patellar dislocations in the past which may be the reason for her subluxation on x-ray.  She has normal range of motion at her knee joint making acute subluxation versus dislocation unlikely.  Discussed  continued supportive management at home as well as need for follow-up with PCP.  Ready for discharge.        Medical Decision Making      Disposition and Plan     Clinical Impression:  1. Leg swelling         Disposition:  Discharge  3/8/2025  1:58 pm    Follow-up:  [place] Chau MD  [address] [place] [id number]  [phone]    Follow up            Medications Prescribed:  Discharge Medication List as of 3/8/2025  2:01 PM              Supplementary Documentation:

## 2025-03-08 NOTE — DISCHARGE INSTRUCTIONS
Call to make an appointment as soon as possible with Ortho and your primary care doctor.  Continue to take Tylenol or ibuprofen for pain as needed.  Continue using compression stocking and elevate leg is much as possible while at rest to help with swelling.  Return to the ER for any other new or worsening symptoms.

## 2025-03-10 ENCOUNTER — PATIENT OUTREACH (OUTPATIENT)
Dept: CASE MANAGEMENT | Age: 85
End: 2025-03-10

## 2025-03-10 ENCOUNTER — TELEPHONE (OUTPATIENT)
Dept: FAMILY MEDICINE CLINIC | Facility: CLINIC | Age: 85
End: 2025-03-10

## 2025-03-10 NOTE — PROGRESS NOTES
Transitions of Care Navigation  Discharge Date: 3/8/25  Contact Date: 3/10/2025        Disposition and Plan      Clinical Impression:  1. Leg swelling        Transitions of Care Assessment:  ZE Initial Assessment    General:  Assessment completed with: Patient  Patient Subjective: Pt feeling better, since hospital discharge--denies pain in either leg, reports left leg swelling improving, slightly, appetite good, staying hydrated.  Pt denies fever, chills, headache, vision changes, dizziness, nausea, vomiting, diarrhea, bleeding, irregular heartbeat or fast pulse, loss of vision, speech or strength or coordination in any body part, chest pain or shortness of breath at this time--speaking in full, clear sentences.  Chief Complaint: Clinical Impression:  1. Leg swelling  Verify patient name and  with patient/ caregiver: Yes    Hospital Stay/Discharge:  Tell me what you understand of why you were in the hospital or emergency department: fall 5 weeks ago - reports fracture to right ankle and a sprain to left ankle saw podiatry - reports swelling to right leg and lump to posterior knee  Prior to leaving the hospital were your Discharge Instructions reviewed with you?: Yes  Did you receive a copy of your written Discharge Instructions?: Yes  What questions do you have about your Discharge Instructions?: none  Do you feel better or worse since you left the hospital or emergency department?: Better    Follow - Up Appointment:  Do you have a follow-up appointment?: No  Are there any barriers to getting to your follow-up appointment?: No    Home Health/DME:  Prior to leaving the hospital was Home Health (HH) arranged for you?: No     Prior to leaving the hospital or emergency department was Durable Medical Equipment (DME), medical supplies, or infusions arranged for you?: No  Are DME/medical supply/infusions needs identified by staff during this assessment?: No     Medications/Diet:  Did any of your medications change,  during or after your hospital stay or ED visit?: No  Do you understand what your medications are for and possible side effects?: Yes  Are there any reasons that keep you from taking your medication as prescribed?: No  Any concerns about medication refills?: No    Were you given a different diet per your Discharge Instructions?: No  Reason: n/a     Questions/Concerns:  Do you have any questions or concerns that have not been discussed?: No     ZE Follow-up Assessment    General:  Assessment completed with: Patient  Patient Subjective: Pt feeling better, since hospital discharge--denies pain in either leg, reports left leg swelling improving, slightly, appetite good, staying hydrated.  Pt denies fever, chills, headache, vision changes, dizziness, nausea, vomiting, diarrhea, bleeding, irregular heartbeat or fast pulse, loss of vision, speech or strength or coordination in any body part, chest pain or shortness of breath at this time--speaking in full, clear sentences.  Chief Complaint: Clinical Impression:  1. Leg swelling  Community Resources: Other (none)    Progress/Care Plan:  Is the patient progressing as planned?: Yes  Frequency/Follow Up Plan: pt declines add'l calls from [facility]     Nursing Interventions: Discussed diet, activity, medications and need for f/u visits. Pt declines ZE/ER f/u with PCP or partners, declines TCC appt, declines scheduling with ortho, Dr. ATIYA Chau. Pt prefers to f/u with her own ortho, outside of [place]. Sent TE to office staff as FYI/ZE protocol.  Patient aware when to contact PCP/specialists and when to seek emergency care. No further questions/concerns at this time.      Medications:  Medication Reconciliation:  I am aware of an inpatient discharge within the last 30 days.  The discharge medication list has been reconciled with the patient's current medication list and reviewed by me. See medication list for additions of new medication, and changes to current doses of medications  and discontinued medications.  Current Outpatient Medications   Medication Sig Dispense Refill    XARELTO 15 MG Oral Tab TAKE 1 TABLET DAILY WITH   FOOD 90 tablet 3    BREO ELLIPTA 200-25 MCG/ACT Inhalation Aerosol Powder, Breath Activated USE 1 INHALATION DAILY 170 each 3    hydrALAZINE 50 MG Oral Tab       fluorouracil 5 % External Cream Apply topically 2 (two) times daily. prn      azithromycin 250 MG Oral Tab take 2 tablet (500MG)  by ORAL route  every day for 1 day then 1 tablet (250 mg) by oral route once daily for 4 days 6 tablet 0    MONTELUKAST 10 MG Oral Tab TAKE 1 TABLET EVERY EVENING 90 tablet 3    triamcinolone 0.1 % External Cream Apply topically 2 (two) times daily as needed. Up to 5, then 1 week break before restarting this cycle as needed. 1 each 1    azelastine 0.1 % Nasal Solution 1 spray by Nasal route 2 (two) times daily. 1 each 5    dilTIAZem  MG Oral Capsule SR 24 Hr Take 1 capsule (180 mg total) by mouth daily.      diphenoxylate-atropine 2.5-0.025 MG Oral Tab Take 1 tablet by mouth 4 (four) times daily as needed for Diarrhea. 30 tablet 0    rosuvastatin 5 MG Oral Tab Take 1 tablet (5 mg total) by mouth nightly. 90 tablet 3    metoprolol succinate 50 MG Oral Tablet 24 Hr Take 1 tablet (50 mg total) by mouth 2 (two) times a day. 180 tablet 1    LOSARTAN POTASSIUM 50 MG Oral Tab TAKE 1 TABLET TWICE A  tablet 2    Iron, Ferrous Sulfate, 325 (65 Fe) MG Oral Tab Take by mouth daily. CLARIFY DOSE WITH PATIENT: THIS WAS ORDERED BY ONCOLOGY  30 tablet 0    Albuterol Sulfate  (90 Base) MCG/ACT Inhalation Aero Soln Inhale 2 puffs into the lungs every 4 (four) hours as needed. prn      Saline Nasal Spray 0.65 % Nasal Solution 1 spray by Nasal route as needed for congestion.      acetaminophen 500 MG Oral Tab Take 1 tablet (500 mg total) by mouth every 6 (six) hours as needed for Pain.     Follow-up Appointments:    Call to make an appointment as soon as possible with Ortho and your  primary care doctor. Continue to take Tylenol or ibuprofen for pain as needed. Continue using compression stocking and elevate leg is much as possible while at rest to help with swelling. Return to the ER for any other new or worsening symptoms.     Follow-up:  [place] Chau MD  [address] [address]  [phone]    Your appointments       Date & Time Appointment Department (Center)    Apr 06, 2025 1:00 PM CDT CT CHEST with BBK CT RM1 Counts include 234 beds at the [facility] ([facility])    Please arrive 15 minutes prior to your scheduled appointment time.      Apr 15, 2025 2:45 PM CDT Follow Up Visit with Debbie Chang MD [facility] Hematology Oncology [facility])    Contact your primary care provider if your insurance requires a referral.    Please arrive 15 minutes prior to your scheduled appointment. Be sure to bring your current Insurance card, Photo ID, and medication bottles or a list of your current medications.      A 24 hour notice is required to cancel any appointment or you may be charged a $40 No Show Fee.     Important: 24 hour notice is required to cancel any appointment or you may be charged a $40 No Show Fee. Please notify your physician office.               [facility] [place]  [address] [facility] [address]  [phone] [facility] Hematology Oncology [facility]  [address] [facility] [address] [phone]         [facility]  Was TCC Ordered: No    Primary Care Provider (If no TCC appointment)  Does patient already have a PCP appointment scheduled? No  Nurse Care Manager Attempted to schedule PCP office ER Follow-up appointment with patient   -If no appointment scheduled: Explain pt declines    Specialist  Does the patient have any other follow-up appointment(s) need to be scheduled? Yes   -If yes: Nurse Care Manager reviewed  upcoming specialist appointments with patient: Yes   -Does the patient need assistance scheduling appointment(s): No--pt will follow up with her own orthopedist    []  Patient verbally agrees to additional follow-up calls from Nurse Care Manager--pt declines add'l calls from [facility]    Book By Date: 3/15/2025

## 2025-03-10 NOTE — TELEPHONE ENCOUNTER
Sent as FYI/ZE protocol:    Spoke with patient for Transitions of Care call today.  Pt declines ZE/ER f/u with PCP or partners, declines TCC appt, declines scheduling with ortho, Dr. ATIYA Chau. Pt prefers to f/u with her own ortho, outside of [place].     ER Follow-up appointment needed by 3/15/25.      BOOK BY DATE: 3/22/25            Disposition and Plan      Clinical Impression:  1. Leg swelling        Follow-up Appointments:    Call to make an appointment as soon as possible with Ortho and your primary care doctor. Continue to take Tylenol or ibuprofen for pain as needed. Continue using compression stocking and elevate leg is much as possible while at rest to help with swelling. Return to the ER for any other new or worsening symptoms.     Follow-up:  [facility] Chau MD  [address] [place] [id number] [phone]

## 2025-03-14 ENCOUNTER — TELEPHONE (OUTPATIENT)
Dept: RADIATION ONCOLOGY | Facility: HOSPITAL | Age: 85
End: 2025-03-14

## 2025-03-14 NOTE — TELEPHONE ENCOUNTER
Lvm to [facility] her April f/u for after her CT scan [facility] on 4/6.

## 2025-04-06 ENCOUNTER — HOSPITAL ENCOUNTER (OUTPATIENT)
Dept: CT IMAGING | Age: 85
End: 2025-04-06
Attending: RADIOLOGY
Payer: MEDICARE

## 2025-04-06 ENCOUNTER — HOSPITAL ENCOUNTER (OUTPATIENT)
Dept: CT IMAGING | Age: 85
Discharge: HOME | End: 2025-04-06
Attending: RADIOLOGY
Payer: MEDICARE

## 2025-04-06 DIAGNOSIS — C34.12 MALIGNANT NEOPLASM OF UPPER LOBE OF LEFT LUNG (HCC): ICD-10-CM

## 2025-04-06 PROCEDURE — 71250 CT THORAX DX C-: CPT | Performed by: RADIOLOGY

## 2025-04-08 NOTE — PROGRESS NOTES
Nursing Follow-Up Note    Patient: Rossy Car  YOB: 1940  Age: 84 year old  Radiation Oncologist: Dr. Bk Urban  Referring Physician: No ref. 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 Completed SBRT to [facility] on 1/2/25. Here for follow up today. CT chest done 4/6/25. Pt feels well today. Denies any new SOB. Has chronic cough.          [1]   Allergies  Allergen Reactions    Bactrim [Sulfamethoxazole W/Trimethoprim] HIVES     Facial hives    Radiology Contrast Iodinated Dyes OTHER (SEE COMMENTS)     HARSH ON KIDNEYS    Penicillins ITCHING

## 2025-04-09 ENCOUNTER — HOSPITAL ENCOUNTER (OUTPATIENT)
Dept: RADIATION ONCOLOGY | Facility: HOSPITAL | Age: 85
Discharge: HOME | End: 2025-04-09
Attending: RADIOLOGY
Payer: MEDICARE

## 2025-04-09 VITALS
SYSTOLIC BLOOD PRESSURE: 137 MMHG | HEART RATE: 56 BPM | OXYGEN SATURATION: 97 % | DIASTOLIC BLOOD PRESSURE: 71 MMHG | WEIGHT: 117.38 LBS | TEMPERATURE: 97 F | RESPIRATION RATE: 16 BRPM | BODY MASS INDEX: 25 KG/M2

## 2025-04-09 DIAGNOSIS — C34.12 MALIGNANT NEOPLASM OF UPPER LOBE OF LEFT LUNG (HCC): Primary | ICD-10-CM

## 2025-04-09 PROCEDURE — 99213 OFFICE O/P EST LOW 20 MIN: CPT

## 2025-04-09 NOTE — PATIENT INSTRUCTIONS
- WE WILL CALL TO SCHEDULE YOUR FOLLOW-UP APPOINTMENT WITH DR. ZACK ULRICH IN 6 MONTHS AFTER CT CHEST    - CALL CENTRAL SCHEDULING AT [phone] TO SCHEDULE CT CHEST IN 6 MONTHS    - CALL [phone] IF YOU HAVE ANY QUESTIONS/CONCERNS REGARDING RADIATION THERAPY

## 2025-04-09 NOTE — PROGRESS NOTES
[facility]    PATIENT'S NAME: ALO PHIPPS   RADIATION ONCOLOGIST: Bk Urban M.D.   PATIENT ACCOUNT #: [id number] LOCATION: [facility]   [facility]   MEDICAL RECORD #: [id number] YOB: 1940   FOLLOW-UP DATE: 04/09/2025       RADIATION ONCOLOGY FOLLOW-UP NOTE    REFERRING PHYSICIAN:  Debbie Chang MD    DIAGNOSIS:  Adenocarcinoma of left upper lobe.    REGION TREATED:    1.   Right lower lobe, 5000 cGy, completed July 2013.  2.   Right lower lobe, 5000 cGy, completed January 2015.  3.   Left upper lobe, 5000 cGy, completed January 2025.    INTERVAL SINCE COMPLETION OF RADIATION THERAPY:  Most recently, 3 months.     PATIENT STATUS:  Clinically and radiographically FIONA.      HISTORY:  The patient is an 84-year-old female who is well known to me.  I have treated her 3 separate times for adenocarcinomas within the lungs.  She had right lung cancers back in 2013 and 2015 and was treated successfully with SBRT.  I had not seen her in quite some time and she had been following with Dr. Chang.  Imaging had shown an increasing ground-glass opacity in the left upper lobe and a PET scan showed some moderately elevated activity.  A biopsy in November 2024 showed an adenocarcinoma with a lepidic pattern.  She was felt to be a poor candidate for surgery and was thought to be a good candidate for additional SBRT.  I then treated her to 5000 cGy in 5 fractions to the left upper lobe malignancy, completing in January 2025.     The patient presents today for her first followup visit since completing her most recent course of therapy.  Overall, she is doing very well.  She denies any particular issues or complaints.  She had no changes in her shortness of breath or dyspnea on exertion.  She is swallowing well and denies any chest wall discomfort.  Her appetite and energy level are normal.  She feels that she really had no side effects related to her radiation.    Her most recent scan is a CT scan on  04/06/2025.  This showed some scarring around the left upper lung mass which is deemed likely to be related to her prior radiation.  There was no other evidence of new lesions or lymphadenopathy.    PHYSICAL EXAMINATION:    VITAL SIGNS:  Blood pressure of 137/71, pulse of 56, respiratory rate of 16, and temperature 96.7.  He has a pain score of 0 and a weight of 117 pounds.  NECK:  Supple with no lymphadenopathy.  LUNGS:  Clear to auscultation bilaterally.  HEART:  Regular rate and rhythm.  Normal S1, S2, and no audible murmurs.  LYMPHATICS:  No supraclavicular, axillary, inguinal lymphadenopathy.    ABDOMEN:  Soft, nontender, and nondistended with normoactive bowel sounds and no hepatosplenomegaly.  EXTREMITIES:  Without clubbing, cyanosis, edema.  NEUROLOGIC:  Cranial nerves II-XII are grossly intact.  There are no focal deficits.    IMPRESSION AND RECOMMENDATIONS:  Overall, the patient is doing very well at this time.  She has had no side effects from her recent course of therapy and her most recent imaging shows an excellent response.  I remain very optimistic regarding her prognosis from this most recent malignancy.  She has tolerated radiation very well in the past and I am optimistic that she will have done so again.    I, therefore, would like to see her again for followup in 6 months and will repeat a scan prior to that time.  I did tell her that if she should have any problems or questions prior to that time she should feel free to contact my office and I would be happy to see her sooner.    Thank you very much for allowing me the opportunity to participate in the care of this patient.  If there should be questions regarding the radiotherapy, please feel free to contact me at any time.    Dictated By Bk Urban M.D.  d: 04/09/2025 08:14:29  t: 04/09/2025 13:58:34  [facility] [id number]  NAD/    cc: MD Debbie Roth M.D.   MD Reed Fernandes IV,  MD

## 2025-04-13 ENCOUNTER — PATIENT MESSAGE (OUTPATIENT)
Dept: ORTHOPEDICS CLINIC | Facility: CLINIC | Age: 85
End: 2025-04-13

## 2025-04-13 ENCOUNTER — PATIENT MESSAGE (OUTPATIENT)
Facility: CLINIC | Age: 85
End: 2025-04-13

## 2025-04-15 ENCOUNTER — OFFICE VISIT (OUTPATIENT)
Age: 85
End: 2025-04-15
Attending: FAMILY MEDICINE
Payer: MEDICARE

## 2025-04-15 VITALS
RESPIRATION RATE: 16 BRPM | DIASTOLIC BLOOD PRESSURE: 72 MMHG | WEIGHT: 117 LBS | HEIGHT: 59.02 IN | TEMPERATURE: 96 F | SYSTOLIC BLOOD PRESSURE: 146 MMHG | HEART RATE: 78 BPM | OXYGEN SATURATION: 96 % | BODY MASS INDEX: 23.59 KG/M2

## 2025-04-15 DIAGNOSIS — D47.2 MONOCLONAL PARAPROTEINEMIA: ICD-10-CM

## 2025-04-15 DIAGNOSIS — J90 PLEURAL EFFUSION ON RIGHT: ICD-10-CM

## 2025-04-15 DIAGNOSIS — C34.81 MALIGNANT NEOPLASM OF OVERLAPPING SITES OF RIGHT LUNG (HCC): ICD-10-CM

## 2025-04-15 DIAGNOSIS — C85.10 LOW GRADE B-CELL LYMPHOMA (HCC): ICD-10-CM

## 2025-04-15 DIAGNOSIS — D64.9 NORMOCYTIC ANEMIA: ICD-10-CM

## 2025-04-15 DIAGNOSIS — N18.32 CHRONIC KIDNEY DISEASE, STAGE 3B (HCC): ICD-10-CM

## 2025-04-15 DIAGNOSIS — R91.8 LUNG NODULES: Primary | ICD-10-CM

## 2025-04-15 DIAGNOSIS — C85.80 MARGINAL ZONE B-CELL LYMPHOMA (HCC): ICD-10-CM

## 2025-04-15 DIAGNOSIS — I82.431 ACUTE DEEP VEIN THROMBOSIS (DVT) OF POPLITEAL VEIN OF RIGHT LOWER EXTREMITY (HCC): ICD-10-CM

## 2025-04-15 LAB
ALBUMIN SERPL-MCNC: 4.2 G/DL (ref 3.2–4.8)
ALBUMIN/GLOB SERPL: 1.6 {RATIO} (ref 1–2)
ALP LIVER SERPL-CCNC: 121 U/L (ref 55–142)
ALT SERPL-CCNC: 13 U/L (ref 10–49)
ANION GAP SERPL CALC-SCNC: 8 MMOL/L (ref 0–18)
AST SERPL-CCNC: 17 U/L (ref ?–34)
BASOPHILS # BLD AUTO: 0.03 X10(3) UL (ref 0–0.2)
BASOPHILS NFR BLD AUTO: 0.6 %
BILIRUB SERPL-MCNC: 0.5 MG/DL (ref 0.2–1.1)
BUN BLD-MCNC: 33 MG/DL (ref 9–23)
CALCIUM BLD-MCNC: 9.9 MG/DL (ref 8.7–10.6)
CHLORIDE SERPL-SCNC: 110 MMOL/L (ref 98–112)
CO2 SERPL-SCNC: 25 MMOL/L (ref 21–32)
CREAT BLD-MCNC: 1.42 MG/DL (ref 0.55–1.02)
EGFRCR SERPLBLD CKD-EPI 2021: 36 ML/MIN/1.73M2 (ref 60–?)
EOSINOPHIL # BLD AUTO: 0.15 X10(3) UL (ref 0–0.7)
EOSINOPHIL NFR BLD AUTO: 2.8 %
ERYTHROCYTE [DISTWIDTH] IN BLOOD BY AUTOMATED COUNT: 14.2 %
FASTING STATUS PATIENT QL REPORTED: NO
GLOBULIN PLAS-MCNC: 2.7 G/DL (ref 2–3.5)
GLUCOSE BLD-MCNC: 114 MG/DL (ref 70–99)
HCT VFR BLD AUTO: 34.3 % (ref 35–48)
HGB BLD-MCNC: 11 G/DL (ref 12–16)
IGA SERPL-MCNC: 202.6 MG/DL (ref 40–350)
IGM SERPL-MCNC: 220 MG/DL (ref 50–300)
IMM GRANULOCYTES # BLD AUTO: 0.03 X10(3) UL (ref 0–1)
IMM GRANULOCYTES NFR BLD: 0.6 %
IMMUNOGLOBULIN PNL SER-MCNC: 750 MG/DL (ref 650–1600)
LDH SERPL L TO P-CCNC: 172 U/L (ref 120–246)
LYMPHOCYTES # BLD AUTO: 1.02 X10(3) UL (ref 1–4)
LYMPHOCYTES NFR BLD AUTO: 18.8 %
MCH RBC QN AUTO: 33 PG (ref 26–34)
MCHC RBC AUTO-ENTMCNC: 32.1 G/DL (ref 31–37)
MCV RBC AUTO: 103 FL (ref 80–100)
MONOCYTES # BLD AUTO: 0.49 X10(3) UL (ref 0.1–1)
MONOCYTES NFR BLD AUTO: 9 %
NEUTROPHILS # BLD AUTO: 3.72 X10 (3) UL (ref 1.5–7.7)
NEUTROPHILS # BLD AUTO: 3.72 X10(3) UL (ref 1.5–7.7)
NEUTROPHILS NFR BLD AUTO: 68.2 %
OSMOLALITY SERPL CALC.SUM OF ELEC: 304 MOSM/KG (ref 275–295)
PLATELET # BLD AUTO: 220 10(3)UL (ref 150–450)
POTASSIUM SERPL-SCNC: 4.1 MMOL/L (ref 3.5–5.1)
PROT SERPL-MCNC: 6.9 G/DL (ref 5.7–8.2)
RBC # BLD AUTO: 3.33 X10(6)UL (ref 3.8–5.3)
SODIUM SERPL-SCNC: 143 MMOL/L (ref 136–145)
WBC # BLD AUTO: 5.4 X10(3) UL (ref 4–11)

## 2025-04-15 NOTE — PROGRESS NOTES
Here for MD fu visit   Pt c/o increase fatigue  No other issues reported  Labs drawn   Education Record     Learner:  Patient/son     Disease / Diagnosis:      Barriers / Limitations:  none                Comments:     Method:  Discussion                Comments:     General Topics:  Plan of care reviewed                Comments:     Outcome:  Shows understanding                Comments:

## 2025-04-15 NOTE — PROGRESS NOTES
The following individual(s) verbally consented to be recorded using ambient AI listening technology and understand that they can each withdraw their consent to this listening technology at any point by asking the clinician to turn off or pause the recording:    Patient name: Rossy Car  Additional names:  son Kenneth      Cancer Center Progress Note    Patient Name: Rossy Car   YOB: 1940   Medical Record Number: [id number]   CSN: [id number]   Attending Physician: Debbie Chang M.D.   Referring Physician: DO Dr. Beata Garcia Dr., IV    Date of Visit: 4/15/2025     Chief Complaint:  Chief Complaint   Patient presents with    Follow - Up        Problem List:  1. 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 No significant dysplastic changes nor increased blasts, lymphocytosis or plasmacytosis noted on aspirate. Cytogenetics from bone marrow biopsy showed translocation of chromosomes 11 and 18 which is expected to result in a BIRC3/MALT1 gene rearrangement which is seen in approximately 50% of extranodal marginal zone B cell or MALT-type lymphomas. MYDD 88 L265P was not detected making LPL less likely.      - PET from 1/2018 showed new GGO/mass in left apex with SUV of 3.1. CT guided biopsy was done and this showed low grade non-Hodgkin B cell lymphoma with focal plasmacytic differentiation c/w MALT vs LPL. Immunoprofile was similar to previous lymph node biopsy.      - Ibrutinib 4/12/18- 10/2018. Decided to stop on her own due to SE.     - Scans done showed worsening adenopathy with symptoms felt to potentially be from NHL and received 4 weekly doses of Rituxan for low grade NHL. Last dose was 10/18/19     -  c/o vague symptoms  but (fatigue, cough but no B symptoms)  had increasing adenopathy with rapid increase of her light chains with IgM around 4K and increased viscosity.     -  BR 3/10/20- 7/2020        2. Lung cancer x 3:   - Stage I NSCLC right medial lung base- well differentiated adenocarcinoma with lepidic growth pattern diagnosed 12/3/14 s/p SBRT. EGFR exon 19 positive. Started \"adjuvant\" Tarceva 2/2015. Stopped after she was placed on Ibrutinib (given concerns for overlapping side effects. Had been [facility] for lung cancer then).      - Stage I NSCLC- adenocarcinoma of the RLL diagnosed 5/02/2013 s/p SBRT     - Stage IB (T2N0M0) well to moderately differentiated adenocarcinoma of the RUL s/p right thoracotomy with right upper lobectomy and mediastinal lymphadenectomy on 09-.      - had her surveillance scans 6/2021 which showed increased reticular nodular densities throughout the LLL along with a 1.2 x 1.3 cm nodule which was new. PET was then done which showed new nodule in the left lung had an SUV of 3.9 which was suspicious. Imaging was reviewed at lung conference and biopsy of the nodule was recommended. This was performed by IR on 5/5/21 which showed necrotizing granulomatous inflammation. She followed up with there pulmonologist who recommended expectant management as asymptomatic. She had a f/u CT which showed slight increase in micronodularity in both lungs felt to likely be infections/inflammatory with some nodules improved.     3. Recurrent VTE   - Right sided PE diagnosed 9/2011- seen on surveillance scans. Was placed on anticoagulation during that time.      - June 2021 presented to the ED c/o chest pain. She was brought by the paramedics who gave her an aspirin. By the time she arrived at the ED she was pain free. Cardiac enzymes were negative with normal EKG. D dimer was elevated and had V/Q scan which was low prob for DVT. BLE Dopplers showed showed DVT involving the right distal popliteal and right proximal  posterior tibial vein. She was placed on Xarelto.     4. Anemia     - felt to be mainly from CKD.    - also with h/o iron deficiency anemia in 2016. Hb had dropped lower than her baseline and w/u revealed BRANNON. Had EGD/c-scope and capsule endoscopy which were unrevealing. Malabsorption?    5.        -  Sputum from 10/2022 positive for BILL.     6. Hospitalized 8/2020 in August with MS changes after a fall. Found to have SDH and UTI. Was placed on antibiotics. NSG was consulted and no surgical intervention was felt indicated. Had delirium during her hospital stay which responded well to seroquel.  Was discharged home 9/9/20 with home PT    History of Present Illness  Jayne Car is an 85-year-old female with h/o lung cancer who presents for follow-up regarding recent CT scan results.      She experiences shortness of breath primarily when climbing onto her high bed, causing her heart to race. She uses bars to assist herself due to a previous fall.    She has a persistent cough. Previous evaluations indicated that her lung sounds are fine. She plans to restart her nasal sprays to see if it alleviates the cough.    She had a recent episode of norovirus in December, which led to hospitalization due to kidney issues.     No wheezing or fevers.  Appetite good. Denies chest pain or abdominal pain, change in urinary habits, headaches, dizziness or visual symptoms. No B symptoms. No problems on Xarelto. No bleeding.      Current Medications:    Current Outpatient Medications:     XARELTO 15 MG Oral Tab, TAKE 1 TABLET DAILY WITH   FOOD, Disp: 90 tablet, Rfl: 3    BREO ELLIPTA 200-25 MCG/ACT Inhalation Aerosol Powder, Breath Activated, USE 1 INHALATION DAILY, Disp: 170 each, Rfl: 3    hydrALAZINE 50 MG Oral Tab, , Disp: , Rfl:     fluorouracil 5 % External Cream, Apply topically in the morning and before bedtime. prn., Disp: , Rfl:     MONTELUKAST 10 MG Oral Tab, TAKE 1 TABLET EVERY EVENING, Disp: 90 tablet, Rfl: 3     dilTIAZem  MG Oral Capsule SR 24 Hr, Take 1 capsule (180 mg total) by mouth in the morning., Disp: , Rfl:     rosuvastatin 5 MG Oral Tab, Take 1 tablet (5 mg total) by mouth nightly., Disp: 90 tablet, Rfl: 3    metoprolol succinate 50 MG Oral Tablet 24 Hr, Take 1 tablet (50 mg total) by mouth 2 (two) times a day., Disp: 180 tablet, Rfl: 1    LOSARTAN POTASSIUM 50 MG Oral Tab, TAKE 1 TABLET TWICE A DAY, Disp: 180 tablet, Rfl: 2    Iron, Ferrous Sulfate, 325 (65 Fe) MG Oral Tab, Take by mouth in the morning. CLARIFY DOSE WITH PATIENT: THIS WAS ORDERED BY ONCOLOGY., Disp: 30 tablet, Rfl: 0    Albuterol Sulfate  (90 Base) MCG/ACT Inhalation Aero Soln, Inhale 2 puffs into the lungs every 4 (four) hours as needed. prn, Disp: , Rfl:     acetaminophen 500 MG Oral Tab, Take 1 tablet (500 mg total) by mouth every 6 (six) hours as needed for Pain., Disp: , Rfl:     triamcinolone 0.1 % External Cream, Apply topically 2 (two) times daily as needed. Up to 5, then 1 week break before restarting this cycle as needed. (Patient not taking: Reported on 4/9/2025), Disp: 1 each, Rfl: 1    Saline Nasal Spray 0.65 % Nasal Solution, 1 spray by Nasal route as needed for congestion. (Patient not taking: Reported on 4/9/2025), Disp: , Rfl:     Past Medical History:  Past Medical History:    Abdominal hernia    Acute kidney injury    Acute subdural hematoma (HCC)    Anemia    Arthritis    Azotemia    Bilateral lung cancer (HCC)    Cancer (HCC)    right lung    Cancer (HCC)    lymphoma    Chronic cough    Clostridium difficile colitis    Community acquired pneumonia, unspecified laterality    Complete rupture of rotator cuff    Constipation    COPD (chronic obstructive pulmonary disease) (HCC)    no O2    COPD (chronic obstructive pulmonary disease) with acute bronchitis (HCC)    COPD exacerbation (HCC)    Deep vein thrombosis (HCC)    Dependence on supplemental oxygen    Diaphragmatic hernia without mention of obstruction or  gangrene    Diarrhea    Diverticulitis of large intestine without perforation or abscess without bleeding    Dysphagia, pharyngoesophageal phase    Epistaxis    Esophageal reflux    Essential hypertension    Exposure to unspecified radiation    right lower lung    Gluteal tendinitis    Gram-negative pneumonia (HCC)    Hearing impairment    bilateral hearing aids     Hearing loss    Hemorrhoids    HIGH BLOOD PRESSURE    High cholesterol    History of blood clots    History of lung cancer    Hyperglycemia    Hyperlipidemia    Hypokalemia    Leaking of urine    Leg swelling    Leukocytosis    Low grade B-cell lymphoma (HCC)    Lung cancer (HCC)    Malignant neoplasm of bronchus and lung, unspecified site    Mass of right forearm    Metabolic acidosis    NHL (nodular histiocytic lymphoma) (HCC)    Nocturnal leg cramps    Normocytic normochromic anemia    Osteoarthritis    Osteoporosis    Personal history of antineoplastic chemotherapy    LAST 10/2019    Personal history of malignant neoplasm of bronchus and lung    Personal history of urinary (tract) infection    Plantar fascial fibromatosis    Pneumonia due to infectious organism    Pneumonia due to organism    Pulmonary embolism (HCC)    right lung,cleared on own per pt    Renal disorder    Renal insufficiency    Scoliosis (and kyphoscoliosis), idiopathic    SOB (shortness of breath)    Stool incontinence    Urticaria, unspecified    Visual impairment    glasses    Wears glasses       Past Surgical History:  Past Surgical History:   Procedure Laterality Date    Cholecystectomy  2008    Colonoscopy  2008    Hernia surgery      Hysterectomy  1970's    vaginal          Other      lung biopsies    Other surgical history  ,    bunionectomy,bilateral    Other surgical history  2017    Cysto- Dr. Tatum    Removal of lung,lobectomy  2009    removal of upper right lobe    Repair ing hernia,5+y/o,reducibl      Tonsillectomy      Total abdom  hysterectomy         Family Medical History:  Family History   Problem Relation Age of Onset    Other (immune system) Mother     Cancer Mother     Cancer Father         of the mouth    Hypertension Father     Cancer Son     Cancer Son        Gyne History:  OB History    Para Term  AB Living   3 3 2 1 0 2   SAB IAB Ectopic Multiple Live Births   0 0 0 2 2       Psychosocial History:  Social History     Socioeconomic History    Marital status:      Spouse name: Not on file    Number of children: Not on file    Years of education: Not on file    Highest education level: Not on file   Occupational History    Not on file   Tobacco Use    Smoking status: Never     Passive exposure: Never    Smokeless tobacco: Never   Vaping Use    Vaping status: Never Used   Substance and Sexual Activity    Alcohol use: Not Currently    Drug use: No    Sexual activity: Not on file   Other Topics Concern     Service Not Asked    Blood Transfusions Not Asked    Caffeine Concern Yes     Comment: soda    Occupational Exposure Not Asked    Hobby Hazards Not Asked    Sleep Concern Not Asked    Stress Concern Not Asked    Weight Concern Not Asked    Special Diet Not Asked    Back Care Not Asked    Exercise No    Bike Helmet Not Asked    Seat Belt Not Asked    Self-Exams Not Asked   Social History Narrative    Not on file     Social Drivers of Health     Food Insecurity: Unknown (2025)    NCSS - Food Insecurity     Worried About Running Out of Food in the Last Year: No     Ran Out of Food in the Last Year: Not on file   Transportation Needs: No Transportation Needs (2025)    NCSS - Transportation     Lack of Transportation: No   Housing Stability: Not At Risk (2025)    NCSS - Housing/Utilities     Has Housing: Yes     Worried About Losing Housing: No     Unable to Get Utilities: No         Allergies:  Allergies   Allergen Reactions    Bactrim [Sulfamethoxazole W/Trimethoprim] HIVES     Facial hives     Radiology Contrast Iodinated Dyes OTHER (SEE COMMENTS)     HARSH ON KIDNEYS    Penicillins ITCHING        Review of Systems:  A 14-point ROS was done with pertinent positives and negative per the HPI    Vital Signs:  /72 (BP Location: Left arm, Patient Position: Sitting, Cuff Size: adult)   Pulse 78   Temp (!) 96 °F (35.6 °C) (Tympanic)   Resp 16   Ht 1.499 m (4' 11.02\")   Wt 53.1 kg (117 lb)   SpO2 96%   BMI 23.62 kg/m²       Physical Examination:  General: Patient is alert and oriented x 3, not in acute distress. No rash.   Psych:  Mood and affect appropriate  HEENT: EOMs intact. PERRL. Oropharynx w/o patches or exudates. No significant mucositis  Neck: No JVD. No palpable lymphadenopathy. Neck is supple.  Lymphatics: There is no palpable lymphadenopathy   Chest: Diminished BS right base. No wheeze  Heart: Regular rate and rhythm.   Abdomen: Soft, non tender with good bowel sounds.  No hepatosplenomegaly.  No palpable mass.  Extremities: Pedal pulses are present.  LLE edema  Neurological: Grossly intact.        Laboratory:  Recent Results (from the past 24 hours)   IMMUNOGLOBULIN A/G/M, QUANT [E]    Collection Time: 04/15/25  2:27 PM   Result Value Ref Range    Immunoglobulin G 750 650 - 1,600 mg/dL    Immunoglobulin M 220.0 50.0 - 300.0 mg/dL    Immunoglobulin A 202.60 40.00 - 350.00 mg/dL   COMP METABOLIC PANEL [E]    Collection Time: 04/15/25  2:27 PM   Result Value Ref Range    Glucose 114 (H) 70 - 99 mg/dL    Sodium 143 136 - 145 mmol/L    Potassium 4.1 3.5 - 5.1 mmol/L    Chloride 110 98 - 112 mmol/L    CO2 25.0 21.0 - 32.0 mmol/L    Anion Gap 8 0 - 18 mmol/L    BUN 33 (H) 9 - 23 mg/dL    Creatinine 1.42 (H) 0.55 - 1.02 mg/dL    Calcium, Total 9.9 8.7 - 10.6 mg/dL    Calculated Osmolality 304 (H) 275 - 295 mOsm/kg    eGFR-Cr 36 (L) >=60 mL/min/1.73m2    AST 17 <34 U/L    ALT 13 10 - 49 U/L    Alkaline Phosphatase 121 55 - 142 U/L    Bilirubin, Total 0.5 0.2 - 1.1 mg/dL    Total Protein 6.9 5.7 -  8.2 g/dL    Albumin 4.2 3.2 - 4.8 g/dL    Globulin  2.7 2.0 - 3.5 g/dL    A/G Ratio 1.6 1.0 - 2.0    Patient Fasting for CMP? No    LDH [E]    Collection Time: 04/15/25  2:27 PM   Result Value Ref Range     120 - 246 U/L   CBC W/DIFF [E]    Collection Time: 04/15/25  2:27 PM   Result Value Ref Range    WBC 5.4 4.0 - 11.0 x10(3) uL    RBC 3.33 (L) 3.80 - 5.30 x10(6)uL    HGB 11.0 (L) 12.0 - 16.0 g/dL    HCT 34.3 (L) 35.0 - 48.0 %    .0 150.0 - 450.0 10(3)uL    .0 (H) 80.0 - 100.0 fL    MCH 33.0 26.0 - 34.0 pg    MCHC 32.1 31.0 - 37.0 g/dL    RDW 14.2 %    Neutrophil Absolute Prelim 3.72 1.50 - 7.70 x10 (3) uL    Neutrophil Absolute 3.72 1.50 - 7.70 x10(3) uL    Lymphocyte Absolute 1.02 1.00 - 4.00 x10(3) uL    Monocyte Absolute 0.49 0.10 - 1.00 x10(3) uL    Eosinophil Absolute 0.15 0.00 - 0.70 x10(3) uL    Basophil Absolute 0.03 0.00 - 0.20 x10(3) uL    Immature Granulocyte Absolute 0.03 0.00 - 1.00 x10(3) uL    Neutrophil % 68.2 %    Lymphocyte % 18.8 %    Monocyte % 9.0 %    Eosinophil % 2.8 %    Basophil % 0.6 %    Immature Granulocyte % 0.6 %       Radiology:  PROCEDURE:  CT CHEST (CPT=71250)     COMPARISON:  [facility] , CT, CT CHEST BRONCH NAVIGATION PRE-OP <6 WEEKS (CPT=76497), 11/08/2024, 12:04 PM.     INDICATIONS:  C34.12 Malignant neoplasm of upper lobe of left lung (HCC)     TECHNIQUE:  Unenhanced multislice CT scanning is performed through the chest.  Dose reduction techniques were used. Dose information is transmitted to the ACR (American College of Radiology) NRDR (National Radiology Data Registry) which includes the Dose   Index Registry.     PATIENT STATED HISTORY: (As transcribed by Technologist)  Patient states no current chest complaints; she has a history of lung cancer.         FINDINGS:    LUNGS:  The mixed attenuation solid and ground-glass nodule in the left upper lobe described previously series 5, image 51 currently measures 2.4 x 2 cm (previously 2.5 x 2 cm).  There is  increasing reticular ground-glass density in the adjacent left  upper lobe and also in the superior segment of the left lower lobe where there is solid nodular component in left lower lobe series 5, image 48 measuring 1.9 x 1.4 cm.  These findings are likely radiation induced fibrosis and organizing pneumonia  assuming patient is being treated in this area.  Confluent opacity with adjacent dilated bronchi in the right lower lobe series 5, image 75 measures 1.9 x 1.5 cm (previously 2.5 x 2 cm).  Tree-in-bud type nodularity basilar segments right lower lobe is  most likely chronic indolent endobronchial infection or chronic aspiration.  Scattered 2-3 mm nodules in the left upper lobe are noted (see for example series 5, image 60 and series 5, image 57).  These are not significantly changed.  7 mm left lower  lobe lung nodule series 5, image 63 is stable.  There has been a right upper lobectomy.  VASCULATURE:  Pulmonary vessels are unremarkable within the limits of a noncontrast CT.    PHILIPPE:  No mass or adenopathy.    MEDIASTINUM:  No mass or adenopathy.    CARDIAC:  There is mild coronary artery atherosclerosis.  PLEURA:  Small to moderate bilateral pleural effusions are noted.  This is slightly increased on the right and new on the left.  THORACIC AORTA:  Thoracic aorta is atherosclerotic but not aneurysmal.  CHEST WALL:  No mass or axillary adenopathy.    LIMITED ABDOMEN:  There are exophytic cystic lesions noted in both kidneys.  There is a large hiatal hernia.  BONES:  No bony lesion or fracture.                     Impression   CONCLUSION:    1. Findings around the left upper lobe lung nodule and in the adjacent superior segment left lower lobe are most likely related to radiation therapy in this area.  Attention to this on follow-up studies is recommended.  The nodular component of this  abnormality is otherwise not significantly changed.  2. Stable postoperative and post treatment changes in the right lung.  3.  Innumerable scattered small nodules throughout both lungs are not significantly changed.  In the right lower lobe this has a \"tree-in-bud\" appearance suggesting this is chronic indolent endobronchial infection or chronic aspiration.  4. Interval increase in small to moderate right pleural effusion and interval development of a small to moderate left pleural effusion.  5. Large hiatal hernia.  6. Otherwise incidental findings are given above.       LOCATION:  [facility]        Dictated by (CST): Alan Enriquez MD on 4/07/2025 at 10:18 AM      Finalized by (CST): Alan Enriquez MD on 4/07/2025 at 10:32 AM       PROCEDURE:  CT CHEST+ABDOMEN+PELVIS(CPT=71250/75594)     COMPARISON:  [facility], CT, CT CHEST+ABDOMEN+PELVIS(CPT=71250/51254), 1/28/2023, 1:01 PM.  [facility], CT, CT CHEST (CPT=71250), 5/21/2023, 12:48 PM.  [facility], CT, CT CHEST+ABDOMEN+PELVIS(CPT=71250/61148), 12/09/2023, 1:23 PM.    [facility], CT, CT CHEST+ABDOMEN+PELVIS(CPT=71250/89348), 4/07/2024, 9:47 AM.     INDICATIONS:  C85.10 Low grade B-cell lymphoma (HCC) C34.81 Malignant neoplasm of overlapping sites of right lung (HCC)     TECHNIQUE:  Following oral contrast administration, unenhanced multislice CT scanning is performed through the chest, abdomen, and pelvis.  Dose reduction techniques were used. Dose information is transmitted to the ACR (American College of Radiology)  NRDR (National Radiology Data Registry) which includes the Dose Index Registry.     PATIENT STATED HISTORY: (As transcribed by Technologist)  Patient has history of right Lung cancer and low grade B-cell lymphoma.  Disease surveillance.         FINDINGS:       CHEST:    LUNGS:  Mixed ground-glass and solid nodule in the left upper lobe measures axial dimensions of 2.7 x 2.2 cm (previously 2.5 x 1.8 cm.  The solid component of this measures axial dimensions of 1.1 x 1.1 cm (previously 1.2 x 1.1 cm).  There is some  extension to the fissure in  the left lung with suggestion of retraction of the fissure as noted for example series 4, image 53. This lesion has imaging appearance which would be consistent with a low-grade lepidic type adenocarcinoma.  Spiculated right  lower lobe lung nodule which has been previously described measures 2.4 x 1.7 cm (previously 2.9 x 1.8 cm.  Innumerable other pulmonary parenchymal nodules are scattered in lungs.  Representative left upper lobe nodule series 4, image 40 measures 0.5 cm  (previously 0.4 cm).  A left upper lobe nodule series 4, image 65 measures 0.5 cm (previously 0.4 cm).  A left lobe nodule series 4, image 64 measures 0.7 cm in diameter and is unchanged.  There is reticular nodular appearance to the right lower lobe  with volume loss in right lower lobe.  MEDIASTINUM:  No mass or adenopathy.    PHILIPPE:  Postoperative changes in right lung are noted.  CARDIAC:  There is mild coronary artery atherosclerosis.  PLEURA:  Moderate right pleural effusion is unchanged.  CHEST WALL:  No mass or axillary adenopathy.    AORTA:  Aorta is atherosclerotic but not aneurysmal.  VASCULATURE:  Pulmonary vessels are unremarkable within the limits of a noncontrast CT.       ABDOMEN/PELVIS:  LIVER:  No enlargement, atrophy, abnormal density, or significant focal lesion.    BILIARY:  There has been previous cholecystectomy.  There is moderate distention of the common bile duct.  PANCREAS:  No lesion, fluid collection, ductal dilatation, or atrophy.    SPLEEN:  No enlargement or focal lesion.    KIDNEYS:  Mixed attenuation lesion in right kidney measures 4 x 4 cm (previously 3.7 x 3.4 cm).  This could be a hemorrhagic cyst, however, solid renal mass would not be excluded based on this appearance.  There are multiple exophytic low-density lesions   in both kidneys which are probably cysts and there are hyperdense cysts in the lower pole of the right kidney.  ADRENALS:  No mass or enlargement.    AORTA:  Aorta is diffusely  atherosclerotic but not aneurysmal.  RETROPERITONEUM:  No mass or adenopathy.    BOWEL/MESENTERY:  There is diverticulosis of the colon.  There is no CT evidence of diverticulitis.  Increased density in the mesentery is noted.  This is a stable finding and a nonspecific indicator of prior inflammation.  ABDOMINAL WALL:  Bilateral fat containing inguinal hernias are noted.  URINARY BLADDER:  No visible focal wall thickening, lesion, or calculus.    PELVIC NODES:  No adenopathy.    PELVIC ORGANS:  No visible mass.  Pelvic organs appropriate for patient age.    BONES:  There is levo scoliotic deformity of the lumbar spine.  There are no aggressive bone lesions.                   Impression   CONCLUSION:    1. There is interval increase in size of the ground-glass density nodule in the left upper lobe (2.7 x 2.2 cm versus 2.5 x 1.8 cm previously).  This abuts the fissure in the left lung and causes some tenting of the fissure.  Imaging appearance would be  most consistent with a low-grade lepidic type adenocarcinoma.  2. Other pulmonary parenchymal nodules are not significantly changed.  3. Right pleural effusion is not significantly changed.  4. Mixed attenuation lesion right kidney is noted.  This is slightly increased as compared to previous study.  This could be a hemorrhagic cyst but is nonspecific on a noncontrast study.  Renal neoplasm would not be excluded without the benefit of a  contrast-enhanced study.  5. There is a hiatal hernia.  6. There is diverticulosis of colon without CT evidence of diverticulitis.  7. Other findings described above are unchanged.                LOCATION:  [facility]           Dictated by (CST): Alan Enriquez MD on 8/25/2024 at 1:27 PM      Finalized by (CST): Alan Enriquez MD on 8/25/2024 at 2:09 PM             Impression and Plan:  Assessment & Plan  1. H/o multiple lung cancers-    -  CT from 8/2024 showed interval increase in LEO GG density nodule. Imaging reviewed at lung  conference. Consensus was to proceed with PET and biopsy of LEO GG/solid nodule. Bronch was recommended but was hesitant to undergo this given previous episodes she had in the past (suffered traumatic epistaxis after nasal trumpet was placed by anesthesia prior to bronch).   - PET showed uptake of 3.1 corresponding to this ELO nodule.  - finally agreed to undergo bronch/EBUS for biopsy which showed adenocarcinoma c/w lung adenocarcinoma with lepidic spread. Was referred to Rad Onc for SBRT which she completed 1/2025, CT just done showed findings felt to be post RT change and small effusions. Met with pulmonary recently who discussed possible tap which she wanted to defer for now    2. Pulmonary nodules  - except for enlarging LEO lesion (see 1), rest were described as stable. Findings felt to be likely infectious/inflammatory- has h/o BILL and granulomatous disease.  Continue vest therapy and inhalers. Following with surveillance scans.     3. Anemia  - Multifactorial. Known CKD with intermittent h/o BRANNON with previous GI w/u negative. No evidence of hemolysis. B12, iron and folate are ok. 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  Bone marrow biopsy aspirate from 2018 showed active trilineage hematopoiesis with no significant dysplastic changes, blasts, lymphocytosis or plasmacytosis.        4. Low grade lymphoproliferative disorder  - had excellent response with BR but reported SE that affected her QoL and therefore did not complete treatment as initially planned. Viscosity normalized and IgM levels and M spike have decreased significantly. Subsequent CTs showed resolution of adenopathy with decreased IgM. IgM, light chain and M spike levels still low. No worsening adenopathy on recent scans     5. DVT  - distal and appears unprovoked.  As 2nd episode of VTE would favor indefinite anticoagulation as long as bleeding risk is low. On Xarelto but given renal insufficiency at reduced dose 15  daily or can change to Apixaban. She wants to stay on Xarelto. No reported bleeding.     6. Right kidney lesion  - slightly increased. Previous US in 2020 showed complex hypoechoic lesion  which back then was reported as stable going back to 2019. Monitor    7. Chronic cough  - follows with ENT and pulmonary  Persistent cough with previous evaluations by Dr. Mcgill and Dr. Feldman showing no significant lung issues. Advised to restart nasal sprays to alleviate the cough. Consider a course of steroids if the cough persists, as per pulmonary    Labs and imaging reviewed.   CT ordered for 3 months- if good will space out to 6       Risk level high- NHL and h/o multiple lung cancers s/p SBRT    Debbie Chang MD  [facility]

## 2025-04-22 LAB
ALBUMIN SERPL ELPH-MCNC: 3.58 G/DL (ref 3.75–5.21)
ALBUMIN/GLOB SERPL: 1.31 {RATIO} (ref 1–2)
ALPHA1 GLOB SERPL ELPH-MCNC: 0.3 G/DL (ref 0.19–0.46)
ALPHA2 GLOB SERPL ELPH-MCNC: 0.83 G/DL (ref 0.48–1.05)
B-GLOBULIN SERPL ELPH-MCNC: 0.73 G/DL (ref 0.68–1.23)
GAMMA GLOB SERPL ELPH-MCNC: 0.86 G/DL (ref 0.62–1.7)
KAPPA LC FREE SER-MCNC: 3.75 MG/DL (ref 0.33–1.94)
KAPPA LC FREE/LAMBDA FREE SER NEPH: 0.52 {RATIO} (ref 0.26–1.65)
LAMBDA LC FREE SERPL-MCNC: 7.26 MG/DL (ref 0.57–2.63)
M PROTEIN 1 SERPL ELPH-MCNC: 0.24 G/DL (ref ?–0)
PROT SERPL-MCNC: 6.3 G/DL (ref 5.7–8.2)

## 2025-05-27 ENCOUNTER — TELEPHONE (OUTPATIENT)
Age: 85
End: 2025-05-27

## 2025-05-27 NOTE — TELEPHONE ENCOUNTER
Called pt back. Pt states she is due for CT scan in July, wanting to have it done sooner due to worsen cough. Pt also seeing pulmonology today for cough.  Will discuss with MQ, approval given. In meantime pt to see pulmonology as scheduled for their recommendation.

## 2025-07-13 ENCOUNTER — HOSPITAL ENCOUNTER (OUTPATIENT)
Dept: CT IMAGING | Age: 85
Discharge: HOME | End: 2025-07-13
Attending: INTERNAL MEDICINE
Payer: MEDICARE

## 2025-07-13 ENCOUNTER — HOSPITAL ENCOUNTER (OUTPATIENT)
Dept: CT IMAGING | Age: 85
End: 2025-07-13
Attending: INTERNAL MEDICINE
Payer: MEDICARE

## 2025-07-13 DIAGNOSIS — C85.10 LOW GRADE B-CELL LYMPHOMA (HCC): ICD-10-CM

## 2025-07-13 DIAGNOSIS — C34.81 MALIGNANT NEOPLASM OF OVERLAPPING SITES OF RIGHT LUNG (HCC): ICD-10-CM

## 2025-07-13 PROCEDURE — 74176 CT ABD & PELVIS W/O CONTRAST: CPT | Performed by: INTERNAL MEDICINE

## 2025-07-13 PROCEDURE — 71250 CT THORAX DX C-: CPT | Performed by: INTERNAL MEDICINE

## 2025-07-22 ENCOUNTER — APPOINTMENT (OUTPATIENT)
Facility: LOCATION | Age: 85
End: 2025-07-22
Attending: FAMILY MEDICINE
Payer: MEDICARE

## 2025-07-22 ENCOUNTER — TELEPHONE (OUTPATIENT)
Dept: FAMILY MEDICINE CLINIC | Facility: CLINIC | Age: 85
End: 2025-07-22

## 2025-07-22 ENCOUNTER — OFFICE VISIT (OUTPATIENT)
Dept: NEPHROLOGY | Facility: CLINIC | Age: 85
End: 2025-07-22
Payer: MEDICARE

## 2025-07-22 ENCOUNTER — OFFICE VISIT (OUTPATIENT)
Facility: LOCATION | Age: 85
End: 2025-07-22
Attending: FAMILY MEDICINE
Payer: MEDICARE

## 2025-07-22 VITALS
OXYGEN SATURATION: 96 % | SYSTOLIC BLOOD PRESSURE: 149 MMHG | TEMPERATURE: 97 F | BODY MASS INDEX: 22.98 KG/M2 | DIASTOLIC BLOOD PRESSURE: 68 MMHG | HEIGHT: 59.02 IN | WEIGHT: 114 LBS | HEART RATE: 64 BPM | RESPIRATION RATE: 16 BRPM

## 2025-07-22 VITALS — DIASTOLIC BLOOD PRESSURE: 78 MMHG | WEIGHT: 114.75 LBS | SYSTOLIC BLOOD PRESSURE: 148 MMHG | BODY MASS INDEX: 23 KG/M2

## 2025-07-22 DIAGNOSIS — J90 CHRONIC BILATERAL PLEURAL EFFUSIONS: ICD-10-CM

## 2025-07-22 DIAGNOSIS — C34.81 MALIGNANT NEOPLASM OF OVERLAPPING SITES OF RIGHT LUNG (HCC): ICD-10-CM

## 2025-07-22 DIAGNOSIS — N18.30 STAGE 3 CHRONIC KIDNEY DISEASE, UNSPECIFIED WHETHER STAGE 3A OR 3B CKD (HCC): Primary | ICD-10-CM

## 2025-07-22 DIAGNOSIS — D64.9 NORMOCYTIC ANEMIA: ICD-10-CM

## 2025-07-22 DIAGNOSIS — D47.2 MONOCLONAL PARAPROTEINEMIA: ICD-10-CM

## 2025-07-22 DIAGNOSIS — N18.32 CHRONIC KIDNEY DISEASE, STAGE 3B (HCC): ICD-10-CM

## 2025-07-22 DIAGNOSIS — R60.1 GENERALIZED EDEMA: ICD-10-CM

## 2025-07-22 DIAGNOSIS — C85.10 LOW GRADE B-CELL LYMPHOMA (HCC): Primary | ICD-10-CM

## 2025-07-22 DIAGNOSIS — I10 PRIMARY HYPERTENSION: ICD-10-CM

## 2025-07-22 DIAGNOSIS — R93.89 ABNORMAL CT OF THE CHEST: ICD-10-CM

## 2025-07-22 DIAGNOSIS — I82.431 ACUTE DEEP VEIN THROMBOSIS (DVT) OF POPLITEAL VEIN OF RIGHT LOWER EXTREMITY (HCC): ICD-10-CM

## 2025-07-22 DIAGNOSIS — R80.9 PROTEINURIA, UNSPECIFIED TYPE: ICD-10-CM

## 2025-07-22 LAB
ALBUMIN SERPL-MCNC: 3.9 G/DL (ref 3.2–4.8)
ALBUMIN/GLOB SERPL: 1.6 (ref 1–2)
ALP LIVER SERPL-CCNC: 106 U/L (ref 55–142)
ALT SERPL-CCNC: 11 U/L (ref 10–49)
ANION GAP SERPL CALC-SCNC: 6 MMOL/L (ref 0–18)
AST SERPL-CCNC: 17 U/L (ref ?–34)
BASOPHILS # BLD AUTO: 0.04 X10(3) UL (ref 0–0.2)
BASOPHILS NFR BLD AUTO: 0.6 %
BILIRUB SERPL-MCNC: 0.4 MG/DL (ref 0.2–1.1)
BUN BLD-MCNC: 37 MG/DL (ref 9–23)
CALCIUM BLD-MCNC: 9.1 MG/DL (ref 8.7–10.6)
CHLORIDE SERPL-SCNC: 112 MMOL/L (ref 98–112)
CO2 SERPL-SCNC: 25 MMOL/L (ref 21–32)
CREAT BLD-MCNC: 1.53 MG/DL (ref 0.55–1.02)
EGFRCR SERPLBLD CKD-EPI 2021: 33 ML/MIN/1.73M2 (ref 60–?)
EOSINOPHIL # BLD AUTO: 0.15 X10(3) UL (ref 0–0.7)
EOSINOPHIL NFR BLD AUTO: 2.3 %
ERYTHROCYTE [DISTWIDTH] IN BLOOD BY AUTOMATED COUNT: 15.6 %
FASTING STATUS PATIENT QL REPORTED: NO
GLOBULIN PLAS-MCNC: 2.4 G/DL (ref 2–3.5)
GLUCOSE BLD-MCNC: 100 MG/DL (ref 70–99)
HCT VFR BLD AUTO: 29.6 % (ref 35–48)
HGB BLD-MCNC: 9.3 G/DL (ref 12–16)
IGA SERPL-MCNC: 196.1 MG/DL (ref 40–350)
IGM SERPL-MCNC: 213.8 MG/DL (ref 50–300)
IMM GRANULOCYTES # BLD AUTO: 0.06 X10(3) UL (ref 0–1)
IMM GRANULOCYTES NFR BLD: 0.9 %
IMMUNOGLOBULIN PNL SER-MCNC: 603 MG/DL (ref 650–1600)
LDH SERPL L TO P-CCNC: 159 U/L (ref 120–246)
LYMPHOCYTES # BLD AUTO: 1 X10(3) UL (ref 1–4)
LYMPHOCYTES NFR BLD AUTO: 15.3 %
MCH RBC QN AUTO: 33.1 PG (ref 26–34)
MCHC RBC AUTO-ENTMCNC: 31.4 G/DL (ref 31–37)
MCV RBC AUTO: 105.3 FL (ref 80–100)
MONOCYTES # BLD AUTO: 0.6 X10(3) UL (ref 0.1–1)
MONOCYTES NFR BLD AUTO: 9.2 %
NEUTROPHILS # BLD AUTO: 4.68 X10 (3) UL (ref 1.5–7.7)
NEUTROPHILS # BLD AUTO: 4.68 X10(3) UL (ref 1.5–7.7)
NEUTROPHILS NFR BLD AUTO: 71.7 %
OSMOLALITY SERPL CALC.SUM OF ELEC: 305 MOSM/KG (ref 275–295)
PLATELET # BLD AUTO: 230 10(3)UL (ref 150–450)
POTASSIUM SERPL-SCNC: 4.5 MMOL/L (ref 3.5–5.1)
PROT SERPL-MCNC: 6.3 G/DL (ref 5.7–8.2)
RBC # BLD AUTO: 2.81 X10(6)UL (ref 3.8–5.3)
SODIUM SERPL-SCNC: 143 MMOL/L (ref 136–145)
WBC # BLD AUTO: 6.5 X10(3) UL (ref 4–11)

## 2025-07-22 PROCEDURE — 99204 OFFICE O/P NEW MOD 45 MIN: CPT | Performed by: INTERNAL MEDICINE

## 2025-07-22 NOTE — PROGRESS NOTES
Nephrology Consult Note    REASON FOR CONSULT: CKD 3 / pleural effusions    ASSESSMENT/PLAN:      1) CKD 3- baseline Cr 1.4-1.6 mg/dl > 10 years + low grade proteinuria is primarily due to longstanding hypertension and age-related nephrosclerosis +/- low grade glomerulopathy- these findings predate diagnosis and treatment of NHL / NSCLC. UA remains bland; meds benign; no other acute insults. PLAN- reassured pt renal function has been stable x years- no further w/u. To focus on BP mgmt.    2) Pleural effusions- small / modest R pleural effusion and minimal L pleural effusion are essentially unchanged by CT (to my eyes) 8/24, 4/25, 7/25. Does not appear volume up overall and no h/o CHF. Renal function stable x yrs with minimal proteinuria. Await echo- defer loop diuretics if unimpressive.    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  Has been followed by Dr. Goodwin and Dr. Chang undergoing serial imaging studies or recurrent lung cancer; noted to have \"moderate\" pleural effusions which apparently increased in size over the last year although they look very similar to me.  Denies exertional dyspnea, increasing weight, significant lower extremity swelling, etc.    ROS:    Denies fever/chills  Denies wt loss/gain  Denies HA or visual changes  Denies CP or palpitations  Denies SOB/cough/hemoptysis  Denies abd or flank pain  Denies N/V/D  Denies change in urinary habits or gross hematuria  Denies LE edema  Denies skin  rashes/myalgias/arthralgias    PMH:  Past Medical History[1]    PSH:  Past Surgical History[2]    Medications (Active prior to today's visit):  Current Medications[3]    Allergies:  Allergies[4]    Social History:  Social Hx on file[5]     Family History:  Denies family history of kidney disease.    PHYSICAL EXAM:   /78 (BP Location: Left arm, Patient Position: Sitting, Cuff Size: adult)   Wt 114 lb 12 oz (52.1 kg)   BMI 23.16 kg/m²    Wt Readings from Last 3 Encounters:   07/22/25 114 lb 12 oz (52.1 kg)   07/22/25 114 lb (51.7 kg)   04/15/25 117 lb (53.1 kg)     General: Alert and oriented in no apparent distress.  HEENT: No scleral icterus, MMM  Neck: Supple, no CIARA or thyromegaly  Cardiac: Regular rate and rhythm, S1, S2 normal, no murmur or rub  Lungs: Clear without wheezes, rales, rhonchi.    Abdomen: Soft, non-tender. + bowel sounds, no palpable organomegaly  Extremities: Without clubbing, cyanosis or edema.  Neurologic:  normal affect, cranial nerves grossly intact, moving all extremities  Skin: Warm and dry, no rashes        Chato Cardoza MD  7/22/2025  4:11 PM         [1]   Past Medical History:   Abdominal hernia    Acute kidney injury    Acute subdural hematoma (HCC)    Anemia    Arthritis    Azotemia    Bilateral lung cancer (HCC)    Cancer (HCC)    right lung    Cancer (HCC)    lymphoma    Chronic cough    Clostridium difficile colitis    Community acquired pneumonia, unspecified laterality    Complete rupture of rotator cuff    Constipation    COPD (chronic obstructive pulmonary disease) (HCC)    no O2    COPD (chronic obstructive pulmonary disease) with acute bronchitis (HCC)    COPD exacerbation (HCC)    Deep vein thrombosis (HCC)    Dependence on supplemental oxygen    Diaphragmatic hernia without mention of obstruction or gangrene    Diarrhea    Diverticulitis of large intestine without perforation or abscess without bleeding    Dysphagia, pharyngoesophageal phase    Epistaxis     Esophageal reflux    Essential hypertension    Exposure to unspecified radiation    right lower lung    Gluteal tendinitis    Gram-negative pneumonia (HCC)    Hearing impairment    bilateral hearing aids     Hearing loss    Hemorrhoids    HIGH BLOOD PRESSURE    High cholesterol    History of blood clots    History of lung cancer    Hyperglycemia    Hyperlipidemia    Hypokalemia    Leaking of urine    Leg swelling    Leukocytosis    Low grade B-cell lymphoma (HCC)    Lung cancer (HCC)    Malignant neoplasm of bronchus and lung, unspecified site    Mass of right forearm    Metabolic acidosis    NHL (nodular histiocytic lymphoma) (HCC)    Nocturnal leg cramps    Normocytic normochromic anemia    Osteoarthritis    Osteoporosis    Personal history of antineoplastic chemotherapy    LAST 10/2019    Personal history of malignant neoplasm of bronchus and lung    Personal history of urinary (tract) infection    Plantar fascial fibromatosis    Pneumonia due to infectious organism    Pneumonia due to organism    Pulmonary embolism (HCC)    right lung,cleared on own per pt    Renal disorder    Renal insufficiency    Scoliosis (and kyphoscoliosis), idiopathic    SOB (shortness of breath)    Stool incontinence    Urticaria, unspecified    Visual impairment    glasses    Wears glasses   [2]   Past Surgical History:  Procedure Laterality Date    Cholecystectomy  2008    Colonoscopy  2008    Hernia surgery      Hysterectomy  's    vaginal          Other      lung biopsies    Other surgical history  ,    bunionectomy,bilateral    Other surgical history  2017    Cysto- Dr. Tatum    Removal of lung,lobectomy      removal of upper right lobe    Repair ing hernia,5+y/o,reducibl      Tonsillectomy      Total abdom hysterectomy     [3]   Current Outpatient Medications   Medication Sig Dispense Refill    XARELTO 15 MG Oral Tab TAKE 1 TABLET DAILY WITH   FOOD 90 tablet 3    BREO ELLIPTA 200-25 MCG/ACT  Inhalation Aerosol Powder, Breath Activated USE 1 INHALATION DAILY 170 each 3    hydrALAZINE 50 MG Oral Tab       fluorouracil 5 % External Cream Apply topically in the morning and before bedtime. prn.      MONTELUKAST 10 MG Oral Tab TAKE 1 TABLET EVERY EVENING 90 tablet 3    dilTIAZem  MG Oral Capsule SR 24 Hr Take 1 capsule (180 mg total) by mouth in the morning.      rosuvastatin 5 MG Oral Tab Take 1 tablet (5 mg total) by mouth nightly. 90 tablet 3    metoprolol succinate 50 MG Oral Tablet 24 Hr Take 1 tablet (50 mg total) by mouth 2 (two) times a day. 180 tablet 1    LOSARTAN POTASSIUM 50 MG Oral Tab TAKE 1 TABLET TWICE A  tablet 2    Iron, Ferrous Sulfate, 325 (65 Fe) MG Oral Tab Take by mouth in the morning. CLARIFY DOSE WITH PATIENT: THIS WAS ORDERED BY ONCOLOGY. 30 tablet 0    Albuterol Sulfate  (90 Base) MCG/ACT Inhalation Aero Soln Inhale 2 puffs into the lungs every 4 (four) hours as needed. prn      acetaminophen 500 MG Oral Tab Take 1 tablet (500 mg total) by mouth every 6 (six) hours as needed for Pain.     [4]   Allergies  Allergen Reactions    Bactrim [Sulfamethoxazole W/Trimethoprim] HIVES     Facial hives    Radiology Contrast Iodinated Dyes OTHER (SEE COMMENTS)     HARSH ON KIDNEYS    Penicillins ITCHING   [5]   Social History  Socioeconomic History    Marital status:    Tobacco Use    Smoking status: Never     Passive exposure: Never    Smokeless tobacco: Never   Vaping Use    Vaping status: Never Used   Substance and Sexual Activity    Alcohol use: Not Currently    Drug use: No   Other Topics Concern    Caffeine Concern Yes     Comment: soda    Exercise No

## 2025-07-23 ENCOUNTER — HOSPITAL ENCOUNTER (INPATIENT)
Facility: HOSPITAL | Age: 85
LOS: 5 days | Discharge: HOME | DRG: 186 | End: 2025-08-01
Attending: EMERGENCY MEDICINE | Admitting: HOSPITALIST
Payer: MEDICARE

## 2025-07-23 ENCOUNTER — APPOINTMENT (OUTPATIENT)
Dept: GENERAL RADIOLOGY | Age: 85
DRG: 186 | End: 2025-07-23
Attending: EMERGENCY MEDICINE
Payer: MEDICARE

## 2025-07-23 DIAGNOSIS — N18.9 CHRONIC KIDNEY DISEASE, UNSPECIFIED CKD STAGE: ICD-10-CM

## 2025-07-23 DIAGNOSIS — R09.02 HYPOXIA: Primary | ICD-10-CM

## 2025-07-23 DIAGNOSIS — D64.9 CHRONIC ANEMIA: ICD-10-CM

## 2025-07-23 DIAGNOSIS — J90 CHRONIC BILATERAL PLEURAL EFFUSIONS: ICD-10-CM

## 2025-07-23 DIAGNOSIS — N30.00 ACUTE CYSTITIS WITHOUT HEMATURIA: ICD-10-CM

## 2025-07-23 DIAGNOSIS — I10 ESSENTIAL HYPERTENSION: ICD-10-CM

## 2025-07-23 LAB
ALBUMIN SERPL-MCNC: 3.9 G/DL (ref 3.2–4.8)
ALBUMIN/GLOB SERPL: 1.7 (ref 1–2)
ALP LIVER SERPL-CCNC: 106 U/L (ref 55–142)
ALT SERPL-CCNC: 10 U/L (ref 10–49)
ANION GAP SERPL CALC-SCNC: 7 MMOL/L (ref 0–18)
AST SERPL-CCNC: 18 U/L (ref ?–34)
BASOPHILS # BLD AUTO: 0.03 X10(3) UL (ref 0–0.2)
BASOPHILS NFR BLD AUTO: 0.4 %
BILIRUB SERPL-MCNC: 0.2 MG/DL (ref 0.2–1.1)
BILIRUB UR QL STRIP.AUTO: NEGATIVE
BUN BLD-MCNC: 47 MG/DL (ref 9–23)
CALCIUM BLD-MCNC: 9.1 MG/DL (ref 8.7–10.6)
CHLORIDE SERPL-SCNC: 112 MMOL/L (ref 98–112)
CLARITY UR REFRACT.AUTO: CLEAR
CO2 SERPL-SCNC: 24 MMOL/L (ref 21–32)
COLOR UR AUTO: YELLOW
CREAT BLD-MCNC: 1.65 MG/DL (ref 0.55–1.02)
EGFRCR SERPLBLD CKD-EPI 2021: 30 ML/MIN/1.73M2 (ref 60–?)
EOSINOPHIL # BLD AUTO: 0.1 X10(3) UL (ref 0–0.7)
EOSINOPHIL NFR BLD AUTO: 1.4 %
ERYTHROCYTE [DISTWIDTH] IN BLOOD BY AUTOMATED COUNT: 15.8 %
GLOBULIN PLAS-MCNC: 2.3 G/DL (ref 2–3.5)
GLUCOSE BLD-MCNC: 103 MG/DL (ref 70–99)
GLUCOSE UR STRIP.AUTO-MCNC: NEGATIVE MG/DL
HCT VFR BLD AUTO: 28.1 % (ref 35–48)
HGB BLD-MCNC: 8.9 G/DL (ref 12–16)
IMM GRANULOCYTES # BLD AUTO: 0.05 X10(3) UL (ref 0–1)
IMM GRANULOCYTES NFR BLD: 0.7 %
KETONES UR STRIP.AUTO-MCNC: NEGATIVE MG/DL
LYMPHOCYTES # BLD AUTO: 1.17 X10(3) UL (ref 1–4)
LYMPHOCYTES NFR BLD AUTO: 16.5 %
MCH RBC QN AUTO: 33.2 PG (ref 26–34)
MCHC RBC AUTO-ENTMCNC: 31.7 G/DL (ref 31–37)
MCV RBC AUTO: 104.9 FL (ref 80–100)
MONOCYTES # BLD AUTO: 0.75 X10(3) UL (ref 0.1–1)
MONOCYTES NFR BLD AUTO: 10.6 %
NEUTROPHILS # BLD AUTO: 4.98 X10 (3) UL (ref 1.5–7.7)
NEUTROPHILS # BLD AUTO: 4.98 X10(3) UL (ref 1.5–7.7)
NEUTROPHILS NFR BLD AUTO: 70.4 %
NITRITE UR QL STRIP.AUTO: NEGATIVE
OSMOLALITY SERPL CALC.SUM OF ELEC: 309 MOSM/KG (ref 275–295)
PH UR STRIP.AUTO: 5 (ref 5–8)
PLATELET # BLD AUTO: 228 10(3)UL (ref 150–450)
POTASSIUM SERPL-SCNC: 4.2 MMOL/L (ref 3.5–5.1)
PROT SERPL-MCNC: 6.2 G/DL (ref 5.7–8.2)
RBC # BLD AUTO: 2.68 X10(6)UL (ref 3.8–5.3)
RBC UR QL AUTO: NEGATIVE
SODIUM SERPL-SCNC: 143 MMOL/L (ref 136–145)
SP GR UR STRIP.AUTO: 1.01 (ref 1–1.03)
TROPONIN I SERPL HS-MCNC: 10 NG/L (ref ?–34)
UROBILINOGEN UR STRIP.AUTO-MCNC: 0.2 MG/DL
WBC # BLD AUTO: 7.1 X10(3) UL (ref 4–11)
WBC #/AREA URNS AUTO: >50 /HPF
WBC CLUMPS UR QL AUTO: PRESENT /HPF

## 2025-07-23 PROCEDURE — 99223 1ST HOSP IP/OBS HIGH 75: CPT | Performed by: STUDENT IN AN ORGANIZED HEALTH CARE EDUCATION/TRAINING PROGRAM

## 2025-07-23 PROCEDURE — 71045 X-RAY EXAM CHEST 1 VIEW: CPT | Performed by: EMERGENCY MEDICINE

## 2025-07-23 RX ORDER — SENNOSIDES 8.6 MG
17.2 TABLET ORAL NIGHTLY PRN
Status: DISCONTINUED | OUTPATIENT
Start: 2025-07-23 | End: 2025-08-01

## 2025-07-23 RX ORDER — ECHINACEA PURPUREA EXTRACT 125 MG
1 TABLET ORAL
Status: DISCONTINUED | OUTPATIENT
Start: 2025-07-23 | End: 2025-08-01

## 2025-07-23 RX ORDER — BISACODYL 10 MG
10 SUPPOSITORY, RECTAL RECTAL
Status: DISCONTINUED | OUTPATIENT
Start: 2025-07-23 | End: 2025-08-01

## 2025-07-23 RX ORDER — METOCLOPRAMIDE HYDROCHLORIDE 5 MG/ML
5 INJECTION INTRAMUSCULAR; INTRAVENOUS EVERY 8 HOURS PRN
Status: DISCONTINUED | OUTPATIENT
Start: 2025-07-23 | End: 2025-08-01

## 2025-07-23 RX ORDER — ACETAMINOPHEN 500 MG
500 TABLET ORAL EVERY 4 HOURS PRN
Status: DISCONTINUED | OUTPATIENT
Start: 2025-07-23 | End: 2025-08-01

## 2025-07-23 RX ORDER — POLYETHYLENE GLYCOL 3350 17 G/17G
17 POWDER, FOR SOLUTION ORAL DAILY PRN
Status: DISCONTINUED | OUTPATIENT
Start: 2025-07-23 | End: 2025-08-01

## 2025-07-23 RX ORDER — FLUTICASONE PROPIONATE 50 MCG
2 SPRAY, SUSPENSION (ML) NASAL 2 TIMES DAILY
COMMUNITY

## 2025-07-23 RX ORDER — ONDANSETRON 2 MG/ML
4 INJECTION INTRAMUSCULAR; INTRAVENOUS EVERY 6 HOURS PRN
Status: DISCONTINUED | OUTPATIENT
Start: 2025-07-23 | End: 2025-08-01

## 2025-07-24 ENCOUNTER — APPOINTMENT (OUTPATIENT)
Dept: CV DIAGNOSTICS | Facility: HOSPITAL | Age: 85
DRG: 186 | End: 2025-07-24
Attending: INTERNAL MEDICINE
Payer: MEDICARE

## 2025-07-24 LAB
ATRIAL RATE: 65 BPM
BASOPHILS # BLD AUTO: 0.03 X10(3) UL (ref 0–0.2)
BASOPHILS NFR BLD AUTO: 0.5 %
EOSINOPHIL # BLD AUTO: 0.17 X10(3) UL (ref 0–0.7)
EOSINOPHIL NFR BLD AUTO: 3 %
ERYTHROCYTE [DISTWIDTH] IN BLOOD BY AUTOMATED COUNT: 15.5 %
HCT VFR BLD AUTO: 28.3 % (ref 35–48)
HGB BLD-MCNC: 8.9 G/DL (ref 12–16)
IMM GRANULOCYTES # BLD AUTO: 0.04 X10(3) UL (ref 0–1)
IMM GRANULOCYTES NFR BLD: 0.7 %
LYMPHOCYTES # BLD AUTO: 0.95 X10(3) UL (ref 1–4)
LYMPHOCYTES NFR BLD AUTO: 16.6 %
MCH RBC QN AUTO: 32.1 PG (ref 26–34)
MCHC RBC AUTO-ENTMCNC: 31.4 G/DL (ref 31–37)
MCV RBC AUTO: 102.2 FL (ref 80–100)
MONOCYTES # BLD AUTO: 0.63 X10(3) UL (ref 0.1–1)
MONOCYTES NFR BLD AUTO: 11 %
NEUTROPHILS # BLD AUTO: 3.92 X10 (3) UL (ref 1.5–7.7)
NEUTROPHILS # BLD AUTO: 3.92 X10(3) UL (ref 1.5–7.7)
NEUTROPHILS NFR BLD AUTO: 68.2 %
P AXIS: 49 DEGREES
P-R INTERVAL: 188 MS
PLATELET # BLD AUTO: 245 10(3)UL (ref 150–450)
PLATELETS.RETICULATED NFR BLD AUTO: 0.6 % (ref 0–7)
Q-T INTERVAL: 420 MS
QRS DURATION: 90 MS
QTC CALCULATION (BEZET): 436 MS
R AXIS: -2 DEGREES
RBC # BLD AUTO: 2.77 X10(6)UL (ref 3.8–5.3)
T AXIS: 29 DEGREES
VENTRICULAR RATE: 65 BPM
WBC # BLD AUTO: 5.7 X10(3) UL (ref 4–11)

## 2025-07-24 PROCEDURE — 99232 SBSQ HOSP IP/OBS MODERATE 35: CPT | Performed by: INTERNAL MEDICINE

## 2025-07-24 PROCEDURE — 93306 TTE W/DOPPLER COMPLETE: CPT | Performed by: INTERNAL MEDICINE

## 2025-07-24 RX ORDER — FAMOTIDINE 10 MG
10 TABLET ORAL EVERY OTHER DAY
Status: DISCONTINUED | OUTPATIENT
Start: 2025-07-24 | End: 2025-08-01

## 2025-07-24 RX ORDER — LOSARTAN POTASSIUM 50 MG/1
50 TABLET ORAL 2 TIMES DAILY
Status: DISCONTINUED | OUTPATIENT
Start: 2025-07-24 | End: 2025-07-24

## 2025-07-24 RX ORDER — FLUTICASONE PROPIONATE 50 MCG
2 SPRAY, SUSPENSION (ML) NASAL 2 TIMES DAILY
Status: DISCONTINUED | OUTPATIENT
Start: 2025-07-24 | End: 2025-08-01

## 2025-07-24 RX ORDER — MONTELUKAST SODIUM 10 MG/1
10 TABLET ORAL EVERY EVENING
Status: DISCONTINUED | OUTPATIENT
Start: 2025-07-24 | End: 2025-07-24

## 2025-07-24 RX ORDER — ALBUTEROL SULFATE 90 UG/1
2 INHALANT RESPIRATORY (INHALATION) EVERY 4 HOURS PRN
Status: DISCONTINUED | OUTPATIENT
Start: 2025-07-24 | End: 2025-08-01

## 2025-07-24 RX ORDER — LOSARTAN POTASSIUM 50 MG/1
50 TABLET ORAL 2 TIMES DAILY
Status: DISCONTINUED | OUTPATIENT
Start: 2025-07-24 | End: 2025-08-01

## 2025-07-24 RX ORDER — METOPROLOL SUCCINATE 50 MG/1
50 TABLET, EXTENDED RELEASE ORAL
Status: DISCONTINUED | OUTPATIENT
Start: 2025-07-24 | End: 2025-07-24

## 2025-07-24 RX ORDER — FLUTICASONE PROPIONATE AND SALMETEROL 500; 50 UG/1; UG/1
1 POWDER RESPIRATORY (INHALATION) 2 TIMES DAILY
Status: DISCONTINUED | OUTPATIENT
Start: 2025-07-24 | End: 2025-07-24 | Stop reason: SDUPTHER

## 2025-07-24 RX ORDER — MONTELUKAST SODIUM 10 MG/1
10 TABLET ORAL EVERY EVENING
Status: DISCONTINUED | OUTPATIENT
Start: 2025-07-24 | End: 2025-08-01

## 2025-07-24 RX ORDER — HYDRALAZINE HYDROCHLORIDE 50 MG/1
50 TABLET, FILM COATED ORAL
Status: DISCONTINUED | OUTPATIENT
Start: 2025-07-24 | End: 2025-08-01

## 2025-07-24 RX ORDER — DILTIAZEM HYDROCHLORIDE 180 MG/1
180 CAPSULE, EXTENDED RELEASE ORAL DAILY
Status: DISCONTINUED | OUTPATIENT
Start: 2025-07-24 | End: 2025-08-01

## 2025-07-24 RX ORDER — ROSUVASTATIN CALCIUM 5 MG/1
5 TABLET, COATED ORAL NIGHTLY
Status: DISCONTINUED | OUTPATIENT
Start: 2025-07-24 | End: 2025-08-01

## 2025-07-24 RX ORDER — PREDNISONE 20 MG/1
40 TABLET ORAL
Status: DISCONTINUED | OUTPATIENT
Start: 2025-07-24 | End: 2025-07-25

## 2025-07-24 RX ORDER — IPRATROPIUM BROMIDE AND ALBUTEROL SULFATE 2.5; .5 MG/3ML; MG/3ML
3 SOLUTION RESPIRATORY (INHALATION)
Status: DISCONTINUED | OUTPATIENT
Start: 2025-07-24 | End: 2025-07-24

## 2025-07-24 RX ORDER — FLUTICASONE FUROATE AND VILANTEROL 200; 25 UG/1; UG/1
1 POWDER RESPIRATORY (INHALATION) DAILY
Status: DISCONTINUED | OUTPATIENT
Start: 2025-07-24 | End: 2025-08-01

## 2025-07-24 RX ORDER — IPRATROPIUM BROMIDE AND ALBUTEROL SULFATE 2.5; .5 MG/3ML; MG/3ML
3 SOLUTION RESPIRATORY (INHALATION) EVERY 6 HOURS PRN
Status: DISCONTINUED | OUTPATIENT
Start: 2025-07-24 | End: 2025-08-01

## 2025-07-24 RX ORDER — METOPROLOL SUCCINATE 50 MG/1
50 TABLET, EXTENDED RELEASE ORAL
Status: DISCONTINUED | OUTPATIENT
Start: 2025-07-24 | End: 2025-08-01

## 2025-07-24 RX ORDER — FERROUS SULFATE 325(65) MG
325 TABLET, DELAYED RELEASE (ENTERIC COATED) ORAL
Status: DISCONTINUED | OUTPATIENT
Start: 2025-07-24 | End: 2025-07-26

## 2025-07-24 RX ORDER — FAMOTIDINE 20 MG/1
20 TABLET, FILM COATED ORAL 2 TIMES DAILY
COMMUNITY

## 2025-07-25 LAB
ALBUMIN SERPL ELPH-MCNC: 3.36 G/DL (ref 3.75–5.21)
ALBUMIN/GLOB SERPL: 1.32 (ref 1–2)
ALPHA1 GLOB SERPL ELPH-MCNC: 0.3 G/DL (ref 0.19–0.46)
ALPHA2 GLOB SERPL ELPH-MCNC: 0.79 G/DL (ref 0.48–1.05)
ANION GAP SERPL CALC-SCNC: 5 MMOL/L (ref 0–18)
B-GLOBULIN SERPL ELPH-MCNC: 0.68 G/DL (ref 0.68–1.23)
BASOPHILS # BLD AUTO: 0.02 X10(3) UL (ref 0–0.2)
BASOPHILS NFR BLD AUTO: 0.2 %
BUN BLD-MCNC: 29 MG/DL (ref 9–23)
CALCIUM BLD-MCNC: 9.1 MG/DL (ref 8.7–10.6)
CHLORIDE SERPL-SCNC: 117 MMOL/L (ref 98–112)
CO2 SERPL-SCNC: 24 MMOL/L (ref 21–32)
CREAT BLD-MCNC: 1.45 MG/DL (ref 0.55–1.02)
DEPRECATED HBV CORE AB SER IA-ACNC: 98 NG/ML (ref 50–306)
EGFRCR SERPLBLD CKD-EPI 2021: 36 ML/MIN/1.73M2 (ref 60–?)
EOSINOPHIL # BLD AUTO: 0 X10(3) UL (ref 0–0.7)
EOSINOPHIL NFR BLD AUTO: 0 %
ERYTHROCYTE [DISTWIDTH] IN BLOOD BY AUTOMATED COUNT: 15.9 %
ERYTHROCYTE [DISTWIDTH] IN BLOOD BY AUTOMATED COUNT: 15.9 %
FOLATE SERPL-MCNC: 11.9 NG/ML (ref 5.4–?)
GAMMA GLOB SERPL ELPH-MCNC: 0.77 G/DL (ref 0.62–1.7)
GLUCOSE BLD-MCNC: 120 MG/DL (ref 70–99)
HAPTOGLOB SERPL-MCNC: 236 MG/DL (ref 30–200)
HCT VFR BLD AUTO: 24.4 % (ref 35–48)
HCT VFR BLD AUTO: 27.3 % (ref 35–48)
HGB BLD-MCNC: 7.7 G/DL (ref 12–16)
HGB BLD-MCNC: 8.5 G/DL (ref 12–16)
IMM GRANULOCYTES # BLD AUTO: 0.04 X10(3) UL (ref 0–1)
IMM GRANULOCYTES NFR BLD: 0.4 %
IRON SATN MFR SERPL: 18 % (ref 15–50)
IRON SERPL-MCNC: 52 UG/DL (ref 50–170)
KAPPA LC FREE SER-MCNC: 4.25 MG/DL (ref 0.33–1.94)
KAPPA LC FREE/LAMBDA FREE SER NEPH: 0.58 (ref 0.26–1.65)
LAMBDA LC FREE SERPL-MCNC: 7.36 MG/DL (ref 0.57–2.63)
LDH SERPL L TO P-CCNC: 159 U/L (ref 120–246)
LYMPHOCYTES # BLD AUTO: 0.63 X10(3) UL (ref 1–4)
LYMPHOCYTES NFR BLD AUTO: 6.5 %
M PROTEIN 1 SERPL ELPH-MCNC: 0.3 G/DL (ref ?–0)
MCH RBC QN AUTO: 32.1 PG (ref 26–34)
MCH RBC QN AUTO: 32.4 PG (ref 26–34)
MCHC RBC AUTO-ENTMCNC: 31.1 G/DL (ref 31–37)
MCHC RBC AUTO-ENTMCNC: 31.6 G/DL (ref 31–37)
MCV RBC AUTO: 101.7 FL (ref 80–100)
MCV RBC AUTO: 104.2 FL (ref 80–100)
MONOCYTES # BLD AUTO: 0.13 X10(3) UL (ref 0.1–1)
MONOCYTES NFR BLD AUTO: 1.4 %
NEUTROPHILS # BLD AUTO: 8.8 X10 (3) UL (ref 1.5–7.7)
NEUTROPHILS # BLD AUTO: 8.8 X10(3) UL (ref 1.5–7.7)
NEUTROPHILS NFR BLD AUTO: 91.5 %
OSMOLALITY SERPL CALC.SUM OF ELEC: 309 MOSM/KG (ref 275–295)
PLATELET # BLD AUTO: 219 10(3)UL (ref 150–450)
PLATELET # BLD AUTO: 257 10(3)UL (ref 150–450)
POTASSIUM SERPL-SCNC: 4.3 MMOL/L (ref 3.5–5.1)
PROT SERPL-MCNC: 5.9 G/DL (ref 5.7–8.2)
RBC # BLD AUTO: 2.4 X10(6)UL (ref 3.8–5.3)
RBC # BLD AUTO: 2.62 X10(6)UL (ref 3.8–5.3)
SODIUM SERPL-SCNC: 146 MMOL/L (ref 136–145)
TOTAL IRON BINDING CAPACITY: 285 UG/DL (ref 250–425)
TRANSFERRIN SERPL-MCNC: 223 MG/DL (ref 250–380)
VIT B12 SERPL-MCNC: 408 PG/ML (ref 211–911)
WBC # BLD AUTO: 5.8 X10(3) UL (ref 4–11)
WBC # BLD AUTO: 9.6 X10(3) UL (ref 4–11)

## 2025-07-25 PROCEDURE — 99232 SBSQ HOSP IP/OBS MODERATE 35: CPT | Performed by: INTERNAL MEDICINE

## 2025-07-25 NOTE — TELEPHONE ENCOUNTER
DME supplies     Need office notes      Fax in YP triage   
I reviewed this fax    Says \"urgent prior authorization request\" from [employer]  P [id number]  F [id number] [phone]    Asks for notes for past 3 mos (we have not seen pt since 3/2024) and MD signature    One of the pages is addressed to dr fernando pickard (ortho) but with our address? That form is dated 5/26/2025    Other pages are addressed to dr mcintyre. No header on those forms. Does not look legit  Asking for ofc notes, dx's for lumbar orthosis?    I LM for pt to cb to discuss    I s/w son Kenneth MOCTEZUMA, advised of fax we rec'd. He confirms this is NOT LEGIT pt is  not needing any such orthosis. Dr Pickard saw her for knees in the past, nothing for her back.    We are not signing this or sending any notes    We have been receiving quite a few scam faxes. Per comments in chart pt has been victim of scams in the past. Son reports mom had to change her medicare number.     I let him know we will not send any notes and will forward to supervisor. He voiced understanding and appreciated the call. He will update mom as well.     I have endorsed fax to supervisor.  
[employer] called asking if fax has been received for DME supplies - spoke to Cintia, advised them patient is not in need or requested and DME supplies and to stop sending faxes.    She said we can call back at [phone] ext [phone]    
back pain

## 2025-07-26 LAB
ANION GAP SERPL CALC-SCNC: 9 MMOL/L (ref 0–18)
BUN BLD-MCNC: 34 MG/DL (ref 9–23)
CALCIUM BLD-MCNC: 9.5 MG/DL (ref 8.7–10.6)
CHLORIDE SERPL-SCNC: 112 MMOL/L (ref 98–112)
CO2 SERPL-SCNC: 24 MMOL/L (ref 21–32)
CREAT BLD-MCNC: 1.41 MG/DL (ref 0.55–1.02)
EGFRCR SERPLBLD CKD-EPI 2021: 37 ML/MIN/1.73M2 (ref 60–?)
ERYTHROCYTE [DISTWIDTH] IN BLOOD BY AUTOMATED COUNT: 16.3 %
GLUCOSE BLD-MCNC: 98 MG/DL (ref 70–99)
HCT VFR BLD AUTO: 23.9 % (ref 35–48)
HGB BLD-MCNC: 7.6 G/DL (ref 12–16)
HGB RETIC QN AUTO: 34.5 PG (ref 28.2–36.6)
IMM RETICS NFR: 0.21 RATIO (ref 0.1–0.3)
MCH RBC QN AUTO: 32.9 PG (ref 26–34)
MCHC RBC AUTO-ENTMCNC: 31.8 G/DL (ref 31–37)
MCV RBC AUTO: 103.5 FL (ref 80–100)
OSMOLALITY SERPL CALC.SUM OF ELEC: 308 MOSM/KG (ref 275–295)
PLATELET # BLD AUTO: 221 10(3)UL (ref 150–450)
POTASSIUM SERPL-SCNC: 4.1 MMOL/L (ref 3.5–5.1)
RBC # BLD AUTO: 2.31 X10(6)UL (ref 3.8–5.3)
RETICS # AUTO: 81.5 X10(3) UL (ref 22.5–147.5)
RETICS/RBC NFR AUTO: 3.5 % (ref 0.5–2.5)
SODIUM SERPL-SCNC: 145 MMOL/L (ref 136–145)
TSI SER-ACNC: 2.45 UIU/ML (ref 0.55–4.78)
WBC # BLD AUTO: 7.9 X10(3) UL (ref 4–11)

## 2025-07-26 PROCEDURE — 99232 SBSQ HOSP IP/OBS MODERATE 35: CPT | Performed by: INTERNAL MEDICINE

## 2025-07-27 LAB
ANION GAP SERPL CALC-SCNC: 9 MMOL/L (ref 0–18)
BUN BLD-MCNC: 33 MG/DL (ref 9–23)
CALCIUM BLD-MCNC: 9.2 MG/DL (ref 8.7–10.6)
CHLORIDE SERPL-SCNC: 113 MMOL/L (ref 98–112)
CO2 SERPL-SCNC: 24 MMOL/L (ref 21–32)
CREAT BLD-MCNC: 1.51 MG/DL (ref 0.55–1.02)
EGFRCR SERPLBLD CKD-EPI 2021: 34 ML/MIN/1.73M2 (ref 60–?)
ERYTHROCYTE [DISTWIDTH] IN BLOOD BY AUTOMATED COUNT: 16.2 %
FOLATE SERPL-MCNC: 9.3 NG/ML (ref 5.4–?)
GLUCOSE BLD-MCNC: 89 MG/DL (ref 70–99)
HCT VFR BLD AUTO: 25.8 % (ref 35–48)
HGB BLD-MCNC: 7.9 G/DL (ref 12–16)
MCH RBC QN AUTO: 32.4 PG (ref 26–34)
MCHC RBC AUTO-ENTMCNC: 30.6 G/DL (ref 31–37)
MCV RBC AUTO: 105.7 FL (ref 80–100)
OSMOLALITY SERPL CALC.SUM OF ELEC: 309 MOSM/KG (ref 275–295)
PLATELET # BLD AUTO: 227 10(3)UL (ref 150–450)
POTASSIUM SERPL-SCNC: 4.5 MMOL/L (ref 3.5–5.1)
RBC # BLD AUTO: 2.44 X10(6)UL (ref 3.8–5.3)
SODIUM SERPL-SCNC: 146 MMOL/L (ref 136–145)
WBC # BLD AUTO: 6.7 X10(3) UL (ref 4–11)

## 2025-07-27 PROCEDURE — 99232 SBSQ HOSP IP/OBS MODERATE 35: CPT | Performed by: INTERNAL MEDICINE

## 2025-07-28 ENCOUNTER — ANESTHESIA EVENT (OUTPATIENT)
Dept: ENDOSCOPY | Facility: HOSPITAL | Age: 85
DRG: 186 | End: 2025-07-28
Payer: MEDICARE

## 2025-07-28 PROBLEM — R19.5 OCCULT BLOOD POSITIVE STOOL: Status: ACTIVE | Noted: 2025-07-28

## 2025-07-28 PROBLEM — C83.00 LYMPHOPLASMACYTIC LYMPHOMA (HCC): Status: ACTIVE | Noted: 2025-07-28

## 2025-07-28 PROBLEM — Z85.118 HISTORY OF LUNG CANCER: Status: ACTIVE | Noted: 2025-07-28

## 2025-07-28 PROBLEM — D53.9 MACROCYTIC ANEMIA: Status: ACTIVE | Noted: 2025-07-28

## 2025-07-28 LAB
ANION GAP SERPL CALC-SCNC: 8 MMOL/L (ref 0–18)
BUN BLD-MCNC: 30 MG/DL (ref 9–23)
CALCIUM BLD-MCNC: 8.7 MG/DL (ref 8.7–10.6)
CHLORIDE SERPL-SCNC: 114 MMOL/L (ref 98–112)
CO2 SERPL-SCNC: 24 MMOL/L (ref 21–32)
CREAT BLD-MCNC: 1.48 MG/DL (ref 0.55–1.02)
EGFRCR SERPLBLD CKD-EPI 2021: 35 ML/MIN/1.73M2 (ref 60–?)
ERYTHROCYTE [DISTWIDTH] IN BLOOD BY AUTOMATED COUNT: 16.4 %
GLUCOSE BLD-MCNC: 88 MG/DL (ref 70–99)
HCT VFR BLD AUTO: 25.3 % (ref 35–48)
HGB BLD-MCNC: 7.4 G/DL (ref 12–16)
MCH RBC QN AUTO: 32.3 PG (ref 26–34)
MCHC RBC AUTO-ENTMCNC: 29.2 G/DL (ref 31–37)
MCV RBC AUTO: 110.5 FL (ref 80–100)
NT-PROBNP SERPL-MCNC: 872 PG/ML (ref ?–450)
OSMOLALITY SERPL CALC.SUM OF ELEC: 308 MOSM/KG (ref 275–295)
PLATELET # BLD AUTO: 191 10(3)UL (ref 150–450)
POTASSIUM SERPL-SCNC: 4.4 MMOL/L (ref 3.5–5.1)
RBC # BLD AUTO: 2.29 X10(6)UL (ref 3.8–5.3)
SODIUM SERPL-SCNC: 146 MMOL/L (ref 136–145)
WBC # BLD AUTO: 5.7 X10(3) UL (ref 4–11)

## 2025-07-28 PROCEDURE — 99232 SBSQ HOSP IP/OBS MODERATE 35: CPT | Performed by: STUDENT IN AN ORGANIZED HEALTH CARE EDUCATION/TRAINING PROGRAM

## 2025-07-28 PROCEDURE — 99223 1ST HOSP IP/OBS HIGH 75: CPT | Performed by: INTERNAL MEDICINE

## 2025-07-28 RX ORDER — FUROSEMIDE 10 MG/ML
20 INJECTION INTRAMUSCULAR; INTRAVENOUS ONCE
Status: COMPLETED | OUTPATIENT
Start: 2025-07-28 | End: 2025-07-28

## 2025-07-29 ENCOUNTER — ANESTHESIA (OUTPATIENT)
Dept: ENDOSCOPY | Facility: HOSPITAL | Age: 85
DRG: 186 | End: 2025-07-29
Payer: MEDICARE

## 2025-07-29 PROCEDURE — 0DB78ZX EXCISION OF STOMACH, PYLORUS, VIA NATURAL OR ARTIFICIAL OPENING ENDOSCOPIC, DIAGNOSTIC: ICD-10-PCS | Performed by: INTERNAL MEDICINE

## 2025-07-29 PROCEDURE — 0DBH8ZZ EXCISION OF CECUM, VIA NATURAL OR ARTIFICIAL OPENING ENDOSCOPIC: ICD-10-PCS | Performed by: INTERNAL MEDICINE

## 2025-07-29 PROCEDURE — 99232 SBSQ HOSP IP/OBS MODERATE 35: CPT | Performed by: INTERNAL MEDICINE

## 2025-07-29 PROCEDURE — 0DBK8ZZ EXCISION OF ASCENDING COLON, VIA NATURAL OR ARTIFICIAL OPENING ENDOSCOPIC: ICD-10-PCS | Performed by: INTERNAL MEDICINE

## 2025-07-29 PROCEDURE — 0DB98ZX EXCISION OF DUODENUM, VIA NATURAL OR ARTIFICIAL OPENING ENDOSCOPIC, DIAGNOSTIC: ICD-10-PCS | Performed by: INTERNAL MEDICINE

## 2025-07-29 PROCEDURE — 0DBL8ZZ EXCISION OF TRANSVERSE COLON, VIA NATURAL OR ARTIFICIAL OPENING ENDOSCOPIC: ICD-10-PCS | Performed by: INTERNAL MEDICINE

## 2025-07-29 PROCEDURE — 99232 SBSQ HOSP IP/OBS MODERATE 35: CPT | Performed by: STUDENT IN AN ORGANIZED HEALTH CARE EDUCATION/TRAINING PROGRAM

## 2025-07-29 DEVICE — IMPLANTABLE DEVICE: Type: IMPLANTABLE DEVICE | Site: COLON | Status: FUNCTIONAL

## 2025-07-29 RX ORDER — FUROSEMIDE 10 MG/ML
20 INJECTION INTRAMUSCULAR; INTRAVENOUS ONCE
Status: COMPLETED | OUTPATIENT
Start: 2025-07-29 | End: 2025-07-29

## 2025-07-29 RX ORDER — SODIUM CHLORIDE, SODIUM LACTATE, POTASSIUM CHLORIDE, CALCIUM CHLORIDE 600; 310; 30; 20 MG/100ML; MG/100ML; MG/100ML; MG/100ML
INJECTION, SOLUTION INTRAVENOUS CONTINUOUS
Status: DISCONTINUED | OUTPATIENT
Start: 2025-07-29 | End: 2025-07-31

## 2025-07-29 RX ORDER — NALOXONE HYDROCHLORIDE 0.4 MG/ML
0.08 INJECTION, SOLUTION INTRAMUSCULAR; INTRAVENOUS; SUBCUTANEOUS ONCE AS NEEDED
Status: DISCONTINUED | OUTPATIENT
Start: 2025-07-29 | End: 2025-07-29 | Stop reason: HOSPADM

## 2025-07-29 RX ORDER — LIDOCAINE HYDROCHLORIDE 10 MG/ML
INJECTION, SOLUTION EPIDURAL; INFILTRATION; INTRACAUDAL; PERINEURAL AS NEEDED
Status: DISCONTINUED | OUTPATIENT
Start: 2025-07-29 | End: 2025-07-29 | Stop reason: SURG

## 2025-07-29 RX ADMIN — LIDOCAINE HYDROCHLORIDE 50 MG: 10 INJECTION, SOLUTION EPIDURAL; INFILTRATION; INTRACAUDAL; PERINEURAL at 16:16:00

## 2025-07-30 ENCOUNTER — APPOINTMENT (OUTPATIENT)
Dept: CT IMAGING | Facility: HOSPITAL | Age: 85
DRG: 186 | End: 2025-07-30
Attending: INTERNAL MEDICINE
Payer: MEDICARE

## 2025-07-30 LAB
ANION GAP SERPL CALC-SCNC: 9 MMOL/L (ref 0–18)
ANTIBODY SCREEN: NEGATIVE
BASOPHILS # BLD AUTO: 0.01 X10(3) UL (ref 0–0.2)
BASOPHILS NFR BLD AUTO: 0.1 %
BUN BLD-MCNC: 30 MG/DL (ref 9–23)
CALCIUM BLD-MCNC: 8.6 MG/DL (ref 8.7–10.6)
CHLORIDE SERPL-SCNC: 108 MMOL/L (ref 98–112)
CO2 SERPL-SCNC: 27 MMOL/L (ref 21–32)
CREAT BLD-MCNC: 1.78 MG/DL (ref 0.55–1.02)
EGFRCR SERPLBLD CKD-EPI 2021: 28 ML/MIN/1.73M2 (ref 60–?)
EOSINOPHIL # BLD AUTO: 0.02 X10(3) UL (ref 0–0.7)
EOSINOPHIL NFR BLD AUTO: 0.2 %
ERYTHROCYTE [DISTWIDTH] IN BLOOD BY AUTOMATED COUNT: 16.1 %
GLUCOSE BLD-MCNC: 117 MG/DL (ref 70–99)
HCT VFR BLD AUTO: 21.4 % (ref 35–48)
HGB BLD-MCNC: 7 G/DL (ref 12–16)
HGB BLD-MCNC: 8.4 G/DL (ref 12–16)
IMM GRANULOCYTES # BLD AUTO: 0.05 X10(3) UL (ref 0–1)
IMM GRANULOCYTES NFR BLD: 0.4 %
INR BLD: 1.12 (ref 0.8–1.2)
LYMPHOCYTES # BLD AUTO: 0.86 X10(3) UL (ref 1–4)
LYMPHOCYTES NFR BLD AUTO: 7.4 %
MAGNESIUM SERPL-MCNC: 1.8 MG/DL (ref 1.6–2.6)
MCH RBC QN AUTO: 33 PG (ref 26–34)
MCHC RBC AUTO-ENTMCNC: 32.7 G/DL (ref 31–37)
MCV RBC AUTO: 100.9 FL (ref 80–100)
MONOCYTES # BLD AUTO: 0.74 X10(3) UL (ref 0.1–1)
MONOCYTES NFR BLD AUTO: 6.3 %
NEUTROPHILS # BLD AUTO: 9.98 X10 (3) UL (ref 1.5–7.7)
NEUTROPHILS # BLD AUTO: 9.98 X10(3) UL (ref 1.5–7.7)
NEUTROPHILS NFR BLD AUTO: 85.6 %
OSMOLALITY SERPL CALC.SUM OF ELEC: 305 MOSM/KG (ref 275–295)
PHOSPHATE SERPL-MCNC: 4.1 MG/DL (ref 2.4–5.1)
PLATELET # BLD AUTO: 174 10(3)UL (ref 150–450)
POTASSIUM SERPL-SCNC: 4.3 MMOL/L (ref 3.5–5.1)
PROTHROMBIN TIME: 14.5 SECONDS (ref 11.6–14.8)
RBC # BLD AUTO: 2.12 X10(6)UL (ref 3.8–5.3)
RH BLOOD TYPE: POSITIVE
SODIUM SERPL-SCNC: 144 MMOL/L (ref 136–145)
WBC # BLD AUTO: 11.7 X10(3) UL (ref 4–11)

## 2025-07-30 PROCEDURE — 77012 CT SCAN FOR NEEDLE BIOPSY: CPT | Performed by: INTERNAL MEDICINE

## 2025-07-30 PROCEDURE — 99152 MOD SED SAME PHYS/QHP 5/>YRS: CPT

## 2025-07-30 PROCEDURE — 07DR3ZX EXTRACTION OF ILIAC BONE MARROW, PERCUTANEOUS APPROACH, DIAGNOSTIC: ICD-10-PCS | Performed by: RADIOLOGY

## 2025-07-30 PROCEDURE — 38222 DX BONE MARROW BX & ASPIR: CPT | Performed by: INTERNAL MEDICINE

## 2025-07-30 PROCEDURE — 30233N1 TRANSFUSION OF NONAUTOLOGOUS RED BLOOD CELLS INTO PERIPHERAL VEIN, PERCUTANEOUS APPROACH: ICD-10-PCS | Performed by: INTERNAL MEDICINE

## 2025-07-30 PROCEDURE — 99232 SBSQ HOSP IP/OBS MODERATE 35: CPT | Performed by: INTERNAL MEDICINE

## 2025-07-30 RX ORDER — MIDAZOLAM HYDROCHLORIDE 1 MG/ML
INJECTION INTRAMUSCULAR; INTRAVENOUS
Status: COMPLETED
Start: 2025-07-30 | End: 2025-07-30

## 2025-07-30 RX ORDER — NALOXONE HYDROCHLORIDE 0.4 MG/ML
0.08 INJECTION, SOLUTION INTRAMUSCULAR; INTRAVENOUS; SUBCUTANEOUS AS NEEDED
Status: DISCONTINUED | OUTPATIENT
Start: 2025-07-30 | End: 2025-07-30

## 2025-07-30 RX ORDER — SODIUM CHLORIDE 9 MG/ML
INJECTION, SOLUTION INTRAVENOUS CONTINUOUS
Status: DISCONTINUED | OUTPATIENT
Start: 2025-07-30 | End: 2025-07-30

## 2025-07-30 RX ORDER — FLUMAZENIL 0.1 MG/ML
0.2 INJECTION INTRAVENOUS AS NEEDED
Status: DISCONTINUED | OUTPATIENT
Start: 2025-07-30 | End: 2025-07-30 | Stop reason: HOSPADM

## 2025-07-30 RX ORDER — MIDAZOLAM HYDROCHLORIDE 1 MG/ML
1 INJECTION INTRAMUSCULAR; INTRAVENOUS EVERY 5 MIN PRN
Status: DISCONTINUED | OUTPATIENT
Start: 2025-07-30 | End: 2025-07-30 | Stop reason: HOSPADM

## 2025-07-30 RX ORDER — MIDAZOLAM HYDROCHLORIDE 1 MG/ML
1 INJECTION INTRAMUSCULAR; INTRAVENOUS EVERY 5 MIN PRN
Status: DISCONTINUED | OUTPATIENT
Start: 2025-07-30 | End: 2025-07-30

## 2025-07-30 RX ORDER — SODIUM CHLORIDE 9 MG/ML
INJECTION, SOLUTION INTRAVENOUS ONCE
Status: COMPLETED | OUTPATIENT
Start: 2025-07-30 | End: 2025-07-30

## 2025-07-30 RX ORDER — NALOXONE HYDROCHLORIDE 0.4 MG/ML
0.08 INJECTION, SOLUTION INTRAMUSCULAR; INTRAVENOUS; SUBCUTANEOUS AS NEEDED
Status: DISCONTINUED | OUTPATIENT
Start: 2025-07-30 | End: 2025-07-30 | Stop reason: HOSPADM

## 2025-07-30 RX ORDER — MAGNESIUM OXIDE 400 MG/1
400 TABLET ORAL ONCE
Status: COMPLETED | OUTPATIENT
Start: 2025-07-30 | End: 2025-07-30

## 2025-07-30 RX ORDER — SODIUM CHLORIDE 9 MG/ML
INJECTION, SOLUTION INTRAVENOUS CONTINUOUS
Status: DISCONTINUED | OUTPATIENT
Start: 2025-07-30 | End: 2025-07-30 | Stop reason: HOSPADM

## 2025-07-30 RX ORDER — FLUMAZENIL 0.1 MG/ML
0.2 INJECTION INTRAVENOUS AS NEEDED
Status: DISCONTINUED | OUTPATIENT
Start: 2025-07-30 | End: 2025-07-30

## 2025-07-31 ENCOUNTER — PATIENT MESSAGE (OUTPATIENT)
Facility: CLINIC | Age: 85
End: 2025-07-31

## 2025-07-31 ENCOUNTER — APPOINTMENT (OUTPATIENT)
Dept: ULTRASOUND IMAGING | Facility: HOSPITAL | Age: 85
DRG: 186 | End: 2025-07-31
Attending: INTERNAL MEDICINE
Payer: MEDICARE

## 2025-07-31 ENCOUNTER — APPOINTMENT (OUTPATIENT)
Dept: GENERAL RADIOLOGY | Facility: HOSPITAL | Age: 85
DRG: 186 | End: 2025-07-31
Attending: RADIOLOGY
Payer: MEDICARE

## 2025-07-31 LAB
ANION GAP SERPL CALC-SCNC: 9 MMOL/L (ref 0–18)
BASOPHILS NFR PLR: 0 %
BLOOD TYPE BARCODE: 6200
BUN BLD-MCNC: 27 MG/DL (ref 9–23)
CALCIUM BLD-MCNC: 9.2 MG/DL (ref 8.7–10.6)
CHLORIDE SERPL-SCNC: 111 MMOL/L (ref 98–112)
CO2 SERPL-SCNC: 29 MMOL/L (ref 21–32)
CREAT BLD-MCNC: 1.82 MG/DL (ref 0.55–1.02)
EGFRCR SERPLBLD CKD-EPI 2021: 27 ML/MIN/1.73M2 (ref 60–?)
EOSINOPHIL NFR PLR: 0 %
ERYTHROCYTE [DISTWIDTH] IN BLOOD BY AUTOMATED COUNT: 18.3 %
GLUCOSE BLD-MCNC: 103 MG/DL (ref 70–99)
GLUCOSE PLR-MCNC: 87 MG/DL
HCT VFR BLD AUTO: 29.7 % (ref 35–48)
HGB BLD-MCNC: 9.4 G/DL (ref 12–16)
INR BLD: 1.05 (ref 0.8–1.2)
LDH FLD L TO P-CCNC: 382 U/L
LYMPHOCYTES NFR PLR: 12 %
MAGNESIUM SERPL-MCNC: 2.2 MG/DL (ref 1.6–2.6)
MCH RBC QN AUTO: 32.5 PG (ref 26–34)
MCHC RBC AUTO-ENTMCNC: 31.6 G/DL (ref 31–37)
MCV RBC AUTO: 102.8 FL (ref 80–100)
MESOTHL CELL NFR PLR: 36 %
MONOS+MACROS NFR PLR: 37 %
NEUTROPHILS NFR PLR: 15 %
OSMOLALITY SERPL CALC.SUM OF ELEC: 313 MOSM/KG (ref 275–295)
PLATELET # BLD AUTO: 197 10(3)UL (ref 150–450)
POTASSIUM SERPL-SCNC: 4.3 MMOL/L (ref 3.5–5.1)
PROT PLR-MCNC: 4.4 G/DL
PROTHROMBIN TIME: 13.8 SECONDS (ref 11.6–14.8)
RBC # BLD AUTO: 2.89 X10(6)UL (ref 3.8–5.3)
RBC PLEURAL FLUID: NORMAL /MM3
SODIUM SERPL-SCNC: 149 MMOL/L (ref 136–145)
TOTAL CELLS COUNTED FLD: 100
UNIT VOLUME: 350 ML
VISCOSITY: 1.6 REL.SALINE
WBC # BLD AUTO: 8.2 X10(3) UL (ref 4–11)
WBC # PLR: 1342 /MM3

## 2025-07-31 PROCEDURE — 0W9B3ZZ DRAINAGE OF LEFT PLEURAL CAVITY, PERCUTANEOUS APPROACH: ICD-10-PCS | Performed by: RADIOLOGY

## 2025-07-31 PROCEDURE — 99232 SBSQ HOSP IP/OBS MODERATE 35: CPT | Performed by: INTERNAL MEDICINE

## 2025-07-31 PROCEDURE — 99223 1ST HOSP IP/OBS HIGH 75: CPT | Performed by: INTERNAL MEDICINE

## 2025-07-31 PROCEDURE — 32555 ASPIRATE PLEURA W/ IMAGING: CPT | Performed by: INTERNAL MEDICINE

## 2025-08-01 VITALS
OXYGEN SATURATION: 99 % | SYSTOLIC BLOOD PRESSURE: 128 MMHG | TEMPERATURE: 98 F | WEIGHT: 112 LBS | RESPIRATION RATE: 18 BRPM | BODY MASS INDEX: 22.58 KG/M2 | HEIGHT: 59 IN | HEART RATE: 79 BPM | DIASTOLIC BLOOD PRESSURE: 55 MMHG

## 2025-08-01 PROBLEM — J90 PLEURAL EFFUSION: Status: ACTIVE | Noted: 2025-07-23

## 2025-08-01 PROCEDURE — 99233 SBSQ HOSP IP/OBS HIGH 50: CPT | Performed by: INTERNAL MEDICINE

## 2025-08-01 PROCEDURE — 99239 HOSP IP/OBS DSCHRG MGMT >30: CPT | Performed by: INTERNAL MEDICINE

## 2025-08-01 PROCEDURE — 99232 SBSQ HOSP IP/OBS MODERATE 35: CPT | Performed by: INTERNAL MEDICINE

## 2025-08-01 RX ORDER — HYDRALAZINE HYDROCHLORIDE 50 MG/1
50 TABLET, FILM COATED ORAL 3 TIMES DAILY
Status: SHIPPED | COMMUNITY
Start: 2025-08-01 | End: 2025-08-01

## 2025-08-01 RX ORDER — METOPROLOL SUCCINATE 50 MG/1
50 TABLET, EXTENDED RELEASE ORAL 2 TIMES DAILY
Qty: 180 TABLET | Refills: 1 | Status: SHIPPED | OUTPATIENT
Start: 2025-08-01 | End: 2025-08-01

## 2025-08-01 RX ORDER — METOPROLOL SUCCINATE 50 MG/1
50 TABLET, EXTENDED RELEASE ORAL 2 TIMES DAILY
Qty: 180 TABLET | Refills: 1 | Status: SHIPPED | OUTPATIENT
Start: 2025-08-01

## 2025-08-01 RX ORDER — HYDRALAZINE HYDROCHLORIDE 25 MG/1
25 TABLET, FILM COATED ORAL 3 TIMES DAILY
Qty: 90 TABLET | Refills: 0 | Status: SHIPPED | OUTPATIENT
Start: 2025-08-01 | End: 2025-08-01

## 2025-08-01 RX ORDER — TORSEMIDE 10 MG/1
10 TABLET ORAL DAILY
Qty: 30 TABLET | Refills: 0 | Status: SHIPPED | OUTPATIENT
Start: 2025-08-02 | End: 2025-08-01

## 2025-08-01 RX ORDER — HYDRALAZINE HYDROCHLORIDE 25 MG/1
25 TABLET, FILM COATED ORAL 3 TIMES DAILY
Qty: 90 TABLET | Refills: 0 | Status: SHIPPED | OUTPATIENT
Start: 2025-08-01

## 2025-08-01 RX ORDER — TORSEMIDE 10 MG/1
10 TABLET ORAL DAILY
Qty: 30 TABLET | Refills: 0 | Status: SHIPPED | OUTPATIENT
Start: 2025-08-02

## 2025-08-01 RX ORDER — TORSEMIDE 5 MG/1
10 TABLET ORAL DAILY
Status: DISCONTINUED | OUTPATIENT
Start: 2025-08-01 | End: 2025-08-01

## 2025-08-04 ENCOUNTER — PATIENT OUTREACH (OUTPATIENT)
Age: 85
End: 2025-08-04

## 2025-08-05 ENCOUNTER — NURSE NAVIGATOR ENCOUNTER (OUTPATIENT)
Facility: LOCATION | Age: 85
End: 2025-08-05

## 2025-08-05 DIAGNOSIS — C34.81 MALIGNANT NEOPLASM OF OVERLAPPING SITES OF RIGHT LUNG (HCC): Primary | ICD-10-CM

## 2025-08-05 DIAGNOSIS — J91.0 MALIGNANT PLEURAL EFFUSION (HCC): ICD-10-CM

## 2025-08-05 DIAGNOSIS — C85.10 LOW GRADE B-CELL LYMPHOMA (HCC): ICD-10-CM

## 2025-08-05 LAB
CD10 CELLS NFR SPEC: <1 %
CD10/CD19: <1 %
CD19 CELLS NFR SPEC: 8 %
CD19+ MM: 43 %
CD19+/CD200+: 5 %
CD19+/CD38+ MM: 43 %
CD2 CELLS NFR SPEC: 84 %
CD20 CELLS NFR SPEC: 9 %
CD200 CELLS: 16 %
CD3 CELLS NFR SPEC: 46 %
CD3+/TCRGD+: 1 %
CD3+CD4+ CELLS NFR SPEC: 23 %
CD3+CD4+ CELLS/CD3+CD8+ CLL SPEC: 1
CD3+CD8+ CELLS NFR SPEC: 23 %
CD3-/CD56+: 44 %
CD34 CELLS NFR SPEC: <1 %
CD38 CELLS NFR SPEC: 5 %
CD38+/CD19+: <1 %
CD45 CELLS NFR SPEC: 100 %
CD45-/CD38+ KAPPA: 3 %
CD45-/CD38+ LAMBDA: 71 %
CD5 CELLS NFR SPEC: 47 %
CD5/CD19 CELLS: 1 %
CD56 MM: 22 %
CD7 CELLS NFR SPEC: 84 %
CELL SURF KAPPA/LAMBDA RATIO: 1.7
CELL SURF LAMBDA LIGHT CHAIN: 3 %
CELL SURFACE KAPPA LIGHT CHAIN: 5 %
TCR G-D CELLS NFR SPEC: 1 %

## 2025-08-06 ENCOUNTER — TELEPHONE (OUTPATIENT)
Facility: LOCATION | Age: 85
End: 2025-08-06

## 2025-08-07 LAB
CELLS ANALYZED LEUK/LYM: 20
CELLS COUNTED LEUK/LYM: 20
CELLS KARYOTYPED LEUK/LYM: 2
GTG BAND LEUK/LYM: 400

## 2025-08-08 ENCOUNTER — TELEPHONE (OUTPATIENT)
Facility: LOCATION | Age: 85
End: 2025-08-08

## 2025-08-09 ENCOUNTER — APPOINTMENT (OUTPATIENT)
Dept: GENERAL RADIOLOGY | Age: 85
End: 2025-08-09
Attending: NURSE PRACTITIONER

## 2025-08-09 ENCOUNTER — APPOINTMENT (OUTPATIENT)
Dept: ULTRASOUND IMAGING | Age: 85
End: 2025-08-09
Attending: NURSE PRACTITIONER

## 2025-08-09 ENCOUNTER — HOSPITAL ENCOUNTER (OUTPATIENT)
Age: 85
Discharge: HOME | End: 2025-08-09

## 2025-08-09 VITALS
WEIGHT: 108 LBS | TEMPERATURE: 99 F | HEIGHT: 59 IN | RESPIRATION RATE: 18 BRPM | BODY MASS INDEX: 21.77 KG/M2 | SYSTOLIC BLOOD PRESSURE: 137 MMHG | HEART RATE: 80 BPM | DIASTOLIC BLOOD PRESSURE: 67 MMHG | OXYGEN SATURATION: 100 %

## 2025-08-09 DIAGNOSIS — I82.4Y2 ACUTE DEEP VEIN THROMBOSIS (DVT) OF PROXIMAL VEIN OF LEFT LOWER EXTREMITY (HCC): Primary | ICD-10-CM

## 2025-08-09 PROCEDURE — 93971 EXTREMITY STUDY: CPT | Performed by: NURSE PRACTITIONER

## 2025-08-09 PROCEDURE — 99215 OFFICE O/P EST HI 40 MIN: CPT

## 2025-08-09 PROCEDURE — 73610 X-RAY EXAM OF ANKLE: CPT | Performed by: NURSE PRACTITIONER

## 2025-08-12 ENCOUNTER — HOSPITAL ENCOUNTER (OUTPATIENT)
Dept: NUCLEAR MEDICINE | Facility: HOSPITAL | Age: 85
Discharge: HOME | End: 2025-08-12
Attending: INTERNAL MEDICINE

## 2025-08-12 ENCOUNTER — RESULTS FOLLOW-UP (OUTPATIENT)
Facility: LOCATION | Age: 85
End: 2025-08-12

## 2025-08-12 DIAGNOSIS — C34.81 MALIGNANT NEOPLASM OF OVERLAPPING SITES OF RIGHT LUNG (HCC): ICD-10-CM

## 2025-08-12 LAB — GLUCOSE BLD-MCNC: 96 MG/DL (ref 70–99)

## 2025-08-12 PROCEDURE — 82962 GLUCOSE BLOOD TEST: CPT

## 2025-08-12 PROCEDURE — 78815 PET IMAGE W/CT SKULL-THIGH: CPT | Performed by: INTERNAL MEDICINE

## 2025-08-15 ENCOUNTER — HOSPITAL ENCOUNTER (EMERGENCY)
Age: 85
Discharge: HOME | End: 2025-08-15
Attending: EMERGENCY MEDICINE

## 2025-08-15 ENCOUNTER — APPOINTMENT (OUTPATIENT)
Dept: GENERAL RADIOLOGY | Age: 85
End: 2025-08-15
Attending: PHYSICIAN ASSISTANT

## 2025-08-15 VITALS
HEART RATE: 71 BPM | WEIGHT: 108 LBS | TEMPERATURE: 99 F | SYSTOLIC BLOOD PRESSURE: 150 MMHG | OXYGEN SATURATION: 97 % | DIASTOLIC BLOOD PRESSURE: 67 MMHG | HEIGHT: 59 IN | BODY MASS INDEX: 21.77 KG/M2 | RESPIRATION RATE: 18 BRPM

## 2025-08-15 DIAGNOSIS — E87.5 HYPERKALEMIA: ICD-10-CM

## 2025-08-15 DIAGNOSIS — M54.50 ACUTE RIGHT-SIDED LOW BACK PAIN WITHOUT SCIATICA: Primary | ICD-10-CM

## 2025-08-15 DIAGNOSIS — M41.9 SCOLIOSIS OF LUMBAR SPINE, UNSPECIFIED SCOLIOSIS TYPE: ICD-10-CM

## 2025-08-15 LAB
ALBUMIN SERPL-MCNC: 4 G/DL (ref 3.2–4.8)
ALBUMIN/GLOB SERPL: 1.5 (ref 1–2)
ALP LIVER SERPL-CCNC: 132 U/L (ref 55–142)
ALT SERPL-CCNC: 13 U/L (ref 10–49)
ANION GAP SERPL CALC-SCNC: 9 MMOL/L (ref 0–18)
AST SERPL-CCNC: 18 U/L (ref ?–34)
BASOPHILS # BLD AUTO: 0.03 X10(3) UL (ref 0–0.2)
BASOPHILS NFR BLD AUTO: 0.5 %
BILIRUB SERPL-MCNC: 0.3 MG/DL (ref 0.2–1.1)
BUN BLD-MCNC: 49 MG/DL (ref 9–23)
CALCIUM BLD-MCNC: 9.2 MG/DL (ref 8.7–10.6)
CHLORIDE SERPL-SCNC: 109 MMOL/L (ref 98–112)
CO2 SERPL-SCNC: 25 MMOL/L (ref 21–32)
CREAT BLD-MCNC: 1.81 MG/DL (ref 0.55–1.02)
EGFRCR SERPLBLD CKD-EPI 2021: 27 ML/MIN/1.73M2 (ref 60–?)
EOSINOPHIL # BLD AUTO: 0.13 X10(3) UL (ref 0–0.7)
EOSINOPHIL NFR BLD AUTO: 2.2 %
ERYTHROCYTE [DISTWIDTH] IN BLOOD BY AUTOMATED COUNT: 15.4 %
GLOBULIN PLAS-MCNC: 2.6 G/DL (ref 2–3.5)
GLUCOSE BLD-MCNC: 108 MG/DL (ref 70–99)
HCT VFR BLD AUTO: 30.4 % (ref 35–48)
HGB BLD-MCNC: 9.6 G/DL (ref 12–16)
IMM GRANULOCYTES # BLD AUTO: 0.06 X10(3) UL (ref 0–1)
IMM GRANULOCYTES NFR BLD: 1 %
LYMPHOCYTES # BLD AUTO: 0.89 X10(3) UL (ref 1–4)
LYMPHOCYTES NFR BLD AUTO: 15.3 %
MCH RBC QN AUTO: 32.5 PG (ref 26–34)
MCHC RBC AUTO-ENTMCNC: 31.6 G/DL (ref 31–37)
MCV RBC AUTO: 103.1 FL (ref 80–100)
MONOCYTES # BLD AUTO: 0.61 X10(3) UL (ref 0.1–1)
MONOCYTES NFR BLD AUTO: 10.5 %
NEUTROPHILS # BLD AUTO: 4.08 X10 (3) UL (ref 1.5–7.7)
NEUTROPHILS # BLD AUTO: 4.08 X10(3) UL (ref 1.5–7.7)
NEUTROPHILS NFR BLD AUTO: 70.5 %
OSMOLALITY SERPL CALC.SUM OF ELEC: 310 MOSM/KG (ref 275–295)
PLATELET # BLD AUTO: 371 10(3)UL (ref 150–450)
POTASSIUM SERPL-SCNC: 5.3 MMOL/L (ref 3.5–5.1)
PROT SERPL-MCNC: 6.6 G/DL (ref 5.7–8.2)
RBC # BLD AUTO: 2.95 X10(6)UL (ref 3.8–5.3)
SODIUM SERPL-SCNC: 143 MMOL/L (ref 136–145)
WBC # BLD AUTO: 5.8 X10(3) UL (ref 4–11)

## 2025-08-15 PROCEDURE — 93005 ELECTROCARDIOGRAM TRACING: CPT

## 2025-08-15 PROCEDURE — 72110 X-RAY EXAM L-2 SPINE 4/>VWS: CPT | Performed by: PHYSICIAN ASSISTANT

## 2025-08-15 PROCEDURE — 85025 COMPLETE CBC W/AUTO DIFF WBC: CPT | Performed by: EMERGENCY MEDICINE

## 2025-08-15 PROCEDURE — 85025 COMPLETE CBC W/AUTO DIFF WBC: CPT

## 2025-08-15 PROCEDURE — 99285 EMERGENCY DEPT VISIT HI MDM: CPT

## 2025-08-15 PROCEDURE — 99284 EMERGENCY DEPT VISIT MOD MDM: CPT

## 2025-08-15 PROCEDURE — 80053 COMPREHEN METABOLIC PANEL: CPT

## 2025-08-15 PROCEDURE — 36415 COLL VENOUS BLD VENIPUNCTURE: CPT

## 2025-08-15 PROCEDURE — 93010 ELECTROCARDIOGRAM REPORT: CPT

## 2025-08-15 PROCEDURE — 80053 COMPREHEN METABOLIC PANEL: CPT | Performed by: EMERGENCY MEDICINE

## 2025-08-15 RX ORDER — ACETAMINOPHEN 500 MG
1000 TABLET ORAL ONCE
Status: COMPLETED | OUTPATIENT
Start: 2025-08-15 | End: 2025-08-15

## 2025-08-15 RX ORDER — TRAMADOL HYDROCHLORIDE 50 MG/1
50 TABLET ORAL EVERY 6 HOURS PRN
Qty: 12 TABLET | Refills: 0 | Status: SHIPPED | OUTPATIENT
Start: 2025-08-15 | End: 2025-08-20

## 2025-08-15 RX ORDER — METHOCARBAMOL 500 MG/1
250 TABLET, FILM COATED ORAL 2 TIMES DAILY PRN
Qty: 10 TABLET | Refills: 0 | Status: ON HOLD | OUTPATIENT
Start: 2025-08-15 | End: 2025-09-01

## 2025-08-15 RX ORDER — LIDOCAINE 50 MG/G
1 PATCH TOPICAL EVERY 24 HOURS
Qty: 10 EACH | Refills: 0 | Status: ON HOLD | OUTPATIENT
Start: 2025-08-15

## 2025-08-18 ENCOUNTER — TELEPHONE (OUTPATIENT)
Facility: LOCATION | Age: 85
End: 2025-08-18

## 2025-08-18 ENCOUNTER — PATIENT OUTREACH (OUTPATIENT)
Age: 85
End: 2025-08-18

## 2025-08-18 LAB
ATRIAL RATE: 72 BPM
P AXIS: 54 DEGREES
P-R INTERVAL: 206 MS
Q-T INTERVAL: 388 MS
QRS DURATION: 86 MS
QTC CALCULATION (BEZET): 424 MS
R AXIS: -9 DEGREES
T AXIS: 26 DEGREES
VENTRICULAR RATE: 72 BPM

## 2025-08-19 ENCOUNTER — TELEPHONE (OUTPATIENT)
Facility: LOCATION | Age: 85
End: 2025-08-19

## 2025-08-19 ENCOUNTER — HOSPITAL ENCOUNTER (OUTPATIENT)
Dept: MRI IMAGING | Age: 85
Discharge: HOME | End: 2025-08-19
Attending: INTERNAL MEDICINE

## 2025-08-19 DIAGNOSIS — C85.10 LOW GRADE B-CELL LYMPHOMA (HCC): Primary | ICD-10-CM

## 2025-08-19 DIAGNOSIS — C34.81 MALIGNANT NEOPLASM OF OVERLAPPING SITES OF RIGHT LUNG (HCC): ICD-10-CM

## 2025-08-19 DIAGNOSIS — C34.81 MALIGNANT NEOPLASM OF OVERLAPPING SITES OF RIGHT LUNG (HCC): Primary | ICD-10-CM

## 2025-08-19 PROCEDURE — A9575 INJ GADOTERATE MEGLUMI 0.1ML: HCPCS | Performed by: INTERNAL MEDICINE

## 2025-08-19 PROCEDURE — 70553 MRI BRAIN STEM W/O & W/DYE: CPT | Performed by: INTERNAL MEDICINE

## 2025-08-19 RX ORDER — PROCHLORPERAZINE MALEATE 10 MG
10 TABLET ORAL EVERY 6 HOURS PRN
Qty: 30 TABLET | Refills: 3 | Status: SHIPPED | OUTPATIENT
Start: 2025-08-19

## 2025-08-19 RX ORDER — DIPHENHYDRAMINE HYDROCHLORIDE 50 MG/ML
10 INJECTION, SOLUTION INTRAMUSCULAR; INTRAVENOUS
Status: COMPLETED | OUTPATIENT
Start: 2025-08-19 | End: 2025-08-19

## 2025-08-19 RX ADMIN — DIPHENHYDRAMINE HYDROCHLORIDE 10 ML: 50 INJECTION, SOLUTION INTRAMUSCULAR; INTRAVENOUS at 20:19:00

## 2025-08-20 ENCOUNTER — VIRTUAL PHONE E/M (OUTPATIENT)
Facility: LOCATION | Age: 85
End: 2025-08-20
Attending: FAMILY MEDICINE

## 2025-08-20 ENCOUNTER — OFFICE VISIT (OUTPATIENT)
Facility: LOCATION | Age: 85
End: 2025-08-20
Attending: FAMILY MEDICINE

## 2025-08-20 VITALS
HEART RATE: 62 BPM | OXYGEN SATURATION: 92 % | WEIGHT: 110 LBS | RESPIRATION RATE: 16 BRPM | TEMPERATURE: 97 F | BODY MASS INDEX: 22.18 KG/M2 | DIASTOLIC BLOOD PRESSURE: 65 MMHG | SYSTOLIC BLOOD PRESSURE: 133 MMHG | HEIGHT: 59.02 IN

## 2025-08-20 DIAGNOSIS — J91.0 MALIGNANT PLEURAL EFFUSION (HCC): ICD-10-CM

## 2025-08-20 DIAGNOSIS — N18.32 CHRONIC KIDNEY DISEASE, STAGE 3B (HCC): ICD-10-CM

## 2025-08-20 DIAGNOSIS — C34.90 EGFR-RELATED LUNG CANCER (HCC): ICD-10-CM

## 2025-08-20 DIAGNOSIS — Z71.9 HEALTH EDUCATION/COUNSELING: ICD-10-CM

## 2025-08-20 DIAGNOSIS — C85.10 LOW GRADE B-CELL LYMPHOMA (HCC): ICD-10-CM

## 2025-08-20 DIAGNOSIS — C34.81 MALIGNANT NEOPLASM OF OVERLAPPING SITES OF RIGHT LUNG (HCC): Primary | ICD-10-CM

## 2025-08-20 DIAGNOSIS — D63.0 ANEMIA COMPLICATING NEOPLASTIC DISEASE: ICD-10-CM

## 2025-08-20 DIAGNOSIS — D64.9 NORMOCYTIC ANEMIA: ICD-10-CM

## 2025-08-20 DIAGNOSIS — I82.4Y2 DVT, LOWER EXTREMITY, PROXIMAL, ACUTE, LEFT (HCC): ICD-10-CM

## 2025-08-20 DIAGNOSIS — D47.2 MONOCLONAL PARAPROTEINEMIA: ICD-10-CM

## 2025-08-20 RX ORDER — ONDANSETRON 8 MG/1
8 TABLET, ORALLY DISINTEGRATING ORAL EVERY 8 HOURS PRN
Qty: 30 TABLET | Refills: 0 | Status: SHIPPED | OUTPATIENT
Start: 2025-08-20

## 2025-08-21 DIAGNOSIS — N18.30 STAGE 3 CHRONIC KIDNEY DISEASE, UNSPECIFIED WHETHER STAGE 3A OR 3B CKD (HCC): Primary | ICD-10-CM

## 2025-08-26 ENCOUNTER — LAB ENCOUNTER (OUTPATIENT)
Dept: LAB | Age: 85
End: 2025-08-26
Attending: INTERNAL MEDICINE

## 2025-08-26 DIAGNOSIS — N18.30 STAGE 3 CHRONIC KIDNEY DISEASE, UNSPECIFIED WHETHER STAGE 3A OR 3B CKD (HCC): ICD-10-CM

## 2025-08-26 DIAGNOSIS — N18.32 STAGE 3B CHRONIC KIDNEY DISEASE (HCC): ICD-10-CM

## 2025-08-26 LAB
ANION GAP SERPL CALC-SCNC: 15 MMOL/L (ref 0–18)
BUN BLD-MCNC: 32 MG/DL (ref 9–23)
CALCIUM BLD-MCNC: 9.9 MG/DL (ref 8.7–10.6)
CHLORIDE SERPL-SCNC: 108 MMOL/L (ref 98–112)
CO2 SERPL-SCNC: 22 MMOL/L (ref 21–32)
CREAT BLD-MCNC: 1.76 MG/DL (ref 0.55–1.02)
EGFRCR SERPLBLD CKD-EPI 2021: 28 ML/MIN/1.73M2 (ref 60–?)
FASTING STATUS PATIENT QL REPORTED: YES
GLUCOSE BLD-MCNC: 95 MG/DL (ref 70–99)
OSMOLALITY SERPL CALC.SUM OF ELEC: 307 MOSM/KG (ref 275–295)
POTASSIUM SERPL-SCNC: 4.7 MMOL/L (ref 3.5–5.1)
SODIUM SERPL-SCNC: 145 MMOL/L (ref 136–145)

## 2025-08-26 PROCEDURE — 36415 COLL VENOUS BLD VENIPUNCTURE: CPT

## 2025-08-26 PROCEDURE — 80048 BASIC METABOLIC PNL TOTAL CA: CPT

## 2025-08-29 ENCOUNTER — TELEPHONE (OUTPATIENT)
Facility: LOCATION | Age: 85
End: 2025-08-29

## 2025-09-01 PROBLEM — J96.01 ACUTE HYPOXEMIC RESPIRATORY FAILURE (HCC): Status: ACTIVE | Noted: 2025-09-01

## (undated) DIAGNOSIS — R35.0 URINARY FREQUENCY: Primary | ICD-10-CM

## (undated) DEVICE — 1200CC GUARDIAN II: Brand: GUARDIAN

## (undated) DEVICE — 4-WAY HIGH FLOW STOPCOCK W/ROTATING LUER: Brand: ICU MEDICAL

## (undated) DEVICE — 3M™ RED DOT™ MONITORING ELECTRODE WITH FOAM TAPE AND STICKY GEL, 50/BAG, 20/CASE, 72/PLT 2570: Brand: RED DOT™

## (undated) DEVICE — Device: Brand: ERBE

## (undated) DEVICE — Device

## (undated) DEVICE — NEB HANDHELD W/MOUTHPC

## (undated) DEVICE — NEEDLE ASPIR 22GA X 700MM SYR VLV ECHOGENIC

## (undated) DEVICE — CANNULA NSL AD W/ FLTR 14FT O2/CO2

## (undated) DEVICE — SYRINGE MED 10ML SLIP TIP CLR BRL TAPR PLUNG

## (undated) DEVICE — FILTERLINE NASAL ADULT O2/CO2

## (undated) DEVICE — KIT VLV 5 PC AIR H2O SUCT BX ENDOGATOR CONN

## (undated) DEVICE — EDGE FT 90 ENDOBRONCHIAL KIT

## (undated) DEVICE — FORCEPS BX 100CM 1.8MM RJ STD

## (undated) DEVICE — AIRLIFE™ VOLUMETRIC INCENTIVE SPIROMETER, 4000 ML: Brand: AIRLIFE

## (undated) DEVICE — 10FT COMBINED O2 DELIVERY/CO2 MONITORING. FILTER WITH MICROSTREAM TYPE LUER: Brand: DUAL ADULT NASAL CANNULA

## (undated) DEVICE — BOWLS UTILITY 16OZ

## (undated) DEVICE — VISION PROBE ADAPTER AND SUCTION ADAPTER

## (undated) DEVICE — 60 ML SYRINGE REGULAR TIP: Brand: MONOJECT

## (undated) DEVICE — SYRINGE MED 20ML STD CLR PLAS LL TIP N CTRL

## (undated) DEVICE — SNARE SURG L230CM MIN WRK CHN 2.8MM OVL 15MM

## (undated) DEVICE — BITEBLOCK ENDOSCP 60FR MAXI STRP

## (undated) DEVICE — KIT MFLD FOR SPEC COLL

## (undated) DEVICE — BIOPSY NEEDLE, 21G: Brand: FLEXISION

## (undated) DEVICE — NEEDLE ASP VIZISHOT 22GA 1.8MM

## (undated) DEVICE — SINGLE USE BIOPSY VALVE MAJ-210: Brand: SINGLE USE BIOPSY VALVE (STERILE)

## (undated) DEVICE — GIJAW SINGLE-USE MINI BIOPSY FORCEPS WITHOUT NEEDLE: Brand: GIJAW

## (undated) DEVICE — Device: Brand: BALLOON

## (undated) DEVICE — ELECTRODE EKG W3.5XL4CM ABRAD W1.25XL1.5IN

## (undated) DEVICE — MEDI-VAC NON-CONDUCTIVE SUCTION TUBING: Brand: CARDINAL HEALTH

## (undated) DEVICE — ADAPTER EDGE BRONCHOSCOPE

## (undated) DEVICE — ENDOSCOPY CHANNEL ADAPTER: Brand: ERBE

## (undated) DEVICE — CANISTER SUCT 1200CC LID BLU HRD ADPT AUTO

## (undated) DEVICE — KIT CUSTOM ENDOPROCEDURE STERIS

## (undated) DEVICE — SWIVEL CONNECTOR

## (undated) DEVICE — MEDI-VAC SUCTION HANDLE REGULAR CAPACITY: Brand: CARDINAL HEALTH

## (undated) DEVICE — KENDALL SCD EXPRESS SLEEVES, KNEE LENGTH, MEDIUM: Brand: KENDALL SCD

## (undated) DEVICE — PATCH SENSOR PATIENT

## (undated) DEVICE — MAJ-1414 SINGLE USE ADPATER BIOPSY VALV: Brand: SINGLE USE ADAPTOR BIOPSY VALVE

## (undated) DEVICE — ENDOSCOPY PACK UPPER: Brand: MEDLINE INDUSTRIES, INC.

## (undated) DEVICE — SINGLE USE SUCTION VALVE MAJ-209: Brand: SINGLE USE SUCTION VALVE (STERILE)

## (undated) DEVICE — ELECTRODE ES AD CRD L9FT COND ADH HYDRGEL REM

## (undated) DEVICE — FORCEPS BIOPSY PREMARKED

## (undated) DEVICE — BOWL MED MD 16OZ PLAS CAP GRAD

## (undated) DEVICE — MASK,FACE,MAXFLUIDPROTECT,SHIELD/TIES: Brand: MEDLINE

## (undated) NOTE — LETTER
December 2, 2021      [address]  [address] [facility] [address]        Dear Frank Mart: It was a pleasure speaking with you over the phone recently.  To follow up, I wanted to send you my contact information to utilize when you h

## (undated) NOTE — LETTER
Patient Name: Michell Pierre  : 1940  MRN: [id number]  Patient Address: [address] [place] [address]      Coronavirus Disease 2019 (COVID-19)     [facility] is committed to the safety and well-being of our patients, terrell carefully. If your symptoms get worse, call your healthcare provider immediately. 3. Get rest and stay hydrated.    4. If you have a medical appointment, call the healthcare provider ahead of time and tell them that you have or may have COVID-19.  5. For m of fever-reducing medications; and  · Improvement in respiratory symptoms (e.g., cough, shortness of breath); and  · At least 10 days have passed since symptoms first appeared OR if asymptomatic patient or date of symptom onset is unclear then use 10 days donors must:    · Have had a confirmed diagnosis of COVID-19  · Be symptom-free for at least 14 days*    *Some people will be required to have a repeat COVID-19 test in order to be eligible to donate.  If you’re instructed by [facility] that a repeat test is r prevent PASC?   The best way to prevent the long-term symptoms of COVID-19 is by getting the COVID 19 vaccine as soon as it is available to you, wear a mask in public, avoid large gatherings, wash your hands frequently, and stay at least 6 feet away from ot

## (undated) NOTE — LETTER
Shelli [address] [address]  [place], [address] [facility]    Consent for Operation  Date: __________________                                Time: _______________    1.  I authorize the performance upon Coralee Counter the following operation:  Procedsaurav revealed by the pictures or by descriptive texts accompanying them. If the procedure has been videotaped, the surgeon will obtain the original videotape. The hospital will not be responsible for storage or maintenance of this tape.   7. For the purpose of a THAT MY DOCTOR PROVIDED ME WITH THE ABOVE EXPLANATIONS, THAT ALL BLANKS OR STATEMENTS REQUIRING INSERTION OR COMPLETION WERE FILLED IN.     Signature of Patient:   ___________________________    When the patient is a minor or mentally incompetent to give co iii. All of the medicines I take (including prescriptions, herbal supplements, and pills I can buy without a prescription (including street drugs/illegal medications).  Failure to inform my anesthesiologist about these medicines may increase my risk of anes _____________________________________________________________________________  Anesthesiologist Signature     Date   Time  I have discussed the procedure and information above with the patient (or patient’s representative) and answered their questions.  The

## (undated) NOTE — Clinical Note
Initial assessment completed with patient. Pt declines ZE/ER f/u with you or partners, declines TCC appt, declines scheduling with ortho, Dr. ATIYA Chau. Pt prefers to f/u with her own ortho, outside of [place]. Sent TE to office staff as FYI/ZE protocol. Patient declines additional follow-up calls from nurse care manager.  Thank you!   Future Appointments 4/6/2025   1:00 PM    [facility] CT 1                 [facility] CT              [place] 4/15/2025  2:45 PM    Debbie Chang MD       [facility]

## (undated) NOTE — LETTER
Patient Name: Rossy Car  -Age / Sex: 1940-A: 84 y  female   Medical Records: [id number] CSN: [id number]    ABNORMAL VALUES  Surgeon(s):  Jamal Torres MD  Anesthesia Type: General  Date of Surgery: 2024  Procedure Description: ROBOT-ASSISTED NAVIGATIONAL BRONCHOSCOPY, ENDOBRONCHIAL ULTRASOUND /RADIAL PROBE ,FLUOROSCOPY/LINEAR ENDOBRONCHIAL ULTRASOUND/CRYOPROBE/CYTOLOGY  Primary Surgeon:  Jamal Torres MD  Phone Number: [phone]    PLEASE NOTE THE FOLLOWING ABNORMALITIES:   Chemistry  ; elevated creatinine 1.92  ________________________________________________________  Anesthesia to review patient's chart

## (undated) NOTE — ED AVS SNAPSHOT
Bk Maneatherwood   MRN: [id number]    Department:  [facility] Emergency Department in [place]   Date of Visit:  2/21/2018           Disclosure     Insurance plans vary and the physician(s) referred by the ER may not be covered by your plan.  Please conta tell this physician (or your personal doctor if your instructions are to return to your personal doctor) about any new or lasting problems. The primary care or specialist physician will see patients referred from the [facility] Emergency Department.  Shelton Lombard

## (undated) NOTE — ED AVS SNAPSHOT
Miranda Malloy   MRN: [id number]    Department:  THE [facility] Emergency Department in [place]   Date of Visit:  1/10/2019           Disclosure     Insurance plans vary and the physician(s) referred by the ER may not be covered by your plan.  Please conta tell this physician (or your personal doctor if your instructions are to return to your personal doctor) about any new or lasting problems. The primary care or specialist physician will see patients referred from the [facility] Emergency Department.  Christa Blackwell

## (undated) NOTE — ED AVS SNAPSHOT
Michell Pierre   MRN: [id number]    Department:  [facility] Emergency Department in [place]   Date of Visit:  12/24/2018           Disclosure     Insurance plans vary and the physician(s) referred by the ER may not be covered by your plan.  Please cont tell this physician (or your personal doctor if your instructions are to return to your personal doctor) about any new or lasting problems. The primary care or specialist physician will see patients referred from the [facility] Emergency Department.  Carole Pederson

## (undated) NOTE — ED AVS SNAPSHOT
Radha Kirk   MRN: [id number]    Department:  THE [facility] Emergency Department in [place]   Date of Visit:  5/24/2018           Disclosure     Insurance plans vary and the physician(s) referred by the ER may not be covered by your plan.  Please conta tell this physician (or your personal doctor if your instructions are to return to your personal doctor) about any new or lasting problems. The primary care or specialist physician will see patients referred from the [facility] Emergency Department.  Severo Pastures

## (undated) NOTE — LETTER
April 11, 2018        [address]  [address] [id number]      Dear Zoya Lobo:    I am the Nurse Care Manager with Dr. Caity Walker office. Just reaching out to see how you are doing since your discharge home.    When you have a minut

## (undated) NOTE — Clinical Note
Initial assessment completed with patient.  Appointment scheduled for 2/5/25 followed up today with Dr. Noe.  However, patient declines additional follow-up calls from nurse care manager.  Thank you!

## (undated) NOTE — ED AVS SNAPSHOT
Katie Tellez   MRN: [id number]    Department:  [address] [facility] Emergency Department in [place]   Date of Visit:  2/22/2019           Disclosure     Insurance plans vary and the physician(s) referred by the ER may not be covered by your plan.  Please conta tell this physician (or your personal doctor if your instructions are to return to your personal doctor) about any new or lasting problems. The primary care or specialist physician will see patients referred from the [facility] Emergency Department.  Kemal Staton

## (undated) NOTE — MR AVS SNAPSHOT
After Visit Summary   5/23/2017    Burgess Yoder    MRN: [id number]           Diagnoses this Visit     Abnormal finding on imaging    -  Primary     Malignant neoplasm of overlapping sites of right lung (HCC)         Normocytic normochromic anemi Patient Instructions     None      Please Note     If a lab draw is part of your appointment, please arrive 15 minutes early. Follow-up Instructions     Return in about 4 months (around 9/23/2017).       To Do List     Tuesday May 23, 2017     Kim Yeung I Component Results     Component Value Flag Ref Range Units Status    Glucose 86  70-99  mg/dL Final    BUN 48 (H) 8-20  mg/dL Final    Creatinine 1.77 (H) 0.55-1.02  mg/dL Final    GFR 28 (L) >=60   Final    Comment:       Estimated GFR units: mL/min/1. MCH 34.0 (H) 27.0-33.2  pg Final    MCHC 33.3  31.0-37.0  g/dL Final    RDW 14.0  11.5-16.0  % Final    RDW-SD 52.9 (H) 35.1-46.3  fL Final    Neutrophil Absolute Prelim 3.70  1.30-6.70  x10 (3) uL Final    Neutrophil Absolute 3.70  1.30-6.70  x10(3) uL F

## (undated) NOTE — IP AVS SNAPSHOT
After Visit Summary   6/1/2017    Luís Rios    MRN: [id number]           Diagnoses this Visit     Metastatic lung carcinoma, right (HCC)    -  Primary       Allergies     Penicillins Itching      Your Vital Signs Were     BP Pulse Temp(Src) R

## (undated) NOTE — MR AVS SNAPSHOT
After Visit Summary   1/24/2017    Burgess Yoder    MRN: [id number]           Diagnoses this Visit     Malignant neoplasm of overlapping sites of right lung [facility])    -  Primary     Normocytic normochromic anemia         Other iron deficiency anem Wednesday May 24, 2017     LAB:  LD[facility]             MyChart     Visit [employer]  You can access your MyChart to more actively manage your health care and view more details from this visit by going to [email or web address].   If you've recently had a stay

## (undated) NOTE — ED AVS SNAPSHOT
Radha Kirk   MRN: [id number]    Department:  THE [facility] Emergency Department in [place]   Date of Visit:  10/20/2018           Disclosure     Insurance plans vary and the physician(s) referred by the ER may not be covered by your plan.  Please cont tell this physician (or your personal doctor if your instructions are to return to your personal doctor) about any new or lasting problems. The primary care or specialist physician will see patients referred from the [facility] Emergency Department.  Bo Hannah

## (undated) NOTE — ED AVS SNAPSHOT
Bk Maneatherwood   MRN: [id number]    Department:  THE [facility] Emergency Department in [place]   Date of Visit:  9/20/2018           Disclosure     Insurance plans vary and the physician(s) referred by the ER may not be covered by your plan.  Please conta tell this physician (or your personal doctor if your instructions are to return to your personal doctor) about any new or lasting problems. The primary care or specialist physician will see patients referred from the [facility] Emergency Department.  Tip Robb

## (undated) NOTE — LETTER
Patient Name: Rossy Car  Surgery Date: 11/8/2024  Medical Record: [id number] CSN: [id number]      Surgeon(s):  Jamal Torres MD  Consent Procedure: ROBOT-ASSISTED NAVIGATIONAL BRONCHOSCOPY, ENDOBRONCHIAL ULTRASOUND (EBUS)/RADIAL PROBE ENDOBRONCHIAL ULTRASOUND/FLUOROSCOPY/LINEAR ENDOBRONCHIAL ULTRASOUND/CRYOPROBE/CYTOLOGY  Anesthesia Type: General    Please advise patient regarding stoppage of Xarelto prior to this procedure.      Your prompt response is appreciated,  NAKUL Motley  PreADmission Testing

## (undated) NOTE — LETTER
Printed: 2019    Patient Name: Tashi Pat  : 1940   Medical Record #: [id number]    Consent to Cancer Treatment    I, Tashi Pat, understand that I have been diagnosed with Low Grade Non-Hodgkin Lymphoma.     I understand that t I have read and fully understand this consent to cancer treatment. I acknowledge that the explanations above were made. The physicians have answered all my questions and I consent to the use of Medication as noted above.         Date:__________  Time:______